# Patient Record
Sex: MALE | Race: WHITE | NOT HISPANIC OR LATINO | Employment: OTHER | URBAN - METROPOLITAN AREA
[De-identification: names, ages, dates, MRNs, and addresses within clinical notes are randomized per-mention and may not be internally consistent; named-entity substitution may affect disease eponyms.]

---

## 2020-07-06 ENCOUNTER — OFFICE VISIT (OUTPATIENT)
Dept: ORTHOPEDIC SURGERY | Facility: CLINIC | Age: 70
End: 2020-07-06

## 2020-07-06 VITALS
HEIGHT: 68 IN | DIASTOLIC BLOOD PRESSURE: 75 MMHG | SYSTOLIC BLOOD PRESSURE: 165 MMHG | HEART RATE: 93 BPM | BODY MASS INDEX: 33.25 KG/M2 | WEIGHT: 219.4 LBS

## 2020-07-06 DIAGNOSIS — M25.561 RIGHT KNEE PAIN, UNSPECIFIED CHRONICITY: Primary | ICD-10-CM

## 2020-07-06 DIAGNOSIS — M17.11 PRIMARY OSTEOARTHRITIS OF RIGHT KNEE: ICD-10-CM

## 2020-07-06 PROCEDURE — 20610 DRAIN/INJ JOINT/BURSA W/O US: CPT | Performed by: FAMILY MEDICINE

## 2020-07-06 PROCEDURE — 99203 OFFICE O/P NEW LOW 30 MIN: CPT | Performed by: FAMILY MEDICINE

## 2020-07-06 RX ORDER — GLIPIZIDE 10 MG/1
10 TABLET ORAL NIGHTLY
COMMUNITY

## 2020-07-06 RX ORDER — PRAVASTATIN SODIUM 10 MG
20 TABLET ORAL NIGHTLY
COMMUNITY
Start: 2020-04-14 | End: 2021-05-27 | Stop reason: ALTCHOICE

## 2020-07-06 RX ORDER — TRIAMCINOLONE ACETONIDE 40 MG/ML
80 INJECTION, SUSPENSION INTRA-ARTICULAR; INTRAMUSCULAR
Status: COMPLETED | OUTPATIENT
Start: 2020-07-06 | End: 2020-07-06

## 2020-07-06 RX ORDER — GLYBURIDE 5 MG/1
TABLET ORAL
Status: ON HOLD | COMMUNITY
End: 2020-10-21

## 2020-07-06 RX ORDER — LISINOPRIL 40 MG/1
40 TABLET ORAL NIGHTLY
COMMUNITY

## 2020-07-06 RX ORDER — LOSARTAN POTASSIUM 100 MG/1
TABLET ORAL
Status: ON HOLD | COMMUNITY
End: 2020-10-21

## 2020-07-06 RX ORDER — ERGOCALCIFEROL 1.25 MG/1
50000 CAPSULE ORAL
COMMUNITY
Start: 2020-04-14

## 2020-07-06 RX ORDER — AMLODIPINE BESYLATE 5 MG/1
5 TABLET ORAL NIGHTLY
COMMUNITY
Start: 2020-04-14

## 2020-07-06 RX ORDER — ALBUTEROL SULFATE 90 UG/1
1 AEROSOL, METERED RESPIRATORY (INHALATION) EVERY 4 HOURS PRN
Status: ON HOLD | COMMUNITY
End: 2021-05-19

## 2020-07-06 RX ORDER — GABAPENTIN 300 MG/1
300 CAPSULE ORAL 3 TIMES DAILY
COMMUNITY

## 2020-07-06 RX ADMIN — TRIAMCINOLONE ACETONIDE 80 MG: 40 INJECTION, SUSPENSION INTRA-ARTICULAR; INTRAMUSCULAR at 13:02

## 2020-07-06 NOTE — PROGRESS NOTES
Procedure   Large Joint Arthrocentesis: R knee  Date/Time: 7/6/2020 1:02 PM  Consent given by: patient  Site marked: site marked  Timeout: Immediately prior to procedure a time out was called to verify the correct patient, procedure, equipment, support staff and site/side marked as required   Supporting Documentation  Indications: pain   Procedure Details  Location: knee - R knee  Preparation: Patient was prepped and draped in the usual sterile fashion  Needle size: 25 G  Approach: anteromedial  Medications administered: 80 mg triamcinolone acetonide 40 MG/ML (2cc of 1% lidocaine without epinepherine, and 2cc of 40mg Kenalog)  Patient tolerance: patient tolerated the procedure well with no immediate complications (Blood loss negligable, pt admits to immediate decrease in pain and improved ROM with gentle ambulation post injection.)

## 2020-07-06 NOTE — PROGRESS NOTES
"Primary Care Sports Medicine Office Visit Note     Patient ID: Brenden Peter is a 69 y.o. male.    Chief Complaint:  Chief Complaint   Patient presents with   • Right Knee - Initial Evaluation, Pain, Edema     X 1 year     HPI:    Mr. Brenden Peter is a 69 y.o. male who presents to the clinic today for bilateral knee pain. Pt states that he has had knee pain for about 1 year. Denies any injury or trauma. Insidious onset. Since that time, has tried rest, icing, patches. Has attempted corticosteroid injections, and HA injections with his previous physicians, with no improvement in pain.     Past Medical History:   Diagnosis Date   • Anxiety    • Diabetes (CMS/HCC)    • Hyperlipidemia    • Hypertension        History reviewed. No pertinent surgical history.    Family History   Problem Relation Age of Onset   • Cancer Other    • Diabetes Other    • Heart disease Other      Social History     Occupational History   • Not on file   Tobacco Use   • Smoking status: Current Every Day Smoker     Packs/day: 0.50     Types: Cigarettes   • Smokeless tobacco: Never Used   Substance and Sexual Activity   • Alcohol use: Not Currently     Frequency: Never   • Drug use: Never   • Sexual activity: Defer      Review of Systems   Constitutional: Negative for activity change and fever.   Respiratory: Negative for cough and shortness of breath.    Cardiovascular: Negative for chest pain.   Gastrointestinal: Negative for constipation, diarrhea, nausea and vomiting.   Musculoskeletal: Positive for arthralgias.   Skin: Negative for color change and rash.   Neurological: Negative for weakness.   Hematological: Does not bruise/bleed easily.     Objective:    /75 (BP Location: Left arm, Patient Position: Sitting, Cuff Size: Large Adult)   Pulse 93   Ht 172.7 cm (68\")   Wt 99.5 kg (219 lb 6.4 oz)   BMI 33.36 kg/m²     Physical Examination:  Physical Exam   Constitutional: He appears well-developed and well-nourished. No distress. " "  HENT:   Head: Normocephalic and atraumatic.   Eyes: Conjunctivae are normal.   Cardiovascular: Intact distal pulses.   Pulmonary/Chest: Effort normal. No respiratory distress.   Musculoskeletal:        Right knee: He exhibits no effusion.   Neurological: He is alert.   Skin: Skin is warm. Capillary refill takes less than 2 seconds. He is not diaphoretic.   Nursing note and vitals reviewed.    Right Knee Exam     Muscle Strength   The patient has normal right knee strength.    Tenderness   The patient is experiencing tenderness in the medial joint line.    Range of Motion   Extension: normal   Flexion: normal     Tests   Wilson:  Medial - negative Lateral - negative  Varus: negative Valgus: negative  Right knee patellar apprehension test: +patellar grind, +luis jeong.    Other   Erythema: absent  Sensation: normal  Pulse: present (distal to the knee, DP and PT palpable)  Swelling: none  Effusion: no effusion present          Imaging and other tests:  Three-view XR of the right knee reveals significant joint space loss with bone-on-bone arthralgia of the medial compartment.  Lateral and patellofemoral compartment joint spaces are moderately well preserved.  There is sclerosis of the tibial plateau of the medial aspect of the knee as well.  Varus knee.  Otherwise, no acute bony abnormality.    Assessment and Plan:    1. Right knee pain, unspecified chronicity  - XR Knee 3 View Right    2. Primary osteoarthritis of right knee    After discussion of risks and benefits, and all other treatment options, the patient elected to proceed with corticosteroid injection to the right knee.  He tolerated this procedure well without complaints or problems.  Continue conservative management otherwise, RTC in 3 months, or sooner if necessary.    Luis Daniel MELLO \"Chance\" Gil MARIE DO, CAQSM  07/06/20  13:01    Disclaimer: Please note that areas of this note were completed with computer voice recognition software.  Quite often " unanticipated grammatical, syntax, homophones, and other interpretive errors are inadvertently transcribed by the computer software. Please excuse any errors that have escaped final proofreading.

## 2020-09-15 ENCOUNTER — OFFICE VISIT (OUTPATIENT)
Dept: ORTHOPEDIC SURGERY | Facility: CLINIC | Age: 70
End: 2020-09-15

## 2020-09-15 VITALS — HEIGHT: 67 IN | WEIGHT: 212 LBS | BODY MASS INDEX: 33.27 KG/M2

## 2020-09-15 DIAGNOSIS — M17.12 PRIMARY OSTEOARTHRITIS OF LEFT KNEE: ICD-10-CM

## 2020-09-15 DIAGNOSIS — M17.11 PRIMARY OSTEOARTHRITIS OF RIGHT KNEE: Primary | ICD-10-CM

## 2020-09-15 PROCEDURE — 99214 OFFICE O/P EST MOD 30 MIN: CPT | Performed by: ORTHOPAEDIC SURGERY

## 2020-09-15 RX ORDER — MELOXICAM 7.5 MG/1
15 TABLET ORAL ONCE
Status: CANCELLED | OUTPATIENT
Start: 2020-09-15 | End: 2020-09-15

## 2020-09-15 RX ORDER — OXYCODONE HCL 10 MG/1
10 TABLET, FILM COATED, EXTENDED RELEASE ORAL ONCE
Status: CANCELLED | OUTPATIENT
Start: 2020-09-15 | End: 2020-09-15

## 2020-09-15 RX ORDER — ACETAMINOPHEN 325 MG/1
1000 TABLET ORAL ONCE
Status: CANCELLED | OUTPATIENT
Start: 2020-09-15 | End: 2020-09-15

## 2020-09-15 NOTE — PROGRESS NOTES
NEW/FOLLOW UP VISIT    Patient: Brenden Peter  ?  YOB: 1950    MRN: [id number]  ?  Chief Complaint   Patient presents with   • Right Knee - Consult      ?  HPI:   Brenden Vásquez is being seen in consultation at the request of Dr. Espinzoa Cordero MD.  The patient states that he has continuous pain and discomfort in his knee.  He is limping just about all the time.  He has tried every form of nonoperative management but continues to have a lot of discomfort in his right knee.  He states that he is afraid of falling because a couple of times is knee has buckled and given out from underneath him.  The patient states that he has difficulty with walking on uneven surfaces especially going up and down the steps.  He is tried steroid injections as well as gel injections into the joint which have really not helped him.  He states that both his knees bother him considerably but the right one is much more symptomatic than the left knee.      Pain Location: BILATERAL knee  Radiation: none  Quality: dull, aching  Intensity/Severity: moderate  Duration: 2 years  Onset quality: gradual   Timing: constant  Aggravating Factors: kneeling, rising after sitting, squatting  Alleviating Factors: NSAIDs, steroid injection (intra-articular), brace  Previous Episodes: yes  Associated Symptoms: pain, swelling, clicking/popping  ADLs Affected: ambulating, recreational activities/sports  Previous Treatment: Anti-inflammatory medication and intra-articular injections of steroid.    This patient is an established patient.  This problem is new to this examiner.      Allergies: No Known Allergies    Medications:   Home Medications:  Current Outpatient Medications on File Prior to Visit   Medication Sig   • albuterol sulfate  (90 Base) MCG/ACT inhaler albuterol sulfate HFA 90 mcg/actuation aerosol inhaler   1 - 2 puffs every 4 to 6 hours as needed for cough or shortness of breath   • amLODIPine (NORVASC) 5 MG tablet     • gabapentin (NEURONTIN) 300 MG capsule gabapentin 300 mg capsule   • glipizide (GLUCOTROL) 10 MG tablet glipizide 10 mg tablet   TAKE 1 TABLET BY MOUTH EVERY DAY AS DIRECTED   • glyburide (DIAbeta) 5 MG tablet glyburide 5 mg tablet   • lisinopril (PRINIVIL,ZESTRIL) 40 MG tablet lisinopril 40 mg tablet   • losartan (COZAAR) 100 MG tablet losartan 100 mg tablet   • metFORMIN (GLUCOPHAGE) 1000 MG tablet    • pravastatin (PRAVACHOL) 10 MG tablet    • sertraline (ZOLOFT) 50 MG tablet    • vitamin D (ERGOCALCIFEROL) 1.25 MG (60404 UT) capsule capsule      No current facility-administered medications on file prior to visit.      Current Medications:  Scheduled Meds:  PRN Meds:.    I have reviewed the patient's medical history in detail and updated the computerized patient record.  Review and summarization of old records include:    Past Medical History:   Diagnosis Date   • Anxiety    • Diabetes (CMS/HCC)    • Hyperlipidemia    • Hypertension      No past surgical history on file.  Social History     Occupational History   • Not on file   Tobacco Use   • Smoking status: Current Every Day Smoker     Packs/day: 0.50     Types: Cigarettes   • Smokeless tobacco: Never Used   Substance and Sexual Activity   • Alcohol use: Not Currently     Frequency: Never   • Drug use: Never   • Sexual activity: Defer      Family History   Problem Relation Age of Onset   • Cancer Other    • Diabetes Other    • Heart disease Other          Review of Systems   Constitutional: Negative.  Negative for fever.   HENT: Negative.    Eyes: Negative.    Respiratory: Negative.    Cardiovascular: Negative.    Endocrine: Negative.    Genitourinary: Negative.    Musculoskeletal: Positive for arthralgias, gait problem and joint swelling.   Skin: Negative.  Negative for rash and wound.   Allergic/Immunologic: Negative.    Neurological: Negative for numbness.   Hematological: Negative.    Psychiatric/Behavioral: Negative.           Wt Readings from Last 3  "Encounters:   09/15/20 96.2 kg (212 lb)   07/06/20 99.5 kg (219 lb 6.4 oz)     Ht Readings from Last 3 Encounters:   09/15/20 170.2 cm (67\")   07/06/20 172.7 cm (68\")     Body mass index is 33.2 kg/m².  Facility age limit for growth percentiles is 20 years.  There were no vitals filed for this visit.      Physical Exam  Constitutional: Patient is oriented to person, place, and time. Appears well-developed and well-nourished.   HENT:   Head: Normocephalic and atraumatic.   Eyes: Conjunctivae and EOM are normal. Pupils are equal, round, and reactive to light.   Cardiovascular: Normal rate, regular rhythm, normal heart sounds and intact distal pulses.   Pulmonary/Chest: Effort normal and breath sounds normal.   Musculoskeletal:   See detailed exam below   Neurological: Alert and oriented to person, place, and time. No sensory deficit. Coordination normal.   Skin: Skin is warm and dry. Capillary refill takes less than 2 seconds. No rash noted. No erythema.   Psychiatric: Patient has a normal mood and affect. His behavior is normal. Judgment and thought content normal.   Nursing note and vitals reviewed.      Ortho Exam:   Bilateral knee (varus). Patient has crepitus throughout range of motion. Positive patellar grind test. Mild effusion. Lachman is negative. Pivot shift is negative. Anterior and posterior drawer signs are negative. Significant joint line tenderness is noted on the medial aspect of the knee. Patient has a varus orientation of the knee. There is fullness and tenderness in the Popliteal fossa. Mild distention of a Popliteal cyst is noted in this location. Range of motion in flexion is from 5-110 degrees. Neurovascular status is intact.  Dorsalis pedis and posterior tibial artery pulses are palpable. Common peroneal nerve function is well preserved. Patient's gait is cautious and antalgic. Skin and soft tissues are mildly swollen, consistent with synovitis and effusion. The patient has a significant limp " with the first few steps after starting the gait cycle. Getting out of a chair takes a lot of effort due to pain on knee flexion.               Diagnostics:  no diagnostic testing performed this visit  Previously obtained x-rays are reviewed.  There is varus deformity of the knee.  Medial bone-on-bone appearance is noted.  There does appear to be some deterioration of the medial femoral condyle bone.  There is subchondral collapse of the bone as well.  The patellofemoral distance is significantly narrowed.  My recommendation is to proceed with total knee arthroplasty based on a combination of radiographic findings and clinical presentation.    Assessment:  Brenden was seen today for consult.    Diagnoses and all orders for this visit:    Primary osteoarthritis of right knee    Primary osteoarthritis of left knee          Procedures  ?    Plan    · Compression/brace to the knee to prevent her from buckling and giving out.  · Intra-articular injections discussed and offered to the patient and he would try that on the left side but on the right side he wants to proceed with total knee arthroplasty.  · Time spent in the office today with the patient is 30 minutes of which greater than 50% of my time was spent face-to-face with the patient going over treatment options including the rationale for total knee arthroplasty.  · We will schedule him for right knee replacement in the near future after medical clearance has been sought and obtained.  The patient was seen today for preoperative discussion.  The patient has been tried on over-the-counter and prescription NSAID's despite the risks of anti-inflammatory bleeding, peptic ulcers and erosive gastritis with short term benefit only.  Braces have been prescribed for mechanical support.  Patient has been participating in an exercise program specifically targeting joint pain relief with limited benefit. Intraarticular injections have been used periodically with some but not  complete relief of pain.  Ambulation aids have also been utilized.    · The details of the surgical procedure were explained including the location of probable incisions and a description of the likely hardware/grafts to be used. The patient understands the likely convalescence after surgery as well as the rehabilitation required.  Also, we have thoroughly discussed with the patient the risks, benefits and alternatives to surgery.  Risks include but are not limited to the risk of infection, joint stiffness, limited range of motion, wound healing problems, scar tissue build up, myocardial infarction, stroke, blood clots (including DVT and/or pulmonary embolus along with the risk of death) neurologic and/or vascular injury, limb length discrepancy, fracture, dislocation, nonunion, malunion, continued pain and need for further surgery including hardware failure requiring revision.   · Rest, ice, compression, and elevation (RICE) therapy  · Stretching and strengthening exercises of the quads and the hamstrings.  · Tylenol 500-1000mg by mouth every 6 hours as needed for pain   · Follow up in 6 week(s)    Date of encounter: 09/15/2020   Ravi Fagan MD

## 2020-10-05 ENCOUNTER — TELEPHONE (OUTPATIENT)
Dept: ORTHOPEDIC SURGERY | Facility: CLINIC | Age: 70
End: 2020-10-05

## 2020-10-05 NOTE — TELEPHONE ENCOUNTER
PATIENT CALLED STATING HE WOULD LIKE TO HAVE BOTH KNEES REPLACED AT THE SAME TIME. I TOLD THE PATIENT WE WOULD HAVE TO LOOK INTO THIS AND SOMEONE WOULD GIVE HIM A CALL TO LET HIM KNOW AFTER WE CHECK WITH DR. SWEENEY NEXT WEEK. PLEASE ADVISE IF THIS IS POSSIBLE TO DO

## 2020-10-05 NOTE — TELEPHONE ENCOUNTER
I would strongly recommend against proceeding with simultaneous bilateral total knee arthroplasty in the same setting under the same anesthetic.  At this point the patient is 69 years of age and has a very significant deformity in his knees.  Even the primary surgery is likely to be fairly involved and complex and his recovery will definitely be complex.  The risk of potential complications related to the heart of the lungs is much higher with bilateral simultaneous knee replacement surgery.  It is my medical advice for the patient that he should have one knee replaced first, the most symptomatic 1.  Subsequently when he has recovered we should be able to go ahead with the neck surgery in about 6 to 8 weeks assuming that he has done very well with the first surgery.  Please communicate this to the patient.

## 2020-10-13 RX ORDER — PIOGLITAZONEHYDROCHLORIDE 30 MG/1
30 TABLET ORAL NIGHTLY
COMMUNITY

## 2020-10-13 NOTE — PAT
Secure chat sent to [facility] Porter and Dr Fagan.  No U/A order has been received for this total joint pt by your office.  If needed please place order.

## 2020-10-15 ENCOUNTER — OFFICE VISIT (OUTPATIENT)
Dept: CARDIOLOGY | Facility: CLINIC | Age: 70
End: 2020-10-15

## 2020-10-15 VITALS
HEIGHT: 67 IN | OXYGEN SATURATION: 96 % | BODY MASS INDEX: 35.63 KG/M2 | SYSTOLIC BLOOD PRESSURE: 152 MMHG | WEIGHT: 227 LBS | DIASTOLIC BLOOD PRESSURE: 79 MMHG | HEART RATE: 82 BPM

## 2020-10-15 DIAGNOSIS — I10 ESSENTIAL HYPERTENSION: ICD-10-CM

## 2020-10-15 DIAGNOSIS — E78.2 MIXED HYPERLIPIDEMIA: ICD-10-CM

## 2020-10-15 DIAGNOSIS — E11.9 TYPE 2 DIABETES MELLITUS WITHOUT COMPLICATION, WITHOUT LONG-TERM CURRENT USE OF INSULIN (HCC): ICD-10-CM

## 2020-10-15 DIAGNOSIS — Z01.810 PREOP CARDIOVASCULAR EXAM: ICD-10-CM

## 2020-10-15 DIAGNOSIS — R06.09 DYSPNEA ON EXERTION: ICD-10-CM

## 2020-10-15 DIAGNOSIS — I20.9 ANGINA PECTORIS (HCC): Primary | ICD-10-CM

## 2020-10-15 PROCEDURE — 93306 TTE W/DOPPLER COMPLETE: CPT | Performed by: INTERNAL MEDICINE

## 2020-10-15 PROCEDURE — 99204 OFFICE O/P NEW MOD 45 MIN: CPT | Performed by: INTERNAL MEDICINE

## 2020-10-15 NOTE — PROGRESS NOTES
"    Subjective:     Encounter Date:10/15/2020      Patient ID: Brenden Peter is a 69 y.o. male.    Chief Complaint: Abnormal EKG, Preop evaluation  History of Present Illness  69-year-old white male patient with multiple cardiac risk factors now scheduled for right total knee replacement  Patient have very limited activity due to the knee pain he has some dyspnea on exertion, Could be angina equilance  Multiple cardiac risk factors  EKG showed sinus rhythm right bundle branch block septal Q waves  Patient was advised echocardiogram to rule out any structural heart disease and a pharmacological stress Myoview  Blood pressure needs to be aggressively controlled  Patient was advised to stop smoking modify cardiac risk factors  The following portions of the patient's history were reviewed and updated as appropriate: Allergies current medications past family history past medical history past social history past surgical history problem list and review of systems    /79 (BP Location: Left arm, Patient Position: Sitting, Cuff Size: Adult)   Pulse 82   Ht 170.2 cm (67\")   Wt 103 kg (227 lb)   SpO2 96%   BMI 35.55 kg/m²     Past Medical History:   Diagnosis Date   • Anxiety    • Diabetes (CMS/[facility])    • Hyperlipidemia    • Hypertension      History reviewed. No pertinent surgical history.  Social History     Socioeconomic History   • Marital status:      Spouse name: Not on file   • Number of children: Not on file   • Years of education: Not on file   • Highest education level: Not on file   Tobacco Use   • Smoking status: Current Every Day Smoker     Packs/day: 0.50     Types: Cigarettes   • Smokeless tobacco: Never Used   Substance and Sexual Activity   • Alcohol use: Not Currently     Frequency: Never   • Drug use: Never   • Sexual activity: Defer     Family History   Problem Relation Age of Onset   • Cancer Other    • Diabetes Other    • Heart disease Other    • No Known Problems Mother    • No Known " Problems Father    • No Known Problems Sister    • No Known Problems Brother    • No Known Problems Maternal Aunt    • No Known Problems Maternal Uncle    • No Known Problems Paternal Aunt    • No Known Problems Paternal Uncle    • No Known Problems Maternal Grandmother    • No Known Problems Maternal Grandfather    • No Known Problems Paternal Grandmother    • No Known Problems Paternal Grandfather    • Anemia Neg Hx    • Arrhythmia Neg Hx    • Asthma Neg Hx    • Clotting disorder Neg Hx    • Fainting Neg Hx    • Heart attack Neg Hx    • Heart failure Neg Hx    • Hyperlipidemia Neg Hx    • Hypertension Neg Hx        Current Outpatient Medications:   •  albuterol sulfate  (90 Base) MCG/ACT inhaler, Inhale 1 puff Every 4 (Four) Hours As Needed for Shortness of Air., Disp: , Rfl:   •  amLODIPine (NORVASC) 5 MG tablet, Take 5 mg by mouth Every Night., Disp: , Rfl:   •  gabapentin (NEURONTIN) 300 MG capsule, Take 300 mg by mouth 3 (Three) Times a Day., Disp: , Rfl:   •  glipizide (GLUCOTROL) 10 MG tablet, Take 10 mg by mouth Every Night., Disp: , Rfl:   •  glyburide (DIAbeta) 5 MG tablet, glyburide 5 mg tablet, Disp: , Rfl:   •  lisinopril (PRINIVIL,ZESTRIL) 40 MG tablet, Take 40 mg by mouth Every Night. LD 10/19, Disp: , Rfl:   •  losartan (COZAAR) 100 MG tablet, losartan 100 mg tablet, Disp: , Rfl:   •  metFORMIN (GLUCOPHAGE) 1000 MG tablet, Take 1,000 mg by mouth 2 (Two) Times a Day With Meals. LD 10/18, Disp: , Rfl:   •  mupirocin (BACTROBAN) 2 % ointment, Apply a pea-sized amount to each nostril twice daily for 5 days prior to surgery., Disp: 22 g, Rfl: 0  •  pioglitazone (ACTOS) 30 MG tablet, Take 30 mg by mouth Every Night., Disp: , Rfl:   •  pravastatin (PRAVACHOL) 10 MG tablet, Take 10 mg by mouth Every Night., Disp: , Rfl:   •  sertraline (ZOLOFT) 50 MG tablet, Take 50 mg by mouth Every Night., Disp: , Rfl:   •  vitamin D (ERGOCALCIFEROL) 1.25 MG (96960 UT) capsule capsule, 50,000 Units 2 (Two) Times a  Week. Tues and Fri, Disp: , Rfl:   No Known Allergies    Review of Systems   Constitution: Negative for fever and malaise/fatigue.   HENT: Negative for congestion and hearing loss.    Eyes: Negative for double vision and visual disturbance.   Cardiovascular: Negative for chest pain, claudication, dyspnea on exertion, leg swelling and syncope.   Respiratory: Negative for cough and shortness of breath.    Endocrine: Negative for cold intolerance.   Skin: Negative for color change and rash.   Musculoskeletal: Negative for arthritis and joint pain.   Gastrointestinal: Negative for abdominal pain and heartburn.   Genitourinary: Negative for hematuria.   Neurological: Negative for excessive daytime sleepiness and dizziness.   Psychiatric/Behavioral: Negative for depression. The patient is not nervous/anxious.    All other systems reviewed and are negative.             Objective:     Constitutional:       Appearance: Healthy appearance. Well-developed.      Comments: Obese   Eyes:      General: No scleral icterus.     Conjunctiva/sclera: Conjunctivae normal.   HENT:      Head: Normocephalic and atraumatic.    Mouth/Throat:      Mouth: No oral lesions.      Pharynx: Oropharynx is clear. Uvula midline.   Neck:      Musculoskeletal: Neck supple.      Thyroid: No thyromegaly.      Vascular: No carotid bruit or JVD.      Trachea: Trachea normal.   Pulmonary:      Effort: Pulmonary effort is normal.      Breath sounds: Normal breath sounds.   Cardiovascular:      Normal rate. Regular rhythm.      No gallop.   Pulses:     Intact distal pulses.   Abdominal:      General: Bowel sounds are normal.      Palpations: Abdomen is soft.   Musculoskeletal:         General: Tenderness present.   Skin:     General: Skin is warm. There is no cyanosis.   Neurological:      Mental Status: Alert and oriented to person, place, and time.      Comments: No focal deficits   Psychiatric:         Behavior: Behavior is cooperative.          Procedures    Lab Review:       Assessment:          Diagnosis Plan   1. Angina pectoris (CMS/HCC)  Stress Test With Myocardial Perfusion One Day   2. Dyspnea on exertion  Stress Test With Myocardial Perfusion One Day   3. Essential hypertension  Stress Test With Myocardial Perfusion One Day   4. Mixed hyperlipidemia  Stress Test With Myocardial Perfusion One Day   5. Type 2 diabetes mellitus without complication, without long-term current use of insulin (CMS/HCC)  Stress Test With Myocardial Perfusion One Day   6. Preop cardiovascular exam  Stress Test With Myocardial Perfusion One Day          Plan:       Multiple cardiac risk factors suggest stress Myoview and echocardiogram  Patient strongly advised to stop smoking modify cardiac risk factors

## 2020-10-16 ENCOUNTER — HOSPITAL ENCOUNTER (OUTPATIENT)
Dept: CARDIOLOGY | Facility: HOSPITAL | Age: 70
End: 2020-10-16

## 2020-10-16 ENCOUNTER — HOSPITAL ENCOUNTER (OUTPATIENT)
Dept: CARDIOLOGY | Facility: HOSPITAL | Age: 70
Discharge: HOME | End: 2020-10-16

## 2020-10-16 ENCOUNTER — TRANSCRIBE ORDERS (OUTPATIENT)
Dept: ADMINISTRATIVE | Facility: HOSPITAL | Age: 70
End: 2020-10-16

## 2020-10-16 DIAGNOSIS — R06.09 DYSPNEA ON EXERTION: ICD-10-CM

## 2020-10-16 DIAGNOSIS — I10 ESSENTIAL HYPERTENSION: ICD-10-CM

## 2020-10-16 DIAGNOSIS — I20.9 ANGINA PECTORIS (HCC): ICD-10-CM

## 2020-10-16 DIAGNOSIS — Z01.818 OTHER SPECIFIED PRE-OPERATIVE EXAMINATION: Primary | ICD-10-CM

## 2020-10-16 DIAGNOSIS — E78.2 MIXED HYPERLIPIDEMIA: ICD-10-CM

## 2020-10-16 DIAGNOSIS — Z01.810 PREOP CARDIOVASCULAR EXAM: ICD-10-CM

## 2020-10-16 DIAGNOSIS — E11.9 TYPE 2 DIABETES MELLITUS WITHOUT COMPLICATION, WITHOUT LONG-TERM CURRENT USE OF INSULIN (HCC): ICD-10-CM

## 2020-10-19 ENCOUNTER — TELEPHONE (OUTPATIENT)
Dept: CARDIOLOGY | Facility: CLINIC | Age: 70
End: 2020-10-19

## 2020-10-19 ENCOUNTER — HOSPITAL ENCOUNTER (OUTPATIENT)
Dept: CARDIOLOGY | Facility: HOSPITAL | Age: 70
Discharge: HOME | End: 2020-10-19

## 2020-10-19 ENCOUNTER — LAB (OUTPATIENT)
Dept: LAB | Facility: HOSPITAL | Age: 70
End: 2020-10-19

## 2020-10-19 DIAGNOSIS — Z01.818 PREOP TESTING: Primary | ICD-10-CM

## 2020-10-19 DIAGNOSIS — M17.11 PRIMARY LOCALIZED OSTEOARTHROSIS OF RIGHT LOWER LEG: ICD-10-CM

## 2020-10-19 DIAGNOSIS — Z01.818 OTHER SPECIFIED PRE-OPERATIVE EXAMINATION: ICD-10-CM

## 2020-10-19 DIAGNOSIS — M17.11 PRIMARY OSTEOARTHRITIS OF RIGHT KNEE: ICD-10-CM

## 2020-10-19 DIAGNOSIS — M17.11 PRIMARY LOCALIZED OSTEOARTHROSIS OF RIGHT LOWER LEG: Primary | ICD-10-CM

## 2020-10-19 LAB
ABO GROUP BLD: NORMAL
ANION GAP SERPL CALCULATED.3IONS-SCNC: 8.9 MMOL/L (ref 5–15)
APTT PPP: 28.7 SECONDS (ref 24–31)
BACTERIA UR QL AUTO: ABNORMAL /HPF
BH CV STRESS COMMENTS STAGE 1: NORMAL
BH CV STRESS DOSE REGADENOSON STAGE 1: 0.4
BH CV STRESS DURATION MIN STAGE 1: 0
BH CV STRESS DURATION SEC STAGE 1: 10
BH CV STRESS PROTOCOL 1: NORMAL
BH CV STRESS RECOVERY BP: NORMAL MMHG
BH CV STRESS RECOVERY HR: 101 BPM
BH CV STRESS STAGE 1: 1
BILIRUB UR QL STRIP: NEGATIVE
BLD GP AB SCN SERPL QL: NEGATIVE
BUN SERPL-MCNC: 12 MG/DL (ref 8–23)
BUN/CREAT SERPL: 18.2 (ref 7–25)
CALCIUM SPEC-SCNC: 9.7 MG/DL (ref 8.6–10.5)
CHLORIDE SERPL-SCNC: 103 MMOL/L (ref 98–107)
CLARITY UR: CLEAR
CO2 SERPL-SCNC: 28.1 MMOL/L (ref 22–29)
COLOR UR: YELLOW
CREAT SERPL-MCNC: 0.66 MG/DL (ref 0.76–1.27)
DEPRECATED RDW RBC AUTO: 45.1 FL (ref 37–54)
ERYTHROCYTE [DISTWIDTH] IN BLOOD BY AUTOMATED COUNT: 13.9 % (ref 12.3–15.4)
GFR SERPL CREATININE-BSD FRML MDRD: 120 ML/MIN/1.73
GLUCOSE SERPL-MCNC: 193 MG/DL (ref 65–99)
GLUCOSE UR STRIP-MCNC: NEGATIVE MG/DL
HBA1C MFR BLD: 7.4 % (ref 3.5–5.6)
HCT VFR BLD AUTO: 38.4 % (ref 37.5–51)
HGB BLD-MCNC: 13 G/DL (ref 13–17.7)
HGB UR QL STRIP.AUTO: NEGATIVE
HYALINE CASTS UR QL AUTO: ABNORMAL /LPF
INR PPP: 1.03 (ref 0.93–1.1)
KETONES UR QL STRIP: NEGATIVE
LEUKOCYTE ESTERASE UR QL STRIP.AUTO: ABNORMAL
LV EF NUC BP: 56 %
MAXIMAL PREDICTED HEART RATE: 151 BPM
MCH RBC QN AUTO: 30.2 PG (ref 26.6–33)
MCHC RBC AUTO-ENTMCNC: 33.9 G/DL (ref 31.5–35.7)
MCV RBC AUTO: 89.1 FL (ref 79–97)
MRSA DNA SPEC QL NAA+PROBE: NORMAL
NITRITE UR QL STRIP: NEGATIVE
PH UR STRIP.AUTO: 5.5 [PH] (ref 5–8)
PLATELET # BLD AUTO: 180 10*3/MM3 (ref 140–450)
PMV BLD AUTO: 10.9 FL (ref 6–12)
POTASSIUM SERPL-SCNC: 4.5 MMOL/L (ref 3.5–5.2)
PROT UR QL STRIP: ABNORMAL
PROTHROMBIN TIME: 11.3 SECONDS (ref 9.6–11.7)
RBC # BLD AUTO: 4.31 10*6/MM3 (ref 4.14–5.8)
RBC # UR: ABNORMAL /HPF
REF LAB TEST METHOD: ABNORMAL
RH BLD: POSITIVE
SODIUM SERPL-SCNC: 140 MMOL/L (ref 136–145)
SP GR UR STRIP: 1.02 (ref 1–1.03)
SQUAMOUS #/AREA URNS HPF: ABNORMAL /HPF
STRESS BASELINE BP: NORMAL MMHG
STRESS BASELINE HR: 85 BPM
STRESS TARGET HR: 128 BPM
T&S EXPIRATION DATE: NORMAL
UROBILINOGEN UR QL STRIP: ABNORMAL
WBC # BLD AUTO: 6.64 10*3/MM3 (ref 3.4–10.8)
WBC UR QL AUTO: ABNORMAL /HPF

## 2020-10-19 PROCEDURE — 78452 HT MUSCLE IMAGE SPECT MULT: CPT | Performed by: INTERNAL MEDICINE

## 2020-10-19 PROCEDURE — U0004 COV-19 TEST NON-CDC HGH THRU: HCPCS

## 2020-10-19 PROCEDURE — 93016 CV STRESS TEST SUPVJ ONLY: CPT | Performed by: INTERNAL MEDICINE

## 2020-10-19 PROCEDURE — A9500 TC99M SESTAMIBI: HCPCS | Performed by: INTERNAL MEDICINE

## 2020-10-19 PROCEDURE — 85610 PROTHROMBIN TIME: CPT

## 2020-10-19 PROCEDURE — 93018 CV STRESS TEST I&R ONLY: CPT | Performed by: INTERNAL MEDICINE

## 2020-10-19 PROCEDURE — 36415 COLL VENOUS BLD VENIPUNCTURE: CPT

## 2020-10-19 PROCEDURE — 85730 THROMBOPLASTIN TIME PARTIAL: CPT

## 2020-10-19 PROCEDURE — 86901 BLOOD TYPING SEROLOGIC RH(D): CPT

## 2020-10-19 PROCEDURE — 93017 CV STRESS TEST TRACING ONLY: CPT

## 2020-10-19 PROCEDURE — 83036 HEMOGLOBIN GLYCOSYLATED A1C: CPT

## 2020-10-19 PROCEDURE — 86900 BLOOD TYPING SEROLOGIC ABO: CPT | Performed by: ORTHOPAEDIC SURGERY

## 2020-10-19 PROCEDURE — 86901 BLOOD TYPING SEROLOGIC RH(D): CPT | Performed by: ORTHOPAEDIC SURGERY

## 2020-10-19 PROCEDURE — 87086 URINE CULTURE/COLONY COUNT: CPT | Performed by: ORTHOPAEDIC SURGERY

## 2020-10-19 PROCEDURE — 80048 BASIC METABOLIC PNL TOTAL CA: CPT

## 2020-10-19 PROCEDURE — 87641 MR-STAPH DNA AMP PROBE: CPT

## 2020-10-19 PROCEDURE — 86900 BLOOD TYPING SEROLOGIC ABO: CPT

## 2020-10-19 PROCEDURE — 25010000002 REGADENOSON 0.4 MG/5ML SOLUTION: Performed by: INTERNAL MEDICINE

## 2020-10-19 PROCEDURE — 0 TECHNETIUM SESTAMIBI: Performed by: INTERNAL MEDICINE

## 2020-10-19 PROCEDURE — 85027 COMPLETE CBC AUTOMATED: CPT

## 2020-10-19 PROCEDURE — C9803 HOPD COVID-19 SPEC COLLECT: HCPCS

## 2020-10-19 PROCEDURE — 86850 RBC ANTIBODY SCREEN: CPT | Performed by: ORTHOPAEDIC SURGERY

## 2020-10-19 PROCEDURE — 81001 URINALYSIS AUTO W/SCOPE: CPT | Performed by: ORTHOPAEDIC SURGERY

## 2020-10-19 PROCEDURE — 78452 HT MUSCLE IMAGE SPECT MULT: CPT

## 2020-10-19 RX ADMIN — TECHNETIUM TC 99M SESTAMIBI 1 DOSE: 1 INJECTION INTRAVENOUS at 09:45

## 2020-10-19 RX ADMIN — REGADENOSON 0.4 MG: 0.08 INJECTION, SOLUTION INTRAVENOUS at 10:30

## 2020-10-19 RX ADMIN — TECHNETIUM TC 99M SESTAMIBI 1 DOSE: 1 INJECTION INTRAVENOUS at 09:30

## 2020-10-19 ASSESSMENT — KOOS JR
KOOS JR SCORE: 34.174
KOOS JR SCORE: 21

## 2020-10-20 ENCOUNTER — TELEPHONE (OUTPATIENT)
Dept: CARDIOLOGY | Facility: CLINIC | Age: 70
End: 2020-10-20

## 2020-10-20 LAB
BACTERIA SPEC AEROBE CULT: NO GROWTH
SARS-COV-2 RNA RESP QL NAA+PROBE: NOT DETECTED

## 2020-10-20 NOTE — TELEPHONE ENCOUNTER
Pt spouse called to see if pt was cleared for stress test. Advsd pt cleared.  Wife stated was unhappy and had a bad experience with the stress test dept.  Stated wasn't offered a sprite and was complaining of lightheaded and dizziness and they didn't do anything to help and let him walk out to the car alone.  I spoke with Aurora Boyd and mel.

## 2020-10-21 ENCOUNTER — APPOINTMENT (OUTPATIENT)
Dept: GENERAL RADIOLOGY | Facility: HOSPITAL | Age: 70
End: 2020-10-21

## 2020-10-21 ENCOUNTER — HOSPITAL ENCOUNTER (OUTPATIENT)
Facility: HOSPITAL | Age: 70
Discharge: HOME HEALTH | End: 2020-10-22
Attending: ORTHOPAEDIC SURGERY | Admitting: ORTHOPAEDIC SURGERY

## 2020-10-21 ENCOUNTER — ANESTHESIA (OUTPATIENT)
Dept: PERIOP | Facility: HOSPITAL | Age: 70
End: 2020-10-21

## 2020-10-21 ENCOUNTER — ANESTHESIA EVENT (OUTPATIENT)
Dept: PERIOP | Facility: HOSPITAL | Age: 70
End: 2020-10-21

## 2020-10-21 DIAGNOSIS — R06.09 DYSPNEA ON EXERTION: Primary | ICD-10-CM

## 2020-10-21 DIAGNOSIS — M17.11 PRIMARY OSTEOARTHRITIS OF RIGHT KNEE: ICD-10-CM

## 2020-10-21 PROBLEM — M17.0 PRIMARY OSTEOARTHRITIS OF KNEES, BILATERAL: Status: ACTIVE | Noted: 2020-10-21

## 2020-10-21 LAB
GLUCOSE BLDC GLUCOMTR-MCNC: 133 MG/DL (ref 70–105)
GLUCOSE BLDC GLUCOMTR-MCNC: 137 MG/DL (ref 70–105)
GLUCOSE BLDC GLUCOMTR-MCNC: 190 MG/DL (ref 70–105)
GLUCOSE BLDC GLUCOMTR-MCNC: 241 MG/DL (ref 70–105)

## 2020-10-21 PROCEDURE — 63710000001 ATORVASTATIN 10 MG TABLET: Performed by: PHYSICIAN ASSISTANT

## 2020-10-21 PROCEDURE — 25010000002 ONDANSETRON PER 1 MG: Performed by: ANESTHESIOLOGY

## 2020-10-21 PROCEDURE — 71046 X-RAY EXAM CHEST 2 VIEWS: CPT

## 2020-10-21 PROCEDURE — A9270 NON-COVERED ITEM OR SERVICE: HCPCS | Performed by: PHYSICIAN ASSISTANT

## 2020-10-21 PROCEDURE — 63710000001 OXYCODONE 10 MG TABLET EXTENDED-RELEASE 12 HOUR: Performed by: ORTHOPAEDIC SURGERY

## 2020-10-21 PROCEDURE — C1776 JOINT DEVICE (IMPLANTABLE): HCPCS | Performed by: ORTHOPAEDIC SURGERY

## 2020-10-21 PROCEDURE — 63710000001 SERTRALINE 50 MG TABLET: Performed by: PHYSICIAN ASSISTANT

## 2020-10-21 PROCEDURE — 73560 X-RAY EXAM OF KNEE 1 OR 2: CPT

## 2020-10-21 PROCEDURE — S0260 H&P FOR SURGERY: HCPCS | Performed by: ORTHOPAEDIC SURGERY

## 2020-10-21 PROCEDURE — 63710000001 INSULIN LISPRO (HUMAN) PER 5 UNITS: Performed by: PHYSICIAN ASSISTANT

## 2020-10-21 PROCEDURE — 63710000001 PIOGLITAZONE 30 MG TABLET: Performed by: PHYSICIAN ASSISTANT

## 2020-10-21 PROCEDURE — 63710000001 GABAPENTIN 300 MG CAPSULE: Performed by: PHYSICIAN ASSISTANT

## 2020-10-21 PROCEDURE — A9270 NON-COVERED ITEM OR SERVICE: HCPCS | Performed by: ORTHOPAEDIC SURGERY

## 2020-10-21 PROCEDURE — G0378 HOSPITAL OBSERVATION PER HR: HCPCS

## 2020-10-21 PROCEDURE — 63710000001 ACETAMINOPHEN 500 MG TABLET: Performed by: PHYSICIAN ASSISTANT

## 2020-10-21 PROCEDURE — 25010000003 BUPIVACAINE LIPOSOME 1.3 % SUSPENSION: Performed by: ORTHOPAEDIC SURGERY

## 2020-10-21 PROCEDURE — 97161 PT EVAL LOW COMPLEX 20 MIN: CPT

## 2020-10-21 PROCEDURE — C1713 ANCHOR/SCREW BN/BN,TIS/BN: HCPCS | Performed by: ORTHOPAEDIC SURGERY

## 2020-10-21 PROCEDURE — 27447 TOTAL KNEE ARTHROPLASTY: CPT | Performed by: ORTHOPAEDIC SURGERY

## 2020-10-21 PROCEDURE — 20985 CPTR-ASST DIR MS PX: CPT | Performed by: ORTHOPAEDIC SURGERY

## 2020-10-21 PROCEDURE — 25010000002 FENTANYL CITRATE (PF) 100 MCG/2ML SOLUTION: Performed by: ANESTHESIOLOGY

## 2020-10-21 PROCEDURE — 63710000001 POLYETHYLENE GLYCOL 17 G PACK: Performed by: PHYSICIAN ASSISTANT

## 2020-10-21 PROCEDURE — 63710000001 GLIPIZIDE 5 MG TABLET: Performed by: PHYSICIAN ASSISTANT

## 2020-10-21 PROCEDURE — 25010000002 ROPIVACAINE PER 1 MG: Performed by: ANESTHESIOLOGY

## 2020-10-21 PROCEDURE — 25010000002 PROPOFOL 10 MG/ML EMULSION: Performed by: ANESTHESIOLOGY

## 2020-10-21 PROCEDURE — 63710000001 OXYCODONE 10 MG TABLET EXTENDED-RELEASE 12 HOUR: Performed by: PHYSICIAN ASSISTANT

## 2020-10-21 PROCEDURE — C9290 INJ, BUPIVACAINE LIPOSOME: HCPCS | Performed by: ORTHOPAEDIC SURGERY

## 2020-10-21 PROCEDURE — 82962 GLUCOSE BLOOD TEST: CPT

## 2020-10-21 PROCEDURE — 63710000001 POVIDONE-IODINE 10 % SOLUTION 118 ML BOTTLE: Performed by: ORTHOPAEDIC SURGERY

## 2020-10-21 PROCEDURE — 99201 PR OFFICE OUTPATIENT NEW 10 MINUTES: CPT | Performed by: NURSE PRACTITIONER

## 2020-10-21 PROCEDURE — 63710000001 MUPIROCIN 2 % OINTMENT 22 G TUBE: Performed by: ORTHOPAEDIC SURGERY

## 2020-10-21 PROCEDURE — 25010000002 CEFAZOLIN PER 500 MG: Performed by: PHYSICIAN ASSISTANT

## 2020-10-21 PROCEDURE — 63710000001 ACETAMINOPHEN 500 MG TABLET: Performed by: ORTHOPAEDIC SURGERY

## 2020-10-21 PROCEDURE — 25010000002 ROPIVACAINE PER 1 MG: Performed by: ORTHOPAEDIC SURGERY

## 2020-10-21 PROCEDURE — 25010000002 DEXAMETHASONE PER 1 MG: Performed by: ANESTHESIOLOGY

## 2020-10-21 PROCEDURE — 76942 ECHO GUIDE FOR BIOPSY: CPT | Performed by: ORTHOPAEDIC SURGERY

## 2020-10-21 PROCEDURE — 63710000001 RIVAROXABAN 10 MG TABLET: Performed by: PHYSICIAN ASSISTANT

## 2020-10-21 PROCEDURE — 63710000001 AMLODIPINE 5 MG TABLET: Performed by: PHYSICIAN ASSISTANT

## 2020-10-21 PROCEDURE — 63710000001 MELOXICAM 15 MG TABLET: Performed by: ORTHOPAEDIC SURGERY

## 2020-10-21 PROCEDURE — 25010000002 MIDAZOLAM PER 1 MG: Performed by: ANESTHESIOLOGY

## 2020-10-21 DEVICE — IMPLANTABLE DEVICE: Type: IMPLANTABLE DEVICE | Site: KNEE | Status: FUNCTIONAL

## 2020-10-21 DEVICE — COMP FEM/KN PERSONA CR CMT COCR STD SZ7 RT: Type: IMPLANTABLE DEVICE | Site: KNEE | Status: FUNCTIONAL

## 2020-10-21 DEVICE — DEV CONTRL TISS STRATAFIX PDS PLS SZ1 VIL 18IN 45 CM: Type: IMPLANTABLE DEVICE | Site: KNEE | Status: FUNCTIONAL

## 2020-10-21 DEVICE — IMPLANTABLE DEVICE
Type: IMPLANTABLE DEVICE | Site: KNEE | Status: NON-FUNCTIONAL
Removed: 2021-05-19

## 2020-10-21 DEVICE — CAP BEAR KN VE UPCHRG: Type: IMPLANTABLE DEVICE | Site: KNEE | Status: FUNCTIONAL

## 2020-10-21 DEVICE — STEM TIB/KN PERSONA CMT 5D SZE RT: Type: IMPLANTABLE DEVICE | Site: KNEE | Status: FUNCTIONAL

## 2020-10-21 DEVICE — CMT BONE REFOBACIN R W/GENT 1X40: Type: IMPLANTABLE DEVICE | Site: KNEE | Status: FUNCTIONAL

## 2020-10-21 DEVICE — EXT STEM FEM/KN PERSONA TPR 14XPLS30MM: Type: IMPLANTABLE DEVICE | Site: KNEE | Status: FUNCTIONAL

## 2020-10-21 DEVICE — CAP TOTL KN CMT PREMIUM: Type: IMPLANTABLE DEVICE | Site: KNEE | Status: FUNCTIONAL

## 2020-10-21 RX ORDER — HYDROMORPHONE HCL 110MG/55ML
0.5 PATIENT CONTROLLED ANALGESIA SYRINGE INTRAVENOUS
Status: DISCONTINUED | OUTPATIENT
Start: 2020-10-21 | End: 2020-10-21 | Stop reason: HOSPADM

## 2020-10-21 RX ORDER — BACITRACIN 50000 [IU]/1
INJECTION, POWDER, FOR SOLUTION INTRAMUSCULAR AS NEEDED
Status: DISCONTINUED | OUTPATIENT
Start: 2020-10-21 | End: 2020-10-21 | Stop reason: HOSPADM

## 2020-10-21 RX ORDER — PROMETHAZINE HYDROCHLORIDE 12.5 MG/1
12.5 TABLET ORAL EVERY 6 HOURS PRN
Status: DISCONTINUED | OUTPATIENT
Start: 2020-10-21 | End: 2020-10-22 | Stop reason: HOSPADM

## 2020-10-21 RX ORDER — FENTANYL CITRATE 50 UG/ML
50 INJECTION, SOLUTION INTRAMUSCULAR; INTRAVENOUS
Status: DISCONTINUED | OUTPATIENT
Start: 2020-10-21 | End: 2020-10-21 | Stop reason: HOSPADM

## 2020-10-21 RX ORDER — DEXTROSE MONOHYDRATE 25 G/50ML
25 INJECTION, SOLUTION INTRAVENOUS
Status: DISCONTINUED | OUTPATIENT
Start: 2020-10-21 | End: 2020-10-22 | Stop reason: HOSPADM

## 2020-10-21 RX ORDER — FERROUS SULFATE TAB EC 324 MG (65 MG FE EQUIVALENT) 324 (65 FE) MG
324 TABLET DELAYED RESPONSE ORAL
Status: DISCONTINUED | OUTPATIENT
Start: 2020-10-22 | End: 2020-10-22 | Stop reason: HOSPADM

## 2020-10-21 RX ORDER — PROPOFOL 10 MG/ML
VIAL (ML) INTRAVENOUS AS NEEDED
Status: DISCONTINUED | OUTPATIENT
Start: 2020-10-21 | End: 2020-10-21 | Stop reason: SURG

## 2020-10-21 RX ORDER — PIOGLITAZONEHYDROCHLORIDE 30 MG/1
30 TABLET ORAL NIGHTLY
Status: DISCONTINUED | OUTPATIENT
Start: 2020-10-21 | End: 2020-10-22 | Stop reason: HOSPADM

## 2020-10-21 RX ORDER — NALOXONE HCL 0.4 MG/ML
0.4 VIAL (ML) INJECTION
Status: DISCONTINUED | OUTPATIENT
Start: 2020-10-21 | End: 2020-10-22 | Stop reason: HOSPADM

## 2020-10-21 RX ORDER — MIDAZOLAM HYDROCHLORIDE 1 MG/ML
INJECTION INTRAMUSCULAR; INTRAVENOUS
Status: COMPLETED | OUTPATIENT
Start: 2020-10-21 | End: 2020-10-21

## 2020-10-21 RX ORDER — ACETAMINOPHEN 650 MG
TABLET, EXTENDED RELEASE ORAL AS NEEDED
Status: DISCONTINUED | OUTPATIENT
Start: 2020-10-21 | End: 2020-10-21 | Stop reason: HOSPADM

## 2020-10-21 RX ORDER — FENTANYL CITRATE 50 UG/ML
INJECTION, SOLUTION INTRAMUSCULAR; INTRAVENOUS
Status: COMPLETED | OUTPATIENT
Start: 2020-10-21 | End: 2020-10-21

## 2020-10-21 RX ORDER — SODIUM CHLORIDE, SODIUM LACTATE, POTASSIUM CHLORIDE, CALCIUM CHLORIDE 600; 310; 30; 20 MG/100ML; MG/100ML; MG/100ML; MG/100ML
INJECTION, SOLUTION INTRAVENOUS CONTINUOUS PRN
Status: DISCONTINUED | OUTPATIENT
Start: 2020-10-21 | End: 2020-10-21 | Stop reason: SURG

## 2020-10-21 RX ORDER — NICOTINE POLACRILEX 4 MG
15 LOZENGE BUCCAL
Status: DISCONTINUED | OUTPATIENT
Start: 2020-10-21 | End: 2020-10-22 | Stop reason: HOSPADM

## 2020-10-21 RX ORDER — OXYCODONE HCL 10 MG/1
10 TABLET, FILM COATED, EXTENDED RELEASE ORAL EVERY 12 HOURS SCHEDULED
Status: DISCONTINUED | OUTPATIENT
Start: 2020-10-21 | End: 2020-10-22 | Stop reason: HOSPADM

## 2020-10-21 RX ORDER — TRAMADOL HYDROCHLORIDE 50 MG/1
50 TABLET ORAL EVERY 6 HOURS PRN
Status: DISCONTINUED | OUTPATIENT
Start: 2020-10-21 | End: 2020-10-22 | Stop reason: HOSPADM

## 2020-10-21 RX ORDER — LISINOPRIL 20 MG/1
40 TABLET ORAL NIGHTLY
Status: DISCONTINUED | OUTPATIENT
Start: 2020-10-22 | End: 2020-10-22 | Stop reason: HOSPADM

## 2020-10-21 RX ORDER — ATORVASTATIN CALCIUM 10 MG/1
10 TABLET, FILM COATED ORAL NIGHTLY
Status: DISCONTINUED | OUTPATIENT
Start: 2020-10-21 | End: 2020-10-22 | Stop reason: HOSPADM

## 2020-10-21 RX ORDER — OXYCODONE HCL 10 MG/1
10 TABLET, FILM COATED, EXTENDED RELEASE ORAL ONCE
Status: COMPLETED | OUTPATIENT
Start: 2020-10-21 | End: 2020-10-21

## 2020-10-21 RX ORDER — ACETAMINOPHEN 500 MG
1000 TABLET ORAL ONCE
Status: COMPLETED | OUTPATIENT
Start: 2020-10-21 | End: 2020-10-21

## 2020-10-21 RX ORDER — ALBUTEROL SULFATE 2.5 MG/3ML
2.5 SOLUTION RESPIRATORY (INHALATION) EVERY 4 HOURS PRN
Status: DISCONTINUED | OUTPATIENT
Start: 2020-10-21 | End: 2020-10-22 | Stop reason: HOSPADM

## 2020-10-21 RX ORDER — ROPIVACAINE HYDROCHLORIDE 5 MG/ML
INJECTION, SOLUTION EPIDURAL; INFILTRATION; PERINEURAL AS NEEDED
Status: DISCONTINUED | OUTPATIENT
Start: 2020-10-21 | End: 2020-10-21 | Stop reason: HOSPADM

## 2020-10-21 RX ORDER — ACETAMINOPHEN 500 MG
1000 TABLET ORAL EVERY 8 HOURS
Status: DISCONTINUED | OUTPATIENT
Start: 2020-10-21 | End: 2020-10-22 | Stop reason: HOSPADM

## 2020-10-21 RX ORDER — MORPHINE SULFATE 4 MG/ML
4 INJECTION, SOLUTION INTRAMUSCULAR; INTRAVENOUS
Status: DISCONTINUED | OUTPATIENT
Start: 2020-10-21 | End: 2020-10-22 | Stop reason: HOSPADM

## 2020-10-21 RX ORDER — GABAPENTIN 300 MG/1
300 CAPSULE ORAL 3 TIMES DAILY
Status: DISCONTINUED | OUTPATIENT
Start: 2020-10-21 | End: 2020-10-22 | Stop reason: HOSPADM

## 2020-10-21 RX ORDER — MELOXICAM 15 MG/1
15 TABLET ORAL ONCE
Status: COMPLETED | OUTPATIENT
Start: 2020-10-21 | End: 2020-10-21

## 2020-10-21 RX ORDER — DEXAMETHASONE SODIUM PHOSPHATE 4 MG/ML
INJECTION, SOLUTION INTRA-ARTICULAR; INTRALESIONAL; INTRAMUSCULAR; INTRAVENOUS; SOFT TISSUE
Status: COMPLETED | OUTPATIENT
Start: 2020-10-21 | End: 2020-10-21

## 2020-10-21 RX ORDER — OXYCODONE HYDROCHLORIDE 5 MG/1
5 TABLET ORAL EVERY 4 HOURS PRN
Status: DISCONTINUED | OUTPATIENT
Start: 2020-10-21 | End: 2020-10-22 | Stop reason: HOSPADM

## 2020-10-21 RX ORDER — ONDANSETRON 2 MG/ML
4 INJECTION INTRAMUSCULAR; INTRAVENOUS EVERY 6 HOURS PRN
Status: DISCONTINUED | OUTPATIENT
Start: 2020-10-21 | End: 2020-10-22 | Stop reason: HOSPADM

## 2020-10-21 RX ORDER — INSULIN LISPRO 100 [IU]/ML
0-9 INJECTION, SOLUTION INTRAVENOUS; SUBCUTANEOUS AS NEEDED
Status: DISCONTINUED | OUTPATIENT
Start: 2020-10-21 | End: 2020-10-22 | Stop reason: HOSPADM

## 2020-10-21 RX ORDER — DIPHENHYDRAMINE HCL 25 MG
25 CAPSULE ORAL EVERY 6 HOURS PRN
Status: DISCONTINUED | OUTPATIENT
Start: 2020-10-21 | End: 2020-10-22 | Stop reason: HOSPADM

## 2020-10-21 RX ORDER — AMLODIPINE BESYLATE 5 MG/1
5 TABLET ORAL NIGHTLY
Status: DISCONTINUED | OUTPATIENT
Start: 2020-10-21 | End: 2020-10-22 | Stop reason: HOSPADM

## 2020-10-21 RX ORDER — ONDANSETRON 2 MG/ML
INJECTION INTRAMUSCULAR; INTRAVENOUS AS NEEDED
Status: DISCONTINUED | OUTPATIENT
Start: 2020-10-21 | End: 2020-10-21 | Stop reason: SURG

## 2020-10-21 RX ORDER — MELOXICAM 15 MG/1
15 TABLET ORAL DAILY
Status: DISCONTINUED | OUTPATIENT
Start: 2020-10-22 | End: 2020-10-22 | Stop reason: HOSPADM

## 2020-10-21 RX ORDER — DEXAMETHASONE SODIUM PHOSPHATE 4 MG/ML
INJECTION, SOLUTION INTRA-ARTICULAR; INTRALESIONAL; INTRAMUSCULAR; INTRAVENOUS; SOFT TISSUE AS NEEDED
Status: DISCONTINUED | OUTPATIENT
Start: 2020-10-21 | End: 2020-10-21 | Stop reason: SURG

## 2020-10-21 RX ORDER — INSULIN LISPRO 100 [IU]/ML
0-9 INJECTION, SOLUTION INTRAVENOUS; SUBCUTANEOUS
Status: DISCONTINUED | OUTPATIENT
Start: 2020-10-21 | End: 2020-10-22 | Stop reason: HOSPADM

## 2020-10-21 RX ORDER — GABAPENTIN 300 MG/1
600 CAPSULE ORAL ONCE
Status: COMPLETED | OUTPATIENT
Start: 2020-10-21 | End: 2020-10-21

## 2020-10-21 RX ORDER — ONDANSETRON 2 MG/ML
4 INJECTION INTRAMUSCULAR; INTRAVENOUS ONCE AS NEEDED
Status: DISCONTINUED | OUTPATIENT
Start: 2020-10-21 | End: 2020-10-21 | Stop reason: HOSPADM

## 2020-10-21 RX ORDER — SODIUM CHLORIDE 9 MG/ML
100 INJECTION, SOLUTION INTRAVENOUS CONTINUOUS
Status: DISCONTINUED | OUTPATIENT
Start: 2020-10-21 | End: 2020-10-22 | Stop reason: HOSPADM

## 2020-10-21 RX ORDER — ROPIVACAINE HYDROCHLORIDE 5 MG/ML
INJECTION, SOLUTION EPIDURAL; INFILTRATION; PERINEURAL
Status: COMPLETED | OUTPATIENT
Start: 2020-10-21 | End: 2020-10-21

## 2020-10-21 RX ORDER — GLIPIZIDE 5 MG/1
10 TABLET ORAL NIGHTLY
Status: DISCONTINUED | OUTPATIENT
Start: 2020-10-21 | End: 2020-10-22 | Stop reason: HOSPADM

## 2020-10-21 RX ORDER — POLYETHYLENE GLYCOL 3350 17 G/17G
17 POWDER, FOR SOLUTION ORAL DAILY
Status: DISCONTINUED | OUTPATIENT
Start: 2020-10-21 | End: 2020-10-22 | Stop reason: HOSPADM

## 2020-10-21 RX ORDER — DIPHENHYDRAMINE HYDROCHLORIDE 50 MG/ML
25 INJECTION INTRAMUSCULAR; INTRAVENOUS EVERY 6 HOURS PRN
Status: DISCONTINUED | OUTPATIENT
Start: 2020-10-21 | End: 2020-10-22 | Stop reason: HOSPADM

## 2020-10-21 RX ORDER — ONDANSETRON 4 MG/1
4 TABLET, FILM COATED ORAL EVERY 6 HOURS PRN
Status: DISCONTINUED | OUTPATIENT
Start: 2020-10-21 | End: 2020-10-22 | Stop reason: HOSPADM

## 2020-10-21 RX ADMIN — SODIUM CHLORIDE 100 ML/HR: 9 INJECTION, SOLUTION INTRAVENOUS at 19:17

## 2020-10-21 RX ADMIN — OXYCODONE HYDROCHLORIDE 10 MG: 10 TABLET, FILM COATED, EXTENDED RELEASE ORAL at 21:10

## 2020-10-21 RX ADMIN — GLIPIZIDE 10 MG: 5 TABLET ORAL at 21:10

## 2020-10-21 RX ADMIN — SODIUM CHLORIDE, SODIUM LACTATE, POTASSIUM CHLORIDE, AND CALCIUM CHLORIDE: .6; .31; .03; .02 INJECTION, SOLUTION INTRAVENOUS at 13:29

## 2020-10-21 RX ADMIN — MUPIROCIN 1 APPLICATION: 20 OINTMENT TOPICAL at 09:54

## 2020-10-21 RX ADMIN — ONDANSETRON 4 MG: 2 INJECTION INTRAMUSCULAR; INTRAVENOUS at 13:46

## 2020-10-21 RX ADMIN — SERTRALINE HYDROCHLORIDE 50 MG: 50 TABLET ORAL at 21:10

## 2020-10-21 RX ADMIN — PIOGLITAZONE 30 MG: 30 TABLET ORAL at 21:10

## 2020-10-21 RX ADMIN — ACETAMINOPHEN 1000 MG: 500 TABLET, FILM COATED ORAL at 09:53

## 2020-10-21 RX ADMIN — ROPIVACAINE HYDROCHLORIDE 30 ML: 5 INJECTION, SOLUTION EPIDURAL; INFILTRATION; PERINEURAL at 11:58

## 2020-10-21 RX ADMIN — RIVAROXABAN 10 MG: 10 TABLET, FILM COATED ORAL at 19:17

## 2020-10-21 RX ADMIN — DEXAMETHASONE SODIUM PHOSPHATE 4 MG: 4 INJECTION, SOLUTION INTRAMUSCULAR; INTRAVENOUS at 11:58

## 2020-10-21 RX ADMIN — DEXAMETHASONE SODIUM PHOSPHATE 4 MG: 4 INJECTION, SOLUTION INTRAMUSCULAR; INTRAVENOUS at 13:46

## 2020-10-21 RX ADMIN — POLYETHYLENE GLYCOL 3350 17 G: 17 POWDER, FOR SOLUTION ORAL at 19:17

## 2020-10-21 RX ADMIN — FENTANYL CITRATE 100 MCG: 50 INJECTION, SOLUTION INTRAMUSCULAR; INTRAVENOUS at 11:58

## 2020-10-21 RX ADMIN — CEFAZOLIN SODIUM 2 G: 10 INJECTION, POWDER, FOR SOLUTION INTRAVENOUS at 21:11

## 2020-10-21 RX ADMIN — TRANEXAMIC ACID 1000 MG: 100 INJECTION, SOLUTION INTRAVENOUS at 12:44

## 2020-10-21 RX ADMIN — MIDAZOLAM 2 MG: 1 INJECTION INTRAMUSCULAR; INTRAVENOUS at 11:58

## 2020-10-21 RX ADMIN — GABAPENTIN 600 MG: 300 CAPSULE ORAL at 09:53

## 2020-10-21 RX ADMIN — MELOXICAM 15 MG: 15 TABLET ORAL at 09:53

## 2020-10-21 RX ADMIN — ACETAMINOPHEN 1000 MG: 500 TABLET, FILM COATED ORAL at 19:17

## 2020-10-21 RX ADMIN — ATORVASTATIN CALCIUM 10 MG: 10 TABLET, FILM COATED ORAL at 21:10

## 2020-10-21 RX ADMIN — GABAPENTIN 300 MG: 300 CAPSULE ORAL at 21:10

## 2020-10-21 RX ADMIN — PROPOFOL 200 MG: 10 INJECTION, EMULSION INTRAVENOUS at 12:27

## 2020-10-21 RX ADMIN — INSULIN LISPRO 2 UNITS: 100 INJECTION, SOLUTION INTRAVENOUS; SUBCUTANEOUS at 19:17

## 2020-10-21 RX ADMIN — CEFAZOLIN SODIUM 2 G: 1 INJECTION, POWDER, FOR SOLUTION INTRAMUSCULAR; INTRAVENOUS at 12:34

## 2020-10-21 RX ADMIN — AMLODIPINE BESYLATE 5 MG: 5 TABLET ORAL at 21:10

## 2020-10-21 RX ADMIN — OXYCODONE HYDROCHLORIDE 10 MG: 10 TABLET, FILM COATED, EXTENDED RELEASE ORAL at 09:53

## 2020-10-21 RX ADMIN — PROPOFOL 100 MCG/KG/MIN: 10 INJECTION, EMULSION INTRAVENOUS at 12:28

## 2020-10-21 RX ADMIN — SODIUM CHLORIDE, SODIUM LACTATE, POTASSIUM CHLORIDE, AND CALCIUM CHLORIDE: .6; .31; .03; .02 INJECTION, SOLUTION INTRAVENOUS at 12:22

## 2020-10-21 NOTE — DISCHARGE PLACEMENT REQUEST
"Brenden Lackey (69 y.o. Male)     Date of Birth Social Security Number Address Home Phone MRN    [id number]  [id number] [place] [facility] [phone] [id number]    Buddhism Marital Status          None        Admission Date Admission Type Admitting Provider Attending Provider Department, Room/Bed    10/21/20 Elective Ravi Fagan MD Mehta, Sanjiv, MD [facility] MAIN OR, [facility] MAIN OR/MAIN OR    Discharge Date Discharge Disposition Discharge Destination                       Attending Provider: Ravi Fagan MD    Allergies: No Known Allergies    Isolation: None   Infection: None   Code Status: Not on file    Ht: 170.2 cm (67\")   Wt: 104 kg (230 lb 6.1 oz)    Admission Cmt: None   Principal Problem: Primary osteoarthritis of right knee [M17.11]                 Active Insurance as of 10/21/2020     Primary Coverage     Payor Plan Insurance Group Employer/Plan Group    MEDICARE MEDICARE A & B      Payor Plan Address Payor Plan Phone Number Payor Plan Fax Number Effective Dates    [address] [phone]  10/1/2015 - None Entered    [facility] [id number]       Subscriber Name Subscriber Birth Date Member ID       BRENDEN LACKEY 1950 [id number]           Secondary Coverage     Payor Plan Insurance Group Employer/Plan Group    [employer] SUP AAR HEALTH CARE OPTIONS N     Payor Plan Address Payor Plan Phone Number Payor Plan Fax Number Effective Dates    [facility] [phone]  1/1/2020 - None Entered    [address]       [facility] [address]       Subscriber Name Subscriber Birth Date Member ID       BRENDEN LACKEY 1950 [id number]                 Emergency Contacts      (Rel.) Home Phone Work Phone Mobile Phone    RomeDonna doty (Spouse) -- -- [phone]    RomeRigoberto (Son) -- -- [phone]              "

## 2020-10-21 NOTE — H&P
History & Physical       Patient: Brenden Peter    Date of Admission: 10/21/2020  6:14 AM    YOB: 1950    Medical Record Number: [id number]    Attending Physician: Ravi Fagan MD        Chief Complaints: Primary osteoarthritis of right knee [M17.11]  Primary osteoarthritis of knees, bilateral [M17.0]      History of Present Illness: 69 y.o. male presents with Primary osteoarthritis of right knee [M17.11]  Primary osteoarthritis of knees, bilateral [M17.0]. Onset of symptoms was gradual and slowly progressive.  Symptoms are associated with pain and a varus deformity of the knee.  Symptoms are aggravated by deep flexion of the knee.   Symptoms improve with using a cane. Patient is now being admitted to the services of Ravi Fagan MD for further evaluation and treatment.      No Known Allergies      Home Medications:  Medications Prior to Admission   Medication Sig Dispense Refill Last Dose   • albuterol sulfate  (90 Base) MCG/ACT inhaler Inhale 1 puff Every 4 (Four) Hours As Needed for Shortness of Air.      • amLODIPine (NORVASC) 5 MG tablet Take 5 mg by mouth Every Night.      • gabapentin (NEURONTIN) 300 MG capsule Take 300 mg by mouth 3 (Three) Times a Day.      • glipizide (GLUCOTROL) 10 MG tablet Take 10 mg by mouth Every Night.      • lisinopril (PRINIVIL,ZESTRIL) 40 MG tablet Take 40 mg by mouth Every Night. LD 10/19      • metFORMIN (GLUCOPHAGE) 1000 MG tablet Take 1,000 mg by mouth 2 (Two) Times a Day With Meals. LD 10/18      • mupirocin (BACTROBAN) 2 % ointment Apply a pea-sized amount to each nostril twice daily for 5 days prior to surgery. 22 g 0    • pioglitazone (ACTOS) 30 MG tablet Take 30 mg by mouth Every Night.      • pravastatin (PRAVACHOL) 10 MG tablet Take 10 mg by mouth Every Night.      • sertraline (ZOLOFT) 50 MG tablet Take 50 mg by mouth Every Night.      • vitamin D (ERGOCALCIFEROL) 1.25 MG (85690 UT) capsule capsule 50,000 Units 2 (Two) Times a Week. Tues  and Fri      • glyburide (DIAbeta) 5 MG tablet glyburide 5 mg tablet      • losartan (COZAAR) 100 MG tablet losartan 100 mg tablet          Current Medications:  Scheduled Meds:  Continuous Infusions:No current facility-administered medications for this encounter.     PRN Meds:.       Past Medical History:   Diagnosis Date   • Anxiety    • Diabetes (CMS/HCC)    • Hyperlipidemia    • Hypertension       No past surgical history on file.     Social History     Occupational History   • Not on file   Tobacco Use   • Smoking status: Current Every Day Smoker     Packs/day: 0.50     Types: Cigarettes   • Smokeless tobacco: Never Used   Substance and Sexual Activity   • Alcohol use: Not Currently     Frequency: Never   • Drug use: Never   • Sexual activity: Defer      Social History     Social History Narrative   • Not on file        Family History   Problem Relation Age of Onset   • Cancer Other    • Diabetes Other    • Heart disease Other    • No Known Problems Mother    • No Known Problems Father    • No Known Problems Sister    • No Known Problems Brother    • No Known Problems Maternal Aunt    • No Known Problems Maternal Uncle    • No Known Problems Paternal Aunt    • No Known Problems Paternal Uncle    • No Known Problems Maternal Grandmother    • No Known Problems Maternal Grandfather    • No Known Problems Paternal Grandmother    • No Known Problems Paternal Grandfather    • Anemia Neg Hx    • Arrhythmia Neg Hx    • Asthma Neg Hx    • Clotting disorder Neg Hx    • Fainting Neg Hx    • Heart attack Neg Hx    • Heart failure Neg Hx    • Hyperlipidemia Neg Hx    • Hypertension Neg Hx          Review of Systems:   HEENT: Patient denies any headaches, vision changes, change in hearing, or tinnitus, Patient denies any rhinorrhea,epistaxis, sinus pain, mouth or dental problems, sore throat or hoarseness, or dysphagia  Pulmonary: Patient denies any cough, congestion, SOA, or wheezing  Cardiovascular: Patient denies any  "chest pain, dyspnea, palpitations, weakness, intolerance of exercise, varicosities, swelling of extremities, known murmur  Gastrointestinal:  Patient denies nausea, vomiting, diarrhea, constipation, loss  of appetite, change in appetite, dysphagia, gas, heartburn, melena, change in bowel habits, use of laxatives or other drugs to alter the function of the gastrointestinal tract.  Genital/Urinary: Patient denies dysuria, change in color of urine, change in frequency of urination, pain with urgency, incontinence, retention, or nocturia.  Musculoskeletal: Patient denies increased warmth; redness; or swelling of joints; limitation of function; deformity; crepitation: pain in a joint or an extremity, the neck, or the back, especially with movement.  Neurological: Patient denies dizziness, tremor, ataxia, difficulty in speaking, change in speech, paresthesia, loss of sensation, seizures, syncope, changes in memory.  Endocrine system: Patient denies tremors, palpitations, intolerance of heat or cold, polyuria, polydipsia, polyphagia, diaphoresis, exophthalmos, or goiter.  Psychological: Patient denies thoughts/plans or harming self or other; depression,  insomnia, night terrors, chuyita, memory loss, disorientation.  Skin: Patient denies any bruising, rashes, discoloration, pruritus, wounds, ulcers, decubiti, changes in the hair or nails  Hematopoietic: Patient denies history of spontaneous or excessive bleeding, epistaxis, hematuria, melena, fatigue, enlarged or tender lymph nodes, pallor, history of anemia.    Physical Exam: 69 y.o. male  Vitals:    10/13/20 1127   Weight: 96.2 kg (212 lb)   Height: 170.2 cm (67\")       General Appearance:          Alert, cooperative, in no acute distress                                                 Head:    Normocephalic, without obvious abnormality, atraumatic   Eyes:            Lids and lashes normal, conjunctivae and sclerae normal, no   icterus, no pallor, corneas clear, PERRLA "   Ears:    Ears appear intact with no abnormalities noted   Throat:   No oral lesions, no thrush, oral mucosa moist   Neck:   No adenopathy, supple, trachea midline, no thyromegaly, no   carotid bruit, no JVD   Back:     No kyphosis present, no scoliosis present, no skin lesions,      erythema or scars, no tenderness to percussion or                   palpation,   range of motion normal   Lungs:     Clear to auscultation,respirations regular, even and                  unlabored    Heart:    Regular rhythm and normal rate, normal S1 and S2, no            murmur, no gallop, no rub, no click   Chest Wall:    No abnormalities observed   Abdomen:     Normal bowel sounds, no masses, no organomegaly, soft        nontender, nondistended, no guarding, no rebound                tenderness   Rectal:     Deferred   Extremities:   Tenderness over medial aspect of the right knee  . Moves all extremities well, no edema,   no cyanosis, no redness   Pulses:   Pulses palpable and equal bilaterally   Skin:   No bleeding, bruising or rash   Lymph nodes:   No palpable adenopathy   Neurologic:   Cranial nerves 2 - 12 grossly intact, sensation intact, DTR       present and equal bilaterally      Right knee. Patient has crepitus throughout range of motion. Positive patellar grind test. Mild effusion. Lachman is negative. Pivot shift is negative. Anterior and posterior drawer signs are negative. Significant joint line tenderness is noted on the medial aspect of the knee. Patient has a varus orientation of the knee. There is fullness and tenderness in the Popliteal fossa. Mild distention of a Popliteal cyst is noted in this location. Range of motion in flexion is from 0- 110 degrees. Neurovascular status is intact.  Dorsalis pedis and posterior tibial artery pulses are palpable. Common peroneal nerve function is well preserved. Patient's gait is cautious and antalgic. Skin and soft tissues are mildly swollen, consistent with synovitis and  effusion. The patient has a significant limp with the first few steps after starting the gait cycle. Getting out of a chair takes a lot of effort due to pain on knee flexion.     Diagnostic Tests:  Admission on 10/21/2020   Component Date Value Ref Range Status   • Color, UA 10/19/2020 Yellow  Yellow, Straw Final   • Appearance, UA 10/19/2020 Clear  Clear Final   • pH, UA 10/19/2020 5.5  5.0 - 8.0 Final   • Specific Gravity, UA 10/19/2020 1.020  1.005 - 1.030 Final   • Glucose, UA 10/19/2020 Negative  Negative Final   • Ketones, UA 10/19/2020 Negative  Negative Final   • Bilirubin, UA 10/19/2020 Negative  Negative Final   • Blood, UA 10/19/2020 Negative  Negative Final   • Protein, UA 10/19/2020 100 mg/dL (2+)* Negative Final   • Leuk Esterase, UA 10/19/2020 Trace* Negative Final   • Nitrite, UA 10/19/2020 Negative  Negative Final   • Urobilinogen, UA 10/19/2020 0.2 E.U./dL  0.2 - 1.0 E.U./dL Final   • RBC, UA 10/19/2020 0-2  None Seen, 0-2 /HPF Final   • WBC, UA 10/19/2020 6-12* None Seen, 0-2 /HPF Final   • Bacteria, UA 10/19/2020 None Seen  None Seen /HPF Final   • Squamous Epithelial Cells, UA 10/19/2020 0-2  None Seen, 0-2 /HPF Final   • Hyaline Casts, UA 10/19/2020 0-2  None Seen /LPF Final   • Methodology 10/19/2020 Automated Microscopy   Final   • Urine Culture 10/19/2020 No growth   Final     No results found.      Assessment:  Patient Active Problem List   Diagnosis   • Primary localized osteoarthrosis of right lower leg   • Primary osteoarthritis of right knee   • Type 2 diabetes mellitus without complication, without long-term current use of insulin (CMS/[facility])   • Preop cardiovascular exam   • Mixed hyperlipidemia   • Essential hypertension   • Dyspnea on exertion   • Primary osteoarthritis of knees, bilateral         Plan:  The patient voiced understanding of the risks, benefits, and alternative forms of treatment that were discussed and the patient consents to proceed with right total knee replacement.        Discharge Plan: tomorrow to home and home health      Date: 10/21/2020    Ravi Fagan MD      DICTATED UTILIZING DRAGON DICTATION

## 2020-10-21 NOTE — PLAN OF CARE
Problem: Adult Inpatient Plan of Care  Goal: Plan of Care Review  Outcome: Ongoing, Progressing  Flowsheets  Taken 10/21/2020 1622  Plan of Care Reviewed With: patient  Outcome Summary: Pt is 70 yo male s/p right TKA on 10/21/2020 by Dr. Fagan.  PT eval completed in PACU.  Pt completes right knee TKA protocol exercises x10 reps.  Pt requiring Loren-modA for bed mobility and Loren for transfers.  Pt able to ambulate using RW with Loren exhibiting mild right knee buckling in stance phase due to weakness.  Pt able to ambulate 50 ft using RW.  Pt will follow 2x/day while at [facility].  Plan home with wife, use of RW, and HHPT.  PPE donned: mask with faceshield, gloves.

## 2020-10-21 NOTE — ANESTHESIA POSTPROCEDURE EVALUATION
Patient: Brenden Peter    Procedure Summary     Date: 10/21/20 Room / Location: [facility] OR 11 / [facility] MAIN OR    Anesthesia Start: 1222 Anesthesia Stop: 1353    Procedure: TOTAL KNEE ARTHROPLASTY (Right Knee) Diagnosis:       Primary osteoarthritis of right knee      (Primary osteoarthritis of right knee [M17.11])    Surgeon: Ravi Fagan MD Provider: Jacques Machuca MD    Anesthesia Type: general with block ASA Status: 3          Anesthesia Type: general with block    Vitals  Vitals Value Taken Time   /63 10/21/20 1417   Temp 97.1 °F (36.2 °C) 10/21/20 1354   Pulse 56 10/21/20 1419   Resp 13 10/21/20 1409   SpO2 94 % 10/21/20 1419   Vitals shown include unvalidated device data.        Post Anesthesia Care and Evaluation    Patient location during evaluation: PACU  Patient participation: complete - patient participated  Level of consciousness: awake  Pain scale: See nurse's notes for pain score.  Pain management: adequate  Airway patency: patent  Anesthetic complications: No anesthetic complications  PONV Status: none  Cardiovascular status: acceptable  Respiratory status: acceptable  Hydration status: acceptable    Comments: Patient seen and examined postoperatively; vital signs stable; SpO2 greater than or equal to 90%; cardiopulmonary status stable; nausea/vomiting adequately controlled; pain adequately controlled; no apparent anesthesia complications; patient discharged from anesthesia care when discharge criteria were met

## 2020-10-21 NOTE — ANESTHESIA PROCEDURE NOTES
Airway  Urgency: elective    Date/Time: 10/21/2020 12:28 PM  Airway not difficult    General Information and Staff    Patient location during procedure: OR  Anesthesiologist: Jacques Machuca MD    Indications and Patient Condition  Indications for airway management: airway protection    Preoxygenated: yes  Mask difficulty assessment: 0 - not attempted    Final Airway Details  Final airway type: supraglottic airway      Successful airway: LMA  Size 4    Number of attempts at approach: 1  Assessment: lips, teeth, and gum same as pre-op and atraumatic intubation

## 2020-10-21 NOTE — ANESTHESIA PREPROCEDURE EVALUATION
Anesthesia Evaluation     Patient summary reviewed and Nursing notes reviewed   NPO Solid Status: > 8 hours  NPO Liquid Status: > 4 hours           Airway   Mallampati: II  TM distance: >3 FB  Neck ROM: full  No difficulty expected  Dental - normal exam     Pulmonary - normal exam   (+) a smoker Current Smoked day of surgery, COPD,   Cardiovascular - normal exam    (+) hypertension, hyperlipidemia,       Neuro/Psych  (+) psychiatric history Anxiety,     GI/Hepatic/Renal/Endo    (+)   diabetes mellitus,     Musculoskeletal (-) negative ROS    Abdominal  - normal exam    Bowel sounds: normal.   Substance History - negative use     OB/GYN negative ob/gyn ROS         Other        ROS/Med Hx Other: Low risk stress test, EF 56%                Anesthesia Plan    ASA 3     general with block   total IV anesthesia  intravenous induction     Anesthetic plan, all risks, benefits, and alternatives have been provided, discussed and informed consent has been obtained with: patient.

## 2020-10-21 NOTE — ANESTHESIA PROCEDURE NOTES
Peripheral Block    Pre-sedation assessment completed: 10/21/2020 11:45 AM    Start time: 10/21/2020 11:50 AM  Stop time: 10/21/2020 11:58 AM  Reason for block: at surgeon's request and post-op pain management  Performed by  Anesthesiologist: Jacques Machuca MD  Preanesthetic Checklist  Completed: patient identified, site marked, surgical consent, pre-op evaluation, timeout performed, IV checked, risks and benefits discussed and monitors and equipment checked  Prep:  Pt Position: supine  Sterile barriers:gloves, gown, sterile barriers and mask  Prep: Betadine  Patient monitoring: blood pressure monitoring, continuous pulse oximetry and EKG  Procedure  Sedation:yes    Guidance:ultrasound guided  ULTRASOUND INTERPRETATION.  Using ultrasound guidance a 20 G gauge needle was placed in close proximity to the femoral nerve, at which point, under ultrasound guidance anesthetic was injected in the area of the nerve and spread of the anesthesia was seen on ultrasound in close proximity thereto.  There were no abnormalities seen on ultrasound; a digital image was taken; and the patient tolerated the procedure with no complications. Images:still images obtained, printed/placed on chart    Laterality:right  Block Type:adductor canal block  Injection Technique:single-shot  Needle Type:echogenic  Needle Gauge:20 G  Resistance on Injection: none  Sedation medications used: midazolam (VERSED) injection, 2 mg  fentaNYL citrate (PF) (SUBLIMAZE) injection, 100 mcg  Medications Used: dexamethasone (DECADRON) injection, 4 mg  ropivacaine (NAROPIN) 0.5 % injection, 30 mL  Med admintered at 10/21/2020 11:58 AM      Post Assessment  Injection Assessment: negative aspiration for heme, no paresthesia on injection and incremental injection  Patient Tolerance:comfortable throughout block  Complications:no

## 2020-10-21 NOTE — THERAPY EVALUATION
Patient Name: Brenden Peter  : 1950    MRN: [id number]                              Today's Date: 10/21/2020       Admit Date: 10/21/2020    Visit Dx:     ICD-10-CM ICD-9-CM   1. Primary osteoarthritis of right knee  M17.11 715.16     Patient Active Problem List   Diagnosis   • Primary localized osteoarthrosis of right lower leg   • Primary osteoarthritis of right knee   • Type 2 diabetes mellitus without complication, without long-term current use of insulin (CMS/Formerly [facility])   • Preop cardiovascular exam   • Mixed hyperlipidemia   • Essential hypertension   • Dyspnea on exertion   • Primary osteoarthritis of knees, bilateral     Past Medical History:   Diagnosis Date   • Anxiety    • COPD (chronic obstructive pulmonary disease) (CMS/Formerly [facility])    • Diabetes (CMS/Formerly [facility])     type 2   • Hyperlipidemia    • Hypertension      Past Surgical History:   Procedure Laterality Date   • EYE SURGERY      cataract     General Information     Row Name 10/21/20 1617          Physical Therapy Time and Intention    Document Type  evaluation  -     Mode of Treatment  physical therapy  -     Row Name 10/21/20 1617          General Information    Patient Profile Reviewed  yes  -     Prior Level of Function  independent: occasionally utilizes RW as needed  -     Existing Precautions/Restrictions  fall  -     Row Name 10/21/20 1617          Living Environment    Lives With  spouse  -     Row Name 10/21/20 1617          Home Main Entrance    Number of Stairs, Main Entrance  one  -     Row Name 10/21/20 1617          Cognition    Orientation Status (Cognition)  oriented x 3  -     Row Name 10/21/20 1617          Safety Issues, Functional Mobility    Safety Issues Affecting Function (Mobility)  safety precaution awareness  -     Impairments Affecting Function (Mobility)  range of motion (ROM);strength;endurance/activity tolerance;balance;postural/trunk control  -       User Key  (r) = Recorded By, (t) = Taken By, (c) =  Cosigned By    Initials Name Provider Type     Jenise Manuel, PT Physical Therapist        Mobility     Row Name 10/21/20 1617          Bed Mobility    Bed Mobility  supine-sit;sit-supine  -     Supine-Sit [place] (Bed Mobility)  moderate assist (50% patient effort)  -     Sit-Supine [place] (Bed Mobility)  2 person assist;minimum assist (75% patient effort)  -     Assistive Device (Bed Mobility)  draw sheet  -     Row Name 10/21/20 1617          Sit-Stand Transfer    Sit-Stand [place] (Transfers)  minimum assist (75% patient effort)  -     Assistive Device (Sit-Stand Transfers)  walker, front-wheeled  -     Row Name 10/21/20 1617          Gait/Stairs (Locomotion)    [place] Level (Gait)  minimum assist (75% patient effort)  -     Assistive Device (Gait)  walker, front-wheeled  -     Distance in Feet (Gait)  60 ft  -     Deviations/Abnormal Patterns (Gait)  gait speed decreased  -     Right Sided Gait Deviations  knee buckling, right side  -     Comment (Gait/Stairs)  decreased step length right LE, mild right knee buckling noted due to weakness, increased reliance on RW, shuffled steps  -     Row Name 10/21/20 1617          Mobility    Extremity Weight-bearing Status  right lower extremity  -     Right Lower Extremity (Weight-bearing Status)  weight-bearing as tolerated (WBAT)  -       User Key  (r) = Recorded By, (t) = Taken By, (c) = Cosigned By    Initials Name Provider Type     Jenise Manuel, PT Physical Therapist        Obj/Interventions     Row Name 10/21/20 1618          Range of Motion Comprehensive    Comment, General Range of Motion  right knee AROM lacking ~10 degrees from neutral knee extension to 90 degrees flexion  -     Row Name 10/21/20 1618          Strength Comprehensive (MMT)    Comment, General Manual Muscle Testing (MMT) Assessment  right knee 3-/5  -     Row Name 10/21/20 1618          Balance    Balance Assessment  sitting static  balance;sitting dynamic balance;standing static balance;standing dynamic balance  -HC     Static Sitting Balance  WFL  -HC     Dynamic Sitting Balance  WFL  -HC     Static Standing Balance  mild impairment  -HC     Dynamic Standing Balance  mild impairment  -HC       User Key  (r) = Recorded By, (t) = Taken By, (c) = Cosigned By    Initials Name Provider Type    HC Jenise Manuel, PT Physical Therapist        Goals/Plan     Row Name 10/21/20 1620          Transfer Goal 1 (PT)    Activity/Assistive Device (Transfer Goal 1, PT)  transfers, all  -HC     [place] Level/Cues Needed (Transfer Goal 1, PT)  modified independence  -HC     Time Frame (Transfer Goal 1, PT)  2 weeks  -HC     Row Name 10/21/20 1620          Gait Training Goal 1 (PT)    Activity/Assistive Device (Gait Training Goal 1, PT)  gait (walking locomotion);assistive device use  -HC     [place] Level (Gait Training Goal 1, PT)  supervision required  -HC     Distance (Gait Training Goal 1, PT)  200 ft  -HC     Time Frame (Gait Training Goal 1, PT)  2 weeks  -HC     Row Name 10/21/20 1620          ROM Goal 1 (PT)    ROM Goal 1 (PT)  Pt will increased right knee AROM to neutral knee extension to 100 degrees knee flexion.  -HC     Row Name 10/21/20 1620          Stairs Goal 1 (PT)    Activity/Assistive Device (Stairs Goal 1, PT)  stairs, all skills  -HC     [place] Level/Cues Needed (Stairs Goal 1, PT)  standby assist  -HC     Number of Stairs (Stairs Goal 1, PT)  1  -HC     Time Frame (Stairs Goal 1, PT)  2 weeks  -HC       User Key  (r) = Recorded By, (t) = Taken By, (c) = Cosigned By    Initials Name Provider Type     Jenise Manuel, PT Physical Therapist        Clinical Impression     Row Name 10/21/20 1614          Pain    Additional Documentation  Pain Scale: Numbers Pre/Post-Treatment (Group)  -     Row Name 10/21/20 1619          Pain Scale: Numbers Pre/Post-Treatment    Pretreatment Pain Rating  5/10  -HC     Posttreatment  Pain Rating  5/10  -     Pre/Posttreatment Pain Comment  right knee discomfort  -     Pain Intervention(s)  Rest;Elevated  -     Row Name 10/21/20 1619          Plan of Care Review    Outcome Summary  Pt is 70 yo male s/p right TKA on 10/21/2020 by Dr Fagan  -     Row Name 10/21/20 1619          Therapy Assessment/Plan (PT)    Patient/Family Therapy Goals Statement (PT)  return home  -     Rehab Potential (PT)  good, to achieve stated therapy goals  -     Criteria for Skilled Interventions Met (PT)  yes;skilled treatment is necessary  -     Predicted Duration of Therapy Intervention (PT)  until d/c  -     Row Name 10/21/20 1619          Vital Signs    Post SpO2 (%)  97  -     O2 Delivery Post Treatment  room air  -     Row Name 10/21/20 1619          Positioning and Restraints    Pre-Treatment Position  in bed  -     Post Treatment Position  bed  -HC     In Bed  notified nsg;call light within reach;encouraged to call for assist  -       User Key  (r) = Recorded By, (t) = Taken By, (c) = Cosigned By    Initials Name Provider Type     Jenise Manuel, PT Physical Therapist        Outcome Measures    No documentation.       Physical Therapy Education                 Title: PT OT SLP Therapies (In Progress)     Topic: Physical Therapy (In Progress)     Point: Mobility training (In Progress)     Learning Progress Summary           Patient Acceptance, E, NR by  at 10/21/2020 1622                   Point: Home exercise program (In Progress)     Learning Progress Summary           Patient Acceptance, E, NR by  at 10/21/2020 1622                               User Key     Initials Effective Dates Name Provider Type Discipline     04/17/20 -  Jenise Manuel, PT Physical Therapist PT              PT Recommendation and Plan  Planned Therapy Interventions (PT): balance training, gait training, neuromuscular re-education, patient/family education, postural re-education, transfer training,  strengthening, ROM (range of motion), stair training, home exercise program  Plan of Care Reviewed With: patient  Outcome Summary: Pt is 68 yo male s/p right TKA on 10/21/2020 by Dr. Fagan.  PT eval completed in PACU.  Pt completes right knee TKA protocol exercises x10 reps.  Pt requiring Loren-modA for bed mobility and Loren for transfers.  Pt able to ambulate using RW with Loren exhibiting mild right knee buckling in stance phase due to weakness.  Pt able to ambulate 50 ft using RW.  [facility]  Plan home with wife, use of RW, and HHPT.  PPE donned: mask with faceshield, gloves.     Time Calculation:   PT Charges     Row Name 10/21/20 1626             Time Calculation    Start Time  1517  -      Stop Time  1541  -      Time Calculation (min)  24 min  -      PT Received On  10/21/20  -      PT - Next Appointment  10/22/20  -      PT Goal Re-Cert Due Date  11/04/20  -         Time Calculation- PT    Total Timed Code Minutes- PT  0 minute(s)  -        User Key  (r) = Recorded By, (t) = Taken By, (c) = Cosigned By    Initials Name Provider Type     Jenise Manuel, PT Physical Therapist        Therapy Charges for Today     Code Description Service Date Service Provider Modifiers Qty    [id number]  PT EVAL LOW COMPLEXITY 4 10/21/2020 Jenise Manuel, PT GP 1               Jenise Manuel, PT  10/21/2020

## 2020-10-21 NOTE — PROGRESS NOTES
Discharge Planning Assessment  [facility]     Patient Name: Brenden Peter  MRN: [id number]  Today's Date: 10/21/2020    Admit Date: 10/21/2020    Discharge Needs Assessment     Row Name 10/21/20 1448       Living Environment    Lives With  spouse    Current Living Arrangements  home/apartment/condo    Primary Care Provided by  self    Provides Primary Care For  no one    Family Caregiver if Needed  spouse    Quality of Family Relationships  helpful    Able to Return to Prior Arrangements  yes       Resource/Environmental Concerns    Resource/Environmental Concerns  none    Transportation Concerns  car, none       Transition Planning    Patient/Family Anticipates Transition to  home with family;home with help/services    Patient/Family Anticipated Services at Transition  none    Transportation Anticipated  family or friend will provide       Discharge Needs Assessment    Readmission Within the Last 30 Days  no previous admission in last 30 days    Equipment Currently Used at Home  walker, rolling;cane, straight    Concerns to be Addressed  denies needs/concerns at this time    Anticipated Changes Related to Illness  none    Equipment Needed After Discharge  none        Discharge Plan     Row Name 10/21/20 1448       Plan    Plan  D/C Plan: Home with [facility] (order placed, sent in Epic). Xarelto 10mg x 12 days cost is $197.87 per [facility] Nurse Navigator will provide samples.    Provided Post Acute Provider List?  Refused    Refused Provider List Comment  wants [facility]    Patient/Family in Agreement with Plan  yes    Plan Comments   called and spoke to patient's wife on phone. Patient lives with spouse, is IADLs and drives. PCP and pharmacy verified-denies any difficulty affording meds. CM discussed home health and wife would like referral to [facility]-order placed, sent in [employer], liaison notified. CM called and spoke to Nriu at [facility] and cost of Xarelto 10mg x  12 days is $197.87-CM notified wife who states they are unable to afford cost. CM spoke to [facility]ru[facility] Nurse Navigator and she has enough samples from Dr Fagan's office to provide to patient tomorrow-CM updated pt's wife. Barrier to D/C: s/p R TKR today.        Continued Care and Services - Admitted Since 10/21/2020     Home Medical Care     Service Provider Request Status Selected Services Address Phone Fax    [facility]  Pending - Request Sent N/A [id number] [place] [address] [phone]              Expected Discharge Date and Time     Expected Discharge Date Expected Discharge Time    Oct 22, 2020         Demographic Summary     Row Name 10/21/20 1447       General Information    Admission Type  observation    Arrived From  operating room    Referral Source  admission list    Reason for Consult  discharge planning    Preferred Language  English     Used During This Interaction  no        Functional Status     Row Name 10/21/20 1447       Functional Status    Usual Activity Tolerance  good    Current Activity Tolerance  moderate       Functional Status, IADL    Medications  independent    Meal Preparation  independent    Housekeeping  independent    Laundry  independent    Shopping  independent       Mental Status Summary    Recent Changes in Mental Status/Cognitive Functioning  unable to assess        Kasandra Card

## 2020-10-21 NOTE — PLAN OF CARE
Goal Outcome Evaluation:  Plan of Care Reviewed With: patient     Outcome Summary: pt up to floor from PACU, doing well at this time.

## 2020-10-21 NOTE — OP NOTE
TOTAL KNEE ARTHROPLASTY OPERATIVE     PATIENT NAME:Brenden Peter     YOB: 1950     ATTENDING PHYSICIAN: Ravi Fagan MD     DATE OF PROCEDURE: 10/21/2020     PREOPERATIVE DIAGNOSIS: Severe degenerative osteoarthritis, right knee.    POSTOPERATIVE DIAGNOSIS: Post-Op Diagnosis Codes:     * Primary osteoarthritis of right knee [M17.11]    PRINCIPAL DIAGNOSIS: Primary osteoarthritis right knee with a varus deformity.    PROCEDURE: Right total knee arthroplasty.    SURGEON:  Ravi Fagan M.D.    ASSISTANT: Moi Waller first assistant was responsible for performing the following activities: Retraction, Suction, Irrigation, Suturing, Closing and Placing Dressing and their skilled assistance was necessary for the success of this case.       ANESTHESIOLOGIST: Dr. Jacques De La Garza MD    ANESTHESIA: Adductor canal block for postop pain control followed by general anesthesia.    POSITION: Supine on the operating table.    DRAINS: None.    COMPLICATIONS: None.    ESTIMATED BLOOD LOSS: 200ml    IMPLANT USED: Ken Persona total knee replacement system, #7 standard posterior cruciate retaining femoral component, number E tibia with a 30 mm intramedullary stem, 11 mm thick ultraconstrained, UC, Vitamin E impregnated polyethylene insert, #32 patella anchored into position using Refobicin antibiotic impregnated bone cement.     SPECIMENS: * No orders in the log *        INDICATION: The patient has been having very significant pain and discomfort in the knee.  We had scheduled the patient for total knee replacement after all forms of conservative nonoperative care had failed to provide adequate relief of symptoms.  Patient understood all the risks and benefits which were discussed in great detail including the possibility of death, infection, myocardial infarction, DVT, pulmonary embolism, stiffness of the knee, wound infection and breakdown, possibility of revision surgery, neurovascular compromise,  pneumonia, pulmonary embolism      DETAILS OF PROCEDURE: Surgical timeout was called. Operative extremity was correctly identified in the operating room suite. Extremity was prepped and draped in a standard fashion. Patient was administered antibiotics per the SCIP protocol.  Patient was placed under appropriate anesthetic.      Patient was administered IV antibiotics per SCIP protocol and hospital policy. The patient’s operative extremity was correctly identified in the operating room suite. The patient was placed under appropriate anesthesia. Tourniquet was applied. The leg was prepped and draped in a standard fashion.  An anterior approach was performed and a quad sparing, subvastus approach was carried out. The patellofemoral ligament was resected. Medial soft tissue release was carried out on the medial face of the tibia to balance the soft tissues to the MCL. Tibial Osteophytes were resected. Severe bone-on-bone appearance was noted. Synovectomy was carried out. The Synovium was thickened and hypertrophic. The PCL and MCL were carefully protected throughout the entire procedure. The distal femur was mapped. A 10 mm thick cut was made off the distal femur. A measuring guide was used and #7 standard posterior cruciate retaining femoral component jig was found to be the best fit. Anterior, posterior and chamfer cuts were created. Care was taken to prevent a distal femoral notch. The proximal tibia was then mapped with navigation. 4 mm of the bone was resected off the medial tibial plateau.  A number E tibia was found to be the best fit with a good fit on the proximal tibial metaphyseal cortical rim.  A trial reduction was carried out. Rotation and orientation of the components were carefully marked. Flexion and extension gaps were checked and found to be symmetric. The stability of the components was checked in full extension, mid- flexion and full flexion.The patella was everted, it was measured and cut.  Patellar Tracking was excellent.  A #32 patella was found to be the best fit.  A Lateral release was not deemed necessary. Femoral lug holes were drilled. The Proximal Tibia was die punched. Patellar anchor holes were drilled.    The cancellous bone was lavaged with a Pulse lavage  and dried. Cement was mixed on the back table. Components were cemented into position sequentially, starting with the number E tibial component and going to the #7 standard posterior cruciate retaining femur and then the #32 patella. The excess amounts of bone cement were removed from the bone-metal interface. Trial reduction was carried out once the cement was fully cured. A 11 mm ultra constrained, UC, tibial polyethylene insert was then locked into position on the tibial tray. Wound was lavaged with Bacitracin irrigating solution. Tourniquet was deflated, hemostasis achieved. An analgesic cocktail was injected intra-articularly into the joint capsule and ligamentous insertions on bone for post op pain relief. The arthrotomy was repaired in 60 degrees of flexion. Subcutaneous sutures were applied. Occlusive dressing was applied. No complications were encountered. Sponge count and needle count were correct. The patient was reversed from anesthesia and taken from the operating room to the recovery room in stable condition. I discussed the satisfactory performance of this procedure with the patient’s family and answered all questions for them.       Ravi Fagan MD  10/21/2020  14:26 EDT

## 2020-10-22 VITALS
OXYGEN SATURATION: 96 % | DIASTOLIC BLOOD PRESSURE: 64 MMHG | BODY MASS INDEX: 36.16 KG/M2 | RESPIRATION RATE: 20 BRPM | HEIGHT: 67 IN | HEART RATE: 70 BPM | WEIGHT: 230.38 LBS | TEMPERATURE: 97.6 F | SYSTOLIC BLOOD PRESSURE: 131 MMHG

## 2020-10-22 PROBLEM — M17.11 PRIMARY OSTEOARTHRITIS OF RIGHT KNEE: Status: RESOLVED | Noted: 2020-09-15 | Resolved: 2020-10-22

## 2020-10-22 LAB
ANION GAP SERPL CALCULATED.3IONS-SCNC: 8 MMOL/L (ref 5–15)
BUN SERPL-MCNC: 19 MG/DL (ref 8–23)
BUN/CREAT SERPL: 27.5 (ref 7–25)
CALCIUM SPEC-SCNC: 8.3 MG/DL (ref 8.6–10.5)
CHLORIDE SERPL-SCNC: 104 MMOL/L (ref 98–107)
CO2 SERPL-SCNC: 26 MMOL/L (ref 22–29)
CREAT SERPL-MCNC: 0.69 MG/DL (ref 0.76–1.27)
GFR SERPL CREATININE-BSD FRML MDRD: 114 ML/MIN/1.73
GLUCOSE BLDC GLUCOMTR-MCNC: 145 MG/DL (ref 70–105)
GLUCOSE BLDC GLUCOMTR-MCNC: 171 MG/DL (ref 70–105)
GLUCOSE BLDC GLUCOMTR-MCNC: 232 MG/DL (ref 70–105)
GLUCOSE SERPL-MCNC: 180 MG/DL (ref 65–99)
HCT VFR BLD AUTO: 36.9 % (ref 37.5–51)
HGB BLD-MCNC: 12.4 G/DL (ref 13–17.7)
POTASSIUM SERPL-SCNC: 4.6 MMOL/L (ref 3.5–5.2)
POTASSIUM SERPL-SCNC: 5.3 MMOL/L (ref 3.5–5.2)
SODIUM SERPL-SCNC: 138 MMOL/L (ref 136–145)

## 2020-10-22 PROCEDURE — A9270 NON-COVERED ITEM OR SERVICE: HCPCS | Performed by: PHYSICIAN ASSISTANT

## 2020-10-22 PROCEDURE — 82962 GLUCOSE BLOOD TEST: CPT

## 2020-10-22 PROCEDURE — 63710000001 POLYETHYLENE GLYCOL 17 G PACK: Performed by: PHYSICIAN ASSISTANT

## 2020-10-22 PROCEDURE — 85014 HEMATOCRIT: CPT | Performed by: PHYSICIAN ASSISTANT

## 2020-10-22 PROCEDURE — G0378 HOSPITAL OBSERVATION PER HR: HCPCS

## 2020-10-22 PROCEDURE — 84132 ASSAY OF SERUM POTASSIUM: CPT | Performed by: INTERNAL MEDICINE

## 2020-10-22 PROCEDURE — 99225 PR SBSQ OBSERVATION CARE/DAY 25 MINUTES: CPT | Performed by: INTERNAL MEDICINE

## 2020-10-22 PROCEDURE — 97116 GAIT TRAINING THERAPY: CPT

## 2020-10-22 PROCEDURE — 63710000001 OXYCODONE 10 MG TABLET EXTENDED-RELEASE 12 HOUR: Performed by: PHYSICIAN ASSISTANT

## 2020-10-22 PROCEDURE — 63710000001 FERROUS SULFATE 324 (65 FE) MG TABLET DELAYED-RELEASE: Performed by: PHYSICIAN ASSISTANT

## 2020-10-22 PROCEDURE — 97110 THERAPEUTIC EXERCISES: CPT

## 2020-10-22 PROCEDURE — 97530 THERAPEUTIC ACTIVITIES: CPT

## 2020-10-22 PROCEDURE — 63710000001 GABAPENTIN 300 MG CAPSULE: Performed by: PHYSICIAN ASSISTANT

## 2020-10-22 PROCEDURE — 99024 POSTOP FOLLOW-UP VISIT: CPT | Performed by: PHYSICIAN ASSISTANT

## 2020-10-22 PROCEDURE — 63710000001 GUAIFENESIN 600 MG TABLET SUSTAINED-RELEASE 12 HOUR: Performed by: INTERNAL MEDICINE

## 2020-10-22 PROCEDURE — 25010000002 CEFAZOLIN PER 500 MG: Performed by: PHYSICIAN ASSISTANT

## 2020-10-22 PROCEDURE — 63710000001 DOXYCYCLINE 100 MG TABLET: Performed by: INTERNAL MEDICINE

## 2020-10-22 PROCEDURE — 63710000001 ACETAMINOPHEN 500 MG TABLET: Performed by: PHYSICIAN ASSISTANT

## 2020-10-22 PROCEDURE — 63710000001 INSULIN LISPRO (HUMAN) PER 5 UNITS: Performed by: PHYSICIAN ASSISTANT

## 2020-10-22 PROCEDURE — 63710000001 METFORMIN 500 MG TABLET: Performed by: PHYSICIAN ASSISTANT

## 2020-10-22 PROCEDURE — A9270 NON-COVERED ITEM OR SERVICE: HCPCS | Performed by: INTERNAL MEDICINE

## 2020-10-22 PROCEDURE — 63710000001 MELOXICAM 15 MG TABLET: Performed by: PHYSICIAN ASSISTANT

## 2020-10-22 PROCEDURE — 80048 BASIC METABOLIC PNL TOTAL CA: CPT | Performed by: PHYSICIAN ASSISTANT

## 2020-10-22 PROCEDURE — 85018 HEMOGLOBIN: CPT | Performed by: PHYSICIAN ASSISTANT

## 2020-10-22 RX ORDER — OXYCODONE HYDROCHLORIDE AND ACETAMINOPHEN 5; 325 MG/1; MG/1
TABLET ORAL
Qty: 50 TABLET | Refills: 0 | Status: SHIPPED | OUTPATIENT
Start: 2020-10-22 | End: 2020-11-03 | Stop reason: SDUPTHER

## 2020-10-22 RX ORDER — MELOXICAM 7.5 MG/1
7.5 TABLET ORAL DAILY
Qty: 12 TABLET | Refills: 0 | Status: SHIPPED | OUTPATIENT
Start: 2020-10-23 | End: 2020-11-04

## 2020-10-22 RX ORDER — BUDESONIDE AND FORMOTEROL FUMARATE DIHYDRATE 80; 4.5 UG/1; UG/1
2 AEROSOL RESPIRATORY (INHALATION)
Status: DISCONTINUED | OUTPATIENT
Start: 2020-10-22 | End: 2020-10-22 | Stop reason: HOSPADM

## 2020-10-22 RX ORDER — DOXYCYCLINE 100 MG/1
100 TABLET ORAL EVERY 12 HOURS SCHEDULED
Qty: 10 TABLET | Refills: 0 | Status: SHIPPED | OUTPATIENT
Start: 2020-10-22 | End: 2020-10-27

## 2020-10-22 RX ORDER — GUAIFENESIN 600 MG/1
600 TABLET, EXTENDED RELEASE ORAL EVERY 12 HOURS SCHEDULED
Status: DISCONTINUED | OUTPATIENT
Start: 2020-10-22 | End: 2020-10-22 | Stop reason: HOSPADM

## 2020-10-22 RX ORDER — DOXYCYCLINE 100 MG/1
100 TABLET ORAL EVERY 12 HOURS SCHEDULED
Status: DISCONTINUED | OUTPATIENT
Start: 2020-10-22 | End: 2020-10-22 | Stop reason: HOSPADM

## 2020-10-22 RX ORDER — ALBUTEROL SULFATE 90 UG/1
2 AEROSOL, METERED RESPIRATORY (INHALATION)
Status: DISCONTINUED | OUTPATIENT
Start: 2020-10-22 | End: 2020-10-22 | Stop reason: HOSPADM

## 2020-10-22 RX ADMIN — INSULIN LISPRO 2 UNITS: 100 INJECTION, SOLUTION INTRAVENOUS; SUBCUTANEOUS at 08:24

## 2020-10-22 RX ADMIN — GABAPENTIN 300 MG: 300 CAPSULE ORAL at 08:23

## 2020-10-22 RX ADMIN — METFORMIN HYDROCHLORIDE 1000 MG: 500 TABLET ORAL at 08:24

## 2020-10-22 RX ADMIN — DOXYCYCLINE 100 MG: 100 TABLET, FILM COATED ORAL at 13:40

## 2020-10-22 RX ADMIN — OXYCODONE HYDROCHLORIDE 10 MG: 10 TABLET, FILM COATED, EXTENDED RELEASE ORAL at 08:29

## 2020-10-22 RX ADMIN — ACETAMINOPHEN 1000 MG: 500 TABLET, FILM COATED ORAL at 03:16

## 2020-10-22 RX ADMIN — INSULIN LISPRO 4 UNITS: 100 INJECTION, SOLUTION INTRAVENOUS; SUBCUTANEOUS at 13:39

## 2020-10-22 RX ADMIN — MELOXICAM 15 MG: 15 TABLET ORAL at 08:24

## 2020-10-22 RX ADMIN — GUAIFENESIN 600 MG: 600 TABLET, EXTENDED RELEASE ORAL at 13:40

## 2020-10-22 RX ADMIN — CEFAZOLIN SODIUM 2 G: 10 INJECTION, POWDER, FOR SOLUTION INTRAVENOUS at 03:16

## 2020-10-22 RX ADMIN — ACETAMINOPHEN 1000 MG: 500 TABLET, FILM COATED ORAL at 10:38

## 2020-10-22 RX ADMIN — FERROUS SULFATE TAB EC 324 MG (65 MG FE EQUIVALENT) 324 MG: 324 (65 FE) TABLET DELAYED RESPONSE at 08:24

## 2020-10-22 RX ADMIN — POLYETHYLENE GLYCOL 3350 17 G: 17 POWDER, FOR SOLUTION ORAL at 08:23

## 2020-10-22 NOTE — PROGRESS NOTES
"      [facility] Medicine Services Daily Progress Note      Hospitalist Team  LOS 0 days      Patient Care Team:  Bert Rojas MD as PCP - General (Family Medicine)    Patient Location: [facility]/      Subjective   Subjective     Chief Complaint / Subjective  Right knee pain    Brief Synopsis of Hospital Course/HPI  Mr. Peter is a 69 y.o. male with a history of type 2 diabetes, hyperlipidemia, hypertension, and osteoarthritis of the right knee was admitted to [facility] on 10/21/2020 for a right total knee arthroplasty per Dr. Fagan.  Hospitalist service was consulted for medical management.            Date::      10/22/2020  Patient's potassium level seems stable but chronic.  Repeat evaluation was normal  Patient blood pressure seems to be controlled with current medications  Patient was hypoxemic yesterday possible atelectatic changes with negative x-ray for acute abnormality of the chest.  He is Covid negative he has some productive cough.  Patient had recent stress test 2 days ago that was negative  Patient denies any chest pain or shortness of breath at this time  He has mild productive cough.  Doxycycline and Symbicort/albuterol were added and recommended to follow-up with pulmonologist as advised primary care physician orthopedic surgery  Patient already anticoagulated with Xarelto for DVT prophylaxis.  Chest x-ray reviewed as above      ROS  All other systems reviewed and negative    Objective   Objective      Vital Signs  Temp:  [97.1 °F (36.2 °C)-98.6 °F (37 °C)] 97.7 °F (36.5 °C)  Heart Rate:  [50-78] 69  Resp:  [10-18] 16  BP: ()/(44-73) 136/68  Oxygen Therapy  SpO2: 95 %  Pulse Oximetry Type: Intermittent  Device (Oxygen Therapy): nasal cannula  Flow (L/min): 2  Flowsheet Rows      First Filed Value   Admission Height  170.2 cm (67\") Documented at 10/13/2020 1127   Admission Weight  96.2 kg (212 lb) Documented at 10/13/2020 1127        Intake & Output (last 3 " days)       10/19 0701 - 10/20 0700 10/20 0701 - 10/21 0700 10/21 0701 - 10/22 0700 10/22 0701 - 10/23 0700    P.O.   420 480    I.V. (mL/kg)   1550 (14.9)     Total Intake(mL/kg)   1970 (18.9) 480 (4.6)    Net   +1970 +480            Urine Unmeasured Occurrence   1 x         Lines, Drains & Airways    Active LDAs     Name:   Placement date:   Placement time:   Site:   Days:    Peripheral IV 10/21/20 0936 Left Antecubital   10/21/20    0936    Antecubital   1                  Physical Exam:    Physical Exam  Vitals signs and nursing note reviewed.   Constitutional:       General: He is not in acute distress.     Appearance: Normal appearance. He is well-developed. He is obese. He is not ill-appearing, toxic-appearing or diaphoretic.   HENT:      Head: Normocephalic and atraumatic.      Right Ear: Ear canal and external ear normal.      Left Ear: Ear canal and external ear normal.      Nose: Nose normal. No congestion or rhinorrhea.      Mouth/Throat:      Mouth: Mucous membranes are moist.      Pharynx: No oropharyngeal exudate.   Eyes:      General: No scleral icterus.        Right eye: No discharge.         Left eye: No discharge.      Extraocular Movements: Extraocular movements intact.      Conjunctiva/sclera: Conjunctivae normal.      Pupils: Pupils are equal, round, and reactive to light.   Neck:      Musculoskeletal: Normal range of motion and neck supple. No neck rigidity or muscular tenderness.      Thyroid: No thyromegaly.      Vascular: No carotid bruit or JVD.      Trachea: No tracheal deviation.   Cardiovascular:      Rate and Rhythm: Normal rate and regular rhythm.      Pulses: Normal pulses.      Heart sounds: Normal heart sounds. No murmur. No friction rub. No gallop.    Pulmonary:      Effort: Pulmonary effort is normal. No respiratory distress.      Breath sounds: No stridor. Examination of the right-middle field reveals rhonchi. Examination of the left-middle field reveals rhonchi. Rhonchi  present. No wheezing or rales.   Chest:      Chest wall: No tenderness.   Abdominal:      General: Bowel sounds are normal. There is no distension.      Palpations: Abdomen is soft. There is no mass.      Tenderness: There is no abdominal tenderness. There is no guarding or rebound.      Hernia: No hernia is present.   Musculoskeletal: Normal range of motion.         General: No swelling, tenderness, deformity or signs of injury.      Right lower leg: No edema.      Left lower leg: No edema.   Lymphadenopathy:      Cervical: No cervical adenopathy.   Skin:     General: Skin is warm and dry.      Coloration: Skin is not jaundiced or pale.      Findings: No bruising, erythema or rash.   Neurological:      General: No focal deficit present.      Mental Status: He is alert and oriented to person, place, and time. Mental status is at baseline.      Cranial Nerves: No cranial nerve deficit.      Sensory: No sensory deficit.      Motor: No weakness or abnormal muscle tone.      Coordination: Coordination normal.   Psychiatric:         Mood and Affect: Mood normal.         Behavior: Behavior normal.         Thought Content: Thought content normal.         Judgment: Judgment normal.              Wounds (last 24 hours)      LDA Wound     Row Name 10/22/20 0720 10/21/20 2115 10/21/20 1741       Wound 10/21/20 1308 Right anterior knee Incision    Wound - Properties Group Placement Date: 10/21/20  -LJ Placement Time: 1308 -LJ Side: Right  - Orientation: anterior  - Location: knee  - Primary Wound Type: Incision  -LJ Additional Comments: LEYLA, ABD, WEBRIL, ACE AND COOL TEMP PAD  -LJ    Dressing Appearance  dry;intact  -LB  dry;intact  -AM  dry;intact;no drainage  -LB    Closure  FRANCINE  -LB  FRANCINE  -AM  FRANCINE  -LB    Base  --  dressing in place, unable to visualize  -AM  --    Drainage Amount  none  -LB  none  -AM  none  -LB    Dressing Care  elastic bandage  -LB  elastic bandage  -AM  elastic bandage  -LB    Retired Wound -  Properties Group Date first assessed: 10/21/20  -LJ Time first assessed: 1308 -LJ Side: Right  -LJ Location: knee  - Primary Wound Type: Incision  -LJ Additional Comments: LEYLA, ABD, WEBRIL, ACE AND COOL TEMP PAD  -LJ    Row Name 10/21/20 1720 10/21/20 1624 10/21/20 1554       Wound 10/21/20 1308 Right anterior knee Incision    Wound - Properties Group Placement Date: 10/21/20  -LJ Placement Time: 1308 -LJ Side: Right  - Orientation: anterior  - Location: knee  -LJ Primary Wound Type: Incision  -LJ Additional Comments: LEYLA, ABD, WEBRIL, ACE AND COOL TEMP PAD  -LJ    Dressing Appearance  dry;intact;no drainage  -NS  dry;intact;no drainage  -NS  dry;intact;no drainage  -NS    Closure  FRANCINE  -NS  FRANCINE  -NS  FRANCINE  -NS    Base  dressing in place, unable to visualize Leyla, Abd, Webril, Ace, Polar Cube  -NS  dressing in place, unable to visualize Leyla, Abd, Webril, Ace, Polar Cube  -NS  dressing in place, unable to visualize Leyla, Abd, Webril, Ace, Polar Cube  -NS    Retired Wound - Properties Group Date first assessed: 10/21/20  -LJ Time first assessed: 1308 -LJ Side: Right  - Location: knee  - Primary Wound Type: Incision  -LJ Additional Comments: LEYLA, ABD, WEBRIL, ACE AND COOL TEMP PAD  -LJ    Row Name 10/21/20 1539 10/21/20 1524 10/21/20 1509       Wound 10/21/20 1308 Right anterior knee Incision    Wound - Properties Group Placement Date: 10/21/20  -LJ Placement Time: 1308 -LJ Side: Right  -LJ Orientation: anterior  - Location: knee  - Primary Wound Type: Incision  -LJ Additional Comments: LEYLA, ABD, WEBRIL, ACE AND COOL TEMP PAD  -LJ    Dressing Appearance  dry;intact;no drainage  -NS  dry;intact;no drainage  -NS  dry;intact;no drainage  -NS    Closure  FRANCINE  -NS  FRANCINE  -NS  FRANCINE  -NS    Base  dressing in place, unable to visualize Leyla, Abd, Webril, Ace, Polar Cube  -NS  dressing in place, unable to visualize [place], Abd, Webril, Ace, Polar Cube  -NS  dressing in place, unable  to visualize [place], Abd, Webril, Ace, Polar Cube  -NS    Retired Wound - Properties Group Date first assessed: 10/21/20  -LJ Time first assessed: 1308 -LJ Side: Right  -LJ Location: knee  - Primary Wound Type: Incision  -LJ Additional Comments: LEYLA, ABD, WEBRIL, ACE AND COOL TEMP PAD  -LJ    Row Name 10/21/20 1454 10/21/20 1439 10/21/20 1424       Wound 10/21/20 1308 Right anterior knee Incision    Wound - Properties Group Placement Date: 10/21/20  -LJ Placement Time: 1308 -LJ Side: Right  -LJ Orientation: anterior  - Location: knee  - Primary Wound Type: Incision  -LJ Additional Comments: LEYLA, ABD, WEBRIL, ACE AND COOL TEMP PAD  -LJ    Dressing Appearance  dry;intact;no drainage  -NS  dry;intact;no drainage  -NS  dry;intact;no drainage  -NS    Closure  FRANCINE  -NS  FRANCINE  -NS  FRANCINE  -NS    Base  dressing in place, unable to visualize [place], Abd, Webril, Ace, Polar Cube  -NS  dressing in place, unable to visualize [place], Abd, Webril, Ace, Polar Cube  -NS  dressing in place, unable to visualize Leyla, Abd, Webril, Ace, Polar Cube  -NS    Retired Wound - Properties Group Date first assessed: 10/21/20  -LJ Time first assessed: 1308 -LJ Side: Right  - Location: knee  - Primary Wound Type: Incision  -LJ Additional Comments: LEYLA, ABD, WEBRIL, ACE AND COOL TEMP PAD  -LJ    Row Name 10/21/20 1409 10/21/20 1354          Wound 10/21/20 1308 Right anterior knee Incision    Wound - Properties Group Placement Date: 10/21/20  -LJ Placement Time: 1308 -LJ Side: Right  -LJ Orientation: anterior  - Location: knee  - Primary Wound Type: Incision  -LJ Additional Comments: LEYLA, ABD, WEBRIL, ACE AND COOL TEMP PAD  -LJ    Dressing Appearance  dry;intact;no drainage  -NS  dry;intact;no drainage  -NS     Closure  FRANCINE  -NS  FRANCINE  -NS     Base  dressing in place, unable to visualize Leyla, Abd, Webril, Ace, Polar Cube  -NS  dressing in place, unable to visualize [place], Abd, Webril, Ace, Polar Cube  -NS      Retired Wound - Properties Group Date first assessed: 10/21/20  -LJ Time first assessed: 1308 -LJ Side: Right  -LJ Location: knee  -LJ Primary Wound Type: Incision  -LJ Additional Comments: LEYLA, ABD, WEBRIL, ACE AND COOL TEMP PAD  -      User Key  (r) = Recorded By, (t) = Taken By, (c) = Cosigned By    Initials Name Provider Type    Linda Perez RN Registered Nurse    Joan Forman RN Registered Nurse    Kasandra Serra RN Registered Nurse    NS Sprigler, Naomi, RN Registered Nurse          Procedures:    Procedure(s):  TOTAL KNEE ARTHROPLASTY          Results Review:     I reviewed the patient's new clinical results.      Lab Results (last 24 hours)     Procedure Component Value Units Date/Time    POC Glucose Once [[id number]]  (Abnormal) Collected: 10/22/20 0736    Specimen: Blood Updated: 10/22/20 0739     Glucose 171 mg/dL      Comment: Serial Number: [id number]:  [id number]       Basic Metabolic Panel [[id number]]  (Abnormal) Collected: 10/22/20 0234    Specimen: Blood Updated: 10/22/20 0326     Glucose 180 mg/dL      BUN 19 mg/dL      Creatinine 0.69 mg/dL      Sodium 138 mmol/L      Potassium 5.3 mmol/L      Comment: Slight hemolysis detected by analyzer. Results may be affected.        Chloride 104 mmol/L      CO2 26.0 mmol/L      Calcium 8.3 mg/dL      eGFR Non African Amer 114 mL/min/1.73      BUN/Creatinine Ratio 27.5     Anion Gap 8.0 mmol/L     Narrative:      GFR Normal >60  Chronic Kidney Disease <60  Kidney Failure <15      Hemoglobin & Hematocrit, Blood [[id number]]  (Abnormal) Collected: 10/22/20 0234    Specimen: Blood Updated: 10/22/20 0308     Hemoglobin 12.4 g/dL      Hematocrit 36.9 %     POC Glucose Once [[id number]]  (Abnormal) Collected: 10/21/20 2114    Specimen: Blood Updated: 10/21/20 2116     Glucose 241 mg/dL      Comment: Serial Number: [id number]:  [id number]       POC Glucose Once [[id number]]  (Abnormal) Collected: 10/21/20 1825    Specimen:  Blood Updated: 10/21/20 1826     Glucose 190 mg/dL      Comment: Serial Number: [id number]:  [id number]       POC Glucose Once [[id number]]  (Abnormal) Collected: 10/21/20 1358    Specimen: Blood Updated: 10/21/20 1359     Glucose 133 mg/dL      Comment: Serial Number: [id number]:  [id number]           Hemoglobin A1C   Date Value Ref Range Status   10/19/2020 7.4 (H) 3.5 - 5.6 % Final     Results from last 7 days   Lab Units 10/19/20  1148   INR  1.03           No results found for: LIPASE  No results found for: CHOL, CHLPL, TRIG, HDL, LDL, LDLDIRECT    No results found for: INTRAOP, PREDX, FINALDX, COMDX    Microbiology Results (last 10 days)     Procedure Component Value - Date/Time    MRSA Screen, PCR (Inpatient) - Swab, Nares [[id number]]  (Normal) Collected: 10/19/20 1148    Lab Status: Final result Specimen: Swab from Nares Updated: 10/19/20 1315     MRSA PCR No MRSA Detected    COVID PRE-OP / PRE-PROCEDURE SCREENING ORDER (NO ISOLATION) - Swab, Nasopharynx [[id number]] Collected: 10/19/20 1148    Lab Status: Final result Specimen: Swab from Nasopharynx Updated: 10/20/20 1421    Narrative:      The following orders were created for panel order COVID PRE-OP / PRE-PROCEDURE SCREENING ORDER (NO ISOLATION) - Swab, Nasopharynx.  Procedure                               Abnormality         Status                     ---------                               -----------         ------                     COVID-19,[facility], NP ...[[id number]]                      Final result                 Please view results for these tests on the individual orders.    COVID-19,[facility], NP SWAB IN LEXAR VIRAL TRANSPORT MEDIA 24-30 HR TAT - Swab, Nasopharynx [[id number]] Collected: 10/19/20 1148    Lab Status: Final result Specimen: Swab from Nasopharynx Updated: 10/20/20 1421     SARS-CoV-2 KAYLI Not Detected    Urine Culture - Urine, Urine, Clean Catch [[id number]]  (Normal) Collected: 10/19/20 1148    Lab Status: Final  result Specimen: Urine, Clean Catch Updated: 10/20/20 2118     Urine Culture No growth          ECG/EMG Results (most recent)     Procedure Component Value Units Date/Time    SCANNED EKG [[id number]] Resulted: 10/21/20      Updated: 10/20/20 1652                    Xr Knee 1 Or 2 View Right    Result Date: 10/21/2020  1.Status post left total knee arthroplasty. 2.No definite immediate postoperative complication is identified.  Electronically Signed By-DR. Vimal Woo MD On:10/21/2020 4:10 PM This report was finalized on [id number] by DR. Vimal Woo MD.    Xr Chest Pa & Lateral    Result Date: 10/21/2020  1.Mild cardiomegaly. 2.Pulmonary hyperinflation which could indicate COPD. 3.No radiographic findings of acute cardiopulmonary abnormality.  Electronically Signed By-DR. Vimal Woo MD On:10/21/2020 9:44 AM This report was finalized on [id number] by DR. Vimal Woo MD.          Xrays, labs reviewed personally by physician.    Medication Review:   I have reviewed the patient's current medication list      Scheduled Meds  acetaminophen, 1,000 mg, Oral, Q8H  amLODIPine, 5 mg, Oral, Nightly  atorvastatin, 10 mg, Oral, Nightly  ferrous sulfate, 324 mg, Oral, Daily With Breakfast  gabapentin, 300 mg, Oral, TID  glipizide, 10 mg, Oral, Nightly  insulin lispro, 0-9 Units, Subcutaneous, TID AC  lisinopril, 40 mg, Oral, Nightly  meloxicam, 15 mg, Oral, Daily  metFORMIN, 1,000 mg, Oral, BID With Meals  oxyCODONE, 10 mg, Oral, Q12H  pioglitazone, 30 mg, Oral, Nightly  polyethylene glycol, 17 g, Oral, Daily  rivaroxaban, 10 mg, Oral, Daily With Dinner  sertraline, 50 mg, Oral, Nightly        Meds Infusions  sodium chloride, 100 mL/hr, Last Rate: 100 mL/hr (10/21/20 1917)        Meds PRN  albuterol  •  dextrose  •  dextrose  •  diphenhydrAMINE **OR** diphenhydrAMINE  •  glucagon (human recombinant)  •  insulin lispro **AND** insulin lispro  •  Morphine **AND** naloxone  •  ondansetron **OR** ondansetron  •   oxyCODONE  •  promethazine  •  traMADol        Assessment/Plan   Assessment/Plan     Active Hospital Problems    Diagnosis  POA   • **Primary osteoarthritis of right knee [M17.11]  Yes   • Primary osteoarthritis of knees, bilateral [M17.0]  Yes   • Type 2 diabetes mellitus without complication, without long-term current use of insulin (CMS/[facility]) [E11.9]  Yes   • Mixed hyperlipidemia [E78.2]  Yes   • Essential hypertension [I10]  Yes      Resolved Hospital Problems   No resolved problems to display.       MEDICAL DECISION MAKING COMPLEXITY BY PROBLEM:     Primary osteoarthritis  -S/P right total knee arthroplasty  -Postop care per orthopedics  -DVT prophylaxis per orthopedics     Diabetes type 2  -Continue Actos, Glucophage  -Accu-Cheks before meals and at bedtime  -Sliding scale insulin    Possible mild postoperative hypoxemia related to atelectatic changes  Patient also suspicious for having sleep apnea/COPD  Patient advised to follow-up with pulmonologist and started on Symbicort and albuterol as needed since he has productive cough and suspect to have acute bronchitic changes  I would advise to continue doxycycline on discharge in addition to Symbicort and albuterol and I would avoid systemic steroids at this time as patient condition seems to be stable.  Also had recent surgery this admission.  6-minute walk test is planned to see if patient will need oxygen on discharge      Hyperlipidemia  -Continue statin     Hypertension, chronic  -Continue Norvasc, lisinopril    Mildly elevated potassium level initially this morning: Potassium level is normal now    Depression/anxiety  -Continue Zoloft  DVT prophylaxis: Xarelto per Ortho  VTE Prophylaxis -   Mechanical Order History:      Ordered        10/21/20 1247  Place Sequential Compression Device  Once         10/21/20 1247  Maintain Sequential Compression Device  Continuous         10/21/20 0844  SCD (sequential compression device)- to be placed on patient in Pre-op   Once,   Status:  Canceled                 Pharmalogical Order History:      Ordered     Dose Route Frequency Stop    10/21/20 5528  rivaroxaban (XARELTO) tablet 10 mg     Question:  Are you ordering rivaroxaban for the prevention of blood clots in an acutely ill medical patient?  Answer:  No    10 mg PO Daily With Dinner --                  Code Status -   Code Status and Medical Interventions:   Ordered at: 10/21/20 8331     Code Status:    CPR     Medical Interventions (Level of Support Prior to Arrest):    Full       This patient has been examined wearing appropriate Personal Protective Equipment and discussed with hospital infection control department. 10/22/20        Discharge Planning  Home health          Electronically signed by Jean Sepulveda MD, 10/22/20, 10:10 EDT.  [facility] Hospitalist Team

## 2020-10-22 NOTE — PROGRESS NOTES
Exercise Oximetry    Patient Name:Brenden Peter   MRN: [id number]   Date: 10/22/20             ROOM AIR BASELINE   SpO2% 87   Heart Rate    Blood Pressure      EXERCISE ON ROOM AIR SpO2% EXERCISE ON O2 @ LPM SpO2%   1 MINUTE  1 MINUTE    2 MINUTES  2 MINUTES    3 MINUTES  3 MINUTES    4 MINUTES  4 MINUTES    5 MINUTES  5 MINUTES    6 MINUTES  6 MINUTES               Distance Walked   Distance Walked   Dyspnea (Miranda Scale)   Dyspnea (Miranda Scale)   Fatigue (Miranda Scale)   Fatigue (Miranda Scale)   SpO2% Post Exercise   SpO2% Post Exercise   HR Post Exercise   HR Post Exercise   Time to Recovery   Time to Recovery     Comments: Pt was 87% at rest on room air. Placed pt on 2L NC sats only increased to 89% with deep breathing. Increased pt to 3L NC pt sats increased to 96%. Patient needs to go home on 3L NC.    Evelia Aguilera, CRT

## 2020-10-22 NOTE — PLAN OF CARE
Problem: Adult Inpatient Plan of Care  Goal: Plan of Care Review  10/22/2020 1802 by Niru Miguel PTA  Outcome: Adequate for Care Transition  Flowsheets (Taken 10/22/2020 1802)  Progress: improving  Plan of Care Reviewed With: patient  Outcome Summary: pt alert this morning.  pt with modified independent transfering in/out of the bed.  pt amb 80' with supervision with cues for heel strike bilaterally.  pt demonstrated active exercises.   right knee flexion to 110 degrees.  Recommend home with wife and HHPT to follow. PPE used:  mask, safety glasses and gloves

## 2020-10-22 NOTE — DISCHARGE PLACEMENT REQUEST
"Brenden Lackey (69 y.o. Male)     Date of Birth Social Security Number Address Home Phone MRN    1950  [id number] [place] [facility] [phone] [id number]    [employer] Marital Status          None        Admission Date Admission Type Admitting Provider Attending Provider Department, Room/Bed    10/21/20 Elective Ravi Fagan MD Mehta, Sanjiv, MD [facility], 4124/1    Discharge Date Discharge Disposition Discharge Destination         Home-Health Care [facility] [place]              Attending Provider: Ravi Fagan MD    Allergies: No Known Allergies    Isolation: None   Infection: None   Code Status: CPR    Ht: 170.2 cm (67\")   Wt: 104 kg (230 lb 6.1 oz)    Admission Cmt: None   Principal Problem: Primary osteoarthritis of right knee [M17.11]                 Active Insurance as of 10/21/2020     Primary Coverage     Payor Plan Insurance Group Employer/Plan Group    MEDICARE MEDICARE A & B      Payor Plan Address Payor Plan Phone Number Payor Plan Fax Number Effective Dates    [address] [phone]  10/1/2015 - None Entered    [facility] [id number]       Subscriber Name Subscriber Birth Date Member ID       BRENDEN LACKEY 1950 [id number]           Secondary Coverage     Payor Plan Insurance Group Employer/Plan Group    AARP MC SUP AARP HEALTH CARE OPTIONS N     Payor Plan Address Payor Plan Phone Number Payor Plan Fax Number Effective Dates    [facility] [phone]  1/1/2020 - None Entered    [address]       [facility] [address]       Subscriber Name Subscriber Birth Date Member ID       BRENDEN LACKEY 1950 [id number]                 Emergency Contacts      (Rel.) Home Phone Work Phone Mobile Phone    RomeDonna (Spouse) -- -- [phone]    Rome Rigoberto (Son) -- -- [phone]              "

## 2020-10-22 NOTE — PLAN OF CARE
Goal Outcome Evaluation:  Plan of Care Reviewed With: patient  Progress: no change  Outcome Summary: pt doing well today with no complaints and will discharge.

## 2020-10-22 NOTE — CONSULTS
[facility] Medicine Services      Patient Name: Brenden Peter  : 1950  MRN: [id number]  Primary Care Physician: Bert Rojas MD  Date of admission: 10/21/2020    Patient Care Team:  Bert Rojas MD as PCP - General (Family Medicine)          Subjective   History Present Illness     Chief Complaint: Osteoarthritis of the right knee.        Mr. Peter is a 69 y.o. male with a history of type 2 diabetes, hyperlipidemia, hypertension, and osteoarthritis of the right knee was admitted to [facility] on 10/21/2020 for a right total knee arthroplasty per Dr. Fagan.  Hospitalist service was consulted for medical management.         Review of Systems   Constitution: Negative.   HENT: Negative.    Eyes: Negative.    Cardiovascular: Negative.    Respiratory: Negative.    Endocrine: Negative.    Hematologic/Lymphatic: Negative.    Skin: Negative.    Musculoskeletal: Positive for joint pain and joint swelling.   Gastrointestinal: Negative.    Genitourinary: Negative.    Neurological: Negative.    Psychiatric/Behavioral: Negative.    Allergic/Immunologic: Negative.    All other systems reviewed and are negative.          Personal History     Past Medical History:   Past Medical History:   Diagnosis Date   • Anxiety    • COPD (chronic obstructive pulmonary disease) (CMS/[facility])    • Diabetes (CMS/[facility])     type 2   • Hyperlipidemia    • Hypertension        Surgical History:      Past Surgical History:   Procedure Laterality Date   • EYE SURGERY      cataract           Family History: family history includes Cancer in an other family member; Diabetes in an other family member; Heart disease in an other family member; No Known Problems in his brother, father, maternal aunt, maternal grandfather, maternal grandmother, maternal uncle, mother, paternal aunt, paternal grandfather, paternal grandmother, paternal uncle, and sister. Otherwise pertinent FHx was reviewed and  unremarkable.     Social History:  reports that he has been smoking cigarettes. He has been smoking about 0.50 packs per day. He has never used smokeless tobacco. He reports previous alcohol use. He reports that he does not use drugs.      Medications:  Prior to Admission medications    Medication Sig Start Date End Date Taking? Authorizing Provider   albuterol sulfate  (90 Base) MCG/ACT inhaler Inhale 1 puff Every 4 (Four) Hours As Needed for Shortness of Air.   Yes Felix Solano MD   amLODIPine (NORVASC) 5 MG tablet Take 5 mg by mouth Every Night. 4/14/20  Yes Felix Solano MD   gabapentin (NEURONTIN) 300 MG capsule Take 300 mg by mouth 3 (Three) Times a Day.   Yes Felix Solano MD   glipizide (GLUCOTROL) 10 MG tablet Take 10 mg by mouth Every Night.   Yes Felix Solano MD   lisinopril (PRINIVIL,ZESTRIL) 40 MG tablet Take 40 mg by mouth Every Night. LD 10/19   Yes Felix Solano MD   metFORMIN (GLUCOPHAGE) 1000 MG tablet Take 1,000 mg by mouth 2 (Two) Times a Day With Meals. LD 10/18 4/14/20  Yes Felix Solano MD   pioglitazone (ACTOS) 30 MG tablet Take 30 mg by mouth Every Night.   Yes Felix Solano MD   pravastatin (PRAVACHOL) 10 MG tablet Take 10 mg by mouth Every Night. 4/14/20  Yes Felix Solano MD   sertraline (ZOLOFT) 50 MG tablet Take 50 mg by mouth Every Night. 4/14/20  Yes Felix Solano MD   vitamin D (ERGOCALCIFEROL) 1.25 MG (01727 UT) capsule capsule 50,000 Units 2 (Two) Times a Week. Tues and Fri 4/14/20  Yes Felix Solano MD   mupirocin (BACTROBAN) 2 % ointment Apply a pea-sized amount to each nostril twice daily for 5 days prior to surgery. 9/15/20 10/21/20 Yes Ravi Fagan MD   glyburide (DIAbeta) 5 MG tablet glyburide 5 mg tablet  10/21/20  Felix Solano MD   losartan (COZAAR) 100 MG tablet losartan 100 mg tablet  10/21/20  Felix Solano MD       Allergies:  No Known Allergies    Objective    Objective     Vital Signs  Temp:  [97.1 °F (36.2 °C)-98.2 °F (36.8 °C)] 97.6 °F (36.4 °C)  Heart Rate:  [50-78] 59  Resp:  [10-18] 18  BP: ()/(44-73) 121/71  SpO2:  [91 %-99 %] 92 %  on  Flow (L/min):  [2-4] 2;   Device (Oxygen Therapy): nasal cannula  Body mass index is 36.08 kg/m².    Physical Exam  Vitals signs reviewed.   Constitutional:       Appearance: Normal appearance. He is obese.   HENT:      Head: Normocephalic.      Mouth/Throat:      Mouth: Mucous membranes are moist.   Eyes:      Conjunctiva/sclera: Conjunctivae normal.   Cardiovascular:      Rate and Rhythm: Normal rate and regular rhythm.   Pulmonary:      Effort: Pulmonary effort is normal.      Breath sounds: Normal breath sounds.   Abdominal:      General: Bowel sounds are normal.   Musculoskeletal:         General: Swelling and tenderness present.   Skin:     General: Skin is warm and dry.      Capillary Refill: Capillary refill takes less than 2 seconds.   Neurological:      General: No focal deficit present.      Mental Status: He is alert and oriented to person, place, and time.         Results Review:  I have personally reviewed most recent cardiac tracings, lab results and radiology images and interpretations and agree with findings    Results from last 7 days   Lab Units 10/19/20  1148   WBC 10*3/mm3 6.64   HEMOGLOBIN g/dL 13.0   HEMATOCRIT % 38.4   PLATELETS 10*3/mm3 180   INR  1.03     Results from last 7 days   Lab Units 10/19/20  1148   SODIUM mmol/L 140   POTASSIUM mmol/L 4.5   CHLORIDE mmol/L 103   CO2 mmol/L 28.1   BUN mg/dL 12   CREATININE mg/dL 0.66*   GLUCOSE mg/dL 193*   CALCIUM mg/dL 9.7     Estimated Creatinine Clearance: 100.7 mL/min (A) (by C-G formula based on SCr of 0.66 mg/dL (L)).  Brief Urine Lab Results  (Last result in the past 365 days)      Color   Clarity   Blood   Leuk Est   Nitrite   Protein   CREAT   Urine HCG        10/19/20 1148 Yellow Clear Negative Trace Negative 100 mg/dL (2+)                Microbiology Results (last 10 days)     Procedure Component Value - Date/Time    MRSA Screen, PCR (Inpatient) - Swab, Nares [[id number]]  (Normal) Collected: 10/19/20 1148    Lab Status: Final result Specimen: Swab from Nares Updated: 10/19/20 1315     MRSA PCR No MRSA Detected    COVID PRE-OP / PRE-PROCEDURE SCREENING ORDER (NO ISOLATION) - Swab, Nasopharynx [[id number]] Collected: 10/19/20 1148    Lab Status: Final result Specimen: Swab from Nasopharynx Updated: 10/20/20 1421    Narrative:      The following orders were created for panel order COVID PRE-OP / PRE-PROCEDURE SCREENING ORDER (NO ISOLATION) - Swab, Nasopharynx.  Procedure                               Abnormality         Status                     ---------                               -----------         ------                     COVID-19,[facility], NP ...[[id number]]                      Final result                 Please view results for these tests on the individual orders.    COVID-19,[facility], NP SWAB IN [facility]EXAR VIRAL TRANSPORT MEDIA 24-30 HR TAT - Swab, Nasopharynx [[id number]] Collected: 10/19/20 1148    Lab Status: Final result Specimen: Swab from Nasopharynx Updated: 10/20/20 1421     SARS-CoV-2 KAYLI Not Detected    Urine Culture - Urine, Urine, Clean Catch [[id number]]  (Normal) Collected: 10/19/20 1148    Lab Status: Final result Specimen: Urine, Clean Catch Updated: 10/20/20 2118     Urine Culture No growth          ECG/EMG Results (most recent)     Procedure Component Value Units Date/Time    SCANNED EKG [[id number]] Resulted: 10/21/20      Updated: 10/20/20 1652                    Xr Knee 1 Or 2 View Right    Result Date: 10/21/2020  1.Status post left total knee arthroplasty. 2.No definite immediate postoperative complication is identified.  Electronically Signed By-DR. Vimal Woo MD On:10/21/2020 4:10 PM This report was finalized on [id number] by DR. Vimal Woo MD.    Xr Chest Pa & Lateral    Result Date:  10/21/2020  1.Mild cardiomegaly. 2.Pulmonary hyperinflation which could indicate COPD. 3.No radiographic findings of acute cardiopulmonary abnormality.  Electronically Signed By-DR. Vimal Woo MD On:10/21/2020 9:44 AM This report was finalized on [id number] by DR. Vimal Woo MD.        Estimated Creatinine Clearance: 100.7 mL/min (A) (by C-G formula based on SCr of 0.66 mg/dL (L)).    Assessment/Plan   Assessment/Plan       Active Hospital Problems    Diagnosis  POA   • **Primary osteoarthritis of right knee [M17.11]  Yes   • Primary osteoarthritis of knees, bilateral [M17.0]  Yes   • Type 2 diabetes mellitus without complication, without long-term current use of insulin (CMS/[facility]) [E11.9]  Yes   • Mixed hyperlipidemia [E78.2]  Yes   • Essential hypertension [I10]  Yes      Resolved Hospital Problems   No resolved problems to display.     Primary osteoarthritis  -S/P right total knee arthroplasty  -Postop care per orthopedics  -DVT prophylaxis per orthopedics    Diabetes type 2  -Continue Actos, Glucophage  -Accu-Cheks before meals and at bedtime  -Sliding scale insulin    Hyperlipidemia  -Continue statin    Hypertension, chronic  -Continue Norvasc, lisinopril    Depression/anxiety  -Continue Zoloft        VTE Prophylaxis -   Mechanical Order History:      Ordered        10/21/20 1247  Place Sequential Compression Device  Once         10/21/20 1247  Maintain Sequential Compression Device  Continuous         10/21/20 0844  SCD (sequential compression device)- to be placed on patient in Pre-op  Once,   Status:  Canceled                 Pharmalogical Order History:      Ordered     Dose Route Frequency Stop    10/21/20 1709  rivaroxaban (XARELTO) tablet 10 mg     Question:  Are you ordering rivaroxaban for the prevention of blood clots in an acutely ill medical patient?  Answer:  No    10 mg PO Daily With Dinner --                CODE STATUS:    Code Status and Medical Interventions:   Ordered at: 10/21/20  1739     Code Status:    CPR     Medical Interventions (Level of Support Prior to Arrest):    Full       This patient has been examined wearing appropriate Personal Protective Equipment . 10/21/20      I discussed the patient's findings and my recommendations with patient.        Electronically signed by LENNIE Bello, 10/21/20, 9:28 PM EDT.  [facility]

## 2020-10-22 NOTE — DISCHARGE SUMMARY
DISCHARGE SUMMARY      Date of Discharge:  10/22/2020    Discharge Diagnosis: Primary osteoarthritis right knee    Presenting Problem/History of Present Illness  Active Hospital Problems    Diagnosis  POA   • Primary osteoarthritis of knees, bilateral [M17.0]  Yes   • Type 2 diabetes mellitus without complication, without long-term current use of insulin (CMS/[facility]) [E11.9]  Yes   • Mixed hyperlipidemia [E78.2]  Yes   • Essential hypertension [I10]  Yes      Resolved Hospital Problems    Diagnosis Date Resolved POA   • **Primary osteoarthritis of right knee [M17.11] 10/22/2020 Yes          Hospital Course  Brenden Peter is a 69 y.o. male who has a longstanding history of right knee pain.  Preoperative imaging did reveal changes of DJD in the area of the right knee.  After exhausting conservative measures and reviewing the risks and benefits of surgery, he elected to proceed with a right total knee replacement.  He underwent that procedure on 10/21/2020 and was delivered to the recovery room in stable condition.  Upon meeting their transfer criteria, he was transferred to the surgical inpatient floor in stable condition.  He did receive perioperative antibiotics and DVT prophylaxis.  He was evaluated by physical therapy team and did receive their services throughout his stay.  Aside from requiring some O2 by nasal cannula, his hospital course was unremarkable.  Upon discharge, he was ambulating with the assistance of a walker, tolerating oral diet, and his pain is controlled with oral medication.  Prior to discharge, all of his questions and concerns were addressed.    Procedures Performed    Procedure(s):  TOTAL KNEE ARTHROPLASTY  -------------------       Consults:   Consults     Date and Time Order Name Status Description    10/21/2020 8954 Inpatient Consult to Hospitalist Completed           Pertinent Test Results: labs: Reviewed and radiology: X-Ray: Reviewed    Condition on Discharge: To home with home  care in stable condition    Vital Signs  Temp:  [97.6 °F (36.4 °C)-98 °F (36.7 °C)] 97.6 °F (36.4 °C)  Heart Rate:  [69-70] 70  Resp:  [19-20] 20  BP: (105-131)/(56-64) 131/64    Physical Exam:   General Appearance alert, pleasant, appears stated age, interactive and cooperative  Extremities Right lower extremity is grossly well aligned and perfused, sensation intact, dressing clean, dry, and intact, plantar and dorsiflexion intact  Neurologic Mental Status orientated to person, place, time and situation, Cranial Nerves cranial nerves 2 - 12 grossly intact as examined    Discharge Medications     Discharge Medications      New Medications      Instructions Start Date   doxycycline 100 MG tablet  Commonly known as: ADOXA   100 mg, Oral, Every 12 Hours Scheduled      meloxicam 7.5 MG tablet  Commonly known as: MOBIC   7.5 mg, Oral, Daily      oxyCODONE-acetaminophen 5-325 MG per tablet  Commonly known as: PERCOCET   1-2 po q6 hours prn pain      rivaroxaban 10 MG tablet  Commonly known as: XARELTO   10 mg, Oral, Daily With Dinner         Continue These Medications      Instructions Start Date   albuterol sulfate  (90 Base) MCG/ACT inhaler  Commonly known as: PROVENTIL HFA;VENTOLIN HFA;PROAIR HFA   1 puff, Inhalation, Every 4 Hours PRN      amLODIPine 5 MG tablet  Commonly known as: NORVASC   5 mg, Oral, Nightly      gabapentin 300 MG capsule  Commonly known as: NEURONTIN   300 mg, Oral, 3 Times Daily      glipizide 10 MG tablet  Commonly known as: GLUCOTROL   10 mg, Oral, Nightly      lisinopril 40 MG tablet  Commonly known as: PRINIVIL,ZESTRIL   40 mg, Oral, Nightly, LD 10/19      metFORMIN 1000 MG tablet  Commonly known as: GLUCOPHAGE   1,000 mg, Oral, 2 Times Daily With Meals, LD 10/18      pioglitazone 30 MG tablet  Commonly known as: ACTOS   30 mg, Oral, Nightly      pravastatin 10 MG tablet  Commonly known as: PRAVACHOL   10 mg, Oral, Nightly      sertraline 50 MG tablet  Commonly known as: ZOLOFT   50  mg, Oral, Nightly      vitamin D 1.25 MG (37329 UT) capsule capsule  Commonly known as: ERGOCALCIFEROL   50,000 Units, 2 Times Weekly, Tues and Fri             Activity at Discharge:   Activity Instructions     Discharge Activity      Total Knee Replacement Discharge Instructions:    I. ACTIVITIES:  1. Exercises:  Complete exercise program as taught by the hospital physical therapist 2 times per day  Exercise program will be advanced by the physical therapist  During the day be up ambulating every 2 hours (while awake) for short distances  Complete the ankle pump exercises at least 10 times per hour (while awake)  Elevate legs when in bed and for at least 30 minutes during the day.Use cold packs 20-30 minutes approximately 5 times per day. This should be done before and after completing your exercises and at any time you are experiencing pain/ stiffness in your operative extremity.      2. Activities of Daily Living:  No tub baths, hot tubs, or swimming pools for 4 weeks  May shower with waterproof dressing in place as long as it is intact.  Do not scrub or rub the incision. Let water run over dressing and pat dry.     II. Restrictions  Continue precautions as taught at the hospital  Your surgeon will discuss with you when you will be able to drive again, typically it is when you have enough strength in your leg to step hard on the brake and are off the pain medication.  Weight bearing is as tolerated  First week stay inside on even terrain. May go up and down stairs one stair at a time utilizing the hand rail once cleared by physical therapy to do so.  After one week, you may venture outside (if cleared to do so by physical therapist).    III. Precautions:  Everyone that comes near you should wash their hands  No elective dental, genital-urinary, or colon procedures or surgical procedures for 12 weeks after surgery unless absolutely necessary.   If dental work or surgical procedure is deemed absolutely necessary,  you will need to contact your surgeon as you will need to take antibiotics 1 hour prior to any dental work (including teeth cleanings).  Please discuss with your surgeon prophylactic antibiotics as the length of time this intervention will be necessary for you varies with each patient's health history and situation.  Avoid sick people. If you must be around someone who is ill, they should wear a mask.  Avoid visits to the Emergency Room or Urgent Care unless you are having a life threatening event.   Stockings/ace wraps are to be placed on in the morning and removed at night. Monitor the stockings to ensure that any swelling is not causing the stockings to become too tight. In this case, remove stockings immediately.    IV. INCISION CARE:  Wash your hands often.  Notify office if dressing becomes dislodged or drainage noted. Change the dressing as directed by your physician.   No creams or ointments to the incision  Do not touch or pick at the incision  Check incision every day and notify surgeon immediately if any of the following signs or symptoms are noted:  Increase in redness  Increase in swelling around the incision/dressing and of the entire extremity  Increase in pain  Drainage oozing touching the edges of the dressing  Pulling apart of the edges of the incision  Increase in overall body temperature (greater than 100.5 degrees)  Your surgeon will instruct you regarding suture or staple removal    V. Medications:   1. Anticoagulants: You will be discharged on an anticoagulant. This is a prophylactic medication that helps prevent blood clots during your post-operative period. The type and length of dosage varies based on your individual needs, procedure performed, and surgeon's preference.  While taking the anticoagulant, you should avoid taking any additional aspirin, ibuprofen (Advil or Motrin), Aleve (Naprosyn) or other non-steroidal anti-inflammatory medications, unless prescribed by your surgeon.   Notify  surgeon immediately if any josefa bleeding is noted in the urine, stool, emesis, or from the nose or the incision. Blood in the stool will often appear as black rather than red. Blood in urine may appear as pink. Blood in emesis may appear as brown/black like coffee grounds.  You will need to apply pressure for longer periods of time to any cuts or abrasions to stop bleeding  Avoid alcohol while taking anticoagulants    2. Stool Softeners: You will be at greater risk of constipation after surgery due to being less mobile and the pain medications.   Take stool softeners as instructed by your surgeon while on pain medications. Over the counter Colace 100 mg 1-2 capsules twice daily.   If stools become too loose or too frequent, please decreases the dosage or stop the stool softener.  If constipation occurs despite use of stool softeners, you are to continue the stool softeners and add a laxative (Milk of Magnesia 1 ounce daily as needed)  Drink plenty of fluids, and eat fruits and vegetables during your recovery time    3. Pain Medications utilized after surgery are narcotics and the law requires that the following information be given to all patients that are prescribed narcotics:  CLASSIFICATION: Pain medications are called Opioids and are narcotics  LEGALITIES: It is illegal to share narcotics with others and to drive within 24 hours of taking narcotics  POTENTIAL SIDE EFFECTS: Potential side effects of opioids include: nausea, vomiting, itching, dizziness, drowsiness, dry mouth, constipation, and difficulty urinating.  POTENTIAL ADVERSE EFFECTS:   Opioid tolerance can develop with use of pain medications and this simply means that it requires more and more of the medication to control pain; however, this is seen more in patients that use opioids for longer periods of time.  Opioid dependence can develop with use of Opioids and this simply means that to stop the medication can cause withdrawal symptoms; however,  this is seen with patients that use Opioids for longer periods of time.  Opioid addiction can develop with use of Opioids and the incidence of this is very unlikely in patients who take the medications as ordered and stop the medications as instructed.  Opioid overdose can be dangerous, but is unlikely when the medication is taken as ordered and stopped when ordered. It is important not to mix opioids with alcohol or with and type of sedative such as Benadryl as this can lead to over sedation and respiratory difficulty.  DOSAGE:   Pain medications will need to be taken consistently for the first week to decrease pain and promote adequate pain relief and participation in physical therapy.  After the initial surgical pain begins to resolve, you may begin to decrease the pain medication. By the end of 6 weeks, you should be off of pain medications.  Refills will not be given by the office during evening hours, on weekends, or after 6 weeks post-op.  To seek refills on pain medications during the initial 6 week post-operative period, you must call the office 48 hours in advance to request the refill. The office will then notify you when to  the prescription. DO NOT wait until you are out of the medication to request a refill.    V. FOLLOW-UP VISITS:  You will need to follow up in the office with Dr. Ravi Fagan/ Karissa Porter PA-C in 1 week. Please call this number [phone] to schedule this appointment.  You will need to follow up with your primary care physician in 4 weeks.  If you have any concerns or suspected complications prior to your follow up visit, please call your surgeons office. Do not wait until your appointment time if you suspect complications. These will need to be addressed in the office promptly.    Additional Activity Instructions:     Possible mild postoperative hypoxemia related to atelectatic changes   Patient also suspicious for having sleep apnea/COPD   Patient advised to follow-up  with pulmonologist and started on Symbicort and albuterol as needed since he has productive cough and suspect to have acute bronchitic changes   I would advise to continue doxycycline on discharge in addition to Symbicort and albuterol and I would avoid systemic steroids at this time as patient condition seems to be stable.  Also had recent surgery this admission                  Follow-up Appointments  Future Appointments   Date Time Provider Department Center   10/27/2020 11:15 AM Karissa Porter PA [facility]     Additional Instructions for the Follow-ups that You Need to Schedule     Ambulatory Referral to Home Health   As directed      Face to Face Visit Date: 10/21/2020    Follow-up provider for Plan of Care?: I treated the patient in an acute care facility and will not continue treatment after discharge.    Follow-up provider: BULMARO CLEMENTS [[id number]]    Reason/Clinical Findings: s/p R TKR    Describe mobility limitations that make leaving home difficult: s/p R TKR    Nursing/Therapeutic Services Requested: Other (eval and treat)    Frequency: 1 Week 1         Discharge Follow-up with Specialty: Orthopedics; 1 Week   As directed      Specialty: Orthopedics    Follow Up: 1 Week    Follow Up Details: Return to the office to see Dr. Ravi Fagan/ Karissa Porter PA-C               Test Results Pending at Discharge       JOSE Willard  10/23/20  09:32 EDT    I certify that this patient is under my care and that I had a face to face encounter that meets the physician face to face encounter requirements.    The encounter occurred on: 10/23/2020 09:32 EDT    Based on the findings of the above encounter, I certify that this patient is confined to the home and needs intermittent skilled nursing care, physical therapy and/or speech therapy.  The patient is under my care, and I have initiated the establishment of the plan of care.    This patient will be followed by a physician who will, periodically,  review the plan of care. The findings from this face to face encounter have been communicated with the patient's community based physician who will be assuming this patient's home health plan of care.    I also have provided the agency additional information to support the patient's homebound status and need for skilled care. (Examples of this information could include physician progress notes, discharge summaries, history and physical forms, operative reports, referral order, etc.)      Ravi Fagan MD

## 2020-10-22 NOTE — DISCHARGE INSTR - ACTIVITY
Possible mild postoperative hypoxemia related to atelectatic changes   Patient also suspicious for having sleep apnea/COPD   Patient advised to follow-up with pulmonologist and started on Symbicort and albuterol as needed since he has productive cough and suspect to have acute bronchitic changes   I would advise to continue doxycycline on discharge in addition to Symbicort and albuterol and I would avoid systemic steroids at this time as patient condition seems to be stable.  Also had recent surgery this admission

## 2020-10-22 NOTE — THERAPY TREATMENT NOTE
Patient Name: Brenden Peter  : 1950    MRN: [id number]                              Today's Date: 10/22/2020       Admit Date: 10/21/2020    Visit Dx:     ICD-10-CM ICD-9-CM   1. Dyspnea on exertion  R06.00 786.09   2. Primary osteoarthritis of right knee  M17.11 715.16     Patient Active Problem List   Diagnosis   • Primary localized osteoarthrosis of right lower leg   • Type 2 diabetes mellitus without complication, without long-term current use of insulin (CMS/[facility])   • Preop cardiovascular exam   • Mixed hyperlipidemia   • Essential hypertension   • Dyspnea on exertion   • Primary osteoarthritis of knees, bilateral     Past Medical History:   Diagnosis Date   • Anxiety    • COPD (chronic obstructive pulmonary disease) (CMS/[facility])    • Diabetes (CMS/[facility])     type 2   • Hyperlipidemia    • Hypertension      Past Surgical History:   Procedure Laterality Date   • EYE SURGERY      cataract   • TOTAL KNEE ARTHROPLASTY Right 10/21/2020    Procedure: TOTAL KNEE ARTHROPLASTY;  Surgeon: Ravi Fagan MD;  Location: [facility];  Service: Orthopedics;  Laterality: Right;     General Information     Row Name 10/22/20 174          Physical Therapy Time and Intention    Document Type  therapy note (daily note)  -SC     Mode of Treatment  physical therapy;individual therapy  -SC     Row Name 10/22/20 174          Cognition    Orientation Status (Cognition)  oriented x 4  -SC     Row Name 10/22/20 174          Safety Issues, Functional Mobility    Impairments Affecting Function (Mobility)  range of motion (ROM);balance;endurance/activity tolerance  -SC     Comment, Safety Issues/Impairments (Mobility)  pt was moderately short of air after gait training  -SC       User Key  (r) = Recorded By, (t) = Taken By, (c) = Cosigned By    Initials Name Provider Type    SC Niru Miguel PTA Physical Therapy Assistant        Mobility     Row Name 10/22/20 1749          Bed Mobility    Bed Mobility  supine-sit;sit-supine   -SC     Supine-Sit [place] (Bed Mobility)  modified independence  -SC     Sit-Supine [place] (Bed Mobility)  modified independence  -SC     Comment (Bed Mobility)  no assist for transfers in and out of the bed  -SC     Row Name 10/22/20 1749          Transfers    Comment (Transfers)  cues to push up from sitting surface  -SC     Row Name 10/22/20 1749          Sit-Stand Transfer    Sit-Stand [place] (Transfers)  supervision;1 person assist  -SC     Assistive Device (Sit-Stand Transfers)  walker, front-wheeled  -SC     Row Name 10/22/20 1749          Gait/Stairs (Locomotion)    [place] Level (Gait)  supervision;1 person assist  -SC     Assistive Device (Gait)  walker, front-wheeled  -SC     Distance in Feet (Gait)  80' x's 2  -SC     Deviations/Abnormal Patterns (Gait)  gait speed decreased;antalgic;stride length decreased  -SC     [place] Level (Stairs)  contact guard;1 person assist  -SC     Assistive Device (Stairs)  walker, front-wheeled  -SC     Number of Steps (Stairs)  up and down one curb step 2 times.  -SC     Ascending Technique (Stairs)  step-to-step  -SC     Descending Technique (Stairs)  step-to-step  -SC     Comment (Gait/Stairs)  initially pt scooting his feet.  cues for heel strike to right and left lower extrem.  improved gait quality by about 1/2 way through gait distance  -SC     Row Name 10/22/20 1749          Mobility    Extremity Weight-bearing Status  right lower extremity  -SC     Right Lower Extremity (Weight-bearing Status)  weight-bearing as tolerated (WBAT)  -SC       User Key  (r) = Recorded By, (t) = Taken By, (c) = Cosigned By    Initials Name Provider Type    SC Niru Miguel PTA Physical Therapy Assistant        Obj/Interventions     Row Name 10/22/20 1755          Range of Motion Comprehensive    Comment, General Range of Motion  right knee rom:  8 degrees from full extension to 110 degrees flexion  -SC     Row Name 10/22/20 1755          Motor Skills     Therapeutic Exercise  knee  -[facility] Name 10/22/20 1755          Knee (Therapeutic Exercise)    Knee (Therapeutic Exercise)  AROM (active range of motion) Total knee protocol:  ap, heel slides, quad sets, hip abduction, laq's and knee flexion in sitting  -SC     Knee AROM (Therapeutic Exercise)  10 repetitions;right  -[facility] Name 10/22/20 1755          Balance    Balance Assessment  sitting static balance;sitting dynamic balance;standing static balance;standing dynamic balance  -SC     Static Sitting Balance  WNL;unsupported  -SC     Dynamic Sitting Balance  WNL;unsupported  -SC     Static Standing Balance  WFL;supported with roll walker  -SC     Dynamic Standing Balance  WFL;supported with roll walker  -SC       User Key  (r) = Recorded By, (t) = Taken By, (c) = Cosigned By    Initials Name Provider Type    Niru Talamantes PTA Physical Therapy Assistant        Goals/Plan     [facility] Name 10/22/20 1801          Transfer Goal 1 (PT)    Progress/Outcome (Transfer Goal 1, PT)  discharged from facility;goal met  -SC     Row Name 10/22/20 1801          Gait Training Goal 1 (PT)    Progress/Outcome (Gait Training Goal 1, PT)  good progress toward goal;discharged from facility  -[facility] Name 10/22/20 1801          ROM Goal 1 (PT)    Progress/Outcome (ROM Goal 1, PT)  goal met  -[facility] Name 10/22/20 1801          Stairs Goal 1 (PT)    Progress/Outcome (Stairs Goal 1, PT)  good progress toward goal;discharged from facility  -SC       User Key  (r) = Recorded By, (t) = Taken By, (c) = Cosigned By    Initials Name Provider Type    Niru Talamantes PTA Physical Therapy Assistant        Clinical Impression     [facility] Name 10/22/20 1759          Pain    Additional Documentation  Pain Scale: Numbers Pre/Post-Treatment (Group)  -SC     Row Name 10/22/20 1759          Pain Scale: Numbers Pre/Post-Treatment    Pretreatment Pain Rating  2/10  -SC     Posttreatment Pain Rating  2/10  -SC     Pain Location - Side   Right  -SC     Pain Location  knee  -SC     Pain Intervention(s)  Rest;Elevated;Cold applied polar ice  -SC     Row Name 10/22/20 1759          Therapy Assessment/Plan (PT)    Rehab Potential (PT)  good, to achieve stated therapy goals  -SC     Criteria for Skilled Interventions Met (PT)  skilled treatment is necessary  -SC     Row Name 10/22/20 1759          Vital Signs    O2 Delivery Pre Treatment  room air  -SC     O2 Delivery Intra Treatment  room air  -SC     O2 Delivery Post Treatment  room air  -SC     Recovery Time  pt moderately short of air but recovered well  -SC     Row Name 10/22/20 1759          Positioning and Restraints    Pre-Treatment Position  sitting in chair/recliner  -SC     Post Treatment Position  chair  -SC     In Chair  notified nsg;reclined;call light within reach  -SC       User Key  (r) = Recorded By, (t) = Taken By, (c) = Cosigned By    Initials Name Provider Type    SC Niru Miguel PTA Physical Therapy Assistant        Outcome Measures    No documentation.       Physical Therapy Education                 Title: PT OT SLP Therapies (Resolved)     Topic: Physical Therapy (Resolved)     Point: Mobility training (Resolved)     Learning Progress Summary           Patient Acceptance, E,TB, DU,VU by SC at 10/22/2020 1802    Acceptance, E, NR by  at 10/21/2020 1622                   Point: Home exercise program (Resolved)     Learning Progress Summary           Patient Acceptance, E,TB, DU,VU by SC at 10/22/2020 1802    Acceptance, E, NR by  at 10/21/2020 1622                               User Key     Initials Effective Dates Name Provider Type Discipline     04/17/20 -  Jenise Manuel, PT Physical Therapist PT    SC 03/01/19 -  Niru Miguel PTA Physical Therapy Assistant PT              PT Recommendation and Plan     Plan of Care Reviewed With: patient  Progress: improving  Outcome Summary: pt alert this morning.  pt with modified independent transfering in/out of the  bed.  pt amb 80' with supervision with cues for heel strike bilaterally.  pt demonstrated active exercises.   right knee flexion to 110 degrees.  Recommend home with wife and HHPT to follow. PPE used:  mask, safety glasses and gloves     Time Calculation:   PT Charges     Row Name 10/22/20 1805             Time Calculation    Start Time  0910  -SC      Stop Time  0951  -SC      Time Calculation (min)  41 min  -SC      PT Received On  10/22/20  -SC         Time Calculation- PT    Total Timed Code Minutes- PT  41 minute(s)  -SC         Timed Charges    05547 - PT Therapeutic Exercise Minutes  11  -SC      65381 - Gait Training Minutes   15  -SC      45199 - PT Therapeutic Activity Minutes  15  -SC        User Key  (r) = Recorded By, (t) = Taken By, (c) = Cosigned By    Initials Name Provider Type    Niru Talamantes PTA Physical Therapy Assistant        Therapy Charges for Today     Code Description Service Date Service Provider Modifiers Qty    55277900256 HC PT THER PROC EA 15 MIN 10/22/2020 Niru Miguel PTA GP 1    14759189383 HC GAIT TRAINING EA 15 MIN 10/22/2020 Niru Miguel PTA GP 1    95774274876 HC PT THERAPEUTIC ACT EA 15 MIN 10/22/2020 Niru Miguel PTA GP 1               Niru Miguel PTA  10/22/2020

## 2020-10-22 NOTE — PLAN OF CARE
Goal Outcome Evaluation:  Plan of Care Reviewed With: patient  Progress: no change  Outcome Summary: pt doing well. no complaints of pain throughout the night. pt still on 2L O2. ambulating well with minimal assist and walker. resting well. will continue to monitor.

## 2020-10-23 ENCOUNTER — TELEPHONE (OUTPATIENT)
Dept: ORTHOPEDIC SURGERY | Facility: CLINIC | Age: 70
End: 2020-10-23

## 2020-10-23 NOTE — TELEPHONE ENCOUNTER
Call from Ravi STOVER PT with [facility]. Started home PT today pt is doing very well already. Patient has two occlusive dressings. Bleeding on lower half of incision, not leaked out from under dressing. Wanted to know if okay to just keep pt wrapped with the Ace wrap until we see him in office on Tuesday. Advised yes that is what we would like to do. Okay per Ravi.    Right arm;

## 2020-10-23 NOTE — PROGRESS NOTES
Case Management Discharge Note      Final Note: DC to home with LTAC, located within [facility]    Provided Post Acute Provider List?: Refused  Refused Provider List Comment: wants [facility]    Selected Continued Care - Discharged on 10/22/2020 Admission date: 10/21/2020 - Discharge disposition: Home-Health Care Svc        Home Medical Care Coordination complete    Service Provider Selected Services Address Phone Fax    [facility] [address] [id number] [phone]                    Final Discharge Disposition Code: 06 - home with home health care

## 2020-10-27 ENCOUNTER — OFFICE VISIT (OUTPATIENT)
Dept: ORTHOPEDIC SURGERY | Facility: CLINIC | Age: 70
End: 2020-10-27

## 2020-10-27 ENCOUNTER — TELEPHONE (OUTPATIENT)
Dept: ORTHOPEDIC SURGERY | Facility: CLINIC | Age: 70
End: 2020-10-27

## 2020-10-27 VITALS
DIASTOLIC BLOOD PRESSURE: 65 MMHG | BODY MASS INDEX: 36.19 KG/M2 | WEIGHT: 230.6 LBS | HEART RATE: 73 BPM | HEIGHT: 67 IN | SYSTOLIC BLOOD PRESSURE: 143 MMHG

## 2020-10-27 DIAGNOSIS — Z96.651 HX OF TOTAL KNEE REPLACEMENT, RIGHT: Primary | ICD-10-CM

## 2020-10-27 DIAGNOSIS — E66.01 MORBID OBESITY (HCC): ICD-10-CM

## 2020-10-27 PROBLEM — E04.2 MULTINODULAR GOITER: Status: ACTIVE | Noted: 2019-04-18

## 2020-10-27 PROBLEM — E11.29 MICROALBUMINURIA DUE TO TYPE 2 DIABETES MELLITUS: Status: ACTIVE | Noted: 2020-02-05

## 2020-10-27 PROBLEM — F17.200 TOBACCO DEPENDENCE SYNDROME: Status: ACTIVE | Noted: 2020-10-27

## 2020-10-27 PROBLEM — E11.42 DIABETIC PERIPHERAL NEUROPATHY: Status: ACTIVE | Noted: 2020-10-27

## 2020-10-27 PROBLEM — N18.2 STAGE 2 CHRONIC KIDNEY DISEASE: Status: ACTIVE | Noted: 2020-10-27

## 2020-10-27 PROBLEM — M65.30 ACQUIRED TRIGGER FINGER: Status: ACTIVE | Noted: 2020-10-27

## 2020-10-27 PROBLEM — E55.9 VITAMIN D DEFICIENCY: Status: ACTIVE | Noted: 2020-10-27

## 2020-10-27 PROBLEM — IMO0002 UNCONTROLLED TYPE 2 DIABETES MELLITUS: Status: ACTIVE | Noted: 2020-08-12

## 2020-10-27 PROBLEM — N40.0 BENIGN PROSTATIC HYPERPLASIA: Status: ACTIVE | Noted: 2020-10-27

## 2020-10-27 PROBLEM — F32.A DEPRESSION: Status: ACTIVE | Noted: 2020-10-27

## 2020-10-27 PROBLEM — R80.9 MICROALBUMINURIA DUE TO TYPE 2 DIABETES MELLITUS: Status: ACTIVE | Noted: 2020-02-05

## 2020-10-27 PROCEDURE — 99024 POSTOP FOLLOW-UP VISIT: CPT | Performed by: PHYSICIAN ASSISTANT

## 2020-10-27 RX ORDER — BISOPROLOL FUMARATE 5 MG/1
5 TABLET, FILM COATED ORAL DAILY
COMMUNITY
Start: 2020-10-06

## 2020-10-27 NOTE — PATIENT INSTRUCTIONS
Total Knee Replacement, Care After  This sheet gives you information about how to care for yourself after your procedure. Your doctor may also give you more specific instructions. If you have problems or questions, contact your doctor.  What can I expect after the procedure?  After the procedure, it is common to have:  · Pain.  · Swelling.  · A small amount of blood coming from your cut from surgery (incision).  · Clear fluid coming from your cut from surgery.  · Limited movement of your knee.  Follow these instructions at home:  Medicines  · Take over-the-counter and prescription medicines only as told by your doctor.  · If you were prescribed a blood thinner (anticoagulant), take it as told by your doctor.  · Ask your doctor if the medicine prescribed to you:  ? Requires you to avoid driving or using heavy machinery.  ? Can cause trouble pooping (constipation). You may need to take steps to prevent or treat trouble pooping:  § Drink enough fluid to keep your pee (urine) pale yellow.  § Take over-the-counter or prescription medicines.  § Eat foods that are high in fiber. These include beans, whole grains, and fresh fruits and vegetables.  § Limit foods that are high in fat and sugar. These include fried or sweet foods.  Bathing  · Do not take baths, swim, or use a hot tub until your doctor approves. Ask your doctor if you may take showers. You may only be allowed to take sponge baths.  · Keep your bandage (dressing) dry until your doctor says it can be taken off.  Incision care and drain care    · Follow instructions from your doctor about how to take care of your cut from surgery. Make sure you:  ? Wash your hands with soap and water before and after you change your bandage. If you cannot use soap and water, use hand .  ? Change your bandage as told by your doctor.  ? Leave stitches (sutures), skin glue, or skin tape (adhesive) strips in place. They may need to stay in place for 2 weeks or longer. If tape  strips get loose and curl up, you may trim the loose edges. Do not remove tape strips completely unless your doctor says it is okay.  · Check your cut from surgery and your drain site every day for signs of infection. Check for:  ? More redness, swelling, or pain.  ? More fluid or blood.  ? Warmth.  ? Pus or a bad smell.  · If you have a drain, follow instructions from your doctor about caring for it.  Managing pain, stiffness, and swelling         · If told, put ice on your knee.  ? Put ice in a plastic bag or use the icing device (cold flow pad or cryocuff) that you were given. Follow your doctor's directions about how to use the icing device.  ? Place a towel between your skin and the bag or between your skin and the icing device.  ? Leave the ice on for 20 minutes, 2-3 times per day.  · If told, put heat on your knee before you exercise. Use the heat source that your doctor recommends, such as a moist heat pack or a heating pad.  ? Place a towel between your skin and the heat source.  ? Leave the heat on for 20-30 minutes.  ? Remove the heat if your skin turns bright red. This is very important if you are unable to feel pain, heat, or cold. You may have a greater risk of getting burned.  · Move your toes often.  · Raise (elevate) your knee above the level of your heart while you are sitting or lying down.  ? Use several pillows to keep your leg straight.  ? Do not put a pillow just under the knee. If the knee is bent for a long time, this may make the knee stiff.  · Wear elastic knee support as told by your doctor.  Activity  · Rest as told by your doctor.  · Do not sit for a long time without moving. Get up to take short walks every 1-2 hours. This is important. Ask for help if you feel weak or unsteady.  · Ask your doctor what activities are safe for you.  · Avoid activities that put stress on your knees. These include running, jumping rope, and jumping jacks.  · Do not play contact sports until your doctor  says it is okay.  · Do exercises as told by your physical therapist.  · If you have been sent home with a knee joint motion machine (continuous passive motion machine), use it as told by your doctor.  Safety    · Do not use your leg to support your body weight until your doctor says that you can. Use crutches or a walker as told by your doctor.  · Do not drive until your doctor says it is okay. Ask your doctor when it is safe to drive.  General instructions  · Do not use any products that contain nicotine or tobacco, such as cigarettes, e-cigarettes, and chewing tobacco. These can delay healing. If you need help quitting, ask your doctor.  · Wear special socks (compression stockings) as told by your doctor.  · Tell your doctor if you plan to have dental work. Also:  ? Tell your dentist about your joint replacement.  ? Ask your doctor if there are instructions you need to follow before dental care and routine cleanings.  · Keep all follow-up visits as told by your doctor. This is important.  Contact a doctor if:  · You have more redness, swelling, or pain around your cut from surgery or your drain.  · You have more fluid or blood coming from your cut from surgery or your drain.  · You have pus or a bad smell coming from your cut from surgery or your drain.  · Your cut from surgery or your drain area feels warm to the touch.  · You have a fever.  · Your cut breaks open.  · You have knee pain that does not go away.  · The movement of your knee is getting worse.  · Your new joint feels loose.  Get help right away if you have:  · Pain in your calf or thigh.  · Swelling in your calf or thigh.  · Shortness of breath.  · Trouble breathing.  · Chest pain.  Summary  · After the procedure, it is common to have pain and swelling, blood or fluid coming from your cut from surgery, and trouble moving your knee.  · Follow instructions from your doctor about how to take care of your cut from surgery.  · Use crutches or a walker as  told by your doctor.  · If you were prescribed a blood thinner, take it as told by your doctor.  · Keep all follow-up visits as told by your doctor. This is important.  This information is not intended to replace advice given to you by your health care provider. Make sure you discuss any questions you have with your health care provider.  Document Released: 03/11/2013 Document Revised: 04/27/2020 Document Reviewed: 08/01/2019  Elsevier Patient Education © 2020 [employer]

## 2020-10-27 NOTE — TELEPHONE ENCOUNTER
Caller: JAYANT LACKEY    Patient is needing: PATIENT WANTED ME TO LET YOU KNOW [facility] DIDN'T COME GET OXYGEN BUT [facility] DID AND THEIR PHONE NUMBER [phone]

## 2020-10-27 NOTE — PROGRESS NOTES
ORTHO POSTOP VISIT       Subjective:    HPI:  Brenden Peter is a 70 y.o. male who presents about 1 week out from having a right total knee replacement.  He reports he is doing well with mild intermittent discomfort that is controlled by his pain medication.  He states that his oxygen level has been around 97% on room air even with activities.    Medications:    Current Outpatient Medications:   •  albuterol sulfate  (90 Base) MCG/ACT inhaler, Inhale 1 puff Every 4 (Four) Hours As Needed for Shortness of Air., Disp: , Rfl:   •  amLODIPine (NORVASC) 5 MG tablet, Take 5 mg by mouth Every Night., Disp: , Rfl:   •  bisoprolol (ZEBeta) 5 MG tablet, Take 5 mg by mouth Daily., Disp: , Rfl:   •  doxycycline (ADOXA) 100 MG tablet, Take 1 tablet by mouth Every 12 (Twelve) Hours for 10 doses. Indications: COPD exacerbation, Disp: 10 tablet, Rfl: 0  •  gabapentin (NEURONTIN) 300 MG capsule, Take 300 mg by mouth 3 (Three) Times a Day., Disp: , Rfl:   •  glipizide (GLUCOTROL) 10 MG tablet, Take 10 mg by mouth Every Night., Disp: , Rfl:   •  lisinopril (PRINIVIL,ZESTRIL) 40 MG tablet, Take 40 mg by mouth Every Night. LD 10/19, Disp: , Rfl:   •  meloxicam (MOBIC) 7.5 MG tablet, Take 1 tablet by mouth Daily for 12 days., Disp: 12 tablet, Rfl: 0  •  metFORMIN (GLUCOPHAGE) 1000 MG tablet, Take 1,000 mg by mouth 2 (Two) Times a Day With Meals. LD 10/18, Disp: , Rfl:   •  oxyCODONE-acetaminophen (PERCOCET) 5-325 MG per tablet, 1-2 po q6 hours prn pain, Disp: 50 tablet, Rfl: 0  •  pioglitazone (ACTOS) 30 MG tablet, Take 30 mg by mouth Every Night., Disp: , Rfl:   •  pravastatin (PRAVACHOL) 10 MG tablet, Take 10 mg by mouth Every Night., Disp: , Rfl:   •  rivaroxaban (XARELTO) 10 MG tablet, Take 1 tablet by mouth Daily With Dinner for 12 days. Indications: Post Surgical - Knee, Prophylaxis of Venous Thromboembolism, Disp: 12 tablet, Rfl: 0  •  sertraline (ZOLOFT) 50 MG tablet, Take 50 mg by mouth Every Night., Disp: , Rfl:  "  •  vitamin D (ERGOCALCIFEROL) 1.25 MG (28709 UT) capsule capsule, 50,000 Units 2 (Two) Times a Week. Tues and Fri, Disp: , Rfl:   Current outpatient and discharge medications have been reconciled for the patient.  Reviewed by: JOSE Willard      Allergies:  No Known Allergies       Objective   Objective:    /65 (BP Location: Right arm, Patient Position: Sitting, Cuff Size: Large Adult)   Pulse 73   Ht 170.2 cm (67\")   Wt 105 kg (230 lb 9.6 oz)   BMI 36.12 kg/m²     Physical Examination:  Alert, oriented, obese individual in no acute distress, ambulating with the assistance of a walker  Right lower extremity shows a well-healing surgical incision with staples in place with no erythema or open skin lesions.  There is minimal serosanguineous drainage from the distal portion of the incision.  There is a mild amount of swelling. It is grossly well aligned, and the patient is neurovascularly intact distally. The knee is stable to varus and valgus stress, there is no patellar maltracking or crepitus noted, and plantar and dorsiflexion is 5/5. There is mild tenderness to palpation and with range of motion.           Imaging:  xrays to be obtained at next visit            Assessment:  1. Hx of total knee replacement, right    2. Morbid obesity (CMS/HCC)       About 1 week out from surgery        Plan:  His incision was cleansed thoroughly with chlorhexidine soap and saline solution and redressed with a bordered Mepilex silver dressing.  This dressing should be left in place until patient is seen back in 1 week.  He may shower with the dressing in place.  His leg was rewrapped with clean Ace wraps.  He should continue his PT, compression, elevation, and icing, as well as his anticoagulation.  He may discontinue his home O2 at this time.  We will plan to see him back in 1 week with imaging at that time.             JOSE Willard  10/27/20  11:45 EDT                      "

## 2020-10-29 ENCOUNTER — TELEPHONE (OUTPATIENT)
Dept: ORTHOPEDIC SURGERY | Facility: CLINIC | Age: 70
End: 2020-10-29

## 2020-10-29 NOTE — TELEPHONE ENCOUNTER
Call placed to [facility], [phone] s/w Christine to inquire about  for the O2. States they need a d/c order. Asked if Karissa's office visit note would do as it does state in there that the patient may d/c at this time. States we can fax that over to her at [phone].    Call placed to patient's wife, apologized that no one had come yet and advised that when the last note was sent it made it sound like [facility] had already picked it up. Advised I had faxed over what was needed and they would contact me if they needed anything further.

## 2020-10-29 NOTE — TELEPHONE ENCOUNTER
WIFE (NOEL) CALLED STATING THAT PATIENT'S OXYGEN WAS DISCONTINUED BUT NO ONE HAS PICKED UP THE OXYGEN SUPPLIES YET. STATES MESSAGE ON 10/27 IS INCORRECT. [facility] DID NOT . PLEASE ADVISE     # [phone]

## 2020-11-03 ENCOUNTER — OFFICE VISIT (OUTPATIENT)
Dept: ORTHOPEDIC SURGERY | Facility: CLINIC | Age: 70
End: 2020-11-03

## 2020-11-03 VITALS
HEART RATE: 86 BPM | SYSTOLIC BLOOD PRESSURE: 134 MMHG | BODY MASS INDEX: 35.47 KG/M2 | DIASTOLIC BLOOD PRESSURE: 74 MMHG | HEIGHT: 67 IN | WEIGHT: 226 LBS

## 2020-11-03 DIAGNOSIS — E66.01 MORBID OBESITY (HCC): ICD-10-CM

## 2020-11-03 DIAGNOSIS — Z96.651 HX OF TOTAL KNEE REPLACEMENT, RIGHT: Primary | ICD-10-CM

## 2020-11-03 PROCEDURE — 99024 POSTOP FOLLOW-UP VISIT: CPT | Performed by: PHYSICIAN ASSISTANT

## 2020-11-03 RX ORDER — OXYCODONE HYDROCHLORIDE AND ACETAMINOPHEN 5; 325 MG/1; MG/1
TABLET ORAL
Qty: 30 TABLET | Refills: 0 | Status: ON HOLD | OUTPATIENT
Start: 2020-11-03 | End: 2021-05-19

## 2020-11-03 NOTE — PROGRESS NOTES
ORTHO POSTOP VISIT       Subjective:    HPI:  Brenden Peter is a 70 y.o. male who presents about 2 weeks out from having a right total knee replacement.  He reports that he is doing well with mild intermittent discomfort.  He is currently in home therapy.  He is requesting a refill on his pain medication.    Medications:    Current Outpatient Medications:   •  albuterol sulfate  (90 Base) MCG/ACT inhaler, Inhale 1 puff Every 4 (Four) Hours As Needed for Shortness of Air., Disp: , Rfl:   •  amLODIPine (NORVASC) 5 MG tablet, Take 5 mg by mouth Every Night., Disp: , Rfl:   •  bisoprolol (ZEBeta) 5 MG tablet, Take 5 mg by mouth Daily., Disp: , Rfl:   •  gabapentin (NEURONTIN) 300 MG capsule, Take 300 mg by mouth 3 (Three) Times a Day., Disp: , Rfl:   •  glipizide (GLUCOTROL) 10 MG tablet, Take 10 mg by mouth Every Night., Disp: , Rfl:   •  lisinopril (PRINIVIL,ZESTRIL) 40 MG tablet, Take 40 mg by mouth Every Night. LD 10/19, Disp: , Rfl:   •  meloxicam (MOBIC) 7.5 MG tablet, Take 1 tablet by mouth Daily for 12 days., Disp: 12 tablet, Rfl: 0  •  metFORMIN (GLUCOPHAGE) 1000 MG tablet, Take 1,000 mg by mouth 2 (Two) Times a Day With Meals. LD 10/18, Disp: , Rfl:   •  oxyCODONE-acetaminophen (PERCOCET) 5-325 MG per tablet, 1 po q6 hours prn pain, Disp: 30 tablet, Rfl: 0  •  pioglitazone (ACTOS) 30 MG tablet, Take 30 mg by mouth Every Night., Disp: , Rfl:   •  pravastatin (PRAVACHOL) 10 MG tablet, Take 10 mg by mouth Every Night., Disp: , Rfl:   •  rivaroxaban (XARELTO) 10 MG tablet, Take 1 tablet by mouth Daily With Dinner for 12 days. Indications: Post Surgical - Knee, Prophylaxis of Venous Thromboembolism, Disp: 12 tablet, Rfl: 0  •  sertraline (ZOLOFT) 50 MG tablet, Take 50 mg by mouth Every Night., Disp: , Rfl:   •  vitamin D (ERGOCALCIFEROL) 1.25 MG (70274 UT) capsule capsule, 50,000 Units 2 (Two) Times a Week. Tues and Fri, Disp: , Rfl:   Current outpatient and discharge medications have been reconciled  "for the patient.  Reviewed by: JOSE Willard      Allergies:  No Known Allergies       Objective   Objective:    /74 (BP Location: Right arm, Patient Position: Sitting, Cuff Size: Large Adult)   Pulse 86   Ht 170.2 cm (67\")   Wt 103 kg (226 lb)   BMI 35.40 kg/m²     Physical Examination:  Alert, oriented, obese individual in no acute distress, ambulating with the assistance of a walker  Right lower extremity shows a well-healing surgical incision with no erythema, drainage, or open skin lesions. There is a minimal to mild amount of swelling. It is grossly well aligned, and the patient is neurovascularly intact distally. The knee is stable to varus and valgus stress, there is no patellar maltracking or crepitus noted, and plantar and dorsiflexion is 5/5. There is mild tenderness to palpation and with range of motion, which is about 0-105.         Imaging:  xrays obtained today   right Knee X-Ray  Indication: 2 weeks status post right total knee replacement  AP, Lateral, [place] views  Findings:A well positioned knee replacement without evidence of bony or implant failure. and No fractures or dislocations are appreciated  within normal limits joint spaces  Hardware appropriately positioned yes    yes prior studies available for comparison.    This patient's x-ray report was graded according to the Kellgren and Arun classification.  This took into account the joint space narrowing, osteophyte formation, sclerosis of the distal femur/proximal tibia along with deformity of those bones.  The findings were indicative of K L grade na.    X-RAY was ordered and reviewed by JOSE Willard            Assessment:  1. Hx of total knee replacement, right    2. Morbid obesity (CMS/HCC)       About 2 weeks out from surgery        Plan:  At this time, we will plan to see the patient back in about 6 weeks. The patient should continue PT, WBAT, and call with any problems.  I will refill his pain medication at " this time, risks and addiction properties were discussed.  He will also be given an order to start outpatient physical therapy.             JOSE Willard  11/03/20  09:07 EST

## 2020-12-22 ENCOUNTER — TELEPHONE (OUTPATIENT)
Dept: ORTHOPEDIC SURGERY | Facility: CLINIC | Age: 70
End: 2020-12-22

## 2020-12-22 NOTE — TELEPHONE ENCOUNTER
My recommendation for this patient to address the fluid on his knee is to elevate the leg, application of an Ace wrap around the knee, application of ice and ibuprofen 600 mg orally twice daily.  The other option would be for him to get a steroid injection into the knee if this initial management does not help him out.  Please call him and let him know thanks

## 2020-12-22 NOTE — TELEPHONE ENCOUNTER
Call placed to patient, advised as per Dr. Fagan and to call me next week if not any better then we can make appt to see Karissa to address the fluid. Patient expressed understanding and thanked us for calling

## 2020-12-22 NOTE — TELEPHONE ENCOUNTER
Caller: JAYANT LACKEY    Relationship to patient: SELF    Best call back number:     Chief complaint: FLUID ON KNEE    Type of visit: FOLLOW UP    Additional notes: PATIENT SAID HAS FLUID ON KNEE AND CAUSING A LITTLE PAIN.  HE ASKED FOR ADVICE ON WHAT TO DO AND WOULD LIKE A CALL BACK

## 2021-01-12 ENCOUNTER — OFFICE VISIT (OUTPATIENT)
Dept: ORTHOPEDIC SURGERY | Facility: CLINIC | Age: 71
End: 2021-01-12

## 2021-01-12 VITALS
WEIGHT: 228 LBS | BODY MASS INDEX: 35.79 KG/M2 | HEIGHT: 67 IN | RESPIRATION RATE: 20 BRPM | SYSTOLIC BLOOD PRESSURE: 137 MMHG | DIASTOLIC BLOOD PRESSURE: 76 MMHG | HEART RATE: 75 BPM

## 2021-01-12 DIAGNOSIS — Z96.651 HX OF TOTAL KNEE REPLACEMENT, RIGHT: Primary | ICD-10-CM

## 2021-01-12 PROCEDURE — 99024 POSTOP FOLLOW-UP VISIT: CPT | Performed by: ORTHOPAEDIC SURGERY

## 2021-01-14 NOTE — PROGRESS NOTES
POST OP TOTAL GLOBAL      NAME: Brenden Peter           : 1950    MRN: [id number]    Chief Complaint   Patient presents with   • Right Knee - Follow-up, Pain     swelling       Date of Surgery: Patient underwent right total knee arthroplasty performed about 2 and half months ago.  ?  HPI:   Patient returns today for 2.5 month follow up of bilateral total knee arthroplasty Incision(s) healed nicely with no signs of infection. Patient reports doing well with no unusual complaints. No fevers, rigors or chills. Appears to be progressing appropriately. Patient is on appropriate anticoagulation.  He has developed some fluid on the prepatellar region.  He is unable to fully kneel on the knee just yet.  I have encouraged him to take anti-inflammatory medication and to wrap the knee up so that the fluid can be dissipated.      Review of Systems:      Ortho Exam:   Right knee.The patient is status post total knee arthroplasty postoperative 2.5 month(s). Incision is clean. Calf is soft and nontender. Homans sign is negative. There is no clicking, popping or catching. Anterior and posterior drawer signs are negative.  There is no instability of the components. Appropriate amounts of swelling and bruising are noted. Dorsalis pedis and posterior tibial artery pulses are palpable. Common peroneal nerve function is well preserved. Range of motion is from 0-125 degrees of flexion. Gait is cautious but otherwise fairly normal. There is no evidence of a deep seated joint infection.      Diagnostic Studies:  no diagnostic testing performed this visit        Assessment:  Diagnoses and all orders for this visit:    1. Hx of total knee replacement, right (Primary)            Plan   · Incision care  · To use ACE wrap/MEG stocking  · Continue ice to joint   · Stretching and strengthening exercises  · Aggressive ROM of the knee including the quads and the hamstrings.  · Calcium and vitamin D for bone health.  · It is okay to use  an elastic knee brace to help manage the swelling in the prepatellar region.  · Falls precautions  · You may now resume any prescription medications you were taking prior to surgery  · Continue with lifelong antibiotic prophylaxis with dental procedures following total joint replacement.  · Follow up in 3 month(s)    Date of encounter: 01/12/2021   Ravi Fagan MD

## 2021-02-02 ENCOUNTER — OFFICE VISIT (OUTPATIENT)
Dept: ORTHOPEDIC SURGERY | Facility: CLINIC | Age: 71
End: 2021-02-02

## 2021-02-02 VITALS
HEART RATE: 73 BPM | DIASTOLIC BLOOD PRESSURE: 69 MMHG | SYSTOLIC BLOOD PRESSURE: 145 MMHG | WEIGHT: 228 LBS | HEIGHT: 67 IN | BODY MASS INDEX: 35.79 KG/M2

## 2021-02-02 DIAGNOSIS — M25.562 CHRONIC PAIN OF LEFT KNEE: Primary | ICD-10-CM

## 2021-02-02 DIAGNOSIS — G89.29 CHRONIC PAIN OF LEFT KNEE: Primary | ICD-10-CM

## 2021-02-02 DIAGNOSIS — M25.461 SWELLING OF JOINT OF RIGHT KNEE: ICD-10-CM

## 2021-02-02 DIAGNOSIS — Z96.651 HX OF TOTAL KNEE REPLACEMENT, RIGHT: ICD-10-CM

## 2021-02-02 LAB
APPEARANCE FLD: ABNORMAL
COLOR FLD: ABNORMAL
LYMPHOCYTES NFR FLD MANUAL: 11 %
METHOD: ABNORMAL
MONOCYTES NFR FLD: 14 %
NEUTROPHILS NFR FLD MANUAL: 75 %
NUC CELL # FLD: 358 /MM3

## 2021-02-02 PROCEDURE — 89051 BODY FLUID CELL COUNT: CPT | Performed by: ORTHOPAEDIC SURGERY

## 2021-02-02 PROCEDURE — 20610 DRAIN/INJ JOINT/BURSA W/O US: CPT | Performed by: ORTHOPAEDIC SURGERY

## 2021-02-02 PROCEDURE — 87205 SMEAR GRAM STAIN: CPT | Performed by: ORTHOPAEDIC SURGERY

## 2021-02-02 PROCEDURE — 99213 OFFICE O/P EST LOW 20 MIN: CPT | Performed by: ORTHOPAEDIC SURGERY

## 2021-02-02 PROCEDURE — 87070 CULTURE OTHR SPECIMN AEROBIC: CPT | Performed by: ORTHOPAEDIC SURGERY

## 2021-02-02 RX ORDER — METHYLPREDNISOLONE ACETATE 80 MG/ML
160 INJECTION, SUSPENSION INTRA-ARTICULAR; INTRALESIONAL; INTRAMUSCULAR; SOFT TISSUE
Status: COMPLETED | OUTPATIENT
Start: 2021-02-02 | End: 2021-02-02

## 2021-02-02 RX ORDER — LIDOCAINE HYDROCHLORIDE 10 MG/ML
2 INJECTION, SOLUTION INFILTRATION; PERINEURAL
Status: COMPLETED | OUTPATIENT
Start: 2021-02-02 | End: 2021-02-02

## 2021-02-02 RX ADMIN — LIDOCAINE HYDROCHLORIDE 2 ML: 10 INJECTION, SOLUTION INFILTRATION; PERINEURAL at 10:22

## 2021-02-02 RX ADMIN — METHYLPREDNISOLONE ACETATE 160 MG: 80 INJECTION, SUSPENSION INTRA-ARTICULAR; INTRALESIONAL; INTRAMUSCULAR; SOFT TISSUE at 10:22

## 2021-02-02 NOTE — PROGRESS NOTES
"Chief Complaint  Pain and Initial Evaluation of the Left Knee    Subjective    History of Present Illness      Brenden Peter is a 70 y.o. male who presents to [facility] for prepatellar swelling in the bursa of the right knee.  He has had a right total knee arthroplasty performed by me on 13 October 2020.  He has done very well with the knee replacement in terms of improved mobility and reduced pain.  He states that he has trouble with bleeding in different body parts.  He is not sure what anticoagulant he is on.  He states that the least little bit of trauma to the soft tissues causes his skin and subcutaneous tissues to bleed.  He feels that he may have bumped his right knee and developed an accumulation of fluid in the prepatellar region.  He is also complaining of pain and discomfort in the left knee.  He has symptoms on the left knee consistent with osteoarthritis and they are slowly and progressively getting worse.  History of Present Illness   Pain Location:  BILATERAL knee  Radiation: none  Quality: dull, aching  Intensity/Severity: moderate  Duration: 3 weeks  Progression of symptoms: yes, progressive worsening  Onset quality: sudden  Timing: constant  Aggravating Factors: kneeling, rising after sitting  Alleviating Factors: NSAIDs  Previous Episodes: yes  Associated Symptoms: swelling  ADLs Affected: ambulating  Previous Treatment: prior surgery       Objective   Vital Signs:   /69   Pulse 73   Ht 170.2 cm (67\")   Wt 103 kg (228 lb)   BMI 35.71 kg/m²     Physical Exam  Physical Exam  Vitals signs and nursing note reviewed.   Constitutional:       Appearance: Normal appearance.   Pulmonary:      Effort: Pulmonary effort is normal.   Skin:     General: Skin is warm and dry.      Capillary Refill: Capillary refill takes less than 2 seconds.   Neurological:      General: No focal deficit present.      Mental Status: He is alert and oriented " to person, place, and time. Mental status is at baseline.   Psychiatric:         Mood and Affect: Mood normal.         Behavior: Behavior normal.         Thought Content: Thought content normal.         Judgment: Judgment normal.     Ortho Exam   Left knee (varus). Patient has crepitus throughout range of motion. Positive patellar grind test. Mild effusion. Lachman is negative. Pivot shift is negative. Anterior and posterior drawer signs are negative. Significant joint line tenderness is noted on the medial aspect of the knee. Patient has a varus orientation of the knee. There is fullness and tenderness in the Popliteal fossa. Mild distention of a Popliteal cyst is noted in this location. Range of motion in flexion is from 0-110 degrees. Neurovascular status is intact.  Dorsalis pedis and posterior tibial artery pulses are palpable. Common peroneal nerve function is well preserved. Patient's gait is cautious and antalgic. Skin and soft tissues are mildly swollen, consistent with synovitis and effusion. The patient has a significant limp with the first few steps after starting the gait cycle. Getting out of a chair takes a lot of effort due to pain on knee flexion.     Right knee.The patient is status post total knee arthroplasty postoperative 4.5 month(s). Incision is clean. Calf is soft and nontender. Homans sign is negative. There is no clicking, popping or catching. Anterior and posterior drawer signs are negative.  There is no instability of the components. Appropriate amounts of swelling and bruising are noted. Dorsalis pedis and posterior tibial artery pulses are palpable. Common peroneal nerve function is well preserved. Range of motion is from 0-110 degrees of flexion. Gait is cautious but otherwise fairly normal. There is no evidence of a deep seated joint infection.  He does have an accumulation of fluid in the prepatellar region.  There is no cellulitis or erythema.  No abscess formation is noted.  There  is no lymphangitis.  He does not have any tenderness around the distal femur or the proximal tibia to suggest that he might have infection related to the implants.          Result Review :   The following data was reviewed by: aRvi Fagan MD on 02/02/2021:       X-ray of the knee AP and lateral obtained today.  Findings: Well-placed implants with a good cement mantle without any subsidence of the implants.  Bony lesion: no  Soft tissues: within normal limits  Joint spaces: decreased on the left side.  Hardware appropriately positioned: yes  Prior studies available for comparison: yes from a previous visit.          Procedures           Assessment   Assessment and Plan    Problem List Items Addressed This Visit        Other    Hx of total knee replacement, right    Swelling of joint of right knee    Relevant Orders    Body Fluid Cell Count With Differential - Body Fluid, Knee, Right    Body Fluid Culture - Body Fluid, Knee, Right    Chronic pain of left knee - Primary    Relevant Orders    XR Knee 3 View Left    Large Joint Arthrocentesis: R knee          Follow Up   · Compression/brace to the knee.  · After full prep and drape the right knee was aspirated in the prepatellar region.  We drained 130 cc of blood tinged, straw-colored fluid.  · We have sent the blood stained fluid for cell count and differential, cultures and sensitivities and Gram stain to make sure that there is no sepsis in this location.  · I have talked to the patient about starting him on oral antibiotics but he states that he has so many other medication that he would like to defer adding any new medication to his daily list of pills that he takes at this point.  · I have also discussed with the patient about the possibility of irrigation debridement and removal of the fluid from the prepatellar bursa.  The possibility of an infected total knee arthroplasty is also been discussed with the patient but he states that he would like to continue  with conservative management at this point.  · Rest, ice, compression, and elevation (RICE) therapy  · Stretching and strengthening exercises of the quads and the hamstrings.  · OTC Tylenol 500-1000mg by mouth every 6 hours as needed for pain   · Follow up in 2 week(s)  • Patient was given instructions and counseling regarding his condition or for health maintenance advice. Please see specific information pulled into the AVS if appropriate.     Ravi Fagan MD   Date of Encounter: 2/2/2021   Electronically signed by Ravi Fagan MD, 02/02/21, 2:50 PM EST.     EMR Dragon/Transcription disclaimer:  Much of this encounter note is an electronic transcription/translation of spoken language to printed text. The electronic translation of spoken language may permit erroneous, or at times, nonsensical words or phrases to be inadvertently transcribed; Although I have reviewed the note for such errors, some may still exist.

## 2021-02-02 NOTE — PROGRESS NOTES
Large Joint Arthrocentesis: R knee  Date/Time: 2/2/2021 10:22 AM  Consent given by: patient  Procedure Details  Location: knee - R knee  Needle size: 25 G  Medications administered: 160 mg methylPREDNISolone acetate 80 MG/ML; 2 mL lidocaine 1 %  Aspirate amount: 125 mL  Aspirate: bloody  Analysis: fluid sample sent for laboratory analysis  Patient tolerance: patient tolerated the procedure well with no immediate complications

## 2021-02-05 ENCOUNTER — TELEPHONE (OUTPATIENT)
Dept: PODIATRY | Facility: CLINIC | Age: 71
End: 2021-02-05

## 2021-02-08 LAB
BACTERIA FLD CULT: NORMAL
GRAM STN SPEC: NORMAL
GRAM STN SPEC: NORMAL

## 2021-02-16 ENCOUNTER — OFFICE VISIT (OUTPATIENT)
Dept: ORTHOPEDIC SURGERY | Facility: CLINIC | Age: 71
End: 2021-02-16

## 2021-02-16 VITALS
HEIGHT: 67 IN | DIASTOLIC BLOOD PRESSURE: 76 MMHG | BODY MASS INDEX: 35.79 KG/M2 | HEART RATE: 55 BPM | SYSTOLIC BLOOD PRESSURE: 137 MMHG | WEIGHT: 228 LBS

## 2021-02-16 DIAGNOSIS — M25.461 SWELLING OF JOINT OF RIGHT KNEE: Primary | ICD-10-CM

## 2021-02-16 LAB
APPEARANCE FLD: ABNORMAL
COLOR FLD: ABNORMAL
LYMPHOCYTES NFR FLD MANUAL: 6 %
METHOD: ABNORMAL
NEUTROPHILS NFR FLD MANUAL: 94 %
NUC CELL # FLD: 1014 /MM3

## 2021-02-16 PROCEDURE — 99213 OFFICE O/P EST LOW 20 MIN: CPT | Performed by: ORTHOPAEDIC SURGERY

## 2021-02-16 PROCEDURE — 87070 CULTURE OTHR SPECIMN AEROBIC: CPT | Performed by: ORTHOPAEDIC SURGERY

## 2021-02-16 PROCEDURE — 87205 SMEAR GRAM STAIN: CPT | Performed by: ORTHOPAEDIC SURGERY

## 2021-02-16 PROCEDURE — 89051 BODY FLUID CELL COUNT: CPT | Performed by: ORTHOPAEDIC SURGERY

## 2021-02-16 PROCEDURE — 20610 DRAIN/INJ JOINT/BURSA W/O US: CPT | Performed by: ORTHOPAEDIC SURGERY

## 2021-02-16 RX ORDER — LIDOCAINE HYDROCHLORIDE 10 MG/ML
3 INJECTION, SOLUTION EPIDURAL; INFILTRATION; INTRACAUDAL; PERINEURAL
Status: COMPLETED | OUTPATIENT
Start: 2021-02-16 | End: 2021-02-16

## 2021-02-16 RX ADMIN — LIDOCAINE HYDROCHLORIDE 3 ML: 10 INJECTION, SOLUTION EPIDURAL; INFILTRATION; INTRACAUDAL; PERINEURAL at 11:45

## 2021-02-16 NOTE — PROGRESS NOTES
Recurrent accumulation of fluid on the anterior aspect of the right knee joint in the prepatellar bursa.  Large Joint Arthrocentesis: R knee  Date/Time: 2/16/2021 11:45 AM  Consent given by: patient  Supporting Documentation  Indications: joint swelling and pain   Procedure Details  Location: knee - R knee  Needle size: 18 G  Medications administered: 3 mL lidocaine PF 1% 1 %  Aspirate amount: 125 mL  Aspirate: bloody  Analysis: fluid sample sent for laboratory analysis  Patient tolerance: patient tolerated the procedure well with no immediate complications

## 2021-02-16 NOTE — PROGRESS NOTES
Orthopedic follow-up visit.    Patient: Brenden Peter    YOB: 1950    MRN: [id number]    Chief Complaints: Recurrent swelling over the anterior aspect of the knee over the prepatellar bursa.    History of Present Illness: Patient returns today for right knee pain.  His pain is located over the Anterior aspect of the knee over the prepatellar bursa aspect of the joint.  The pain has been progressive in nature and remains constant.  His pain is worsened by going up and down stairs, kneeling. There has been improvement in the past with ice and injections.  The patient had knee replacement surgery performed by me in October 2020.  He did very well with the knee replacement surgery and does not have any issues whatsoever.  There are no fevers, rigors or chills.  He has developed recurrent effusion in the prepatellar region.  He denies any recent history of trauma to the anterior aspect of the knee.  The patient also states that he does not take any blood thinners that might cause a spontaneous hemorrhage in the prepatellar bursa.  I have discussed with him very clearly that he may have an infective pathology related to the knee replacement surgery in which case he might need removal of the implants with placement of antibiotic spacers and IV antibiotics.  At this point he states he would rather avoid surgery as long as possible.    This problem is new to this examiner.     Allergies: No Known Allergies    Medications:   Home Medications:  Current Outpatient Medications on File Prior to Visit   Medication Sig   • albuterol sulfate  (90 Base) MCG/ACT inhaler Inhale 1 puff Every 4 (Four) Hours As Needed for Shortness of Air.   • amLODIPine (NORVASC) 5 MG tablet Take 5 mg by mouth Every Night.   • bisoprolol (ZEBeta) 5 MG tablet Take 5 mg by mouth Daily.   • gabapentin (NEURONTIN) 300 MG capsule Take 300 mg by mouth 3 (Three) Times a Day.   • glipizide (GLUCOTROL) 10 MG tablet Take 10 mg by mouth  Every Night.   • lisinopril (PRINIVIL,ZESTRIL) 40 MG tablet Take 40 mg by mouth Every Night. LD 10/19   • metFORMIN (GLUCOPHAGE) 1000 MG tablet Take 1,000 mg by mouth 2 (Two) Times a Day With Meals. LD 10/18   • oxyCODONE-acetaminophen (PERCOCET) 5-325 MG per tablet 1 po q6 hours prn pain   • pioglitazone (ACTOS) 30 MG tablet Take 30 mg by mouth Every Night.   • pravastatin (PRAVACHOL) 10 MG tablet Take 10 mg by mouth Every Night.   • sertraline (ZOLOFT) 50 MG tablet Take 50 mg by mouth Every Night.   • vitamin D (ERGOCALCIFEROL) 1.25 MG (72177 UT) capsule capsule 50,000 Units 2 (Two) Times a Week. Tues and Fri   • rivaroxaban (XARELTO) 10 MG tablet Take 1 tablet by mouth Daily With Dinner for 12 days. Indications: Post Surgical - Knee, Prophylaxis of Venous Thromboembolism     No current facility-administered medications on file prior to visit.      Current Medications:  Scheduled Meds:  PRN Meds:.    I have reviewed the patient's medical history in detail and updated the computerized patient record.  Review and summarization of old records include:    Past Medical History:   Diagnosis Date   • Abnormal EKG 2/3/2015   • Anxiety    • COPD (chronic obstructive pulmonary disease) (CMS/[facility])    • Coronary artery disease 3/3/2015   • Diabetes (CMS/[facility])     type 2   • Hyperlipidemia    • Hypertension    • Microalbuminuria due to type 2 diabetes mellitus (CMS/[facility]) 2/5/2020   • Multinodular goiter 4/18/2019   • Murmur 2/3/2015     Past Surgical History:   Procedure Laterality Date   • EYE SURGERY      cataract   • TOTAL KNEE ARTHROPLASTY Right 10/21/2020    Procedure: TOTAL KNEE ARTHROPLASTY;  Surgeon: Ravi Fagan MD;  Location: [facility] OR;  Service: Orthopedics;  Laterality: Right;     Social History     Occupational History   • Not on file   Tobacco Use   • Smoking status: Current Every Day Smoker     Packs/day: 0.50     Types: Cigarettes   • Smokeless tobacco: Never Used   Substance and Sexual Activity   • Alcohol  "use: Not Currently     Frequency: Never   • Drug use: Never   • Sexual activity: Defer      Social History     Social History Narrative   • Not on file     Family History   Problem Relation Age of Onset   • Cancer Other    • Diabetes Other    • Heart disease Other    • No Known Problems Mother    • No Known Problems Father    • No Known Problems Sister    • No Known Problems Brother    • No Known Problems Maternal Aunt    • No Known Problems Maternal Uncle    • No Known Problems Paternal Aunt    • No Known Problems Paternal Uncle    • No Known Problems Maternal Grandmother    • No Known Problems Maternal Grandfather    • No Known Problems Paternal Grandmother    • No Known Problems Paternal Grandfather    • Anemia Neg Hx    • Arrhythmia Neg Hx    • Asthma Neg Hx    • Clotting disorder Neg Hx    • Fainting Neg Hx    • Heart attack Neg Hx    • Heart failure Neg Hx    • Hyperlipidemia Neg Hx    • Hypertension Neg Hx        Review of Systems  Constitutional: Negative for appetite change.   HENT: Negative.    Eyes: Negative.    Respiratory: Negative.    Cardiovascular: Negative.    Gastrointestinal: Negative.    Endocrine: Negative.    Genitourinary: Negative.    Musculoskeletal: See details of exam below.  Skin: Negative.    Allergic/Immunologic: Negative.    Hematological: Negative.    Psychiatric/Behavioral: Negative.          Wt Readings from Last 3 Encounters:   02/16/21 103 kg (228 lb)   02/02/21 103 kg (228 lb)   01/12/21 103 kg (228 lb)     Ht Readings from Last 3 Encounters:   02/16/21 170.2 cm (67\")   02/02/21 170.2 cm (67\")   01/12/21 170.2 cm (67\")     Body mass index is 35.71 kg/m².  Facility age limit for growth percentiles is 20 years.  Vitals:    02/16/21 1112   BP: 137/76   Pulse: 55       Physical Exam  Constitutional: Patient is oriented to person, place, and time. Appears well-developed and well-nourished.   HENT:   Head: Normocephalic and atraumatic.   Eyes: Conjunctivae and EOM are normal. Pupils " are equal, round, and reactive to light.   Cardiovascular: Normal rate, regular rhythm, normal heart sounds and intact distal pulses.   Pulmonary/Chest: Effort normal and breath sounds normal.   Musculoskeletal:   See detailed exam below   Neurological: Alert and oriented to person, place, and time. No sensory deficit. Coordination normal.   Skin: Skin is warm and dry. Capillary refill takes less than 2 seconds. No rash noted. No erythema.   Psychiatric: Patient has a normal mood and affect. His behavior is normal. Judgment and thought content normal.   Nursing note and vitals reviewed.    Musculoskeletal:    Right knee: The patient has a healed incision from previous total knee arthroplasty.  There is no redness or erythema in this location.  He does not have any increased warmth of the overlying skin.  He does have a fairly significant accumulation of fluid in the prepatellar region.  No abscess formation or lymphangitis is noted.  Range of motion is from 10 to 90 degrees of flexion.  Cervical flexion was associated with pain and discomfort.      Diagnostics:      Procedure:  Procedures    Assessment:   Diagnoses and all orders for this visit:    1. Swelling of joint of right knee (Primary)  -     Large Joint Arthrocentesis: R knee          Plan:   · Injected patient's right knee joint(s)with lidocaine from an anterolateral approach followed by aspiration of 125 cc of bloodstained fluid from the prepatellar bursa.  · Compression/brace to the knee to prevent recurrence of effusion in the prepatellar region.  · Tablet meloxicam 7.5 mg tab 1 p.o. nightly for pain swelling and discomfort.  · Rest, ice, compression, and elevation (RICE) therapy  · OTC Tylenol 500-1000mg by mouth every 6 hours as needed for pain   · Follow up in 1 week(s).  · I have discussed with the patient that he does have recurrent prepatellar fluid accumulation and might require a visit to the operating room for a formal irrigation and debridement  and intraoperative cultures to represent an accurate picture of what is going on with these recurrent effusions.  There is a possibility of an infective pathology related to the knee replacement surgery and in that case he would have to have the implants removed followed by placement of antibiotic spacers and IV antibiotics as well.  At this point he is controlling his blood sugar levels well with a recent hemoglobin A1c of 6.8 and I have encouraged him to be very strict with his dietary protocols to prevent a run up on his blood sugar levels.    Date of encounter: 02/16/2021   Ravi Fagan MD

## 2021-02-21 LAB
BACTERIA FLD CULT: NORMAL
GRAM STN SPEC: NORMAL
GRAM STN SPEC: NORMAL

## 2021-02-23 ENCOUNTER — OFFICE VISIT (OUTPATIENT)
Dept: ORTHOPEDIC SURGERY | Facility: CLINIC | Age: 71
End: 2021-02-23

## 2021-02-23 VITALS
WEIGHT: 228 LBS | HEART RATE: 64 BPM | HEIGHT: 67 IN | BODY MASS INDEX: 35.79 KG/M2 | DIASTOLIC BLOOD PRESSURE: 68 MMHG | SYSTOLIC BLOOD PRESSURE: 131 MMHG

## 2021-02-23 DIAGNOSIS — M25.461 SWELLING OF JOINT OF RIGHT KNEE: Primary | ICD-10-CM

## 2021-02-23 PROCEDURE — 20610 DRAIN/INJ JOINT/BURSA W/O US: CPT | Performed by: ORTHOPAEDIC SURGERY

## 2021-02-23 RX ADMIN — METHYLPREDNISOLONE ACETATE 160 MG: 80 INJECTION, SUSPENSION INTRA-ARTICULAR; INTRALESIONAL; INTRAMUSCULAR; SOFT TISSUE at 14:39

## 2021-02-23 NOTE — PROGRESS NOTES
Large Joint Arthrocentesis: R knee  Date/Time: 2/23/2021 2:39 PM  Consent given by: patient  Supporting Documentation  Indications: pain   Procedure Details  Location: knee - R knee  Needle size: 25 G  Medications administered: 160 mg methylPREDNISolone acetate 80 MG/ML  Aspirate amount: 50 mL  Aspirate: bloody

## 2021-02-24 RX ORDER — METHYLPREDNISOLONE ACETATE 80 MG/ML
160 INJECTION, SUSPENSION INTRA-ARTICULAR; INTRALESIONAL; INTRAMUSCULAR; SOFT TISSUE
Status: COMPLETED | OUTPATIENT
Start: 2021-02-23 | End: 2021-02-23

## 2021-02-24 NOTE — PROGRESS NOTES
INJECTION    Patient: Brenden Peter    YOB: 1950    MRN: [id number]    Chief Complaints: Recurrent swelling in the prepatellar bursa of the right knee.    History of Present Illness: Patient returns today for right knee pain.  His pain is located over the Anterior aspect of the joint.  The pain has been progressive in nature and remains intermittent .  His pain is worsened by kneeling, rising after sitting. There has been improvement in the past with ice and injections.  The patient has had a prior total knee arthroplasty last year.  There are no issues related to the knee replacement surgery.  He does not have any fevers, rigors or chills.  I have discussed with him about taking him to surgery to perform an irrigation and debridement and to explore the prepatellar bursa.  We had aspirated him last week and his fluid aspirate did not show any organisms on Gram stain.  His cultures have been negative up to this point.  There is no question that infection is obviously a priority and concern.    This problem is not new to this examiner.     Allergies: No Known Allergies    Medications:   Home Medications:  Current Outpatient Medications on File Prior to Visit   Medication Sig   • albuterol sulfate  (90 Base) MCG/ACT inhaler Inhale 1 puff Every 4 (Four) Hours As Needed for Shortness of Air.   • amLODIPine (NORVASC) 5 MG tablet Take 5 mg by mouth Every Night.   • bisoprolol (ZEBeta) 5 MG tablet Take 5 mg by mouth Daily.   • gabapentin (NEURONTIN) 300 MG capsule Take 300 mg by mouth 3 (Three) Times a Day.   • glipizide (GLUCOTROL) 10 MG tablet Take 10 mg by mouth Every Night.   • lisinopril (PRINIVIL,ZESTRIL) 40 MG tablet Take 40 mg by mouth Every Night. LD 10/19   • metFORMIN (GLUCOPHAGE) 1000 MG tablet Take 1,000 mg by mouth 2 (Two) Times a Day With Meals. LD 10/18   • oxyCODONE-acetaminophen (PERCOCET) 5-325 MG per tablet 1 po q6 hours prn pain   • pioglitazone (ACTOS) 30 MG tablet Take 30 mg  by mouth Every Night.   • pravastatin (PRAVACHOL) 10 MG tablet Take 10 mg by mouth Every Night.   • rivaroxaban (XARELTO) 10 MG tablet Take 1 tablet by mouth Daily With Dinner for 12 days. Indications: Post Surgical - Knee, Prophylaxis of Venous Thromboembolism   • sertraline (ZOLOFT) 50 MG tablet Take 50 mg by mouth Every Night.   • vitamin D (ERGOCALCIFEROL) 1.25 MG (24682 UT) capsule capsule 50,000 Units 2 (Two) Times a Week. Tues and Fri     No current facility-administered medications on file prior to visit.      Current Medications:  Scheduled Meds:  PRN Meds:.    I have reviewed the patient's medical history in detail and updated the computerized patient record.  Review and summarization of old records include:    Past Medical History:   Diagnosis Date   • Abnormal EKG 2/3/2015   • Anxiety    • COPD (chronic obstructive pulmonary disease) (CMS/[facility])    • Coronary artery disease 3/3/2015   • Diabetes (CMS/[facility])     type 2   • Hyperlipidemia    • Hypertension    • Microalbuminuria due to type 2 diabetes mellitus (CMS/[facility]) 2/5/2020   • Multinodular goiter 4/18/2019   • Murmur 2/3/2015     Past Surgical History:   Procedure Laterality Date   • EYE SURGERY      cataract   • TOTAL KNEE ARTHROPLASTY Right 10/21/2020    Procedure: TOTAL KNEE ARTHROPLASTY;  Surgeon: Ravi Fagan MD;  Location: [facility];  Service: Orthopedics;  Laterality: Right;     Social History     Occupational History   • Not on file   Tobacco Use   • Smoking status: Current Every Day Smoker     Packs/day: 0.50     Types: Cigarettes   • Smokeless tobacco: Never Used   Substance and Sexual Activity   • Alcohol use: Not Currently     Frequency: Never   • Drug use: Never   • Sexual activity: Defer      Social History     Social History Narrative   • Not on file     Family History   Problem Relation Age of Onset   • Cancer Other    • Diabetes Other    • Heart disease Other    • No Known Problems Mother    • No Known Problems Father    • No Known  "Problems Sister    • No Known Problems Brother    • No Known Problems Maternal Aunt    • No Known Problems Maternal Uncle    • No Known Problems Paternal Aunt    • No Known Problems Paternal Uncle    • No Known Problems Maternal Grandmother    • No Known Problems Maternal Grandfather    • No Known Problems Paternal Grandmother    • No Known Problems Paternal Grandfather    • Anemia Neg Hx    • Arrhythmia Neg Hx    • Asthma Neg Hx    • Clotting disorder Neg Hx    • Fainting Neg Hx    • Heart attack Neg Hx    • Heart failure Neg Hx    • Hyperlipidemia Neg Hx    • Hypertension Neg Hx        Review of Systems  Constitutional: Negative for appetite change.   HENT: Negative.    Eyes: Negative.    Respiratory: Negative.    Cardiovascular: Negative.    Gastrointestinal: Negative.    Endocrine: Negative.    Genitourinary: Negative.    Musculoskeletal: See details of exam below.  Skin: Negative.    Allergic/Immunologic: Negative.    Hematological: Negative.    Psychiatric/Behavioral: Negative.          Wt Readings from Last 3 Encounters:   02/23/21 103 kg (228 lb)   02/16/21 103 kg (228 lb)   02/02/21 103 kg (228 lb)     Ht Readings from Last 3 Encounters:   02/23/21 170.2 cm (67\")   02/16/21 170.2 cm (67\")   02/02/21 170.2 cm (67\")     Body mass index is 35.71 kg/m².  Facility age limit for growth percentiles is 20 years.  Vitals:    02/23/21 1325   BP: 131/68   Pulse: 64       Physical Exam  Constitutional: Patient is oriented to person, place, and time. Appears well-developed and well-nourished.   HENT:   Head: Normocephalic and atraumatic.   Eyes: Conjunctivae and EOM are normal. Pupils are equal, round, and reactive to light.   Cardiovascular: Normal rate, regular rhythm, normal heart sounds and intact distal pulses.   Pulmonary/Chest: Effort normal and breath sounds normal.   Musculoskeletal:   See detailed exam below   Neurological: Alert and oriented to person, place, and time. No sensory deficit. Coordination normal. "   Skin: Skin is warm and dry. Capillary refill takes less than 2 seconds. No rash noted. No erythema.   Psychiatric: Patient has a normal mood and affect. His behavior is normal. Judgment and thought content normal.   Nursing note and vitals reviewed.    Musculoskeletal:    Right knee (effusion). The knee is swollen. The patella is ballotable. There is thickening of the synovial membrane. There appears to be a fluid flow in the supra-patellar space. The patient's knee feels tight in flexion. Range of motion is restricted because of the limited flexion. There is some quadriceps inhibition on account of the distension of the joint. There is diffuse tenderness around the knee. The popliteal fossa is full and tender as well. No evidence of a compartmental syndrome is noted. Anterior and posterior drawer signs are negative. No medial or lateral instability is noted. Pivot shift sign is negative. Dorsalis pedis and posterior tibial artery pulses are palpable. Common peroneal nerve function is well preserved.       Diagnostics:      Procedure:  Procedures    Assessment:   Diagnoses and all orders for this visit:    1. Swelling of joint of right knee (Primary)  -     Large Joint Arthrocentesis: R knee          Plan:   · Injected patient's right knee joint(s)with steroid from an anterolateral approach.  We were able to aspirate 50 cc of straw-colored fluid with blood staining from the anterior aspect of the knee over the prepatellar bursa.  · I have discussed with the patient that there is no doubt that we need to perform an I&D of this area and obtain cultures of the fluid intraoperatively.  The possibility of the underlying knee joint replacement implants being infected is definitely very high and we would have to make sure that he does not need explantation of the implants with antibiotic spacer implantation.  · Compression/brace to the need to support it.  · Rest, ice, compression, and elevation (RICE) therapy  · OTC  Tylenol 500-1000mg by mouth every 6 hours as needed for pain   · Follow up in 2 week(s)    Date of encounter: 02/23/2021   Ravi Fagan MD   contact guard

## 2021-02-25 ENCOUNTER — TELEPHONE (OUTPATIENT)
Dept: ORTHOPEDIC SURGERY | Facility: CLINIC | Age: 71
End: 2021-02-25

## 2021-02-25 DIAGNOSIS — Z96.651 HX OF TOTAL KNEE REPLACEMENT, RIGHT: Primary | ICD-10-CM

## 2021-02-25 DIAGNOSIS — Z96.651 HX OF TOTAL KNEE REPLACEMENT, RIGHT: ICD-10-CM

## 2021-02-25 RX ORDER — HYDROCODONE BITARTRATE AND ACETAMINOPHEN 5; 325 MG/1; MG/1
1 TABLET ORAL EVERY 8 HOURS PRN
Qty: 30 TABLET | Refills: 0 | Status: CANCELLED | OUTPATIENT
Start: 2021-02-25

## 2021-02-25 RX ORDER — OXYCODONE HYDROCHLORIDE AND ACETAMINOPHEN 5; 325 MG/1; MG/1
TABLET ORAL
Qty: 30 TABLET | Refills: 0 | Status: CANCELLED | OUTPATIENT
Start: 2021-02-25

## 2021-02-25 NOTE — TELEPHONE ENCOUNTER
Can you please call this patient in a total of 3 prescriptions?  First 1 is tablet Bactrim DS tab 1 p.o. twice daily total 30 pills.  Second prescription is tablet doxycycline 100 mg tab 1 p.o. twice daily total 30 pills.  These are for antibiotics to cover the joint and to minimize the possibility of an infection.  The next prescription is tablet Norco 5/325 mg tab 1 p.o. q. 8-12 as needed pain total 40 pills no refills.  Please send this 1 to me electronically and I will sign off on it thank you

## 2021-02-25 NOTE — TELEPHONE ENCOUNTER
Caller: JAYANT    Relationship: SELF    Best call back number: [phone]    Medication needed: PAIN MEDS  Requested Prescriptions      No prescriptions requested or ordered in this encounter       When do you need the refill by: ASAP    What details did the patient provide when requesting the medication: PT SPOKE TO SOMEONE THE OTHER DAY ABOUT PAIN MEDS  RT KNEE HE HAD SX IN 10/2020.     Does the patient have less than a 3 day supply:  [x] Yes  [] No    What is the patient's preferred pharmacy:        [facility]

## 2021-02-26 RX ORDER — HYDROCODONE BITARTRATE AND ACETAMINOPHEN 5; 325 MG/1; MG/1
1 TABLET ORAL EVERY 8 HOURS PRN
Qty: 40 TABLET | Refills: 0 | Status: SHIPPED | OUTPATIENT
Start: 2021-02-26 | End: 2021-04-20 | Stop reason: SDUPTHER

## 2021-02-26 NOTE — TELEPHONE ENCOUNTER
Okay.  Thank you.  I have signed off on those.  Please look at my note encounter signature and then let the patient know that the medicine has been sent to his pharmacy thank you

## 2021-03-09 ENCOUNTER — OFFICE VISIT (OUTPATIENT)
Dept: ORTHOPEDIC SURGERY | Facility: CLINIC | Age: 71
End: 2021-03-09

## 2021-03-09 VITALS
SYSTOLIC BLOOD PRESSURE: 149 MMHG | BODY MASS INDEX: 35.79 KG/M2 | HEIGHT: 67 IN | DIASTOLIC BLOOD PRESSURE: 83 MMHG | WEIGHT: 228 LBS | HEART RATE: 75 BPM

## 2021-03-09 DIAGNOSIS — M25.461 SWELLING OF JOINT OF RIGHT KNEE: Primary | ICD-10-CM

## 2021-03-09 LAB
APPEARANCE FLD: ABNORMAL
COLOR FLD: ABNORMAL
LYMPHOCYTES NFR FLD MANUAL: 13 %
METHOD: ABNORMAL
MONOCYTES NFR FLD: 6 %
NEUTROPHILS NFR FLD MANUAL: 81 %
NUC CELL # FLD: 918 /MM3

## 2021-03-09 PROCEDURE — 20610 DRAIN/INJ JOINT/BURSA W/O US: CPT | Performed by: ORTHOPAEDIC SURGERY

## 2021-03-09 PROCEDURE — 87070 CULTURE OTHR SPECIMN AEROBIC: CPT | Performed by: ORTHOPAEDIC SURGERY

## 2021-03-09 PROCEDURE — 89051 BODY FLUID CELL COUNT: CPT | Performed by: ORTHOPAEDIC SURGERY

## 2021-03-09 PROCEDURE — 87205 SMEAR GRAM STAIN: CPT | Performed by: ORTHOPAEDIC SURGERY

## 2021-03-09 RX ADMIN — METHYLPREDNISOLONE ACETATE 160 MG: 80 INJECTION, SUSPENSION INTRA-ARTICULAR; INTRALESIONAL; INTRAMUSCULAR; SOFT TISSUE at 16:08

## 2021-03-09 NOTE — PROGRESS NOTES
Large Joint Arthrocentesis: R knee  Date/Time: 3/9/2021 4:08 PM  Consent given by: patient  Site marked: site marked  Timeout: Immediately prior to procedure a time out was called to verify the correct patient, procedure, equipment, support staff and site/side marked as required   Supporting Documentation  Indications: pain   Procedure Details  Location: knee - R knee  Needle size: 25 G  Medications administered: 160 mg methylPREDNISolone acetate 80 MG/ML  Aspirate amount: 50 mL  Aspirate: bloody and blood-tinged  Analysis: fluid sample sent for laboratory analysis  Patient tolerance: patient tolerated the procedure well with no immediate complications

## 2021-03-10 RX ORDER — METHYLPREDNISOLONE ACETATE 80 MG/ML
160 INJECTION, SUSPENSION INTRA-ARTICULAR; INTRALESIONAL; INTRAMUSCULAR; SOFT TISSUE
Status: COMPLETED | OUTPATIENT
Start: 2021-03-09 | End: 2021-03-09

## 2021-03-10 NOTE — PROGRESS NOTES
INJECTION    Patient: Brenden Peter    YOB: 1950    MRN: [id number]    Chief Complaints: Recurrent swelling of the right knee in the prepatellar region.    History of Present Illness: Patient returns today for right knee pain.  His pain is located over the Anteriorly and superior to the patella. aspect of the joint.  The pain has been progressive in nature and remains intermittent .  His pain is worsened by going up and down stairs, kneeling. There has been improvement in the past with heat, NSAIDS and injections.  He has had a prior total knee arthroplasty performed by me in October 2020.  Obviously I am very much concerned about a possible infection related to the implants.  The patient understands that he may have to have the implant explanted and proceed with antibiotic spacers.  He states that he is not at all interested in any form of surgical intervention due to multiple medical problems.  He is currently on oral doxycycline for antibiotic prophylaxis.    This problem is not new to this examiner.     Allergies: No Known Allergies    Medications:   Home Medications:  Current Outpatient Medications on File Prior to Visit   Medication Sig   • albuterol sulfate  (90 Base) MCG/ACT inhaler Inhale 1 puff Every 4 (Four) Hours As Needed for Shortness of Air.   • amLODIPine (NORVASC) 5 MG tablet Take 5 mg by mouth Every Night.   • bisoprolol (ZEBeta) 5 MG tablet Take 5 mg by mouth Daily.   • gabapentin (NEURONTIN) 300 MG capsule Take 300 mg by mouth 3 (Three) Times a Day.   • glipizide (GLUCOTROL) 10 MG tablet Take 10 mg by mouth Every Night.   • HYDROcodone-acetaminophen (NORCO) 5-325 MG per tablet Take 1 tablet by mouth Every 8 (Eight) Hours As Needed for Severe Pain .   • lisinopril (PRINIVIL,ZESTRIL) 40 MG tablet Take 40 mg by mouth Every Night. LD 10/19   • metFORMIN (GLUCOPHAGE) 1000 MG tablet Take 1,000 mg by mouth 2 (Two) Times a Day With Meals. LD 10/18   • oxyCODONE-acetaminophen  (PERCOCET) 5-325 MG per tablet 1 po q6 hours prn pain   • pioglitazone (ACTOS) 30 MG tablet Take 30 mg by mouth Every Night.   • pravastatin (PRAVACHOL) 10 MG tablet Take 10 mg by mouth Every Night.   • sertraline (ZOLOFT) 50 MG tablet Take 50 mg by mouth Every Night.   • vitamin D (ERGOCALCIFEROL) 1.25 MG (38691 UT) capsule capsule 50,000 Units 2 (Two) Times a Week. Tues and Fri   • rivaroxaban (XARELTO) 10 MG tablet Take 1 tablet by mouth Daily With Dinner for 12 days. Indications: Post Surgical - Knee, Prophylaxis of Venous Thromboembolism     No current facility-administered medications on file prior to visit.     Current Medications:  Scheduled Meds:  PRN Meds:.    I have reviewed the patient's medical history in detail and updated the computerized patient record.  Review and summarization of old records include:    Past Medical History:   Diagnosis Date   • Abnormal EKG 2/3/2015   • Anxiety    • COPD (chronic obstructive pulmonary disease) (CMS/[facility])    • Coronary artery disease 3/3/2015   • Diabetes (CMS/[facility])     type 2   • Hyperlipidemia    • Hypertension    • Microalbuminuria due to type 2 diabetes mellitus (CMS/[facility]) 2/5/2020   • Multinodular goiter 4/18/2019   • Murmur 2/3/2015     Past Surgical History:   Procedure Laterality Date   • EYE SURGERY      cataract   • TOTAL KNEE ARTHROPLASTY Right 10/21/2020    Procedure: TOTAL KNEE ARTHROPLASTY;  Surgeon: Ravi Fagan MD;  Location: [facility];  Service: Orthopedics;  Laterality: Right;     Social History     Occupational History   • Not on file   Tobacco Use   • Smoking status: Current Every Day Smoker     Packs/day: 0.50     Types: Cigarettes   • Smokeless tobacco: Never Used   Vaping Use   • Vaping Use: Never used   Substance and Sexual Activity   • Alcohol use: Not Currently   • Drug use: Never   • Sexual activity: Defer      Social History     Social History Narrative   • Not on file     Family History   Problem Relation Age of Onset   • Cancer  "Other    • Diabetes Other    • Heart disease Other    • No Known Problems Mother    • No Known Problems Father    • No Known Problems Sister    • No Known Problems Brother    • No Known Problems Maternal Aunt    • No Known Problems Maternal Uncle    • No Known Problems Paternal Aunt    • No Known Problems Paternal Uncle    • No Known Problems Maternal Grandmother    • No Known Problems Maternal Grandfather    • No Known Problems Paternal Grandmother    • No Known Problems Paternal Grandfather    • Anemia Neg Hx    • Arrhythmia Neg Hx    • Asthma Neg Hx    • Clotting disorder Neg Hx    • Fainting Neg Hx    • Heart attack Neg Hx    • Heart failure Neg Hx    • Hyperlipidemia Neg Hx    • Hypertension Neg Hx        Review of Systems  Constitutional: Negative for appetite change.   HENT: Negative.    Eyes: Negative.    Respiratory: Negative.    Cardiovascular: Negative.    Gastrointestinal: Negative.    Endocrine: Negative.    Genitourinary: Negative.    Musculoskeletal: See details of exam below.  Skin: Negative.    Allergic/Immunologic: Negative.    Hematological: Negative.    Psychiatric/Behavioral: Negative.          Wt Readings from Last 3 Encounters:   03/09/21 103 kg (228 lb)   02/23/21 103 kg (228 lb)   02/16/21 103 kg (228 lb)     Ht Readings from Last 3 Encounters:   03/09/21 170.2 cm (67\")   02/23/21 170.2 cm (67\")   02/16/21 170.2 cm (67\")     Body mass index is 35.71 kg/m².  Facility age limit for growth percentiles is 20 years.  Vitals:    03/09/21 1433   BP: 149/83   Pulse: 75       Physical Exam  Constitutional: Patient is oriented to person, place, and time. Appears well-developed and well-nourished.   HENT:   Head: Normocephalic and atraumatic.   Eyes: Conjunctivae and EOM are normal. Pupils are equal, round, and reactive to light.   Cardiovascular: Normal rate, regular rhythm, normal heart sounds and intact distal pulses.   Pulmonary/Chest: Effort normal and breath sounds normal.   Musculoskeletal: "   See detailed exam below   Neurological: Alert and oriented to person, place, and time. No sensory deficit. Coordination normal.   Skin: Skin is warm and dry. Capillary refill takes less than 2 seconds. No rash noted. No erythema.   Psychiatric: Patient has a normal mood and affect. His behavior is normal. Judgment and thought content normal.   Nursing note and vitals reviewed.    Musculoskeletal:    Right knee (effusion). The knee is swollen. The patella is ballotable. There is thickening of the synovial membrane. There appears to be a fluid flow in the supra-patellar space. The patient's knee feels tight in flexion. Range of motion is restricted because of the limited flexion. There is some quadriceps inhibition on account of the distension of the joint. There is diffuse tenderness around the knee. The popliteal fossa is full and tender as well. No evidence of a compartmental syndrome is noted. Anterior and posterior drawer signs are negative. No medial or lateral instability is noted. Pivot shift sign is negative. Dorsalis pedis and posterior tibial artery pulses are palpable. Common peroneal nerve function is well preserved.       Diagnostics:      Procedure:  Procedures    Assessment:   Diagnoses and all orders for this visit:    1. Swelling of joint of right knee (Primary)  -     Large Joint Arthrocentesis: R knee  -     Body Fluid Culture - Body Fluid, Knee, Right  -     Body Fluid Cell Count With Differential - Body Fluid, Knee, Right          Plan:   · Injected patient's right knee joint(s)with steroid from an anteromedial approach.  50 cc of bloodstained fluid were aspirated and will be sent for cultures and sensitivities, Gram stain and aerobic and anaerobic cultures to make sure that he does not have a localized infection.  · Compression/brace to the knee to prevent recurrence of the swelling.  · Tablet doxycycline 100 mg orally twice daily for antibiotic prophylaxis.  · Rest, ice, compression, and  elevation (RICE) therapy  · OTC Tylenol 500-1000mg by mouth every 6 hours as needed for pain   · Follow up in 2 week(s)    Date of encounter: 03/09/2021   Ravi Fagan MD

## 2021-03-14 LAB
BACTERIA FLD CULT: NORMAL
GRAM STN SPEC: NORMAL

## 2021-03-23 ENCOUNTER — OFFICE VISIT (OUTPATIENT)
Dept: ORTHOPEDIC SURGERY | Facility: CLINIC | Age: 71
End: 2021-03-23

## 2021-03-23 ENCOUNTER — TELEPHONE (OUTPATIENT)
Dept: ORTHOPEDIC SURGERY | Facility: CLINIC | Age: 71
End: 2021-03-23

## 2021-03-23 VITALS — WEIGHT: 222.8 LBS | BODY MASS INDEX: 34.97 KG/M2 | HEIGHT: 67 IN

## 2021-03-23 DIAGNOSIS — Z96.651 HX OF TOTAL KNEE REPLACEMENT, RIGHT: ICD-10-CM

## 2021-03-23 DIAGNOSIS — M25.461 SWELLING OF JOINT OF RIGHT KNEE: Primary | ICD-10-CM

## 2021-03-23 PROCEDURE — 99213 OFFICE O/P EST LOW 20 MIN: CPT | Performed by: ORTHOPAEDIC SURGERY

## 2021-03-23 PROCEDURE — 20610 DRAIN/INJ JOINT/BURSA W/O US: CPT | Performed by: ORTHOPAEDIC SURGERY

## 2021-03-23 RX ORDER — LIDOCAINE HYDROCHLORIDE 10 MG/ML
2 INJECTION, SOLUTION INFILTRATION; PERINEURAL
Status: COMPLETED | OUTPATIENT
Start: 2021-03-23 | End: 2021-03-23

## 2021-03-23 RX ORDER — DOXYCYCLINE HYCLATE 100 MG
TABLET ORAL
COMMUNITY
Start: 2021-02-25 | End: 2021-04-06

## 2021-03-23 RX ORDER — SULFAMETHOXAZOLE AND TRIMETHOPRIM 800; 160 MG/1; MG/1
TABLET ORAL
Status: ON HOLD | COMMUNITY
Start: 2021-02-25 | End: 2021-05-19

## 2021-03-23 RX ADMIN — LIDOCAINE HYDROCHLORIDE 2 ML: 10 INJECTION, SOLUTION INFILTRATION; PERINEURAL at 08:38

## 2021-03-23 NOTE — PROGRESS NOTES
"Chief Complaint  Follow-up of the Right Knee    Subjective    History of Present Illness      Brenden Peter is a 70 y.o. male who presents to [facility] for prepatellar swelling and effusion of his right knee.  History of Present Illness the patient has had a right total knee replacement performed by me on 21 October 2020.  His knee replacement is doing extremely well.  He does not have any issues with that knee whatsoever.  The patient states that his prepatellar swelling is a little bit better than before.  He does not know why the swelling comes on.  He is starting to experience some amount of increased pain and wants me to aspirate this knee for him.  He is on a combination of doxycycline and Bactrim for his antibiotic prophylaxis.  There is no evidence of a deep-seated joint infection at this point.  Pain Location:  RIGHT knee  Radiation: none  Quality: aching, sharp  Intensity/Severity: mild-moderate  Duration: 5 months  Progression of symptoms: no worsening, reports improvement  Onset quality: gradual   Timing: intermittent  Aggravating Factors: going up and down stairs, kneeling, rising after sitting, walking  Alleviating Factors: steroid injection (intra-articular), rest, brace, ice, aspiration   Previous Episodes: yes  Associated Symptoms: pain, swelling  ADLs Affected: ambulating, work related activities, recreational activities/sports  Previous Treatment: intra-articular injection and prior surgery and aspiration       Objective   Vital Signs:   Ht 170.2 cm (67\")   Wt 101 kg (222 lb 12.8 oz)   BMI 34.90 kg/m²     Physical Exam  Physical Exam  Vitals signs and nursing note reviewed.   Constitutional:       Appearance: Normal appearance.   Pulmonary:      Effort: Pulmonary effort is normal.   Skin:     General: Skin is warm and dry.      Capillary Refill: Capillary refill takes less than 2 seconds.   Neurological:      General: No focal deficit present.      Mental " Status: He is alert and oriented to person, place, and time. Mental status is at baseline.   Psychiatric:         Mood and Affect: Mood normal.         Behavior: Behavior normal.         Thought Content: Thought content normal.         Judgment: Judgment normal.     Ortho Exam     Right knee.The patient is status post total knee arthroplasty postoperative 5 month(s). Incision is clean. Calf is soft and nontender. Homans sign is negative. There is no clicking, popping or catching. Anterior and posterior drawer signs are negative.  There is no instability of the components. Appropriate amounts of swelling and bruising are noted. Dorsalis pedis and posterior tibial artery pulses are palpable. Common peroneal nerve function is well preserved. Range of motion is from 0-110 degrees of flexion. Gait is cautious but otherwise fairly normal. There is no evidence of a deep seated joint infection.        Result Review :   The following data was reviewed by: Ravi Fagan MD on 03/23/2021:  Lab tests - see below for summary  no diagnostic testing performed this visit  The reports of the aspirate from the previous visit to the office are noted and discussed with the patient.  The appearance of the fluid is turbid.  Body culture fluid shows no growth at 5 days.  The neutrophil amount is 80% which is less than before.  The cultures and sensitivities are negative and I have discussed that with the patient in detail.          Large Joint Arthrocentesis: R knee  Date/Time: 3/23/2021 8:38 AM  Consent given by: patient  Site marked: site marked  Timeout: Immediately prior to procedure a time out was called to verify the correct patient, procedure, equipment, support staff and site/side marked as required   Supporting Documentation  Indications: pain   Procedure Details  Location: knee - R knee  Preparation: Patient was prepped and draped in the usual sterile fashion  Needle size: 25 G  Approach: anteromedial  Medications administered:  2 mL lidocaine 1 %  Aspirate amount: 25 mL  Patient tolerance: patient tolerated the procedure well with no immediate complications                 Assessment   Assessment and Plan    Diagnoses and all orders for this visit:    1. Swelling of joint of right knee (Primary)    2. Hx of total knee replacement, right    Other orders  -     Large Joint Arthrocentesis: R knee          Follow Up   · Compression/brace to the knee to prevent recurrence of the fluid accumulation.  · The right knee was prepped and draped in the standard fashion and about 25 cc of straw-colored fluid with some blood tingeing were aspirated from the anterior aspect of the knee in the prepatellar bursa.  There is no evidence of sepsis.  The fluid is clear and there is no evidence of turbulence.  · Calcium and vitamin D for bone health.  · Tablet doxycycline 100 mg orally twice daily for 2 weeks.  · Tablet Bactrim DS tab 1 p.o. twice daily for antibiotic prophylaxis.  · Rest, ice, compression, and elevation (RICE) therapy  · Stretching and strengthening exercises of the quads and the hamstrings.  · Discussed with the patient that he might need a formal irrigation and debridement and possible explantation of the implants with application of a antibiotic impregnated spacer.  The patient states that he really does not want to consider any form of surgical intervention at this point because of underlying medical issues and fear of potential complications.  · OTC Alternate Ibuprofen and Tylenol as needed  · Follow up in 2 week(s)  • Patient was given instructions and counseling regarding his condition or for health maintenance advice. Please see specific information pulled into the AVS if appropriate.     Ravi Fagan MD   Date of Encounter: 3/23/2021       EMR Dragon/Transcription disclaimer:  Much of this encounter note is an electronic transcription/translation of spoken language to printed text. The electronic translation of spoken language may  permit erroneous, or at times, nonsensical words or phrases to be inadvertently transcribed; Although I have reviewed the note for such errors, some may still exist.

## 2021-04-06 ENCOUNTER — OFFICE VISIT (OUTPATIENT)
Dept: ORTHOPEDIC SURGERY | Facility: CLINIC | Age: 71
End: 2021-04-06

## 2021-04-06 VITALS — WEIGHT: 223 LBS | HEIGHT: 67 IN | BODY MASS INDEX: 35 KG/M2

## 2021-04-06 DIAGNOSIS — Z96.651 HX OF TOTAL KNEE REPLACEMENT, RIGHT: Primary | ICD-10-CM

## 2021-04-06 PROCEDURE — 20610 DRAIN/INJ JOINT/BURSA W/O US: CPT | Performed by: ORTHOPAEDIC SURGERY

## 2021-04-06 PROCEDURE — 99213 OFFICE O/P EST LOW 20 MIN: CPT | Performed by: ORTHOPAEDIC SURGERY

## 2021-04-06 RX ORDER — DOXYCYCLINE HYCLATE 100 MG
100 TABLET ORAL 2 TIMES DAILY
Qty: 60 TABLET | Refills: 0 | Status: SHIPPED | OUTPATIENT
Start: 2021-04-06 | End: 2021-04-20 | Stop reason: SDUPTHER

## 2021-04-06 RX ORDER — LIDOCAINE HYDROCHLORIDE 10 MG/ML
2 INJECTION, SOLUTION INFILTRATION; PERINEURAL
Status: COMPLETED | OUTPATIENT
Start: 2021-04-06 | End: 2021-04-06

## 2021-04-06 RX ADMIN — LIDOCAINE HYDROCHLORIDE 2 ML: 10 INJECTION, SOLUTION INFILTRATION; PERINEURAL at 08:50

## 2021-04-06 NOTE — PROGRESS NOTES
"Chief Complaint  Follow-up of the Right Knee    Subjective    History of Present Illness      Brenden Peter is a 70 y.o. male who presents to [facility] for right knee pain and effusion.  History of Present Illness the patient is well-known to me he has had a right total knee arthroplasty on 21 October 2020.  He continues to have effusion in the suprapatellar location.  He has a lot of pain and discomfort with stiffness of the knee.  He has difficulty with squatting on the ground.  He has been on doxycycline for 3 to 4 weeks now.  He does not have any fevers, rigors or chills.  There is no evidence of infective pathology.  The cultures and Gram stain from previous aspirate from the knee have been essentially normal and sterile and have not grown any organisms.  I have discussed with the patient at length that he might eventually need irrigation debridement and possible removal of the implants with antibiotic spacer placement.  He states that he does not want any surgical consideration at this point whatsoever.  Pain Location:  RIGHT knee  Radiation: none  Quality: dull, aching  Intensity/Severity: moderate  Duration: 2 months  Progression of symptoms: no worsening, reports improvement  Onset quality: sudden  Timing: intermittent  Aggravating Factors: rising after sitting, squatting  Alleviating Factors: NSAIDs  Previous Episodes: yes  Associated Symptoms: swelling  ADLs Affected: ambulating, recreational activities/sports  Previous Treatment: prior surgery       Objective   Vital Signs:   Ht 170.2 cm (67\")   Wt 101 kg (223 lb)   BMI 34.93 kg/m²     Physical Exam  Physical Exam  Vitals signs and nursing note reviewed.   Constitutional:       Appearance: Normal appearance.   Pulmonary:      Effort: Pulmonary effort is normal.   Skin:     General: Skin is warm and dry.      Capillary Refill: Capillary refill takes less than 2 seconds.   Neurological:      General: No focal deficit " present.      Mental Status: He is alert and oriented to person, place, and time. Mental status is at baseline.   Psychiatric:         Mood and Affect: Mood normal.         Behavior: Behavior normal.         Thought Content: Thought content normal.         Judgment: Judgment normal.     Ortho Exam     Right knee.The patient is status post total knee arthroplasty postoperative 5.5 month(s). Incision is clean. Calf is soft and nontender. Homans sign is negative. There is no clicking, popping or catching. Anterior and posterior drawer signs are negative.  There is no instability of the components. Appropriate amounts of swelling and bruising are noted. Dorsalis pedis and posterior tibial artery pulses are palpable. Common peroneal nerve function is well preserved. Range of motion is from 0-95 degrees of flexion. Gait is cautious but otherwise fairly normal. There is no evidence of a deep seated joint infection.          Result Review :   The following data was reviewed by: Ravi Fagan MD on 04/06/2021:    no diagnostic testing performed this visit            Large Joint Arthrocentesis: R knee  Date/Time: 4/6/2021 8:50 AM  Consent given by: patient  Site marked: site marked  Timeout: Immediately prior to procedure a time out was called to verify the correct patient, procedure, equipment, support staff and site/side marked as required   Supporting Documentation  Indications: pain   Procedure Details  Location: knee - R knee  Preparation: Patient was prepped and draped in the usual sterile fashion  Needle size: 25 G  Approach: anteromedial  Medications administered: 2 mL lidocaine 1 %  Aspirate amount: 35 mL  Patient tolerance: patient tolerated the procedure well with no immediate complications                 Assessment   Assessment and Plan    Diagnoses and all orders for this visit:    1. Hx of total knee replacement, right (Primary)    Other orders  -     Large Joint Arthrocentesis: R knee          Follow Up    · Compression/brace to the knee.  · After full prep and draped in a sterile fashion from a lateral approach 35 cc of straw-colored fluid was withdrawn from the joint.  There is no evidence of progression or purulence.  The patient tolerated the aspiration procedure well.  · Rest, ice, compression, and elevation (RICE) therapy  · Stretching and strengthening exercises of the quads and the hamstrings.  · Tablet doxycycline 100 mg orally twice daily for 2 weeks for antibiotic prophylaxis.  · Discussed with the patient about removal of the implants and antibiotic spacer placement and he states that he does not want any surgery at this point because he is 70 years of age and has multiple medical issues and would be at a significant risk for perioperative complications.  · OTC Tylenol 500-1000mg by mouth every 6 hours as needed for pain   · Follow up in 2 week(s)  • Patient was given instructions and counseling regarding his condition or for health maintenance advice. Please see specific information pulled into the AVS if appropriate.     Ravi Fagan MD   Date of Encounter: 4/6/2021        EMR Dragon/Transcription disclaimer:  Much of this encounter note is an electronic transcription/translation of spoken language to printed text. The electronic translation of spoken language may permit erroneous, or at times, nonsensical words or phrases to be inadvertently transcribed; Although I have reviewed the note for such errors, some may still exist.

## 2021-04-20 ENCOUNTER — LAB (OUTPATIENT)
Dept: LAB | Facility: HOSPITAL | Age: 71
End: 2021-04-20

## 2021-04-20 ENCOUNTER — OFFICE VISIT (OUTPATIENT)
Dept: ORTHOPEDIC SURGERY | Facility: CLINIC | Age: 71
End: 2021-04-20

## 2021-04-20 VITALS — WEIGHT: 226.2 LBS | HEIGHT: 67 IN | BODY MASS INDEX: 35.5 KG/M2

## 2021-04-20 DIAGNOSIS — Z96.651 HX OF TOTAL KNEE REPLACEMENT, RIGHT: ICD-10-CM

## 2021-04-20 DIAGNOSIS — Z96.651 HX OF TOTAL KNEE REPLACEMENT, RIGHT: Primary | ICD-10-CM

## 2021-04-20 DIAGNOSIS — M25.461 SWELLING OF JOINT OF RIGHT KNEE: ICD-10-CM

## 2021-04-20 LAB
BASOPHILS # BLD AUTO: 0.03 10*3/MM3 (ref 0–0.2)
BASOPHILS NFR BLD AUTO: 0.4 % (ref 0–1.5)
CRP SERPL-MCNC: <0.3 MG/DL (ref 0–0.5)
DEPRECATED RDW RBC AUTO: 45.2 FL (ref 37–54)
EOSINOPHIL # BLD AUTO: 0.06 10*3/MM3 (ref 0–0.4)
EOSINOPHIL NFR BLD AUTO: 0.9 % (ref 0.3–6.2)
ERYTHROCYTE [DISTWIDTH] IN BLOOD BY AUTOMATED COUNT: 14.4 % (ref 12.3–15.4)
ERYTHROCYTE [SEDIMENTATION RATE] IN BLOOD: 25 MM/HR (ref 0–20)
HCT VFR BLD AUTO: 39.1 % (ref 37.5–51)
HGB BLD-MCNC: 13.1 G/DL (ref 13–17.7)
IMM GRANULOCYTES # BLD AUTO: 0.03 10*3/MM3 (ref 0–0.05)
IMM GRANULOCYTES NFR BLD AUTO: 0.4 % (ref 0–0.5)
LYMPHOCYTES # BLD AUTO: 2.24 10*3/MM3 (ref 0.7–3.1)
LYMPHOCYTES NFR BLD AUTO: 32.4 % (ref 19.6–45.3)
MCH RBC QN AUTO: 29.4 PG (ref 26.6–33)
MCHC RBC AUTO-ENTMCNC: 33.5 G/DL (ref 31.5–35.7)
MCV RBC AUTO: 87.7 FL (ref 79–97)
MONOCYTES # BLD AUTO: 0.63 10*3/MM3 (ref 0.1–0.9)
MONOCYTES NFR BLD AUTO: 9.1 % (ref 5–12)
NEUTROPHILS NFR BLD AUTO: 3.93 10*3/MM3 (ref 1.7–7)
NEUTROPHILS NFR BLD AUTO: 56.8 % (ref 42.7–76)
NRBC BLD AUTO-RTO: 0 /100 WBC (ref 0–0.2)
PLATELET # BLD AUTO: 181 10*3/MM3 (ref 140–450)
PMV BLD AUTO: 11.9 FL (ref 6–12)
RBC # BLD AUTO: 4.46 10*6/MM3 (ref 4.14–5.8)
WBC # BLD AUTO: 6.92 10*3/MM3 (ref 3.4–10.8)

## 2021-04-20 PROCEDURE — 86140 C-REACTIVE PROTEIN: CPT

## 2021-04-20 PROCEDURE — 36415 COLL VENOUS BLD VENIPUNCTURE: CPT

## 2021-04-20 PROCEDURE — 87070 CULTURE OTHR SPECIMN AEROBIC: CPT

## 2021-04-20 PROCEDURE — 87205 SMEAR GRAM STAIN: CPT

## 2021-04-20 PROCEDURE — 85025 COMPLETE CBC W/AUTO DIFF WBC: CPT

## 2021-04-20 PROCEDURE — 99214 OFFICE O/P EST MOD 30 MIN: CPT | Performed by: ORTHOPAEDIC SURGERY

## 2021-04-20 PROCEDURE — 85652 RBC SED RATE AUTOMATED: CPT

## 2021-04-20 RX ORDER — HYDROCODONE BITARTRATE AND ACETAMINOPHEN 5; 325 MG/1; MG/1
1 TABLET ORAL EVERY 12 HOURS PRN
Qty: 40 TABLET | Refills: 0 | Status: SHIPPED | OUTPATIENT
Start: 2021-04-20 | End: 2021-05-20 | Stop reason: HOSPADM

## 2021-04-20 RX ORDER — DOXYCYCLINE HYCLATE 100 MG
100 TABLET ORAL 2 TIMES DAILY
Qty: 60 TABLET | Refills: 0 | Status: ON HOLD | OUTPATIENT
Start: 2021-04-20 | End: 2021-05-19

## 2021-04-20 NOTE — PROGRESS NOTES
"Chief Complaint  Follow-up of the Right Knee    Subjective    History of Present Illness      Brenden Peter is a 70 y.o. male who presents to [facility] for persistent right knee swelling.  History of Present Illness the patient continues to have recurrent swelling in the prepatellar bursa of the right knee.  His surgery was on 21 October 2020.  He states that he is miserable when the effusion builds up.  He has no fevers, rigors or chills.  I am very much concerned about a deep-seated joint infection.  Every time he has his knee aspirated his symptoms resolved and he is back to walking normally in the community.  The patient states that he has no history of trauma to the knee.  The knee itself is functioning well.  I am going to check a full set of labs on him as well as a triple phase bone scan to make sure that he does not need removal of the implants and two-stage exchange arthroplasty for deep-seated infection.  Pain Location:  RIGHT knee  Radiation: none  Quality: dull, aching  Intensity/Severity: mild-moderate  Duration: 2 months  Progression of symptoms: no worsening, symptoms stable/unchanged  Onset quality: sudden  Timing: intermittent  Aggravating Factors: rising after sitting, squatting  Alleviating Factors: NSAIDs  Previous Episodes: yes  Associated Symptoms: swelling  ADLs Affected: ambulating, recreational activities/sports  Previous Treatment: prior surgery       Objective   Vital Signs:   Ht 170.2 cm (67\")   Wt 103 kg (226 lb 3.2 oz)   BMI 35.43 kg/m²     Physical Exam  Physical Exam  Vitals signs and nursing note reviewed.   Constitutional:       Appearance: Normal appearance.   Pulmonary:      Effort: Pulmonary effort is normal.   Skin:     General: Skin is warm and dry.      Capillary Refill: Capillary refill takes less than 2 seconds.   Neurological:      General: No focal deficit present.      Mental Status: He is alert and oriented to person, place, and time. " Mental status is at baseline.   Psychiatric:         Mood and Affect: Mood normal.         Behavior: Behavior normal.         Thought Content: Thought content normal.         Judgment: Judgment normal.     Ortho Exam       Right knee.The patient is status post total knee arthroplasty postoperative 6 month(s). Incision is clean. Calf is soft and nontender. Homans sign is negative. There is no clicking, popping or catching. Anterior and posterior drawer signs are negative.  There is no instability of the components. Appropriate amounts of swelling and bruising are noted. Dorsalis pedis and posterior tibial artery pulses are palpable. Common peroneal nerve function is well preserved. Range of motion is from 0-120 degrees of flexion. Gait is cautious but otherwise fairly normal. There is no evidence of a deep seated joint infection.  He does have an effusion which is mostly in the prepatellar bursal region.          Result Review :   The following data was reviewed by: Ravi Fagan MD on 04/20/2021:  Radiologic studies - see below for interpretation  right Knee X-Ray  Indication: Evaluation of implant position and prepatellar fluid accumulation.  AP, Lateral views  Findings: Well-placed implants with a good cement mantle without any subsidence of the implants.  There is no bony erosion or osteolysis.  no bony lesion  Soft tissues within normal limits  within normal limits joint spaces  Hardware appropriately positioned yes      yes prior studies available for comparison.    X-RAY was ordered and reviewed by Ravi Fagan MD            Procedures           Assessment   Assessment and Plan    Diagnoses and all orders for this visit:    1. Hx of total knee replacement, right (Primary)  -     XR Knee 3 View Right  -     CBC & Differential; Future  -     C-reactive Protein; Future  -     Sedimentation Rate; Future  -     Gram Stain - , Knee, Right; Future  -     NM Bone Scan 3 Phase; Future    2. Swelling of joint of right  knee  -     CBC & Differential; Future  -     C-reactive Protein; Future  -     Sedimentation Rate; Future  -     Gram Stain - , Knee, Right; Future  -     NM Bone Scan 3 Phase; Future          Follow Up   · Compression/brace to the knee to prevent the effusion from recurring.  · Aspirated patient's knee with full aseptic precautions from a lateral approach and we were able to remove 35 cc of straw-colored fluid.  There is no evidence of purulence in this knee fluid.  · Blood work for infection parameters including CBC with differential sedimentation rate and C-reactive protein ordered for the patient today.  · On the fluid that was aspirated we got orders for Gram stain cultures and sensitivity, aerobic and anaerobic.  · We are going to schedule a triple phase bone scan for the patient for evaluation of any deep-seated infection or bony changes related to infection.  · Rest, ice, compression, and elevation (RICE) therapy  · Stretching and strengthening exercises of the quads and the hamstrings.  · Extensive discussion with the patient that he might require removal of these implants and possibly a two-stage infection protocol with spacer blocks.  · OTC Alternate Ibuprofen and Tylenol as needed  · Follow up in 2 week(s)  • Patient was given instructions and counseling regarding his condition or for health maintenance advice. Please see specific information pulled into the AVS if appropriate.     Ravi Fagan MD   Date of Encounter: 4/20/2021        EMR Dragon/Transcription disclaimer:  Much of this encounter note is an electronic transcription/translation of spoken language to printed text. The electronic translation of spoken language may permit erroneous, or at times, nonsensical words or phrases to be inadvertently transcribed; Although I have reviewed the note for such errors, some may still exist.

## 2021-04-25 LAB
BACTERIA FLD CULT: NORMAL
GRAM STN SPEC: NORMAL
GRAM STN SPEC: NORMAL

## 2021-04-29 ENCOUNTER — HOSPITAL ENCOUNTER (OUTPATIENT)
Dept: NUCLEAR MEDICINE | Facility: HOSPITAL | Age: 71
Discharge: HOME | End: 2021-04-29

## 2021-04-29 DIAGNOSIS — Z96.651 HX OF TOTAL KNEE REPLACEMENT, RIGHT: ICD-10-CM

## 2021-04-29 DIAGNOSIS — M25.461 SWELLING OF JOINT OF RIGHT KNEE: ICD-10-CM

## 2021-04-29 PROCEDURE — 0 TECHNETIUM MEDRONATE KIT: Performed by: ORTHOPAEDIC SURGERY

## 2021-04-29 PROCEDURE — 78315 BONE IMAGING 3 PHASE: CPT

## 2021-04-29 PROCEDURE — A9503 TC99M MEDRONATE: HCPCS | Performed by: ORTHOPAEDIC SURGERY

## 2021-04-29 RX ORDER — TC 99M MEDRONATE 20 MG/10ML
28.6 INJECTION, POWDER, LYOPHILIZED, FOR SOLUTION INTRAVENOUS
Status: COMPLETED | OUTPATIENT
Start: 2021-04-29 | End: 2021-04-29

## 2021-04-29 RX ADMIN — TC 99M MEDRONATE 28.6 MILLICURIE: 20 INJECTION, POWDER, LYOPHILIZED, FOR SOLUTION INTRAVENOUS at 10:45

## 2021-05-05 ENCOUNTER — TELEPHONE (OUTPATIENT)
Dept: ORTHOPEDIC SURGERY | Facility: CLINIC | Age: 71
End: 2021-05-05

## 2021-05-05 NOTE — TELEPHONE ENCOUNTER
PT wife voiced they never received results regarding the bone scan, Can you please review bone scan and I can call PT back with results

## 2021-05-06 ENCOUNTER — PREP FOR SURGERY (OUTPATIENT)
Dept: OTHER | Facility: HOSPITAL | Age: 71
End: 2021-05-06

## 2021-05-06 DIAGNOSIS — Z96.651 HX OF TOTAL KNEE REPLACEMENT, RIGHT: Primary | ICD-10-CM

## 2021-05-06 RX ORDER — OXYCODONE HCL 10 MG/1
10 TABLET, FILM COATED, EXTENDED RELEASE ORAL ONCE
Status: CANCELLED | OUTPATIENT
Start: 2021-05-06 | End: 2021-05-06

## 2021-05-06 RX ORDER — CHLORHEXIDINE GLUCONATE 4 G/100ML
SOLUTION TOPICAL
Qty: 236 ML | Refills: 0 | Status: ON HOLD | OUTPATIENT
Start: 2021-05-06 | End: 2021-05-19

## 2021-05-06 RX ORDER — MELOXICAM 15 MG/1
15 TABLET ORAL ONCE
Status: CANCELLED | OUTPATIENT
Start: 2021-05-06 | End: 2021-05-06

## 2021-05-06 RX ORDER — ACETAMINOPHEN 500 MG
1000 TABLET ORAL ONCE
Status: CANCELLED | OUTPATIENT
Start: 2021-05-06 | End: 2021-05-06

## 2021-05-12 ENCOUNTER — LAB (OUTPATIENT)
Dept: LAB | Facility: HOSPITAL | Age: 71
End: 2021-05-12

## 2021-05-12 ENCOUNTER — HOSPITAL ENCOUNTER (OUTPATIENT)
Dept: GENERAL RADIOLOGY | Facility: HOSPITAL | Age: 71
Discharge: HOME | End: 2021-05-12

## 2021-05-12 ENCOUNTER — HOSPITAL ENCOUNTER (OUTPATIENT)
Dept: CARDIOLOGY | Facility: HOSPITAL | Age: 71
Discharge: HOME | End: 2021-05-12

## 2021-05-12 DIAGNOSIS — Z96.651 HX OF TOTAL KNEE REPLACEMENT, RIGHT: ICD-10-CM

## 2021-05-12 LAB
ABO GROUP BLD: NORMAL
ANION GAP SERPL CALCULATED.3IONS-SCNC: 11.6 MMOL/L (ref 5–15)
APTT PPP: 28.9 SECONDS (ref 24–31)
BACTERIA UR QL AUTO: NORMAL /HPF
BASOPHILS # BLD AUTO: 0.02 10*3/MM3 (ref 0–0.2)
BASOPHILS NFR BLD AUTO: 0.3 % (ref 0–1.5)
BILIRUB UR QL STRIP: NEGATIVE
BLD GP AB SCN SERPL QL: NEGATIVE
BUN SERPL-MCNC: 9 MG/DL (ref 8–23)
BUN/CREAT SERPL: 13.2 (ref 7–25)
CALCIUM SPEC-SCNC: 9.5 MG/DL (ref 8.6–10.5)
CHLORIDE SERPL-SCNC: 102 MMOL/L (ref 98–107)
CLARITY UR: CLEAR
CO2 SERPL-SCNC: 24.4 MMOL/L (ref 22–29)
COLOR UR: YELLOW
CREAT SERPL-MCNC: 0.68 MG/DL (ref 0.76–1.27)
DEPRECATED RDW RBC AUTO: 46.7 FL (ref 37–54)
EOSINOPHIL # BLD AUTO: 0.07 10*3/MM3 (ref 0–0.4)
EOSINOPHIL NFR BLD AUTO: 1.1 % (ref 0.3–6.2)
ERYTHROCYTE [DISTWIDTH] IN BLOOD BY AUTOMATED COUNT: 14.5 % (ref 12.3–15.4)
GFR SERPL CREATININE-BSD FRML MDRD: 115 ML/MIN/1.73
GLUCOSE SERPL-MCNC: 117 MG/DL (ref 65–99)
GLUCOSE UR STRIP-MCNC: NEGATIVE MG/DL
HBA1C MFR BLD: 6.9 % (ref 3.5–5.6)
HCT VFR BLD AUTO: 39.8 % (ref 37.5–51)
HGB BLD-MCNC: 13.2 G/DL (ref 13–17.7)
HGB UR QL STRIP.AUTO: NEGATIVE
HYALINE CASTS UR QL AUTO: NORMAL /LPF
IMM GRANULOCYTES # BLD AUTO: 0.03 10*3/MM3 (ref 0–0.05)
IMM GRANULOCYTES NFR BLD AUTO: 0.5 % (ref 0–0.5)
INR PPP: 1 (ref 0.93–1.1)
KETONES UR QL STRIP: NEGATIVE
LEUKOCYTE ESTERASE UR QL STRIP.AUTO: NEGATIVE
LYMPHOCYTES # BLD AUTO: 1.63 10*3/MM3 (ref 0.7–3.1)
LYMPHOCYTES NFR BLD AUTO: 25.5 % (ref 19.6–45.3)
MCH RBC QN AUTO: 29.2 PG (ref 26.6–33)
MCHC RBC AUTO-ENTMCNC: 33.2 G/DL (ref 31.5–35.7)
MCV RBC AUTO: 88.1 FL (ref 79–97)
MONOCYTES # BLD AUTO: 0.55 10*3/MM3 (ref 0.1–0.9)
MONOCYTES NFR BLD AUTO: 8.6 % (ref 5–12)
MRSA DNA SPEC QL NAA+PROBE: NORMAL
NEUTROPHILS NFR BLD AUTO: 4.1 10*3/MM3 (ref 1.7–7)
NEUTROPHILS NFR BLD AUTO: 64 % (ref 42.7–76)
NITRITE UR QL STRIP: NEGATIVE
NRBC BLD AUTO-RTO: 0 /100 WBC (ref 0–0.2)
PH UR STRIP.AUTO: 5.5 [PH] (ref 5–8)
PLATELET # BLD AUTO: 195 10*3/MM3 (ref 140–450)
PMV BLD AUTO: 11.7 FL (ref 6–12)
POTASSIUM SERPL-SCNC: 4.9 MMOL/L (ref 3.5–5.2)
PROT UR QL STRIP: ABNORMAL
PROTHROMBIN TIME: 11 SECONDS (ref 9.6–11.7)
RBC # BLD AUTO: 4.52 10*6/MM3 (ref 4.14–5.8)
RBC # UR: NORMAL /HPF
REF LAB TEST METHOD: NORMAL
RH BLD: POSITIVE
SODIUM SERPL-SCNC: 138 MMOL/L (ref 136–145)
SP GR UR STRIP: 1.02 (ref 1–1.03)
SQUAMOUS #/AREA URNS HPF: NORMAL /HPF
T&S EXPIRATION DATE: NORMAL
UROBILINOGEN UR QL STRIP: ABNORMAL
WBC # BLD AUTO: 6.4 10*3/MM3 (ref 3.4–10.8)
WBC UR QL AUTO: NORMAL /HPF

## 2021-05-12 PROCEDURE — 81001 URINALYSIS AUTO W/SCOPE: CPT

## 2021-05-12 PROCEDURE — 83036 HEMOGLOBIN GLYCOSYLATED A1C: CPT

## 2021-05-12 PROCEDURE — 86900 BLOOD TYPING SEROLOGIC ABO: CPT

## 2021-05-12 PROCEDURE — 93010 ELECTROCARDIOGRAM REPORT: CPT | Performed by: INTERNAL MEDICINE

## 2021-05-12 PROCEDURE — 80048 BASIC METABOLIC PNL TOTAL CA: CPT

## 2021-05-12 PROCEDURE — 85730 THROMBOPLASTIN TIME PARTIAL: CPT

## 2021-05-12 PROCEDURE — 86850 RBC ANTIBODY SCREEN: CPT

## 2021-05-12 PROCEDURE — 71046 X-RAY EXAM CHEST 2 VIEWS: CPT

## 2021-05-12 PROCEDURE — 93005 ELECTROCARDIOGRAM TRACING: CPT | Performed by: PHYSICIAN ASSISTANT

## 2021-05-12 PROCEDURE — 85025 COMPLETE CBC W/AUTO DIFF WBC: CPT

## 2021-05-12 PROCEDURE — 85610 PROTHROMBIN TIME: CPT

## 2021-05-12 PROCEDURE — 36415 COLL VENOUS BLD VENIPUNCTURE: CPT

## 2021-05-12 PROCEDURE — 86901 BLOOD TYPING SEROLOGIC RH(D): CPT

## 2021-05-12 PROCEDURE — 87641 MR-STAPH DNA AMP PROBE: CPT

## 2021-05-17 ENCOUNTER — LAB (OUTPATIENT)
Dept: LAB | Facility: HOSPITAL | Age: 71
End: 2021-05-17

## 2021-05-17 LAB — SARS-COV-2 ORF1AB RESP QL NAA+PROBE: NOT DETECTED

## 2021-05-17 PROCEDURE — U0005 INFEC AGEN DETEC AMPLI PROBE: HCPCS

## 2021-05-17 PROCEDURE — C9803 HOPD COVID-19 SPEC COLLECT: HCPCS

## 2021-05-17 PROCEDURE — U0004 COV-19 TEST NON-CDC HGH THRU: HCPCS

## 2021-05-18 ENCOUNTER — ANESTHESIA EVENT (OUTPATIENT)
Dept: PERIOP | Facility: HOSPITAL | Age: 71
End: 2021-05-18

## 2021-05-19 ENCOUNTER — ANESTHESIA (OUTPATIENT)
Dept: PERIOP | Facility: HOSPITAL | Age: 71
End: 2021-05-19

## 2021-05-19 ENCOUNTER — HOSPITAL ENCOUNTER (OUTPATIENT)
Facility: HOSPITAL | Age: 71
Discharge: HOME | End: 2021-05-20
Attending: ORTHOPAEDIC SURGERY | Admitting: ORTHOPAEDIC SURGERY

## 2021-05-19 DIAGNOSIS — Z96.651 HX OF TOTAL KNEE REPLACEMENT, RIGHT: ICD-10-CM

## 2021-05-19 PROBLEM — E78.2 MIXED HYPERLIPIDEMIA: Chronic | Status: ACTIVE | Noted: 2020-10-15

## 2021-05-19 PROBLEM — J44.9 COPD (CHRONIC OBSTRUCTIVE PULMONARY DISEASE): Chronic | Status: ACTIVE | Noted: 2021-05-19

## 2021-05-19 PROBLEM — F32.A DEPRESSION: Chronic | Status: ACTIVE | Noted: 2020-10-27

## 2021-05-19 PROBLEM — N18.2 STAGE 2 CHRONIC KIDNEY DISEASE: Chronic | Status: ACTIVE | Noted: 2020-10-27

## 2021-05-19 PROBLEM — N40.0 BENIGN PROSTATIC HYPERPLASIA: Chronic | Status: ACTIVE | Noted: 2020-10-27

## 2021-05-19 PROBLEM — E11.9 TYPE 2 DIABETES MELLITUS WITHOUT COMPLICATION, WITHOUT LONG-TERM CURRENT USE OF INSULIN (HCC): Chronic | Status: ACTIVE | Noted: 2020-10-15

## 2021-05-19 PROBLEM — I10 ESSENTIAL HYPERTENSION: Chronic | Status: ACTIVE | Noted: 2020-10-15

## 2021-05-19 PROBLEM — F17.200 TOBACCO DEPENDENCE SYNDROME: Chronic | Status: ACTIVE | Noted: 2020-10-27

## 2021-05-19 LAB
GLUCOSE BLDC GLUCOMTR-MCNC: 123 MG/DL (ref 70–105)
GLUCOSE BLDC GLUCOMTR-MCNC: 152 MG/DL (ref 70–105)
GLUCOSE BLDC GLUCOMTR-MCNC: 172 MG/DL (ref 70–105)
GLUCOSE BLDC GLUCOMTR-MCNC: 182 MG/DL (ref 70–105)

## 2021-05-19 PROCEDURE — A9270 NON-COVERED ITEM OR SERVICE: HCPCS | Performed by: PHYSICIAN ASSISTANT

## 2021-05-19 PROCEDURE — 88331 PATH CONSLTJ SURG 1 BLK 1SPC: CPT | Performed by: PATHOLOGY

## 2021-05-19 PROCEDURE — 63710000001 ATORVASTATIN 10 MG TABLET: Performed by: PHYSICIAN ASSISTANT

## 2021-05-19 PROCEDURE — C9290 INJ, BUPIVACAINE LIPOSOME: HCPCS | Performed by: ORTHOPAEDIC SURGERY

## 2021-05-19 PROCEDURE — 76942 ECHO GUIDE FOR BIOPSY: CPT | Performed by: ORTHOPAEDIC SURGERY

## 2021-05-19 PROCEDURE — 94799 UNLISTED PULMONARY SVC/PX: CPT

## 2021-05-19 PROCEDURE — 63710000001 ACETAMINOPHEN 500 MG TABLET: Performed by: PHYSICIAN ASSISTANT

## 2021-05-19 PROCEDURE — 63710000001 BISOPROLOL 5 MG TABLET: Performed by: PHYSICIAN ASSISTANT

## 2021-05-19 PROCEDURE — 25010000002 PROPOFOL 10 MG/ML EMULSION: Performed by: NURSE ANESTHETIST, CERTIFIED REGISTERED

## 2021-05-19 PROCEDURE — 87116 MYCOBACTERIA CULTURE: CPT | Performed by: ORTHOPAEDIC SURGERY

## 2021-05-19 PROCEDURE — S0260 H&P FOR SURGERY: HCPCS | Performed by: ORTHOPAEDIC SURGERY

## 2021-05-19 PROCEDURE — 25010000002 FENTANYL CITRATE (PF) 50 MCG/ML SOLUTION: Performed by: ANESTHESIOLOGY

## 2021-05-19 PROCEDURE — 87070 CULTURE OTHR SPECIMN AEROBIC: CPT | Performed by: ORTHOPAEDIC SURGERY

## 2021-05-19 PROCEDURE — 87206 SMEAR FLUORESCENT/ACID STAI: CPT | Performed by: ORTHOPAEDIC SURGERY

## 2021-05-19 PROCEDURE — A9270 NON-COVERED ITEM OR SERVICE: HCPCS | Performed by: ORTHOPAEDIC SURGERY

## 2021-05-19 PROCEDURE — A9270 NON-COVERED ITEM OR SERVICE: HCPCS | Performed by: NURSE PRACTITIONER

## 2021-05-19 PROCEDURE — 25010000002 CEFTRIAXONE PER 250 MG: Performed by: NURSE PRACTITIONER

## 2021-05-19 PROCEDURE — G0378 HOSPITAL OBSERVATION PER HR: HCPCS

## 2021-05-19 PROCEDURE — 63710000001 OXYCODONE 10 MG TABLET EXTENDED-RELEASE 12 HOUR: Performed by: PHYSICIAN ASSISTANT

## 2021-05-19 PROCEDURE — C1713 ANCHOR/SCREW BN/BN,TIS/BN: HCPCS | Performed by: ORTHOPAEDIC SURGERY

## 2021-05-19 PROCEDURE — 27486 REVISE/REPLACE KNEE JOINT: CPT | Performed by: ORTHOPAEDIC SURGERY

## 2021-05-19 PROCEDURE — 87205 SMEAR GRAM STAIN: CPT | Performed by: ORTHOPAEDIC SURGERY

## 2021-05-19 PROCEDURE — 25010000002 ROPIVACAINE PER 1 MG: Performed by: ORTHOPAEDIC SURGERY

## 2021-05-19 PROCEDURE — 63710000001 POVIDONE-IODINE 10 % SOLUTION 118 ML BOTTLE: Performed by: ORTHOPAEDIC SURGERY

## 2021-05-19 PROCEDURE — 25010000002 ROPIVACAINE PER 1 MG: Performed by: ANESTHESIOLOGY

## 2021-05-19 PROCEDURE — 25010000003 CEFAZOLIN PER 500 MG: Performed by: ORTHOPAEDIC SURGERY

## 2021-05-19 PROCEDURE — 63710000001 MELOXICAM 15 MG TABLET: Performed by: PHYSICIAN ASSISTANT

## 2021-05-19 PROCEDURE — C1776 JOINT DEVICE (IMPLANTABLE): HCPCS | Performed by: ORTHOPAEDIC SURGERY

## 2021-05-19 PROCEDURE — 63710000001 INSULIN LISPRO (HUMAN) PER 5 UNITS: Performed by: NURSE PRACTITIONER

## 2021-05-19 PROCEDURE — 25010000002 HYDROMORPHONE PER 4 MG: Performed by: NURSE ANESTHETIST, CERTIFIED REGISTERED

## 2021-05-19 PROCEDURE — 87102 FUNGUS ISOLATION CULTURE: CPT | Performed by: ORTHOPAEDIC SURGERY

## 2021-05-19 PROCEDURE — 63710000001 POLYETHYLENE GLYCOL 17 G PACK: Performed by: PHYSICIAN ASSISTANT

## 2021-05-19 PROCEDURE — 82962 GLUCOSE BLOOD TEST: CPT

## 2021-05-19 PROCEDURE — 94640 AIRWAY INHALATION TREATMENT: CPT

## 2021-05-19 PROCEDURE — 88304 TISSUE EXAM BY PATHOLOGIST: CPT | Performed by: ORTHOPAEDIC SURGERY

## 2021-05-19 PROCEDURE — 25010000002 DEXAMETHASONE PER 1 MG: Performed by: ANESTHESIOLOGY

## 2021-05-19 PROCEDURE — 63710000001 SERTRALINE 50 MG TABLET: Performed by: PHYSICIAN ASSISTANT

## 2021-05-19 PROCEDURE — C1889 IMPLANT/INSERT DEVICE, NOC: HCPCS | Performed by: ORTHOPAEDIC SURGERY

## 2021-05-19 PROCEDURE — 63710000001 GABAPENTIN 300 MG CAPSULE: Performed by: PHYSICIAN ASSISTANT

## 2021-05-19 PROCEDURE — 25010000002 VANCOMYCIN 10 G RECONSTITUTED SOLUTION: Performed by: PHYSICIAN ASSISTANT

## 2021-05-19 PROCEDURE — 99219 PR INITIAL OBSERVATION CARE/DAY 50 MINUTES: CPT | Performed by: HOSPITALIST

## 2021-05-19 PROCEDURE — 25010000002 VANCOMYCIN: Performed by: PHYSICIAN ASSISTANT

## 2021-05-19 PROCEDURE — 25010000003 BUPIVACAINE LIPOSOME 1.3 % SUSPENSION: Performed by: ORTHOPAEDIC SURGERY

## 2021-05-19 PROCEDURE — 87075 CULTR BACTERIA EXCEPT BLOOD: CPT | Performed by: ORTHOPAEDIC SURGERY

## 2021-05-19 PROCEDURE — 25010000002 ONDANSETRON PER 1 MG: Performed by: NURSE ANESTHETIST, CERTIFIED REGISTERED

## 2021-05-19 DEVICE — ART/SRF KN PERSONA PS EF MC 6TO7 12MM RT: Type: IMPLANTABLE DEVICE | Site: KNEE | Status: FUNCTIONAL

## 2021-05-19 DEVICE — DEV WND/CLS CONTRL TISS STRATAFIX SYMM PDS PLS CTX 60CM VIL: Type: IMPLANTABLE DEVICE | Site: KNEE | Status: FUNCTIONAL

## 2021-05-19 RX ORDER — DIPHENHYDRAMINE HYDROCHLORIDE 50 MG/ML
25 INJECTION INTRAMUSCULAR; INTRAVENOUS EVERY 6 HOURS PRN
Status: DISCONTINUED | OUTPATIENT
Start: 2021-05-19 | End: 2021-05-20 | Stop reason: HOSPADM

## 2021-05-19 RX ORDER — BISOPROLOL FUMARATE 5 MG/1
5 TABLET, FILM COATED ORAL DAILY
Status: DISCONTINUED | OUTPATIENT
Start: 2021-05-19 | End: 2021-05-20 | Stop reason: HOSPADM

## 2021-05-19 RX ORDER — FERROUS SULFATE TAB EC 324 MG (65 MG FE EQUIVALENT) 324 (65 FE) MG
324 TABLET DELAYED RESPONSE ORAL
Status: DISCONTINUED | OUTPATIENT
Start: 2021-05-20 | End: 2021-05-20 | Stop reason: HOSPADM

## 2021-05-19 RX ORDER — FENTANYL CITRATE 50 UG/ML
INJECTION, SOLUTION INTRAMUSCULAR; INTRAVENOUS
Status: COMPLETED | OUTPATIENT
Start: 2021-05-19 | End: 2021-05-19

## 2021-05-19 RX ORDER — LIDOCAINE HYDROCHLORIDE 10 MG/ML
0.5 INJECTION, SOLUTION INFILTRATION; PERINEURAL ONCE AS NEEDED
Status: DISCONTINUED | OUTPATIENT
Start: 2021-05-19 | End: 2021-05-19 | Stop reason: HOSPADM

## 2021-05-19 RX ORDER — PROPOFOL 10 MG/ML
VIAL (ML) INTRAVENOUS AS NEEDED
Status: DISCONTINUED | OUTPATIENT
Start: 2021-05-19 | End: 2021-05-19 | Stop reason: SURG

## 2021-05-19 RX ORDER — ACETAMINOPHEN 650 MG
TABLET, EXTENDED RELEASE ORAL AS NEEDED
Status: DISCONTINUED | OUTPATIENT
Start: 2021-05-19 | End: 2021-05-19 | Stop reason: HOSPADM

## 2021-05-19 RX ORDER — OXYCODONE HCL 10 MG/1
10 TABLET, FILM COATED, EXTENDED RELEASE ORAL ONCE
Status: COMPLETED | OUTPATIENT
Start: 2021-05-19 | End: 2021-05-19

## 2021-05-19 RX ORDER — TRANEXAMIC ACID 10 MG/ML
1000 INJECTION, SOLUTION INTRAVENOUS ONCE
Status: DISCONTINUED | OUTPATIENT
Start: 2021-05-19 | End: 2021-05-19 | Stop reason: HOSPADM

## 2021-05-19 RX ORDER — ALBUTEROL SULFATE 2.5 MG/3ML
2.5 SOLUTION RESPIRATORY (INHALATION) ONCE AS NEEDED
Status: DISCONTINUED | OUTPATIENT
Start: 2021-05-19 | End: 2021-05-19 | Stop reason: HOSPADM

## 2021-05-19 RX ORDER — GLIPIZIDE 5 MG/1
10 TABLET ORAL NIGHTLY
Status: DISCONTINUED | OUTPATIENT
Start: 2021-05-19 | End: 2021-05-19

## 2021-05-19 RX ORDER — LIDOCAINE HYDROCHLORIDE 20 MG/ML
INJECTION, SOLUTION EPIDURAL; INFILTRATION; INTRACAUDAL; PERINEURAL AS NEEDED
Status: DISCONTINUED | OUTPATIENT
Start: 2021-05-19 | End: 2021-05-19 | Stop reason: SURG

## 2021-05-19 RX ORDER — LIDOCAINE HYDROCHLORIDE 10 MG/ML
0.5 INJECTION, SOLUTION EPIDURAL; INFILTRATION; INTRACAUDAL; PERINEURAL ONCE AS NEEDED
Status: DISCONTINUED | OUTPATIENT
Start: 2021-05-19 | End: 2021-05-19 | Stop reason: HOSPADM

## 2021-05-19 RX ORDER — ACETAMINOPHEN 325 MG/1
650 TABLET ORAL ONCE AS NEEDED
Status: DISCONTINUED | OUTPATIENT
Start: 2021-05-19 | End: 2021-05-19 | Stop reason: HOSPADM

## 2021-05-19 RX ORDER — ONDANSETRON 4 MG/1
4 TABLET, FILM COATED ORAL EVERY 6 HOURS PRN
Status: DISCONTINUED | OUTPATIENT
Start: 2021-05-19 | End: 2021-05-20 | Stop reason: HOSPADM

## 2021-05-19 RX ORDER — HYDROMORPHONE HCL 110MG/55ML
0.25 PATIENT CONTROLLED ANALGESIA SYRINGE INTRAVENOUS
Status: DISCONTINUED | OUTPATIENT
Start: 2021-05-19 | End: 2021-05-19 | Stop reason: HOSPADM

## 2021-05-19 RX ORDER — INSULIN LISPRO 100 [IU]/ML
0-9 INJECTION, SOLUTION INTRAVENOUS; SUBCUTANEOUS
Status: DISCONTINUED | OUTPATIENT
Start: 2021-05-19 | End: 2021-05-20 | Stop reason: HOSPADM

## 2021-05-19 RX ORDER — ONDANSETRON 2 MG/ML
4 INJECTION INTRAMUSCULAR; INTRAVENOUS EVERY 6 HOURS PRN
Status: DISCONTINUED | OUTPATIENT
Start: 2021-05-19 | End: 2021-05-20 | Stop reason: HOSPADM

## 2021-05-19 RX ORDER — ONDANSETRON 2 MG/ML
4 INJECTION INTRAMUSCULAR; INTRAVENOUS ONCE AS NEEDED
Status: DISCONTINUED | OUTPATIENT
Start: 2021-05-19 | End: 2021-05-19 | Stop reason: HOSPADM

## 2021-05-19 RX ORDER — SODIUM CHLORIDE 0.9 % (FLUSH) 0.9 %
10 SYRINGE (ML) INJECTION AS NEEDED
Status: DISCONTINUED | OUTPATIENT
Start: 2021-05-19 | End: 2021-05-19 | Stop reason: HOSPADM

## 2021-05-19 RX ORDER — SODIUM CHLORIDE 0.9 % (FLUSH) 0.9 %
10 SYRINGE (ML) INJECTION EVERY 12 HOURS SCHEDULED
Status: DISCONTINUED | OUTPATIENT
Start: 2021-05-19 | End: 2021-05-19 | Stop reason: HOSPADM

## 2021-05-19 RX ORDER — DROPERIDOL 2.5 MG/ML
0.62 INJECTION, SOLUTION INTRAMUSCULAR; INTRAVENOUS ONCE AS NEEDED
Status: DISCONTINUED | OUTPATIENT
Start: 2021-05-19 | End: 2021-05-19 | Stop reason: HOSPADM

## 2021-05-19 RX ORDER — DEXAMETHASONE SODIUM PHOSPHATE 4 MG/ML
INJECTION, SOLUTION INTRA-ARTICULAR; INTRALESIONAL; INTRAMUSCULAR; INTRAVENOUS; SOFT TISSUE
Status: COMPLETED | OUTPATIENT
Start: 2021-05-19 | End: 2021-05-19

## 2021-05-19 RX ORDER — MAGNESIUM HYDROXIDE 1200 MG/15ML
LIQUID ORAL AS NEEDED
Status: DISCONTINUED | OUTPATIENT
Start: 2021-05-19 | End: 2021-05-19 | Stop reason: HOSPADM

## 2021-05-19 RX ORDER — ONDANSETRON 2 MG/ML
INJECTION INTRAMUSCULAR; INTRAVENOUS AS NEEDED
Status: DISCONTINUED | OUTPATIENT
Start: 2021-05-19 | End: 2021-05-19 | Stop reason: SURG

## 2021-05-19 RX ORDER — ACETAMINOPHEN 650 MG/1
650 SUPPOSITORY RECTAL ONCE AS NEEDED
Status: DISCONTINUED | OUTPATIENT
Start: 2021-05-19 | End: 2021-05-19 | Stop reason: HOSPADM

## 2021-05-19 RX ORDER — ACETAMINOPHEN 500 MG
1000 TABLET ORAL EVERY 8 HOURS
Status: DISCONTINUED | OUTPATIENT
Start: 2021-05-19 | End: 2021-05-20 | Stop reason: HOSPADM

## 2021-05-19 RX ORDER — 0.9 % SODIUM CHLORIDE 0.9 %
VIAL (ML) INJECTION AS NEEDED
Status: DISCONTINUED | OUTPATIENT
Start: 2021-05-19 | End: 2021-05-19 | Stop reason: HOSPADM

## 2021-05-19 RX ORDER — SODIUM CHLORIDE, SODIUM LACTATE, POTASSIUM CHLORIDE, CALCIUM CHLORIDE 600; 310; 30; 20 MG/100ML; MG/100ML; MG/100ML; MG/100ML
1000 INJECTION, SOLUTION INTRAVENOUS CONTINUOUS
Status: DISCONTINUED | OUTPATIENT
Start: 2021-05-19 | End: 2021-05-19

## 2021-05-19 RX ORDER — NICOTINE POLACRILEX 4 MG
15 LOZENGE BUCCAL
Status: DISCONTINUED | OUTPATIENT
Start: 2021-05-19 | End: 2021-05-20 | Stop reason: HOSPADM

## 2021-05-19 RX ORDER — ATORVASTATIN CALCIUM 10 MG/1
10 TABLET, FILM COATED ORAL DAILY
Status: DISCONTINUED | OUTPATIENT
Start: 2021-05-19 | End: 2021-05-20 | Stop reason: HOSPADM

## 2021-05-19 RX ORDER — ROPIVACAINE HYDROCHLORIDE 5 MG/ML
INJECTION, SOLUTION EPIDURAL; INFILTRATION; PERINEURAL AS NEEDED
Status: DISCONTINUED | OUTPATIENT
Start: 2021-05-19 | End: 2021-05-19 | Stop reason: HOSPADM

## 2021-05-19 RX ORDER — ROPIVACAINE HYDROCHLORIDE 5 MG/ML
INJECTION, SOLUTION EPIDURAL; INFILTRATION; PERINEURAL
Status: COMPLETED | OUTPATIENT
Start: 2021-05-19 | End: 2021-05-19

## 2021-05-19 RX ORDER — HYDRALAZINE HYDROCHLORIDE 20 MG/ML
10 INJECTION INTRAMUSCULAR; INTRAVENOUS EVERY 6 HOURS PRN
Status: DISCONTINUED | OUTPATIENT
Start: 2021-05-19 | End: 2021-05-20 | Stop reason: HOSPADM

## 2021-05-19 RX ORDER — CEFAZOLIN SODIUM 1 G/3ML
INJECTION, POWDER, FOR SOLUTION INTRAMUSCULAR; INTRAVENOUS AS NEEDED
Status: DISCONTINUED | OUTPATIENT
Start: 2021-05-19 | End: 2021-05-19 | Stop reason: HOSPADM

## 2021-05-19 RX ORDER — POLYETHYLENE GLYCOL 3350 17 G/17G
17 POWDER, FOR SOLUTION ORAL DAILY
Status: DISCONTINUED | OUTPATIENT
Start: 2021-05-19 | End: 2021-05-20 | Stop reason: HOSPADM

## 2021-05-19 RX ORDER — OXYCODONE HYDROCHLORIDE 5 MG/1
5 TABLET ORAL EVERY 4 HOURS PRN
Status: DISCONTINUED | OUTPATIENT
Start: 2021-05-19 | End: 2021-05-20 | Stop reason: HOSPADM

## 2021-05-19 RX ORDER — PIOGLITAZONEHYDROCHLORIDE 30 MG/1
30 TABLET ORAL NIGHTLY
Status: DISCONTINUED | OUTPATIENT
Start: 2021-05-19 | End: 2021-05-19

## 2021-05-19 RX ORDER — PROMETHAZINE HYDROCHLORIDE 12.5 MG/1
12.5 TABLET ORAL EVERY 6 HOURS PRN
Status: DISCONTINUED | OUTPATIENT
Start: 2021-05-19 | End: 2021-05-20 | Stop reason: HOSPADM

## 2021-05-19 RX ORDER — LISINOPRIL 20 MG/1
40 TABLET ORAL NIGHTLY
Status: DISCONTINUED | OUTPATIENT
Start: 2021-05-20 | End: 2021-05-20 | Stop reason: HOSPADM

## 2021-05-19 RX ORDER — MELOXICAM 15 MG/1
15 TABLET ORAL DAILY
Status: DISCONTINUED | OUTPATIENT
Start: 2021-05-20 | End: 2021-05-20 | Stop reason: HOSPADM

## 2021-05-19 RX ORDER — OXYCODONE HCL 10 MG/1
10 TABLET, FILM COATED, EXTENDED RELEASE ORAL EVERY 12 HOURS SCHEDULED
Status: DISCONTINUED | OUTPATIENT
Start: 2021-05-19 | End: 2021-05-20 | Stop reason: HOSPADM

## 2021-05-19 RX ORDER — SODIUM CHLORIDE 9 MG/ML
100 INJECTION, SOLUTION INTRAVENOUS CONTINUOUS
Status: DISCONTINUED | OUTPATIENT
Start: 2021-05-19 | End: 2021-05-20 | Stop reason: HOSPADM

## 2021-05-19 RX ORDER — MORPHINE SULFATE 4 MG/ML
4 INJECTION, SOLUTION INTRAMUSCULAR; INTRAVENOUS
Status: DISCONTINUED | OUTPATIENT
Start: 2021-05-19 | End: 2021-05-20 | Stop reason: HOSPADM

## 2021-05-19 RX ORDER — GABAPENTIN 300 MG/1
600 CAPSULE ORAL ONCE
Status: COMPLETED | OUTPATIENT
Start: 2021-05-19 | End: 2021-05-19

## 2021-05-19 RX ORDER — IPRATROPIUM BROMIDE AND ALBUTEROL SULFATE 2.5; .5 MG/3ML; MG/3ML
3 SOLUTION RESPIRATORY (INHALATION) ONCE AS NEEDED
Status: DISCONTINUED | OUTPATIENT
Start: 2021-05-19 | End: 2021-05-19 | Stop reason: HOSPADM

## 2021-05-19 RX ORDER — INSULIN LISPRO 100 [IU]/ML
0-9 INJECTION, SOLUTION INTRAVENOUS; SUBCUTANEOUS AS NEEDED
Status: DISCONTINUED | OUTPATIENT
Start: 2021-05-19 | End: 2021-05-20 | Stop reason: HOSPADM

## 2021-05-19 RX ORDER — GABAPENTIN 300 MG/1
300 CAPSULE ORAL EVERY 12 HOURS SCHEDULED
Status: DISCONTINUED | OUTPATIENT
Start: 2021-05-19 | End: 2021-05-20 | Stop reason: HOSPADM

## 2021-05-19 RX ORDER — ACETAMINOPHEN 500 MG
1000 TABLET ORAL ONCE
Status: COMPLETED | OUTPATIENT
Start: 2021-05-19 | End: 2021-05-19

## 2021-05-19 RX ORDER — ASPIRIN 325 MG
325 TABLET, DELAYED RELEASE (ENTERIC COATED) ORAL DAILY
Status: DISCONTINUED | OUTPATIENT
Start: 2021-05-20 | End: 2021-05-20 | Stop reason: HOSPADM

## 2021-05-19 RX ORDER — DEXTROSE MONOHYDRATE 25 G/50ML
25 INJECTION, SOLUTION INTRAVENOUS
Status: DISCONTINUED | OUTPATIENT
Start: 2021-05-19 | End: 2021-05-20 | Stop reason: HOSPADM

## 2021-05-19 RX ORDER — NALOXONE HCL 0.4 MG/ML
0.4 VIAL (ML) INJECTION
Status: DISCONTINUED | OUTPATIENT
Start: 2021-05-19 | End: 2021-05-20 | Stop reason: HOSPADM

## 2021-05-19 RX ORDER — MELOXICAM 15 MG/1
15 TABLET ORAL ONCE
Status: COMPLETED | OUTPATIENT
Start: 2021-05-19 | End: 2021-05-19

## 2021-05-19 RX ORDER — DIPHENHYDRAMINE HCL 25 MG
25 CAPSULE ORAL EVERY 6 HOURS PRN
Status: DISCONTINUED | OUTPATIENT
Start: 2021-05-19 | End: 2021-05-20 | Stop reason: HOSPADM

## 2021-05-19 RX ORDER — AMLODIPINE BESYLATE 5 MG/1
5 TABLET ORAL NIGHTLY
Status: DISCONTINUED | OUTPATIENT
Start: 2021-05-19 | End: 2021-05-20 | Stop reason: HOSPADM

## 2021-05-19 RX ORDER — HYDROMORPHONE HCL 110MG/55ML
PATIENT CONTROLLED ANALGESIA SYRINGE INTRAVENOUS AS NEEDED
Status: DISCONTINUED | OUTPATIENT
Start: 2021-05-19 | End: 2021-05-19 | Stop reason: SURG

## 2021-05-19 RX ORDER — SODIUM CHLORIDE, SODIUM LACTATE, POTASSIUM CHLORIDE, CALCIUM CHLORIDE 600; 310; 30; 20 MG/100ML; MG/100ML; MG/100ML; MG/100ML
9 INJECTION, SOLUTION INTRAVENOUS CONTINUOUS PRN
Status: DISCONTINUED | OUTPATIENT
Start: 2021-05-19 | End: 2021-05-19 | Stop reason: HOSPADM

## 2021-05-19 RX ORDER — IPRATROPIUM BROMIDE AND ALBUTEROL SULFATE 2.5; .5 MG/3ML; MG/3ML
3 SOLUTION RESPIRATORY (INHALATION)
Status: DISCONTINUED | OUTPATIENT
Start: 2021-05-19 | End: 2021-05-20 | Stop reason: HOSPADM

## 2021-05-19 RX ORDER — HYDROCODONE BITARTRATE AND ACETAMINOPHEN 5; 325 MG/1; MG/1
1 TABLET ORAL ONCE AS NEEDED
Status: DISCONTINUED | OUTPATIENT
Start: 2021-05-19 | End: 2021-05-19 | Stop reason: HOSPADM

## 2021-05-19 RX ADMIN — LIDOCAINE HYDROCHLORIDE 100 MG: 20 INJECTION, SOLUTION EPIDURAL; INFILTRATION; INTRACAUDAL; PERINEURAL at 10:42

## 2021-05-19 RX ADMIN — GABAPENTIN 600 MG: 300 CAPSULE ORAL at 08:11

## 2021-05-19 RX ADMIN — DEXAMETHASONE SODIUM PHOSPHATE 4 MG: 4 INJECTION, SOLUTION INTRAMUSCULAR; INTRAVENOUS at 08:53

## 2021-05-19 RX ADMIN — ACETAMINOPHEN 1000 MG: 500 TABLET, FILM COATED ORAL at 08:11

## 2021-05-19 RX ADMIN — VANCOMYCIN HYDROCHLORIDE 1250 MG: 10 INJECTION, POWDER, LYOPHILIZED, FOR SOLUTION INTRAVENOUS at 20:39

## 2021-05-19 RX ADMIN — SODIUM CHLORIDE, POTASSIUM CHLORIDE, SODIUM LACTATE AND CALCIUM CHLORIDE: 600; 310; 30; 20 INJECTION, SOLUTION INTRAVENOUS at 10:42

## 2021-05-19 RX ADMIN — MELOXICAM 15 MG: 15 TABLET ORAL at 08:11

## 2021-05-19 RX ADMIN — SERTRALINE HYDROCHLORIDE 50 MG: 50 TABLET ORAL at 20:39

## 2021-05-19 RX ADMIN — PROPOFOL 150 MG: 10 INJECTION, EMULSION INTRAVENOUS at 10:42

## 2021-05-19 RX ADMIN — INSULIN LISPRO 2 UNITS: 100 INJECTION, SOLUTION INTRAVENOUS; SUBCUTANEOUS at 18:22

## 2021-05-19 RX ADMIN — OXYCODONE HYDROCHLORIDE 10 MG: 10 TABLET, FILM COATED, EXTENDED RELEASE ORAL at 20:39

## 2021-05-19 RX ADMIN — IPRATROPIUM BROMIDE AND ALBUTEROL SULFATE 3 ML: 2.5; .5 SOLUTION RESPIRATORY (INHALATION) at 20:15

## 2021-05-19 RX ADMIN — BISOPROLOL FUMARATE 5 MG: 5 TABLET, FILM COATED ORAL at 18:24

## 2021-05-19 RX ADMIN — ATORVASTATIN CALCIUM 10 MG: 10 TABLET, FILM COATED ORAL at 18:24

## 2021-05-19 RX ADMIN — GABAPENTIN 300 MG: 300 CAPSULE ORAL at 20:39

## 2021-05-19 RX ADMIN — SODIUM CHLORIDE 100 ML/HR: 9 INJECTION, SOLUTION INTRAVENOUS at 19:56

## 2021-05-19 RX ADMIN — PROPOFOL 100 MCG/KG/MIN: 10 INJECTION, EMULSION INTRAVENOUS at 10:53

## 2021-05-19 RX ADMIN — VANCOMYCIN HYDROCHLORIDE 1250 MG: 10 INJECTION, POWDER, LYOPHILIZED, FOR SOLUTION INTRAVENOUS at 09:18

## 2021-05-19 RX ADMIN — SODIUM CHLORIDE, POTASSIUM CHLORIDE, SODIUM LACTATE AND CALCIUM CHLORIDE 9 ML/HR: 600; 310; 30; 20 INJECTION, SOLUTION INTRAVENOUS at 07:55

## 2021-05-19 RX ADMIN — HYDROMORPHONE HYDROCHLORIDE 0.4 MG: 2 INJECTION INTRAMUSCULAR; INTRAVENOUS; SUBCUTANEOUS at 11:32

## 2021-05-19 RX ADMIN — CEFTRIAXONE SODIUM 2 G: 2 INJECTION, POWDER, FOR SOLUTION INTRAMUSCULAR; INTRAVENOUS at 19:54

## 2021-05-19 RX ADMIN — OXYCODONE HYDROCHLORIDE 10 MG: 10 TABLET, FILM COATED, EXTENDED RELEASE ORAL at 08:11

## 2021-05-19 RX ADMIN — POLYETHYLENE GLYCOL 3350 17 G: 17 POWDER, FOR SOLUTION ORAL at 20:39

## 2021-05-19 RX ADMIN — FENTANYL CITRATE 100 MCG: 50 INJECTION, SOLUTION INTRAMUSCULAR; INTRAVENOUS at 08:53

## 2021-05-19 RX ADMIN — ONDANSETRON 4 MG: 2 INJECTION INTRAMUSCULAR; INTRAVENOUS at 12:07

## 2021-05-19 RX ADMIN — ACETAMINOPHEN 1000 MG: 500 TABLET, FILM COATED ORAL at 18:24

## 2021-05-19 RX ADMIN — ROPIVACAINE HYDROCHLORIDE 30 ML: 5 INJECTION, SOLUTION EPIDURAL; INFILTRATION; PERINEURAL at 08:53

## 2021-05-19 NOTE — ANESTHESIA PROCEDURE NOTES
Airway  Urgency: elective    Date/Time: 5/19/2021 10:55 AM  Airway not difficult    General Information and Staff    Patient location during procedure: OR  Anesthesiologist: Dalton Loo MD  CRNA: Cassia Wolff CRNA    Indications and Patient Condition  Indications for airway management: airway protection    Preoxygenated: yes  Mask difficulty assessment: 0 - not attempted    Final Airway Details  Final airway type: supraglottic airway      Successful airway: unique and LMA  Size 4    Number of attempts at approach: 1  Assessment: lips, teeth, and gum same as pre-op

## 2021-05-19 NOTE — ANESTHESIA PREPROCEDURE EVALUATION
Anesthesia Evaluation     Patient summary reviewed and Nursing notes reviewed   NPO Solid Status: > 8 hours  NPO Liquid Status: > 2 hours           Airway   Mallampati: II  TM distance: >3 FB  Neck ROM: full  No difficulty expected  Dental - normal exam     Pulmonary    (+) COPD,   Cardiovascular     (+) hypertension, valvular problems/murmurs, CAD, hyperlipidemia,       Neuro/Psych  GI/Hepatic/Renal/Endo    (+) obesity,   renal disease, diabetes mellitus,     Musculoskeletal     (+) joint swelling,   Abdominal    Substance History      OB/GYN          Other   arthritis,      ROS/Med Hx Other: · Findings consistent with a normal ECG stress test.  · Left ventricular ejection fraction is normal. (Calculated EF = 56%).  · Impressions are consistent with a low risk study.  · Small mid inferior and apical reverse redistribution without any ischemia EF 56%.                   Anesthesia Plan    ASA 3     general with block     intravenous induction     Anesthetic plan, all risks, benefits, and alternatives have been provided, discussed and informed consent has been obtained with: patient.    Plan discussed with CRNA and CAA.

## 2021-05-19 NOTE — ANESTHESIA POSTPROCEDURE EVALUATION
Patient: Brenden Peter    Procedure Summary     Date: 05/19/21 Room / Location: [facility] OR 12 / [facility] MAIN OR    Anesthesia Start: 1042 Anesthesia Stop: 1231    Procedure: KNEE POLY INSERT EXCHANGE with irrigation (Right Knee) Diagnosis:       Hx of total knee replacement, right      (Hx of total knee replacement, right [Z96.651])    Surgeons: Ravi Fagan MD Provider: Dalton Loo MD    Anesthesia Type: general with block ASA Status: 3          Anesthesia Type: general with block    Vitals  Vitals Value Taken Time   /47 05/19/21 1328   Temp 97.8 °F (36.6 °C) 05/19/21 1228   Pulse 56 05/19/21 1328   Resp 22 05/19/21 1328   SpO2 95 % 05/19/21 1328           Post Anesthesia Care and Evaluation    Patient location during evaluation: PACU  Patient participation: complete - patient participated  Level of consciousness: awake  Pain scale: See nurse's notes for pain score.  Pain management: adequate  Airway patency: patent  Anesthetic complications: No anesthetic complications  PONV Status: none  Cardiovascular status: acceptable  Respiratory status: acceptable  Hydration status: acceptable    Comments: Patient seen and examined postoperatively; vital signs stable; SpO2 greater than or equal to 90%; cardiopulmonary status stable; nausea/vomiting adequately controlled; pain adequately controlled; no apparent anesthesia complications; patient discharged from anesthesia care when discharge criteria were met

## 2021-05-19 NOTE — ANESTHESIA PROCEDURE NOTES
Peripheral Block    Pre-sedation assessment completed: 5/19/2021 8:48 AM    Patient reassessed immediately prior to procedure    Patient location during procedure: pre-op  Start time: 5/19/2021 8:48 AM  Stop time: 5/19/2021 8:53 AM  Reason for block: at surgeon's request and post-op pain management  Performed by  Anesthesiologist: Dalton Loo MD  Preanesthetic Checklist  Completed: patient identified, IV checked, site marked, risks and benefits discussed, surgical consent, monitors and equipment checked, pre-op evaluation and timeout performed  Prep:  Pt Position: supine  Sterile barriers:cap, gloves, mask and washed/disinfected hands  Prep: ChloraPrep  Patient monitoring: blood pressure monitoring, continuous pulse oximetry and EKG  Procedure  Sedation:yes    Guidance:ultrasound guided  ULTRASOUND INTERPRETATION. Using ultrasound guidance a 21 G gauge needle was placed in close proximity to the nerve, at which point, under ultrasound guidance anesthetic was injected in the area of the nerve and spread of the anesthesia was seen on ultrasound in close proximity thereto.  There were no abnormalities seen on ultrasound; a digital image was taken; and the patient tolerated the procedure with no complications. Images:still images obtained, printed/placed on chart    Laterality:right  Block Type:adductor canal block  Injection Technique:single-shot  Needle Type:echogenic    Sedation medications used: fentaNYL citrate (PF) (SUBLIMAZE) injection, 100 mcg  Medications Used: dexamethasone (DECADRON) injection, 4 mg  ropivacaine (NAROPIN) 0.5 % injection, 30 mL  Med admintered at 5/19/2021 8:53 AM      Post Assessment  Injection Assessment: negative aspiration for heme, no paresthesia on injection and incremental injection  Patient Tolerance:comfortable throughout block  Complications:no

## 2021-05-20 VITALS
SYSTOLIC BLOOD PRESSURE: 122 MMHG | BODY MASS INDEX: 34.6 KG/M2 | DIASTOLIC BLOOD PRESSURE: 68 MMHG | TEMPERATURE: 97.9 F | HEIGHT: 67 IN | HEART RATE: 60 BPM | WEIGHT: 220.46 LBS | RESPIRATION RATE: 17 BRPM | OXYGEN SATURATION: 93 %

## 2021-05-20 DIAGNOSIS — M17.11 PRIMARY LOCALIZED OSTEOARTHROSIS OF RIGHT LOWER LEG: Primary | ICD-10-CM

## 2021-05-20 LAB
ALBUMIN SERPL-MCNC: 3.6 G/DL (ref 3.5–5.2)
ALBUMIN/GLOB SERPL: 1.2 G/DL
ALP SERPL-CCNC: 48 U/L (ref 39–117)
ALT SERPL W P-5'-P-CCNC: 15 U/L (ref 1–41)
ANION GAP SERPL CALCULATED.3IONS-SCNC: 11 MMOL/L (ref 5–15)
AST SERPL-CCNC: 14 U/L (ref 1–40)
BASOPHILS # BLD AUTO: 0 10*3/MM3 (ref 0–0.2)
BASOPHILS NFR BLD AUTO: 0.2 % (ref 0–1.5)
BILIRUB SERPL-MCNC: 0.2 MG/DL (ref 0–1.2)
BUN SERPL-MCNC: 19 MG/DL (ref 8–23)
BUN/CREAT SERPL: 26.4 (ref 7–25)
CALCIUM SPEC-SCNC: 8.7 MG/DL (ref 8.6–10.5)
CHLORIDE SERPL-SCNC: 106 MMOL/L (ref 98–107)
CO2 SERPL-SCNC: 22 MMOL/L (ref 22–29)
CREAT SERPL-MCNC: 0.72 MG/DL (ref 0.76–1.27)
CRP SERPL-MCNC: 0.98 MG/DL (ref 0–0.5)
DEPRECATED RDW RBC AUTO: 50.3 FL (ref 37–54)
EOSINOPHIL # BLD AUTO: 0 10*3/MM3 (ref 0–0.4)
EOSINOPHIL NFR BLD AUTO: 0 % (ref 0.3–6.2)
ERYTHROCYTE [DISTWIDTH] IN BLOOD BY AUTOMATED COUNT: 16 % (ref 12.3–15.4)
ERYTHROCYTE [SEDIMENTATION RATE] IN BLOOD: 41 MM/HR (ref 0–20)
GFR SERPL CREATININE-BSD FRML MDRD: 108 ML/MIN/1.73
GLOBULIN UR ELPH-MCNC: 3.1 GM/DL
GLUCOSE BLDC GLUCOMTR-MCNC: 126 MG/DL (ref 70–105)
GLUCOSE BLDC GLUCOMTR-MCNC: 145 MG/DL (ref 70–105)
GLUCOSE SERPL-MCNC: 153 MG/DL (ref 65–99)
HCT VFR BLD AUTO: 36.4 % (ref 37.5–51)
HGB BLD-MCNC: 11.9 G/DL (ref 13–17.7)
LAB AP CASE REPORT: NORMAL
LAB AP CLINICAL INFORMATION: NORMAL
LYMPHOCYTES # BLD AUTO: 0.6 10*3/MM3 (ref 0.7–3.1)
LYMPHOCYTES NFR BLD AUTO: 6.7 % (ref 19.6–45.3)
Lab: NORMAL
MCH RBC QN AUTO: 29.3 PG (ref 26.6–33)
MCHC RBC AUTO-ENTMCNC: 32.7 G/DL (ref 31.5–35.7)
MCV RBC AUTO: 89.6 FL (ref 79–97)
MONOCYTES # BLD AUTO: 0.6 10*3/MM3 (ref 0.1–0.9)
MONOCYTES NFR BLD AUTO: 6.4 % (ref 5–12)
NEUTROPHILS NFR BLD AUTO: 8.1 10*3/MM3 (ref 1.7–7)
NEUTROPHILS NFR BLD AUTO: 86.7 % (ref 42.7–76)
NRBC BLD AUTO-RTO: 0 /100 WBC (ref 0–0.2)
PATH REPORT.FINAL DX SPEC: NORMAL
PATH REPORT.GROSS SPEC: NORMAL
PLATELET # BLD AUTO: 159 10*3/MM3 (ref 140–450)
PMV BLD AUTO: 9.6 FL (ref 6–12)
POTASSIUM SERPL-SCNC: 5.1 MMOL/L (ref 3.5–5.2)
PROT SERPL-MCNC: 6.7 G/DL (ref 6–8.5)
RBC # BLD AUTO: 4.06 10*6/MM3 (ref 4.14–5.8)
SODIUM SERPL-SCNC: 139 MMOL/L (ref 136–145)
VANCOMYCIN PEAK SERPL-MCNC: 20.6 MCG/ML (ref 20–40)
WBC # BLD AUTO: 9.4 10*3/MM3 (ref 3.4–10.8)

## 2021-05-20 PROCEDURE — 25010000002 VANCOMYCIN 10 G RECONSTITUTED SOLUTION: Performed by: NURSE PRACTITIONER

## 2021-05-20 PROCEDURE — 63710000001 OXYCODONE 10 MG TABLET EXTENDED-RELEASE 12 HOUR: Performed by: PHYSICIAN ASSISTANT

## 2021-05-20 PROCEDURE — 97161 PT EVAL LOW COMPLEX 20 MIN: CPT

## 2021-05-20 PROCEDURE — A9270 NON-COVERED ITEM OR SERVICE: HCPCS | Performed by: PHYSICIAN ASSISTANT

## 2021-05-20 PROCEDURE — 63710000001 FERROUS SULFATE 324 (65 FE) MG TABLET DELAYED-RELEASE: Performed by: PHYSICIAN ASSISTANT

## 2021-05-20 PROCEDURE — 63710000001 POLYETHYLENE GLYCOL 17 G PACK: Performed by: PHYSICIAN ASSISTANT

## 2021-05-20 PROCEDURE — 63710000001 BISOPROLOL 5 MG TABLET: Performed by: PHYSICIAN ASSISTANT

## 2021-05-20 PROCEDURE — 80202 ASSAY OF VANCOMYCIN: CPT | Performed by: INTERNAL MEDICINE

## 2021-05-20 PROCEDURE — 94618 PULMONARY STRESS TESTING: CPT

## 2021-05-20 PROCEDURE — 82962 GLUCOSE BLOOD TEST: CPT

## 2021-05-20 PROCEDURE — 85652 RBC SED RATE AUTOMATED: CPT | Performed by: NURSE PRACTITIONER

## 2021-05-20 PROCEDURE — 63710000001 GABAPENTIN 300 MG CAPSULE: Performed by: PHYSICIAN ASSISTANT

## 2021-05-20 PROCEDURE — 86140 C-REACTIVE PROTEIN: CPT | Performed by: NURSE PRACTITIONER

## 2021-05-20 PROCEDURE — 63710000001 ATORVASTATIN 10 MG TABLET: Performed by: PHYSICIAN ASSISTANT

## 2021-05-20 PROCEDURE — 94799 UNLISTED PULMONARY SVC/PX: CPT

## 2021-05-20 PROCEDURE — 99225 PR SBSQ OBSERVATION CARE/DAY 25 MINUTES: CPT | Performed by: HOSPITALIST

## 2021-05-20 PROCEDURE — 63710000001 ASPIRIN EC 325 MG TABLET DELAYED-RELEASE: Performed by: PHYSICIAN ASSISTANT

## 2021-05-20 PROCEDURE — 63710000001 ACETAMINOPHEN 500 MG TABLET: Performed by: PHYSICIAN ASSISTANT

## 2021-05-20 PROCEDURE — 85025 COMPLETE CBC W/AUTO DIFF WBC: CPT | Performed by: NURSE PRACTITIONER

## 2021-05-20 PROCEDURE — G0378 HOSPITAL OBSERVATION PER HR: HCPCS

## 2021-05-20 PROCEDURE — 80053 COMPREHEN METABOLIC PANEL: CPT | Performed by: NURSE PRACTITIONER

## 2021-05-20 PROCEDURE — 99024 POSTOP FOLLOW-UP VISIT: CPT | Performed by: ORTHOPAEDIC SURGERY

## 2021-05-20 PROCEDURE — 63710000001 MELOXICAM 15 MG TABLET: Performed by: PHYSICIAN ASSISTANT

## 2021-05-20 RX ORDER — ASPIRIN 325 MG
325 TABLET, DELAYED RELEASE (ENTERIC COATED) ORAL DAILY
Qty: 30 TABLET | Refills: 0 | COMMUNITY
Start: 2021-05-21 | End: 2021-06-20

## 2021-05-20 RX ORDER — OXYCODONE HYDROCHLORIDE AND ACETAMINOPHEN 5; 325 MG/1; MG/1
1 TABLET ORAL EVERY 6 HOURS PRN
Qty: 40 TABLET | Refills: 0 | Status: SHIPPED | OUTPATIENT
Start: 2021-05-20 | End: 2021-12-06

## 2021-05-20 RX ADMIN — SODIUM CHLORIDE 100 ML/HR: 9 INJECTION, SOLUTION INTRAVENOUS at 07:11

## 2021-05-20 RX ADMIN — OXYCODONE HYDROCHLORIDE 10 MG: 10 TABLET, FILM COATED, EXTENDED RELEASE ORAL at 09:07

## 2021-05-20 RX ADMIN — ACETAMINOPHEN 1000 MG: 500 TABLET, FILM COATED ORAL at 09:07

## 2021-05-20 RX ADMIN — POLYETHYLENE GLYCOL 3350 17 G: 17 POWDER, FOR SOLUTION ORAL at 09:07

## 2021-05-20 RX ADMIN — ATORVASTATIN CALCIUM 10 MG: 10 TABLET, FILM COATED ORAL at 09:07

## 2021-05-20 RX ADMIN — GABAPENTIN 300 MG: 300 CAPSULE ORAL at 09:07

## 2021-05-20 RX ADMIN — MELOXICAM 15 MG: 15 TABLET ORAL at 09:07

## 2021-05-20 RX ADMIN — ASPIRIN 325 MG: 325 TABLET, COATED ORAL at 09:07

## 2021-05-20 RX ADMIN — BISOPROLOL FUMARATE 5 MG: 5 TABLET, FILM COATED ORAL at 09:07

## 2021-05-20 RX ADMIN — VANCOMYCIN HYDROCHLORIDE 1250 MG: 10 INJECTION, POWDER, LYOPHILIZED, FOR SOLUTION INTRAVENOUS at 09:16

## 2021-05-20 RX ADMIN — FERROUS SULFATE TAB EC 324 MG (65 MG FE EQUIVALENT) 324 MG: 324 (65 FE) TABLET DELAYED RESPONSE at 09:07

## 2021-05-21 ENCOUNTER — READMISSION MANAGEMENT (OUTPATIENT)
Dept: CALL CENTER | Facility: HOSPITAL | Age: 71
End: 2021-05-21

## 2021-05-21 NOTE — OUTREACH NOTE
Prep Survey      Responses   [facility] patient discharged from?  [facility]   Is LACE score < 7 ?  No   Emergency Room discharge w/ pulse ox?  No   Eligibility  Readm Mgmt   Discharge diagnosis  Hx of total knee replacement, right    Does the patient have one of the following disease processes/diagnoses(primary or secondary)?  Total Joint Replacement   Does the patient have Home health ordered?  Yes   What is the Home health agency?   [facility]    Is there a DME ordered?  No   Medication alerts for this patient  ASA    Prep survey completed?  Yes          Olivia Corrigan RN

## 2021-05-22 LAB
BACTERIA SPEC AEROBE CULT: NORMAL
GRAM STN SPEC: NORMAL
GRAM STN SPEC: NORMAL

## 2021-05-24 ENCOUNTER — READMISSION MANAGEMENT (OUTPATIENT)
Dept: CALL CENTER | Facility: HOSPITAL | Age: 71
End: 2021-05-24

## 2021-05-24 LAB
BACTERIA FLD CULT: NORMAL
GRAM STN SPEC: NORMAL
GRAM STN SPEC: NORMAL

## 2021-05-24 NOTE — OUTREACH NOTE
Total Joint Week 1 Survey      Responses   [facility] facility patient discharged from?  [facility]   Does the patient have one of the following disease processes/diagnoses(primary or secondary)?  Total Joint Replacement   Joint surgery performed?  Knee   Week 1 attempt successful?  No   Unsuccessful attempts  Attempt 1          Sara Abbott RN

## 2021-05-27 ENCOUNTER — OFFICE VISIT (OUTPATIENT)
Dept: ORTHOPEDIC SURGERY | Facility: CLINIC | Age: 71
End: 2021-05-27

## 2021-05-27 VITALS
DIASTOLIC BLOOD PRESSURE: 66 MMHG | HEIGHT: 67 IN | BODY MASS INDEX: 34.53 KG/M2 | WEIGHT: 220 LBS | HEART RATE: 80 BPM | SYSTOLIC BLOOD PRESSURE: 133 MMHG

## 2021-05-27 DIAGNOSIS — E66.9 OBESITY (BMI 30-39.9): ICD-10-CM

## 2021-05-27 DIAGNOSIS — Z96.651 HX OF TOTAL KNEE REPLACEMENT, RIGHT: Primary | ICD-10-CM

## 2021-05-27 PROCEDURE — 99024 POSTOP FOLLOW-UP VISIT: CPT | Performed by: PHYSICIAN ASSISTANT

## 2021-05-27 RX ORDER — PRAVASTATIN SODIUM 20 MG
20 TABLET ORAL DAILY
COMMUNITY
Start: 2021-04-19 | End: 2023-01-16 | Stop reason: HOSPADM

## 2021-05-27 RX ORDER — DOXYCYCLINE HYCLATE 100 MG/1
100 CAPSULE ORAL 2 TIMES DAILY
Qty: 28 CAPSULE | Refills: 0 | Status: SHIPPED | OUTPATIENT
Start: 2021-05-27 | End: 2021-06-10

## 2021-05-27 NOTE — PROGRESS NOTES
ORTHO POSTOP VISIT       Subjective:    HPI:  Brenden Peter is a 70 y.o. male who presents about 1 week out from having a right knee I&D with polyexchange.  He reports he is doing well with mild intermittent discomfort.  He is not currently on any antibiotics.  He does report that it feels like the fluid is trying to come back.  Cultures were negative, however tissue pathology did show reactive synovium with chronic inflammation, hemosiderin deposition and few foci of acute inflammation (focally greater than five neutrophils per high power field).    Medications:    Current Outpatient Medications:   •  amLODIPine (NORVASC) 5 MG tablet, Take 5 mg by mouth Every Night., Disp: , Rfl:   •  aspirin  MG tablet, Take 1 tablet by mouth Daily for 30 days., Disp: 30 tablet, Rfl: 0  •  bisoprolol (ZEBeta) 5 MG tablet, Take 5 mg by mouth Daily., Disp: , Rfl:   •  gabapentin (NEURONTIN) 300 MG capsule, Take 300 mg by mouth 2 (two) times a day., Disp: , Rfl:   •  glipizide (GLUCOTROL) 10 MG tablet, Take 10 mg by mouth Every Night. HOLD the night before surgery, Disp: , Rfl:   •  lisinopril (PRINIVIL,ZESTRIL) 40 MG tablet, Take 40 mg by mouth Every Night. HOLD 24 hours before surgery, Disp: , Rfl:   •  metFORMIN (GLUCOPHAGE) 1000 MG tablet, Take 1,000 mg by mouth 2 (Two) Times a Day With Meals. HOLD 48 hours before surgery, Disp: , Rfl:   •  oxyCODONE-acetaminophen (PERCOCET) 5-325 MG per tablet, Take 1 tablet by mouth Every 6 (Six) Hours As Needed for Moderate Pain  or Severe Pain ., Disp: 40 tablet, Rfl: 0  •  pioglitazone (ACTOS) 30 MG tablet, Take 30 mg by mouth Every Night. HOLD the night before surgery, Disp: , Rfl:   •  pravastatin (PRAVACHOL) 20 MG tablet, , Disp: , Rfl:   •  sertraline (ZOLOFT) 50 MG tablet, Take 50 mg by mouth Every Night., Disp: , Rfl:   •  vitamin D (ERGOCALCIFEROL) 1.25 MG (86683 UT) capsule capsule, 50,000 Units 2 (Two) Times a Week. Tues and Fri, Disp: , Rfl:   •  doxycycline  "(VIBRAMYCIN) 100 MG capsule, Take 1 capsule by mouth 2 (Two) Times a Day for 14 days., Disp: 28 capsule, Rfl: 0  Current outpatient and discharge medications have been reconciled for the patient.  Reviewed by: JOSE Willard      Allergies:  No Known Allergies       Objective   Objective:    /66 (BP Location: Right arm, Patient Position: Sitting, Cuff Size: Large Adult)   Pulse 80   Ht 170.2 cm (67\")   Wt 99.8 kg (220 lb)   BMI 34.46 kg/m²     Physical Examination:  Alert, oriented, obese individual in no acute distress, ambulating with the assistance of a walker  Right lower extremity shows a well-healing surgical incision with staples in place with no erythema, drainage, or open skin lesions. There is a mild to moderate amount of swelling. It is grossly well aligned, and the patient is neurovascularly intact distally. There is mild tenderness to palpation and with range of motion.           Imaging:  no diagnostic testing performed this visit            Assessment:  1. Hx of total knee replacement, right    2. Obesity (BMI 30-39.9)       About 1 week out from surgery        Plan:  His incision was cleansed thoroughly with chlorhexidine soap and saline solution and redressed with a bordered Mepilex silver dressing.  This dressing should be left in place with the patient is seen back in 1 week.  He was given clean Ace wraps to continue compression.  He may shower with the dressing in place.  Although his cultures were negative, he is intermittently been on antibiotics and did show greater than 5 neutrophils on high-power field, and therefore I am going to place him on doxycycline for 2 weeks.  I will plan to see him back in 1 week for staple removal.  He should call with any questions or concerns.             JOSE Willard  05/27/21  11:18 EDT    EMR Dragon/Transcription disclaimer:  Much of this encounter note is an electronic transcription/translation of spoken language to printed text. The " electronic translation of spoken language may permit erroneous, or at times, nonsensical words or phrases to be inadvertently transcribed; Although I have reviewed the note for such errors, some may still exist.

## 2021-05-27 NOTE — PATIENT INSTRUCTIONS
Total Knee Replacement, Care After  This sheet gives you information about how to care for yourself after your procedure. Your doctor may also give you more specific instructions. If you have problems or questions, contact your doctor.  What can I expect after the procedure?  After the procedure, it is common to have:  · Pain.  · Swelling.  · A small amount of blood coming from your cut from surgery (incision).  · Clear fluid coming from your cut from surgery.  · Limited movement of your knee.  Follow these instructions at home:  Medicines  · Take over-the-counter and prescription medicines only as told by your doctor.  · If you were prescribed a blood thinner (anticoagulant), take it as told by your doctor.  · Ask your doctor if the medicine prescribed to you:  ? Requires you to avoid driving or using heavy machinery.  ? Can cause trouble pooping (constipation). You may need to take steps to prevent or treat trouble pooping:  § Drink enough fluid to keep your pee (urine) pale yellow.  § Take over-the-counter or prescription medicines.  § Eat foods that are high in fiber. These include beans, whole grains, and fresh fruits and vegetables.  § Limit foods that are high in fat and sugar. These include fried or sweet foods.  Bathing  · Do not take baths, swim, or use a hot tub until your doctor approves. Ask your doctor if you may take showers. You may only be allowed to take sponge baths.  · Keep your bandage (dressing) dry until your doctor says it can be taken off.  Incision care and drain care    · Follow instructions from your doctor about how to take care of your cut from surgery. Make sure you:  ? Wash your hands with soap and water before and after you change your bandage. If you cannot use soap and water, use hand .  ? Change your bandage as told by your doctor.  ? Leave stitches (sutures), skin glue, or skin tape (adhesive) strips in place. They may need to stay in place for 2 weeks or longer. If tape  strips get loose and curl up, you may trim the loose edges. Do not remove tape strips completely unless your doctor says it is okay.  · Check your cut from surgery and your drain site every day for signs of infection. Check for:  ? More redness, swelling, or pain.  ? More fluid or blood.  ? Warmth.  ? Pus or a bad smell.  · If you have a drain, follow instructions from your doctor about caring for it.  Managing pain, stiffness, and swelling         · If told, put ice on your knee.  ? Put ice in a plastic bag or use the icing device (cold flow pad or cryocuff) that you were given. Follow your doctor's directions about how to use the icing device.  ? Place a towel between your skin and the bag or between your skin and the icing device.  ? Leave the ice on for 20 minutes, 2-3 times per day.  · If told, put heat on your knee before you exercise. Use the heat source that your doctor recommends, such as a moist heat pack or a heating pad.  ? Place a towel between your skin and the heat source.  ? Leave the heat on for 20-30 minutes.  ? Remove the heat if your skin turns bright red. This is very important if you are unable to feel pain, heat, or cold. You may have a greater risk of getting burned.  · Move your toes often.  · Raise (elevate) your knee above the level of your heart while you are sitting or lying down.  ? Use several pillows to keep your leg straight.  ? Do not put a pillow just under the knee. If the knee is bent for a long time, this may make the knee stiff.  · Wear elastic knee support as told by your doctor.  Activity  · Rest as told by your doctor.  · Do not sit for a long time without moving. Get up to take short walks every 1-2 hours. This is important. Ask for help if you feel weak or unsteady.  · Ask your doctor what activities are safe for you.  · Avoid activities that put stress on your knees. These include running, jumping rope, and jumping jacks.  · Do not play contact sports until your doctor  says it is okay.  · Do exercises as told by your physical therapist.  · If you have been sent home with a knee joint motion machine (continuous passive motion machine), use it as told by your doctor.  Safety    · Do not use your leg to support your body weight until your doctor says that you can. Use crutches or a walker as told by your doctor.  · Do not drive until your doctor says it is okay. Ask your doctor when it is safe to drive.  General instructions  · Do not use any products that contain nicotine or tobacco, such as cigarettes, e-cigarettes, and chewing tobacco. These can delay healing. If you need help quitting, ask your doctor.  · Wear special socks (compression stockings) as told by your doctor.  · Tell your doctor if you plan to have dental work. Also:  ? Tell your dentist about your joint replacement.  ? Ask your doctor if there are instructions you need to follow before dental care and routine cleanings.  · Keep all follow-up visits as told by your doctor. This is important.  Contact a doctor if:  · You have more redness, swelling, or pain around your cut from surgery or your drain.  · You have more fluid or blood coming from your cut from surgery or your drain.  · You have pus or a bad smell coming from your cut from surgery or your drain.  · Your cut from surgery or your drain area feels warm to the touch.  · You have a fever.  · Your cut breaks open.  · You have knee pain that does not go away.  · The movement of your knee is getting worse.  · Your new joint feels loose.  Get help right away if you have:  · Pain in your calf or thigh.  · Swelling in your calf or thigh.  · Shortness of breath.  · Trouble breathing.  · Chest pain.  Summary  · After the procedure, it is common to have pain and swelling, blood or fluid coming from your cut from surgery, and trouble moving your knee.  · Follow instructions from your doctor about how to take care of your cut from surgery.  · Use crutches or a walker as  told by your doctor.  · If you were prescribed a blood thinner, take it as told by your doctor.  · Keep all follow-up visits as told by your doctor. This is important.  This information is not intended to replace advice given to you by your health care provider. Make sure you discuss any questions you have with your health care provider.  Document Revised: 04/27/2020 Document Reviewed: 08/01/2019  Elsevier Patient Education © 2021 [employer]

## 2021-05-28 ENCOUNTER — READMISSION MANAGEMENT (OUTPATIENT)
Dept: CALL CENTER | Facility: HOSPITAL | Age: 71
End: 2021-05-28

## 2021-05-28 NOTE — OUTREACH NOTE
Total Joint Week 1 Survey      Responses   [facility] patient discharged from?  [facility]   Does the patient have one of the following disease processes/diagnoses(primary or secondary)?  Total Joint Replacement   Joint surgery performed?  Knee   Week 1 attempt successful?  Yes   Call start time  1749   Call end time  1755   Discharge diagnosis  Hx of total knee replacement, right    Does the patient have all medications related to this admission filled (includes all antibiotics, pain medications, etc.)  Yes   Is the patient taking all medications as directed (includes completed medication regime)?  Yes   Is the patient able to teach back alternate methods of pain control?  Ice, Knee-elevation/no pillow under knee, Reposition, Short, frequent activity, Correct alignment   Does the patient have a follow up appointment with their surgeon?  Yes   Has the patient kept scheduled appointments due by today?  Yes   What is the Home health agency?   [facility]    Has home health visited the patient within 72 hours of discharge?  Yes   Psychosocial issues?  No   Has the patient began therapy sessions (either in the home or as an out patient)?  Yes [ PT]   Does the patient have a wound vac in place?  N/A   Has the patient fallen since discharge?  No   Did the patient receive a copy of their discharge instructions?  Yes   Nursing interventions  Reviewed instructions with patient   What is the patient's perception of their functional status since discharge?  Improving   Is the patient able to teach back signs and symptoms of infection?  Temp >100.4 for 24h or longer, Incisional drainage, Blisters around incision, Increased swelling or redness around incision (not associated with surgical edema), Severe discomfort or pain, Changes in mobility, Shortness of breath or chest pain   Is the patient able to teach back how to prevent infection?  Check incision daily, Keep incision covered if drainage, Shower only as directed by surgeon,  Eat well-balanced diet, No lotion or creams, Wash hands before and after touching incision, Keep incision covered if pets in house, No tub baths, hot tub or swimming   Is the patient able to teach back signs and symptoms of DVT?  Redness in calf, Severe pain in calf, Swelling in calf, Shortness of breath or chest pain, Area hot to touch   Is the patient able to teach back home safety measures?  Ability to shower, Modifications to reach items, Accessibility to necessary areas in home, Modifications with ADLs such as dressing, cooking, toileting   Did the patient implement home safety suggestions from pre-surgery classes if attended?  N/A   If the patient is a current smoker, are they able to teach back resources for cessation?  -- [has reduced to 1/2 PPD]   Is the patient/caregiver able to teach back the hierarchy of who to call/visit for symptoms/problems? PCP, Specialist, Home health nurse, Urgent Care, ED, 911  Yes   Additional teach back comments  pt states Incision had leak with bleeding today after HH PT, pt went to [facility], incision rewrapped, so far, no more bleeding noted   Week 1 call completed?  Yes          Shabana Diallo, RN

## 2021-05-29 LAB — BACTERIA SPEC ANAEROBE CULT: NORMAL

## 2021-06-03 ENCOUNTER — OFFICE VISIT (OUTPATIENT)
Dept: ORTHOPEDIC SURGERY | Facility: CLINIC | Age: 71
End: 2021-06-03

## 2021-06-03 VITALS
WEIGHT: 220 LBS | SYSTOLIC BLOOD PRESSURE: 145 MMHG | DIASTOLIC BLOOD PRESSURE: 71 MMHG | HEART RATE: 56 BPM | HEIGHT: 67 IN | BODY MASS INDEX: 34.53 KG/M2

## 2021-06-03 DIAGNOSIS — Z96.651 HX OF TOTAL KNEE REPLACEMENT, RIGHT: Primary | ICD-10-CM

## 2021-06-03 DIAGNOSIS — E66.9 OBESITY (BMI 30-39.9): ICD-10-CM

## 2021-06-03 PROCEDURE — 99024 POSTOP FOLLOW-UP VISIT: CPT | Performed by: PHYSICIAN ASSISTANT

## 2021-06-03 NOTE — PROGRESS NOTES
ORTHO POSTOP VISIT       Subjective:    HPI:  Brenden Peter is a 70 y.o. male who presents about 2 weeks out from having a right knee I&D with polyexchange.  He reports he is doing well with mild intermittent discomfort.  He does report that it is feeling better. Cultures were negative, however tissue pathology did show reactive synovium with chronic inflammation, hemosiderin deposition and few foci of acute inflammation (focally greater than five neutrophils per high power field).    Medications:    Current Outpatient Medications:   •  amLODIPine (NORVASC) 5 MG tablet, Take 5 mg by mouth Every Night., Disp: , Rfl:   •  aspirin  MG tablet, Take 1 tablet by mouth Daily for 30 days., Disp: 30 tablet, Rfl: 0  •  bisoprolol (ZEBeta) 5 MG tablet, Take 5 mg by mouth Daily., Disp: , Rfl:   •  doxycycline (VIBRAMYCIN) 100 MG capsule, Take 1 capsule by mouth 2 (Two) Times a Day for 14 days., Disp: 28 capsule, Rfl: 0  •  gabapentin (NEURONTIN) 300 MG capsule, Take 300 mg by mouth 2 (two) times a day., Disp: , Rfl:   •  glipizide (GLUCOTROL) 10 MG tablet, Take 10 mg by mouth Every Night. HOLD the night before surgery, Disp: , Rfl:   •  lisinopril (PRINIVIL,ZESTRIL) 40 MG tablet, Take 40 mg by mouth Every Night. HOLD 24 hours before surgery, Disp: , Rfl:   •  metFORMIN (GLUCOPHAGE) 1000 MG tablet, Take 1,000 mg by mouth 2 (Two) Times a Day With Meals. HOLD 48 hours before surgery, Disp: , Rfl:   •  oxyCODONE-acetaminophen (PERCOCET) 5-325 MG per tablet, Take 1 tablet by mouth Every 6 (Six) Hours As Needed for Moderate Pain  or Severe Pain ., Disp: 40 tablet, Rfl: 0  •  pioglitazone (ACTOS) 30 MG tablet, Take 30 mg by mouth Every Night. HOLD the night before surgery, Disp: , Rfl:   •  pravastatin (PRAVACHOL) 20 MG tablet, , Disp: , Rfl:   •  sertraline (ZOLOFT) 50 MG tablet, Take 50 mg by mouth Every Night., Disp: , Rfl:   •  vitamin D (ERGOCALCIFEROL) 1.25 MG (63552 UT) capsule capsule, 50,000 Units 2 (Two) Times a  "Week. Tues and Fri, Disp: , Rfl:   Current outpatient and discharge medications have been reconciled for the patient.  Reviewed by: JOSE Willard      Allergies:  No Known Allergies       Objective   Objective:    /71 (BP Location: Right arm, Patient Position: Sitting, Cuff Size: Large Adult)   Pulse 56   Ht 170.2 cm (67\")   Wt 99.8 kg (220 lb)   BMI 34.46 kg/m²     Physical Examination:  Alert, oriented, obese individual in no acute distress, ambulating with the assistance of a cane  Right lower extremity shows a well-healing surgical incision with no erythema, drainage, or open skin lesions. There is a mild amount of swelling. It is grossly well aligned, and the patient is neurovascularly intact distally. The knee is stable to varus and valgus stress, there is no patellar maltracking or crepitus noted, and plantar and dorsiflexion is 5/5. There is mild tenderness to palpation and with range of motion, which is about 0-95.           Imaging:  xrays obtained today  right Knee X-Ray  Indication: 2 weeks status post right knee I&D with poly exchange  AP, Lateral, [place] views  Findings:A well positioned knee replacement without evidence of bony or implant failure. and No fractures or dislocations are appreciated  within normal limits joint spaces  Hardware appropriately positioned yes    yes prior studies available for comparison.    This patient's x-ray report was graded according to the Kellgren and Arun classification.  This took into account the joint space narrowing, osteophyte formation, sclerosis of the distal femur/proximal tibia along with deformity of those bones.  The findings were indicative of K L grade na.    X-RAY was ordered and reviewed by JOSE Willard          Assessment:  1. Hx of total knee replacement, right    2. Obesity (BMI 30-39.9)       2 weeks status post right knee I&D with poly exchange        Plan:  His incision was cleansed thoroughly with chlorhexidine soap " and saline solution and redressed with a bordered Mepilex silver dressing.  He may remove this dressing in 1 week.  He has deferred outpatient physical therapy at this time. We will plan to see the patient back in about 6 weeks. The patient should continue PT, WBAT, and call with any problems.               JOSE Willard  06/03/21  12:45 EDT    EMR Dragon/Transcription disclaimer:  Much of this encounter note is an electronic transcription/translation of spoken language to printed text. The electronic translation of spoken language may permit erroneous, or at times, nonsensical words or phrases to be inadvertently transcribed; Although I have reviewed the note for such errors, some may still exist.

## 2021-06-04 LAB — QT INTERVAL: 439 MS

## 2021-06-08 ENCOUNTER — READMISSION MANAGEMENT (OUTPATIENT)
Dept: CALL CENTER | Facility: HOSPITAL | Age: 71
End: 2021-06-08

## 2021-06-08 NOTE — OUTREACH NOTE
Total Joint Week 2 Survey      Responses   [facility] patient discharged from?  [facility]   Does the patient have one of the following disease processes/diagnoses(primary or secondary)?  Total Joint Replacement   Joint surgery performed?  Knee   Week 2 attempt successful?  Yes   Call start time  1048   Call end time  1058   Has the patient been back in either the hospital or Emergency Department since discharge?  No   Discharge diagnosis  Hx of total knee replacement, right    Person spoke with today (if not patient) and relationship  Patient   Does the patient have all medications related to this admission filled (includes all antibiotics, pain medications, etc.)  Yes   Is the patient taking all medications as directed (includes completed medication regime)?  Yes   Is the patient able to teach back alternate methods of pain control?  Ice, Reposition, Short, frequent activity   Does the patient have a follow up appointment with their surgeon?  No   Nursing Interventions  Advised patient to make appointment, Educated patient on importance of making appointment   Has the patient kept scheduled appointments due by today?  N/A   What is the Home health agency?   [facility]    Has home health visited the patient within 72 hours of discharge?  Yes   Psychosocial issues?  No   Has the patient began therapy sessions (either in the home or as an out patient)?  Yes   Does the patient have a wound vac in place?  N/A   Has the patient fallen since discharge?  No   What is the patient's perception of their functional status since discharge?  Improving   Is the patient able to teach back signs and symptoms of infection?  Temp >100.4 for 24h or longer, Blisters around incision, Increased swelling or redness around incision (not associated with surgical edema), Severe discomfort or pain, Changes in mobility, Incisional drainage, Shortness of breath or chest pain   Is the patient able to teach back how to prevent infection?  Check  incision daily, No lotion or creams, Shower only as directed by surgeon, No tub baths, hot tub or swimming, Eat well-balanced diet   Is the patient able to teach back signs and symptoms of DVT?  Redness in calf, Swelling in calf, Area hot to touch, Severe pain in calf, Shortness of breath or chest pain   Is the patient able to teach back home safety measures?  Ability to shower   Did the patient implement home safety suggestions from pre-surgery classes if attended?  N/A   If the patient is a current smoker, are they able to teach back resources for cessation?  [id number]   Is the patient/caregiver able to teach back the hierarchy of who to call/visit for symptoms/problems? PCP, Specialist, Home health nurse, Urgent Care, ED, 911  Yes   Week 2 call completed?  Yes   Wrap up additional comments  Pt states he is doing better. Pt did not have surgeon fu appt. Pt advised to call surgeon office and make fu appt,  pt verbalized understanding. Questions/concerns addressed.          Nahomy Choi RN

## 2021-06-14 ENCOUNTER — TELEPHONE (OUTPATIENT)
Dept: ORTHOPEDIC SURGERY | Facility: CLINIC | Age: 71
End: 2021-06-14

## 2021-06-14 NOTE — TELEPHONE ENCOUNTER
Caller: JAYANT LACKEY    Relationship: SELF    Best call back number: [phone]    What is the best time to reach you: ANY    Who are you requesting to speak with (clinical staff, provider,  specific staff member): MEDICAL ASSISTANT    What was the call regarding: PATIENT STATES HE IS OUT OF ANTIBIOTICS AND HE WAS CURIOUS HOW LONG HE NEEDED TO STAY ON ASPIRIN OR IF HE CAN STOP TAKING IT    Do you require a callback: YES

## 2021-06-15 NOTE — TELEPHONE ENCOUNTER
His cultures from the surgery were negative.  Based on that I feel that he can quit taking his antibiotics at this point.  I just wanted to see how he is doing postoperatively after the irrigation debridement and polyethylene exchange.  Please let me know.  Thank you

## 2021-06-16 LAB — FUNGUS WND CULT: NORMAL

## 2021-06-16 NOTE — TELEPHONE ENCOUNTER
He can quit taking the aspirin as well.  His risk of a DVT is very minimal at this point.  Did you asked the patient how he is doing following the irrigation and debridement?  I am curious to know because I have not seen him back in the office.  I do think [facility] has seen him but I have not.  Please let me know thank you

## 2021-06-22 ENCOUNTER — READMISSION MANAGEMENT (OUTPATIENT)
Dept: CALL CENTER | Facility: HOSPITAL | Age: 71
End: 2021-06-22

## 2021-06-22 NOTE — OUTREACH NOTE
Total Joint Month 1 Survey      Responses   [facility] patient discharged from?  [facility]   Does the patient have one of the following disease processes/diagnoses(primary or secondary)?  Total Joint Replacement   Joint surgery performed?  Knee   Month 1 attempt successful?  Yes   Call start time  1114   Call end time  1117   Has the patient been back in either the hospital or Emergency Department since discharge?  No   Discharge diagnosis  Hx of total knee replacement, right    Person spoke with today (if not patient) and relationship  Patient   Is the patient taking all medications as directed (includes completed medication regime)?  Yes   Is the patient able to teach back alternate methods of pain control?  Knee-elevation/no pillow under knee, Ice, Short, frequent activity, Correct alignment, Reposition   Has the patient kept scheduled appointments due by today?  Yes   Is the patient still receiving Home Health Services?  Yes   Home health comments  PT visit 6/22/21   Is the patient still attending therapy sessions(either in the home or as an outpatient)?  Yes   Has the patient fallen since discharge?  No   What is the patient's perception of their functional status since discharge?  Improving   Is the patient able to teach back signs and symptoms of infection?  Changes in mobility   Month 1 call completed?  Yes   Wrap up additional comments  Pt states he is doing really good. PT visit at time of call. No questions/concerns.          Nahomy Choi RN

## 2021-06-30 LAB
MYCOBACTERIUM SPEC CULT: NORMAL
NIGHT BLUE STAIN TISS: NORMAL

## 2021-07-14 ENCOUNTER — TELEPHONE (OUTPATIENT)
Dept: ORTHOPEDIC SURGERY | Facility: CLINIC | Age: 71
End: 2021-07-14

## 2021-07-14 NOTE — TELEPHONE ENCOUNTER
Provider:  GIANCARLO WOO PA-C  Caller:  JAYANT LACKEY  Relationship to Patient: SELF  Phone Number: [phone]  Reason for Call:  PATIENT CALLED AND CANCELLED POST OP APPT FOR RIGHT KNEE ON 7/15/21. SURGERY WAS ON 5/19/21. PATIENT SAYS HE'LL CALL BACK TO R/S. HAS TO TAKE WIFE TOMORROW FOR ALL KINDS OF TESTING AND HAS HIS HANDS FULL. SAYS KNEE DOING FINE AND SWELLING GONE DOWN.

## 2021-07-26 ENCOUNTER — READMISSION MANAGEMENT (OUTPATIENT)
Dept: CALL CENTER | Facility: HOSPITAL | Age: 71
End: 2021-07-26

## 2021-07-26 NOTE — OUTREACH NOTE
Total Joint Month 2 Survey      Responses   [facility] patient discharged from?  [facility]   Does the patient have one of the following disease processes/diagnoses(primary or secondary)?  Total Joint Replacement   Joint surgery performed?  Knee   Month 2 attempt successful?  Yes   Call start time  1358   Call end time  1359   Has the patient been back in either the hospital or Emergency Department since discharge?  No   Discharge diagnosis  Hx of total knee replacement, right    Is patient permission given to speak with other caregiver?  Yes   List who call center can speak with  Donna- Wife   Person spoke with today (if not patient) and relationship  Donna- Wife   Is the patient taking all medications as directed (includes completed medication regime)?  Yes   Is the patient able to teach back alternate methods of pain control?  Ice, Knee-elevation/no pillow under knee, Reposition, Correct alignment, Short, frequent activity   Has the patient kept scheduled appointments due by today?  Yes   Is the patient still receiving Home Health Services?  Yes   Is the patient still attending therapy sessions(either in the home or as an outpatient)?  Yes   Has the patient fallen since discharge?  No   What is the patient's perception of their functional status since discharge?  Improving   Is the patient able to teach back signs and symptoms of infection?  Temp >100.4 for 24h or longer, Incisional drainage, Blisters around incision, Increased swelling or redness around incision (not associated with surgical edema), Severe discomfort or pain, Changes in mobility, Shortness of breath or chest pain   If the patient is a current smoker, are they able to teach back resources for cessation?  Smoking cessation medications   Is the patient/caregiver able to teach back the hierarchy of who to call/visit for symptoms/problems? PCP, Specialist, Home health nurse, Urgent Care, ED, 911  Yes   Month 2 Call Completed?  Yes          Eloina  MARIANA Steiner, RN

## 2021-08-25 ENCOUNTER — READMISSION MANAGEMENT (OUTPATIENT)
Dept: CALL CENTER | Facility: HOSPITAL | Age: 71
End: 2021-08-25

## 2021-08-25 NOTE — OUTREACH NOTE
Total Joint Month 3 Survey      Responses   [facility] patient discharged from?  [facility]   Does the patient have one of the following disease processes/diagnoses(primary or secondary)?  Total Joint Replacement   Joint surgery performed?  Knee   Month 3 attempt successful?  Yes   Call start time  1358   Call end time  1400   Discharge diagnosis  Hx of total knee replacement, right    Person spoke with today (if not patient) and relationship  Donna- Wife   Is the patient taking all medications as directed (includes completed medication regime)?  Yes   Has the patient kept scheduled appointments due by today?  Yes   Is the patient still receiving Home Health Services?  No   Is the patient still attending therapy sessions(either in the home or as an outpatient)?  No   Has the patient fallen since discharge?  No   What is the patient's perception of their functional status since discharge?  Improving   Is the patient/caregiver able to teach back the hierarchy of who to call/visit for symptoms/problems? PCP, Specialist, Home health nurse, Urgent Care, ED, 911  Yes   Additional teach back comments  States he is doing well and has no pain.  Has been to his follow up appts.  He no longer needs home oxygen.   Graduated  Yes   Wrap up additional comments  Denies questions or needs at this time          Shirley Serrano LPN

## 2021-10-21 ENCOUNTER — TELEPHONE (OUTPATIENT)
Dept: ORTHOPEDIC SURGERY | Facility: CLINIC | Age: 71
End: 2021-10-21

## 2021-10-21 NOTE — TELEPHONE ENCOUNTER
Caller: JAYANT LACKEY     Relationship: SELF     Best call back number: [phone]    What was the call regarding:[facility] IS REQUESTING L KNEE INJECTION    Do you require a callback: YES

## 2021-10-22 ENCOUNTER — OFFICE VISIT (OUTPATIENT)
Dept: ORTHOPEDIC SURGERY | Facility: CLINIC | Age: 71
End: 2021-10-22

## 2021-10-22 VITALS
HEIGHT: 67 IN | WEIGHT: 225.6 LBS | HEART RATE: 78 BPM | DIASTOLIC BLOOD PRESSURE: 74 MMHG | BODY MASS INDEX: 35.41 KG/M2 | SYSTOLIC BLOOD PRESSURE: 160 MMHG

## 2021-10-22 DIAGNOSIS — M17.12 PRIMARY OSTEOARTHRITIS OF LEFT KNEE: Primary | ICD-10-CM

## 2021-10-22 DIAGNOSIS — E66.01 MORBID OBESITY (HCC): ICD-10-CM

## 2021-10-22 PROCEDURE — 20610 DRAIN/INJ JOINT/BURSA W/O US: CPT | Performed by: PHYSICIAN ASSISTANT

## 2021-10-22 PROCEDURE — 99213 OFFICE O/P EST LOW 20 MIN: CPT | Performed by: PHYSICIAN ASSISTANT

## 2021-10-22 RX ORDER — TRIAMCINOLONE ACETONIDE 40 MG/ML
80 INJECTION, SUSPENSION INTRA-ARTICULAR; INTRAMUSCULAR
Status: COMPLETED | OUTPATIENT
Start: 2021-10-22 | End: 2021-10-22

## 2021-10-22 RX ORDER — ALBUTEROL SULFATE 90 UG/1
2 AEROSOL, METERED RESPIRATORY (INHALATION) EVERY 6 HOURS PRN
COMMUNITY
Start: 2021-10-13 | End: 2023-04-03 | Stop reason: HOSPADM

## 2021-10-22 RX ORDER — LIDOCAINE HYDROCHLORIDE 10 MG/ML
2 INJECTION, SOLUTION EPIDURAL; INFILTRATION; INTRACAUDAL; PERINEURAL
Status: COMPLETED | OUTPATIENT
Start: 2021-10-22 | End: 2021-10-22

## 2021-10-22 RX ORDER — MELOXICAM 15 MG/1
15 TABLET ORAL DAILY
Qty: 90 TABLET | Refills: 3 | Status: ON HOLD | OUTPATIENT
Start: 2021-10-22 | End: 2021-12-27

## 2021-10-22 RX ADMIN — LIDOCAINE HYDROCHLORIDE 2 ML: 10 INJECTION, SOLUTION EPIDURAL; INFILTRATION; INTRACAUDAL; PERINEURAL at 09:23

## 2021-10-22 RX ADMIN — TRIAMCINOLONE ACETONIDE 80 MG: 40 INJECTION, SUSPENSION INTRA-ARTICULAR; INTRAMUSCULAR at 09:23

## 2021-10-22 NOTE — PROGRESS NOTES
ORTHOPEDIC VISIT    Referring Provider: No ref. provider found  Primary Care Provider: Bert Rojas MD         Subjective:  Chief Complaint:  Chief Complaint   Patient presents with   • Left Knee - Follow-up       HPI:  Brenden Peter is a 70 y.o. male who presents for his initial visit for left knee pain ongoing for at least 6 months.  He denies any specific injury.  He describes an intermittent sharp, shooting pain, mainly located on the medial aspect of the knee.  He denies any radiation, numbness, or tingling.  He denies any mechanical symptoms, but does report occasional instability.  He is not currently taking any anti-inflammatories, but has previously used meloxicam.  He denies any previous history of surgery or injections in the knee.    Past Medical History:   Diagnosis Date   • Abnormal EKG 2/3/2015   • Anxiety    • COPD (chronic obstructive pulmonary disease) ([facility])    • Coronary artery disease 3/3/2015   • Diabetes ([facility])     type 2   • Hyperlipidemia    • Hypertension    • Microalbuminuria due to type 2 diabetes mellitus ([facility]) 2/5/2020   • Multinodular goiter 4/18/2019   • Murmur 2/3/2015       Past Surgical History:   Procedure Laterality Date   • EYE SURGERY      cataract   • TOTAL KNEE ARTHROPLASTY Right 10/21/2020    Procedure: TOTAL KNEE ARTHROPLASTY;  Surgeon: Ravi Fagan MD;  Location: [facility];  Service: Orthopedics;  Laterality: Right;   • TOTAL KNEE ARTHROPLASTY REVISION Right 5/19/2021    Procedure: KNEE POLY INSERT EXCHANGE with irrigation;  Surgeon: Ravi Fagan MD;  Location: [facility] OR;  Service: Orthopedics;  Laterality: Right;       Family History   Problem Relation Age of Onset   • Cancer Other    • Diabetes Other    • Heart disease Other    • No Known Problems Mother    • No Known Problems Father    • No Known Problems Sister    • No Known Problems Brother    • No Known Problems Maternal Aunt    • No Known Problems Maternal Uncle    • No Known Problems  Paternal Aunt    • No Known Problems Paternal Uncle    • No Known Problems Maternal Grandmother    • No Known Problems Maternal Grandfather    • No Known Problems Paternal Grandmother    • No Known Problems Paternal Grandfather    • Anemia Neg Hx    • Arrhythmia Neg Hx    • Asthma Neg Hx    • Clotting disorder Neg Hx    • Fainting Neg Hx    • Heart attack Neg Hx    • Heart failure Neg Hx    • Hyperlipidemia Neg Hx    • Hypertension Neg Hx        Social History     Occupational History   • Not on file   Tobacco Use   • Smoking status: Current Every Day Smoker     Packs/day: 1.00     Types: Cigarettes   • Smokeless tobacco: Never Used   Vaping Use   • Vaping Use: Never used   Substance and Sexual Activity   • Alcohol use: Not Currently   • Drug use: Never   • Sexual activity: Defer        Medications:    Current Outpatient Medications:   •  albuterol sulfate  (90 Base) MCG/ACT inhaler, , Disp: , Rfl:   •  amLODIPine (NORVASC) 5 MG tablet, Take 5 mg by mouth Every Night., Disp: , Rfl:   •  bisoprolol (ZEBeta) 5 MG tablet, Take 5 mg by mouth Daily., Disp: , Rfl:   •  gabapentin (NEURONTIN) 300 MG capsule, Take 300 mg by mouth 2 (two) times a day., Disp: , Rfl:   •  glipizide (GLUCOTROL) 10 MG tablet, Take 10 mg by mouth Every Night. HOLD the night before surgery, Disp: , Rfl:   •  lisinopril (PRINIVIL,ZESTRIL) 40 MG tablet, Take 40 mg by mouth Every Night. HOLD 24 hours before surgery, Disp: , Rfl:   •  metFORMIN (GLUCOPHAGE) 1000 MG tablet, Take 1,000 mg by mouth 2 (Two) Times a Day With Meals. HOLD 48 hours before surgery, Disp: , Rfl:   •  oxyCODONE-acetaminophen (PERCOCET) 5-325 MG per tablet, Take 1 tablet by mouth Every 6 (Six) Hours As Needed for Moderate Pain  or Severe Pain ., Disp: 40 tablet, Rfl: 0  •  pioglitazone (ACTOS) 30 MG tablet, Take 30 mg by mouth Every Night. HOLD the night before surgery, Disp: , Rfl:   •  pravastatin (PRAVACHOL) 20 MG tablet, , Disp: , Rfl:   •  sertraline (ZOLOFT) 50 MG  "tablet, Take 50 mg by mouth Every Night., Disp: , Rfl:   •  vitamin D (ERGOCALCIFEROL) 1.25 MG (04009 UT) capsule capsule, 50,000 Units 2 (Two) Times a Week. Tues and Fri, Disp: , Rfl:   •  meloxicam (MOBIC) 15 MG tablet, Take 1 tablet by mouth Daily. Prn joint pain, Disp: 90 tablet, Rfl: 3    Allergies:  No Known Allergies      Review of Systems:  Gen -no fever, chills , sweats, headache   Eyes - no irritation or discharge   ENT -  no ear pain , runny nose , sore throat , difficulty swallowing   Resp - no cough , congestion , excessive expectoration   CVS - no chest pain , palpitations.   Abd - no pain , nausea , vomiting , diarrhea   Skin - no rash , lesions.   Neuro - no dizziness    Please see HPI for any other pertinent positives.  All other systems were reviewed and are negative.       Objective   Objective:    /74 (BP Location: Left arm, Patient Position: Sitting, Cuff Size: Large Adult)   Pulse 78   Ht 170.2 cm (67\")   Wt 102 kg (225 lb 9.6 oz)   BMI 35.33 kg/m²     Physical Examination:  Alert, oriented, obese individual in no acute distress, ambulating unassisted  Left lower extremity shows no erythema, rashes, or open skin lesions. There is a minimal to mild amount of swelling. It is grossly well aligned, and the patient is neurovascularly intact distally. The knee is stable to varus and valgus stress, there is no patellar maltracking or crepitus noted, and plantar and dorsiflexion is 5/5. There is no tenderness to palpation or with range of motion, which is about 0-120.  Negative Wilson's.           Imaging:  Previous left knee imaging was reviewed by me today done on 2/2/2021 which shows mild tricompartmental DJD.  No fractures or dislocations are appreciated.            Assessment:  1. Primary osteoarthritis of left knee    2. Morbid obesity (HCC)                 Plan:  I am recommending treatment with conservative measures at this time.  Weight loss is highly recommended.  We will try " meloxicam.  He will start using the brace that he has at home to help with stability and fall prevention.  Intra-articular steroid injection today, risks and benefits were discussed and postinjection instructions were given.  We have also discussed future use with Visco supplementation.  I will plan to see him back in 3 months for recheck.  Natural history and expected course discussed. Questions answered.  Educational materials distributed.  Rest, ice, compression, and elevation (RICE) therapy.  NSAIDs per medication orders.  OTC analgesics as needed.  Arthrocentesis. See procedure note.  cortisone injections  viscosupplementation  physical therapy  bracing  weight loss  activtiy modification  Large Joint Arthrocentesis: L knee  Date/Time: 10/22/2021 9:23 AM  Consent given by: patient  Timeout: Immediately prior to procedure a time out was called to verify the correct patient, procedure, equipment, support staff and site/side marked as required   Supporting Documentation  Indications: pain   Procedure Details  Location: knee - L knee  Preparation: Patient was prepped and draped in the usual sterile fashion  Needle size: 25 G  Approach: anterolateral  Medications administered: 2 mL lidocaine PF 1% 1 %; 80 mg triamcinolone acetonide 40 MG/ML  Patient tolerance: patient tolerated the procedure well with no immediate complications      After consent was obtained and a time out was properly performed, the left knee was prepped with alcohol and chlorhexidine. Sterile technique was used, along with a 25 gauge needle, to inject 2 cc of 1% lidocaine and 2 cc of 40 mg/mL kenalog into the knee. The patient tolerated the procedure well.               JOSE Willard  10/22/21  09:21 EDT    EMR Dragon/Transcription disclaimer:  Much of this encounter note is an electronic transcription/translation of spoken language to printed text. The electronic translation of spoken language may permit erroneous, or at times,  nonsensical words or phrases to be inadvertently transcribed; Although I have reviewed the note for such errors, some may still exist.

## 2021-10-22 NOTE — PATIENT INSTRUCTIONS
"Osteoarthritis    Osteoarthritis is a type of arthritis. It refers to joint pain or joint disease. Osteoarthritis affects tissue that covers the ends of bones in joints (cartilage). Cartilage acts as a cushion between the bones and helps them move smoothly. Osteoarthritis occurs when cartilage in the joints gets worn down. Osteoarthritis is sometimes called \"wear and tear\" arthritis.  Osteoarthritis is the most common form of arthritis. It often occurs in older people. It is a condition that gets worse over time. The joints most often affected by this condition are in the fingers, toes, hips, knees, and spine, including the neck and lower back.  What are the causes?  This condition is caused by the wearing down of cartilage that covers the ends of bones.  What increases the risk?  The following factors may make you more likely to develop this condition:  · Being age 50 or older.  · Obesity.  · Overuse of joints.  · Past injury of a joint.  · Past surgery on a joint.  · Family history of osteoarthritis.  What are the signs or symptoms?  The main symptoms of this condition are pain, swelling, and stiffness in the joint. Other symptoms may include:  · An enlarged joint.  · More pain and further damage caused by small pieces of bone or cartilage that break off and float inside of the joint.  · Small deposits of bone (osteophytes) that grow on the edges of the joint.  · A grating or scraping feeling inside the joint when you move it.  · Popping or creaking sounds when you move.  · Difficulty walking or exercising.  · An inability to  items, twist your hand(s), or control the movements of your hands and fingers.  How is this diagnosed?  This condition may be diagnosed based on:  · Your medical history.  · A physical exam.  · Your symptoms.  · X-rays of the affected joint(s).  · Blood tests to rule out other types of arthritis.  How is this treated?  There is no cure for this condition, but treatment can help control " pain and improve joint function. Treatment may include a combination of therapies, such as:  · Pain relief techniques, such as:  ? Applying heat and cold to the joint.  ? Massage.  ? A form of talk therapy called cognitive behavioral therapy (CBT). This therapy helps you set goals and follow up on the changes that you make.  · Medicines for pain and inflammation. The medicines can be taken by mouth or applied to the skin. They include:  ? NSAIDs, such as ibuprofen.  ? Prescription medicines.  ? Strong anti-inflammatory medicines (corticosteroids).  ? Certain nutritional supplements.  · A prescribed exercise program. You may work with a physical therapist.  · Assistive devices, such as a brace, wrap, splint, specialized glove, or cane.  · A weight control plan.  · Surgery, such as:  ? An osteotomy. This is done to reposition the bones and relieve pain or to remove loose pieces of bone and cartilage.  ? Joint replacement surgery. You may need this surgery if you have advanced osteoarthritis.  Follow these instructions at home:  Activity  · Rest your affected joints as told by your health care provider.  · Exercise as told by your health care provider. He or she may recommend specific types of exercise, such as:  ? Strengthening exercises. These are done to strengthen the muscles that support joints affected by arthritis.  ? Aerobic activities. These are exercises, such as brisk walking or water aerobics, that increase your heart rate.  ? Range-of-motion activities. These help your joints move more easily.  ? Balance and agility exercises.  Managing pain, stiffness, and swelling         · If directed, apply heat to the affected area as often as told by your health care provider. Use the heat source that your health care provider recommends, such as a moist heat pack or a heating pad.  ? If you have a removable assistive device, remove it as told by your health care provider.  ? Place a towel between your skin and the  heat source. If your health care provider tells you to keep the assistive device on while you apply heat, place a towel between the assistive device and the heat source.  ? Leave the heat on for 20-30 minutes.  ? Remove the heat if your skin turns bright red. This is especially important if you are unable to feel pain, heat, or cold. You may have a greater risk of getting burned.  · If directed, put ice on the affected area. To do this:  ? If you have a removable assistive device, remove it as told by your health care provider.  ? Put ice in a plastic bag.  ? Place a towel between your skin and the bag. If your health care provider tells you to keep the assistive device on during icing, place a towel between the assistive device and the bag.  ? Leave the ice on for 20 minutes, 2-3 times a day.  ? Move your fingers or toes often to reduce stiffness and swelling.  ? Raise (elevate) the injured area above the level of your heart while you are sitting or lying down.  General instructions  · Take over-the-counter and prescription medicines only as told by your health care provider.  · Maintain a healthy weight. Follow instructions from your health care provider for weight control.  · Do not use any products that contain nicotine or tobacco, such as cigarettes, e-cigarettes, and chewing tobacco. If you need help quitting, ask your health care provider.  · Use assistive devices as told by your health care provider.  · Keep all follow-up visits as told by your health care provider. This is important.  Where to find more information  · National Kelly of Arthritis and Musculoskeletal and Skin Diseases: www.niams.nih.gov  · National Kelly on Aging: www.toy.nih.gov  · American College of Rheumatology: www.rheumatology.org  Contact a health care provider if:  · You have redness, swelling, or a feeling of warmth in a joint that gets worse.  · You have a fever along with joint or muscle aches.  · You develop a rash.  · You  have trouble doing your normal activities.  Get help right away if:  · You have pain that gets worse and is not relieved by pain medicine.  Summary  · Osteoarthritis is a type of arthritis that affects tissue covering the ends of bones in joints (cartilage).  · This condition is caused by the wearing down of cartilage that covers the ends of bones.  · The main symptom of this condition is pain, swelling, and stiffness in the joint.  · There is no cure for this condition, but treatment can help control pain and improve joint function.  This information is not intended to replace advice given to you by your health care provider. Make sure you discuss any questions you have with your health care provider.  Document Revised: 12/14/2020 Document Reviewed: 12/14/2020  [employer] Patient Education © 2021 [employer]      Knee Injection  A knee injection is a procedure to get medicine into your knee joint to relieve the pain, swelling, and stiffness of arthritis. Your health care provider uses a needle to inject medicine, which may also help to lubricate and cushion your knee joint. You may need more than one injection.  Tell a health care provider about:  · Any allergies you have.  · All medicines you are taking, including vitamins, herbs, eye drops, creams, and over-the-counter medicines.  · Any problems you or family members have had with anesthetic medicines.  · Any blood disorders you have.  · Any surgeries you have had.  · Any medical conditions you have.  · Whether you are pregnant or may be pregnant.  What are the risks?  Generally, this is a safe procedure. However, problems may occur, including:  · Infection.  · Bleeding.  · Symptoms that get worse.  · Damage to the area around your knee.  · Allergic reaction to any of the medicines.  · Skin reactions from repeated injections.  What happens before the procedure?  · Ask your health care provider about:  ? Changing or stopping your regular medicines. This is  especially important if you are taking diabetes medicines or blood thinners.  ? Taking medicines such as aspirin and ibuprofen. These medicines can thin your blood. Do not take these medicines unless your health care provider tells you to take them.  ? Taking over-the-counter medicines, vitamins, herbs, and supplements.  · Plan to have a responsible adult take you home from the hospital or clinic.  What happens during the procedure?    · You will sit or lie down in a position for your knee to be treated.  · The skin over your kneecap will be cleaned with a germ-killing soap.  · You will be given a medicine that numbs the area (local anesthetic). You may feel some stinging.  · The medicine will be injected into your knee. The needle is carefully placed between your kneecap and your knee. The medicine is injected into the joint space.  · The needle will be removed at the end of the procedure.  · A bandage (dressing) may be placed over the injection site.  The procedure may vary among health care providers and hospitals.  What can I expect after the procedure?  · Your blood pressure, heart rate, breathing rate, and blood oxygen level will be monitored until you leave the hospital or clinic.  · You may have to move your knee through its full range of motion. This helps to get all the medicine into your joint space.  · You will be watched to make sure that you do not have a reaction to the injected medicine.  · You may feel more pain, swelling, and warmth than you did before the injection. This reaction may last about 1-2 days.  Follow these instructions at home:  Medicines  · Take over-the-counter and prescription medicines only as told by your health care provider.  · Ask your health care provider if the medicine prescribed to you requires you to avoid driving or using machinery.  · Do not take medicines such as aspirin and ibuprofen unless your health care provider tells you to take them.  Injection site  care  · Follow instructions from your health care provider about:  ? How to take care of your puncture site.  ? When and how you should change your dressing.  ? When you should remove your dressing.  · Check your injection area every day for signs of infection. Check for:  ? More redness, swelling, or pain after 2 days.  ? Fluid or blood.  ? Pus or a bad smell.  ? Warmth.  Managing pain, stiffness, and swelling    · If directed, put ice on the injection area. To do this:  ? Put ice in a plastic bag.  ? Place a towel between your skin and the bag.  ? Leave the ice on for 20 minutes, 2-3 times per day.  ? Remove the ice if your skin turns bright red. This is very important. If you cannot feel pain, heat, or cold, you have a greater risk of damage to the area.  · Do not apply heat to your knee.  · Raise (elevate) the injection area above the level of your heart while you are sitting or lying down.    General instructions  · If you were given a dressing, keep it dry until your health care provider says it can be removed. Ask your health care provider when you can start showering or bathing.  · Avoid strenuous activities for as long as directed by your health care provider. Ask your health care provider when you can return to your normal activities.  · Keep all follow-up visits. This is important. You may need more injections.  Contact a health care provider if you have:  · A fever.  · Warmth in your injection area.  · Fluid, blood, or pus coming from your injection site.  · Symptoms at your injection site that last longer than 2 days after your procedure.  Get help right away if:  · Your knee turns very red.  · Your knee becomes very swollen.  · Your knee is in severe pain.  Summary  · A knee injection is a procedure to get medicine into your knee joint to relieve the pain, swelling, and stiffness of arthritis.  · A needle is carefully placed between your kneecap and your knee to inject medicine into the joint  space.  · Before the procedure, ask your health care provider about changing or stopping your regular medicines, especially if you are taking diabetes medicines or blood thinners.  · Contact your health care provider if you have any problems or questions after your procedure.  This information is not intended to replace advice given to you by your health care provider. Make sure you discuss any questions you have with your health care provider.  Document Revised: 06/02/2021 Document Reviewed: 06/02/2021  [employer] Patient Education © 2021 [employer]

## 2021-11-08 ENCOUNTER — TELEPHONE (OUTPATIENT)
Dept: ORTHOPEDIC SURGERY | Facility: CLINIC | Age: 71
End: 2021-11-08

## 2021-11-08 DIAGNOSIS — M17.12 PRIMARY OSTEOARTHRITIS OF LEFT KNEE: Primary | ICD-10-CM

## 2021-11-08 NOTE — TELEPHONE ENCOUNTER
Provider: GIANCARLO WOO PA-C  Caller:  JAYANT LACKEY  Relationship to Patient: SELF  Pharmacy:   Phone Number:  [phone]  Reason for Call:  PATIENT SAYS HE WAS SUPPOSED TO CALL IF CORTISONE INJECTION DID NOT HELP HIS LEFT KNEE AND WE WOULD CHECK WITH HIS INSURANCE ABOUT GETTING GEL INJECTIONS.

## 2021-11-08 NOTE — TELEPHONE ENCOUNTER
Call placed to patient, advised we would start the process for the gel injection then let him know what ins says and get him scheduled after that. Okay per patient.

## 2021-11-15 NOTE — TELEPHONE ENCOUNTER
Caller: Brenden Peter    Relationship: Self    Best call back number:     What is the best time to reach you: ANYTIME    Do you know the name of the person who called: NOHEMI    What was the call regarding: THE PATIENT WOULD LIKE TO KNOW IF THE INSURANCE HAS APPROVED GEL INJECTIONS FOR HIS LEFT KNEE.    Do you require a callback: YES

## 2021-11-19 NOTE — TELEPHONE ENCOUNTER
Caller: Brenden Peter     Relationship: Self     Best call back number: [phone]      What is the best time to reach you: ANYTIME     Do you know the name of the person who called: NOHEMI     What was the call regarding: PATIENT CALLED x3 WOULD LIKE TO KNOW IF HIS MEDICARE A & B + AARP MEDICARE SUPPLEMENT HAS APPROVED GEL INJECTIONS FOR HIS LEFT KNEE?    PATIENT SCHEDULED bernadine/GIANCARLO WOO 01/28/22 FOR FOLLOW UP / LEFT KNEE / MONOVISC INJ (& IT APPEARS MONOVISC APPROVAL IN MEDIA FROM 11/10/21?)     Do you require a callback: YES

## 2021-11-19 NOTE — TELEPHONE ENCOUNTER
Call back to patient, he has not had a gel injection in his Left knee and would like to come in before January to get this taken care of. Advised we can do that and that ins did approve the injection. Appt given.

## 2021-12-06 ENCOUNTER — OFFICE VISIT (OUTPATIENT)
Dept: ORTHOPEDIC SURGERY | Facility: CLINIC | Age: 71
End: 2021-12-06

## 2021-12-06 VITALS
DIASTOLIC BLOOD PRESSURE: 72 MMHG | BODY MASS INDEX: 37.2 KG/M2 | HEIGHT: 67 IN | HEART RATE: 73 BPM | SYSTOLIC BLOOD PRESSURE: 158 MMHG | WEIGHT: 237 LBS

## 2021-12-06 DIAGNOSIS — E66.01 MORBID OBESITY (HCC): ICD-10-CM

## 2021-12-06 DIAGNOSIS — M17.12 PRIMARY OSTEOARTHRITIS OF LEFT KNEE: Primary | ICD-10-CM

## 2021-12-06 PROCEDURE — 20610 DRAIN/INJ JOINT/BURSA W/O US: CPT | Performed by: PHYSICIAN ASSISTANT

## 2021-12-06 RX ORDER — LIDOCAINE HYDROCHLORIDE 10 MG/ML
3 INJECTION, SOLUTION EPIDURAL; INFILTRATION; INTRACAUDAL; PERINEURAL
Status: COMPLETED | OUTPATIENT
Start: 2021-12-06 | End: 2021-12-06

## 2021-12-06 RX ORDER — ALBUTEROL SULFATE 2.5 MG/3ML
3 SOLUTION RESPIRATORY (INHALATION) 3 TIMES DAILY PRN
COMMUNITY
End: 2023-04-03 | Stop reason: HOSPADM

## 2021-12-06 RX ORDER — CEPHALEXIN 500 MG/1
CAPSULE ORAL
COMMUNITY
Start: 2021-11-28 | End: 2021-12-27

## 2021-12-06 RX ADMIN — LIDOCAINE HYDROCHLORIDE 3 ML: 10 INJECTION, SOLUTION EPIDURAL; INFILTRATION; INTRACAUDAL; PERINEURAL at 09:32

## 2021-12-06 NOTE — PATIENT INSTRUCTIONS
Knee Injection  A knee injection is a procedure to get medicine into your knee joint to relieve the pain, swelling, and stiffness of arthritis. Your health care provider uses a needle to inject medicine, which may also help to lubricate and cushion your knee joint. You may need more than one injection.  Tell a health care provider about:  · Any allergies you have.  · All medicines you are taking, including vitamins, herbs, eye drops, creams, and over-the-counter medicines.  · Any problems you or family members have had with anesthetic medicines.  · Any blood disorders you have.  · Any surgeries you have had.  · Any medical conditions you have.  · Whether you are pregnant or may be pregnant.  What are the risks?  Generally, this is a safe procedure. However, problems may occur, including:  · Infection.  · Bleeding.  · Symptoms that get worse.  · Damage to the area around your knee.  · Allergic reaction to any of the medicines.  · Skin reactions from repeated injections.  What happens before the procedure?  · Ask your health care provider about:  ? Changing or stopping your regular medicines. This is especially important if you are taking diabetes medicines or blood thinners.  ? Taking medicines such as aspirin and ibuprofen. These medicines can thin your blood. Do not take these medicines unless your health care provider tells you to take them.  ? Taking over-the-counter medicines, vitamins, herbs, and supplements.  · Plan to have a responsible adult take you home from the hospital or clinic.  What happens during the procedure?    · You will sit or lie down in a position for your knee to be treated.  · The skin over your kneecap will be cleaned with a germ-killing soap.  · You will be given a medicine that numbs the area (local anesthetic). You may feel some stinging.  · The medicine will be injected into your knee. The needle is carefully placed between your kneecap and your knee. The medicine is injected into the  joint space.  · The needle will be removed at the end of the procedure.  · A bandage (dressing) may be placed over the injection site.  The procedure may vary among health care providers and hospitals.  What can I expect after the procedure?  · Your blood pressure, heart rate, breathing rate, and blood oxygen level will be monitored until you leave the hospital or clinic.  · You may have to move your knee through its full range of motion. This helps to get all the medicine into your joint space.  · You will be watched to make sure that you do not have a reaction to the injected medicine.  · You may feel more pain, swelling, and warmth than you did before the injection. This reaction may last about 1-2 days.  Follow these instructions at home:  Medicines  · Take over-the-counter and prescription medicines only as told by your health care provider.  · Ask your health care provider if the medicine prescribed to you requires you to avoid driving or using machinery.  · Do not take medicines such as aspirin and ibuprofen unless your health care provider tells you to take them.  Injection site care  · Follow instructions from your health care provider about:  ? How to take care of your puncture site.  ? When and how you should change your dressing.  ? When you should remove your dressing.  · Check your injection area every day for signs of infection. Check for:  ? More redness, swelling, or pain after 2 days.  ? Fluid or blood.  ? Pus or a bad smell.  ? Warmth.  Managing pain, stiffness, and swelling    · If directed, put ice on the injection area. To do this:  ? Put ice in a plastic bag.  ? Place a towel between your skin and the bag.  ? Leave the ice on for 20 minutes, 2-3 times per day.  ? Remove the ice if your skin turns bright red. This is very important. If you cannot feel pain, heat, or cold, you have a greater risk of damage to the area.  · Do not apply heat to your knee.  · Raise (elevate) the injection area  above the level of your heart while you are sitting or lying down.    General instructions  · If you were given a dressing, keep it dry until your health care provider says it can be removed. Ask your health care provider when you can start showering or bathing.  · Avoid strenuous activities for as long as directed by your health care provider. Ask your health care provider when you can return to your normal activities.  · Keep all follow-up visits. This is important. You may need more injections.  Contact a health care provider if you have:  · A fever.  · Warmth in your injection area.  · Fluid, blood, or pus coming from your injection site.  · Symptoms at your injection site that last longer than 2 days after your procedure.  Get help right away if:  · Your knee turns very red.  · Your knee becomes very swollen.  · Your knee is in severe pain.  Summary  · A knee injection is a procedure to get medicine into your knee joint to relieve the pain, swelling, and stiffness of arthritis.  · A needle is carefully placed between your kneecap and your knee to inject medicine into the joint space.  · Before the procedure, ask your health care provider about changing or stopping your regular medicines, especially if you are taking diabetes medicines or blood thinners.  · Contact your health care provider if you have any problems or questions after your procedure.  This information is not intended to replace advice given to you by your health care provider. Make sure you discuss any questions you have with your health care provider.  Document Revised: 06/02/2021 Document Reviewed: 06/02/2021  [employer] Patient Education © 2021 [employer]

## 2021-12-06 NOTE — PROGRESS NOTES
ORTHO FOLLOW UP       Subjective:    HPI:   Brenden Peter is a 71 y.o. male who presents in follow-up for his left knee pain with a known history of left knee DJD.  He last had an intra-articular steroid injection on 10/22/2021 with a positive but inadequate response.      Past Medical History:   Diagnosis Date   • Abnormal EKG 2/3/2015   • Anxiety    • COPD (chronic obstructive pulmonary disease) ([facility])    • Coronary artery disease 3/3/2015   • Diabetes ([facility])     type 2   • Hyperlipidemia    • Hypertension    • Microalbuminuria due to type 2 diabetes mellitus ([facility]) 2/5/2020   • Multinodular goiter 4/18/2019   • Murmur 2/3/2015   • Primary osteoarthritis of left knee 10/22/2021       Past Surgical History:   Procedure Laterality Date   • EYE SURGERY      cataract   • TOTAL KNEE ARTHROPLASTY Right 10/21/2020    Procedure: TOTAL KNEE ARTHROPLASTY;  Surgeon: Ravi Fagan MD;  Location: [facility];  Service: Orthopedics;  Laterality: Right;   • TOTAL KNEE ARTHROPLASTY REVISION Right 5/19/2021    Procedure: KNEE POLY INSERT EXCHANGE with irrigation;  Surgeon: Ravi Fagan MD;  Location: [facility];  Service: Orthopedics;  Laterality: Right;       Social History     Occupational History   • Not on file   Tobacco Use   • Smoking status: Current Every Day Smoker     Packs/day: 1.00     Types: Cigarettes   • Smokeless tobacco: Never Used   Vaping Use   • Vaping Use: Never used   Substance and Sexual Activity   • Alcohol use: Not Currently   • Drug use: Never   • Sexual activity: Defer      The following portions of the patient's history were reviewed and updated as appropriate: allergies, current medications, past family history, past medical history, past social history, past surgical history and problem list.    Medications:    Current Outpatient Medications:   •  albuterol (PROVENTIL) (2.5 MG/3ML) 0.083% nebulizer solution, 3 mL., Disp: , Rfl:   •  albuterol sulfate  (90 Base) MCG/ACT inhaler, ,  "Disp: , Rfl:   •  amLODIPine (NORVASC) 5 MG tablet, Take 5 mg by mouth Every Night., Disp: , Rfl:   •  bisoprolol (ZEBeta) 5 MG tablet, Take 5 mg by mouth Daily., Disp: , Rfl:   •  cephalexin (KEFLEX) 500 MG capsule, , Disp: , Rfl:   •  gabapentin (NEURONTIN) 300 MG capsule, Take 300 mg by mouth 2 (two) times a day., Disp: , Rfl:   •  glipizide (GLUCOTROL) 10 MG tablet, Take 10 mg by mouth Every Night. HOLD the night before surgery, Disp: , Rfl:   •  lisinopril (PRINIVIL,ZESTRIL) 40 MG tablet, Take 40 mg by mouth Every Night. HOLD 24 hours before surgery, Disp: , Rfl:   •  meloxicam (MOBIC) 15 MG tablet, Take 1 tablet by mouth Daily. Prn joint pain, Disp: 90 tablet, Rfl: 3  •  metFORMIN (GLUCOPHAGE) 1000 MG tablet, Take 1,000 mg by mouth 2 (Two) Times a Day With Meals. HOLD 48 hours before surgery, Disp: , Rfl:   •  pioglitazone (ACTOS) 30 MG tablet, Take 30 mg by mouth Every Night. HOLD the night before surgery, Disp: , Rfl:   •  pravastatin (PRAVACHOL) 20 MG tablet, , Disp: , Rfl:   •  sertraline (ZOLOFT) 50 MG tablet, Take 50 mg by mouth Every Night., Disp: , Rfl:   •  vitamin D (ERGOCALCIFEROL) 1.25 MG (78360 UT) capsule capsule, 50,000 Units 2 (Two) Times a Week. Tues and Fri, Disp: , Rfl:     Allergies:  No Known Allergies    Review of Systems:  Gen -no fever, chills , sweats, headache   Eyes - no irritation or discharge   ENT -  no ear pain , runny nose , sore throat , difficulty swallowing   Resp - no cough , congestion , excessive expectoration   CVS - no chest pain , palpitations.   Abd - no pain , nausea , vomiting , diarrhea   Skin - no rash , lesions.   Neuro - no dizziness    Please see HPI for any other pertinent positives.  All other systems were reviewed and are negative.       Objective   Objective:    /72 (BP Location: Left arm, Patient Position: Sitting, Cuff Size: Large Adult)   Pulse 73   Ht 170.2 cm (67\")   Wt 108 kg (237 lb)   BMI 37.12 kg/m²     Physical Examination:  Alert, " oriented, obese individual in no acute distress, ambulating unassisted  Left lower extremity shows no erythema, rashes, or open skin lesions. There is a mild amount of swelling with pitting edema. It is grossly well aligned, and the patient is neurovascularly intact distally. The knee is stable to varus and valgus stress, there is no patellar maltracking or crepitus noted, and plantar and dorsiflexion is 5/5. There is mild tenderness to palpation and with range of motion, which is about 0-120.         Imaging:  no diagnostic testing performed this visit            Assessment:  1. Primary osteoarthritis of left knee    2. Morbid obesity (HCC)                 Plan:  Left knee Monovisc injection today, risks and benefits were discussed and postinjection instructions were given.  This is his first time trying Visco supplementation.  Continue weight loss efforts, and follow-up as needed.  Large Joint Arthrocentesis: L knee  Date/Time: 12/6/2021 9:32 AM  Consent given by: patient  Timeout: Immediately prior to procedure a time out was called to verify the correct patient, procedure, equipment, support staff and site/side marked as required   Supporting Documentation  Indications: pain   Procedure Details  Location: knee - L knee  Preparation: Patient was prepped and draped in the usual sterile fashion  Needle size: 22 G  Approach: anterolateral  Medications administered: 88 mg Hyaluronan 88 MG/4ML; 3 mL lidocaine PF 1% 1 %  Patient tolerance: patient tolerated the procedure well with no immediate complications      After consent was obtained and a time out was properly performed, the left knee was prepped with alcohol and chlorhexidine. A 25 gauge needle was used to inject 3 cc of 1% lidocaine, following this a 22 gauge needle was used to inject one syringe of Monovisc. Sterile technique was used and the patient tolerated the procedure well.               JOSE Willard  12/06/21  09:31 EST    EMR  Dragon/Transcription disclaimer:  Much of this encounter note is an electronic transcription/translation of spoken language to printed text. The electronic translation of spoken language may permit erroneous, or at times, nonsensical words or phrases to be inadvertently transcribed; Although I have reviewed the note for such errors, some may still exist.

## 2021-12-27 ENCOUNTER — APPOINTMENT (OUTPATIENT)
Dept: CT IMAGING | Facility: HOSPITAL | Age: 71
End: 2021-12-27

## 2021-12-27 ENCOUNTER — HOSPITAL ENCOUNTER (INPATIENT)
Facility: HOSPITAL | Age: 71
LOS: 2 days | Discharge: HOME | End: 2021-12-29
Attending: EMERGENCY MEDICINE | Admitting: HOSPITALIST

## 2021-12-27 ENCOUNTER — APPOINTMENT (OUTPATIENT)
Dept: GENERAL RADIOLOGY | Facility: HOSPITAL | Age: 71
End: 2021-12-27

## 2021-12-27 DIAGNOSIS — J44.1 COPD EXACERBATION: ICD-10-CM

## 2021-12-27 DIAGNOSIS — J96.01 ACUTE RESPIRATORY FAILURE WITH HYPOXIA: Primary | ICD-10-CM

## 2021-12-27 DIAGNOSIS — J20.4 PARAINFLUENZA VIRUS BRONCHITIS: ICD-10-CM

## 2021-12-27 LAB
ALBUMIN SERPL-MCNC: 3.6 G/DL (ref 3.5–5.2)
ALBUMIN/GLOB SERPL: 1.1 G/DL
ALP SERPL-CCNC: 56 U/L (ref 39–117)
ALT SERPL W P-5'-P-CCNC: 13 U/L (ref 1–41)
ANION GAP SERPL CALCULATED.3IONS-SCNC: 10 MMOL/L (ref 5–15)
APTT PPP: 32.4 SECONDS (ref 24–31)
ARTERIAL PATENCY WRIST A: POSITIVE
AST SERPL-CCNC: 12 U/L (ref 1–40)
ATMOSPHERIC PRESS: ABNORMAL MM[HG]
B PARAPERT DNA SPEC QL NAA+PROBE: NOT DETECTED
B PERT DNA SPEC QL NAA+PROBE: NOT DETECTED
BACTERIA UR QL AUTO: ABNORMAL /HPF
BASE EXCESS BLDA CALC-SCNC: 2.8 MMOL/L (ref 0–3)
BASOPHILS # BLD AUTO: 0 10*3/MM3 (ref 0–0.2)
BASOPHILS NFR BLD AUTO: 0.5 % (ref 0–1.5)
BDY SITE: ABNORMAL
BILIRUB SERPL-MCNC: 0.2 MG/DL (ref 0–1.2)
BILIRUB UR QL STRIP: NEGATIVE
BUN SERPL-MCNC: 8 MG/DL (ref 8–23)
BUN/CREAT SERPL: 13.1 (ref 7–25)
C PNEUM DNA NPH QL NAA+NON-PROBE: NOT DETECTED
CALCIUM SPEC-SCNC: 8.6 MG/DL (ref 8.6–10.5)
CHLORIDE SERPL-SCNC: 100 MMOL/L (ref 98–107)
CLARITY UR: CLEAR
CO2 BLDA-SCNC: 29.1 MMOL/L (ref 22–29)
CO2 SERPL-SCNC: 29 MMOL/L (ref 22–29)
COLOR UR: YELLOW
CREAT SERPL-MCNC: 0.61 MG/DL (ref 0.76–1.27)
D DIMER PPP FEU-MCNC: 1.23 MG/L (FEU) (ref 0–0.59)
D-LACTATE SERPL-SCNC: 1.1 MMOL/L (ref 0.5–2)
DEPRECATED RDW RBC AUTO: 46.8 FL (ref 37–54)
EOSINOPHIL # BLD AUTO: 0 10*3/MM3 (ref 0–0.4)
EOSINOPHIL NFR BLD AUTO: 0.7 % (ref 0.3–6.2)
ERYTHROCYTE [DISTWIDTH] IN BLOOD BY AUTOMATED COUNT: 15.4 % (ref 12.3–15.4)
FLUAV SUBTYP SPEC NAA+PROBE: NOT DETECTED
FLUBV RNA ISLT QL NAA+PROBE: NOT DETECTED
GFR SERPL CREATININE-BSD FRML MDRD: 130 ML/MIN/1.73
GLOBULIN UR ELPH-MCNC: 3.3 GM/DL
GLUCOSE BLDC GLUCOMTR-MCNC: 186 MG/DL (ref 70–105)
GLUCOSE BLDC GLUCOMTR-MCNC: 232 MG/DL (ref 70–105)
GLUCOSE SERPL-MCNC: 187 MG/DL (ref 65–99)
GLUCOSE UR STRIP-MCNC: NEGATIVE MG/DL
HADV DNA SPEC NAA+PROBE: NOT DETECTED
HCO3 BLDA-SCNC: 27.8 MMOL/L (ref 21–28)
HCOV 229E RNA SPEC QL NAA+PROBE: NOT DETECTED
HCOV HKU1 RNA SPEC QL NAA+PROBE: NOT DETECTED
HCOV NL63 RNA SPEC QL NAA+PROBE: NOT DETECTED
HCOV OC43 RNA SPEC QL NAA+PROBE: NOT DETECTED
HCT VFR BLD AUTO: 36 % (ref 37.5–51)
HEMODILUTION: NO
HGB BLD-MCNC: 12 G/DL (ref 13–17.7)
HGB UR QL STRIP.AUTO: NEGATIVE
HMPV RNA NPH QL NAA+NON-PROBE: NOT DETECTED
HPIV1 RNA ISLT QL NAA+PROBE: NOT DETECTED
HPIV2 RNA SPEC QL NAA+PROBE: NOT DETECTED
HPIV3 RNA NPH QL NAA+PROBE: DETECTED
HPIV4 P GENE NPH QL NAA+PROBE: NOT DETECTED
HYALINE CASTS UR QL AUTO: ABNORMAL /LPF
INHALED O2 CONCENTRATION: 40 %
INR PPP: 1.03 (ref 0.93–1.1)
KETONES UR QL STRIP: ABNORMAL
L PNEUMO1 AG UR QL IA: NEGATIVE
LEUKOCYTE ESTERASE UR QL STRIP.AUTO: NEGATIVE
LYMPHOCYTES # BLD AUTO: 1.2 10*3/MM3 (ref 0.7–3.1)
LYMPHOCYTES NFR BLD AUTO: 27.3 % (ref 19.6–45.3)
M PNEUMO IGG SER IA-ACNC: NOT DETECTED
MCH RBC QN AUTO: 29.2 PG (ref 26.6–33)
MCHC RBC AUTO-ENTMCNC: 33.3 G/DL (ref 31.5–35.7)
MCV RBC AUTO: 87.6 FL (ref 79–97)
MODALITY: ABNORMAL
MONOCYTES # BLD AUTO: 0.5 10*3/MM3 (ref 0.1–0.9)
MONOCYTES NFR BLD AUTO: 11.9 % (ref 5–12)
NEUTROPHILS NFR BLD AUTO: 2.6 10*3/MM3 (ref 1.7–7)
NEUTROPHILS NFR BLD AUTO: 59.6 % (ref 42.7–76)
NITRITE UR QL STRIP: NEGATIVE
NRBC BLD AUTO-RTO: 0.1 /100 WBC (ref 0–0.2)
NT-PROBNP SERPL-MCNC: 427.2 PG/ML (ref 0–900)
PCO2 BLDA: 43.4 MM HG (ref 35–48)
PH BLDA: 7.41 PH UNITS (ref 7.35–7.45)
PH UR STRIP.AUTO: 6.5 [PH] (ref 5–8)
PLATELET # BLD AUTO: 183 10*3/MM3 (ref 140–450)
PMV BLD AUTO: 8.6 FL (ref 6–12)
PO2 BLDA: 53.9 MM HG (ref 83–108)
POTASSIUM SERPL-SCNC: 4.1 MMOL/L (ref 3.5–5.2)
PROT SERPL-MCNC: 6.9 G/DL (ref 6–8.5)
PROT UR QL STRIP: ABNORMAL
PROTHROMBIN TIME: 11.4 SECONDS (ref 9.6–11.7)
RBC # BLD AUTO: 4.11 10*6/MM3 (ref 4.14–5.8)
RBC # UR STRIP: ABNORMAL /HPF
REF LAB TEST METHOD: ABNORMAL
RHINOVIRUS RNA SPEC NAA+PROBE: NOT DETECTED
RSV RNA NPH QL NAA+NON-PROBE: NOT DETECTED
S PNEUM AG SPEC QL LA: NEGATIVE
SAO2 % BLDCOA: 87.8 % (ref 94–98)
SARS-COV-2 RNA NPH QL NAA+NON-PROBE: NOT DETECTED
SODIUM SERPL-SCNC: 139 MMOL/L (ref 136–145)
SP GR UR STRIP: 1.04 (ref 1–1.03)
SQUAMOUS #/AREA URNS HPF: ABNORMAL /HPF
TROPONIN T SERPL-MCNC: <0.01 NG/ML (ref 0–0.03)
UROBILINOGEN UR QL STRIP: ABNORMAL
WBC # UR STRIP: ABNORMAL /HPF
WBC NRBC COR # BLD: 4.3 10*3/MM3 (ref 3.4–10.8)

## 2021-12-27 PROCEDURE — 85025 COMPLETE CBC W/AUTO DIFF WBC: CPT | Performed by: EMERGENCY MEDICINE

## 2021-12-27 PROCEDURE — 80053 COMPREHEN METABOLIC PANEL: CPT | Performed by: EMERGENCY MEDICINE

## 2021-12-27 PROCEDURE — 85610 PROTHROMBIN TIME: CPT | Performed by: EMERGENCY MEDICINE

## 2021-12-27 PROCEDURE — 94640 AIRWAY INHALATION TREATMENT: CPT

## 2021-12-27 PROCEDURE — 99285 EMERGENCY DEPT VISIT HI MDM: CPT

## 2021-12-27 PROCEDURE — 93005 ELECTROCARDIOGRAM TRACING: CPT

## 2021-12-27 PROCEDURE — 82962 GLUCOSE BLOOD TEST: CPT

## 2021-12-27 PROCEDURE — 85379 FIBRIN DEGRADATION QUANT: CPT | Performed by: EMERGENCY MEDICINE

## 2021-12-27 PROCEDURE — 87899 AGENT NOS ASSAY W/OPTIC: CPT | Performed by: HOSPITALIST

## 2021-12-27 PROCEDURE — 93005 ELECTROCARDIOGRAM TRACING: CPT | Performed by: EMERGENCY MEDICINE

## 2021-12-27 PROCEDURE — 85730 THROMBOPLASTIN TIME PARTIAL: CPT | Performed by: EMERGENCY MEDICINE

## 2021-12-27 PROCEDURE — 94799 UNLISTED PULMONARY SVC/PX: CPT

## 2021-12-27 PROCEDURE — 71275 CT ANGIOGRAPHY CHEST: CPT

## 2021-12-27 PROCEDURE — 25010000002 METHYLPREDNISOLONE PER 125 MG: Performed by: EMERGENCY MEDICINE

## 2021-12-27 PROCEDURE — 36600 WITHDRAWAL OF ARTERIAL BLOOD: CPT

## 2021-12-27 PROCEDURE — 99222 1ST HOSP IP/OBS MODERATE 55: CPT | Performed by: HOSPITALIST

## 2021-12-27 PROCEDURE — 71045 X-RAY EXAM CHEST 1 VIEW: CPT

## 2021-12-27 PROCEDURE — 87040 BLOOD CULTURE FOR BACTERIA: CPT | Performed by: EMERGENCY MEDICINE

## 2021-12-27 PROCEDURE — 81001 URINALYSIS AUTO W/SCOPE: CPT | Performed by: EMERGENCY MEDICINE

## 2021-12-27 PROCEDURE — 0 IOPAMIDOL PER 1 ML: Performed by: EMERGENCY MEDICINE

## 2021-12-27 PROCEDURE — 83880 ASSAY OF NATRIURETIC PEPTIDE: CPT | Performed by: EMERGENCY MEDICINE

## 2021-12-27 PROCEDURE — 25010000002 HEPARIN (PORCINE) PER 1000 UNITS: Performed by: HOSPITALIST

## 2021-12-27 PROCEDURE — 82803 BLOOD GASES ANY COMBINATION: CPT

## 2021-12-27 PROCEDURE — 0202U NFCT DS 22 TRGT SARS-COV-2: CPT | Performed by: EMERGENCY MEDICINE

## 2021-12-27 PROCEDURE — 84484 ASSAY OF TROPONIN QUANT: CPT | Performed by: EMERGENCY MEDICINE

## 2021-12-27 PROCEDURE — 83605 ASSAY OF LACTIC ACID: CPT

## 2021-12-27 PROCEDURE — 25010000002 METHYLPREDNISOLONE PER 40 MG: Performed by: HOSPITALIST

## 2021-12-27 PROCEDURE — 63710000001 INSULIN LISPRO (HUMAN) PER 5 UNITS: Performed by: HOSPITALIST

## 2021-12-27 RX ORDER — AMOXICILLIN 250 MG
2 CAPSULE ORAL 2 TIMES DAILY
Status: DISCONTINUED | OUTPATIENT
Start: 2021-12-27 | End: 2021-12-29 | Stop reason: HOSPADM

## 2021-12-27 RX ORDER — ALBUTEROL SULFATE 90 UG/1
2 AEROSOL, METERED RESPIRATORY (INHALATION) ONCE
Status: COMPLETED | OUTPATIENT
Start: 2021-12-27 | End: 2021-12-27

## 2021-12-27 RX ORDER — METHYLPREDNISOLONE SODIUM SUCCINATE 125 MG/2ML
125 INJECTION, POWDER, LYOPHILIZED, FOR SOLUTION INTRAMUSCULAR; INTRAVENOUS ONCE
Status: COMPLETED | OUTPATIENT
Start: 2021-12-27 | End: 2021-12-27

## 2021-12-27 RX ORDER — GABAPENTIN 300 MG/1
300 CAPSULE ORAL EVERY 12 HOURS SCHEDULED
Status: DISCONTINUED | OUTPATIENT
Start: 2021-12-27 | End: 2021-12-29 | Stop reason: HOSPADM

## 2021-12-27 RX ORDER — BISACODYL 10 MG
10 SUPPOSITORY, RECTAL RECTAL DAILY PRN
Status: DISCONTINUED | OUTPATIENT
Start: 2021-12-27 | End: 2021-12-29 | Stop reason: HOSPADM

## 2021-12-27 RX ORDER — INSULIN LISPRO 100 [IU]/ML
0-9 INJECTION, SOLUTION INTRAVENOUS; SUBCUTANEOUS
Status: DISCONTINUED | OUTPATIENT
Start: 2021-12-27 | End: 2021-12-28

## 2021-12-27 RX ORDER — ACETAMINOPHEN 325 MG/1
650 TABLET ORAL EVERY 4 HOURS PRN
Status: DISCONTINUED | OUTPATIENT
Start: 2021-12-27 | End: 2021-12-29 | Stop reason: HOSPADM

## 2021-12-27 RX ORDER — IPRATROPIUM BROMIDE AND ALBUTEROL SULFATE 2.5; .5 MG/3ML; MG/3ML
3 SOLUTION RESPIRATORY (INHALATION)
Status: DISCONTINUED | OUTPATIENT
Start: 2021-12-27 | End: 2021-12-29 | Stop reason: HOSPADM

## 2021-12-27 RX ORDER — BISOPROLOL FUMARATE 5 MG/1
5 TABLET, FILM COATED ORAL DAILY
Status: DISCONTINUED | OUTPATIENT
Start: 2021-12-27 | End: 2021-12-29 | Stop reason: HOSPADM

## 2021-12-27 RX ORDER — ACETAMINOPHEN 160 MG/5ML
650 SOLUTION ORAL EVERY 4 HOURS PRN
Status: DISCONTINUED | OUTPATIENT
Start: 2021-12-27 | End: 2021-12-29 | Stop reason: HOSPADM

## 2021-12-27 RX ORDER — ALBUTEROL SULFATE 90 UG/1
2 AEROSOL, METERED RESPIRATORY (INHALATION) ONCE
Status: DISCONTINUED | OUTPATIENT
Start: 2021-12-27 | End: 2021-12-29 | Stop reason: HOSPADM

## 2021-12-27 RX ORDER — BISACODYL 5 MG/1
5 TABLET, DELAYED RELEASE ORAL DAILY PRN
Status: DISCONTINUED | OUTPATIENT
Start: 2021-12-27 | End: 2021-12-29 | Stop reason: HOSPADM

## 2021-12-27 RX ORDER — HEPARIN SODIUM 5000 [USP'U]/ML
5000 INJECTION, SOLUTION INTRAVENOUS; SUBCUTANEOUS EVERY 8 HOURS SCHEDULED
Status: DISCONTINUED | OUTPATIENT
Start: 2021-12-27 | End: 2021-12-29 | Stop reason: HOSPADM

## 2021-12-27 RX ORDER — SODIUM CHLORIDE 0.9 % (FLUSH) 0.9 %
3-10 SYRINGE (ML) INJECTION AS NEEDED
Status: DISCONTINUED | OUTPATIENT
Start: 2021-12-27 | End: 2021-12-29 | Stop reason: HOSPADM

## 2021-12-27 RX ORDER — GLIPIZIDE 5 MG/1
10 TABLET ORAL NIGHTLY
Status: DISCONTINUED | OUTPATIENT
Start: 2021-12-27 | End: 2021-12-29 | Stop reason: HOSPADM

## 2021-12-27 RX ORDER — SODIUM CHLORIDE 0.9 % (FLUSH) 0.9 %
10 SYRINGE (ML) INJECTION AS NEEDED
Status: DISCONTINUED | OUTPATIENT
Start: 2021-12-27 | End: 2021-12-29 | Stop reason: HOSPADM

## 2021-12-27 RX ORDER — ONDANSETRON 2 MG/ML
4 INJECTION INTRAMUSCULAR; INTRAVENOUS EVERY 6 HOURS PRN
Status: DISCONTINUED | OUTPATIENT
Start: 2021-12-27 | End: 2021-12-29 | Stop reason: HOSPADM

## 2021-12-27 RX ORDER — ONDANSETRON 4 MG/1
4 TABLET, FILM COATED ORAL EVERY 6 HOURS PRN
Status: DISCONTINUED | OUTPATIENT
Start: 2021-12-27 | End: 2021-12-29 | Stop reason: HOSPADM

## 2021-12-27 RX ORDER — DEXTROSE MONOHYDRATE 25 G/50ML
25 INJECTION, SOLUTION INTRAVENOUS
Status: DISCONTINUED | OUTPATIENT
Start: 2021-12-27 | End: 2021-12-29 | Stop reason: HOSPADM

## 2021-12-27 RX ORDER — NICOTINE POLACRILEX 4 MG
15 LOZENGE BUCCAL
Status: DISCONTINUED | OUTPATIENT
Start: 2021-12-27 | End: 2021-12-29 | Stop reason: HOSPADM

## 2021-12-27 RX ORDER — HYDROCODONE BITARTRATE AND ACETAMINOPHEN 5; 325 MG/1; MG/1
1 TABLET ORAL EVERY 4 HOURS PRN
Status: DISCONTINUED | OUTPATIENT
Start: 2021-12-27 | End: 2021-12-29 | Stop reason: HOSPADM

## 2021-12-27 RX ORDER — SODIUM CHLORIDE 9 MG/ML
75 INJECTION, SOLUTION INTRAVENOUS CONTINUOUS
Status: DISCONTINUED | OUTPATIENT
Start: 2021-12-27 | End: 2021-12-28

## 2021-12-27 RX ORDER — ATORVASTATIN CALCIUM 10 MG/1
10 TABLET, FILM COATED ORAL DAILY
Status: DISCONTINUED | OUTPATIENT
Start: 2021-12-27 | End: 2021-12-29 | Stop reason: HOSPADM

## 2021-12-27 RX ORDER — POLYETHYLENE GLYCOL 3350 17 G/17G
17 POWDER, FOR SOLUTION ORAL DAILY PRN
Status: DISCONTINUED | OUTPATIENT
Start: 2021-12-27 | End: 2021-12-29 | Stop reason: HOSPADM

## 2021-12-27 RX ORDER — PIOGLITAZONEHYDROCHLORIDE 30 MG/1
30 TABLET ORAL NIGHTLY
Status: DISCONTINUED | OUTPATIENT
Start: 2021-12-27 | End: 2021-12-29 | Stop reason: HOSPADM

## 2021-12-27 RX ORDER — OLANZAPINE 10 MG/2ML
1 INJECTION, POWDER, LYOPHILIZED, FOR SOLUTION INTRAMUSCULAR
Status: DISCONTINUED | OUTPATIENT
Start: 2021-12-27 | End: 2021-12-29 | Stop reason: HOSPADM

## 2021-12-27 RX ORDER — IPRATROPIUM BROMIDE AND ALBUTEROL SULFATE 2.5; .5 MG/3ML; MG/3ML
3 SOLUTION RESPIRATORY (INHALATION) ONCE
Status: DISCONTINUED | OUTPATIENT
Start: 2021-12-27 | End: 2021-12-27

## 2021-12-27 RX ORDER — ACETAMINOPHEN 650 MG/1
650 SUPPOSITORY RECTAL EVERY 4 HOURS PRN
Status: DISCONTINUED | OUTPATIENT
Start: 2021-12-27 | End: 2021-12-29 | Stop reason: HOSPADM

## 2021-12-27 RX ORDER — BUDESONIDE 0.5 MG/2ML
0.5 INHALANT ORAL
Status: DISCONTINUED | OUTPATIENT
Start: 2021-12-27 | End: 2021-12-29 | Stop reason: HOSPADM

## 2021-12-27 RX ORDER — CALCIUM CARBONATE 200(500)MG
1 TABLET,CHEWABLE ORAL 2 TIMES DAILY PRN
Status: DISCONTINUED | OUTPATIENT
Start: 2021-12-27 | End: 2021-12-29 | Stop reason: HOSPADM

## 2021-12-27 RX ORDER — AMLODIPINE BESYLATE 5 MG/1
5 TABLET ORAL NIGHTLY
Status: DISCONTINUED | OUTPATIENT
Start: 2021-12-27 | End: 2021-12-29 | Stop reason: HOSPADM

## 2021-12-27 RX ORDER — METHYLPREDNISOLONE SODIUM SUCCINATE 40 MG/ML
40 INJECTION, POWDER, LYOPHILIZED, FOR SOLUTION INTRAMUSCULAR; INTRAVENOUS EVERY 8 HOURS
Status: DISCONTINUED | OUTPATIENT
Start: 2021-12-27 | End: 2021-12-28

## 2021-12-27 RX ORDER — INSULIN LISPRO 100 [IU]/ML
0-9 INJECTION, SOLUTION INTRAVENOUS; SUBCUTANEOUS AS NEEDED
Status: DISCONTINUED | OUTPATIENT
Start: 2021-12-27 | End: 2021-12-28

## 2021-12-27 RX ORDER — LISINOPRIL 20 MG/1
40 TABLET ORAL NIGHTLY
Status: DISCONTINUED | OUTPATIENT
Start: 2021-12-27 | End: 2021-12-29 | Stop reason: HOSPADM

## 2021-12-27 RX ORDER — SODIUM CHLORIDE 0.9 % (FLUSH) 0.9 %
3 SYRINGE (ML) INJECTION EVERY 12 HOURS SCHEDULED
Status: DISCONTINUED | OUTPATIENT
Start: 2021-12-27 | End: 2021-12-29 | Stop reason: HOSPADM

## 2021-12-27 RX ADMIN — BISOPROLOL FUMARATE 5 MG: 5 TABLET, FILM COATED ORAL at 23:02

## 2021-12-27 RX ADMIN — IPRATROPIUM BROMIDE AND ALBUTEROL SULFATE 3 ML: 2.5; .5 SOLUTION RESPIRATORY (INHALATION) at 15:27

## 2021-12-27 RX ADMIN — METHYLPREDNISOLONE SODIUM SUCCINATE 40 MG: 40 INJECTION, POWDER, FOR SOLUTION INTRAMUSCULAR; INTRAVENOUS at 23:01

## 2021-12-27 RX ADMIN — INSULIN LISPRO 4 UNITS: 100 INJECTION, SOLUTION INTRAVENOUS; SUBCUTANEOUS at 23:00

## 2021-12-27 RX ADMIN — IPRATROPIUM BROMIDE AND ALBUTEROL SULFATE 3 ML: 2.5; .5 SOLUTION RESPIRATORY (INHALATION) at 20:32

## 2021-12-27 RX ADMIN — AMLODIPINE BESYLATE 5 MG: 5 TABLET ORAL at 23:02

## 2021-12-27 RX ADMIN — SODIUM CHLORIDE 75 ML/HR: 9 INJECTION, SOLUTION INTRAVENOUS at 16:59

## 2021-12-27 RX ADMIN — IPRATROPIUM BROMIDE AND ALBUTEROL SULFATE 3 ML: 2.5; .5 SOLUTION RESPIRATORY (INHALATION) at 23:56

## 2021-12-27 RX ADMIN — GLIPIZIDE 10 MG: 5 TABLET ORAL at 23:01

## 2021-12-27 RX ADMIN — PIOGLITAZONE 30 MG: 30 TABLET ORAL at 23:01

## 2021-12-27 RX ADMIN — ATORVASTATIN CALCIUM 10 MG: 10 TABLET, FILM COATED ORAL at 23:02

## 2021-12-27 RX ADMIN — METHYLPREDNISOLONE SODIUM SUCCINATE 125 MG: 125 INJECTION, POWDER, FOR SOLUTION INTRAMUSCULAR; INTRAVENOUS at 13:47

## 2021-12-27 RX ADMIN — SODIUM CHLORIDE, PRESERVATIVE FREE 3 ML: 5 INJECTION INTRAVENOUS at 23:07

## 2021-12-27 RX ADMIN — GABAPENTIN 300 MG: 300 CAPSULE ORAL at 23:02

## 2021-12-27 RX ADMIN — BUDESONIDE 0.5 MG: 0.5 INHALANT RESPIRATORY (INHALATION) at 20:32

## 2021-12-27 RX ADMIN — IOPAMIDOL 100 ML: 755 INJECTION, SOLUTION INTRAVENOUS at 14:35

## 2021-12-27 RX ADMIN — LISINOPRIL 40 MG: 20 TABLET ORAL at 23:02

## 2021-12-27 RX ADMIN — DOCUSATE SODIUM 50 MG AND SENNOSIDES 8.6 MG 2 TABLET: 8.6; 5 TABLET, FILM COATED ORAL at 23:02

## 2021-12-27 RX ADMIN — ALBUTEROL SULFATE 2 PUFF: 108 INHALANT RESPIRATORY (INHALATION) at 13:50

## 2021-12-27 RX ADMIN — HEPARIN SODIUM 5000 UNITS: 5000 INJECTION INTRAVENOUS; SUBCUTANEOUS at 23:02

## 2021-12-27 RX ADMIN — SERTRALINE 50 MG: 50 TABLET, FILM COATED ORAL at 23:02

## 2021-12-28 LAB
ANION GAP SERPL CALCULATED.3IONS-SCNC: 11 MMOL/L (ref 5–15)
BASOPHILS # BLD AUTO: 0 10*3/MM3 (ref 0–0.2)
BASOPHILS NFR BLD AUTO: 0.4 % (ref 0–1.5)
BUN SERPL-MCNC: 15 MG/DL (ref 8–23)
BUN/CREAT SERPL: 25.9 (ref 7–25)
CALCIUM SPEC-SCNC: 8.7 MG/DL (ref 8.6–10.5)
CHLORIDE SERPL-SCNC: 103 MMOL/L (ref 98–107)
CO2 SERPL-SCNC: 23 MMOL/L (ref 22–29)
CREAT SERPL-MCNC: 0.58 MG/DL (ref 0.76–1.27)
DEPRECATED RDW RBC AUTO: 45.9 FL (ref 37–54)
EOSINOPHIL # BLD AUTO: 0 10*3/MM3 (ref 0–0.4)
EOSINOPHIL NFR BLD AUTO: 0 % (ref 0.3–6.2)
ERYTHROCYTE [DISTWIDTH] IN BLOOD BY AUTOMATED COUNT: 15.1 % (ref 12.3–15.4)
GFR SERPL CREATININE-BSD FRML MDRD: 138 ML/MIN/1.73
GLUCOSE BLDC GLUCOMTR-MCNC: 229 MG/DL (ref 70–105)
GLUCOSE BLDC GLUCOMTR-MCNC: 247 MG/DL (ref 70–105)
GLUCOSE BLDC GLUCOMTR-MCNC: 362 MG/DL (ref 70–105)
GLUCOSE BLDC GLUCOMTR-MCNC: 384 MG/DL (ref 70–105)
GLUCOSE SERPL-MCNC: 238 MG/DL (ref 65–99)
HCT VFR BLD AUTO: 35.8 % (ref 37.5–51)
HGB BLD-MCNC: 11.9 G/DL (ref 13–17.7)
LYMPHOCYTES # BLD AUTO: 0.9 10*3/MM3 (ref 0.7–3.1)
LYMPHOCYTES NFR BLD AUTO: 20.2 % (ref 19.6–45.3)
MCH RBC QN AUTO: 28.8 PG (ref 26.6–33)
MCHC RBC AUTO-ENTMCNC: 33.2 G/DL (ref 31.5–35.7)
MCV RBC AUTO: 86.8 FL (ref 79–97)
MONOCYTES # BLD AUTO: 0.2 10*3/MM3 (ref 0.1–0.9)
MONOCYTES NFR BLD AUTO: 3.9 % (ref 5–12)
NEUTROPHILS NFR BLD AUTO: 3.4 10*3/MM3 (ref 1.7–7)
NEUTROPHILS NFR BLD AUTO: 75.5 % (ref 42.7–76)
NRBC BLD AUTO-RTO: 0.3 /100 WBC (ref 0–0.2)
PLATELET # BLD AUTO: 189 10*3/MM3 (ref 140–450)
PMV BLD AUTO: 9.1 FL (ref 6–12)
POTASSIUM SERPL-SCNC: 4.2 MMOL/L (ref 3.5–5.2)
RBC # BLD AUTO: 4.13 10*6/MM3 (ref 4.14–5.8)
SODIUM SERPL-SCNC: 137 MMOL/L (ref 136–145)
WBC NRBC COR # BLD: 4.6 10*3/MM3 (ref 3.4–10.8)

## 2021-12-28 PROCEDURE — 92610 EVALUATE SWALLOWING FUNCTION: CPT

## 2021-12-28 PROCEDURE — 85025 COMPLETE CBC W/AUTO DIFF WBC: CPT | Performed by: HOSPITALIST

## 2021-12-28 PROCEDURE — 97162 PT EVAL MOD COMPLEX 30 MIN: CPT

## 2021-12-28 PROCEDURE — 25010000002 HEPARIN (PORCINE) PER 1000 UNITS: Performed by: HOSPITALIST

## 2021-12-28 PROCEDURE — 80048 BASIC METABOLIC PNL TOTAL CA: CPT | Performed by: HOSPITALIST

## 2021-12-28 PROCEDURE — 99232 SBSQ HOSP IP/OBS MODERATE 35: CPT | Performed by: HOSPITALIST

## 2021-12-28 PROCEDURE — 25010000002 DIPHENHYDRAMINE PER 50 MG: Performed by: NURSE PRACTITIONER

## 2021-12-28 PROCEDURE — 82962 GLUCOSE BLOOD TEST: CPT

## 2021-12-28 PROCEDURE — 94799 UNLISTED PULMONARY SVC/PX: CPT

## 2021-12-28 PROCEDURE — 63710000001 INSULIN LISPRO (HUMAN) PER 5 UNITS: Performed by: HOSPITALIST

## 2021-12-28 PROCEDURE — 36415 COLL VENOUS BLD VENIPUNCTURE: CPT | Performed by: HOSPITALIST

## 2021-12-28 PROCEDURE — 25010000002 METHYLPREDNISOLONE PER 40 MG: Performed by: HOSPITALIST

## 2021-12-28 RX ORDER — DIPHENHYDRAMINE HYDROCHLORIDE 50 MG/ML
25 INJECTION INTRAMUSCULAR; INTRAVENOUS NIGHTLY PRN
Status: DISCONTINUED | OUTPATIENT
Start: 2021-12-28 | End: 2021-12-29 | Stop reason: HOSPADM

## 2021-12-28 RX ORDER — INSULIN LISPRO 100 [IU]/ML
0-14 INJECTION, SOLUTION INTRAVENOUS; SUBCUTANEOUS AS NEEDED
Status: DISCONTINUED | OUTPATIENT
Start: 2021-12-28 | End: 2021-12-28

## 2021-12-28 RX ORDER — INSULIN LISPRO 100 [IU]/ML
0-14 INJECTION, SOLUTION INTRAVENOUS; SUBCUTANEOUS
Status: DISCONTINUED | OUTPATIENT
Start: 2021-12-28 | End: 2021-12-28

## 2021-12-28 RX ORDER — METHYLPREDNISOLONE SODIUM SUCCINATE 40 MG/ML
40 INJECTION, POWDER, LYOPHILIZED, FOR SOLUTION INTRAMUSCULAR; INTRAVENOUS DAILY
Status: DISCONTINUED | OUTPATIENT
Start: 2021-12-29 | End: 2021-12-29 | Stop reason: HOSPADM

## 2021-12-28 RX ORDER — INSULIN LISPRO 100 [IU]/ML
0-14 INJECTION, SOLUTION INTRAVENOUS; SUBCUTANEOUS EVERY 6 HOURS SCHEDULED
Status: DISCONTINUED | OUTPATIENT
Start: 2021-12-28 | End: 2021-12-29

## 2021-12-28 RX ORDER — INSULIN LISPRO 100 [IU]/ML
0-14 INJECTION, SOLUTION INTRAVENOUS; SUBCUTANEOUS AS NEEDED
Status: DISCONTINUED | OUTPATIENT
Start: 2021-12-28 | End: 2021-12-29

## 2021-12-28 RX ADMIN — HYDROCODONE BITARTRATE AND ACETAMINOPHEN 1 TABLET: 5; 325 TABLET ORAL at 21:24

## 2021-12-28 RX ADMIN — DOCUSATE SODIUM 50 MG AND SENNOSIDES 8.6 MG 2 TABLET: 8.6; 5 TABLET, FILM COATED ORAL at 21:23

## 2021-12-28 RX ADMIN — AMLODIPINE BESYLATE 5 MG: 5 TABLET ORAL at 21:22

## 2021-12-28 RX ADMIN — SODIUM CHLORIDE, PRESERVATIVE FREE 3 ML: 5 INJECTION INTRAVENOUS at 21:24

## 2021-12-28 RX ADMIN — INSULIN LISPRO 4 UNITS: 100 INJECTION, SOLUTION INTRAVENOUS; SUBCUTANEOUS at 08:32

## 2021-12-28 RX ADMIN — GABAPENTIN 300 MG: 300 CAPSULE ORAL at 08:31

## 2021-12-28 RX ADMIN — GLIPIZIDE 10 MG: 5 TABLET ORAL at 21:22

## 2021-12-28 RX ADMIN — HEPARIN SODIUM 5000 UNITS: 5000 INJECTION INTRAVENOUS; SUBCUTANEOUS at 05:11

## 2021-12-28 RX ADMIN — INSULIN LISPRO 12 UNITS: 100 INJECTION, SOLUTION INTRAVENOUS; SUBCUTANEOUS at 16:54

## 2021-12-28 RX ADMIN — IPRATROPIUM BROMIDE AND ALBUTEROL SULFATE 3 ML: 2.5; .5 SOLUTION RESPIRATORY (INHALATION) at 20:55

## 2021-12-28 RX ADMIN — SODIUM CHLORIDE 75 ML/HR: 9 INJECTION, SOLUTION INTRAVENOUS at 02:09

## 2021-12-28 RX ADMIN — IPRATROPIUM BROMIDE AND ALBUTEROL SULFATE 3 ML: 2.5; .5 SOLUTION RESPIRATORY (INHALATION) at 08:30

## 2021-12-28 RX ADMIN — BUDESONIDE 0.5 MG: 0.5 INHALANT RESPIRATORY (INHALATION) at 20:55

## 2021-12-28 RX ADMIN — LISINOPRIL 40 MG: 20 TABLET ORAL at 21:23

## 2021-12-28 RX ADMIN — HEPARIN SODIUM 5000 UNITS: 5000 INJECTION INTRAVENOUS; SUBCUTANEOUS at 12:54

## 2021-12-28 RX ADMIN — BUDESONIDE 0.5 MG: 0.5 INHALANT RESPIRATORY (INHALATION) at 08:34

## 2021-12-28 RX ADMIN — GABAPENTIN 300 MG: 300 CAPSULE ORAL at 21:22

## 2021-12-28 RX ADMIN — METHYLPREDNISOLONE SODIUM SUCCINATE 40 MG: 40 INJECTION, POWDER, FOR SOLUTION INTRAMUSCULAR; INTRAVENOUS at 05:11

## 2021-12-28 RX ADMIN — HEPARIN SODIUM 5000 UNITS: 5000 INJECTION INTRAVENOUS; SUBCUTANEOUS at 21:23

## 2021-12-28 RX ADMIN — SODIUM CHLORIDE 75 ML/HR: 9 INJECTION, SOLUTION INTRAVENOUS at 15:30

## 2021-12-28 RX ADMIN — INSULIN LISPRO 8 UNITS: 100 INJECTION, SOLUTION INTRAVENOUS; SUBCUTANEOUS at 12:49

## 2021-12-28 RX ADMIN — ATORVASTATIN CALCIUM 10 MG: 10 TABLET, FILM COATED ORAL at 08:31

## 2021-12-28 RX ADMIN — SERTRALINE 50 MG: 50 TABLET, FILM COATED ORAL at 21:23

## 2021-12-28 RX ADMIN — DIPHENHYDRAMINE HYDROCHLORIDE 25 MG: 50 INJECTION, SOLUTION INTRAMUSCULAR; INTRAVENOUS at 21:24

## 2021-12-28 RX ADMIN — IPRATROPIUM BROMIDE AND ALBUTEROL SULFATE 3 ML: 2.5; .5 SOLUTION RESPIRATORY (INHALATION) at 23:24

## 2021-12-28 RX ADMIN — PIOGLITAZONE 30 MG: 30 TABLET ORAL at 21:23

## 2021-12-28 RX ADMIN — IPRATROPIUM BROMIDE AND ALBUTEROL SULFATE 3 ML: 2.5; .5 SOLUTION RESPIRATORY (INHALATION) at 11:00

## 2021-12-29 VITALS
RESPIRATION RATE: 18 BRPM | HEART RATE: 62 BPM | HEIGHT: 67 IN | DIASTOLIC BLOOD PRESSURE: 71 MMHG | TEMPERATURE: 97.8 F | SYSTOLIC BLOOD PRESSURE: 135 MMHG | BODY MASS INDEX: 36.54 KG/M2 | OXYGEN SATURATION: 96 % | WEIGHT: 232.81 LBS

## 2021-12-29 LAB
GLUCOSE BLDC GLUCOMTR-MCNC: 175 MG/DL (ref 70–105)
GLUCOSE BLDC GLUCOMTR-MCNC: 207 MG/DL (ref 70–105)
QT INTERVAL: 425 MS

## 2021-12-29 PROCEDURE — 99239 HOSP IP/OBS DSCHRG MGMT >30: CPT | Performed by: HOSPITALIST

## 2021-12-29 PROCEDURE — 25010000002 HEPARIN (PORCINE) PER 1000 UNITS: Performed by: HOSPITALIST

## 2021-12-29 PROCEDURE — 25010000002 METHYLPREDNISOLONE PER 40 MG: Performed by: HOSPITALIST

## 2021-12-29 PROCEDURE — 82962 GLUCOSE BLOOD TEST: CPT

## 2021-12-29 PROCEDURE — 63710000001 INSULIN LISPRO (HUMAN) PER 5 UNITS: Performed by: HOSPITALIST

## 2021-12-29 PROCEDURE — 94618 PULMONARY STRESS TESTING: CPT

## 2021-12-29 PROCEDURE — 94799 UNLISTED PULMONARY SVC/PX: CPT

## 2021-12-29 RX ORDER — PREDNISONE 20 MG/1
TABLET ORAL
Qty: 16 TABLET | Refills: 0 | Status: SHIPPED | OUTPATIENT
Start: 2021-12-29 | End: 2022-01-08

## 2021-12-29 RX ORDER — INSULIN LISPRO 100 [IU]/ML
0-14 INJECTION, SOLUTION INTRAVENOUS; SUBCUTANEOUS AS NEEDED
Status: DISCONTINUED | OUTPATIENT
Start: 2021-12-29 | End: 2021-12-29 | Stop reason: HOSPADM

## 2021-12-29 RX ORDER — METHYLPREDNISOLONE 4 MG/1
TABLET ORAL
Qty: 1 EACH | Refills: 0 | Status: SHIPPED | OUTPATIENT
Start: 2021-12-29 | End: 2021-12-29 | Stop reason: HOSPADM

## 2021-12-29 RX ORDER — INSULIN LISPRO 100 [IU]/ML
0-14 INJECTION, SOLUTION INTRAVENOUS; SUBCUTANEOUS
Status: DISCONTINUED | OUTPATIENT
Start: 2021-12-29 | End: 2021-12-29 | Stop reason: HOSPADM

## 2021-12-29 RX ADMIN — ATORVASTATIN CALCIUM 10 MG: 10 TABLET, FILM COATED ORAL at 08:07

## 2021-12-29 RX ADMIN — METHYLPREDNISOLONE SODIUM SUCCINATE 40 MG: 40 INJECTION, POWDER, FOR SOLUTION INTRAMUSCULAR; INTRAVENOUS at 08:09

## 2021-12-29 RX ADMIN — IPRATROPIUM BROMIDE AND ALBUTEROL SULFATE 3 ML: 2.5; .5 SOLUTION RESPIRATORY (INHALATION) at 07:02

## 2021-12-29 RX ADMIN — BUDESONIDE 0.5 MG: 0.5 INHALANT RESPIRATORY (INHALATION) at 07:06

## 2021-12-29 RX ADMIN — SODIUM CHLORIDE, PRESERVATIVE FREE 3 ML: 5 INJECTION INTRAVENOUS at 08:08

## 2021-12-29 RX ADMIN — IPRATROPIUM BROMIDE AND ALBUTEROL SULFATE 3 ML: 2.5; .5 SOLUTION RESPIRATORY (INHALATION) at 11:59

## 2021-12-29 RX ADMIN — BISOPROLOL FUMARATE 5 MG: 5 TABLET, FILM COATED ORAL at 08:07

## 2021-12-29 RX ADMIN — GABAPENTIN 300 MG: 300 CAPSULE ORAL at 08:07

## 2021-12-29 RX ADMIN — INSULIN LISPRO 3 UNITS: 100 INJECTION, SOLUTION INTRAVENOUS; SUBCUTANEOUS at 08:08

## 2021-12-29 RX ADMIN — HEPARIN SODIUM 5000 UNITS: 5000 INJECTION INTRAVENOUS; SUBCUTANEOUS at 06:26

## 2021-12-30 ENCOUNTER — READMISSION MANAGEMENT (OUTPATIENT)
Dept: CALL CENTER | Facility: HOSPITAL | Age: 71
End: 2021-12-30

## 2021-12-30 NOTE — OUTREACH NOTE
Prep Survey      Responses   [facility] facility patient discharged from? [facility]   Is LACE score < 7 ? No   Emergency Room discharge w/ pulse ox? No   Eligibility Readm Mgmt   Discharge diagnosis COPD   Does the patient have one of the following disease processes/diagnoses(primary or secondary)? COPD/Pneumonia   Does the patient have Home health ordered? No   Is there a DME ordered? Yes   What DME was ordered? Oxygen   Comments regarding appointments Follow up with Bert Rojas MD   Prep survey completed? Yes          Marci Dc RN

## 2022-01-01 LAB
BACTERIA SPEC AEROBE CULT: NORMAL
BACTERIA SPEC AEROBE CULT: NORMAL

## 2022-01-05 ENCOUNTER — READMISSION MANAGEMENT (OUTPATIENT)
Dept: CALL CENTER | Facility: HOSPITAL | Age: 72
End: 2022-01-05

## 2022-01-05 NOTE — OUTREACH NOTE
COPD/PN Week 1 Survey      Responses   [facility] patient discharged from? [facility]   Does the patient have one of the following disease processes/diagnoses(primary or secondary)? COPD/Pneumonia   Was the primary reason for admission: COPD exacerbation   Week 1 attempt successful? Yes   Call start time 1647   Call end time 1654   Meds reviewed with patient/caregiver? Yes   Is the patient having any side effects they believe may be caused by any medication additions or changes? No   Does the patient have all medications ordered at discharge? Yes   Is the patient taking all medications as directed (includes completed medication regime)? Yes   Has home health visited the patient within 72 hours of discharge? N/A   Pulse Ox monitoring Intermittent   Pulse Ox device source Hospital   O2 Sat comments 90-91 %  without the oxygen   Psychosocial issues? No   What is the patient's perception of their health status since discharge? Improving  [feels he is improving]   Are the patient's immunizations up to date?  --  [flu vaccination is UTD,covd vaccination is utd. No booster]   If the patient is a current smoker, are they able to teach back resources for cessation? --  [has decreased to six cigarettes a day]   Is the patient/caregiver able to teach back the hierarchy of who to call/visit for symptoms/problems? PCP, Specialist, Home health nurse, Urgent Care, ED, 911 Yes   Is the patient able to teach back COPD zones? No   Patient reports what zone on this call? Green Zone  [patient is between the green and yellow]   Yellow Zone Increased shortness of air   Week 1 call completed? Yes   Wrap up additional comments feels he is improving          Ángela Gutierrez RN

## 2022-08-02 ENCOUNTER — TELEPHONE (OUTPATIENT)
Dept: ORTHOPEDIC SURGERY | Facility: CLINIC | Age: 72
End: 2022-08-02

## 2022-08-02 DIAGNOSIS — M17.12 PRIMARY OSTEOARTHRITIS OF LEFT KNEE: Primary | ICD-10-CM

## 2022-08-02 NOTE — TELEPHONE ENCOUNTER
SPOKE WITH PATIENT LET HIM KNOW I SUBMITTED FOR APPROVAL, WILL CALL TO SCHEDULED ONCE APPROVED. PATIENT UNDERSTOOD

## 2022-08-02 NOTE — TELEPHONE ENCOUNTER
Caller: JAYANT LACKEY     Relationship to patient: PATIENT    Best call back number:[phone]    Chief complaint: LEFT KNEE     Type of visit: MONOVISC INJECTION     Requested date: ASAP

## 2022-08-12 ENCOUNTER — OFFICE VISIT (OUTPATIENT)
Dept: ORTHOPEDIC SURGERY | Facility: CLINIC | Age: 72
End: 2022-08-12

## 2022-08-12 VITALS
HEIGHT: 67 IN | HEART RATE: 48 BPM | SYSTOLIC BLOOD PRESSURE: 138 MMHG | BODY MASS INDEX: 38.17 KG/M2 | WEIGHT: 243.2 LBS | DIASTOLIC BLOOD PRESSURE: 71 MMHG

## 2022-08-12 DIAGNOSIS — M17.12 PRIMARY OSTEOARTHRITIS OF LEFT KNEE: Primary | ICD-10-CM

## 2022-08-12 DIAGNOSIS — E66.01 MORBID OBESITY: ICD-10-CM

## 2022-08-12 PROCEDURE — 20610 DRAIN/INJ JOINT/BURSA W/O US: CPT | Performed by: PHYSICIAN ASSISTANT

## 2022-08-12 PROCEDURE — 99213 OFFICE O/P EST LOW 20 MIN: CPT | Performed by: PHYSICIAN ASSISTANT

## 2022-08-12 RX ORDER — IPRATROPIUM BROMIDE AND ALBUTEROL SULFATE 2.5; .5 MG/3ML; MG/3ML
3 SOLUTION RESPIRATORY (INHALATION) EVERY 6 HOURS PRN
COMMUNITY

## 2022-08-12 RX ORDER — HYDROCODONE BITARTRATE AND ACETAMINOPHEN 5; 325 MG/1; MG/1
1 TABLET ORAL EVERY 6 HOURS PRN
Qty: 20 TABLET | Refills: 0 | Status: SHIPPED | OUTPATIENT
Start: 2022-08-12 | End: 2023-01-13

## 2022-08-12 RX ORDER — LIDOCAINE HYDROCHLORIDE 10 MG/ML
3 INJECTION, SOLUTION EPIDURAL; INFILTRATION; INTRACAUDAL; PERINEURAL
Status: COMPLETED | OUTPATIENT
Start: 2022-08-12 | End: 2022-08-12

## 2022-08-12 RX ADMIN — LIDOCAINE HYDROCHLORIDE 3 ML: 10 INJECTION, SOLUTION EPIDURAL; INFILTRATION; INTRACAUDAL; PERINEURAL at 11:01

## 2022-08-12 NOTE — PROGRESS NOTES
ORTHO FOLLOW UP       Subjective:    HPI:   Brenden Peter is a 71 y.o. male who presents in follow-up for his left knee pain with a known history of left knee DJD.  He last had a Monovisc injection on 2021, which did help to reduce his pain.  He reports that he has been in moderate to severe pain for the last week or 2 and is requesting something for the pain.      Past Medical History:   Diagnosis Date   • Abnormal EKG 2/3/2015   • Anxiety    • COPD (chronic obstructive pulmonary disease) ([facility])    • Coronary artery disease 3/3/2015   • Diabetes ([facility])     type 2   • Hyperlipidemia    • Hypertension    • Microalbuminuria due to type 2 diabetes mellitus ([facility]) 2020   • Multinodular goiter 2019   • Murmur 2/3/2015   • Primary osteoarthritis of left knee 10/22/2021       Past Surgical History:   Procedure Laterality Date   • EYE SURGERY      cataract   • TOTAL KNEE ARTHROPLASTY Right 10/21/2020    Procedure: TOTAL KNEE ARTHROPLASTY;  Surgeon: Ravi Fagan MD;  Location: [facility];  Service: Orthopedics;  Laterality: Right;   • TOTAL KNEE ARTHROPLASTY REVISION Right 2021    Procedure: KNEE POLY INSERT EXCHANGE with irrigation;  Surgeon: Ravi Fagan MD;  Location: [facility];  Service: Orthopedics;  Laterality: Right;       Social History     Occupational History   • Not on file   Tobacco Use   • Smoking status: Former Smoker     Packs/day: 1.00     Types: Cigarettes     Quit date: 7/3/2022     Years since quittin.1   • Smokeless tobacco: Former User     Quit date: 7/3/2022   Vaping Use   • Vaping Use: Never used   Substance and Sexual Activity   • Alcohol use: Not Currently   • Drug use: Never   • Sexual activity: Defer      The following portions of the patient's history were reviewed and updated as appropriate: allergies, current medications, past family history, past medical history, past social history, past surgical history and problem list.    Medications:    Current  Outpatient Medications:   •  albuterol (PROVENTIL) (2.5 MG/3ML) 0.083% nebulizer solution, Take 3 mL by nebulization 3 (Three) Times a Day As Needed for Wheezing or Shortness of Air., Disp: , Rfl:   •  albuterol sulfate  (90 Base) MCG/ACT inhaler, Inhale 2 puffs Every 6 (Six) Hours As Needed., Disp: , Rfl:   •  amLODIPine (NORVASC) 5 MG tablet, Take 5 mg by mouth Every Night., Disp: , Rfl:   •  bisoprolol (ZEBeta) 5 MG tablet, Take 5 mg by mouth Daily., Disp: , Rfl:   •  gabapentin (NEURONTIN) 300 MG capsule, Take 300 mg by mouth 2 (two) times a day., Disp: , Rfl:   •  glipizide (GLUCOTROL) 10 MG tablet, Take 10 mg by mouth Every Night. HOLD the night before surgery, Disp: , Rfl:   •  ipratropium-albuterol (DUO-NEB) 0.5-2.5 mg/3 ml nebulizer, 3 mL Every 6 (Six) Hours., Disp: , Rfl:   •  lisinopril (PRINIVIL,ZESTRIL) 40 MG tablet, Take 40 mg by mouth Every Night. HOLD 24 hours before surgery, Disp: , Rfl:   •  metFORMIN (GLUCOPHAGE) 1000 MG tablet, Take 1,000 mg by mouth 2 (Two) Times a Day With Meals. HOLD 48 hours before surgery, Disp: , Rfl:   •  pioglitazone (ACTOS) 30 MG tablet, Take 30 mg by mouth Every Night. HOLD the night before surgery, Disp: , Rfl:   •  pravastatin (PRAVACHOL) 20 MG tablet, 20 mg Daily., Disp: , Rfl:   •  sertraline (ZOLOFT) 50 MG tablet, Take 50 mg by mouth Every Night., Disp: , Rfl:   •  umeclidinium-vilanterol (ANORO ELLIPTA) 62.5-25 MCG/INH aerosol powder  inhaler, Anoro Ellipta 62.5 mcg-25 mcg/actuation powder for inhalation  Inhale 1 puff every day by inhalation route for 30 days., Disp: , Rfl:   •  vitamin D (ERGOCALCIFEROL) 1.25 MG (48619 UT) capsule capsule, 50,000 Units 2 (Two) Times a Week. [place] and Fri, Disp: , Rfl:   •  HYDROcodone-acetaminophen (NORCO) 5-325 MG per tablet, Take 1 tablet by mouth Every 6 (Six) Hours As Needed for Moderate Pain ., Disp: 20 tablet, Rfl: 0    Allergies:  No Known Allergies    Review of Systems:  Gen -no fever, chills , sweats, headache  "  Eyes - no irritation or discharge   ENT -  no ear pain , runny nose , sore throat , difficulty swallowing   Resp - no cough , congestion , excessive expectoration   CVS - no chest pain , palpitations.   Abd - no pain , nausea , vomiting , diarrhea   Skin - no rash , lesions.   Neuro - no dizziness    Please see HPI for any other pertinent positives.  All other systems were reviewed and are negative.       Objective   Objective:    /71   Pulse (!) 48   Ht 170.2 cm (67\")   Wt 110 kg (243 lb 3.2 oz)   BMI 38.09 kg/m²     Physical Examination:  Alert, oriented, obese individual in no acute distress, ambulating unassisted  Left lower extremity shows no erythema, rashes, or open skin lesions. There is a mild amount of swelling with pitting edema. It is grossly well aligned, and the patient is neurovascularly intact distally. The knee is stable to varus and valgus stress, there is no patellar maltracking or crepitus noted, and plantar and dorsiflexion is 5/5. There is mild tenderness to palpation and with range of motion, which is about 0-120.         Imaging:  no diagnostic testing performed this visit            Assessment:  1. Primary osteoarthritis of left knee    2. Morbid obesity (HCC)                 Plan:  Left knee Monovisc injection today, risks and benefits were discussed and postinjection instructions were given.  I will give him a small amount of Norco for his pain, as it can take up to 4 weeks before you see the full effects of viscosupplementation.  Risks and addiction properties were discussed.  Continue weight loss efforts, and follow-up as needed.  - Large Joint Arthrocentesis: L knee on 8/12/2022 11:01 AM  Indications: pain  Details: 22 G needle, anterolateral approach  Medications: 3 mL lidocaine PF 1% 1 %; 88 mg Hyaluronan 88 MG/4ML  Outcome: tolerated well, no immediate complications  Procedure, treatment alternatives, risks and benefits explained, specific risks discussed. Consent was " given by the patient. Immediately prior to procedure a time out was called to verify the correct patient, procedure, equipment, support staff and site/side marked as required. Patient was prepped and draped in the usual sterile fashion.       After consent was obtained and a time out was properly performed, the left knee was prepped with alcohol and chlorhexidine. A 25 gauge needle was used to inject 3 cc of 1% lidocaine, following this a 22 gauge needle was used to inject one syringe of Monovisc. Sterile technique was used and the patient tolerated the procedure well.               JOSE Willard  08/12/22  11:03 EDT

## 2022-09-04 ENCOUNTER — HOSPITAL ENCOUNTER (INPATIENT)
Facility: HOSPITAL | Age: 72
LOS: 1 days | Discharge: HOME | End: 2022-09-06
Attending: EMERGENCY MEDICINE | Admitting: INTERNAL MEDICINE

## 2022-09-04 ENCOUNTER — APPOINTMENT (OUTPATIENT)
Dept: GENERAL RADIOLOGY | Facility: HOSPITAL | Age: 72
End: 2022-09-04

## 2022-09-04 DIAGNOSIS — J18.9 PNEUMONIA DUE TO INFECTIOUS ORGANISM, UNSPECIFIED LATERALITY, UNSPECIFIED PART OF LUNG: ICD-10-CM

## 2022-09-04 DIAGNOSIS — R06.02 SHORTNESS OF BREATH: Primary | ICD-10-CM

## 2022-09-04 DIAGNOSIS — J44.1 COPD EXACERBATION: ICD-10-CM

## 2022-09-04 LAB
ALBUMIN SERPL-MCNC: 4.4 G/DL (ref 3.5–5.2)
ALBUMIN/GLOB SERPL: 1.3 G/DL
ALP SERPL-CCNC: 59 U/L (ref 39–117)
ALT SERPL W P-5'-P-CCNC: 14 U/L (ref 1–41)
ANION GAP SERPL CALCULATED.3IONS-SCNC: 9 MMOL/L (ref 5–15)
ARTERIAL PATENCY WRIST A: POSITIVE
AST SERPL-CCNC: 15 U/L (ref 1–40)
ATMOSPHERIC PRESS: ABNORMAL MM[HG]
B PARAPERT DNA SPEC QL NAA+PROBE: NOT DETECTED
B PERT DNA SPEC QL NAA+PROBE: NOT DETECTED
BASE EXCESS BLDA CALC-SCNC: 1.2 MMOL/L (ref 0–3)
BASOPHILS # BLD AUTO: 0 10*3/MM3 (ref 0–0.2)
BASOPHILS NFR BLD AUTO: 0.6 % (ref 0–1.5)
BDY SITE: ABNORMAL
BILIRUB SERPL-MCNC: 0.4 MG/DL (ref 0–1.2)
BUN SERPL-MCNC: 15 MG/DL (ref 8–23)
BUN/CREAT SERPL: 22.7 (ref 7–25)
C PNEUM DNA NPH QL NAA+NON-PROBE: NOT DETECTED
CALCIUM SPEC-SCNC: 9.5 MG/DL (ref 8.6–10.5)
CHLORIDE SERPL-SCNC: 104 MMOL/L (ref 98–107)
CO2 BLDA-SCNC: 27.1 MMOL/L (ref 22–29)
CO2 SERPL-SCNC: 29 MMOL/L (ref 22–29)
CREAT SERPL-MCNC: 0.66 MG/DL (ref 0.76–1.27)
DEPRECATED RDW RBC AUTO: 49.4 FL (ref 37–54)
EGFRCR SERPLBLD CKD-EPI 2021: 100.3 ML/MIN/1.73
EOSINOPHIL # BLD AUTO: 0.1 10*3/MM3 (ref 0–0.4)
EOSINOPHIL NFR BLD AUTO: 1.9 % (ref 0.3–6.2)
ERYTHROCYTE [DISTWIDTH] IN BLOOD BY AUTOMATED COUNT: 16 % (ref 12.3–15.4)
FLUAV SUBTYP SPEC NAA+PROBE: NOT DETECTED
FLUBV RNA ISLT QL NAA+PROBE: NOT DETECTED
GLOBULIN UR ELPH-MCNC: 3.4 GM/DL
GLUCOSE SERPL-MCNC: 60 MG/DL (ref 65–99)
HADV DNA SPEC NAA+PROBE: NOT DETECTED
HCO3 BLDA-SCNC: 25.8 MMOL/L (ref 21–28)
HCOV 229E RNA SPEC QL NAA+PROBE: NOT DETECTED
HCOV HKU1 RNA SPEC QL NAA+PROBE: NOT DETECTED
HCOV NL63 RNA SPEC QL NAA+PROBE: NOT DETECTED
HCOV OC43 RNA SPEC QL NAA+PROBE: NOT DETECTED
HCT VFR BLD AUTO: 34.6 % (ref 37.5–51)
HEMODILUTION: NO
HGB BLD-MCNC: 11.2 G/DL (ref 13–17.7)
HMPV RNA NPH QL NAA+NON-PROBE: NOT DETECTED
HPIV1 RNA ISLT QL NAA+PROBE: NOT DETECTED
HPIV2 RNA SPEC QL NAA+PROBE: NOT DETECTED
HPIV3 RNA NPH QL NAA+PROBE: NOT DETECTED
HPIV4 P GENE NPH QL NAA+PROBE: NOT DETECTED
INHALED O2 CONCENTRATION: 36 %
LYMPHOCYTES # BLD AUTO: 1.5 10*3/MM3 (ref 0.7–3.1)
LYMPHOCYTES NFR BLD AUTO: 31.3 % (ref 19.6–45.3)
M PNEUMO IGG SER IA-ACNC: NOT DETECTED
MCH RBC QN AUTO: 28.3 PG (ref 26.6–33)
MCHC RBC AUTO-ENTMCNC: 32.4 G/DL (ref 31.5–35.7)
MCV RBC AUTO: 87.3 FL (ref 79–97)
MODALITY: ABNORMAL
MONOCYTES # BLD AUTO: 0.4 10*3/MM3 (ref 0.1–0.9)
MONOCYTES NFR BLD AUTO: 7.5 % (ref 5–12)
NEUTROPHILS NFR BLD AUTO: 2.8 10*3/MM3 (ref 1.7–7)
NEUTROPHILS NFR BLD AUTO: 58.7 % (ref 42.7–76)
NRBC BLD AUTO-RTO: 0 /100 WBC (ref 0–0.2)
NT-PROBNP SERPL-MCNC: 289.2 PG/ML (ref 0–900)
PCO2 BLDA: 40 MM HG (ref 35–48)
PH BLDA: 7.42 PH UNITS (ref 7.35–7.45)
PLATELET # BLD AUTO: 164 10*3/MM3 (ref 140–450)
PMV BLD AUTO: 8.6 FL (ref 6–12)
PO2 BLDA: 59.7 MM HG (ref 83–108)
POTASSIUM SERPL-SCNC: 3.7 MMOL/L (ref 3.5–5.2)
PROT SERPL-MCNC: 7.8 G/DL (ref 6–8.5)
RBC # BLD AUTO: 3.97 10*6/MM3 (ref 4.14–5.8)
RHINOVIRUS RNA SPEC NAA+PROBE: NOT DETECTED
RSV RNA NPH QL NAA+NON-PROBE: NOT DETECTED
SAO2 % BLDCOA: 90.9 % (ref 94–98)
SARS-COV-2 RNA NPH QL NAA+NON-PROBE: NOT DETECTED
SODIUM SERPL-SCNC: 142 MMOL/L (ref 136–145)
TROPONIN T SERPL-MCNC: <0.01 NG/ML (ref 0–0.03)
WBC NRBC COR # BLD: 4.8 10*3/MM3 (ref 3.4–10.8)

## 2022-09-04 PROCEDURE — 36600 WITHDRAWAL OF ARTERIAL BLOOD: CPT

## 2022-09-04 PROCEDURE — 84484 ASSAY OF TROPONIN QUANT: CPT | Performed by: NURSE PRACTITIONER

## 2022-09-04 PROCEDURE — 84443 ASSAY THYROID STIM HORMONE: CPT | Performed by: NURSE PRACTITIONER

## 2022-09-04 PROCEDURE — 94799 UNLISTED PULMONARY SVC/PX: CPT

## 2022-09-04 PROCEDURE — 82077 ASSAY SPEC XCP UR&BREATH IA: CPT | Performed by: NURSE PRACTITIONER

## 2022-09-04 PROCEDURE — 99284 EMERGENCY DEPT VISIT MOD MDM: CPT

## 2022-09-04 PROCEDURE — 85025 COMPLETE CBC W/AUTO DIFF WBC: CPT | Performed by: NURSE PRACTITIONER

## 2022-09-04 PROCEDURE — 0202U NFCT DS 22 TRGT SARS-COV-2: CPT | Performed by: NURSE PRACTITIONER

## 2022-09-04 PROCEDURE — 80053 COMPREHEN METABOLIC PANEL: CPT | Performed by: NURSE PRACTITIONER

## 2022-09-04 PROCEDURE — 83880 ASSAY OF NATRIURETIC PEPTIDE: CPT | Performed by: NURSE PRACTITIONER

## 2022-09-04 PROCEDURE — 99222 1ST HOSP IP/OBS MODERATE 55: CPT | Performed by: NURSE PRACTITIONER

## 2022-09-04 PROCEDURE — 82803 BLOOD GASES ANY COMBINATION: CPT

## 2022-09-04 PROCEDURE — 71045 X-RAY EXAM CHEST 1 VIEW: CPT

## 2022-09-04 PROCEDURE — 36415 COLL VENOUS BLD VENIPUNCTURE: CPT

## 2022-09-04 PROCEDURE — 84100 ASSAY OF PHOSPHORUS: CPT | Performed by: NURSE PRACTITIONER

## 2022-09-04 PROCEDURE — 93005 ELECTROCARDIOGRAM TRACING: CPT | Performed by: EMERGENCY MEDICINE

## 2022-09-04 PROCEDURE — 94640 AIRWAY INHALATION TREATMENT: CPT

## 2022-09-04 RX ORDER — ALBUTEROL SULFATE 90 UG/1
2 AEROSOL, METERED RESPIRATORY (INHALATION) ONCE
Status: COMPLETED | OUTPATIENT
Start: 2022-09-04 | End: 2022-09-04

## 2022-09-04 RX ORDER — IPRATROPIUM BROMIDE AND ALBUTEROL SULFATE 2.5; .5 MG/3ML; MG/3ML
3 SOLUTION RESPIRATORY (INHALATION) ONCE
Status: COMPLETED | OUTPATIENT
Start: 2022-09-04 | End: 2022-09-04

## 2022-09-04 RX ORDER — METHYLPREDNISOLONE SODIUM SUCCINATE 125 MG/2ML
125 INJECTION, POWDER, LYOPHILIZED, FOR SOLUTION INTRAMUSCULAR; INTRAVENOUS ONCE
Status: COMPLETED | OUTPATIENT
Start: 2022-09-04 | End: 2022-09-05

## 2022-09-04 RX ORDER — SODIUM CHLORIDE 0.9 % (FLUSH) 0.9 %
10 SYRINGE (ML) INJECTION AS NEEDED
Status: DISCONTINUED | OUTPATIENT
Start: 2022-09-04 | End: 2022-09-06 | Stop reason: HOSPADM

## 2022-09-04 RX ADMIN — IPRATROPIUM BROMIDE AND ALBUTEROL SULFATE 3 ML: .5; 3 SOLUTION RESPIRATORY (INHALATION) at 22:48

## 2022-09-04 RX ADMIN — ALBUTEROL SULFATE 2 PUFF: 108 INHALANT RESPIRATORY (INHALATION) at 21:58

## 2022-09-05 ENCOUNTER — APPOINTMENT (OUTPATIENT)
Dept: CARDIOLOGY | Facility: HOSPITAL | Age: 72
End: 2022-09-05

## 2022-09-05 PROBLEM — M17.12 PRIMARY OSTEOARTHRITIS OF LEFT KNEE: Status: RESOLVED | Noted: 2021-10-22 | Resolved: 2022-09-05

## 2022-09-05 PROBLEM — J44.1 COPD EXACERBATION (HCC): Status: ACTIVE | Noted: 2022-09-05

## 2022-09-05 PROBLEM — R06.09 DYSPNEA ON EXERTION: Status: RESOLVED | Noted: 2020-10-15 | Resolved: 2022-09-05

## 2022-09-05 PROBLEM — N18.2 STAGE 2 CHRONIC KIDNEY DISEASE: Chronic | Status: RESOLVED | Noted: 2020-10-27 | Resolved: 2022-09-05

## 2022-09-05 PROBLEM — J96.01 ACUTE RESPIRATORY FAILURE WITH HYPOXIA: Status: RESOLVED | Noted: 2021-12-27 | Resolved: 2022-09-05

## 2022-09-05 PROBLEM — A41.9 SEPSIS (HCC): Status: ACTIVE | Noted: 2022-09-05

## 2022-09-05 PROBLEM — Z01.810 PREOP CARDIOVASCULAR EXAM: Status: RESOLVED | Noted: 2020-10-15 | Resolved: 2022-09-05

## 2022-09-05 PROBLEM — E66.9 OBESITY (BMI 30-39.9): Status: RESOLVED | Noted: 2021-05-27 | Resolved: 2022-09-05

## 2022-09-05 PROBLEM — IMO0002 UNCONTROLLED TYPE 2 DIABETES MELLITUS: Status: RESOLVED | Noted: 2020-08-12 | Resolved: 2022-09-05

## 2022-09-05 PROBLEM — R00.1 BRADYCARDIA: Status: ACTIVE | Noted: 2022-09-05

## 2022-09-05 PROBLEM — R06.02 SHORTNESS OF BREATH: Status: ACTIVE | Noted: 2022-09-05

## 2022-09-05 LAB
ALBUMIN SERPL-MCNC: 3.9 G/DL (ref 3.5–5.2)
ALBUMIN/GLOB SERPL: 1.1 G/DL
ALP SERPL-CCNC: 56 U/L (ref 39–117)
ALT SERPL W P-5'-P-CCNC: 14 U/L (ref 1–41)
AMPHET+METHAMPHET UR QL: NEGATIVE
ANION GAP SERPL CALCULATED.3IONS-SCNC: 11 MMOL/L (ref 5–15)
ANION GAP SERPL CALCULATED.3IONS-SCNC: 13 MMOL/L (ref 5–15)
AST SERPL-CCNC: 15 U/L (ref 1–40)
BARBITURATES UR QL SCN: NEGATIVE
BASOPHILS # BLD AUTO: 0 10*3/MM3 (ref 0–0.2)
BASOPHILS # BLD AUTO: 0 10*3/MM3 (ref 0–0.2)
BASOPHILS NFR BLD AUTO: 0.2 % (ref 0–1.5)
BASOPHILS NFR BLD AUTO: 0.5 % (ref 0–1.5)
BENZODIAZ UR QL SCN: NEGATIVE
BILIRUB SERPL-MCNC: 0.3 MG/DL (ref 0–1.2)
BUN SERPL-MCNC: 13 MG/DL (ref 8–23)
BUN SERPL-MCNC: 14 MG/DL (ref 8–23)
BUN/CREAT SERPL: 21.3 (ref 7–25)
BUN/CREAT SERPL: 22.2 (ref 7–25)
CA-I SERPL ISE-MCNC: 1.22 MMOL/L (ref 1.2–1.3)
CALCIUM SPEC-SCNC: 9.1 MG/DL (ref 8.6–10.5)
CALCIUM SPEC-SCNC: 9.3 MG/DL (ref 8.6–10.5)
CANNABINOIDS SERPL QL: NEGATIVE
CHLORIDE SERPL-SCNC: 103 MMOL/L (ref 98–107)
CHLORIDE SERPL-SCNC: 105 MMOL/L (ref 98–107)
CO2 SERPL-SCNC: 24 MMOL/L (ref 22–29)
CO2 SERPL-SCNC: 27 MMOL/L (ref 22–29)
COCAINE UR QL: NEGATIVE
CREAT SERPL-MCNC: 0.61 MG/DL (ref 0.76–1.27)
CREAT SERPL-MCNC: 0.63 MG/DL (ref 0.76–1.27)
D-LACTATE SERPL-SCNC: 1.9 MMOL/L (ref 0.5–2)
D-LACTATE SERPL-SCNC: 2.3 MMOL/L (ref 0.5–2)
D-LACTATE SERPL-SCNC: 2.5 MMOL/L (ref 0.5–2)
D-LACTATE SERPL-SCNC: 2.5 MMOL/L (ref 0.5–2)
D-LACTATE SERPL-SCNC: 4.5 MMOL/L (ref 0.5–2)
D-LACTATE SERPL-SCNC: 4.7 MMOL/L (ref 0.5–2)
DEPRECATED RDW RBC AUTO: 49.4 FL (ref 37–54)
DEPRECATED RDW RBC AUTO: 50.8 FL (ref 37–54)
EGFRCR SERPLBLD CKD-EPI 2021: 101.7 ML/MIN/1.73
EGFRCR SERPLBLD CKD-EPI 2021: 102.7 ML/MIN/1.73
EOSINOPHIL # BLD AUTO: 0 10*3/MM3 (ref 0–0.4)
EOSINOPHIL # BLD AUTO: 0 10*3/MM3 (ref 0–0.4)
EOSINOPHIL NFR BLD AUTO: 0.2 % (ref 0.3–6.2)
EOSINOPHIL NFR BLD AUTO: 0.6 % (ref 0.3–6.2)
ERYTHROCYTE [DISTWIDTH] IN BLOOD BY AUTOMATED COUNT: 16 % (ref 12.3–15.4)
ERYTHROCYTE [DISTWIDTH] IN BLOOD BY AUTOMATED COUNT: 16.2 % (ref 12.3–15.4)
ETHANOL UR QL: <0.01 %
GLOBULIN UR ELPH-MCNC: 3.4 GM/DL
GLUCOSE BLDC GLUCOMTR-MCNC: 228 MG/DL (ref 70–105)
GLUCOSE BLDC GLUCOMTR-MCNC: 264 MG/DL (ref 70–105)
GLUCOSE BLDC GLUCOMTR-MCNC: 288 MG/DL (ref 70–105)
GLUCOSE BLDC GLUCOMTR-MCNC: 293 MG/DL (ref 70–105)
GLUCOSE BLDC GLUCOMTR-MCNC: 301 MG/DL (ref 70–105)
GLUCOSE SERPL-MCNC: 115 MG/DL (ref 65–99)
GLUCOSE SERPL-MCNC: 229 MG/DL (ref 65–99)
HBA1C MFR BLD: 5.8 % (ref 3.5–5.6)
HCT VFR BLD AUTO: 33.5 % (ref 37.5–51)
HCT VFR BLD AUTO: 34.5 % (ref 37.5–51)
HGB BLD-MCNC: 10.9 G/DL (ref 13–17.7)
HGB BLD-MCNC: 11.4 G/DL (ref 13–17.7)
LYMPHOCYTES # BLD AUTO: 0.5 10*3/MM3 (ref 0.7–3.1)
LYMPHOCYTES # BLD AUTO: 0.8 10*3/MM3 (ref 0.7–3.1)
LYMPHOCYTES NFR BLD AUTO: 11.7 % (ref 19.6–45.3)
LYMPHOCYTES NFR BLD AUTO: 6.9 % (ref 19.6–45.3)
MAGNESIUM SERPL-MCNC: 1.9 MG/DL (ref 1.6–2.4)
MCH RBC QN AUTO: 28.2 PG (ref 26.6–33)
MCH RBC QN AUTO: 28.9 PG (ref 26.6–33)
MCHC RBC AUTO-ENTMCNC: 32.4 G/DL (ref 31.5–35.7)
MCHC RBC AUTO-ENTMCNC: 32.9 G/DL (ref 31.5–35.7)
MCV RBC AUTO: 87 FL (ref 79–97)
MCV RBC AUTO: 87.7 FL (ref 79–97)
METHADONE UR QL SCN: NEGATIVE
MONOCYTES # BLD AUTO: 0.1 10*3/MM3 (ref 0.1–0.9)
MONOCYTES # BLD AUTO: 0.2 10*3/MM3 (ref 0.1–0.9)
MONOCYTES NFR BLD AUTO: 1.4 % (ref 5–12)
MONOCYTES NFR BLD AUTO: 2.4 % (ref 5–12)
NEUTROPHILS NFR BLD AUTO: 5.5 10*3/MM3 (ref 1.7–7)
NEUTROPHILS NFR BLD AUTO: 6.6 10*3/MM3 (ref 1.7–7)
NEUTROPHILS NFR BLD AUTO: 84.8 % (ref 42.7–76)
NEUTROPHILS NFR BLD AUTO: 91.3 % (ref 42.7–76)
NRBC BLD AUTO-RTO: 0.1 /100 WBC (ref 0–0.2)
NRBC BLD AUTO-RTO: 0.1 /100 WBC (ref 0–0.2)
OPIATES UR QL: POSITIVE
OXYCODONE UR QL SCN: NEGATIVE
PHOSPHATE SERPL-MCNC: 2.8 MG/DL (ref 2.5–4.5)
PLATELET # BLD AUTO: 160 10*3/MM3 (ref 140–450)
PLATELET # BLD AUTO: 164 10*3/MM3 (ref 140–450)
PMV BLD AUTO: 9.2 FL (ref 6–12)
PMV BLD AUTO: 9.2 FL (ref 6–12)
POTASSIUM SERPL-SCNC: 4.1 MMOL/L (ref 3.5–5.2)
POTASSIUM SERPL-SCNC: 4.1 MMOL/L (ref 3.5–5.2)
PROCALCITONIN SERPL-MCNC: 0.05 NG/ML (ref 0–0.25)
PROT SERPL-MCNC: 7.3 G/DL (ref 6–8.5)
RBC # BLD AUTO: 3.85 10*6/MM3 (ref 4.14–5.8)
RBC # BLD AUTO: 3.94 10*6/MM3 (ref 4.14–5.8)
SODIUM SERPL-SCNC: 140 MMOL/L (ref 136–145)
SODIUM SERPL-SCNC: 143 MMOL/L (ref 136–145)
TROPONIN T SERPL-MCNC: <0.01 NG/ML (ref 0–0.03)
TROPONIN T SERPL-MCNC: <0.01 NG/ML (ref 0–0.03)
TSH SERPL DL<=0.05 MIU/L-ACNC: 3.22 UIU/ML (ref 0.27–4.2)
WBC NRBC COR # BLD: 6.5 10*3/MM3 (ref 3.4–10.8)
WBC NRBC COR # BLD: 7.3 10*3/MM3 (ref 3.4–10.8)

## 2022-09-05 PROCEDURE — 80307 DRUG TEST PRSMV CHEM ANLYZR: CPT | Performed by: NURSE PRACTITIONER

## 2022-09-05 PROCEDURE — 87040 BLOOD CULTURE FOR BACTERIA: CPT | Performed by: NURSE PRACTITIONER

## 2022-09-05 PROCEDURE — 25010000002 CEFTRIAXONE PER 250 MG: Performed by: NURSE PRACTITIONER

## 2022-09-05 PROCEDURE — 85025 COMPLETE CBC W/AUTO DIFF WBC: CPT | Performed by: NURSE PRACTITIONER

## 2022-09-05 PROCEDURE — 25010000002 HEPARIN (PORCINE) PER 1000 UNITS: Performed by: INTERNAL MEDICINE

## 2022-09-05 PROCEDURE — 83605 ASSAY OF LACTIC ACID: CPT | Performed by: NURSE PRACTITIONER

## 2022-09-05 PROCEDURE — 93010 ELECTROCARDIOGRAM REPORT: CPT | Performed by: INTERNAL MEDICINE

## 2022-09-05 PROCEDURE — 94799 UNLISTED PULMONARY SVC/PX: CPT

## 2022-09-05 PROCEDURE — 94761 N-INVAS EAR/PLS OXIMETRY MLT: CPT

## 2022-09-05 PROCEDURE — 25010000002 AZITHROMYCIN PER 500 MG: Performed by: NURSE PRACTITIONER

## 2022-09-05 PROCEDURE — 94664 DEMO&/EVAL PT USE INHALER: CPT

## 2022-09-05 PROCEDURE — 83605 ASSAY OF LACTIC ACID: CPT

## 2022-09-05 PROCEDURE — 36415 COLL VENOUS BLD VENIPUNCTURE: CPT | Performed by: NURSE PRACTITIONER

## 2022-09-05 PROCEDURE — 93005 ELECTROCARDIOGRAM TRACING: CPT | Performed by: NURSE PRACTITIONER

## 2022-09-05 PROCEDURE — 93306 TTE W/DOPPLER COMPLETE: CPT

## 2022-09-05 PROCEDURE — 82962 GLUCOSE BLOOD TEST: CPT

## 2022-09-05 PROCEDURE — 83036 HEMOGLOBIN GLYCOSYLATED A1C: CPT | Performed by: NURSE PRACTITIONER

## 2022-09-05 PROCEDURE — 93005 ELECTROCARDIOGRAM TRACING: CPT | Performed by: INTERNAL MEDICINE

## 2022-09-05 PROCEDURE — 83735 ASSAY OF MAGNESIUM: CPT | Performed by: NURSE PRACTITIONER

## 2022-09-05 PROCEDURE — 25010000002 METHYLPREDNISOLONE PER 40 MG: Performed by: NURSE PRACTITIONER

## 2022-09-05 PROCEDURE — 63710000001 INSULIN GLARGINE PER 5 UNITS: Performed by: HOSPITALIST

## 2022-09-05 PROCEDURE — 80053 COMPREHEN METABOLIC PANEL: CPT | Performed by: NURSE PRACTITIONER

## 2022-09-05 PROCEDURE — 25010000002 METHYLPREDNISOLONE PER 125 MG: Performed by: NURSE PRACTITIONER

## 2022-09-05 PROCEDURE — 99222 1ST HOSP IP/OBS MODERATE 55: CPT | Performed by: INTERNAL MEDICINE

## 2022-09-05 PROCEDURE — 93306 TTE W/DOPPLER COMPLETE: CPT | Performed by: INTERNAL MEDICINE

## 2022-09-05 PROCEDURE — 63710000001 INSULIN LISPRO (HUMAN) PER 5 UNITS: Performed by: NURSE PRACTITIONER

## 2022-09-05 PROCEDURE — 82330 ASSAY OF CALCIUM: CPT | Performed by: NURSE PRACTITIONER

## 2022-09-05 PROCEDURE — 99232 SBSQ HOSP IP/OBS MODERATE 35: CPT | Performed by: INTERNAL MEDICINE

## 2022-09-05 PROCEDURE — 84484 ASSAY OF TROPONIN QUANT: CPT | Performed by: NURSE PRACTITIONER

## 2022-09-05 PROCEDURE — 84145 PROCALCITONIN (PCT): CPT | Performed by: NURSE PRACTITIONER

## 2022-09-05 RX ORDER — INSULIN LISPRO 100 [IU]/ML
0-9 INJECTION, SOLUTION INTRAVENOUS; SUBCUTANEOUS
Status: DISCONTINUED | OUTPATIENT
Start: 2022-09-05 | End: 2022-09-06

## 2022-09-05 RX ORDER — HYDRALAZINE HYDROCHLORIDE 20 MG/ML
10 INJECTION INTRAMUSCULAR; INTRAVENOUS EVERY 6 HOURS PRN
Status: DISCONTINUED | OUTPATIENT
Start: 2022-09-05 | End: 2022-09-06 | Stop reason: HOSPADM

## 2022-09-05 RX ORDER — POTASSIUM CHLORIDE 1.5 G/1.77G
40 POWDER, FOR SOLUTION ORAL AS NEEDED
Status: DISCONTINUED | OUTPATIENT
Start: 2022-09-05 | End: 2022-09-06 | Stop reason: HOSPADM

## 2022-09-05 RX ORDER — METHYLPREDNISOLONE SODIUM SUCCINATE 40 MG/ML
40 INJECTION, POWDER, LYOPHILIZED, FOR SOLUTION INTRAMUSCULAR; INTRAVENOUS EVERY 8 HOURS
Status: DISCONTINUED | OUTPATIENT
Start: 2022-09-05 | End: 2022-09-06

## 2022-09-05 RX ORDER — ONDANSETRON 2 MG/ML
4 INJECTION INTRAMUSCULAR; INTRAVENOUS EVERY 6 HOURS PRN
Status: DISCONTINUED | OUTPATIENT
Start: 2022-09-05 | End: 2022-09-06 | Stop reason: HOSPADM

## 2022-09-05 RX ORDER — MAGNESIUM SULFATE HEPTAHYDRATE 40 MG/ML
4 INJECTION, SOLUTION INTRAVENOUS AS NEEDED
Status: DISCONTINUED | OUTPATIENT
Start: 2022-09-05 | End: 2022-09-06 | Stop reason: HOSPADM

## 2022-09-05 RX ORDER — MAGNESIUM SULFATE HEPTAHYDRATE 40 MG/ML
2 INJECTION, SOLUTION INTRAVENOUS AS NEEDED
Status: DISCONTINUED | OUTPATIENT
Start: 2022-09-05 | End: 2022-09-06 | Stop reason: HOSPADM

## 2022-09-05 RX ORDER — SODIUM CHLORIDE 0.9 % (FLUSH) 0.9 %
10 SYRINGE (ML) INJECTION AS NEEDED
Status: DISCONTINUED | OUTPATIENT
Start: 2022-09-05 | End: 2022-09-06 | Stop reason: HOSPADM

## 2022-09-05 RX ORDER — NICOTINE POLACRILEX 4 MG
15 LOZENGE BUCCAL
Status: DISCONTINUED | OUTPATIENT
Start: 2022-09-05 | End: 2022-09-06 | Stop reason: HOSPADM

## 2022-09-05 RX ORDER — GUAIFENESIN 600 MG/1
1200 TABLET, EXTENDED RELEASE ORAL EVERY 12 HOURS
Status: DISCONTINUED | OUTPATIENT
Start: 2022-09-05 | End: 2022-09-06 | Stop reason: HOSPADM

## 2022-09-05 RX ORDER — TRAMADOL HYDROCHLORIDE 50 MG/1
50 TABLET ORAL EVERY 6 HOURS PRN
Status: DISCONTINUED | OUTPATIENT
Start: 2022-09-05 | End: 2022-09-06 | Stop reason: HOSPADM

## 2022-09-05 RX ORDER — BENZONATATE 100 MG/1
200 CAPSULE ORAL 3 TIMES DAILY PRN
Status: DISCONTINUED | OUTPATIENT
Start: 2022-09-05 | End: 2022-09-06 | Stop reason: HOSPADM

## 2022-09-05 RX ORDER — DEXTROSE MONOHYDRATE 25 G/50ML
25 INJECTION, SOLUTION INTRAVENOUS
Status: DISCONTINUED | OUTPATIENT
Start: 2022-09-05 | End: 2022-09-06 | Stop reason: HOSPADM

## 2022-09-05 RX ORDER — BISOPROLOL FUMARATE 5 MG/1
5 TABLET, FILM COATED ORAL DAILY
Status: DISCONTINUED | OUTPATIENT
Start: 2022-09-06 | End: 2022-09-06 | Stop reason: HOSPADM

## 2022-09-05 RX ORDER — DEXTROSE AND SODIUM CHLORIDE 5; .45 G/100ML; G/100ML
75 INJECTION, SOLUTION INTRAVENOUS CONTINUOUS
Status: DISCONTINUED | OUTPATIENT
Start: 2022-09-05 | End: 2022-09-05

## 2022-09-05 RX ORDER — POTASSIUM CHLORIDE 20 MEQ/1
40 TABLET, EXTENDED RELEASE ORAL AS NEEDED
Status: DISCONTINUED | OUTPATIENT
Start: 2022-09-05 | End: 2022-09-06 | Stop reason: HOSPADM

## 2022-09-05 RX ORDER — IPRATROPIUM BROMIDE AND ALBUTEROL SULFATE 2.5; .5 MG/3ML; MG/3ML
3 SOLUTION RESPIRATORY (INHALATION)
Status: DISCONTINUED | OUTPATIENT
Start: 2022-09-05 | End: 2022-09-06

## 2022-09-05 RX ORDER — ALUMINA, MAGNESIA, AND SIMETHICONE 2400; 2400; 240 MG/30ML; MG/30ML; MG/30ML
15 SUSPENSION ORAL EVERY 6 HOURS PRN
Status: DISCONTINUED | OUTPATIENT
Start: 2022-09-05 | End: 2022-09-06 | Stop reason: HOSPADM

## 2022-09-05 RX ORDER — ACETAMINOPHEN 650 MG/1
650 SUPPOSITORY RECTAL EVERY 4 HOURS PRN
Status: DISCONTINUED | OUTPATIENT
Start: 2022-09-05 | End: 2022-09-06 | Stop reason: HOSPADM

## 2022-09-05 RX ORDER — AZITHROMYCIN 250 MG/1
500 TABLET, FILM COATED ORAL
Status: DISCONTINUED | OUTPATIENT
Start: 2022-09-05 | End: 2022-09-06

## 2022-09-05 RX ORDER — AMLODIPINE BESYLATE 5 MG/1
5 TABLET ORAL NIGHTLY
Status: DISCONTINUED | OUTPATIENT
Start: 2022-09-05 | End: 2022-09-06 | Stop reason: HOSPADM

## 2022-09-05 RX ORDER — ATORVASTATIN CALCIUM 10 MG/1
10 TABLET, FILM COATED ORAL DAILY
Status: DISCONTINUED | OUTPATIENT
Start: 2022-09-06 | End: 2022-09-06 | Stop reason: HOSPADM

## 2022-09-05 RX ORDER — CHOLECALCIFEROL (VITAMIN D3) 125 MCG
5 CAPSULE ORAL NIGHTLY PRN
Status: DISCONTINUED | OUTPATIENT
Start: 2022-09-05 | End: 2022-09-06 | Stop reason: HOSPADM

## 2022-09-05 RX ORDER — GABAPENTIN 300 MG/1
300 CAPSULE ORAL 3 TIMES DAILY
Status: DISCONTINUED | OUTPATIENT
Start: 2022-09-05 | End: 2022-09-06 | Stop reason: HOSPADM

## 2022-09-05 RX ORDER — HEPARIN SODIUM 5000 [USP'U]/ML
5000 INJECTION, SOLUTION INTRAVENOUS; SUBCUTANEOUS EVERY 8 HOURS SCHEDULED
Status: DISCONTINUED | OUTPATIENT
Start: 2022-09-05 | End: 2022-09-06 | Stop reason: HOSPADM

## 2022-09-05 RX ORDER — SODIUM CHLORIDE 0.9 % (FLUSH) 0.9 %
10 SYRINGE (ML) INJECTION EVERY 12 HOURS SCHEDULED
Status: DISCONTINUED | OUTPATIENT
Start: 2022-09-05 | End: 2022-09-06 | Stop reason: HOSPADM

## 2022-09-05 RX ORDER — ONDANSETRON 4 MG/1
4 TABLET, FILM COATED ORAL EVERY 6 HOURS PRN
Status: DISCONTINUED | OUTPATIENT
Start: 2022-09-05 | End: 2022-09-06 | Stop reason: HOSPADM

## 2022-09-05 RX ORDER — BUDESONIDE 0.5 MG/2ML
0.5 INHALANT ORAL
Status: DISCONTINUED | OUTPATIENT
Start: 2022-09-05 | End: 2022-09-06 | Stop reason: HOSPADM

## 2022-09-05 RX ORDER — ACETAMINOPHEN 160 MG/5ML
650 SOLUTION ORAL EVERY 4 HOURS PRN
Status: DISCONTINUED | OUTPATIENT
Start: 2022-09-05 | End: 2022-09-06 | Stop reason: HOSPADM

## 2022-09-05 RX ORDER — CALCIUM GLUCONATE 20 MG/ML
1 INJECTION, SOLUTION INTRAVENOUS AS NEEDED
Status: DISCONTINUED | OUTPATIENT
Start: 2022-09-05 | End: 2022-09-06 | Stop reason: HOSPADM

## 2022-09-05 RX ORDER — ACETAMINOPHEN 325 MG/1
650 TABLET ORAL EVERY 4 HOURS PRN
Status: DISCONTINUED | OUTPATIENT
Start: 2022-09-05 | End: 2022-09-06 | Stop reason: HOSPADM

## 2022-09-05 RX ORDER — OLANZAPINE 10 MG/2ML
1 INJECTION, POWDER, LYOPHILIZED, FOR SOLUTION INTRAMUSCULAR AS NEEDED
Status: DISCONTINUED | OUTPATIENT
Start: 2022-09-05 | End: 2022-09-06 | Stop reason: HOSPADM

## 2022-09-05 RX ORDER — NITROGLYCERIN 0.4 MG/1
0.4 TABLET SUBLINGUAL
Status: DISCONTINUED | OUTPATIENT
Start: 2022-09-05 | End: 2022-09-06 | Stop reason: HOSPADM

## 2022-09-05 RX ORDER — CALCIUM GLUCONATE 20 MG/ML
2 INJECTION, SOLUTION INTRAVENOUS AS NEEDED
Status: DISCONTINUED | OUTPATIENT
Start: 2022-09-05 | End: 2022-09-06 | Stop reason: HOSPADM

## 2022-09-05 RX ORDER — LISINOPRIL 20 MG/1
40 TABLET ORAL NIGHTLY
Status: DISCONTINUED | OUTPATIENT
Start: 2022-09-05 | End: 2022-09-06 | Stop reason: HOSPADM

## 2022-09-05 RX ADMIN — INSULIN GLARGINE 5 UNITS: 100 INJECTION, SOLUTION SUBCUTANEOUS at 22:04

## 2022-09-05 RX ADMIN — HEPARIN SODIUM 5000 UNITS: 5000 INJECTION INTRAVENOUS; SUBCUTANEOUS at 17:56

## 2022-09-05 RX ADMIN — METHYLPREDNISOLONE SODIUM SUCCINATE 125 MG: 125 INJECTION, POWDER, FOR SOLUTION INTRAMUSCULAR; INTRAVENOUS at 00:51

## 2022-09-05 RX ADMIN — SODIUM CHLORIDE 1000 ML: 9 INJECTION, SOLUTION INTRAVENOUS at 22:04

## 2022-09-05 RX ADMIN — BUDESONIDE 0.5 MG: 0.5 INHALANT RESPIRATORY (INHALATION) at 10:13

## 2022-09-05 RX ADMIN — AZITHROMYCIN MONOHYDRATE 500 MG: 500 INJECTION, POWDER, LYOPHILIZED, FOR SOLUTION INTRAVENOUS at 00:53

## 2022-09-05 RX ADMIN — IPRATROPIUM BROMIDE AND ALBUTEROL SULFATE 3 ML: .5; 3 SOLUTION RESPIRATORY (INHALATION) at 15:18

## 2022-09-05 RX ADMIN — INSULIN LISPRO 6 UNITS: 100 INJECTION, SOLUTION INTRAVENOUS; SUBCUTANEOUS at 12:22

## 2022-09-05 RX ADMIN — CEFTRIAXONE 2 G: 2 INJECTION, POWDER, FOR SOLUTION INTRAMUSCULAR; INTRAVENOUS at 01:25

## 2022-09-05 RX ADMIN — METHYLPREDNISOLONE SODIUM SUCCINATE 40 MG: 40 INJECTION, POWDER, FOR SOLUTION INTRAMUSCULAR; INTRAVENOUS at 12:22

## 2022-09-05 RX ADMIN — Medication 10 ML: at 08:25

## 2022-09-05 RX ADMIN — IPRATROPIUM BROMIDE AND ALBUTEROL SULFATE 3 ML: .5; 3 SOLUTION RESPIRATORY (INHALATION) at 10:19

## 2022-09-05 RX ADMIN — INSULIN LISPRO 4 UNITS: 100 INJECTION, SOLUTION INTRAVENOUS; SUBCUTANEOUS at 08:21

## 2022-09-05 RX ADMIN — IPRATROPIUM BROMIDE AND ALBUTEROL SULFATE 3 ML: .5; 3 SOLUTION RESPIRATORY (INHALATION) at 02:54

## 2022-09-05 RX ADMIN — GUAIFENESIN 1200 MG: 600 TABLET, EXTENDED RELEASE ORAL at 08:21

## 2022-09-05 RX ADMIN — INSULIN LISPRO 6 UNITS: 100 INJECTION, SOLUTION INTRAVENOUS; SUBCUTANEOUS at 21:18

## 2022-09-05 RX ADMIN — GUAIFENESIN 1200 MG: 600 TABLET, EXTENDED RELEASE ORAL at 21:17

## 2022-09-05 RX ADMIN — METHYLPREDNISOLONE SODIUM SUCCINATE 40 MG: 40 INJECTION, POWDER, FOR SOLUTION INTRAMUSCULAR; INTRAVENOUS at 17:56

## 2022-09-05 RX ADMIN — AMLODIPINE BESYLATE 5 MG: 5 TABLET ORAL at 21:17

## 2022-09-05 RX ADMIN — AZITHROMYCIN MONOHYDRATE 500 MG: 250 TABLET ORAL at 21:17

## 2022-09-05 RX ADMIN — SERTRALINE 50 MG: 50 TABLET, FILM COATED ORAL at 21:18

## 2022-09-05 RX ADMIN — Medication 10 ML: at 21:18

## 2022-09-05 RX ADMIN — INSULIN LISPRO 7 UNITS: 100 INJECTION, SOLUTION INTRAVENOUS; SUBCUTANEOUS at 17:56

## 2022-09-05 RX ADMIN — IPRATROPIUM BROMIDE AND ALBUTEROL SULFATE 3 ML: .5; 3 SOLUTION RESPIRATORY (INHALATION) at 07:14

## 2022-09-05 RX ADMIN — DEXTROSE AND SODIUM CHLORIDE 75 ML/HR: 5; 450 INJECTION, SOLUTION INTRAVENOUS at 02:00

## 2022-09-05 RX ADMIN — GABAPENTIN 300 MG: 300 CAPSULE ORAL at 21:17

## 2022-09-05 RX ADMIN — Medication 5 MG: at 21:17

## 2022-09-05 RX ADMIN — TRAMADOL HYDROCHLORIDE 50 MG: 50 TABLET, COATED ORAL at 08:31

## 2022-09-05 RX ADMIN — Medication 10 ML: at 02:00

## 2022-09-05 RX ADMIN — HEPARIN SODIUM 5000 UNITS: 5000 INJECTION INTRAVENOUS; SUBCUTANEOUS at 21:17

## 2022-09-05 RX ADMIN — LISINOPRIL 40 MG: 20 TABLET ORAL at 21:17

## 2022-09-05 NOTE — H&P
"    [facility]      Patient Name: Brenden Peter  : 1950  MRN: [id number]  Primary Care Physician:  Bert Rojas MD  Date of admission: 2022      Subjective      Chief Complaint: Dyspnea    History of Present Illness:  Brenden Peter is a 71 y.o. male w/PMH of COPD, DM, HLD, HTN, BPH, CAD, obesity, tobacco use (quit July 3, 2022), chronic pain, neuropathy, depression, vitamin D deficiency who presents to [facility] ED due to dyspnea.  Patient states dyspnea has progressively worsened over the last 3 to 4 days, dyspnea is worse with exertion and laying flat, relieved with rest and elevated HOB.  Patient experienced a syncopal episode last week, states he was standing in the kitchen window and began to \"nod off\".  Patient admits to dyspnea with exertion, orthopnea, chills, wheezing, nonproductive cough.  Patient denies current cigarette use, fever, chest pain, nausea, vomiting.  As dyspnea did not improve he decided to go to [facility] ED.        Upon arrival to the ED vitals temp 97.4, HR 49, RR 16, /118, O2 sats 89% on 4 L cannula.  Labs notable for pH 7.419, PCO2 40.0, PO2 59.7, HCO3 25.8, O2 sat 90.9% on 3 L nasal cannula.  Troponin less than 0.010, proBNP 289.2, glucose 60, , K3.7, CO2 29, creatinine 0.66, BUN 15, ALT 14, WBC 5.80, Hgb 11.2, platelets 164, neutrophils 58.7.  EKG shows sinus bradycardia rate 52, prolonged SC interval with RBBB.  COVID test negative.  Respiratory PCR panel negative.  Patient treated in the ED with IV azithromycin, IV Rocephin, albuterol MDI, DuoNeb, IV Solu-Medrol.      Review of Systems   Constitutional: Positive for chills and malaise/fatigue. Negative for decreased appetite and fever.   HENT: Negative.    Eyes: Negative.    Cardiovascular: Positive for dyspnea on exertion, orthopnea and syncope. Negative for chest pain and palpitations.   Respiratory: Positive for cough, shortness of " breath, sleep disturbances due to breathing and wheezing. Negative for sputum production.    Endocrine: Negative.    Hematologic/Lymphatic: Negative.    Skin: Negative.    Musculoskeletal: Negative.    Gastrointestinal: Negative.  Negative for bloating, abdominal pain, nausea and vomiting.   Genitourinary: Negative.  Negative for dysuria and hematuria.   Neurological: Positive for weakness. Negative for difficulty with concentration and seizures.   Psychiatric/Behavioral: Negative.  Negative for substance abuse.   Allergic/Immunologic: Negative.    All other systems reviewed and are negative.       Personal History     Past Medical History:   Diagnosis Date   • Abnormal EKG 2/3/2015   • Anxiety    • COPD (chronic obstructive pulmonary disease) ([facility])    • Coronary artery disease 3/3/2015   • Diabetes ([facility])     type 2   • Hyperlipidemia    • Hypertension    • Microalbuminuria due to type 2 diabetes mellitus ([facility]) 2/5/2020   • Multinodular goiter 4/18/2019   • Murmur 2/3/2015   • Primary osteoarthritis of left knee 10/22/2021       Past Surgical History:   Procedure Laterality Date   • EYE SURGERY      cataract   • TOTAL KNEE ARTHROPLASTY Right 10/21/2020    Procedure: TOTAL KNEE ARTHROPLASTY;  Surgeon: Ravi Fagan MD;  Location: [facility];  Service: Orthopedics;  Laterality: Right;   • TOTAL KNEE ARTHROPLASTY REVISION Right 5/19/2021    Procedure: KNEE POLY INSERT EXCHANGE with irrigation;  Surgeon: Ravi Fagan MD;  Location: [facility];  Service: Orthopedics;  Laterality: Right;       Family History: family history includes Cancer in an other family member; Diabetes in an other family member; Heart disease in an other family member; No Known Problems in his brother, father, maternal aunt, maternal grandfather, maternal grandmother, maternal uncle, mother, paternal aunt, paternal grandfather, paternal grandmother, paternal uncle, and sister. Otherwise pertinent FHx was reviewed and not pertinent to  current issue.    Social History:  reports that he quit smoking about 2 months ago. His smoking use included cigarettes. He smoked 1.00 pack per day. He quit smokeless tobacco use about 2 months ago. He reports previous alcohol use. He reports that he does not use drugs.    Home Medications:  Prior to Admission Medications     Prescriptions Last Dose Informant Patient Reported? Taking?    albuterol (PROVENTIL) (2.5 MG/3ML) 0.083% nebulizer solution   Yes No    Take 3 mL by nebulization 3 (Three) Times a Day As Needed for Wheezing or Shortness of Air.    albuterol sulfate  (90 Base) MCG/ACT inhaler   Yes No    Inhale 2 puffs Every 6 (Six) Hours As Needed.    amLODIPine (NORVASC) 5 MG tablet   Yes No    Take 5 mg by mouth Every Night.    bisoprolol (ZEBeta) 5 MG tablet   Yes No    Take 5 mg by mouth Daily.    gabapentin (NEURONTIN) 300 MG capsule   Yes No    Take 300 mg by mouth 2 (two) times a day.    glipizide (GLUCOTROL) 10 MG tablet   Yes No    Take 10 mg by mouth Every Night. HOLD the night before surgery    HYDROcodone-acetaminophen (NORCO) 5-325 MG per tablet   No No    Take 1 tablet by mouth Every 6 (Six) Hours As Needed for Moderate Pain .    ipratropium-albuterol (DUO-NEB) 0.5-2.5 mg/3 ml nebulizer   Yes No    3 mL Every 6 (Six) Hours.    lisinopril (PRINIVIL,ZESTRIL) 40 MG tablet   Yes No    Take 40 mg by mouth Every Night. HOLD 24 hours before surgery    metFORMIN (GLUCOPHAGE) 1000 MG tablet   Yes No    Take 1,000 mg by mouth 2 (Two) Times a Day With Meals. HOLD 48 hours before surgery    pioglitazone (ACTOS) 30 MG tablet   Yes No    Take 30 mg by mouth Every Night. HOLD the night before surgery    pravastatin (PRAVACHOL) 20 MG tablet   Yes No    20 mg Daily.    sertraline (ZOLOFT) 50 MG tablet   Yes No    Take 50 mg by mouth Every Night.    umeclidinium-vilanterol (ANORO ELLIPTA) 62.5-25 MCG/INH aerosol powder  inhaler   Yes No    Anoro Ellipta 62.5 mcg-25 mcg/actuation powder for inhalation    Inhale 1 puff every day by inhalation route for 30 days.    vitamin D (ERGOCALCIFEROL) 1.25 MG (05967 UT) capsule capsule   Yes No    50,000 Units 2 (Two) Times a Week. Tues and Fri            Allergies:  No Known Allergies    Objective      Vitals:   Temp:  [97.4 °F (36.3 °C)] 97.4 °F (36.3 °C)  Heart Rate:  [49-60] 60  Resp:  [13-18] 18  BP: (126-163)/() 135/62  Flow (L/min):  [4] 4    Physical Exam  Vitals and nursing note reviewed.   Constitutional:       Appearance: He is obese. He is ill-appearing.   HENT:      Head: Normocephalic.      Right Ear: External ear normal.      Left Ear: External ear normal.      Nose: Nose normal.      Mouth/Throat:      Mouth: Mucous membranes are moist.   Eyes:      Pupils: Pupils are equal, round, and reactive to light.   Cardiovascular:      Rate and Rhythm: Regular rhythm. Bradycardia present.      Pulses: Normal pulses.   Pulmonary:      Effort: Tachypnea and prolonged expiration present.      Breath sounds: Decreased breath sounds, wheezing and rhonchi present.   Abdominal:      General: Bowel sounds are normal.      Palpations: Abdomen is soft.      Tenderness: There is no guarding or rebound.   Musculoskeletal:      Cervical back: No edema or erythema.      Right lower le+ Edema present.      Left lower le+ Edema present.   Skin:     General: Skin is warm and dry.      Findings: Bruising present.   Neurological:      General: No focal deficit present.      Mental Status: He is alert and oriented to person, place, and time.      Cranial Nerves: No dysarthria or facial asymmetry.      Motor: No tremor.   Psychiatric:         Attention and Perception: Attention and perception normal.         Mood and Affect: Mood is anxious.         Speech: Speech normal.         Behavior: Behavior normal. Behavior is cooperative.         Thought Content: Thought content normal.         Cognition and Memory: Cognition and memory normal.         Judgment: Judgment normal.             Result Review:  I have personally reviewed the results from the time of this admission to 9/5/2022 00:31 EDT and agree with these findings:  [x]  Laboratory  [x]  Microbiology  [x]  Radiology  [x]  EKG/Telemetry   [x]  Cardiology/Vascular   []  Pathology  []  Old records  []  Other:            Assessment & Plan        Active Hospital Problems:  Active Hospital Problems    Diagnosis    • **COPD with acute exacerbation (HCC)    • Bradycardia    • Mixed hyperlipidemia    • Type 2 diabetes mellitus without complication, without long-term current use of insulin (HCC)    • Essential hypertension    • Coronary artery disease    • Chronic pain of left knee    • Morbid obesity (HCC)    • Tobacco dependence syndrome        COPD with acute exacerbation:  -Patient treated in ED with REY Tanner, IV Rocephin, IV azithromycin  -COVID, respiratory PCR panel negative  -Consult pulmonology for evaluation and recommendations, may require sleep study and referral to outpatient pulmonary rehabilitation as well  -Obtain sputum culture  -Continue IV Rocephin  -DuoNebs, IV Pulmicort, Pep therapy, O2  -Continuous pulse oximetry  -Continue to monitor, consult pulmonology if indicated    Bradycardia:  -Troponin negative, serial troponins  -EKG reviewed, trend EKGs  -Consult cardiology for evaluation recommendations  -Echocardiogram in a.m. to evaluate LV function  -Continuous cardiac monitoring    DM type II:  -Sliding scale insulin  -Hypoglycemia protocol  -Monitor Accu-Cheks  -Consistent carbohydrate diet  -Hold oral diabetic medication during admission for potential procedures    Mixed hyperlipidemia:  -Reconcile home medications, resume as appropriate    Essential hypertension/CAD:  -IV hydralazine as needed for systolic greater than 160  -Low-sodium diet  -Reconcile home medications, resume as appropriate    Obesity:  -Encourage lifestyle modifications  -Encourage dietary modifications    Tobacco abuse:  -Tobacco cessation  education    Chronic pain of left knee:  -Ultram every 6 as needed for pain        DVT prophylaxis:  Mechanical DVT prophylaxis orders are present.                Home medications verified by nursing and/or pharmacy prior to provider review, resume medications as appropriate after medication reconciliation is completed      CODE STATUS:    Code Status (Patient has no pulse and is not breathing): CPR (Attempt to Resuscitate)  Medical Interventions (Patient has pulse or is breathing): Full Support    Admission Status:  I believe this patient meets observation status.          The patient was interviewed wearing appropriate personal protective equipment.      Signature: Electronically signed by LENNIE Champion, 09/05/22, 4:30 AM EDT.

## 2022-09-05 NOTE — PLAN OF CARE
Problem: Adult Inpatient Plan of Care  Goal: Plan of Care Review  Outcome: Ongoing, Progressing  Flowsheets (Taken 9/5/2022 1713)  Progress: no change  Plan of Care Reviewed With: patient  Goal: Patient-Specific Goal (Individualized)  Outcome: Ongoing, Progressing  Goal: Absence of Hospital-Acquired Illness or Injury  Outcome: Ongoing, Progressing  Intervention: Identify and Manage Fall Risk  Recent Flowsheet Documentation  Taken 9/5/2022 1627 by Tiff Finley LPN  Safety Promotion/Fall Prevention: safety round/check completed  Taken 9/5/2022 1434 by Tiff Finley LPN  Safety Promotion/Fall Prevention: safety round/check completed  Taken 9/5/2022 1225 by Tiff Finley LPN  Safety Promotion/Fall Prevention: safety round/check completed  Taken 9/5/2022 1035 by Tiff Finley LPN  Safety Promotion/Fall Prevention: safety round/check completed  Taken 9/5/2022 0810 by Tiff Finley LPN  Safety Promotion/Fall Prevention: safety round/check completed  Goal: Optimal Comfort and Wellbeing  Outcome: Ongoing, Progressing  Intervention: Monitor Pain and Promote Comfort  Recent Flowsheet Documentation  Taken 9/5/2022 0810 by Tiff Finley LPN  Pain Management Interventions: see MAR  Intervention: Provide Person-Centered Care  Recent Flowsheet Documentation  Taken 9/5/2022 0810 by Tiff Finley LPN  Trust Relationship/Rapport:   care explained   choices provided   thoughts/feelings acknowledged  Goal: Readiness for Transition of Care  Outcome: Ongoing, Progressing   Goal Outcome Evaluation:  Plan of Care Reviewed With: patient        Progress: no change   Pt has been sitting up in the chair today, with meals and family at bedside. Pt and family have been updated about the plan of care. Pt has had no complaints, will cont to monitor.

## 2022-09-05 NOTE — CONSULTS
Referring Provider: Hospitalist  Reason for Consultation: Shortness of breath    Patient Care Team:  Bert Rojas MD as PCP - General (Family Medicine)    Chief complaint shortness of breath    Subjective .     History of present illness:  Brenden Peter is a 71 y.o. male with history of coronary disease COPD diabetes hypertension hyperlipidemia presents to the hospital with complaints of increasing shortness of breath.  Patient states that he has been having some shortness of breath got worse over the last 3 to 4 days.  No complains of any chest pain.  No PND orthopnea.  No palpitations but had little dizziness with near syncope.  No swelling of the feet.  Has been taking his medicines regularly.  In the ER patient was noted to have slightly evaded proBNP level but his chest x-ray was abnormal and she was started with antibiotics and duo nebs and Solu-Medrol.  Review of Systems   Constitutional: Negative for fever and malaise/fatigue.   HENT: Negative for ear pain and nosebleeds.    Eyes: Negative for blurred vision and double vision.   Cardiovascular: Negative for chest pain, dyspnea on exertion and palpitations.   Respiratory: Positive for shortness of breath. Negative for cough.    Skin: Negative for rash.   Musculoskeletal: Negative for joint pain.   Gastrointestinal: Negative for abdominal pain, nausea and vomiting.   Neurological: Negative for focal weakness and headaches.   Psychiatric/Behavioral: Negative for depression. The patient is not nervous/anxious.    All other systems reviewed and are negative.      History  Past Medical History:   Diagnosis Date   • Abnormal EKG 2/3/2015   • Anxiety    • COPD (chronic obstructive pulmonary disease) (HCC)    • Coronary artery disease 3/3/2015   • Diabetes (HCC)     type 2   • Hyperlipidemia    • Hypertension    • Microalbuminuria due to type 2 diabetes mellitus (HCC) 2/5/2020   • Multinodular goiter 4/18/2019   • Murmur 2/3/2015   • Primary  osteoarthritis of left knee 10/22/2021       Past Surgical History:   Procedure Laterality Date   • EYE SURGERY      cataract   • TOTAL KNEE ARTHROPLASTY Right 10/21/2020    Procedure: TOTAL KNEE ARTHROPLASTY;  Surgeon: Ravi Fagan MD;  Location: [facility] MAIN OR;  Service: Orthopedics;  Laterality: Right;   • TOTAL KNEE ARTHROPLASTY REVISION Right 2021    Procedure: KNEE POLY INSERT EXCHANGE with irrigation;  Surgeon: Ravi Fagan MD;  Location: [facility] MAIN OR;  Service: Orthopedics;  Laterality: Right;       Family History   Problem Relation Age of Onset   • Cancer Other    • Diabetes Other    • Heart disease Other    • No Known Problems Mother    • No Known Problems Father    • No Known Problems Sister    • No Known Problems Brother    • No Known Problems Maternal Aunt    • No Known Problems Maternal Uncle    • No Known Problems Paternal Aunt    • No Known Problems Paternal Uncle    • No Known Problems Maternal Grandmother    • No Known Problems Maternal Grandfather    • No Known Problems Paternal Grandmother    • No Known Problems Paternal Grandfather    • Anemia Neg Hx    • Arrhythmia Neg Hx    • Asthma Neg Hx    • Clotting disorder Neg Hx    • Fainting Neg Hx    • Heart attack Neg Hx    • Heart failure Neg Hx    • Hyperlipidemia Neg Hx    • Hypertension Neg Hx        Social History     Tobacco Use   • Smoking status: Former Smoker     Packs/day: 1.00     Types: Cigarettes     Quit date: 7/3/2022     Years since quittin.1   • Smokeless tobacco: Former User     Quit date: 7/3/2022   Vaping Use   • Vaping Use: Never used   Substance Use Topics   • Alcohol use: Not Currently   • Drug use: Never        Medications Prior to Admission   Medication Sig Dispense Refill Last Dose   • albuterol sulfate  (90 Base) MCG/ACT inhaler Inhale 2 puffs Every 6 (Six) Hours As Needed.   Past Week at Unknown time   • amLODIPine (NORVASC) 5 MG tablet Take 5 mg by mouth Every Night.   2022 at Unknown time   •  bisoprolol (ZEBeta) 5 MG tablet Take 5 mg by mouth Daily.   9/4/2022 at Unknown time   • gabapentin (NEURONTIN) 300 MG capsule Take 300 mg by mouth 3 (Three) Times a Day.   9/4/2022 at Unknown time   • glipizide (GLUCOTROL) 10 MG tablet Take 10 mg by mouth Every Night.   9/4/2022 at Unknown time   • HYDROcodone-acetaminophen (NORCO) 5-325 MG per tablet Take 1 tablet by mouth Every 6 (Six) Hours As Needed for Moderate Pain . 20 tablet 0 Past Month at Unknown time   • lisinopril (PRINIVIL,ZESTRIL) 40 MG tablet Take 40 mg by mouth Every Night.   9/4/2022 at Unknown time   • metFORMIN (GLUCOPHAGE) 1000 MG tablet Take 1,000 mg by mouth 2 (Two) Times a Day With Meals.   9/4/2022 at Unknown time   • pioglitazone (ACTOS) 30 MG tablet Take 30 mg by mouth Every Night.   9/4/2022 at Unknown time   • pravastatin (PRAVACHOL) 20 MG tablet Take 20 mg by mouth Daily.   9/4/2022 at Unknown time   • sertraline (ZOLOFT) 50 MG tablet Take 50 mg by mouth Every Night.   9/4/2022 at Unknown time   • vitamin D (ERGOCALCIFEROL) 1.25 MG (27694 UT) capsule capsule 50,000 Units 2 (Two) Times a Week. Tues and Fri   Past Week at Unknown time   • albuterol (PROVENTIL) (2.5 MG/3ML) 0.083% nebulizer solution Take 3 mL by nebulization 3 (Three) Times a Day As Needed for Wheezing or Shortness of Air.      • ipratropium-albuterol (DUO-NEB) 0.5-2.5 mg/3 ml nebulizer 3 mL Every 6 (Six) Hours.      • umeclidinium-vilanterol (ANORO ELLIPTA) 62.5-25 MCG/INH aerosol powder  inhaler Inhale 1 puff Daily.            Patient has no known allergies.    Scheduled Meds:azithromycin, 500 mg, Oral, Q24H  budesonide, 0.5 mg, Nebulization, BID - RT  [START ON 9/6/2022] cefTRIAXone, 2 g, Intravenous, Daily  guaiFENesin, 1,200 mg, Oral, Q12H  heparin (porcine), 5,000 Units, Subcutaneous, Q8H  insulin lispro, 0-9 Units, Subcutaneous, 4x Daily With Meals & Nightly  ipratropium-albuterol, 3 mL, Nebulization, Q4H - RT  methylPREDNISolone sodium succinate, 40 mg,  "Intravenous, Q8H  sodium chloride, 10 mL, Intravenous, Q12H      Continuous Infusions:dextrose 5 % and sodium chloride 0.45 %, 75 mL/hr, Last Rate: 75 mL/hr (09/05/22 0200)      PRN Meds:.•  acetaminophen **OR** acetaminophen **OR** acetaminophen  •  aluminum-magnesium hydroxide-simethicone  •  benzonatate  •  Calcium Gluconate-NaCl **AND** calcium gluconate **AND** Calcium, Ionized  •  dextrose  •  dextrose  •  glucagon (human recombinant)  •  hydrALAZINE  •  magnesium sulfate **OR** magnesium sulfate **OR** magnesium sulfate  •  nitroglycerin  •  ondansetron **OR** ondansetron  •  potassium & sodium phosphates **OR** potassium & sodium phosphates  •  potassium chloride  •  potassium chloride  •  [COMPLETED] Insert peripheral IV **AND** sodium chloride  •  sodium chloride  •  traMADol    Objective     VITAL SIGNS  Vitals:    09/05/22 1207 09/05/22 1518 09/05/22 1521 09/05/22 1541   BP: 135/68   129/62   BP Location: Right arm   Right arm   Patient Position: Sitting   Sitting   Pulse: 67 75 77 67   Resp: 18 18 18 18   Temp: 98.2 °F (36.8 °C)   97.5 °F (36.4 °C)   TempSrc: Oral   Oral   SpO2: 96% 95% 97% 95%   Weight:       Height:           Flowsheet Rows    Flowsheet Row First Filed Value   Admission Height 170.2 cm (67\") Documented at 09/04/2022 2124   Admission Weight 107 kg (236 lb 1.8 oz) Documented at 09/04/2022 2124           TELEMETRY: Sinus rhythm    Physical Exam:  Constitutional:       Appearance: Well-developed.   Eyes:      General: No scleral icterus.     Conjunctiva/sclera: Conjunctivae normal.      Pupils: Pupils are equal, round, and reactive to light.   HENT:      Head: Normocephalic and atraumatic.   Neck:      Vascular: No carotid bruit or JVD.   Pulmonary:      Effort: Pulmonary effort is normal.      Breath sounds: Normal breath sounds. No wheezing. No rales.   Cardiovascular:      Normal rate. Regular rhythm.   Pulses:     Intact distal pulses.   Abdominal:      General: Bowel sounds are " normal.      Palpations: Abdomen is soft.   Musculoskeletal: Normal range of motion.      Cervical back: Normal range of motion and neck supple. Skin:     General: Skin is warm and dry.      Findings: No rash.   Neurological:      Mental Status: Alert.      Comments: No focal deficits          Results Review:   I reviewed the patient's new clinical results.  Lab Results (last 24 hours)     Procedure Component Value Units Date/Time    POC Glucose Once [[id number]]  (Abnormal) Collected: 09/05/22 1745    Specimen: Blood Updated: 09/05/22 1746     Glucose 301 mg/dL      Comment: Serial Number: [id number]:  [id number]       STAT Lactic Acid, Reflex [[id number]]  (Abnormal) Collected: 09/05/22 1451    Specimen: Blood Updated: 09/05/22 1534     Lactate 2.5 mmol/L     Hemoglobin A1c [[id number]]  (Abnormal) Collected: 09/05/22 0332    Specimen: Blood Updated: 09/05/22 1323     Hemoglobin A1C 5.8 %     Narrative:      Hemoglobin A1C Reference Range:    <5.7 %        Normal  5.7-6.4 %     Increased risk for diabetes  > 6.4 %        Diabetes       These guidelines have been recommended by the American Diabetic Association for Hgb A1c.      The following 2010 guidelines have been recommended by the American Diabetes Association for Hemoglobin A1c.    HBA1c 5.7-6.4% Increased risk for future diabetes (pre-diabetes)  HBA1c     >6.4% Diabetes      Lactic Acid, Plasma [[id number]]  (Abnormal) Collected: 09/05/22 1201    Specimen: Blood Updated: 09/05/22 1249     Lactate 4.5 mmol/L     Troponin [[id number]]  (Normal) Collected: 09/05/22 1201    Specimen: Blood Updated: 09/05/22 1238     Troponin T <0.010 ng/mL     Narrative:      Troponin T Reference Range:  <= 0.03 ng/mL-   Negative for AMI  >0.03 ng/mL-     Abnormal for myocardial necrosis.  Clinicians would have to utilize clinical acumen, EKG, Troponin and serial changes to determine if it is an Acute Myocardial Infarction or myocardial injury due to an underlying chronic  condition.       Results may be falsely decreased if patient taking Biotin.      POC Glucose Once [[id number]]  (Abnormal) Collected: 09/05/22 1204    Specimen: Blood Updated: 09/05/22 1205     Glucose 288 mg/dL      Comment: Serial Number: [id number]:  [id number]       Comprehensive Metabolic Panel [[id number]]  (Abnormal) Collected: 09/05/22 0639    Specimen: Blood Updated: 09/05/22 0726     Glucose 229 mg/dL      BUN 14 mg/dL      Creatinine 0.63 mg/dL      Sodium 140 mmol/L      Potassium 4.1 mmol/L      Chloride 103 mmol/L      CO2 24.0 mmol/L      Calcium 9.1 mg/dL      Total Protein 7.3 g/dL      Albumin 3.90 g/dL      ALT (SGPT) 14 U/L      AST (SGOT) 15 U/L      Alkaline Phosphatase 56 U/L      Total Bilirubin 0.3 mg/dL      Globulin 3.4 gm/dL      A/G Ratio 1.1 g/dL      BUN/Creatinine Ratio 22.2     Anion Gap 13.0 mmol/L      eGFR 101.7 mL/min/1.73      Comment: National Kidney Foundation and American Society of Nephrology (ASN) Task Force recommended calculation based on the Chronic Kidney Disease Epidemiology Collaboration (CKD-EPI) equation refit without adjustment for race.       Narrative:      GFR Normal >60  Chronic Kidney Disease <60  Kidney Failure <15      POC Glucose Once [[id number]]  (Abnormal) Collected: 09/05/22 0713    Specimen: Blood Updated: 09/05/22 0714     Glucose 228 mg/dL      Comment: Serial Number: [id number]:  [id number]       CBC & Differential [[id number]]  (Abnormal) Collected: 09/05/22 0639    Specimen: Blood Updated: 09/05/22 0708    Narrative:      The following orders were created for panel order CBC & Differential.  Procedure                               Abnormality         Status                     ---------                               -----------         ------                     CBC Auto Differential[[id number]]        Abnormal            Final result                 Please view results for these tests on the individual orders.    CBC Auto Differential  "[[id number]]  (Abnormal) Collected: 09/05/22 0639    Specimen: Blood Updated: 09/05/22 0708     WBC 7.30 10*3/mm3      RBC 3.85 10*6/mm3      Hemoglobin 10.9 g/dL      Hematocrit 33.5 %      MCV 87.0 fL      MCH 28.2 pg      MCHC 32.4 g/dL      RDW 16.0 %      RDW-SD 49.4 fl      MPV 9.2 fL      Platelets 164 10*3/mm3      Neutrophil % 91.3 %      Lymphocyte % 6.9 %      Monocyte % 1.4 %      Eosinophil % 0.2 %      Basophil % 0.2 %      Neutrophils, Absolute 6.60 10*3/mm3      Lymphocytes, Absolute 0.50 10*3/mm3      Monocytes, Absolute 0.10 10*3/mm3      Eosinophils, Absolute 0.00 10*3/mm3      Basophils, Absolute 0.00 10*3/mm3      nRBC 0.1 /100 WBC     Procalcitonin [[id number]]  (Normal) Collected: 09/05/22 0332    Specimen: Blood Updated: 09/05/22 0616     Procalcitonin 0.05 ng/mL     Narrative:      As a Marker for Sepsis (Non-Neonates):    1. <0.5 ng/mL represents a low risk of severe sepsis and/or septic shock.  2. >2 ng/mL represents a high risk of severe sepsis and/or septic shock.    As a Marker for Lower Respiratory Tract Infections that require antibiotic therapy:    PCT on Admission    Antibiotic Therapy       6-12 Hrs later    >0.5                Strongly Recommended  >0.25 - <0.5        Recommended   0.1 - 0.25          Discouraged              Remeasure/reassess PCT  <0.1                Strongly Discouraged     Remeasure/reassess PCT    As 28 day mortality risk marker: \"Change in Procalcitonin Result\" (>80% or <=80%) if Day 0 (or Day 1) and Day 4 values are available. Refer to [email or web address]://[email or web address]    Change in PCT <=80%  A decrease of PCT levels below or equal to 80% defines a positive change in PCT test result representing a higher risk for 28-day all-cause mortality of patients diagnosed with severe sepsis for septic shock.    Change in PCT >80%  A decrease of PCT levels of more than 80% defines a negative change in PCT result representing a lower risk for 28-day all-cause mortality " of patients diagnosed with severe sepsis or septic shock.       Lactic Acid, Plasma [[id number]]  (Abnormal) Collected: 09/05/22 0332    Specimen: Blood Updated: 09/05/22 0523     Lactate 2.3 mmol/L     Basic Metabolic Panel [[id number]]  (Abnormal) Collected: 09/05/22 0332    Specimen: Blood Updated: 09/05/22 0520     Glucose 115 mg/dL      BUN 13 mg/dL      Creatinine 0.61 mg/dL      Sodium 143 mmol/L      Potassium 4.1 mmol/L      Chloride 105 mmol/L      CO2 27.0 mmol/L      Calcium 9.3 mg/dL      BUN/Creatinine Ratio 21.3     Anion Gap 11.0 mmol/L      eGFR 102.7 mL/min/1.73      Comment: National Kidney Foundation and American Society of Nephrology (ASN) Task Force recommended calculation based on the Chronic Kidney Disease Epidemiology Collaboration (CKD-EPI) equation refit without adjustment for race.       Narrative:      GFR Normal >60  Chronic Kidney Disease <60  Kidney Failure <15      Troponin [[id number]]  (Normal) Collected: 09/05/22 0332    Specimen: Blood Updated: 09/05/22 0520     Troponin T <0.010 ng/mL     Narrative:      Troponin T Reference Range:  <= 0.03 ng/mL-   Negative for AMI  >0.03 ng/mL-     Abnormal for myocardial necrosis.  Clinicians would have to utilize clinical acumen, EKG, Troponin and serial changes to determine if it is an Acute Myocardial Infarction or myocardial injury due to an underlying chronic condition.       Results may be falsely decreased if patient taking Biotin.      Magnesium [[id number]]  (Normal) Collected: 09/05/22 0332    Specimen: Blood Updated: 09/05/22 0520     Magnesium 1.9 mg/dL     CBC Auto Differential [[id number]]  (Abnormal) Collected: 09/05/22 0332    Specimen: Blood Updated: 09/05/22 0516     WBC 6.50 10*3/mm3      RBC 3.94 10*6/mm3      Hemoglobin 11.4 g/dL      Hematocrit 34.5 %      MCV 87.7 fL      MCH 28.9 pg      MCHC 32.9 g/dL      RDW 16.2 %      RDW-SD 50.8 fl      MPV 9.2 fL      Platelets 160 10*3/mm3      Neutrophil % 84.8 %      Lymphocyte %  11.7 %      Monocyte % 2.4 %      Eosinophil % 0.6 %      Basophil % 0.5 %      Neutrophils, Absolute 5.50 10*3/mm3      Lymphocytes, Absolute 0.80 10*3/mm3      Monocytes, Absolute 0.20 10*3/mm3      Eosinophils, Absolute 0.00 10*3/mm3      Basophils, Absolute 0.00 10*3/mm3      nRBC 0.1 /100 WBC     Calcium, Ionized [[id number]]  (Normal) Collected: 09/05/22 0332    Specimen: Blood Updated: 09/05/22 0505     Ionized Calcium 1.22 mmol/L     Urine Drug Screen - Urine, Clean Catch [[id number]]  (Abnormal) Collected: 09/05/22 0256    Specimen: Urine, Clean Catch Updated: 09/05/22 0351     Amphet/Methamphet, Screen Negative     Barbiturates Screen, Urine Negative     Benzodiazepine Screen, Urine Negative     Cocaine Screen, Urine Negative     Opiate Screen Positive     THC, Screen, Urine Negative     Methadone Screen, Urine Negative     Oxycodone Screen, Urine Negative    Narrative:      Negative Thresholds Per Drugs Screened:    Amphetamines                 500 ng/ml  Barbiturates                 200 ng/ml  Benzodiazepines              100 ng/ml  Cocaine                      300 ng/ml  Methadone                    300 ng/ml  Opiates                      300 ng/ml  Oxycodone                    100 ng/ml  THC                           50 ng/ml    The Normal Value for all drugs tested is negative. This report includes final unconfirmed screening results to be used for medical treatment purposes only. Unconfirmed results must not be used for non-medical purposes such as employment or legal testing. Clinical consideration should be applied to any drug of abuse test, particularly when unconfirmed results are used.          All urine drugs of abuse requests without chain of custody are for medical screening purposes only.  False positives are possible.      TSH [[id number]]  (Normal) Collected: 09/04/22 2146    Specimen: Blood Updated: 09/05/22 0059     TSH 3.220 uIU/mL     Ethanol [[id number]] Collected: 09/04/22 2146     Specimen: Blood Updated: 09/05/22 0046     Ethanol % <0.010 %     Narrative:      Plasma Ethanol Clinical Symptoms:    ETOH (%)               Clinical Symptom  .01-.05              No apparent influence  .03-.12              Euphoria, Diminished judgment and attention   .09-.25              Impaired comprehension, Muscle incoordination  .18-.30              Confusion, Staggered gait, Slurred speech  .25-.40              Markedly decreased response to stimuli, unable to stand or                        walk, vomitting, sleep or stupor  .35-.50              Comatose, Anesthesia, Subnormal body temperature        Phosphorus [[id number]]  (Normal) Collected: 09/04/22 2146    Specimen: Blood Updated: 09/05/22 0045     Phosphorus 2.8 mg/dL     Blood Culture - Blood, Arm, Right [[id number]] Collected: 09/05/22 0041    Specimen: Blood from Arm, Right Updated: 09/05/22 0044    POC Lactate [[id number]]  (Normal) Collected: 09/05/22 0010    Specimen: Blood Updated: 09/05/22 0011     Lactate 1.9 mmol/L      Comment: Serial Number: [id number]:  [id number]       Blood Culture - Blood, Arm, Right [[id number]] Collected: 09/05/22 0007    Specimen: Blood from Arm, Right Updated: 09/05/22 0009    Respiratory Panel PCR w/COVID-19(SARS-CoV-2) IAN/NAYELY/KATERIN/PAD/COR/MAD/KRISH In-House, NP Swab in UTM/VTM, 3-4 HR TAT - Swab, Nasopharynx [[id number]]  (Normal) Collected: 09/04/22 2150    Specimen: Swab from Nasopharynx Updated: 09/04/22 2243     ADENOVIRUS, PCR Not Detected     Coronavirus 229E Not Detected     Coronavirus HKU1 Not Detected     Coronavirus NL63 Not Detected     Coronavirus OC43 Not Detected     COVID19 Not Detected     Human Metapneumovirus Not Detected     Human Rhinovirus/Enterovirus Not Detected     Influenza A PCR Not Detected     Influenza B PCR Not Detected     Parainfluenza Virus 1 Not Detected     Parainfluenza Virus 2 Not Detected     Parainfluenza Virus 3 Not Detected     Parainfluenza Virus 4 Not Detected     RSV,  PCR Not Detected     Bordetella pertussis pcr Not Detected     Bordetella parapertussis PCR Not Detected     Chlamydophila pneumoniae PCR Not Detected     Mycoplasma pneumo by PCR Not Detected    Narrative:      In the setting of a positive respiratory panel with a viral infection PLUS a negative procalcitonin without other underlying concern for bacterial infection, consider observing off antibiotics or discontinuation of antibiotics and continue supportive care. If the respiratory panel is positive for atypical bacterial infection (Bordetella pertussis, Chlamydophila pneumoniae, or Mycoplasma pneumoniae), consider antibiotic de-escalation to target atypical bacterial infection.    Comprehensive Metabolic Panel [[id number]]  (Abnormal) Collected: 09/04/22 2146    Specimen: Blood Updated: 09/04/22 2220     Glucose 60 mg/dL      BUN 15 mg/dL      Creatinine 0.66 mg/dL      Sodium 142 mmol/L      Potassium 3.7 mmol/L      Chloride 104 mmol/L      CO2 29.0 mmol/L      Calcium 9.5 mg/dL      Total Protein 7.8 g/dL      Albumin 4.40 g/dL      ALT (SGPT) 14 U/L      AST (SGOT) 15 U/L      Alkaline Phosphatase 59 U/L      Total Bilirubin 0.4 mg/dL      Globulin 3.4 gm/dL      A/G Ratio 1.3 g/dL      BUN/Creatinine Ratio 22.7     Anion Gap 9.0 mmol/L      eGFR 100.3 mL/min/1.73      Comment: National Kidney Foundation and American Society of Nephrology (ASN) Task Force recommended calculation based on the Chronic Kidney Disease Epidemiology Collaboration (CKD-EPI) equation refit without adjustment for race.       Narrative:      GFR Normal >60  Chronic Kidney Disease <60  Kidney Failure <15      Troponin [[id number]]  (Normal) Collected: 09/04/22 2146    Specimen: Blood Updated: 09/04/22 2212     Troponin T <0.010 ng/mL     Narrative:      Troponin T Reference Range:  <= 0.03 ng/mL-   Negative for AMI  >0.03 ng/mL-     Abnormal for myocardial necrosis.  Clinicians would have to utilize clinical acumen, EKG, Troponin and  serial changes to determine if it is an Acute Myocardial Infarction or myocardial injury due to an underlying chronic condition.       Results may be falsely decreased if patient taking Biotin.      BNP [[id number]]  (Normal) Collected: 09/04/22 2146    Specimen: Blood Updated: 09/04/22 2211     proBNP 289.2 pg/mL     Narrative:      Among patients with dyspnea, NT-proBNP is highly sensitive for the detection of acute congestive heart failure. In addition NT-proBNP of <300 pg/ml effectively rules out acute congestive heart failure with 99% negative predictive value.    Results may be falsely decreased if patient taking Biotin.      Blood Gas, Arterial - [[id number]]  (Abnormal) Collected: 09/04/22 2155    Specimen: Arterial Blood Updated: 09/04/22 2202     Site Right Radial     Scar's Test Positive     pH, Arterial 7.419 pH units      pCO2, Arterial 40.0 mm Hg      pO2, Arterial 59.7 mm Hg      HCO3, Arterial 25.8 mmol/L      Base Excess, Arterial 1.2 mmol/L      Comment: Serial Number: [id number]:  [id number]        O2 Saturation, Arterial 90.9 %      CO2 Content 27.1 mmol/L      Barometric Pressure for Blood Gas --     Comment: N/A        Modality Cannula     FIO2 36 %      Hemodilution No    CBC & Differential [[id number]]  (Abnormal) Collected: 09/04/22 2146    Specimen: Blood Updated: 09/04/22 2152    Narrative:      The following orders were created for panel order CBC & Differential.  Procedure                               Abnormality         Status                     ---------                               -----------         ------                     CBC Auto Differential[[id number]]        Abnormal            Final result                 Please view results for these tests on the individual orders.    CBC Auto Differential [[id number]]  (Abnormal) Collected: 09/04/22 2146    Specimen: Blood Updated: 09/04/22 2152     WBC 4.80 10*3/mm3      RBC 3.97 10*6/mm3      Hemoglobin 11.2 g/dL      Hematocrit 34.6 %       MCV 87.3 fL      MCH 28.3 pg      MCHC 32.4 g/dL      RDW 16.0 %      RDW-SD 49.4 fl      MPV 8.6 fL      Platelets 164 10*3/mm3      Neutrophil % 58.7 %      Lymphocyte % 31.3 %      Monocyte % 7.5 %      Eosinophil % 1.9 %      Basophil % 0.6 %      Neutrophils, Absolute 2.80 10*3/mm3      Lymphocytes, Absolute 1.50 10*3/mm3      Monocytes, Absolute 0.40 10*3/mm3      Eosinophils, Absolute 0.10 10*3/mm3      Basophils, Absolute 0.00 10*3/mm3      nRBC 0.0 /100 WBC           Imaging Results (Last 24 Hours)     Procedure Component Value Units Date/Time    XR Chest 1 View [[id number]] Collected: 09/05/22 0757     Updated: 09/05/22 0800    Narrative:         DATE OF EXAM:   9/4/2022 10:11 PM     PROCEDURE:   XR CHEST 1 VW-     INDICATIONS:   Dyspnea     COMPARISON:  12/27/2021     TECHNIQUE:   Portable chest radiograph.     FINDINGS:    Heart size mildly enlarged. No pneumothorax, or significant pleural  effusion. There are scattered calcified granulomas. Osseous structures  appear grossly intact. Mild patchy bibasilar opacities may relate to  atelectasis, pneumonia not excluded.       Impression:      Mild patchy bibasilar airspace opacities may relate to atelectasis,  pneumonia not excluded.     Electronically Signed By-Wang Alba MD On:9/5/2022 7:58 AM  This report was finalized on [id number] by  Wang Alba MD.          EKG      I personally viewed and interpreted the patient's EKG/Telemetry data:    ECHOCARDIOGRAM:      STRESS MYOVIEW:    CARDIAC CATHETERIZATION:    OTHER:         Assessment & Plan     Principal Problem:    COPD with acute exacerbation (HCC)  Active Problems:    Type 2 diabetes mellitus without complication, without long-term current use of insulin (HCC)    Mixed hyperlipidemia    Essential hypertension    Morbid obesity (HCC)    Coronary artery disease    Tobacco dependence syndrome    Chronic pain of left knee    Bradycardia    Sepsis (HCC)    Shortness of breath      Patient  presented with shortness of breath and had COPD exacerbation and is on antibiotics and duo nebs.  Patient is also being ruled out for MI by get enzymes  Patient is having an echocardiogram for LV function  Patient sugar levels and lipid levels are followed by the primary care doctor and is on home medications  Patient's blood pressure is slightly high but will adjust medicines accordingly  Patient is also advised to stop smoking.    I discussed the patients findings and my recommendations with patient and nurse    Prince Celeste MD  09/05/22  17:47 EDT

## 2022-09-05 NOTE — ED PROVIDER NOTES
Subjective   Patient presents with:  Shortness of Breath: Pt reports increased SOA and bradycardia (in the 50's at home).  Pt reports hx of COPD and states on home oxygen.     Bert Rojas MD   No LMP for male patient.    Patient is a 71-year-old male with a history of COPD, on oxygen at home baseline 4 L, presents emergency department with a complaint of dyspnea.  Patient is also cared for by his son who also runs history, they reported they check his oxygen levels, and also noted his heart rate to be in the 50s.  Patient denies chest pain at this time.  He does report he has had some lower extremity swelling and fluid retention.  No nausea vomiting diarrhea.  No dizziness, lightheadedness or diaphoresis.  His son reports the patient may have had a syncopal episode last week.  Had no further episode since that time.          Review of Systems   Constitutional: Negative for chills and fever.   Eyes: Negative for photophobia and visual disturbance.   Respiratory: Positive for shortness of breath. Negative for cough and chest tightness.    Cardiovascular: Positive for leg swelling. Negative for chest pain and palpitations.   Gastrointestinal: Negative for abdominal pain.   Genitourinary: Negative for dysuria.   Musculoskeletal: Negative for back pain and neck pain.   Skin: Negative for color change and rash.   Neurological: Positive for syncope. Negative for dizziness, weakness and light-headedness.   Psychiatric/Behavioral: Negative for confusion.       Past Medical History:   Diagnosis Date   • Abnormal EKG 2/3/2015   • Anxiety    • COPD (chronic obstructive pulmonary disease) ([facility])    • Coronary artery disease 3/3/2015   • Diabetes ([facility])     type 2   • Hyperlipidemia    • Hypertension    • Microalbuminuria due to type 2 diabetes mellitus ([facility]) 2/5/2020   • Multinodular goiter 4/18/2019   • Murmur 2/3/2015   • Primary osteoarthritis of left knee 10/22/2021       No Known Allergies    Past Surgical  History:   Procedure Laterality Date   • EYE SURGERY      cataract   • TOTAL KNEE ARTHROPLASTY Right 10/21/2020    Procedure: TOTAL KNEE ARTHROPLASTY;  Surgeon: Ravi Fagan MD;  Location: [facility] MAIN OR;  Service: Orthopedics;  Laterality: Right;   • TOTAL KNEE ARTHROPLASTY REVISION Right 2021    Procedure: KNEE POLY INSERT EXCHANGE with irrigation;  Surgeon: Ravi Fagan MD;  Location: [facility] MAIN OR;  Service: Orthopedics;  Laterality: Right;       Family History   Problem Relation Age of Onset   • Cancer Other    • Diabetes Other    • Heart disease Other    • No Known Problems Mother    • No Known Problems Father    • No Known Problems Sister    • No Known Problems Brother    • No Known Problems Maternal Aunt    • No Known Problems Maternal Uncle    • No Known Problems Paternal Aunt    • No Known Problems Paternal Uncle    • No Known Problems Maternal Grandmother    • No Known Problems Maternal Grandfather    • No Known Problems Paternal Grandmother    • No Known Problems Paternal Grandfather    • Anemia Neg Hx    • Arrhythmia Neg Hx    • Asthma Neg Hx    • Clotting disorder Neg Hx    • Fainting Neg Hx    • Heart attack Neg Hx    • Heart failure Neg Hx    • Hyperlipidemia Neg Hx    • Hypertension Neg Hx        Social History     Socioeconomic History   • Marital status:    Tobacco Use   • Smoking status: Former Smoker     Packs/day: 1.00     Types: Cigarettes     Quit date: 7/3/2022     Years since quittin.1   • Smokeless tobacco: Former User     Quit date: 7/3/2022   Vaping Use   • Vaping Use: Never used   Substance and Sexual Activity   • Alcohol use: Not Currently   • Drug use: Never   • Sexual activity: Defer           Objective   Physical Exam  Vitals and nursing note reviewed.   Constitutional:       Appearance: He is ill-appearing.   HENT:      Head: Normocephalic and atraumatic.      Mouth/Throat:      Mouth: Mucous membranes are moist.      Pharynx: Oropharynx is clear.   Eyes:       "Extraocular Movements: Extraocular movements intact.      Pupils: Pupils are equal, round, and reactive to light.   Cardiovascular:      Rate and Rhythm: Normal rate and regular rhythm.      Heart sounds: No murmur heard.    No friction rub. No gallop.   Pulmonary:      Effort: Pulmonary effort is normal.      Breath sounds: Wheezing present.   Abdominal:      General: Bowel sounds are normal.      Palpations: Abdomen is soft.   Musculoskeletal:         General: Normal range of motion.      Cervical back: Normal range of motion and neck supple.      Right lower leg: No tenderness. No edema.      Left lower leg: No tenderness. No edema.   Skin:     General: Skin is warm and dry.      Capillary Refill: Capillary refill takes less than 2 seconds.   Neurological:      General: No focal deficit present.      Mental Status: He is alert and oriented to person, place, and time.   Psychiatric:         Mood and Affect: Mood normal.         Behavior: Behavior normal.         Procedures           ED Course  ED Course as of 09/05/22 0243   Sun Sep 04, 2022   2143 Per hospital policy respiratory declines to do DuoNeb prior to COVID test result [LB]      ED Course User Index  [LB] Jigna Dennis, APRN           /94 (BP Location: Right arm, Patient Position: Lying)   Pulse 59   Temp 97.4 °F (36.3 °C) (Temporal)   Resp 18   Ht 170.2 cm (67\")   Wt 108 kg (237 lb 9.4 oz)   SpO2 (!) 87%   BMI 37.21 kg/m²   Labs Reviewed   COMPREHENSIVE METABOLIC PANEL - Abnormal; Notable for the following components:       Result Value    Glucose 60 (*)     Creatinine 0.66 (*)     All other components within normal limits    Narrative:     GFR Normal >60  Chronic Kidney Disease <60  Kidney Failure <15     CBC WITH AUTO DIFFERENTIAL - Abnormal; Notable for the following components:    RBC 3.97 (*)     Hemoglobin 11.2 (*)     Hematocrit 34.6 (*)     RDW 16.0 (*)     All other components within normal limits   BLOOD GAS, ARTERIAL - " Abnormal; Notable for the following components:    pO2, Arterial 59.7 (*)     O2 Saturation, Arterial 90.9 (*)     All other components within normal limits   RESPIRATORY PANEL PCR W/ COVID-19 (SARS-COV-2) IAN/NAYELY/KATERIN/PAD/COR/MAD/[facility] IN-HOUSE, NP [facility]AB IN [place], 3-4 HR TAT - Normal    Narrative:     In the setting of a positive respiratory panel with a viral infection PLUS a negative procalcitonin without other underlying concern for bacterial infection, consider observing off antibiotics or discontinuation of antibiotics and continue supportive care. If the respiratory panel is positive for atypical bacterial infection (Bordetella pertussis, Chlamydophila pneumoniae, or Mycoplasma pneumoniae), consider antibiotic de-escalation to target atypical bacterial infection.   BNP (IN-HOUSE) - Normal    Narrative:     Among patients with dyspnea, NT-proBNP is highly sensitive for the detection of acute congestive heart failure. In addition NT-proBNP of <300 pg/ml effectively rules out acute congestive heart failure with 99% negative predictive value.    Results may be falsely decreased if patient taking Biotin.     TROPONIN (IN-HOUSE) - Normal    Narrative:     Troponin T Reference Range:  <= 0.03 ng/mL-   Negative for AMI  >0.03 ng/mL-     Abnormal for myocardial necrosis.  Clinicians would have to utilize clinical acumen, EKG, Troponin and serial changes to determine if it is an Acute Myocardial Infarction or myocardial injury due to an underlying chronic condition.       Results may be falsely decreased if patient taking Biotin.     PHOSPHORUS - Normal   TSH - Normal   POC LACTATE - Normal   BLOOD CULTURE   BLOOD CULTURE   RESPIRATORY CULTURE   BLOOD GAS, ARTERIAL   ETHANOL    Narrative:     Plasma Ethanol Clinical Symptoms:    ETOH (%)               Clinical Symptom  .01-.05              No apparent influence  .03-.12              Euphoria, Diminished judgment and attention   .09-.25              Impaired  comprehension, Muscle incoordination  .18-.30              Confusion, Staggered gait, Slurred speech  .25-.40              Markedly decreased response to stimuli, unable to stand or                        walk, vomitting, sleep or stupor  .35-.50              Comatose, Anesthesia, Subnormal body temperature       BASIC METABOLIC PANEL   CBC WITH AUTO DIFFERENTIAL   HEMOGLOBIN A1C   TROPONIN (IN-HOUSE)   CALCIUM, IONIZED   LACTIC ACID, PLASMA   URINE DRUG SCREEN   MAGNESIUM   MAGNESIUM   POC LACTATE   POCT GLUCOSE FINGERSTICK   POCT GLUCOSE FINGERSTICK   POCT GLUCOSE FINGERSTICK   POCT GLUCOSE FINGERSTICK   CBC AND DIFFERENTIAL    Narrative:     The following orders were created for panel order CBC & Differential.  Procedure                               Abnormality         Status                     ---------                               -----------         ------                     CBC Auto Differential[[id number]]        Abnormal            Final result                 Please view results for these tests on the individual orders.     Medications   sodium chloride 0.9 % flush 10 mL (has no administration in time range)   azithromycin (ZITHROMAX) 500 mg in sodium chloride 0.9 % 250 mL IVPB-VTB (500 mg Intravenous Given 9/5/22 0053)   ipratropium-albuterol (DUO-NEB) nebulizer solution 3 mL (3 mL Nebulization Not Given 9/5/22 0032)   methylPREDNISolone sodium succinate (SOLU-Medrol) injection 40 mg (has no administration in time range)   guaiFENesin (MUCINEX) 12 hr tablet 1,200 mg (has no administration in time range)   nitroglycerin (NITROSTAT) SL tablet 0.4 mg (has no administration in time range)   sodium chloride 0.9 % flush 10 mL (10 mL Intravenous Given 9/5/22 0200)   sodium chloride 0.9 % flush 10 mL (has no administration in time range)   acetaminophen (TYLENOL) tablet 650 mg (has no administration in time range)     Or   acetaminophen (TYLENOL) 160 MG/5ML solution 650 mg (has no administration in time range)      Or   acetaminophen (TYLENOL) suppository 650 mg (has no administration in time range)   aluminum-magnesium hydroxide-simethicone (MAALOX MAX) 400-400-40 MG/5ML suspension 15 mL (has no administration in time range)   ondansetron (ZOFRAN) tablet 4 mg (has no administration in time range)     Or   ondansetron (ZOFRAN) injection 4 mg (has no administration in time range)   dextrose (GLUTOSE) oral gel 15 g (has no administration in time range)   dextrose (D50W) (25 g/50 mL) IV injection 25 g (has no administration in time range)   glucagon (human recombinant) (GLUCAGEN DIAGNOSTIC) 1 mg in sterile water (preservative free) 1 mL injection (has no administration in time range)   cefTRIAXone (ROCEPHIN) 1 g in sodium chloride 0.9 % 100 mL IVPB (has no administration in time range)   traMADol (ULTRAM) tablet 50 mg (has no administration in time range)   potassium chloride (K-DUR,KLOR-CON) CR tablet 40 mEq (has no administration in time range)   potassium chloride (KLOR-CON) packet 40 mEq (has no administration in time range)   Magnesium Sulfate 2 gram Bolus, followed by 8 gram infusion (total Mg dose 10 grams)- Mg less than or equal to 1mg/dL (has no administration in time range)     Or   Magnesium Sulfate 2 gram / 50mL Infusion (GIVE X 3 BAGS TO EQUAL 6GM TOTAL DOSE) - Mg 1.1 - 1.5 mg/dl (has no administration in time range)     Or   Magnesium Sulfate 4 gram infusion- Mg 1.6-1.9 mg/dL (has no administration in time range)   potassium & sodium phosphates (PHOS-NAK) 280-160-250 MG packet - for Phosphorus less than 1.25 mg/dL (has no administration in time range)     Or   potassium & sodium phosphates (PHOS-NAK) 280-160-250 MG packet - for Phosphorus 1.25 - 2.5 mg/dL (has no administration in time range)   calcium gluconate 1g/50ml 0.675% NaCl IV SOLN (has no administration in time range)     And   calcium gluconate 2-0.675 GM/100ML NACL IVPB (has no administration in time range)   insulin lispro (ADMELOG) injection 0-9  Units (has no administration in time range)   benzonatate (TESSALON) capsule 200 mg (has no administration in time range)   dextrose 5 % and sodium chloride 0.45 % infusion (75 mL/hr Intravenous New Bag 9/5/22 0200)   budesonide (PULMICORT) nebulizer solution 0.5 mg (0.5 mg Nebulization Not Given 9/5/22 0031)   hydrALAZINE (APRESOLINE) injection 10 mg (has no administration in time range)   ipratropium-albuterol (DUO-NEB) nebulizer solution 3 mL (3 mL Nebulization Given 9/4/22 2248)   albuterol sulfate HFA (PROVENTIL HFA;VENTOLIN HFA;PROAIR HFA) inhaler 2 puff (2 puffs Inhalation Given 9/4/22 2158)   methylPREDNISolone sodium succinate (SOLU-Medrol) injection 125 mg (125 mg Intravenous Given 9/5/22 0051)   cefTRIAXone (ROCEPHIN) 2 g in sodium chloride 0.9 % 100 mL IVPB (2 g Intravenous New Bag 9/5/22 0125)     No radiology results for the last day       EKG reviewed by Dr. Rodney MADISON and I, our findings are: Sinus bradycardia rate of 52, compared to previous 12/27/2021.  No acute changes                           MDM  Number of Diagnoses or Management Options     Amount and/or Complexity of Data Reviewed  Clinical lab tests: reviewed  Tests in the radiology section of CPT®: reviewed  Tests in the medicine section of CPT®: reviewed  Independent visualization of images, tracings, or specimens: yes      Clinical information obtained from an independent historian. History obtained from or confirmed by : Son at bedside    Differentials: Pneumonia, COPD, electrolyte imbalance, arrhythmia    Patient was brought back to the emergency department room for evaluation and placed on appropriate monitoring.  Patient had IV established and blood work obtained.  He underwent a recent work-up for shortness of breath.  Chest x-ray reveals findings concerning for pneumonia.  He will be started on Rocephin and Zithromax.  He is given Solu-Medrol as well as DuoNeb treatment.  Viral panel was negative.  CBC CMP reviewed.  BMP troponin  reviewed.  Patient will be admitted for further evaluation and management.    Managment of patient discussed with: HospitalistJenise  Note Disclaimer: At [facility], we believe that sharing information builds trust and better relationships. You are receiving this note because you recently visited [facility]. It is possible you will see health information before a provider has talked with you about it. This kind of information can be easy to misunderstand. To help you fully understand what it means for your health, we urge you to discuss this note with your provider.    Note dicatated utilizing Dragon Dictation.  Final diagnoses:   Shortness of breath   Pneumonia due to infectious organism, unspecified laterality, unspecified part of lung   COPD exacerbation (HCC)       ED Disposition  ED Disposition     ED Disposition   Decision to Admit    Condition   --    Comment   Level of Care: Remote Telemetry [26]   Diagnosis: COPD with acute exacerbation (HCC) [[id number]]   Admitting Physician: RAYO RASMUSSEN [[id number]]   Attending Physician: RAYO RASMUSSEN [[id number]]   Bed Request Comments: med surg               No follow-up provider specified.       Medication List      No changes were made to your prescriptions during this visit.          Jigna Dennis, APRN  09/05/22 0243

## 2022-09-05 NOTE — CONSULTS
Group: [facility]         CONSULT NOTE    Patient Identification:  Brenden Peter  71 y.o.  male  1950  [id number]            Requesting physician: Attending physician    Reason for Consultation: COPD      History of Present Illness:  71-year-old male with previous medical history of COPD,DM, HLD, HTN, BPH, CAD, obesity, tobacco use (quit July 3, 2022), chronic pain, neuropathy, depression, vitamin D deficiency who presents to [facility] ED due to complaints of 3 to 4 days of shortness of breath that worsens with exertion and supine position.  Also reports cough with minimal sputum.  He was admitted with COPD exacerbation with pneumonia and was started on IV antibiotics IV steroids and bronchodilators and today he is feeling partially better.    Assessment:  COPD with acute exacerbation (HCC)  Bibasilar pneumonia  Hypoxemia  Loud snoring excessive tiredness suggestive of obstructive sleep apnea  Ex-smoker: Quit July 2022  Diabetes mellitus  Hypertension  Chronic coronary artery disease  Chronic back pain  Dyslipidemia  Obesity      Recommendations:  Oxygen supplement and titration to maintain saturation 90 to 95%: Currently requiring 6 L per nasal cannula  IV steroids  Antibiotics  Bronchodilators  Inhaled corticosteroids  Mucinex  DVT/GI prophylaxis  Check daily labs and correct electrolytes as needed    Patient will follow-up as an outpatient after discharge to schedule sleep study        Review of Sytems:  Review of Systems  Constitutional: Negative for chills, fever and positive for malaise/fatigue.   HENT: Negative.    Eyes: Negative.    Cardiovascular: Negative.    Respiratory: Positive for cough and shortness of breath.  Positive for loud snoring and excessive daytime sleepiness  Skin: Negative.    Musculoskeletal: Negative.    Gastrointestinal: Negative.    Genitourinary: Negative.    Neurological: Negative.    Psychiatric/Behavioral: Negative.  Past Medical History:  Past  Medical History:   Diagnosis Date   • Abnormal EKG 2/3/2015   • Anxiety    • COPD (chronic obstructive pulmonary disease) ([facility])    • Coronary artery disease 3/3/2015   • Diabetes ([facility])     type 2   • Hyperlipidemia    • Hypertension    • Microalbuminuria due to type 2 diabetes mellitus ([facility]) 2/5/2020   • Multinodular goiter 4/18/2019   • Murmur 2/3/2015   • Primary osteoarthritis of left knee 10/22/2021       Past Surgical History:  Past Surgical History:   Procedure Laterality Date   • EYE SURGERY      cataract   • TOTAL KNEE ARTHROPLASTY Right 10/21/2020    Procedure: TOTAL KNEE ARTHROPLASTY;  Surgeon: Ravi Fagan MD;  Location: [facility] OR;  Service: Orthopedics;  Laterality: Right;   • TOTAL KNEE ARTHROPLASTY REVISION Right 5/19/2021    Procedure: KNEE POLY INSERT EXCHANGE with irrigation;  Surgeon: Ravi Fagan MD;  Location: [facility] OR;  Service: Orthopedics;  Laterality: Right;        Home Meds:  Medications Prior to Admission   Medication Sig Dispense Refill Last Dose   • albuterol (PROVENTIL) (2.5 MG/3ML) 0.083% nebulizer solution Take 3 mL by nebulization 3 (Three) Times a Day As Needed for Wheezing or Shortness of Air.      • albuterol sulfate  (90 Base) MCG/ACT inhaler Inhale 2 puffs Every 6 (Six) Hours As Needed.      • amLODIPine (NORVASC) 5 MG tablet Take 5 mg by mouth Every Night.      • bisoprolol (ZEBeta) 5 MG tablet Take 5 mg by mouth Daily.      • gabapentin (NEURONTIN) 300 MG capsule Take 300 mg by mouth 2 (two) times a day.      • glipizide (GLUCOTROL) 10 MG tablet Take 10 mg by mouth Every Night. HOLD the night before surgery      • HYDROcodone-acetaminophen (NORCO) 5-325 MG per tablet Take 1 tablet by mouth Every 6 (Six) Hours As Needed for Moderate Pain . 20 tablet 0    • ipratropium-albuterol (DUO-NEB) 0.5-2.5 mg/3 ml nebulizer 3 mL Every 6 (Six) Hours.      • lisinopril (PRINIVIL,ZESTRIL) 40 MG tablet Take 40 mg by mouth Every Night. HOLD 24 hours before surgery      •  metFORMIN (GLUCOPHAGE) 1000 MG tablet Take 1,000 mg by mouth 2 (Two) Times a Day With Meals. HOLD 48 hours before surgery      • pioglitazone (ACTOS) 30 MG tablet Take 30 mg by mouth Every Night. HOLD the night before surgery      • pravastatin (PRAVACHOL) 20 MG tablet 20 mg Daily.      • sertraline (ZOLOFT) 50 MG tablet Take 50 mg by mouth Every Night.      • umeclidinium-vilanterol (ANORO ELLIPTA) 62.5-25 MCG/INH aerosol powder  inhaler Anoro Ellipta 62.5 mcg-25 mcg/actuation powder for inhalation   Inhale 1 puff every day by inhalation route for 30 days.      • vitamin D (ERGOCALCIFEROL) 1.25 MG (99990 UT) capsule capsule 50,000 Units 2 (Two) Times a Week. Tues and Fri          Allergies:  No Known Allergies    Social History:   Social History     Socioeconomic History   • Marital status:    Tobacco Use   • Smoking status: Former Smoker     Packs/day: 1.00     Types: Cigarettes     Quit date: 7/3/2022     Years since quittin.1   • Smokeless tobacco: Former User     Quit date: 7/3/2022   Vaping Use   • Vaping Use: Never used   Substance and Sexual Activity   • Alcohol use: Not Currently   • Drug use: Never   • Sexual activity: Defer       Family History:  Family History   Problem Relation Age of Onset   • Cancer Other    • Diabetes Other    • Heart disease Other    • No Known Problems Mother    • No Known Problems Father    • No Known Problems Sister    • No Known Problems Brother    • No Known Problems Maternal Aunt    • No Known Problems Maternal Uncle    • No Known Problems Paternal Aunt    • No Known Problems Paternal Uncle    • No Known Problems Maternal Grandmother    • No Known Problems Maternal Grandfather    • No Known Problems Paternal Grandmother    • No Known Problems Paternal Grandfather    • Anemia Neg Hx    • Arrhythmia Neg Hx    • Asthma Neg Hx    • Clotting disorder Neg Hx    • Fainting Neg Hx    • Heart attack Neg Hx    • Heart failure Neg Hx    • Hyperlipidemia Neg Hx    •  "Hypertension Neg Hx        Physical Exam:  /68 (BP Location: Right arm, Patient Position: Sitting)   Pulse 67   Temp 98.2 °F (36.8 °C) (Oral)   Resp 18   Ht 170.2 cm (67\")   Wt 108 kg (237 lb)   SpO2 96%   BMI 37.12 kg/m²  Body mass index is 37.12 kg/m². 96% 108 kg (237 lb)  Physical Exam  General Appearance:  Alert   HEENT:  Normocephalic, without obvious abnormality, Conjunctiva/corneas clear,.   Nares normal, no drainage     Neck:  Supple, symmetrical, trachea midline. No JVD.  Lungs /Chest wall:   Bilateral basal rhonchi, expiratory wheezing, respirations unlabored, symmetrical wall movement.     Heart:  Regular rate and rhythm, S1 S2 normal  Abdomen: Soft, non-tender, no masses, no organomegaly.    Extremities: No edema, no clubbing or cyanosis  LABS:  Lab Results   Component Value Date    CALCIUM 9.1 09/05/2022    PHOS 2.8 09/04/2022     Results from last 7 days   Lab Units 09/05/22  0639 09/05/22 0332 09/04/22  2146   MAGNESIUM mg/dL  --  1.9  --    SODIUM mmol/L 140 143 142   POTASSIUM mmol/L 4.1 4.1 3.7   CHLORIDE mmol/L 103 105 104   CO2 mmol/L 24.0 27.0 29.0   BUN mg/dL 14 13 15   CREATININE mg/dL 0.63* 0.61* 0.66*   GLUCOSE mg/dL 229* 115* 60*   CALCIUM mg/dL 9.1 9.3 9.5   WBC 10*3/mm3 7.30 6.50 4.80   HEMOGLOBIN g/dL 10.9* 11.4* 11.2*   PLATELETS 10*3/mm3 164 160 164   ALT (SGPT) U/L 14  --  14   AST (SGOT) U/L 15  --  15   PROBNP pg/mL  --   --  289.2   PROCALCITONIN ng/mL  --  0.05  --      Lab Results   Component Value Date    TROPONINT <0.010 09/05/2022     Results from last 7 days   Lab Units 09/05/22  0332 09/04/22  2146   TROPONIN T ng/mL <0.010 <0.010         Results from last 7 days   Lab Units 09/05/22  0332 09/05/22  0010   PROCALCITONIN ng/mL 0.05  --    LACTATE mmol/L 2.3* 1.9     Results from last 7 days   Lab Units 09/04/22  2155   PH, ARTERIAL pH units 7.419   PCO2, ARTERIAL mm Hg 40.0   PO2 ART mm Hg 59.7*   O2 SATURATION ART % 90.9*   MODALITY  Cannula     Results from " last 7 days   Lab Units 09/04/22  2150   ADENOVIRUS DETECTION BY PCR  Not Detected   CORONAVIRUS 229E  Not Detected   CORONAVIRUS HKU1  Not Detected   CORONAVIRUS NL63  Not Detected   CORONAVIRUS OC43  Not Detected   HUMAN METAPNEUMOVIRUS  Not Detected   HUMAN RHINOVIRUS/ENTEROVIRUS  Not Detected   INFLUENZA B PCR  Not Detected   PARAINFLUENZA 1  Not Detected   PARAINFLUENZA VIRUS 2  Not Detected   PARAINFLUENZA VIRUS 3  Not Detected   PARAINFLUENZA VIRUS 4  Not Detected   BORDETELLA PERTUSSIS PCR  Not Detected   CHLAMYDOPHILA PNEUMONIAE PCR  Not Detected   MYCOPLAMA PNEUMO PCR  Not Detected   INFLUENZA A PCR  Not Detected   RSV, PCR  Not Detected             Lab Results   Component Value Date    TSH 3.220 09/04/2022     Estimated Creatinine Clearance: 126.1 mL/min (A) (by C-G formula based on SCr of 0.63 mg/dL (L)).         Imaging:  Imaging Results (Last 24 Hours)     Procedure Component Value Units Date/Time    XR Chest 1 View [[id number]] Collected: 09/05/22 0757     Updated: 09/05/22 0800    Narrative:         DATE OF EXAM:   9/4/2022 10:11 PM     PROCEDURE:   XR CHEST 1 VW-     INDICATIONS:   Dyspnea     COMPARISON:  12/27/2021     TECHNIQUE:   Portable chest radiograph.     FINDINGS:    Heart size mildly enlarged. No pneumothorax, or significant pleural  effusion. There are scattered calcified granulomas. Osseous structures  appear grossly intact. Mild patchy bibasilar opacities may relate to  atelectasis, pneumonia not excluded.       Impression:      Mild patchy bibasilar airspace opacities may relate to atelectasis,  pneumonia not excluded.     Electronically Signed By-Wang Alba MD On:9/5/2022 7:58 AM  This report was finalized on [id number] by  Wang Alba MD.            Current Meds:   SCHEDULE  azithromycin, 500 mg, Oral, Q24H  budesonide, 0.5 mg, Nebulization, BID - RT  [START ON 9/6/2022] cefTRIAXone, 2 g, Intravenous, Daily  guaiFENesin, 1,200 mg, Oral, Q12H  insulin lispro, 0-9 Units,  Subcutaneous, 4x Daily With Meals & Nightly  ipratropium-albuterol, 3 mL, Nebulization, Q4H - RT  methylPREDNISolone sodium succinate, 40 mg, Intravenous, Q8H  sodium chloride, 10 mL, Intravenous, Q12H      Infusions  dextrose 5 % and sodium chloride 0.45 %, 75 mL/hr, Last Rate: 75 mL/hr (09/05/22 0200)      PRNs  •  acetaminophen **OR** acetaminophen **OR** acetaminophen  •  aluminum-magnesium hydroxide-simethicone  •  benzonatate  •  Calcium Gluconate-NaCl **AND** calcium gluconate **AND** Calcium, Ionized  •  dextrose  •  dextrose  •  glucagon (human recombinant)  •  hydrALAZINE  •  magnesium sulfate **OR** magnesium sulfate **OR** magnesium sulfate  •  nitroglycerin  •  ondansetron **OR** ondansetron  •  potassium & sodium phosphates **OR** potassium & sodium phosphates  •  potassium chloride  •  potassium chloride  •  [COMPLETED] Insert peripheral IV **AND** sodium chloride  •  sodium chloride  •  traMADol        Juanjo Castañeda MD  9/5/2022  12:19 EDT      Much of this encounter note is an electronic transcription/translation of spoken language to printed text using Dragon Software.

## 2022-09-05 NOTE — PLAN OF CARE
Problem: Adult Inpatient Plan of Care  Goal: Plan of Care Review  Outcome: Ongoing, Progressing  Flowsheets (Taken 9/5/2022 0330)  Outcome Evaluation: Pt admitted to unit from ED r/t SOA, PNA, & COPD exacerbation. Pt reports SOA, especially on exertion. Pt on tele with HR in 60s. Cardiology consulted per orders. Pt wears 4L O2 at home & is currently on 5.5L. Pt is diabetic & is to receive accuchecks AS/HS. Sputum cx ordered but pt reports no cough at this time. D5 1/2 NS running at 75 mL/hr per orders. Pt reports having syncopal episode recently. No additional C/O voiced at this time. Will continue to monitor.  Goal: Patient-Specific Goal (Individualized)  Outcome: Ongoing, Progressing  Goal: Absence of Hospital-Acquired Illness or Injury  Outcome: Ongoing, Progressing  Intervention: Identify and Manage Fall Risk  Recent Flowsheet Documentation  Taken 9/5/2022 0212 by Nicolette oWods RN  Safety Promotion/Fall Prevention:   safety round/check completed   nonskid shoes/slippers when out of bed   fall prevention program maintained   clutter free environment maintained   assistive device/personal items within reach   activity supervised  Intervention: Prevent Skin Injury  Recent Flowsheet Documentation  Taken 9/5/2022 0212 by Nicolette Woods RN  Body Position: supine  Intervention: Prevent and Manage VTE (Venous Thromboembolism) Risk  Recent Flowsheet Documentation  Taken 9/5/2022 0212 by Nicolette Woods RN  Activity Management: activity adjusted per tolerance  Intervention: Prevent Infection  Recent Flowsheet Documentation  Taken 9/5/2022 0212 by Nicolette Woods RN  Infection Prevention:   hand hygiene promoted   personal protective equipment utilized   rest/sleep promoted   single patient room provided  Goal: Optimal Comfort and Wellbeing  Outcome: Ongoing, Progressing  Intervention: Monitor Pain and Promote Comfort  Recent Flowsheet Documentation  Taken 9/5/2022 0212 by Nicoltete Woods RN  Pain Management  Interventions:   care clustered   diversional activity provided   pillow support provided   position adjusted   quiet environment facilitated   unnecessary movement minimized  Intervention: Provide Person-Centered Care  Recent Flowsheet Documentation  Taken 9/5/2022 0212 by Nicolette Woods RN  Trust Relationship/Rapport:   care explained   choices provided   questions answered   questions encouraged   reassurance provided   thoughts/feelings acknowledged  Goal: Readiness for Transition of Care  Outcome: Ongoing, Progressing  Intervention: Mutually Develop Transition Plan  Recent Flowsheet Documentation  Taken 9/5/2022 0218 by Nicolette Woods RN  Transportation Anticipated:   car, drives self   family or friend will provide  Patient/Family Anticipated Services at Transition:   respiratory services      home health care  Patient/Family Anticipates Transition to: home with family  Taken 9/5/2022 0216 by Nicolette Woods, NITHYA  Equipment Currently Used at Home:   cane, straight   glucometer   nebulizer   oxygen   pulse ox   respiratory supplies   shower chair  Taken 9/5/2022 0213 by Nicolette Woods RN  Equipment Currently Used at Home:   pulse ox   nebulizer   respiratory supplies   oxygen   cane, straight   glucometer   shower chair     Problem: Fall Injury Risk  Goal: Absence of Fall and Fall-Related Injury  Outcome: Ongoing, Progressing  Intervention: Identify and Manage Contributors  Recent Flowsheet Documentation  Taken 9/5/2022 0212 by Nicolette Woods RN  Medication Review/Management: medications reviewed  Intervention: Promote Injury-Free Environment  Recent Flowsheet Documentation  Taken 9/5/2022 0212 by Nicolette Woods, NITHYA  Safety Promotion/Fall Prevention:   safety round/check completed   nonskid shoes/slippers when out of bed   fall prevention program maintained   clutter free environment maintained   assistive device/personal items within reach   activity supervised     Problem: COPD (Chronic  Obstructive Pulmonary Disease) Comorbidity  Goal: Maintenance of COPD Symptom Control  Outcome: Ongoing, Progressing  Intervention: Maintain COPD-Symptom Control  Recent Flowsheet Documentation  Taken 9/5/2022 0212 by Nicolette Woods RN  Medication Review/Management: medications reviewed     Problem: Diabetes Comorbidity  Goal: Blood Glucose Level Within Targeted Range  Outcome: Ongoing, Progressing     Problem: Hypertension Comorbidity  Goal: Blood Pressure in Desired Range  Outcome: Ongoing, Progressing  Intervention: Maintain Blood Pressure Management  Recent Flowsheet Documentation  Taken 9/5/2022 0212 by Nicolette Woods, RN  Medication Review/Management: medications reviewed     Problem: Osteoarthritis Comorbidity  Goal: Maintenance of Osteoarthritis Symptom Control  Outcome: Ongoing, Progressing  Intervention: Maintain Osteoarthritis Symptom Control  Recent Flowsheet Documentation  Taken 9/5/2022 0212 by Nicolette Woods RN  Activity Management: activity adjusted per tolerance  Medication Review/Management: medications reviewed   Goal Outcome Evaluation:              Outcome Evaluation: Pt admitted to unit from ED r/t SOA, PNA, & COPD exacerbation. Pt reports SOA, especially on exertion. Pt on tele with HR in 60s. Cardiology consulted per orders. Pt wears 4L O2 at home & is currently on 5.5L. Pt is diabetic & is to receive accuchecks AS/HS. Sputum cx ordered but pt reports no cough at this time. D5 1/2 NS running at 75 mL/hr per orders. Pt reports having syncopal episode recently. No additional C/O voiced at this time. Will continue to monitor.

## 2022-09-06 ENCOUNTER — READMISSION MANAGEMENT (OUTPATIENT)
Dept: CALL CENTER | Facility: HOSPITAL | Age: 72
End: 2022-09-06

## 2022-09-06 VITALS
TEMPERATURE: 97.5 F | OXYGEN SATURATION: 95 % | BODY MASS INDEX: 37.2 KG/M2 | RESPIRATION RATE: 20 BRPM | HEIGHT: 67 IN | DIASTOLIC BLOOD PRESSURE: 78 MMHG | WEIGHT: 237 LBS | HEART RATE: 58 BPM | SYSTOLIC BLOOD PRESSURE: 146 MMHG

## 2022-09-06 PROBLEM — R00.1 BRADYCARDIA: Status: RESOLVED | Noted: 2022-09-05 | Resolved: 2022-09-06

## 2022-09-06 PROBLEM — J44.1 COPD WITH ACUTE EXACERBATION: Status: RESOLVED | Noted: 2022-09-04 | Resolved: 2022-09-06

## 2022-09-06 PROBLEM — R06.02 SHORTNESS OF BREATH: Status: RESOLVED | Noted: 2022-09-05 | Resolved: 2022-09-06

## 2022-09-06 LAB
ANION GAP SERPL CALCULATED.3IONS-SCNC: 12 MMOL/L (ref 5–15)
BASOPHILS # BLD AUTO: 0 10*3/MM3 (ref 0–0.2)
BASOPHILS NFR BLD AUTO: 0.1 % (ref 0–1.5)
BUN SERPL-MCNC: 28 MG/DL (ref 8–23)
BUN/CREAT SERPL: 30.8 (ref 7–25)
CALCIUM SPEC-SCNC: 8.9 MG/DL (ref 8.6–10.5)
CHLORIDE SERPL-SCNC: 104 MMOL/L (ref 98–107)
CO2 SERPL-SCNC: 24 MMOL/L (ref 22–29)
CREAT SERPL-MCNC: 0.91 MG/DL (ref 0.76–1.27)
D-LACTATE SERPL-SCNC: 1.7 MMOL/L (ref 0.5–2)
DEPRECATED RDW RBC AUTO: 50.3 FL (ref 37–54)
EGFRCR SERPLBLD CKD-EPI 2021: 90.1 ML/MIN/1.73
EOSINOPHIL # BLD AUTO: 0 10*3/MM3 (ref 0–0.4)
EOSINOPHIL NFR BLD AUTO: 0 % (ref 0.3–6.2)
ERYTHROCYTE [DISTWIDTH] IN BLOOD BY AUTOMATED COUNT: 16.2 % (ref 12.3–15.4)
GLUCOSE BLDC GLUCOMTR-MCNC: 205 MG/DL (ref 70–105)
GLUCOSE BLDC GLUCOMTR-MCNC: 220 MG/DL (ref 70–105)
GLUCOSE BLDC GLUCOMTR-MCNC: 226 MG/DL (ref 70–105)
GLUCOSE SERPL-MCNC: 243 MG/DL (ref 65–99)
HCT VFR BLD AUTO: 30.9 % (ref 37.5–51)
HGB BLD-MCNC: 10 G/DL (ref 13–17.7)
LYMPHOCYTES # BLD AUTO: 0.6 10*3/MM3 (ref 0.7–3.1)
LYMPHOCYTES NFR BLD AUTO: 7.2 % (ref 19.6–45.3)
MAGNESIUM SERPL-MCNC: 1.9 MG/DL (ref 1.6–2.4)
MCH RBC QN AUTO: 28.2 PG (ref 26.6–33)
MCHC RBC AUTO-ENTMCNC: 32.3 G/DL (ref 31.5–35.7)
MCV RBC AUTO: 87.4 FL (ref 79–97)
MONOCYTES # BLD AUTO: 0.2 10*3/MM3 (ref 0.1–0.9)
MONOCYTES NFR BLD AUTO: 2.8 % (ref 5–12)
NEUTROPHILS NFR BLD AUTO: 6.9 10*3/MM3 (ref 1.7–7)
NEUTROPHILS NFR BLD AUTO: 89.9 % (ref 42.7–76)
NRBC BLD AUTO-RTO: 0 /100 WBC (ref 0–0.2)
PLATELET # BLD AUTO: 154 10*3/MM3 (ref 140–450)
PMV BLD AUTO: 9.6 FL (ref 6–12)
POTASSIUM SERPL-SCNC: 4.4 MMOL/L (ref 3.5–5.2)
QT INTERVAL: 483 MS
RBC # BLD AUTO: 3.54 10*6/MM3 (ref 4.14–5.8)
SODIUM SERPL-SCNC: 140 MMOL/L (ref 136–145)
WBC NRBC COR # BLD: 7.7 10*3/MM3 (ref 3.4–10.8)

## 2022-09-06 PROCEDURE — 82962 GLUCOSE BLOOD TEST: CPT

## 2022-09-06 PROCEDURE — 63710000001 INSULIN LISPRO (HUMAN) PER 5 UNITS: Performed by: NURSE PRACTITIONER

## 2022-09-06 PROCEDURE — 94618 PULMONARY STRESS TESTING: CPT

## 2022-09-06 PROCEDURE — 85025 COMPLETE CBC W/AUTO DIFF WBC: CPT | Performed by: INTERNAL MEDICINE

## 2022-09-06 PROCEDURE — 99232 SBSQ HOSP IP/OBS MODERATE 35: CPT | Performed by: INTERNAL MEDICINE

## 2022-09-06 PROCEDURE — 80048 BASIC METABOLIC PNL TOTAL CA: CPT | Performed by: INTERNAL MEDICINE

## 2022-09-06 PROCEDURE — 25010000002 HEPARIN (PORCINE) PER 1000 UNITS: Performed by: INTERNAL MEDICINE

## 2022-09-06 PROCEDURE — 83735 ASSAY OF MAGNESIUM: CPT | Performed by: NURSE PRACTITIONER

## 2022-09-06 PROCEDURE — 99239 HOSP IP/OBS DSCHRG MGMT >30: CPT | Performed by: INTERNAL MEDICINE

## 2022-09-06 PROCEDURE — 36415 COLL VENOUS BLD VENIPUNCTURE: CPT | Performed by: INTERNAL MEDICINE

## 2022-09-06 PROCEDURE — 25010000002 CEFTRIAXONE PER 250 MG: Performed by: NURSE PRACTITIONER

## 2022-09-06 PROCEDURE — 25010000002 METHYLPREDNISOLONE PER 40 MG: Performed by: NURSE PRACTITIONER

## 2022-09-06 PROCEDURE — 83605 ASSAY OF LACTIC ACID: CPT | Performed by: NURSE PRACTITIONER

## 2022-09-06 RX ORDER — IPRATROPIUM BROMIDE AND ALBUTEROL SULFATE 2.5; .5 MG/3ML; MG/3ML
3 SOLUTION RESPIRATORY (INHALATION)
Status: DISCONTINUED | OUTPATIENT
Start: 2022-09-06 | End: 2022-09-06 | Stop reason: HOSPADM

## 2022-09-06 RX ORDER — PREDNISONE 20 MG/1
20 TABLET ORAL DAILY
Status: DISCONTINUED | OUTPATIENT
Start: 2022-09-08 | End: 2022-09-06 | Stop reason: HOSPADM

## 2022-09-06 RX ORDER — INSULIN LISPRO 100 [IU]/ML
0-24 INJECTION, SOLUTION INTRAVENOUS; SUBCUTANEOUS
Status: DISCONTINUED | OUTPATIENT
Start: 2022-09-06 | End: 2022-09-06 | Stop reason: HOSPADM

## 2022-09-06 RX ORDER — PREDNISONE 10 MG/1
TABLET ORAL
Qty: 12 TABLET | Refills: 0 | Status: SHIPPED | OUTPATIENT
Start: 2022-09-06 | End: 2022-09-12

## 2022-09-06 RX ORDER — PREDNISONE 10 MG/1
10 TABLET ORAL DAILY
Status: DISCONTINUED | OUTPATIENT
Start: 2022-09-10 | End: 2022-09-06 | Stop reason: HOSPADM

## 2022-09-06 RX ADMIN — INSULIN LISPRO 4 UNITS: 100 INJECTION, SOLUTION INTRAVENOUS; SUBCUTANEOUS at 08:16

## 2022-09-06 RX ADMIN — GUAIFENESIN 1200 MG: 600 TABLET, EXTENDED RELEASE ORAL at 08:17

## 2022-09-06 RX ADMIN — GABAPENTIN 300 MG: 300 CAPSULE ORAL at 08:17

## 2022-09-06 RX ADMIN — INSULIN LISPRO 4 UNITS: 100 INJECTION, SOLUTION INTRAVENOUS; SUBCUTANEOUS at 12:09

## 2022-09-06 RX ADMIN — BISOPROLOL FUMARATE 5 MG: 5 TABLET ORAL at 08:17

## 2022-09-06 RX ADMIN — ATORVASTATIN CALCIUM 10 MG: 10 TABLET, FILM COATED ORAL at 08:17

## 2022-09-06 RX ADMIN — CEFTRIAXONE 2 G: 2 INJECTION, POWDER, FOR SOLUTION INTRAMUSCULAR; INTRAVENOUS at 05:12

## 2022-09-06 RX ADMIN — METHYLPREDNISOLONE SODIUM SUCCINATE 40 MG: 40 INJECTION, POWDER, FOR SOLUTION INTRAMUSCULAR; INTRAVENOUS at 12:09

## 2022-09-06 RX ADMIN — HEPARIN SODIUM 5000 UNITS: 5000 INJECTION INTRAVENOUS; SUBCUTANEOUS at 05:12

## 2022-09-06 RX ADMIN — METHYLPREDNISOLONE SODIUM SUCCINATE 40 MG: 40 INJECTION, POWDER, FOR SOLUTION INTRAMUSCULAR; INTRAVENOUS at 02:32

## 2022-09-06 RX ADMIN — Medication 10 ML: at 08:24

## 2022-09-06 NOTE — DISCHARGE SUMMARY
"             [facility] Medicine Services  DISCHARGE SUMMARY    Patient Name: Brenden Peter  : 1950  MRN: [id number]    Date of Admission: 2022  Discharge Diagnosis: Acute on chronic hypoxic respiratory failure  Date of Discharge: 2022  Primary Care Physician: Bert Rojas MD      Presenting Problem:   Shortness of breath [R06.02]  COPD exacerbation (HCC) [J44.1]  COPD with acute exacerbation (HCC) [J44.1]  Pneumonia due to infectious organism, unspecified laterality, unspecified part of lung [J18.9]    Active and Resolved Hospital Problems:  Active Hospital Problems    Diagnosis POA   • Chronic pain of left knee [M25.562, G89.29] Yes   • Morbid obesity (HCC) [E66.01] Yes   • Tobacco dependence syndrome [F17.200] Yes   • Mixed hyperlipidemia [E78.2] Yes   • Type 2 diabetes mellitus without complication, without long-term current use of insulin ([facility]) [E11.9] Yes   • Essential hypertension [I10] Yes   • Coronary artery disease [I25.10] Yes      Resolved Hospital Problems    Diagnosis POA   • **COPD with acute exacerbation (HCC) [J44.1] Yes   • Bradycardia [R00.1] Yes   • Shortness of breath [R06.02] Yes         Hospital Course     Hospital Course:  Brenden Peter is a 71 y.o. male who presents to [facility] ED due to dyspnea.  Patient states dyspnea has progressively worsened over the last 3 to 4 days, dyspnea is worse with exertion and laying flat, relieved with rest and elevated HOB.  Patient experienced a syncopal episode last week, states he was standing in the kitchen window and began to \"nod off\".  Patient admits to dyspnea with exertion, orthopnea, chills, wheezing, nonproductive cough.  Patient denies current cigarette use, fever, chest pain, nausea, vomiting.  As dyspnea did not improve he decided to go to [facility] ED. the patient's acute and chronic medical conditions were managed as outlined:    COPD with acute exacerbation:  -Patient " treated in ED with REY Tanner, IV Rocephin, IV azithromycin  -COVID, respiratory PCR panel negative  -Consult pulmonology for evaluation and recommendations, may require sleep study and referral to outpatient pulmonary rehabilitation as well  -Obtain sputum culture  -Continue IV Rocephin  -DuoNebs, IV Pulmicort, Pep therapy, O2  -Continuous pulse oximetry  -- Pulmonology consulted     --Supplemental O2, sats 90 to 95% = currently on 6 L     --IV steroids, inhaled corticosteroids, bronchodilators, antibiotics     --Follow-up outpatient for sleep study     Bradycardia:  -Troponin negative, serial troponins  -EKG reviewed, trend EKGs  --2D echo pending  --Cardiology consulted     --Adjust blood pressure medication     --Advised patient to stop smoking     DM type II:  -Sliding scale insulin  -Hypoglycemia protocol  -Monitor Accu-Cheks  -Consistent carbohydrate diet  -Hold oral diabetic medication during admission for potential procedures     Mixed hyperlipidemia:  -Reconcile home medications, resume as appropriate     Essential hypertension/CAD:  -IV hydralazine as needed for systolic greater than 160  -Low-sodium diet  -Reconcile home medications, resume as appropriate     Obesity:  -Encourage lifestyle modifications  -Encourage dietary modifications     Tobacco abuse:  -Tobacco cessation education     Chronic pain of left knee:  -Ultram every 6 as needed for pain    DVT prophylaxis:  Medical and mechanical DVT prophylaxis orders are present.    The patient was able to discharge home in good condition with discharge instructions and follow-up appointments as noted below.      Day of Discharge     Vital Signs:  Temp:  [97.5 °F (36.4 °C)-98.5 °F (36.9 °C)] 97.5 °F (36.4 °C)  Heart Rate:  [58-70] 58  Resp:  [16-20] 20  BP: (126-146)/(54-78) 146/78  Flow (L/min):  [5] 5    Physical Exam:  Vital signs reviewed.  Nursing notes reviewed.  General: well-developed and well-nourished, NAD  HEENT: NC/AT, EOMI, PERRLA  Heart:  RRR. No murmur   Chest: Decreased bibasilar rhonchi and wheezing.  Normal respiratory effort  Abdominal: Soft. NT/ND. Bowel sounds present  Musculoskeletal: Normal ROM.  No edema. No calf tenderness.  Neurological: AAOx3, no focal deficits  Skin: Skin is warm and dry. No rash  Psychiatric: Normal mood and affect.                Pertinent  and/or Most Recent Results     LAB RESULTS:      Lab 09/06/22  0126 09/05/22  1949 09/05/22  1729 09/05/22  1451 09/05/22  1201 09/05/22  0639 09/05/22  0332 09/05/22  0010 09/04/22 2146   WBC 7.70  --   --   --   --  7.30 6.50  --  4.80   HEMOGLOBIN 10.0*  --   --   --   --  10.9* 11.4*  --  11.2*   HEMATOCRIT 30.9*  --   --   --   --  33.5* 34.5*  --  34.6*   PLATELETS 154  --   --   --   --  164 160  --  164   NEUTROS ABS 6.90  --   --   --   --  6.60 5.50  --  2.80   LYMPHS ABS 0.60*  --   --   --   --  0.50* 0.80  --  1.50   MONOS ABS 0.20  --   --   --   --  0.10 0.20  --  0.40   EOS ABS 0.00  --   --   --   --  0.00 0.00  --  0.10   MCV 87.4  --   --   --   --  87.0 87.7  --  87.3   PROCALCITONIN  --   --   --   --   --   --  0.05  --   --    LACTATE 1.7 4.7* 2.5* 2.5* 4.5*  --  2.3*   < >  --     < > = values in this interval not displayed.         Lab 09/06/22  0126 09/05/22  0639 09/05/22 0332 09/04/22 2146   SODIUM 140 140 143 142   POTASSIUM 4.4 4.1 4.1 3.7   CHLORIDE 104 103 105 104   CO2 24.0 24.0 27.0 29.0   ANION GAP 12.0 13.0 11.0 9.0   BUN 28* 14 13 15   CREATININE 0.91 0.63* 0.61* 0.66*   EGFR 90.1 101.7 102.7 100.3   GLUCOSE 243* 229* 115* 60*   CALCIUM 8.9 9.1 9.3 9.5   IONIZED CALCIUM  --   --  1.22  --    MAGNESIUM 1.9  --  1.9  --    PHOSPHORUS  --   --   --  2.8   HEMOGLOBIN A1C  --   --  5.8*  --    TSH  --   --   --  3.220         Lab 09/05/22  0639 09/04/22  2146   TOTAL PROTEIN 7.3 7.8   ALBUMIN 3.90 4.40   GLOBULIN 3.4 3.4   ALT (SGPT) 14 14   AST (SGOT) 15 15   BILIRUBIN 0.3 0.4   ALK PHOS 56 59         Lab 09/05/22  1201 09/05/22  0332  09/04/22 2146   PROBNP  --   --  289.2   TROPONIN T <0.010 <0.010 <0.010                 Lab 09/04/22 2155   PH, ARTERIAL 7.419   PCO2, ARTERIAL 40.0   PO2 ART 59.7*   O2 SATURATION ART 90.9*   FIO2 36   HCO3 ART 25.8   BASE EXCESS ART 1.2     Brief Urine Lab Results  (Last result in the past 365 days)      Color   Clarity   Blood   Leuk Est   Nitrite   Protein   CREAT   Urine HCG        12/27/21 1500 Yellow   Clear   Negative   Negative   Negative   100 mg/dL (2+)               Microbiology Results (last 10 days)     Procedure Component Value - Date/Time    Blood Culture - Blood, Arm, Right [[id number]]  (Normal) Collected: 09/05/22 0041    Lab Status: Preliminary result Specimen: Blood from Arm, Right Updated: 09/06/22 0047     Blood Culture No growth at 24 hours    Blood Culture - Blood, Arm, Right [[id number]]  (Normal) Collected: 09/05/22 0007    Lab Status: Preliminary result Specimen: Blood from Arm, Right Updated: 09/06/22 0017     Blood Culture No growth at 24 hours    Respiratory Panel PCR w/COVID-19(SARS-CoV-2) IAN/NAYELY/KATERIN/PAD/COR/MAD/KRISH In-House, NP Swab in UTM/VTM, 3-4 HR TAT - Swab, Nasopharynx [[id number]]  (Normal) Collected: 09/04/22 2150    Lab Status: Final result Specimen: Swab from Nasopharynx Updated: 09/04/22 2243     ADENOVIRUS, PCR Not Detected     Coronavirus 229E Not Detected     Coronavirus HKU1 Not Detected     Coronavirus NL63 Not Detected     Coronavirus OC43 Not Detected     COVID19 Not Detected     Human Metapneumovirus Not Detected     Human Rhinovirus/Enterovirus Not Detected     Influenza A PCR Not Detected     Influenza B PCR Not Detected     Parainfluenza Virus 1 Not Detected     Parainfluenza Virus 2 Not Detected     Parainfluenza Virus 3 Not Detected     Parainfluenza Virus 4 Not Detected     RSV, PCR Not Detected     Bordetella pertussis pcr Not Detected     Bordetella parapertussis PCR Not Detected     Chlamydophila pneumoniae PCR Not Detected     Mycoplasma pneumo by PCR  Not Detected    Narrative:      In the setting of a positive respiratory panel with a viral infection PLUS a negative procalcitonin without other underlying concern for bacterial infection, consider observing off antibiotics or discontinuation of antibiotics and continue supportive care. If the respiratory panel is positive for atypical bacterial infection (Bordetella pertussis, Chlamydophila pneumoniae, or Mycoplasma pneumoniae), consider antibiotic de-escalation to target atypical bacterial infection.          XR Chest 1 View    Result Date: 9/5/2022  Impression: Mild patchy bibasilar airspace opacities may relate to atelectasis, pneumonia not excluded.  Electronically Signed By-Wang Alba MD On:9/5/2022 7:58 AM This report was finalized on [id number] by  Wang Alba MD.      Labs Pending at Discharge:  Pending Labs     Order Current Status    Blood Culture - Blood, Arm, Right Preliminary result    Blood Culture - Blood, Arm, Right Preliminary result          Procedures Performed           Consults:   Consults     Date and Time Order Name Status Description    9/5/2022  4:27 AM Inpatient Pulmonology Consult Completed     9/5/2022 12:29 AM Inpatient Cardiology Consult Completed     9/4/2022 11:21 PM Hospitalist (on-call MD unless specified)            Discharge Details        Discharge Medications      New Medications      Instructions Start Date   predniSONE 10 MG tablet  Commonly known as: DELTASONE   Take 3 tablets by mouth Daily for 2 days, THEN 2 tablets Daily for 2 days, THEN 1 tablet Daily for 2 days.   Start Date: September 6, 2022        Continue These Medications      Instructions Start Date   albuterol (2.5 MG/3ML) 0.083% nebulizer solution  Commonly known as: PROVENTIL   3 mL, Nebulization, 3 Times Daily PRN      albuterol sulfate  (90 Base) MCG/ACT inhaler  Commonly known as: PROVENTIL HFA;VENTOLIN HFA;PROAIR HFA   2 puffs, Inhalation, Every 6 Hours PRN      amLODIPine 5 MG  tablet  Commonly known as: NORVASC   5 mg, Oral, Nightly      bisoprolol 5 MG tablet  Commonly known as: ZEBeta   5 mg, Oral, Daily      gabapentin 300 MG capsule  Commonly known as: NEURONTIN   300 mg, Oral, 3 Times Daily      glipizide 10 MG tablet  Commonly known as: GLUCOTROL   10 mg, Oral, Nightly      HYDROcodone-acetaminophen 5-325 MG per tablet  Commonly known as: NORCO   1 tablet, Oral, Every 6 Hours PRN      ipratropium-albuterol 0.5-2.5 mg/3 ml nebulizer  Commonly known as: DUO-NEB   3 mL, Every 6 Hours      lisinopril 40 MG tablet  Commonly known as: PRINIVIL,ZESTRIL   40 mg, Oral, Nightly      metFORMIN 1000 MG tablet  Commonly known as: GLUCOPHAGE   1,000 mg, Oral, 2 Times Daily With Meals      pioglitazone 30 MG tablet  Commonly known as: ACTOS   30 mg, Oral, Nightly      pravastatin 20 MG tablet  Commonly known as: PRAVACHOL   20 mg, Oral, Daily      sertraline 50 MG tablet  Commonly known as: ZOLOFT   50 mg, Oral, Nightly      umeclidinium-vilanterol 62.5-25 MCG/INH aerosol powder  inhaler  Commonly known as: ANORO ELLIPTA   1 puff, Inhalation, Daily - RT      vitamin D 1.25 MG (48145 UT) capsule capsule  Commonly known as: ERGOCALCIFEROL   50,000 Units, 2 Times Weekly, Tues and Fri             No Known Allergies      Discharge Disposition: Home in good condition  Home or Self Care    Diet:  Hospital:  Diet Order   Procedures   • Diet Cardiac, Diabetic/Consistent Carbs; Healthy Heart; Diabetic - Consistent Carb     Home:  Diet Instructions     Diet: Consistent Carbohydrate, Cardiac      Discharge Diet:  Consistent Carbohydrate  Cardiac             Discharge Activity:   Activity Instructions     Activity as Tolerated            CODE STATUS:  Code Status and Medical Interventions:   Ordered at: 09/05/22 0029     Code Status (Patient has no pulse and is not breathing):    CPR (Attempt to Resuscitate)     Medical Interventions (Patient has pulse or is breathing):    Full Support         No future  appointments.    Additional Instructions for the Follow-ups that You Need to Schedule     Call MD With Problems / Concerns   As directed      Instructions: Call [phone] or email [email or web address] for problems or concerns.    Order Comments: Instructions: Call [phone] or email [email or web address] for problems or concerns.          Discharge Follow-up with PCP   As directed       Currently Documented PCP:    Bert Rojas MD    PCP Phone Number:    [phone]     Follow Up Details: 1 week         Discharge Follow-up with Specialty: Pulmonary clinic - Dr. Castañeda; 2 Weeks   As directed      Specialty: Pulmonary clinic - Dr. Castañeda    Follow Up: 2 Weeks         Discharge Follow-up with Specified Provider: Cardiology - Dr. Celeste; 1 Month   As directed      To: Cardiology - Dr. Celeste    Follow Up: 1 Month               Time spent on Discharge including face to face service: 35 minutes    This patient has been examined wearing appropriate Personal Protective Equipment. 09/06/22      Signature: Electronically signed by Dagmar Snell MD, 09/06/22, 4:25 PM EDT.

## 2022-09-06 NOTE — DISCHARGE PLACEMENT REQUEST
"Brenden Lackey (71 y.o. Male)             Date of Birth   1950    Social Security Number       Address   [address] [place]    Home Phone   [phone]    MRN   [id number]       Christianity   None    Marital Status                               Admission Date   9/4/22    Admission Type   Emergency    Admitting Provider   Dagmar Snell MD    Attending Provider   Dagmar Snell MD    Department, Room/Bed   [facility] 3A MEDICAL INPATIENT, 311/1       Discharge Date       Discharge Disposition       Discharge Destination                               Attending Provider: Dagmar Snell MD    Allergies: No Known Allergies    Isolation: None   Infection: None   Code Status: CPR   Advance Care Planning Activity    Ht: 170.2 cm (67\")   Wt: 108 kg (237 lb)    Admission Cmt: None   Principal Problem: COPD with acute exacerbation (HCC) [J44.1]                 Active Insurance as of 9/4/2022     Primary Coverage     Payor Plan Insurance Group Employer/Plan Group    MEDICARE MEDICARE A & B      Payor Plan Address Payor Plan Phone Number Payor Plan Fax Number Effective Dates    [address] [phone]  10/1/2015 - None Entered    [facility] [id number]       Subscriber Name Subscriber Birth Date Member ID       BRENDEN LACKEY 1950 [id number]           Secondary Coverage     Payor Plan Insurance Group Employer/Plan Group    [employer] SUP AAR HEALTH CARE OPTIONS N     Payor Plan Address Payor Plan Phone Number Payor Plan Fax Number Effective Dates    [facility] [phone]  1/1/2020 - None Entered    [address]       [facility] [address]       Subscriber Name Subscriber Birth Date Member ID       BRENDEN LACKEY 1950 [id number]                 Emergency Contacts      (Rel.) Home Phone Work Phone Mobile Phone    RomeDonna SHANNAN (Spouse) [phone] -- [phone]    Rigoberto Lackey (Son) -- -- [phone]    Renetta Lackey (Daughter) " [phone] -- --

## 2022-09-06 NOTE — PROGRESS NOTES
Exercise Oximetry    Patient Name:Brenden Peter   MRN: [id number]   Date: 09/06/22             ROOM AIR BASELINE   SpO2% 86   Heart Rate    Blood Pressure      EXERCISE ON ROOM AIR SpO2% EXERCISE ON O2 @  LPM SpO2%   1 MINUTE  1 MINUTE    2 MINUTES  2 MINUTES    3 MINUTES  3 MINUTES    4 MINUTES  4 MINUTES    5 MINUTES  5 MINUTES    6 MINUTES  6 MINUTES               Distance Walked   Distance Walked   Dyspnea (Miranda Scale)   Dyspnea (Miranda Scale)   Fatigue (Miranda Scale)   Fatigue (Miranda Scale)   SpO2% Post Exercise   SpO2% Post Exercise   HR Post Exercise   HR Post Exercise   Time to Recovery   Time to Recovery     Comments: Room air sats 86% while at rest in bed.  Returned pt to 4L that he was previously on.

## 2022-09-06 NOTE — PROGRESS NOTES
"    [facility] Medicine Services Daily Progress Note    Patient Name: Brenden Peter  : 1950  MRN: [id number]  Primary Care Physician:  Bert Rojas MD  Date of admission: 2022      Subjective      Chief Complaint: Dyspnea      Patient Reports he feels much better, no shortness of air.    Review of Systems   All other systems reviewed and are negative.        Objective      Vitals:   Temp:  [97.4 °F (36.3 °C)-98.6 °F (37 °C)] 98.5 °F (36.9 °C)  Heart Rate:  [49-77] 68  Resp:  [13-24] 17  BP: (126-163)/() 134/68  Flow (L/min):  [4-6] 5    Physical Exam   Vital signs reviewed.  Nursing notes reviewed.  General: well-developed and well-nourished, NAD  HEENT: NC/AT, EOMI, PERRLA  Heart: RRR. No murmur   Chest: Bibasilar rhonchi and wheezing.  Normal respiratory effort  Abdominal: Soft. NT/ND. Bowel sounds present  Musculoskeletal: Normal ROM.  No edema. No calf tenderness.  Neurological: AAOx3, no focal deficits  Skin: Skin is warm and dry. No rash  Psychiatric: Normal mood and affect.       Result Review    Result Review:  I have personally reviewed the results from the time of this admission to 2022 20:03 EDT and agree with these findings:  [x]  Laboratory  [x]  Microbiology  [x]  Radiology  [x]  EKG/Telemetry   [x]  Cardiology/Vascular   []  Pathology  []  Old records  []  Other:  Most notable findings include:          Assessment & Plan      Brief Patient Summary:  Brenden Peter is a 71 y.o. male who presents to [facility] ED due to dyspnea.  Patient states dyspnea has progressively worsened over the last 3 to 4 days, dyspnea is worse with exertion and laying flat, relieved with rest and elevated HOB.  Patient experienced a syncopal episode last week, states he was standing in the kitchen window and began to \"nod off\".  Patient admits to dyspnea with exertion, orthopnea, chills, wheezing, nonproductive cough.  Patient denies current cigarette use, " fever, chest pain, nausea, vomiting.  As dyspnea did not improve he decided to go to [facility] ED.      azithromycin, 500 mg, Oral, Q24H  budesonide, 0.5 mg, Nebulization, BID - RT  [START ON 9/6/2022] cefTRIAXone, 2 g, Intravenous, Daily  guaiFENesin, 1,200 mg, Oral, Q12H  heparin (porcine), 5,000 Units, Subcutaneous, Q8H  insulin lispro, 0-9 Units, Subcutaneous, 4x Daily With Meals & Nightly  ipratropium-albuterol, 3 mL, Nebulization, Q4H - RT  methylPREDNISolone sodium succinate, 40 mg, Intravenous, Q8H  sodium chloride, 10 mL, Intravenous, Q12H       dextrose 5 % and sodium chloride 0.45 %, 75 mL/hr, Last Rate: 75 mL/hr (09/05/22 0200)         Active Hospital Problems:  Active Hospital Problems    Diagnosis    • **COPD with acute exacerbation (HCC)    • Bradycardia    • Sepsis ([facility])    • Shortness of breath    • Chronic pain of left knee    • Morbid obesity ([facility])    • Tobacco dependence syndrome    • Mixed hyperlipidemia    • Type 2 diabetes mellitus without complication, without long-term current use of insulin ([facility])    • Essential hypertension    • Coronary artery disease      Plan:   COPD with acute exacerbation:  -Patient treated in ED with REY Tanner, IV Rocephin, IV azithromycin  -COVID, respiratory PCR panel negative  -Consult pulmonology for evaluation and recommendations, may require sleep study and referral to outpatient pulmonary rehabilitation as well  -Obtain sputum culture  -Continue IV Rocephin  -DuMoises, IV Pulmicort, Pep therapy, O2  -Continuous pulse oximetry  -- Pulmonology consulted     --Supplemental O2, sats 90 to 95% = currently on 6 L     --IV steroids, inhaled corticosteroids, bronchodilators, antibiotics     --Follow-up outpatient for sleep study     Bradycardia:  -Troponin negative, serial troponins  -EKG reviewed, trend EKGs  --2D echo pending  --Cardiology consulted     --Adjust blood pressure medication     --Advised patient to stop smoking     DM type II:  -Sliding  scale insulin  -Hypoglycemia protocol  -Monitor Accu-Cheks  -Consistent carbohydrate diet  -Hold oral diabetic medication during admission for potential procedures     Mixed hyperlipidemia:  -Reconcile home medications, resume as appropriate     Essential hypertension/CAD:  -IV hydralazine as needed for systolic greater than 160  -Low-sodium diet  -Reconcile home medications, resume as appropriate     Obesity:  -Encourage lifestyle modifications  -Encourage dietary modifications     Tobacco abuse:  -Tobacco cessation education     Chronic pain of left knee:  -Ultram every 6 as needed for pain    DVT prophylaxis:  Medical and mechanical DVT prophylaxis orders are present.    CODE STATUS:    Code Status (Patient has no pulse and is not breathing): CPR (Attempt to Resuscitate)  Medical Interventions (Patient has pulse or is breathing): Full Support      Disposition:  I expect patient to be discharged pending clinical course and consultant recommendations.    This patient has been examined wearing appropriate Personal Protective Equipment. 09/05/22      Electronically signed by Dagmar Snell MD, 09/05/22, 20:03 EDT.  [facility] Hospitalist Team

## 2022-09-06 NOTE — CASE MANAGEMENT/SOCIAL WORK
Discharge Planning Assessment  [facility]     Patient Name: Brenden Peter  MRN: [id number]  Today's Date: 9/6/2022    Admit Date: 9/4/2022     Discharge Needs Assessment     Row Name 09/06/22 1539       Living Environment    People in Home spouse    Name(s) of People in Home Isis Thompson    Current Living Arrangements home    Primary Care Provided by self    Provides Primary Care For no one, unable/limited ability to care for self    Family Caregiver if Needed spouse    Family Caregiver Names Donna    Quality of Family Relationships unable to assess  No family present during CM rounds    Able to Return to Prior Arrangements yes       Resource/Environmental Concerns    Resource/Environmental Concerns none    Transportation Concerns none       Transition Planning    Patient/Family Anticipates Transition to home with family    Patient/Family Anticipated Services at Transition durable medical equipment  requested portable oxygen tank    Transportation Anticipated family or friend will provide       Discharge Needs Assessment    Readmission Within the Last 30 Days no previous admission in last 30 days    Equipment Currently Used at Home cane, straight;oxygen    Concerns to be Addressed discharge planning    Anticipated Changes Related to Illness none    Equipment Needed After Discharge oxygen    Provided Post Acute Provider List? N/A    N/A Provider List Comment Anticipate routine home               Discharge Plan     Row Name 09/06/22 7802       Plan    Plan D/C plan: Anticpate routine home, needs portable oxygen tank from [employer]    Patient/Family in Agreement with Plan yes    Plan Comments Met with pt at bedside. Pt lives at home with wife Donna. Pt is IADL, including driving. Son Rigoberto lives next door. Rigoberto or Donna will provide transport at time of d/c. PCP and pharmacy confirmed. No financial barriers to medications. Pt has cane and oxygen that he uses at home, 4L supplied by [facility] [facility]. Pt reqeusted portable tank  for his ride home. CM contacted [place] liaison to request portable tank. Pt had 6 min walk that required 4L oxygen, no need to increase current order.              Continued Care and Services - Admitted Since 9/4/2022     Durable Medical Equipment     Service Provider Request Status Selected Services Address Phone Fax Patient Preferred    [facility]  Pending - Request Sent N/A [id number], [place] [address] [phone] --              Expected Discharge Date and Time     Expected Discharge Date Expected Discharge Time    Sep 6, 2022          Demographic Summary     Row Name 09/06/22 1535       General Information    Admission Type inpatient    Arrived From emergency department    Required Notices Provided Important Message from Medicare    Referral Source admission list    Reason for Consult discharge planning    Preferred Language English               Functional Status     Row Name 09/06/22 1536       Functional Status    Usual Activity Tolerance good    Current Activity Tolerance moderate       Functional Status, IADL    Medications independent    Meal Preparation independent    Housekeeping independent    Laundry independent    Shopping independent                      Patient Forms     Row Name 09/06/22 1535       Patient Forms    Important Message from Medicare (IMM) Delivered  IMM 9/6    Delivered to Patient    Method of delivery In person                Met with patient in room wearing PPE: mask  Maintained distance greater than six feet and spent less than 15 minutes in the room.        PABLO LEÓN RN

## 2022-09-06 NOTE — PROGRESS NOTES
Daily Progress Note        COPD with acute exacerbation (HCC)    Type 2 diabetes mellitus without complication, without long-term current use of insulin (HCC)    Mixed hyperlipidemia    Essential hypertension    Morbid obesity (HCC)    Coronary artery disease    Tobacco dependence syndrome    Chronic pain of left knee    Bradycardia    Sepsis (HCC)    Shortness of breath      Assessment  COPD with acute exacerbation (HCC)  Bibasilar pneumonia  Hypoxemia  Loud snoring excessive tiredness suggestive of obstructive sleep apnea  Ex-smoker: Quit July 2022  Diabetes mellitus  Hypertension  Chronic coronary artery disease  Chronic back pain  Dyslipidemia  Obesity          Plan  New echo results pending    Oxygen supplement and titration to maintain saturation 90 to 95%: Currently requiring 5L NC, was 6 L   Solu-Medrol 40 mg IV every 8 hours  Azithromycin, Rocephin  Mucinex  Pulmicort and DuoNebs  DVT  prophylaxis-Heparin SQ and mechanical SCDs  Check daily labs and correct electrolytes as needed  I-S every 4 hours while awake and flutter valve 4 times daily  Smoking cessation counseling  PT/OT following  Patient will follow-up as an outpatient after discharge to schedule sleep study    9/4/22       LOS: 1 day     Subjective         Objective     Vital signs for last 24 hours:  Vitals:    09/05/22 1541 09/05/22 1817 09/05/22 2326 09/06/22 0354   BP: 129/62 134/68 126/56 138/54   BP Location: Right arm Right arm Right arm    Patient Position: Sitting Lying Lying Lying   Pulse: 67 68 70 58   Resp: 18 17 16 16   Temp: 97.5 °F (36.4 °C) 98.5 °F (36.9 °C) 97.7 °F (36.5 °C) 97.6 °F (36.4 °C)   TempSrc: Oral Oral Oral Oral   SpO2: 95% 96% 91% 94%   Weight:       Height:           Intake/Output last 3 shifts:  I/O last 3 completed shifts:  In: 2700 [P.O.:600; I.V.:1000; IV Piggyback:1100]  Out: 600 [Urine:600]  Intake/Output this shift:  No intake/output data recorded.      Radiology  Imaging Results (Last 24 Hours)       ** No  results found for the last 24 hours. **            Labs:  Results from last 7 days   Lab Units 09/06/22  0126   WBC 10*3/mm3 7.70   HEMOGLOBIN g/dL 10.0*   HEMATOCRIT % 30.9*   PLATELETS 10*3/mm3 154     Results from last 7 days   Lab Units 09/06/22  0126 09/05/22  0639   SODIUM mmol/L 140 140   POTASSIUM mmol/L 4.4 4.1   CHLORIDE mmol/L 104 103   CO2 mmol/L 24.0 24.0   BUN mg/dL 28* 14   CREATININE mg/dL 0.91 0.63*   CALCIUM mg/dL 8.9 9.1   BILIRUBIN mg/dL  --  0.3   ALK PHOS U/L  --  56   ALT (SGPT) U/L  --  14   AST (SGOT) U/L  --  15   GLUCOSE mg/dL 243* 229*     Results from last 7 days   Lab Units 09/04/22  2155   PH, ARTERIAL pH units 7.419   PO2 ART mm Hg 59.7*   PCO2, ARTERIAL mm Hg 40.0   HCO3 ART mmol/L 25.8     Results from last 7 days   Lab Units 09/05/22  0639 09/04/22  2146   ALBUMIN g/dL 3.90 4.40     Results from last 7 days   Lab Units 09/05/22  1201 09/05/22  0332 09/04/22  2146   TROPONIN T ng/mL <0.010 <0.010 <0.010         Results from last 7 days   Lab Units 09/06/22  0126   MAGNESIUM mg/dL 1.9         Results from last 7 days   Lab Units 09/04/22  2146   TSH uIU/mL 3.220           Meds:   SCHEDULE  amLODIPine, 5 mg, Oral, Nightly  atorvastatin, 10 mg, Oral, Daily  azithromycin, 500 mg, Oral, Q24H  bisoprolol, 5 mg, Oral, Daily  budesonide, 0.5 mg, Nebulization, BID - RT  cefTRIAXone, 2 g, Intravenous, Daily  gabapentin, 300 mg, Oral, TID  guaiFENesin, 1,200 mg, Oral, Q12H  heparin (porcine), 5,000 Units, Subcutaneous, Q8H  insulin lispro, 0-9 Units, Subcutaneous, 4x Daily With Meals & Nightly  ipratropium-albuterol, 3 mL, Nebulization, Q6H - RT  lisinopril, 40 mg, Oral, Nightly  methylPREDNISolone sodium succinate, 40 mg, Intravenous, Q8H  sertraline, 50 mg, Oral, Nightly  sodium chloride, 10 mL, Intravenous, Q12H      Infusions     PRNs    acetaminophen **OR** acetaminophen **OR** acetaminophen    aluminum-magnesium hydroxide-simethicone    benzonatate    Calcium Gluconate-NaCl **AND**  calcium gluconate **AND** Calcium, Ionized    dextrose    dextrose    glucagon (human recombinant)    hydrALAZINE    magnesium sulfate **OR** magnesium sulfate **OR** magnesium sulfate    melatonin    nitroglycerin    ondansetron **OR** ondansetron    potassium & sodium phosphates **OR** potassium & sodium phosphates    potassium chloride    potassium chloride    [COMPLETED] Insert peripheral IV **AND** sodium chloride    sodium chloride    traMADol    Physical Exam:  Physical Exam  General Appearance:  Alert.  No distress noted.  HEENT:  Normocephalic, without obvious abnormality, Conjunctiva/corneas clear,.   Nares normal, no drainage     Neck:  Supple, symmetrical, trachea midline. No JVD.  Lungs /Chest wall:   Decreased minimal basal rales bilaterally, respirations unlabored, symmetrical wall movement.     Heart:  Regular rate and rhythm, S1 S2 normal  Abdomen: Soft, non-tender, no masses, no organomegaly.    Extremities: No edema, no clubbing or cyanosis    ROS  Review of Systems  Constitutional: Negative for chills, fever and positive for malaise/fatigue.   HENT: Negative.    Eyes: Negative.    Cardiovascular: Negative.    Respiratory: Positive for cough and shortness of breath. Positive for loud snoring and excessive daytime sleepiness  Skin: Negative.    Musculoskeletal: Negative.    Gastrointestinal: Negative.    Genitourinary: Negative.    Neurological: Negative.    Psychiatric/Behavioral: Negative.      I have reviewed current clinicals.     Electronically signed by LENNIE Villalba, 09/06/22, 8:21 AM EDT.     The NP scribed the note for me, I have examined the patient on the day of the encounter and reviewed and verified the above findings and and I formulated the assessment and plan as documented

## 2022-09-06 NOTE — PROGRESS NOTES
Referring Provider: Hospitalist    Reason for follow-up: Shortness of breath     Patient Care Team:  Bert Rojas MD as PCP - General (Family Medicine)    Subjective .  Patient is feeling better now    Objective  Sitting in chair comfortably     Review of Systems   Constitutional: Negative for fever and malaise/fatigue.   Cardiovascular: Negative for chest pain, dyspnea on exertion and palpitations.   Respiratory: Negative for cough and shortness of breath.    Skin: Negative for rash.   Gastrointestinal: Negative for abdominal pain, nausea and vomiting.   Neurological: Negative for focal weakness and headaches.   All other systems reviewed and are negative.      Patient has no known allergies.    Scheduled Meds:amLODIPine, 5 mg, Oral, Nightly  atorvastatin, 10 mg, Oral, Daily  bisoprolol, 5 mg, Oral, Daily  budesonide, 0.5 mg, Nebulization, BID - RT  cefTRIAXone, 2 g, Intravenous, Daily  gabapentin, 300 mg, Oral, TID  guaiFENesin, 1,200 mg, Oral, Q12H  heparin (porcine), 5,000 Units, Subcutaneous, Q8H  insulin lispro, 0-24 Units, Subcutaneous, TID AC  ipratropium-albuterol, 3 mL, Nebulization, Q6H - RT  lisinopril, 40 mg, Oral, Nightly  predniSONE, 30 mg, Oral, Daily   Followed by  [START ON 9/8/2022] predniSONE, 20 mg, Oral, Daily   Followed by  [START ON 9/10/2022] predniSONE, 10 mg, Oral, Daily  sertraline, 50 mg, Oral, Nightly  sodium chloride, 10 mL, Intravenous, Q12H      Continuous Infusions:   PRN Meds:.•  acetaminophen **OR** acetaminophen **OR** acetaminophen  •  aluminum-magnesium hydroxide-simethicone  •  benzonatate  •  Calcium Gluconate-NaCl **AND** calcium gluconate **AND** Calcium, Ionized  •  dextrose  •  dextrose  •  glucagon (human recombinant)  •  hydrALAZINE  •  magnesium sulfate **OR** magnesium sulfate **OR** magnesium sulfate  •  melatonin  •  nitroglycerin  •  ondansetron **OR** ondansetron  •  potassium & sodium phosphates **OR** potassium & sodium phosphates  •  potassium  "chloride  •  potassium chloride  •  [COMPLETED] Insert peripheral IV **AND** sodium chloride  •  sodium chloride  •  traMADol        VITAL SIGNS  Vitals:    09/05/22 2326 09/06/22 0354 09/06/22 0837 09/06/22 1245   BP: 126/56 138/54 137/67 146/78   BP Location: Right arm  Left arm Right arm   Patient Position: Lying Lying Sitting Sitting   Pulse: 70 58 68 58   Resp: 16 16 18 20   Temp: 97.7 °F (36.5 °C) 97.6 °F (36.4 °C) 97.6 °F (36.4 °C) 97.5 °F (36.4 °C)   TempSrc: Oral Oral Oral Oral   SpO2: 91% 94% 96% 95%   Weight:       Height:           Flowsheet Rows    Flowsheet Row First Filed Value   Admission Height 170.2 cm (67\") Documented at 09/04/2022 2124   Admission Weight 107 kg (236 lb 1.8 oz) Documented at 09/04/2022 2124           TELEMETRY: Sinus rhythm    Physical Exam:  Constitutional:       Appearance: Well-developed.   Eyes:      General: No scleral icterus.     Conjunctiva/sclera: Conjunctivae normal.   HENT:      Head: Normocephalic and atraumatic.   Neck:      Vascular: No carotid bruit or JVD.   Pulmonary:      Effort: Pulmonary effort is normal.      Breath sounds: Normal breath sounds. No wheezing. No rales.   Cardiovascular:      Normal rate. Regular rhythm.   Pulses:     Intact distal pulses.   Abdominal:      General: Bowel sounds are normal.      Palpations: Abdomen is soft.   Musculoskeletal:      Cervical back: Normal range of motion and neck supple. Skin:     General: Skin is warm and dry.      Findings: No rash.   Neurological:      Mental Status: Alert.          Results Review:   I reviewed the patient's new clinical results.  Lab Results (last 24 hours)     Procedure Component Value Units Date/Time    POC Glucose Once [[id number]]  (Abnormal) Collected: 09/06/22 1123    Specimen: Blood Updated: 09/06/22 1125     Glucose 226 mg/dL      Comment: Serial Number: [id number]:  [id number]       POC Glucose Once [[id number]]  (Abnormal) Collected: 09/06/22 0725    Specimen: Blood Updated: " 09/06/22 0727     Glucose 205 mg/dL      Comment: Serial Number: [id number]:  [id number]       STAT Lactic Acid, Reflex [[id number]]  (Normal) Collected: 09/06/22 0126    Specimen: Blood Updated: 09/06/22 0211     Lactate 1.7 mmol/L     Basic Metabolic Panel [[id number]]  (Abnormal) Collected: 09/06/22 0126    Specimen: Blood Updated: 09/06/22 0211     Glucose 243 mg/dL      BUN 28 mg/dL      Creatinine 0.91 mg/dL      Sodium 140 mmol/L      Potassium 4.4 mmol/L      Chloride 104 mmol/L      CO2 24.0 mmol/L      Calcium 8.9 mg/dL      BUN/Creatinine Ratio 30.8     Anion Gap 12.0 mmol/L      eGFR 90.1 mL/min/1.73      Comment: National Kidney Foundation and American Society of Nephrology (ASN) Task Force recommended calculation based on the Chronic Kidney Disease Epidemiology Collaboration (CKD-EPI) equation refit without adjustment for race.       Narrative:      GFR Normal >60  Chronic Kidney Disease <60  Kidney Failure <15      Magnesium [[id number]]  (Normal) Collected: 09/06/22 0126    Specimen: Blood Updated: 09/06/22 0209     Magnesium 1.9 mg/dL     CBC & Differential [[id number]]  (Abnormal) Collected: 09/06/22 0126    Specimen: Blood Updated: 09/06/22 0206    Narrative:      The following orders were created for panel order CBC & Differential.  Procedure                               Abnormality         Status                     ---------                               -----------         ------                     CBC Auto Differential[[id number]]        Abnormal            Final result                 Please view results for these tests on the individual orders.    CBC Auto Differential [[id number]]  (Abnormal) Collected: 09/06/22 0126    Specimen: Blood Updated: 09/06/22 0206     WBC 7.70 10*3/mm3      RBC 3.54 10*6/mm3      Hemoglobin 10.0 g/dL      Hematocrit 30.9 %      MCV 87.4 fL      MCH 28.2 pg      MCHC 32.3 g/dL      RDW 16.2 %      RDW-SD 50.3 fl      MPV 9.6 fL      Platelets 154 10*3/mm3       Neutrophil % 89.9 %      Lymphocyte % 7.2 %      Monocyte % 2.8 %      Eosinophil % 0.0 %      Basophil % 0.1 %      Neutrophils, Absolute 6.90 10*3/mm3      Lymphocytes, Absolute 0.60 10*3/mm3      Monocytes, Absolute 0.20 10*3/mm3      Eosinophils, Absolute 0.00 10*3/mm3      Basophils, Absolute 0.00 10*3/mm3      nRBC 0.0 /100 WBC     Blood Culture - Blood, Arm, Right [[id number]]  (Normal) Collected: 09/05/22 0041    Specimen: Blood from Arm, Right Updated: 09/06/22 0047     Blood Culture No growth at 24 hours    Blood Culture - Blood, Arm, Right [[id number]]  (Normal) Collected: 09/05/22 0007    Specimen: Blood from Arm, Right Updated: 09/06/22 0017     Blood Culture No growth at 24 hours    POC Glucose Once [[id number]]  (Abnormal) Collected: 09/05/22 2249    Specimen: Blood Updated: 09/05/22 2250     Glucose 264 mg/dL      Comment: Serial Number: [id number]:  [id number]       POC Glucose Once [[id number]]  (Abnormal) Collected: 09/05/22 2111    Specimen: Blood Updated: 09/05/22 2112     Glucose 293 mg/dL      Comment: Serial Number: [id number]:  [id number]       STAT Lactic Acid, Reflex [[id number]]  (Abnormal) Collected: 09/05/22 1949    Specimen: Blood Updated: 09/05/22 2054     Lactate 4.7 mmol/L     STAT Lactic Acid, Reflex [[id number]]  (Abnormal) Collected: 09/05/22 1729    Specimen: Blood Updated: 09/05/22 1830     Lactate 2.5 mmol/L     POC Glucose Once [[id number]]  (Abnormal) Collected: 09/05/22 1745    Specimen: Blood Updated: 09/05/22 1746     Glucose 301 mg/dL      Comment: Serial Number: [id number]:  [id number]       STAT Lactic Acid, Reflex [[id number]]  (Abnormal) Collected: 09/05/22 1451    Specimen: Blood Updated: 09/05/22 1534     Lactate 2.5 mmol/L           Imaging Results (Last 24 Hours)     ** No results found for the last 24 hours. **          EKG      I personally viewed and interpreted the patient's EKG/Telemetry data:    ECHOCARDIOGRAM:    STRESS MYOVIEW:    CARDIAC  CATHETERIZATION:    OTHER:         Assessment & Plan     Principal Problem:    COPD with acute exacerbation (HCC)  Active Problems:    Type 2 diabetes mellitus without complication, without long-term current use of insulin (HCC)    Mixed hyperlipidemia    Essential hypertension    Morbid obesity (HCC)    Coronary artery disease    Tobacco dependence syndrome    Chronic pain of left knee    Bradycardia    Sepsis (HCC)    Shortness of breath       Patient presented with shortness of breath and had COPD exacerbation is on antibiotics and duo nebs  Patient is ruled out for MI by EKG enzymes  Patient had an echocardiogram which showed normal function but had right ventricular dilatation and probably high PA pressures  Patient is blood pressure and heart rate are stable and sugar levels and lipid levels are followed by the primary care doctor  Patient will be followed as an outpatient.    I discussed the patients findings and my recommendations with patient and nurse    Prince Celeste MD  09/06/22  15:23 EDT

## 2022-09-06 NOTE — PLAN OF CARE
Problem: Adult Inpatient Plan of Care  Goal: Plan of Care Review  Outcome: Ongoing, Progressing  Goal: Patient-Specific Goal (Individualized)  Outcome: Ongoing, Progressing  Goal: Absence of Hospital-Acquired Illness or Injury  Outcome: Ongoing, Progressing  Intervention: Identify and Manage Fall Risk  Recent Flowsheet Documentation  Taken 9/6/2022 1400 by Domi Medel RN  Safety Promotion/Fall Prevention: safety round/check completed  Taken 9/6/2022 1246 by Domi Medel RN  Safety Promotion/Fall Prevention: safety round/check completed  Goal: Optimal Comfort and Wellbeing  Outcome: Ongoing, Progressing  Goal: Readiness for Transition of Care  Outcome: Ongoing, Progressing     Problem: Fall Injury Risk  Goal: Absence of Fall and Fall-Related Injury  Outcome: Ongoing, Progressing  Intervention: Promote Injury-Free Environment  Recent Flowsheet Documentation  Taken 9/6/2022 1400 by Domi Medel RN  Safety Promotion/Fall Prevention: safety round/check completed  Taken 9/6/2022 1246 by Domi Medel RN  Safety Promotion/Fall Prevention: safety round/check completed     Problem: COPD (Chronic Obstructive Pulmonary Disease) Comorbidity  Goal: Maintenance of COPD Symptom Control  Outcome: Ongoing, Progressing     Problem: Diabetes Comorbidity  Goal: Blood Glucose Level Within Targeted Range  Outcome: Ongoing, Progressing     Problem: Hypertension Comorbidity  Goal: Blood Pressure in Desired Range  Outcome: Ongoing, Progressing     Problem: Osteoarthritis Comorbidity  Goal: Maintenance of Osteoarthritis Symptom Control  Outcome: Ongoing, Progressing   Goal Outcome Evaluation:                 Patient has discharge orders. IV removed. Discharge teaching provided. Will continue to observe while patient is still here.

## 2022-09-06 NOTE — PLAN OF CARE
Problem: Adult Inpatient Plan of Care  Goal: Plan of Care Review  Outcome: Ongoing, Progressing  Flowsheets (Taken 9/6/2022 0241)  Outcome Evaluation: Pt reports feeling much better during this shift. Pt currently wearing O2 at 5L per NC, no SOA reported. Pt initially was on D5 1/2 NS running at 75 mL/hr. Pt is diabetic with elevated BS. Lactate continued to be elevated.  Dr. Louise notified with N.O provided. Pt received 1L bolus of NS & 5U of lantus. Lactate currently wnl. Pt on tele with HR in the upper 60s at this time. Pt resting in chair at this time. Pt reports this is most comfortable for him. No additional C/O voiced. Will continue to monitor.  Goal: Patient-Specific Goal (Individualized)  Outcome: Ongoing, Progressing  Goal: Absence of Hospital-Acquired Illness or Injury  Outcome: Ongoing, Progressing  Intervention: Identify and Manage Fall Risk  Recent Flowsheet Documentation  Taken 9/6/2022 0217 by Nicolette Woods RN  Safety Promotion/Fall Prevention:   safety round/check completed   fall prevention program maintained  Taken 9/6/2022 0010 by Nicolette Woods RN  Safety Promotion/Fall Prevention:   safety round/check completed   fall prevention program maintained  Taken 9/5/2022 2219 by Nicolette Woods RN  Safety Promotion/Fall Prevention:   safety round/check completed   fall prevention program maintained  Taken 9/5/2022 2026 by Nicolette Woods, NITHYA  Safety Promotion/Fall Prevention:   safety round/check completed   nonskid shoes/slippers when out of bed   fall prevention program maintained   clutter free environment maintained   assistive device/personal items within reach   activity supervised  Intervention: Prevent and Manage VTE (Venous Thromboembolism) Risk  Recent Flowsheet Documentation  Taken 9/5/2022 2026 by Nicolette Woods, RN  Activity Management: up in chair  Intervention: Prevent Infection  Recent Flowsheet Documentation  Taken 9/6/2022 0217 by Nicolette Woods, NITHYA  Infection Prevention:    hand hygiene promoted   personal protective equipment utilized   rest/sleep promoted   single patient room provided  Taken 9/6/2022 0010 by Nicolette Woods RN  Infection Prevention:   hand hygiene promoted   personal protective equipment utilized   rest/sleep promoted   single patient room provided  Taken 9/5/2022 2219 by Nicolette Woods RN  Infection Prevention:   hand hygiene promoted   personal protective equipment utilized   rest/sleep promoted   single patient room provided  Taken 9/5/2022 2026 by Nicolette Woods RN  Infection Prevention:   hand hygiene promoted   personal protective equipment utilized   rest/sleep promoted   single patient room provided  Goal: Optimal Comfort and Wellbeing  Outcome: Ongoing, Progressing  Intervention: Provide Person-Centered Care  Recent Flowsheet Documentation  Taken 9/5/2022 2026 by Nicolette Woods RN  Trust Relationship/Rapport:   care explained   choices provided   questions answered   questions encouraged   reassurance provided   thoughts/feelings acknowledged  Goal: Readiness for Transition of Care  Outcome: Ongoing, Progressing     Problem: Fall Injury Risk  Goal: Absence of Fall and Fall-Related Injury  Outcome: Ongoing, Progressing  Intervention: Identify and Manage Contributors  Recent Flowsheet Documentation  Taken 9/6/2022 0217 by Nicolette Woods RN  Medication Review/Management: medications reviewed  Taken 9/6/2022 0010 by Nicolette Woods RN  Medication Review/Management: medications reviewed  Taken 9/5/2022 2219 by Nicolette Woods RN  Medication Review/Management: medications reviewed  Taken 9/5/2022 2026 by Nicolette Woods RN  Medication Review/Management: medications reviewed  Intervention: Promote Injury-Free Environment  Recent Flowsheet Documentation  Taken 9/6/2022 0217 by Nicolette Woods RN  Safety Promotion/Fall Prevention:   safety round/check completed   fall prevention program maintained  Taken 9/6/2022 0010 by Nicolette Woods, NITHYA  Safety  Promotion/Fall Prevention:   safety round/check completed   fall prevention program maintained  Taken 9/5/2022 2219 by Nicolette Woods RN  Safety Promotion/Fall Prevention:   safety round/check completed   fall prevention program maintained  Taken 9/5/2022 2026 by Nicolette Woods RN  Safety Promotion/Fall Prevention:   safety round/check completed   nonskid shoes/slippers when out of bed   fall prevention program maintained   clutter free environment maintained   assistive device/personal items within reach   activity supervised     Problem: COPD (Chronic Obstructive Pulmonary Disease) Comorbidity  Goal: Maintenance of COPD Symptom Control  Outcome: Ongoing, Progressing  Intervention: Maintain COPD-Symptom Control  Recent Flowsheet Documentation  Taken 9/6/2022 0217 by Nicolette Woods RN  Medication Review/Management: medications reviewed  Taken 9/6/2022 0010 by Nicolette Woods RN  Medication Review/Management: medications reviewed  Taken 9/5/2022 2219 by Nicolette Woods RN  Medication Review/Management: medications reviewed  Taken 9/5/2022 2026 by Nicolette Woods RN  Supportive Measures:   active listening utilized   verbalization of feelings encouraged  Medication Review/Management: medications reviewed     Problem: Diabetes Comorbidity  Goal: Blood Glucose Level Within Targeted Range  Outcome: Ongoing, Progressing  Intervention: Monitor and Manage Glycemia  Recent Flowsheet Documentation  Taken 9/5/2022 2026 by Nicolette Woods RN  Glycemic Management: blood glucose monitored     Problem: Hypertension Comorbidity  Goal: Blood Pressure in Desired Range  Outcome: Ongoing, Progressing  Intervention: Maintain Blood Pressure Management  Recent Flowsheet Documentation  Taken 9/6/2022 0217 by Nicolette Woods RN  Medication Review/Management: medications reviewed  Taken 9/6/2022 0010 by Nicolette Woods RN  Medication Review/Management: medications reviewed  Taken 9/5/2022 2219 by Nicolette Woods, RN  Medication  Review/Management: medications reviewed  Taken 9/5/2022 2026 by Nicolette Woods RN  Medication Review/Management: medications reviewed     Problem: Osteoarthritis Comorbidity  Goal: Maintenance of Osteoarthritis Symptom Control  Outcome: Ongoing, Progressing  Intervention: Maintain Osteoarthritis Symptom Control  Recent Flowsheet Documentation  Taken 9/6/2022 0217 by Nicolette Woods RN  Medication Review/Management: medications reviewed  Taken 9/6/2022 0010 by Nicolette Woods RN  Medication Review/Management: medications reviewed  Taken 9/5/2022 2219 by Nicolette Woods RN  Medication Review/Management: medications reviewed  Taken 9/5/2022 2026 by Nicolette Woods RN  Activity Management: up in chair  Medication Review/Management: medications reviewed   Goal Outcome Evaluation:              Outcome Evaluation: Pt reports feeling much better during this shift. Pt currently wearing O2 at 5L per NC, no SOA reported. Pt initially was on D5 1/2 NS running at 75 mL/hr. Pt is diabetic with elevated BS. Lactate continued to be elevated.  Dr. Louise notified with N.O provided. Pt received 1L bolus of NS & 5U of lantus. Lactate currently wnl. Pt on tele with HR in the upper 60s at this time. Pt resting in chair at this time. Pt reports this is most comfortable for him. No additional C/O voiced. Will continue to monitor.

## 2022-09-07 NOTE — CASE MANAGEMENT/SOCIAL WORK
Case Management Discharge Note      Final Note: Home    Provided Post Acute Provider List?: N/A  N/A Provider List Comment: Anticipate routine home    Selected Continued Care - Discharged on 9/6/2022 Admission date: 9/4/2022 - Discharge disposition: Home or Self Care            Transportation Services  Private: Car    Final Discharge Disposition Code: 01 - home or self-care

## 2022-09-07 NOTE — OUTREACH NOTE
Prep Survey    Flowsheet Row Responses   [facility] patient discharged from? [facility]   Is LACE score < 7 ? No   Emergency Room discharge w/ pulse ox? No   Eligibility Readm Mgmt   Discharge diagnosis   COPD with acute exacerbation    Does the patient have one of the following disease processes/diagnoses(primary or secondary)? COPD   Does the patient have Home health ordered? No   Is there a DME ordered? Yes   What DME was ordered? [facility] for portable oxygen tank to get home   Prep survey completed? Yes          SKYLER RIGGINS - Registered Nurse

## 2022-09-09 LAB
BH CV ECHO MEAS - ACS: 2.14 CM
BH CV ECHO MEAS - AO MAX PG: 15.5 MMHG
BH CV ECHO MEAS - AO MEAN PG: 7.9 MMHG
BH CV ECHO MEAS - AO ROOT DIAM: 4 CM
BH CV ECHO MEAS - AO V2 MAX: 196.8 CM/SEC
BH CV ECHO MEAS - AO V2 VTI: 46.4 CM
BH CV ECHO MEAS - AVA(I,D): 2.8 CM2
BH CV ECHO MEAS - EDV(CUBED): 110.1 ML
BH CV ECHO MEAS - EDV(MOD-SP4): 130.1 ML
BH CV ECHO MEAS - EF(MOD-BP): 65 %
BH CV ECHO MEAS - EF(MOD-SP4): 64.7 %
BH CV ECHO MEAS - ESV(CUBED): 32.4 ML
BH CV ECHO MEAS - ESV(MOD-SP4): 45.9 ML
BH CV ECHO MEAS - FS: 33.5 %
BH CV ECHO MEAS - IVS/LVPW: 0.94 CM
BH CV ECHO MEAS - IVSD: 1.14 CM
BH CV ECHO MEAS - LA A2CS (ATRIAL LENGTH): 3.6 CM
BH CV ECHO MEAS - LV DIASTOLIC VOL/BSA (35-75): 59.8 CM2
BH CV ECHO MEAS - LV MASS(C)D: 211.8 GRAMS
BH CV ECHO MEAS - LV MAX PG: 7.8 MMHG
BH CV ECHO MEAS - LV MEAN PG: 3.7 MMHG
BH CV ECHO MEAS - LV SYSTOLIC VOL/BSA (12-30): 21.1 CM2
BH CV ECHO MEAS - LV V1 MAX: 139.9 CM/SEC
BH CV ECHO MEAS - LV V1 VTI: 33 CM
BH CV ECHO MEAS - LVIDD: 4.8 CM
BH CV ECHO MEAS - LVIDS: 3.2 CM
BH CV ECHO MEAS - LVOT AREA: 3.9 CM2
BH CV ECHO MEAS - LVOT DIAM: 2.22 CM
BH CV ECHO MEAS - LVPWD: 1.21 CM
BH CV ECHO MEAS - MV A MAX VEL: 120.1 CM/SEC
BH CV ECHO MEAS - MV DEC SLOPE: 515.7 CM/SEC2
BH CV ECHO MEAS - MV DEC TIME: 0.24 MSEC
BH CV ECHO MEAS - MV E MAX VEL: 122.4 CM/SEC
BH CV ECHO MEAS - MV E/A: 1.02
BH CV ECHO MEAS - MV MAX PG: 7.5 MMHG
BH CV ECHO MEAS - MV MEAN PG: 3.4 MMHG
BH CV ECHO MEAS - MV V2 VTI: 45.3 CM
BH CV ECHO MEAS - MVA(VTI): 2.8 CM2
BH CV ECHO MEAS - PA V2 MAX: 100.3 CM/SEC
BH CV ECHO MEAS - RV MAX PG: 2.7 MMHG
BH CV ECHO MEAS - RV V1 MAX: 81.6 CM/SEC
BH CV ECHO MEAS - RV V1 VTI: 16.9 CM
BH CV ECHO MEAS - RVDD: 3.2 CM
BH CV ECHO MEAS - SI(MOD-SP4): 38.7 ML/M2
BH CV ECHO MEAS - SV(LVOT): 128.3 ML
BH CV ECHO MEAS - SV(MOD-SP4): 84.2 ML
BH CV ECHO MEAS - TR MAX PG: 8.7 MMHG
BH CV ECHO MEAS - TR MAX VEL: 147.8 CM/SEC
MAXIMAL PREDICTED HEART RATE: 149 BPM
STRESS TARGET HR: 127 BPM

## 2022-09-10 LAB
BACTERIA SPEC AEROBE CULT: NORMAL
BACTERIA SPEC AEROBE CULT: NORMAL

## 2022-09-11 LAB — QT INTERVAL: 470 MS

## 2022-09-15 ENCOUNTER — READMISSION MANAGEMENT (OUTPATIENT)
Dept: CALL CENTER | Facility: HOSPITAL | Age: 72
End: 2022-09-15

## 2022-09-15 NOTE — OUTREACH NOTE
COPD/PN Week 2 Survey    Flowsheet Row Responses   [facility] patient discharged from? [facility]   Does the patient have one of the following disease processes/diagnoses(primary or secondary)? COPD   Week 2 attempt successful? Yes   Call start time 1456   Call end time 1506   Discharge diagnosis   COPD with acute exacerbation    Meds reviewed with patient/caregiver? Yes   Is the patient having any side effects they believe may be caused by any medication additions or changes? No   Does the patient have all medications ordered at discharge? Yes   Is the patient taking all medications as directed (includes completed medication regime)? Yes   Medication comments New med ordered for thrush that still needs to be p/u   Comments regarding appointments Pulm appt on 9/15/22   Does the patient have a primary care provider?  Yes   Does the patient have an appointment with their PCP or specialist within 7 days of discharge? Greater than 7 days   Comments regarding PCP 9/20/22   Nursing Interventions Verified appointment date/time/provider   Has the patient kept scheduled appointments due by today? Yes   Has home health visited the patient within 72 hours of discharge? N/A   DME comments home oxygen prior to this admission   Pulse Ox monitoring Intermittent   Pulse Ox device source Patient   O2 Sat comments 94-97% on RA   O2 Sat: education provided Sat levels, Monitoring frequency   Psychosocial issues? No   Did the patient receive a copy of their discharge instructions? Yes   Nursing interventions Reviewed instructions with patient   What is the patient's perception of their health status since discharge? Improving   Is the patient/caregiver able to teach back the hierarchy of who to call/visit for symptoms/problems? PCP, Specialist, Home health nurse, Urgent Care, ED, 911 Yes   Patient reports what zone on this call? Green Zone   Green Zone Reports doing well, Breathing without shortness of breath, Usual activity and  exercise level   Green Zone interventions: Take daily medications, Use oxygen as prescribed   Is the patient/caregiver able to teach back signs and symptoms of worsening condition: Fever/chills, Shortness of breath, Chest pain   Is the patient/caregiver able to teach back importance of completing antibiotic course of treatment? --  [none ordered]   Week 2 call completed? Yes          JORDY Euceda Registered Nurse

## 2022-10-16 LAB — QT INTERVAL: 425 MS

## 2022-11-28 ENCOUNTER — TELEPHONE (OUTPATIENT)
Dept: ORTHOPEDIC SURGERY | Facility: CLINIC | Age: 72
End: 2022-11-28

## 2022-12-06 NOTE — TELEPHONE ENCOUNTER
Caller: JAYANT LACKEY    Relationship: SELF    Best call back number: [phone] MAY LEAVE A MESSAGE    What is the best time to reach you: ANYTIME    Do you require a callback: PLEASE CALL PATIENT. HE HAS NOT HEARD ANYTHING ABOUT THE SCHEDULING OF HIS INJECTION FOR HIS LEFT KNEE.    THANK YOU

## 2022-12-07 NOTE — TELEPHONE ENCOUNTER
CALLED PATIENT AND INFORMED HIM HIS LAST GEL INJECTION WAS ON 08/12/2022 AND THEY ARE EVERY  6 MONTHS AND A DAY. PATIENT WILL WAIT UNTIL JAN TO CALL AND GET THE NEXT INJECTION APPROVAL STARTED.

## 2023-01-12 ENCOUNTER — APPOINTMENT (OUTPATIENT)
Dept: CT IMAGING | Facility: HOSPITAL | Age: 73
End: 2023-01-12
Payer: MEDICARE

## 2023-01-12 ENCOUNTER — HOSPITAL ENCOUNTER (OUTPATIENT)
Facility: HOSPITAL | Age: 73
Discharge: HOME | End: 2023-01-16
Attending: EMERGENCY MEDICINE | Admitting: HOSPITALIST
Payer: MEDICARE

## 2023-01-12 ENCOUNTER — APPOINTMENT (OUTPATIENT)
Dept: GENERAL RADIOLOGY | Facility: HOSPITAL | Age: 73
End: 2023-01-12
Payer: MEDICARE

## 2023-01-12 DIAGNOSIS — J44.1 ACUTE EXACERBATION OF CHRONIC OBSTRUCTIVE PULMONARY DISEASE (COPD): ICD-10-CM

## 2023-01-12 DIAGNOSIS — J96.01 ACUTE HYPOXEMIC RESPIRATORY FAILURE: Primary | ICD-10-CM

## 2023-01-12 DIAGNOSIS — J96.21 ACUTE ON CHRONIC RESPIRATORY FAILURE WITH HYPOXIA: ICD-10-CM

## 2023-01-12 DIAGNOSIS — R94.39 ABNORMAL NUCLEAR STRESS TEST: ICD-10-CM

## 2023-01-12 LAB
ALBUMIN SERPL-MCNC: 4.3 G/DL (ref 3.5–5.2)
ALBUMIN/GLOB SERPL: 1.2 G/DL
ALP SERPL-CCNC: 71 U/L (ref 39–117)
ALT SERPL W P-5'-P-CCNC: 15 U/L (ref 1–41)
ANION GAP SERPL CALCULATED.3IONS-SCNC: 12 MMOL/L (ref 5–15)
AST SERPL-CCNC: 16 U/L (ref 1–40)
BASOPHILS # BLD AUTO: 0 10*3/MM3 (ref 0–0.2)
BASOPHILS NFR BLD AUTO: 0.5 % (ref 0–1.5)
BILIRUB SERPL-MCNC: 0.4 MG/DL (ref 0–1.2)
BUN SERPL-MCNC: 13 MG/DL (ref 8–23)
BUN/CREAT SERPL: 17.3 (ref 7–25)
CALCIUM SPEC-SCNC: 9.3 MG/DL (ref 8.6–10.5)
CHLORIDE SERPL-SCNC: 101 MMOL/L (ref 98–107)
CO2 SERPL-SCNC: 28 MMOL/L (ref 22–29)
CREAT SERPL-MCNC: 0.75 MG/DL (ref 0.76–1.27)
D DIMER PPP FEU-MCNC: 1.3 MG/L (FEU) (ref 0–0.72)
DEPRECATED RDW RBC AUTO: 45.9 FL (ref 37–54)
EGFRCR SERPLBLD CKD-EPI 2021: 95.9 ML/MIN/1.73
EOSINOPHIL # BLD AUTO: 0.1 10*3/MM3 (ref 0–0.4)
EOSINOPHIL NFR BLD AUTO: 0.9 % (ref 0.3–6.2)
ERYTHROCYTE [DISTWIDTH] IN BLOOD BY AUTOMATED COUNT: 14.8 % (ref 12.3–15.4)
GLOBULIN UR ELPH-MCNC: 3.5 GM/DL
GLUCOSE SERPL-MCNC: 218 MG/DL (ref 65–99)
HCT VFR BLD AUTO: 36.1 % (ref 37.5–51)
HGB BLD-MCNC: 11.5 G/DL (ref 13–17.7)
HOLD SPECIMEN: NORMAL
INR PPP: 1.06 (ref 0.93–1.1)
LYMPHOCYTES # BLD AUTO: 1 10*3/MM3 (ref 0.7–3.1)
LYMPHOCYTES NFR BLD AUTO: 14.4 % (ref 19.6–45.3)
MCH RBC QN AUTO: 28.5 PG (ref 26.6–33)
MCHC RBC AUTO-ENTMCNC: 31.9 G/DL (ref 31.5–35.7)
MCV RBC AUTO: 89.4 FL (ref 79–97)
MONOCYTES # BLD AUTO: 0.4 10*3/MM3 (ref 0.1–0.9)
MONOCYTES NFR BLD AUTO: 6.3 % (ref 5–12)
NEUTROPHILS NFR BLD AUTO: 5.1 10*3/MM3 (ref 1.7–7)
NEUTROPHILS NFR BLD AUTO: 77.9 % (ref 42.7–76)
NRBC BLD AUTO-RTO: 0 /100 WBC (ref 0–0.2)
NT-PROBNP SERPL-MCNC: 354.3 PG/ML (ref 0–900)
PLATELET # BLD AUTO: 179 10*3/MM3 (ref 140–450)
PMV BLD AUTO: 9.1 FL (ref 6–12)
POTASSIUM SERPL-SCNC: 3.9 MMOL/L (ref 3.5–5.2)
PROCALCITONIN SERPL-MCNC: 0.06 NG/ML (ref 0–0.25)
PROT SERPL-MCNC: 7.8 G/DL (ref 6–8.5)
PROTHROMBIN TIME: 10.9 SECONDS (ref 9.6–11.7)
RBC # BLD AUTO: 4.04 10*6/MM3 (ref 4.14–5.8)
SODIUM SERPL-SCNC: 141 MMOL/L (ref 136–145)
TROPONIN T SERPL-MCNC: <0.01 NG/ML (ref 0–0.03)
WBC NRBC COR # BLD: 6.6 10*3/MM3 (ref 3.4–10.8)
WHOLE BLOOD HOLD COAG: NORMAL
WHOLE BLOOD HOLD SPECIMEN: NORMAL

## 2023-01-12 PROCEDURE — 93005 ELECTROCARDIOGRAM TRACING: CPT | Performed by: EMERGENCY MEDICINE

## 2023-01-12 PROCEDURE — G0378 HOSPITAL OBSERVATION PER HR: HCPCS

## 2023-01-12 PROCEDURE — 99285 EMERGENCY DEPT VISIT HI MDM: CPT

## 2023-01-12 PROCEDURE — 84484 ASSAY OF TROPONIN QUANT: CPT | Performed by: EMERGENCY MEDICINE

## 2023-01-12 PROCEDURE — 94761 N-INVAS EAR/PLS OXIMETRY MLT: CPT

## 2023-01-12 PROCEDURE — 85610 PROTHROMBIN TIME: CPT | Performed by: EMERGENCY MEDICINE

## 2023-01-12 PROCEDURE — 96365 THER/PROPH/DIAG IV INF INIT: CPT

## 2023-01-12 PROCEDURE — 96375 TX/PRO/DX INJ NEW DRUG ADDON: CPT

## 2023-01-12 PROCEDURE — 94799 UNLISTED PULMONARY SVC/PX: CPT

## 2023-01-12 PROCEDURE — 94640 AIRWAY INHALATION TREATMENT: CPT

## 2023-01-12 PROCEDURE — 85025 COMPLETE CBC W/AUTO DIFF WBC: CPT | Performed by: EMERGENCY MEDICINE

## 2023-01-12 PROCEDURE — 85379 FIBRIN DEGRADATION QUANT: CPT | Performed by: EMERGENCY MEDICINE

## 2023-01-12 PROCEDURE — 83880 ASSAY OF NATRIURETIC PEPTIDE: CPT | Performed by: EMERGENCY MEDICINE

## 2023-01-12 PROCEDURE — 0 IOPAMIDOL PER 1 ML: Performed by: EMERGENCY MEDICINE

## 2023-01-12 PROCEDURE — 80053 COMPREHEN METABOLIC PANEL: CPT | Performed by: EMERGENCY MEDICINE

## 2023-01-12 PROCEDURE — 84145 PROCALCITONIN (PCT): CPT | Performed by: EMERGENCY MEDICINE

## 2023-01-12 PROCEDURE — 71045 X-RAY EXAM CHEST 1 VIEW: CPT

## 2023-01-12 PROCEDURE — 25010000002 METHYLPREDNISOLONE PER 125 MG: Performed by: EMERGENCY MEDICINE

## 2023-01-12 PROCEDURE — 71275 CT ANGIOGRAPHY CHEST: CPT

## 2023-01-12 RX ORDER — METHYLPREDNISOLONE SODIUM SUCCINATE 125 MG/2ML
125 INJECTION, POWDER, LYOPHILIZED, FOR SOLUTION INTRAMUSCULAR; INTRAVENOUS ONCE
Status: COMPLETED | OUTPATIENT
Start: 2023-01-12 | End: 2023-01-12

## 2023-01-12 RX ORDER — IPRATROPIUM BROMIDE AND ALBUTEROL SULFATE 2.5; .5 MG/3ML; MG/3ML
3 SOLUTION RESPIRATORY (INHALATION) ONCE
Status: COMPLETED | OUTPATIENT
Start: 2023-01-12 | End: 2023-01-12

## 2023-01-12 RX ORDER — ACETAMINOPHEN 325 MG/1
650 TABLET ORAL EVERY 4 HOURS PRN
Status: DISCONTINUED | OUTPATIENT
Start: 2023-01-12 | End: 2023-01-16 | Stop reason: HOSPADM

## 2023-01-12 RX ORDER — INSULIN LISPRO 100 [IU]/ML
2-7 INJECTION, SOLUTION INTRAVENOUS; SUBCUTANEOUS
Status: DISCONTINUED | OUTPATIENT
Start: 2023-01-12 | End: 2023-01-16 | Stop reason: HOSPADM

## 2023-01-12 RX ORDER — NICOTINE POLACRILEX 4 MG
15 LOZENGE BUCCAL
Status: DISCONTINUED | OUTPATIENT
Start: 2023-01-12 | End: 2023-01-16 | Stop reason: HOSPADM

## 2023-01-12 RX ORDER — SODIUM CHLORIDE 0.9 % (FLUSH) 0.9 %
10 SYRINGE (ML) INJECTION AS NEEDED
Status: DISCONTINUED | OUTPATIENT
Start: 2023-01-12 | End: 2023-01-16 | Stop reason: HOSPADM

## 2023-01-12 RX ORDER — OLANZAPINE 10 MG/2ML
1 INJECTION, POWDER, LYOPHILIZED, FOR SOLUTION INTRAMUSCULAR
Status: DISCONTINUED | OUTPATIENT
Start: 2023-01-12 | End: 2023-01-16 | Stop reason: HOSPADM

## 2023-01-12 RX ORDER — CHOLECALCIFEROL (VITAMIN D3) 125 MCG
5 CAPSULE ORAL NIGHTLY PRN
Status: DISCONTINUED | OUTPATIENT
Start: 2023-01-12 | End: 2023-01-16 | Stop reason: HOSPADM

## 2023-01-12 RX ORDER — SODIUM CHLORIDE 9 MG/ML
40 INJECTION, SOLUTION INTRAVENOUS AS NEEDED
Status: DISCONTINUED | OUTPATIENT
Start: 2023-01-12 | End: 2023-01-16 | Stop reason: HOSPADM

## 2023-01-12 RX ORDER — ENOXAPARIN SODIUM 100 MG/ML
40 INJECTION SUBCUTANEOUS
Status: DISCONTINUED | OUTPATIENT
Start: 2023-01-12 | End: 2023-01-15

## 2023-01-12 RX ORDER — SODIUM CHLORIDE 0.9 % (FLUSH) 0.9 %
10 SYRINGE (ML) INJECTION EVERY 12 HOURS SCHEDULED
Status: DISCONTINUED | OUTPATIENT
Start: 2023-01-12 | End: 2023-01-16 | Stop reason: HOSPADM

## 2023-01-12 RX ORDER — DEXTROSE MONOHYDRATE 25 G/50ML
25 INJECTION, SOLUTION INTRAVENOUS
Status: DISCONTINUED | OUTPATIENT
Start: 2023-01-12 | End: 2023-01-16 | Stop reason: HOSPADM

## 2023-01-12 RX ADMIN — IPRATROPIUM BROMIDE AND ALBUTEROL SULFATE 3 ML: .5; 3 SOLUTION RESPIRATORY (INHALATION) at 22:50

## 2023-01-12 RX ADMIN — Medication 10 ML: at 22:22

## 2023-01-12 RX ADMIN — METHYLPREDNISOLONE SODIUM SUCCINATE 125 MG: 125 INJECTION, POWDER, FOR SOLUTION INTRAMUSCULAR; INTRAVENOUS at 19:21

## 2023-01-12 RX ADMIN — IPRATROPIUM BROMIDE AND ALBUTEROL SULFATE 3 ML: .5; 3 SOLUTION RESPIRATORY (INHALATION) at 19:13

## 2023-01-12 RX ADMIN — IOPAMIDOL 100 ML: 755 INJECTION, SOLUTION INTRAVENOUS at 20:52

## 2023-01-12 RX ADMIN — DOXYCYCLINE 100 MG: 100 INJECTION, POWDER, LYOPHILIZED, FOR SOLUTION INTRAVENOUS at 22:21

## 2023-01-12 NOTE — Clinical Note
Level of Care: Med/Surg [1]   Admitting Physician: RUBIN MEI [[id number]]   Attending Physician: RUBIN MEI [[id number]]

## 2023-01-13 LAB
FERRITIN SERPL-MCNC: 134.5 NG/ML (ref 30–400)
GLUCOSE BLDC GLUCOMTR-MCNC: 210 MG/DL (ref 70–105)
GLUCOSE BLDC GLUCOMTR-MCNC: 217 MG/DL (ref 70–105)
GLUCOSE BLDC GLUCOMTR-MCNC: 219 MG/DL (ref 70–105)
GLUCOSE BLDC GLUCOMTR-MCNC: 225 MG/DL (ref 70–105)
GLUCOSE BLDC GLUCOMTR-MCNC: 234 MG/DL (ref 70–105)
GLUCOSE BLDC GLUCOMTR-MCNC: 257 MG/DL (ref 70–105)
IRON 24H UR-MRATE: 48 MCG/DL (ref 59–158)
IRON SATN MFR SERPL: 10 % (ref 20–50)
QT INTERVAL: 428 MS
TIBC SERPL-MCNC: 466 MCG/DL (ref 298–536)
TRANSFERRIN SERPL-MCNC: 313 MG/DL (ref 200–360)
WHOLE BLOOD HOLD SPECIMEN: NORMAL

## 2023-01-13 PROCEDURE — 94799 UNLISTED PULMONARY SVC/PX: CPT

## 2023-01-13 PROCEDURE — 25010000002 ENOXAPARIN PER 10 MG: Performed by: HOSPITALIST

## 2023-01-13 PROCEDURE — 84466 ASSAY OF TRANSFERRIN: CPT | Performed by: HOSPITALIST

## 2023-01-13 PROCEDURE — 83540 ASSAY OF IRON: CPT | Performed by: HOSPITALIST

## 2023-01-13 PROCEDURE — G0378 HOSPITAL OBSERVATION PER HR: HCPCS

## 2023-01-13 PROCEDURE — 82962 GLUCOSE BLOOD TEST: CPT

## 2023-01-13 PROCEDURE — 63710000001 INSULIN LISPRO (HUMAN) PER 5 UNITS: Performed by: HOSPITALIST

## 2023-01-13 PROCEDURE — 82728 ASSAY OF FERRITIN: CPT | Performed by: HOSPITALIST

## 2023-01-13 PROCEDURE — 94660 CPAP INITIATION&MGMT: CPT

## 2023-01-13 PROCEDURE — 94761 N-INVAS EAR/PLS OXIMETRY MLT: CPT

## 2023-01-13 PROCEDURE — 99215 OFFICE O/P EST HI 40 MIN: CPT | Performed by: INTERNAL MEDICINE

## 2023-01-13 PROCEDURE — 96372 THER/PROPH/DIAG INJ SC/IM: CPT

## 2023-01-13 PROCEDURE — 94664 DEMO&/EVAL PT USE INHALER: CPT

## 2023-01-13 PROCEDURE — 63710000001 PREDNISONE PER 1 MG: Performed by: HOSPITALIST

## 2023-01-13 PROCEDURE — 36415 COLL VENOUS BLD VENIPUNCTURE: CPT | Performed by: HOSPITALIST

## 2023-01-13 PROCEDURE — 25010000002 FUROSEMIDE PER 20 MG: Performed by: INTERNAL MEDICINE

## 2023-01-13 PROCEDURE — 94618 PULMONARY STRESS TESTING: CPT

## 2023-01-13 PROCEDURE — 96375 TX/PRO/DX INJ NEW DRUG ADDON: CPT

## 2023-01-13 RX ORDER — LISINOPRIL 20 MG/1
40 TABLET ORAL NIGHTLY
Status: DISCONTINUED | OUTPATIENT
Start: 2023-01-13 | End: 2023-01-16 | Stop reason: HOSPADM

## 2023-01-13 RX ORDER — AMLODIPINE BESYLATE 5 MG/1
5 TABLET ORAL NIGHTLY
Status: DISCONTINUED | OUTPATIENT
Start: 2023-01-13 | End: 2023-01-16 | Stop reason: HOSPADM

## 2023-01-13 RX ORDER — FUROSEMIDE 10 MG/ML
40 INJECTION INTRAMUSCULAR; INTRAVENOUS
Status: DISCONTINUED | OUTPATIENT
Start: 2023-01-13 | End: 2023-01-16

## 2023-01-13 RX ORDER — ATORVASTATIN CALCIUM 10 MG/1
10 TABLET, FILM COATED ORAL DAILY
Status: DISCONTINUED | OUTPATIENT
Start: 2023-01-13 | End: 2023-01-16

## 2023-01-13 RX ORDER — GABAPENTIN 300 MG/1
300 CAPSULE ORAL 3 TIMES DAILY
Status: DISCONTINUED | OUTPATIENT
Start: 2023-01-13 | End: 2023-01-16 | Stop reason: HOSPADM

## 2023-01-13 RX ORDER — ALBUTEROL SULFATE 2.5 MG/3ML
2.5 SOLUTION RESPIRATORY (INHALATION) 3 TIMES DAILY PRN
Status: DISCONTINUED | OUTPATIENT
Start: 2023-01-13 | End: 2023-01-16 | Stop reason: HOSPADM

## 2023-01-13 RX ORDER — GLIPIZIDE 5 MG/1
10 TABLET ORAL NIGHTLY
Status: DISCONTINUED | OUTPATIENT
Start: 2023-01-13 | End: 2023-01-13

## 2023-01-13 RX ORDER — BISOPROLOL FUMARATE 5 MG/1
5 TABLET, FILM COATED ORAL DAILY
Status: DISCONTINUED | OUTPATIENT
Start: 2023-01-13 | End: 2023-01-16 | Stop reason: HOSPADM

## 2023-01-13 RX ORDER — IPRATROPIUM BROMIDE AND ALBUTEROL SULFATE 2.5; .5 MG/3ML; MG/3ML
3 SOLUTION RESPIRATORY (INHALATION)
Status: DISCONTINUED | OUTPATIENT
Start: 2023-01-13 | End: 2023-01-16 | Stop reason: HOSPADM

## 2023-01-13 RX ORDER — FERROUS SULFATE TAB EC 324 MG (65 MG FE EQUIVALENT) 324 (65 FE) MG
324 TABLET DELAYED RESPONSE ORAL
Status: DISCONTINUED | OUTPATIENT
Start: 2023-01-13 | End: 2023-01-16 | Stop reason: HOSPADM

## 2023-01-13 RX ORDER — HYDROCODONE BITARTRATE AND ACETAMINOPHEN 5; 325 MG/1; MG/1
1 TABLET ORAL EVERY 6 HOURS PRN
Status: DISCONTINUED | OUTPATIENT
Start: 2023-01-13 | End: 2023-01-16 | Stop reason: HOSPADM

## 2023-01-13 RX ORDER — PREDNISONE 20 MG/1
40 TABLET ORAL
Status: DISCONTINUED | OUTPATIENT
Start: 2023-01-13 | End: 2023-01-16 | Stop reason: HOSPADM

## 2023-01-13 RX ADMIN — AMLODIPINE BESYLATE 5 MG: 5 TABLET ORAL at 21:00

## 2023-01-13 RX ADMIN — IPRATROPIUM BROMIDE AND ALBUTEROL SULFATE 3 ML: 2.5; .5 SOLUTION RESPIRATORY (INHALATION) at 22:58

## 2023-01-13 RX ADMIN — IPRATROPIUM BROMIDE AND ALBUTEROL SULFATE 3 ML: 2.5; .5 SOLUTION RESPIRATORY (INHALATION) at 06:36

## 2023-01-13 RX ADMIN — BISOPROLOL FUMARATE 5 MG: 5 TABLET ORAL at 09:08

## 2023-01-13 RX ADMIN — FERROUS SULFATE TAB EC 324 MG (65 MG FE EQUIVALENT) 324 MG: 324 (65 FE) TABLET DELAYED RESPONSE at 16:13

## 2023-01-13 RX ADMIN — LISINOPRIL 40 MG: 20 TABLET ORAL at 21:00

## 2023-01-13 RX ADMIN — Medication 10 ML: at 21:00

## 2023-01-13 RX ADMIN — INSULIN LISPRO 3 UNITS: 100 INJECTION, SOLUTION INTRAVENOUS; SUBCUTANEOUS at 01:11

## 2023-01-13 RX ADMIN — PREDNISONE 40 MG: 20 TABLET ORAL at 09:08

## 2023-01-13 RX ADMIN — IPRATROPIUM BROMIDE AND ALBUTEROL SULFATE 3 ML: 2.5; .5 SOLUTION RESPIRATORY (INHALATION) at 15:40

## 2023-01-13 RX ADMIN — INSULIN LISPRO 3 UNITS: 100 INJECTION, SOLUTION INTRAVENOUS; SUBCUTANEOUS at 12:34

## 2023-01-13 RX ADMIN — INSULIN LISPRO 4 UNITS: 100 INJECTION, SOLUTION INTRAVENOUS; SUBCUTANEOUS at 21:00

## 2023-01-13 RX ADMIN — FUROSEMIDE 40 MG: 10 INJECTION, SOLUTION INTRAMUSCULAR; INTRAVENOUS at 20:59

## 2023-01-13 RX ADMIN — ENOXAPARIN SODIUM 40 MG: 100 INJECTION SUBCUTANEOUS at 16:00

## 2023-01-13 RX ADMIN — ENOXAPARIN SODIUM 40 MG: 100 INJECTION SUBCUTANEOUS at 01:12

## 2023-01-13 RX ADMIN — GABAPENTIN 300 MG: 300 CAPSULE ORAL at 09:08

## 2023-01-13 RX ADMIN — INSULIN LISPRO 3 UNITS: 100 INJECTION, SOLUTION INTRAVENOUS; SUBCUTANEOUS at 09:07

## 2023-01-13 RX ADMIN — ATORVASTATIN CALCIUM 10 MG: 10 TABLET, FILM COATED ORAL at 09:08

## 2023-01-13 RX ADMIN — SERTRALINE 50 MG: 50 TABLET, FILM COATED ORAL at 21:00

## 2023-01-13 RX ADMIN — INSULIN LISPRO 3 UNITS: 100 INJECTION, SOLUTION INTRAVENOUS; SUBCUTANEOUS at 18:23

## 2023-01-13 RX ADMIN — GABAPENTIN 300 MG: 300 CAPSULE ORAL at 16:14

## 2023-01-13 RX ADMIN — GABAPENTIN 300 MG: 300 CAPSULE ORAL at 21:00

## 2023-01-13 NOTE — PLAN OF CARE
Goal Outcome Evaluation:   Patient was admitted due to COPD exacerbation. Patients oxygen needs are greater than what he is on at home and drops significantly when up. Plan is for patient to have stress test tomorrow.

## 2023-01-13 NOTE — ED PROVIDER NOTES
Subjective   History of Present Illness  72-year-old male with prior medical history of COPD, CAD, type 2 diabetes states he had worsening shortness of breath for the last 2 days.  No fevers.  States he is just felt bad.  Wears 4 L nasal cannula at baseline.  On his baseline oxygen in triage he was originally in the 70s.  Was placed on nonrebreather and brought back to room.  States he has had some very mild intermittent chest pain.  No vomiting or diarrhea.  No runny nose or sore throat.  Is endorsing some orthopnea.  Review of Systems   Constitutional: Negative for chills and fever.   Respiratory: Positive for shortness of breath and wheezing.    Cardiovascular: Positive for chest pain. Negative for leg swelling.   Gastrointestinal: Negative for abdominal pain.   All other systems reviewed and are negative.      Past Medical History:   Diagnosis Date   • Abnormal EKG 2/3/2015   • Anxiety    • COPD (chronic obstructive pulmonary disease) ([facility])    • Coronary artery disease 3/3/2015   • Diabetes ([facility])     type 2   • Hyperlipidemia    • Hypertension    • Microalbuminuria due to type 2 diabetes mellitus ([facility]) 2/5/2020   • Multinodular goiter 4/18/2019   • Murmur 2/3/2015   • Primary osteoarthritis of left knee 10/22/2021       No Known Allergies    Past Surgical History:   Procedure Laterality Date   • EYE SURGERY      cataract   • TOTAL KNEE ARTHROPLASTY Right 10/21/2020    Procedure: TOTAL KNEE ARTHROPLASTY;  Surgeon: Ravi Fagan MD;  Location: [facility];  Service: Orthopedics;  Laterality: Right;   • TOTAL KNEE ARTHROPLASTY REVISION Right 5/19/2021    Procedure: KNEE POLY INSERT EXCHANGE with irrigation;  Surgeon: Ravi Fagan MD;  Location: [facility] OR;  Service: Orthopedics;  Laterality: Right;       Family History   Problem Relation Age of Onset   • Cancer Other    • Diabetes Other    • Heart disease Other    • No Known Problems Mother    • No Known Problems Father    • No Known Problems Sister    •  No Known Problems Brother    • No Known Problems Maternal Aunt    • No Known Problems Maternal Uncle    • No Known Problems Paternal Aunt    • No Known Problems Paternal Uncle    • No Known Problems Maternal Grandmother    • No Known Problems Maternal Grandfather    • No Known Problems Paternal Grandmother    • No Known Problems Paternal Grandfather    • Anemia Neg Hx    • Arrhythmia Neg Hx    • Asthma Neg Hx    • Clotting disorder Neg Hx    • Fainting Neg Hx    • Heart attack Neg Hx    • Heart failure Neg Hx    • Hyperlipidemia Neg Hx    • Hypertension Neg Hx        Social History     Socioeconomic History   • Marital status:    Tobacco Use   • Smoking status: Former     Packs/day: 1.00     Types: Cigarettes     Quit date: 7/3/2022     Years since quittin.5   • Smokeless tobacco: Former     Quit date: 7/3/2022   Vaping Use   • Vaping Use: Never used   Substance and Sexual Activity   • Alcohol use: Not Currently   • Drug use: Never   • Sexual activity: Defer           Objective   Physical Exam  Constitutional:  No acute distress.  Head:  Atraumatic.  Normocephalic.   Eyes:  No scleral icterus. Normal conjunctivae  ENT:  Moist mucosa.  No nasal discharge present.  Cardiovascular:  Well perfused.  Equal pulses.  Regular rate.  Normal capillary refill.    Pulmonary/Chest: Tachypnea, bilateral expiratory wheezes, increased work of breathing, speaking in short sentences  Abdominal:  Nondistended. Nontender.   Extremities:  No peripheral edema.  No Deformity  Skin:  Warm, dry  Neurological:  Alert, awake, and appropriate.  Normal speech.      Procedures           ED Course  ED Course as of 23 2220   Thu 2023   193 BUN: 13 [CH]      ED Course User Index  [CH] Aditya Caldwell MD                                           [facility]  EKG # 1  Signed and interpreted by the EP.  Time Interpreted: 60 6 PM  Rate: 71  Rhythm: Normal sinus rhythm  Axis: Right axis deviation  Intervals: Right bundle branch  block  ST Segments: No obvious acute ischemic changes however baseline wander in multiple leads somewhat limits interpretation     Comparison: No significant change from September 5, 2022 EKG    Patient wheezing significantly.  Given DuoNeb.  Initially required nonrebreather but after patient settled out able to place on 5 L nasal cannula maintaining saturations in low 90s with this.  Elevated D-dimer.  Given patient having some chest pain with shortness of breath that is unusual for him we will pursue this.  EKG appears to have no significant changes from prior negative troponin low suspicion for ACS given this is been going on for 2 days.  BUN unremarkable.  Not appreciate pulmonary edema on x-ray.  Despite patient endorsing orthopnea I have low suspicion that this is secondary to volume overload and pulmonary edema.  Suspect COPD exacerbation.  Given steroids and DuoNeb will reassess.  Patient states he feels much better after steroids and DuoNeb but still significantly hypoxic.  6 L only satting 89 to 90%.  Given doxycycline.  Will admit to hospitalist.  Patient agreeable with this plan when discussed.    Final diagnoses:   Acute hypoxemic respiratory failure (HCC)   Acute exacerbation of chronic obstructive pulmonary disease (COPD) (HCC)       ED Disposition  ED Disposition     ED Disposition   Decision to Admit    Condition   --    Comment   Level of Care: Med/Surg [1]   Admitting Physician: RUBIN MEI [[id number]]   Attending Physician: RUBIN MEI [[id number]]               No follow-up provider specified.       Medication List      No changes were made to your prescriptions during this visit.          Aditya Caldwell MD  01/12/23 1769

## 2023-01-13 NOTE — CONSULTS
"Cardiology Consult Note      REQUESTING PHYSICIAN    No att. providers found    PATIENT IDENTIFICATION  Name: Brenden Peter  Age: 72 y.o.  Sex: male  :  1950  MRN: [id number]             REASON FOR CONSULTATION:  72-year-old male with no known history of ischemic heart disease.  Past medical history includes COPD, chronic hypoxic respiratory failure on home O2 at 4L, diabetes mellitus 2, primary hypertension, obesity with BMI 34.46.    TTE 2022 with normal LV systolic function EF 61-65%, mild MR/TR      CC:  Shortness of breath    HISTORY OF PRESENT ILLNESS:   Patient presented to the emergency department [facility] 2023 with complaint of worsening shortness of breath, orthopnea and severe activity intolerance.  He reports \"very mild\" right lower chest pressure.  He denies any josefa chest pain, no palpitations.  He denies any lower extremity edema, dizziness or lightheadedness.  Patient has CPAP at home which she is compliant with.  He was given 125 mg IV methylprednisolone, started on DuoNeb treatments and IV doxycycline.  Upon my evaluation, patient is resting comfortably in bed and reports significant improvement in his shortness of breath.    Additionally, the patient has had some significant lower extremity edema and he reports at least a 20 pound weight gain.  His CT scan does suggest some interstitial edema.      REVIEW OF SYSTEMS:  Pertinent items are noted in HPI, all other systems reviewed and negative    OBJECTIVE   Troponin negative x1  proBNP within normal limits  D-dimer 1.3  Hemoglobin 11.5    Chest CT with no evidence of PE, mild interstitial edema, trace pleural effusion    ASSESSMENT  Acute respiratory failure  Mild interstitial edema on CT  History of chronic hypoxic respiratory failure-O2 dependent  Chest discomfort-atypical  Valvular heart disease-mild MR/TR  Diabetes mellitus 2  Primary hypertension  Lower extremity edema/volume overload      PLAN  TTE September " "2022 with normal LV systolic function, mild valvular dysfunction  Will order nuclear stress test in a.m. to evaluate for any evidence of inducible ischemia  Blood pressure, rhythm and rate very stable  Patient has known history of chronic hypoxic respiratory failure.  Only mild interstitial edema noted on CT, no lower extremity edema.  Continue amlodipine, statin, bisoprolol, lisinopril  Diuresis as renal function allows  Further recommendations following results of diagnostics and evaluation by cardiologist        Vital Signs  Visit Vitals  /85 (BP Location: Right arm, Patient Position: Sitting)   Pulse 59   Temp 97.8 °F (36.6 °C) (Oral)   Resp 15   Ht 170.2 cm (67\")   Wt 99.8 kg (220 lb)   SpO2 96%   BMI 34.46 kg/m²     Oxygen Therapy  SpO2: 96 %  Pulse Oximetry Type: Intermittent  Device (Oxygen Therapy): nasal cannula  Flow (L/min): 4  Oxygen Concentration (%): 40  Flowsheet Rows      Flowsheet Row First Filed Value   Admission Height 170.2 cm (67\") Documented at 01/12/2023 1845   Admission Weight 99.8 kg (220 lb) Documented at 01/12/2023 1845          Intake & Output (last 3 days)     None        Lines, Drains & Airways       Active LDAs       Name Placement date Placement time Site Days    Peripheral IV 01/12/23 1858 Right Antecubital 01/12/23  1858  Antecubital  less than 1    Peripheral IV 01/12/23 1858 Left Antecubital 01/12/23  1858  Antecubital  less than 1                    MEDICAL HISTORY    Past Medical History:   Diagnosis Date   • Abnormal EKG 2/3/2015   • Anxiety    • COPD (chronic obstructive pulmonary disease) ([facility])    • Coronary artery disease 3/3/2015   • Diabetes ([facility])     type 2   • Hyperlipidemia    • Hypertension    • Microalbuminuria due to type 2 diabetes mellitus ([facility]) 2/5/2020   • Multinodular goiter 4/18/2019   • Murmur 2/3/2015   • Primary osteoarthritis of left knee 10/22/2021        SURGICAL HISTORY    Past Surgical History:   Procedure Laterality Date   • CARDIAC " "CATHETERIZATION Left 1/15/2023    Procedure: Left Heart Cath and coronary angiogram;  Surgeon: Shawn Baker MD;  Location: [facility] CATH INVASIVE LOCATION;  Service: Cardiovascular;  Laterality: Left;   • EYE SURGERY      cataract   • TOTAL KNEE ARTHROPLASTY Right 10/21/2020    Procedure: TOTAL KNEE ARTHROPLASTY;  Surgeon: Ravi Fagan MD;  Location: [facility] MAIN OR;  Service: Orthopedics;  Laterality: Right;   • TOTAL KNEE ARTHROPLASTY REVISION Right 2021    Procedure: KNEE POLY INSERT EXCHANGE with irrigation;  Surgeon: Ravi Fagan MD;  Location: [facility] MAIN OR;  Service: Orthopedics;  Laterality: Right;        FAMILY HISTORY    Family History   Problem Relation Age of Onset   • Cancer Other    • Diabetes Other    • Heart disease Other    • No Known Problems Mother    • No Known Problems Father    • No Known Problems Sister    • No Known Problems Brother    • No Known Problems Maternal Aunt    • No Known Problems Maternal Uncle    • No Known Problems Paternal Aunt    • No Known Problems Paternal Uncle    • No Known Problems Maternal Grandmother    • No Known Problems Maternal Grandfather    • No Known Problems Paternal Grandmother    • No Known Problems Paternal Grandfather    • Anemia Neg Hx    • Arrhythmia Neg Hx    • Asthma Neg Hx    • Clotting disorder Neg Hx    • Fainting Neg Hx    • Heart attack Neg Hx    • Heart failure Neg Hx    • Hyperlipidemia Neg Hx    • Hypertension Neg Hx        SOCIAL HISTORY    Social History     Tobacco Use   • Smoking status: Former     Packs/day: 1.00     Types: Cigarettes     Quit date: 7/3/2022     Years since quittin.5   • Smokeless tobacco: Former     Quit date: 7/3/2022   Substance Use Topics   • Alcohol use: Not Currently        ALLERGIES    No Known Allergies           /85 (BP Location: Right arm, Patient Position: Sitting)   Pulse 59   Temp 97.8 °F (36.6 °C) (Oral)   Resp 15   Ht 170.2 cm (67\")   Wt 99.8 kg (220 lb)   SpO2 96%   BMI 34.46 kg/m² "   Intake/Output last 3 shifts:  No intake/output data recorded.  Intake/Output this shift:  No intake/output data recorded.    PHYSICAL EXAM:    General: Alert, cooperative, no distress, appears stated age  Head:  Normocephalic, atraumatic, mucous membranes moist  Eyes:  Conjunctivae/corneas clear, EOM's intact     Neck:  Supple,  no bruit  Lungs:             Coarse and diminished with scattered crackles in bases, no wheezes  Chest wall: No tenderness  Heart::  Regular rate and rhythm, S1 and S2 normal, 1/6 holosystolic murmur.  No rub or gallop  Abdomen: Soft, nontender, nondistended, bowel sounds active  Extremities: No cyanosis, clubbing.  2+ edema  Pulses: Diminished pedal pulses  Skin:  No rashes or lesions  Neuro/psych: A&O x3. CN II through XII are grossly intact with appropriate affect      Scheduled Meds:          Continuous Infusions:    No current facility-administered medications for this encounter.      PRN Meds:            Results Review:     I reviewed the patient's new clinical results.    CBC        Cr Clearance Estimated Creatinine Clearance: 119.3 mL/min (A) (by C-G formula based on SCr of 0.63 mg/dL (L)).  Coag       HbA1C   Lab Results   Component Value Date    HGBA1C 5.8 (H) 09/05/2022    HGBA1C 6.9 (H) 05/12/2021    HGBA1C 7.4 (H) 10/19/2020     Blood Glucose   No results found for: POCGLU  Infection       CMP       ABG      UA      MARILIA  No results found for: POCMETH, POCAMPHET, POCBARBITUR, POCBENZO, POCCOCAINE, POCOPIATES, POCOXYCODO, POCPHENCYC, POCPROPOXY, POCTHC, POCTRICYC  Lysis Labs       Radiology(recent) No radiology results for the last day            Xrays, labs reviewed personally by physician.    ECG/EMG Results (most recent)     Procedure Component Value Units Date/Time    ECG 12 Lead Other; SOA [[id number]] Collected: 01/12/23 1856     Updated: 01/13/23 1610     QT Interval 428 ms     Narrative:      HEART RATE= 71  bpm  RR Interval= 848  ms  OK Interval= 198  ms  P Horizontal  "Axis= -44  deg  P Front Axis= 62  deg  QRSD Interval= 167  ms  QT Interval= 428  ms  QRS Axis= 132  deg  T Wave Axis= 42  deg  - ABNORMAL ECG -  Sinus rhythm  Right bundle branch block  Anteroseptal infarct, age indeterminate  When compared with ECG of 05-Sep-2022 9:56:20,  No significant change  Electronically Signed By: Aditya Caldwell ([facility]) 13-Jan-2023 16:10:08  Date and Time of Study: 2023-01-12 18:56:59    SCANNED - TELEMETRY   [[id number]] Resulted: 01/12/23     Updated: 01/16/23 0524    SCANNED - TELEMETRY   [[id number]] Resulted: 01/12/23     Updated: 01/16/23 1016    SCANNED - TELEMETRY   [[id number]] Resulted: 01/12/23     Updated: 01/16/23 1034    SCANNED - TELEMETRY   [[id number]] Resulted: 01/12/23     Updated: 01/17/23 0712    ECG 12 Lead [[id number]] Collected: 01/16/23 0655     Updated: 01/21/23 1308     QT Interval 461 ms     Narrative:      HEART RATE= 55  bpm  RR Interval= 1088  ms  OH Interval= 214  ms  P Horizontal Axis= 5  deg  P Front Axis= 49  deg  QRSD Interval= 168  ms  QT Interval= 461  ms  QRS Axis= 77  deg  T Wave Axis= 21  deg  - ABNORMAL ECG -  Sinus bradycardia  Borderline prolonged OH interval  Right bundle branch block  ST elevation secondary to IVCD  When compared with ECG of 12-Jan-2023 18:56:59,  No significant change  Electronically Signed By: Shawn Baker ([facility]) 21-Jan-2023 13:07:50  Date and Time of Study: 2023-01-16 06:55:22            Medication Review:   I have reviewed the patient's current medication list  Scheduled Meds:  Continuous Infusions:No current facility-administered medications for this encounter.    PRN Meds:.    Imaging:  Imaging Results (Last 72 Hours)     ** No results found for the last 72 hours. **            Kenan Parker DO  02/01/23  19:24 EST       EMR Dragon/Transcription:   \"Dictated utilizing Dragon dictation\".                 Electronically signed by LENNIE Jimenez, 01/13/23, 2:56 PM EST.    Cardiology attending:  Seen and examined.  Chart " and labs reviewed.  History and exam findings are verified with above changes noted.  Assessment and plan notated by APC after being formulated by attending consultant.  Note that greater than 50% of the time spent in care of the patient was provided by attending consultant.  Patient does report some mild chest pressure.  Biggest complaint is shortness of breath.  CT scan is suggestive of interstitial edema.  Patient does report a least 20 pound weight gain.  We will get stress test but will also start patient on IV diuretics and monitor renal function electrolytes closely.

## 2023-01-13 NOTE — PROGRESS NOTES
[facility] Medicine Services  Daily progress note     Patient Name: Brenden Peter  : 1950  MRN: [id number]  Primary Care Physician:  Bert Rojas MD  Date of admission: 2023        Subjective       Chief Complaint: Shortness of breath     History of Present Illness: Brenden Peter is a 72 y.o. male with a medical history notable for morbid obesity, COPD C/B chronic hypoxic respiratory failure on home O2 at 4 L, type 2 diabetes mellitus, hypertension, and other comorbidities who presented to [facility] on 2023 complaining of worsening shortness of breath.  For the past 2 to 3 days     Patient has been having worsening shortness of breath.  He noted on his home pulse oximeter that his O2 sats were going down.  He is adherent to his CPAP machine and he takes his medications as prescribed.  However he did mention he has not been using his nebulizer treatment as often.  He typically uses it about 2-3 times daily, but has only been using it about once daily recently.  He thinks he seems to get sick around this time yearly.  He stated his wife was feeling ill with a stomach bug but no other known sick contacts.     While in the ED, the patient received methylprednisolone 125mg IV, Duoneb treatment x1 dose, and doxycycline 100mg IV.  Patient stated after receiving medications, he feels much better and feels close to his baseline.   2023; patient sitting at the bedside, upon speaking to the patient he endorses he has been having this difficulty of breathing/dyspnea on minimal exertion in the last 1 year every 2 months he has these recurrences and has not been sleeping well, he was just prescribed CPAP 1 month ago, denies any chest pain no nausea or vomiting, hemodynamically stable     Review of Systems   Constitutional: Negative for chills and fever.   HENT: Negative.    Cardiovascular: Positive for dyspnea on exertion. Negative for chest pain, leg swelling and  palpitations.   Respiratory: Positive for shortness of breath. Negative for cough, sputum production and wheezing.    Skin: Negative.    Musculoskeletal: Positive for arthritis.   Gastrointestinal: Negative for abdominal pain, nausea and vomiting.   Genitourinary: Negative for dysuria.   Neurological: Negative.    Psychiatric/Behavioral: Negative.          Personal History      Medical History        Past Medical History:   Diagnosis Date   • Abnormal EKG 2/3/2015   • Anxiety     • COPD (chronic obstructive pulmonary disease) ([facility])     • Coronary artery disease 3/3/2015   • Diabetes ([facility])       type 2   • Hyperlipidemia     • Hypertension     • Microalbuminuria due to type 2 diabetes mellitus ([facility]) 2/5/2020   • Multinodular goiter 4/18/2019   • Murmur 2/3/2015   • Primary osteoarthritis of left knee 10/22/2021            Surgical History         Past Surgical History:   Procedure Laterality Date   • EYE SURGERY         cataract   • TOTAL KNEE ARTHROPLASTY Right 10/21/2020     Procedure: TOTAL KNEE ARTHROPLASTY;  Surgeon: Ravi Fagan MD;  Location: [facility];  Service: Orthopedics;  Laterality: Right;   • TOTAL KNEE ARTHROPLASTY REVISION Right 5/19/2021     Procedure: KNEE POLY INSERT EXCHANGE with irrigation;  Surgeon: Ravi Fagan MD;  Location: [facility];  Service: Orthopedics;  Laterality: Right;            Family History: family history includes Cancer in an other family member; Diabetes in an other family member; Heart disease in an other family member; No Known Problems in his brother, father, maternal aunt, maternal grandfather, maternal grandmother, maternal uncle, mother, paternal aunt, paternal grandfather, paternal grandmother, paternal uncle, and sister.      Social History:  reports that he quit smoking about 6 months ago. His smoking use included cigarettes. He smoked an average of 1 pack per day. He quit smokeless tobacco use about 6 months ago. He reports that he does not currently use  alcohol. He reports that he does not use drugs.     Home Medications:          Prior to Admission Medications      Prescriptions Last Dose Informant Patient Reported? Taking?     albuterol (PROVENTIL) (2.5 MG/3ML) 0.083% nebulizer solution     Yes No     Take 3 mL by nebulization 3 (Three) Times a Day As Needed for Wheezing or Shortness of Air.     albuterol sulfate  (90 Base) MCG/ACT inhaler     Yes No     Inhale 2 puffs Every 6 (Six) Hours As Needed.     amLODIPine (NORVASC) 5 MG tablet     Yes No     Take 5 mg by mouth Every Night.     bisoprolol (ZEBeta) 5 MG tablet     Yes No     Take 5 mg by mouth Daily.     gabapentin (NEURONTIN) 300 MG capsule     Yes No     Take 300 mg by mouth 3 (Three) Times a Day.     glipizide (GLUCOTROL) 10 MG tablet     Yes No     Take 10 mg by mouth Every Night.     HYDROcodone-acetaminophen (NORCO) 5-325 MG per tablet     No No     Take 1 tablet by mouth Every 6 (Six) Hours As Needed for Moderate Pain .     ipratropium-albuterol (DUO-NEB) 0.5-2.5 mg/3 ml nebulizer     Yes No     3 mL Every 6 (Six) Hours.     lisinopril (PRINIVIL,ZESTRIL) 40 MG tablet     Yes No     Take 40 mg by mouth Every Night.     metFORMIN (GLUCOPHAGE) 1000 MG tablet     Yes No     Take 1,000 mg by mouth 2 (Two) Times a Day With Meals.     pioglitazone (ACTOS) 30 MG tablet     Yes No     Take 30 mg by mouth Every Night.     pravastatin (PRAVACHOL) 20 MG tablet     Yes No     Take 20 mg by mouth Daily.     sertraline (ZOLOFT) 50 MG tablet     Yes No     Take 50 mg by mouth Every Night.     umeclidinium-vilanterol (ANORO ELLIPTA) 62.5-25 MCG/INH aerosol powder  inhaler     Yes No     Inhale 1 puff Daily.     vitamin D (ERGOCALCIFEROL) 1.25 MG (78224 UT) capsule capsule     Yes No     50,000 Units 2 (Two) Times a Week. Tues and Fri                Allergies:  No Known Allergies     Objective       Vitals:   Temp:  [97.7 °F (36.5 °C)-98.7 °F (37.1 °C)] 97.7 °F (36.5 °C)  Heart Rate:  [67-83] 69  Resp:   [16-24] 18  BP: (124-165)/() 142/65  Flow (L/min):  [4-15] 4  Physical Exam      General:  Appears in no acute distress, nontoxic appearing  HEENT:  Normocephalic. Normal conjunctivae, EOM intact bilaterally, pupils equal and reactive to light. No JVD. Thyroid not visibly enlarged. No mucosal pallor or cyanosis. No oral lesions.   Respiratory: Respirations regular, increased shortness of breath with minimal exertion, bilateral breath sounds with good air entry in all fields. No crackles, rubs or wheezes auscultated  Cardiovascular: S1S2 Regular rate and rhythm. No murmur, rub or gallop. No pretibial pitting edema  Gastrointestinal: Abdomen soft, flat, nontender. Bowel sounds present. No rebound or guarding.   Musculoskeletal: Moves all extremities voluntarily. No abnormal movements  Extremities: No digital clubbing or cyanosis  Skin: Color pink. Skin warm and dry to touch. No rashes    Neuro: AAO x3, CN II-XII grossly intact, comprehension intact, follows commands appropriately  Psych: Mood and affect normal, pleasant and cooperative        Result Review    Result Review:  I have personally reviewed the results from the time of this admission to 1/13/2023 05:49 EST and agree with these findings:  [x]?  Laboratory  []?  Microbiology  [x]?  Radiology  [x]?  EKG/Telemetry           []?  Cardiology/Vascular   []?  Pathology  []?  Old records  []?  Other:     LAB RESULTS:          Lab 01/12/23  1902   WBC 6.60   HEMOGLOBIN 11.5*   HEMATOCRIT 36.1*   PLATELETS 179   NEUTROS ABS 5.10   LYMPHS ABS 1.00   MONOS ABS 0.40   EOS ABS 0.10   MCV 89.4   PROCALCITONIN 0.06   PROTIME 10.9              Lab 01/12/23  1902   SODIUM 141   POTASSIUM 3.9   CHLORIDE 101   CO2 28.0   ANION GAP 12.0   BUN 13   CREATININE 0.75*   EGFR 95.9   GLUCOSE 218*   CALCIUM 9.3              Lab 01/12/23  1902   TOTAL PROTEIN 7.8   ALBUMIN 4.3   GLOBULIN 3.5   ALT (SGPT) 15   AST (SGOT) 16   BILIRUBIN 0.4   ALK PHOS 71              Lab  01/12/23  1902   PROBNP 354.3   TROPONIN T <0.010   PROTIME 10.9   INR 1.06              XR CHEST 1 VW   Date of Exam: 1/12/2023 7:10 PM EST      Findings:   The heart appears mildly enlarged. There is evidence of remote granulomatous disease. There appears to be prominence of central pulmonary vasculature. The lungs appear hyperinflated which may be seen with COPD. There is suggestion of mild basilar   predominant interstitial opacities. No significant effusion or pneumothorax is seen.      Impression:   1.Mild basilar predominant interstitial opacities which may indicate edema or could be seen with COPD.   2.Mild cardiomegaly and prominent pulmonary vasculature.         CT ANGIOGRAM CHEST PULMONARY EMBOLISM   Date of Exam: 1/12/2023 8:49 PM EST      Findings:   Pulmonary arteries: Adequately opacified. No emboli demonstrated      Tiff/mediastinum: Enlarged mediastinal and bilateral hilar lymph nodes. These appear slightly larger than on the comparison study. Precarinal node measures 2.6 x 2.2 cm. Subcarinal node measures 1.8 cm short axis thoracic aorta normal in caliber.   Coronary artery calcification. No pericardial effusion      Lungs/pleura: Emphysema. Interlobular septal thickening most pronounced in the apices and bases. Interval resolution of bronchiolitis changes from the prior study. Trace pleural effusions with minimal dependent atelectasis in the lower lobes. Calcified   granuloma in the right middle lobe.      Upper abdomen: Unremarkable. Normal-sized spleen. No upper abdominal adenopathy      Bones/soft tissues: No axillary adenopathy. No acute bony abnormality. Degenerative disc disease in the midthoracic spine      Impression:   1. No evidence of pulmonary embolism   2. Interlobular septal thickening that likely represents mild interstitial edema. Atypical pneumonia less likely.   3. Trace pleural effusions   4. Emphysema   5. Mediastinal and hilar adenopathy, likely congestive or reactive.  Recommend chest CT with the patient's clinically baseline to assess for resolution               Assessment & Plan          Plan:   Acute on chronic respiratory failure with hypoxia, baseline 4 L at home currently requiring 9 L with minimal exertion/WOOD/orthopnea  Likely due to COPD exacerbation versus cardiac in origin with history of CAD.  Versus SONJA, CTA of chest negative for PE. ProBNP WNL r/o acute heart failure exacerbation.,  EKG with right bundle branch block no new changes, will get stress Cardiolite test and consult cardiology and pulmonology  - continue supplemental O2; goal O2 88-92%  - will treat underlying COPD      COPD exacerbation  - 5 day course of prednisone 40mg daily  - standing Duonebs q8h since Anoro inhaler not on hospital formulary     Anemia  -Check iron, TIBC, and ferritin  -Recommend outpatient follow-up and age-appropriate cancer screening if indicated     Hypertension  -Amlodipine 5 mg daily  -Lisinopril 40 mg daily     Type 2 DM c/b peripheral neuropathy  -Carb consistent diet  -POC glucose 3 times daily before meals and at bedtime  -Sliding scale correction insulin protocol  -Continue home regimen of gabapentin  -Hold home regimen of metformin, glipizide, pioglitazone     CAD/  -Bisoprolol 5 mg daily  -Atorvastatin 10 mg; pravastatin not on hospital formulary     Mediastinal and hilar adenopathy  Etiology unknown - may be reactive vs due to congestion. However, findings warrant close f/u  - recommend repeat CT chest when pt at baseline. Consider repeat CT chest in 3 months.  Pulmonology consult     Chronic pain, stable  -Continue home regimen of hydrocodone-acetaminophen 5/325 mg every 6 hours as needed     Mood disorder, stable  -Continue sertraline 50 mg nightly     DVT prophylaxis:  Medical DVT prophylaxis orders are present.     CODE STATUS:    Admission Status:  I believe this patient meets inpatient observation status.  His symptoms remain improved and patient at baseline O2  requirements, stress Cardiolite test, cardiology and pulmonology consult likely discharge in 3 to 4 days     I discussed the patient's findings and my recommendations with patient and nursing staff.    Electronically signed by Macarena Marte MD, 01/13/23, 12:46 PM EST.

## 2023-01-13 NOTE — H&P
[facility] Medicine Services  History & Physical    Patient Name: Brenden Peter  : 1950  MRN: [id number]  Primary Care Physician:  Bert Rojas MD  Date of admission: 2023      Subjective      Chief Complaint: Shortness of breath    History of Present Illness: Brenden Peter is a 72 y.o. male with a medical history notable for morbid obesity, COPD C/B chronic hypoxic respiratory failure on home O2 at 4 L, type 2 diabetes mellitus, hypertension, and other comorbidities who presented to [facility] on 2023 complaining of worsening shortness of breath.  For the past 2 to 3 days    Patient has been having worsening shortness of breath.  He noted on his home pulse oximeter that his O2 sats were going down.  He is adherent to his CPAP machine and he takes his medications as prescribed.  However he did mention he has not been using his nebulizer treatment as often.  He typically uses it about 2-3 times daily, but has only been using it about once daily recently.  He thinks he seems to get sick around this time yearly.  He stated his wife was feeling ill with a stomach bug but no other known sick contacts.    While in the ED, the patient received methylprednisolone 125mg IV, Duoneb treatment x1 dose, and doxycycline 100mg IV.  Patient stated after receiving medications, he feels much better and feels close to his baseline.      Review of Systems   Constitutional: Negative for chills and fever.   HENT: Negative.    Cardiovascular: Positive for dyspnea on exertion. Negative for chest pain, leg swelling and palpitations.   Respiratory: Positive for shortness of breath. Negative for cough, sputum production and wheezing.    Skin: Negative.    Musculoskeletal: Positive for arthritis.   Gastrointestinal: Negative for abdominal pain, nausea and vomiting.   Genitourinary: Negative for dysuria.   Neurological: Negative.    Psychiatric/Behavioral: Negative.         Personal  History     Past Medical History:   Diagnosis Date   • Abnormal EKG 2/3/2015   • Anxiety    • COPD (chronic obstructive pulmonary disease) ([facility])    • Coronary artery disease 3/3/2015   • Diabetes ([facility])     type 2   • Hyperlipidemia    • Hypertension    • Microalbuminuria due to type 2 diabetes mellitus ([facility]) 2/5/2020   • Multinodular goiter 4/18/2019   • Murmur 2/3/2015   • Primary osteoarthritis of left knee 10/22/2021       Past Surgical History:   Procedure Laterality Date   • EYE SURGERY      cataract   • TOTAL KNEE ARTHROPLASTY Right 10/21/2020    Procedure: TOTAL KNEE ARTHROPLASTY;  Surgeon: Ravi Fagan MD;  Location: [facility] OR;  Service: Orthopedics;  Laterality: Right;   • TOTAL KNEE ARTHROPLASTY REVISION Right 5/19/2021    Procedure: KNEE POLY INSERT EXCHANGE with irrigation;  Surgeon: Ravi Fagan MD;  Location: [facility] MAIN OR;  Service: Orthopedics;  Laterality: Right;       Family History: family history includes Cancer in an other family member; Diabetes in an other family member; Heart disease in an other family member; No Known Problems in his brother, father, maternal aunt, maternal grandfather, maternal grandmother, maternal uncle, mother, paternal aunt, paternal grandfather, paternal grandmother, paternal uncle, and sister.     Social History:  reports that he quit smoking about 6 months ago. His smoking use included cigarettes. He smoked an average of 1 pack per day. He quit smokeless tobacco use about 6 months ago. He reports that he does not currently use alcohol. He reports that he does not use drugs.    Home Medications:  Prior to Admission Medications     Prescriptions Last Dose Informant Patient Reported? Taking?    albuterol (PROVENTIL) (2.5 MG/3ML) 0.083% nebulizer solution   Yes No    Take 3 mL by nebulization 3 (Three) Times a Day As Needed for Wheezing or Shortness of Air.    albuterol sulfate  (90 Base) MCG/ACT inhaler   Yes No    Inhale 2 puffs Every 6 (Six) Hours As  Needed.    amLODIPine (NORVASC) 5 MG tablet   Yes No    Take 5 mg by mouth Every Night.    bisoprolol (ZEBeta) 5 MG tablet   Yes No    Take 5 mg by mouth Daily.    gabapentin (NEURONTIN) 300 MG capsule   Yes No    Take 300 mg by mouth 3 (Three) Times a Day.    glipizide (GLUCOTROL) 10 MG tablet   Yes No    Take 10 mg by mouth Every Night.    HYDROcodone-acetaminophen (NORCO) 5-325 MG per tablet   No No    Take 1 tablet by mouth Every 6 (Six) Hours As Needed for Moderate Pain .    ipratropium-albuterol (DUO-NEB) 0.5-2.5 mg/3 ml nebulizer   Yes No    3 mL Every 6 (Six) Hours.    lisinopril (PRINIVIL,ZESTRIL) 40 MG tablet   Yes No    Take 40 mg by mouth Every Night.    metFORMIN (GLUCOPHAGE) 1000 MG tablet   Yes No    Take 1,000 mg by mouth 2 (Two) Times a Day With Meals.    pioglitazone (ACTOS) 30 MG tablet   Yes No    Take 30 mg by mouth Every Night.    pravastatin (PRAVACHOL) 20 MG tablet   Yes No    Take 20 mg by mouth Daily.    sertraline (ZOLOFT) 50 MG tablet   Yes No    Take 50 mg by mouth Every Night.    umeclidinium-vilanterol (ANORO ELLIPTA) 62.5-25 MCG/INH aerosol powder  inhaler   Yes No    Inhale 1 puff Daily.    vitamin D (ERGOCALCIFEROL) 1.25 MG (34258 UT) capsule capsule   Yes No    50,000 Units 2 (Two) Times a Week. Tues and Fri            Allergies:  No Known Allergies    Objective      Vitals:   Temp:  [98.7 °F (37.1 °C)] 98.7 °F (37.1 °C)  Heart Rate:  [68-83] 71  Resp:  [18-24] 18  BP: (124-165)/() 165/80  Flow (L/min):  [4-15] 15    Physical Exam     General:  Appears in no acute distress, nontoxic appearing  HEENT:  Normocephalic. Normal conjunctivae, EOM intact bilaterally, pupils equal and reactive to light. No JVD. Thyroid not visibly enlarged. No mucosal pallor or cyanosis. No oral lesions.   Respiratory: Respirations regular and unlabored at rest. Bilateral breath sounds with good air entry in all fields. No crackles, rubs or wheezes auscultated  Cardiovascular: S1S2 Regular rate and  rhythm. No murmur, rub or gallop. No pretibial pitting edema  Gastrointestinal: Abdomen soft, flat, nontender. Bowel sounds present. No rebound or guarding.   Musculoskeletal: Moves all extremities voluntarily. No abnormal movements  Extremities: No digital clubbing or cyanosis  Skin: Color pink. Skin warm and dry to touch. No rashes    Neuro: AAO x3, CN II-XII grossly intact, comprehension intact, follows commands appropriately  Psych: Mood and affect normal, pleasant and cooperative      Result Review    Result Review:  I have personally reviewed the results from the time of this admission to 1/13/2023 05:49 EST and agree with these findings:  [x]  Laboratory  []  Microbiology  [x]  Radiology  [x]  EKG/Telemetry         []  Cardiology/Vascular   []  Pathology  []  Old records  []  Other:    LAB RESULTS:      Lab 01/12/23  1902   WBC 6.60   HEMOGLOBIN 11.5*   HEMATOCRIT 36.1*   PLATELETS 179   NEUTROS ABS 5.10   LYMPHS ABS 1.00   MONOS ABS 0.40   EOS ABS 0.10   MCV 89.4   PROCALCITONIN 0.06   PROTIME 10.9         Lab 01/12/23  1902   SODIUM 141   POTASSIUM 3.9   CHLORIDE 101   CO2 28.0   ANION GAP 12.0   BUN 13   CREATININE 0.75*   EGFR 95.9   GLUCOSE 218*   CALCIUM 9.3         Lab 01/12/23  1902   TOTAL PROTEIN 7.8   ALBUMIN 4.3   GLOBULIN 3.5   ALT (SGPT) 15   AST (SGOT) 16   BILIRUBIN 0.4   ALK PHOS 71         Lab 01/12/23  1902   PROBNP 354.3   TROPONIN T <0.010   PROTIME 10.9   INR 1.06             XR CHEST 1 VW   Date of Exam: 1/12/2023 7:10 PM EST     Findings:   The heart appears mildly enlarged. There is evidence of remote granulomatous disease. There appears to be prominence of central pulmonary vasculature. The lungs appear hyperinflated which may be seen with COPD. There is suggestion of mild basilar   predominant interstitial opacities. No significant effusion or pneumothorax is seen.     Impression:   1.Mild basilar predominant interstitial opacities which may indicate edema or could be seen with  COPD.   2.Mild cardiomegaly and prominent pulmonary vasculature.       CT ANGIOGRAM CHEST PULMONARY EMBOLISM   Date of Exam: 1/12/2023 8:49 PM EST     Findings:   Pulmonary arteries: Adequately opacified. No emboli demonstrated     Tiff/mediastinum: Enlarged mediastinal and bilateral hilar lymph nodes. These appear slightly larger than on the comparison study. Precarinal node measures 2.6 x 2.2 cm. Subcarinal node measures 1.8 cm short axis thoracic aorta normal in caliber.   Coronary artery calcification. No pericardial effusion     Lungs/pleura: Emphysema. Interlobular septal thickening most pronounced in the apices and bases. Interval resolution of bronchiolitis changes from the prior study. Trace pleural effusions with minimal dependent atelectasis in the lower lobes. Calcified   granuloma in the right middle lobe.     Upper abdomen: Unremarkable. Normal-sized spleen. No upper abdominal adenopathy     Bones/soft tissues: No axillary adenopathy. No acute bony abnormality. Degenerative disc disease in the midthoracic spine     Impression:   1. No evidence of pulmonary embolism   2. Interlobular septal thickening that likely represents mild interstitial edema. Atypical pneumonia less likely.   3. Trace pleural effusions   4. Emphysema   5. Mediastinal and hilar adenopathy, likely congestive or reactive. Recommend chest CT with the patient's clinically baseline to assess for resolution           Assessment & Plan        Plan:   Acute on chronic respiratory with hypoxia  Due to COPD exacerbation. CTA of chest negative for PE. ProBNP WNL r/o acute heart failure exacerbation.  - continue supplemental O2; goal O2 88-92%  - will treat underlying COPD     COPD exacerbation  - 5 day course of prednisone 40mg daily  - standing Duonebs q8h since Anoro inhaler not on hospital formulary    Anemia  -Check iron, TIBC, and ferritin  -Recommend outpatient follow-up and age-appropriate cancer screening if  indicated    Hypertension  -Amlodipine 5 mg daily  -Lisinopril 40 mg daily    Type 2 DM c/b peripheral neuropathy  -Carb consistent diet  -POC glucose 3 times daily before meals and at bedtime  -Sliding scale correction insulin protocol  -Continue home regimen of gabapentin  -Hold home regimen of metformin, glipizide, pioglitazone    CAD  -Bisoprolol 5 mg daily  -Atorvastatin 10 mg; pravastatin not on hospital formulary    Mediastinal and hilar adenopathy  Etiology unknown - may be reactive vs due to congestion. However, findings warrant close f/u  - recommend repeat CT chest when pt at baseline. Consider repeat CT chest in 3 months.    Chronic pain, stable  -Continue home regimen of hydrocodone-acetaminophen 5/325 mg every 6 hours as needed    Mood disorder, stable  -Continue sertraline 50 mg nightly    DVT prophylaxis:  Medical DVT prophylaxis orders are present.    CODE STATUS:       Admission Status:  I believe this patient meets inpatient observation status.  His symptoms remain improved and patient at baseline O2 requirements, anticipate DC home within the next 24 hours.    I discussed the patient's findings and my recommendations with patient and nursing staff.      Signature: Electronically signed by Rachna Louise MD, 01/13/23, 05:49 EST.  [facility] Hospitalist Team

## 2023-01-13 NOTE — CASE MANAGEMENT/SOCIAL WORK
Discharge Planning Assessment   Daniele     Patient Name: Brenden Peter  MRN: [id number]  Today's Date: 1/13/2023    Admit Date: 1/12/2023    Plan: Return home with spouse,current with [facility] for home 02,(updated [facility] representative of incresed 02 needs)   Discharge Needs Assessment     Row Name 01/13/23 1212       Living Environment    People in Home spouse    Name(s) of People in Home Donna-Spouse    Current Living Arrangements home    Primary Care Provided by self    Provides Primary Care For no one    Family Caregiver if Needed spouse    Quality of Family Relationships helpful;involved;supportive       Resource/Environmental Concerns    Transportation Concerns none       Transition Planning    Patient/Family Anticipates Transition to home with family    Transportation Anticipated family or friend will provide  Spouse       Discharge Needs Assessment    Readmission Within the Last 30 Days no previous admission in last 30 days    Equipment Currently Used at Home oxygen;cpap  02@5L at home([facility])    Concerns to be Addressed discharge planning    Equipment Needed After Discharge --  increase 02 needs, updated [facility]               Discharge Plan     Row Name 01/13/23 1215       Plan    Plan Return home with spouse,current with [facility] for home 02,(updated [facility] representative of incresed 02 needs)    Plan Comments Spoke with shannen who lives in own home with spouse, IADL's, Confirmed PCP and Pharmacy. No transportation or prescription coverage issues. Updated [facility] of increase home 02 needs.              Continued Care and Services - Admitted Since 1/12/2023     Durable Medical Equipment     Service Provider Request Status Selected Services Address Phone Fax Patient Preferred    [facility] Pending - Request Sent N/A [id number], [place] [address] [phone] --                 Demographic Summary     Row Name 01/13/23 1211       General Information    Admission  Type observation    Arrived From emergency department    Required Notices Provided Observation Status Notice    Referral Source emergency department    Reason for Consult discharge planning               Functional Status     Row Name 01/13/23 1212       Functional Status    Usual Activity Tolerance good    Current Activity Tolerance good       Functional Status, IADL    Medications independent    Meal Preparation independent    Housekeeping independent    Laundry independent    Shopping independent       Mental Status    General Appearance WDL WDL            Patient Forms     Row Name 01/13/23 1211       Patient Forms    Patient Observation Letter Delivered  1/12 Registration         Met with patient in room wearing PPE: mask, face shield/goggles, gloves, gown.      Maintained distance greater than six feet and spent less than 15 minutes in the room.        Jaja Devlin RN

## 2023-01-13 NOTE — CONSULTS
Group: [facility]         CONSULT NOTE    Patient Identification:  Brenden Peter  72 y.o.  male  1950  [id number]            Requesting physician: Attending physician    Reason for Consultation: COPD        History of Present Illness:    72 y.o. male with a medical history notable for morbid obesity, COPD C/B chronic hypoxic respiratory failure on home O2 at 4 L, type 2 diabetes mellitus, hypertension, and other comorbidities who presented to [facility] on 1/12/2023 complaining of worsening shortness of breath.  For the past 2 to 3 days    Assessment:    COPD with acute exacerbation (HCC)  Acute broncitis/Bronchiectasis  Ex-smoker: Quit July 2022  Diabetes mellitus  Hypertension  Chronic coronary artery disease  Chronic back pain  Dyslipidemia  Obesity      Recommendations:      Oxygen supplement and titration to maintain saturation 90 to 95%:     prednison    Doxycyline    Mucinex    Pulmicort and DuoNebs                  Review of Sytems:  Review of Systems   Respiratory: Positive for cough and shortness of breath.    Cardiovascular: Positive for palpitations.       Past Medical History:  Past Medical History:   Diagnosis Date   • Abnormal EKG 2/3/2015   • Anxiety    • COPD (chronic obstructive pulmonary disease) ([facility])    • Coronary artery disease 3/3/2015   • Diabetes ([facility])     type 2   • Hyperlipidemia    • Hypertension    • Microalbuminuria due to type 2 diabetes mellitus ([facility]) 2/5/2020   • Multinodular goiter 4/18/2019   • Murmur 2/3/2015   • Primary osteoarthritis of left knee 10/22/2021       Past Surgical History:  Past Surgical History:   Procedure Laterality Date   • EYE SURGERY      cataract   • TOTAL KNEE ARTHROPLASTY Right 10/21/2020    Procedure: TOTAL KNEE ARTHROPLASTY;  Surgeon: Ravi Fagan MD;  Location: [facility];  Service: Orthopedics;  Laterality: Right;   • TOTAL KNEE ARTHROPLASTY REVISION Right 5/19/2021    Procedure: KNEE POLY INSERT EXCHANGE with  irrigation;  Surgeon: Ravi Fagan MD;  Location: [facility] MAIN OR;  Service: Orthopedics;  Laterality: Right;        Home Meds:  Medications Prior to Admission   Medication Sig Dispense Refill Last Dose   • amLODIPine (NORVASC) 5 MG tablet Take 5 mg by mouth Every Night.   Past Week   • bisoprolol (ZEBeta) 5 MG tablet Take 5 mg by mouth Daily.   Past Week   • gabapentin (NEURONTIN) 300 MG capsule Take 300 mg by mouth 3 (Three) Times a Day.   Past Week   • glipizide (GLUCOTROL) 10 MG tablet Take 10 mg by mouth Every Night.   Past Week   • ipratropium-albuterol (DUO-NEB) 0.5-2.5 mg/3 ml nebulizer 3 mL Every 6 (Six) Hours As Needed.   Past Week   • lisinopril (PRINIVIL,ZESTRIL) 40 MG tablet Take 40 mg by mouth Every Night.   Past Week   • metFORMIN (GLUCOPHAGE) 1000 MG tablet Take 1,000 mg by mouth 2 (Two) Times a Day With Meals.   Past Week   • pioglitazone (ACTOS) 30 MG tablet Take 30 mg by mouth Every Night.   Past Week   • pravastatin (PRAVACHOL) 20 MG tablet Take 20 mg by mouth Daily.   Past Week   • sertraline (ZOLOFT) 50 MG tablet Take 50 mg by mouth Every Night.   Past Week   • albuterol (PROVENTIL) (2.5 MG/3ML) 0.083% nebulizer solution Take 3 mL by nebulization 3 (Three) Times a Day As Needed for Wheezing or Shortness of Air.      • albuterol sulfate  (90 Base) MCG/ACT inhaler Inhale 2 puffs Every 6 (Six) Hours As Needed.      • umeclidinium-vilanterol (ANORO ELLIPTA) 62.5-25 MCG/INH aerosol powder  inhaler Inhale 1 puff Daily. (Patient not taking: Reported on 2023)   Not Taking   • vitamin D (ERGOCALCIFEROL) 1.25 MG (99056 UT) capsule capsule 50,000 Units Every 7 (Seven) Days. 2023       Allergies:  No Known Allergies    Social History:   Social History     Socioeconomic History   • Marital status:    Tobacco Use   • Smoking status: Former     Packs/day: 1.00     Types: Cigarettes     Quit date: 7/3/2022     Years since quittin.5   • Smokeless tobacco: Former     Quit  "date: 7/3/2022   Vaping Use   • Vaping Use: Never used   Substance and Sexual Activity   • Alcohol use: Not Currently   • Drug use: Never   • Sexual activity: Defer       Family History:  Family History   Problem Relation Age of Onset   • Cancer Other    • Diabetes Other    • Heart disease Other    • No Known Problems Mother    • No Known Problems Father    • No Known Problems Sister    • No Known Problems Brother    • No Known Problems Maternal Aunt    • No Known Problems Maternal Uncle    • No Known Problems Paternal Aunt    • No Known Problems Paternal Uncle    • No Known Problems Maternal Grandmother    • No Known Problems Maternal Grandfather    • No Known Problems Paternal Grandmother    • No Known Problems Paternal Grandfather    • Anemia Neg Hx    • Arrhythmia Neg Hx    • Asthma Neg Hx    • Clotting disorder Neg Hx    • Fainting Neg Hx    • Heart attack Neg Hx    • Heart failure Neg Hx    • Hyperlipidemia Neg Hx    • Hypertension Neg Hx        Physical Exam:  /76 (BP Location: Left arm, Patient Position: Lying)   Pulse 65   Temp 97.5 °F (36.4 °C) (Oral)   Resp 19   Ht 170.2 cm (67\")   Wt 99.8 kg (220 lb)   SpO2 94%   BMI 34.46 kg/m²  Body mass index is 34.46 kg/m². 94% 99.8 kg (220 lb)  Physical Exam    LABS:  Lab Results   Component Value Date    CALCIUM 9.3 01/12/2023    PHOS 2.8 09/04/2022     Results from last 7 days   Lab Units 01/12/23 1902   SODIUM mmol/L 141   POTASSIUM mmol/L 3.9   CHLORIDE mmol/L 101   CO2 mmol/L 28.0   BUN mg/dL 13   CREATININE mg/dL 0.75*   GLUCOSE mg/dL 218*   CALCIUM mg/dL 9.3   WBC 10*3/mm3 6.60   HEMOGLOBIN g/dL 11.5*   PLATELETS 10*3/mm3 179   ALT (SGPT) U/L 15   AST (SGOT) U/L 16   PROBNP pg/mL 354.3   PROCALCITONIN ng/mL 0.06     Lab Results   Component Value Date    TROPONINT <0.010 01/12/2023     Results from last 7 days   Lab Units 01/12/23 1902   TROPONIN T ng/mL <0.010         Results from last 7 days   Lab Units 01/12/23 1902   PROCALCITONIN ng/mL " 0.06             Results from last 7 days   Lab Units 01/12/23  1902   INR  1.06         Lab Results   Component Value Date    TSH 3.220 09/04/2022     Estimated Creatinine Clearance: 100.2 mL/min (A) (by C-G formula based on SCr of 0.75 mg/dL (L)).         Imaging:  Imaging Results (Last 24 Hours)     Procedure Component Value Units Date/Time    CT Angiogram Chest Pulmonary Embolism [[id number]] Collected: 01/12/23 2107     Updated: 01/12/23 2116    Narrative:      CT ANGIOGRAM CHEST PULMONARY EMBOLISM    Date of Exam: 1/12/2023 8:49 PM EST    Indication: Pulmonary embolism (PE) suspected, positive D-dimer.    Comparison: 12/27/2021    Technique: Axial CT images were obtained of the chest before and after the uneventful intravenous administration of iodinated contrast utilizing pulmonary embolism protocol.  Sagittal and coronal reconstructions were performed.  Automated exposure   control and iterative reconstruction methods were used.     Findings:  Pulmonary arteries: Adequately opacified. No emboli demonstrated    Tiff/mediastinum: Enlarged mediastinal and bilateral hilar lymph nodes. These appear slightly larger than on the comparison study. Precarinal node measures 2.6 x 2.2 cm. Subcarinal node measures 1.8 cm short axis thoracic aorta normal in caliber.   Coronary artery calcification. No pericardial effusion    Lungs/pleura: Emphysema. Interlobular septal thickening most pronounced in the apices and bases. Interval resolution of bronchiolitis changes from the prior study. Trace pleural effusions with minimal dependent atelectasis in the lower lobes. Calcified   granuloma in the right middle lobe.    Upper abdomen: Unremarkable. Normal-sized spleen. No upper abdominal adenopathy    Bones/soft tissues: No axillary adenopathy. No acute bony abnormality. Degenerative disc disease in the midthoracic spine      Impression:      Impression:    1. No evidence of pulmonary embolism  2. Interlobular septal thickening  that likely represents mild interstitial edema. Atypical pneumonia less likely.  3. Trace pleural effusions  4. Emphysema  5. Mediastinal and hilar adenopathy, likely congestive or reactive. Recommend chest CT with the patient's clinically baseline to assess for resolution    Electronically Signed: Vitaliy Sesay    1/12/2023 9:14 PM EST    Workstation ID: [id number]    XR Chest 1 View [[id number]] Collected: 01/12/23 1956     Updated: 01/12/23 2000    Narrative:      XR CHEST 1 VW    Date of Exam: 1/12/2023 7:10 PM EST    Indication: sob.    Comparison: September 4, 2022, December 27, 2021    Findings:  The heart appears mildly enlarged. There is evidence of remote granulomatous disease. There appears to be prominence of central pulmonary vasculature. The lungs appear hyperinflated which may be seen with COPD. There is suggestion of mild basilar   predominant interstitial opacities. No significant effusion or pneumothorax is seen.      Impression:      Impression:  1.Mild basilar predominant interstitial opacities which may indicate edema or could be seen with COPD.  2.Mild cardiomegaly and prominent pulmonary vasculature.    Electronically Signed: Vimal Woo    1/12/2023 7:58 PM EST    Workstation ID: [id number]            Current Meds:   SCHEDULE  amLODIPine, 5 mg, Oral, Nightly  atorvastatin, 10 mg, Oral, Daily  bisoprolol, 5 mg, Oral, Daily  enoxaparin, 40 mg, Subcutaneous, Q24H  ferrous sulfate, 324 mg, Oral, Daily With Breakfast  gabapentin, 300 mg, Oral, TID  insulin lispro, 2-7 Units, Subcutaneous, 4x Daily AC & at Bedtime  ipratropium-albuterol, 3 mL, Nebulization, Q8H - RT  lisinopril, 40 mg, Oral, Nightly  predniSONE, 40 mg, Oral, Daily With Breakfast  sertraline, 50 mg, Oral, Nightly  sodium chloride, 10 mL, Intravenous, Q12H      Infusions     PRNs  •  acetaminophen  •  albuterol  •  dextrose  •  dextrose  •  glucagon (human recombinant)  •  HYDROcodone-acetaminophen  •  melatonin  •  [COMPLETED] Insert  Peripheral IV **AND** sodium chloride  •  sodium chloride  •  sodium chloride        Flip Greenwood MD  1/13/2023  17:44 EST      Much of this encounter note is an electronic transcription/translation of spoken language to printed text using Dragon Software.

## 2023-01-13 NOTE — PROGRESS NOTES
Exercise Oximetry    Patient Name:Brenden Peter   MRN: [id number]   Date: 01/13/23             ROOM AIR BASELINE   SpO2%                  75 %   Heart Rate             67   Blood Pressure      EXERCISE ON ROOM AIR SpO2% EXERCISE ON O2 @  LPM SpO2%   1 MINUTE  1 MINUTE                    4   79   2 MINUTES  2 MINUTES                  5  82    3 MINUTES  3 MINUTES                  6  87   4 MINUTES  4 MINUTES                  7   84   5 MINUTES  5 MINUTES                  8  87   6 MINUTES  6 MINUTES                  9  90              Distance Walked   Distance Walked   Dyspnea (Miranda Scale)   Dyspnea (Miranda Scale)   Fatigue (Miranda Scale)   Fatigue (Miranda Scale)   SpO2% Post Exercise   SpO2% Post Exercise   HR Post Exercise   HR Post Exercise   Time to Recovery   Time to Recovery     Comments: At rest pt requires 4 lpm NC to ks 90-91%. With exertion the patient required 9 lpm NC with focused breathing to Keep saturations 90%.  Dr Marte Notified.

## 2023-01-14 ENCOUNTER — APPOINTMENT (OUTPATIENT)
Dept: NUCLEAR MEDICINE | Facility: HOSPITAL | Age: 73
End: 2023-01-14
Payer: MEDICARE

## 2023-01-14 LAB
BH CV REST NUCLEAR ISOTOPE DOSE: 11 MCI
BH CV STRESS BP STAGE 1: NORMAL
BH CV STRESS BP STAGE 2: NORMAL
BH CV STRESS COMMENTS STAGE 1: NORMAL
BH CV STRESS COMMENTS STAGE 2: NORMAL
BH CV STRESS DOSE REGADENOSON STAGE 1: 0.4
BH CV STRESS DURATION MIN STAGE 1: 0
BH CV STRESS DURATION MIN STAGE 2: 4
BH CV STRESS DURATION SEC STAGE 1: 10
BH CV STRESS DURATION SEC STAGE 2: 0
BH CV STRESS HR STAGE 1: 60
BH CV STRESS HR STAGE 2: 77
BH CV STRESS NUCLEAR ISOTOPE DOSE: 30 MCI
BH CV STRESS PROTOCOL 1: NORMAL
BH CV STRESS RECOVERY BP: NORMAL MMHG
BH CV STRESS RECOVERY HR: 58 BPM
BH CV STRESS STAGE 1: 1
BH CV STRESS STAGE 2: 2
GLUCOSE BLDC GLUCOMTR-MCNC: 156 MG/DL (ref 70–105)
GLUCOSE BLDC GLUCOMTR-MCNC: 173 MG/DL (ref 70–105)
GLUCOSE BLDC GLUCOMTR-MCNC: 259 MG/DL (ref 70–105)
GLUCOSE BLDC GLUCOMTR-MCNC: 318 MG/DL (ref 70–105)
LV EF NUC BP: 55 %
MAXIMAL PREDICTED HEART RATE: 148 BPM
PERCENT MAX PREDICTED HR: 52.03 %
STRESS BASELINE BP: NORMAL MMHG
STRESS BASELINE HR: 53 BPM
STRESS PERCENT HR: 61 %
STRESS POST PEAK BP: NORMAL MMHG
STRESS POST PEAK HR: 77 BPM
STRESS TARGET HR: 126 BPM

## 2023-01-14 PROCEDURE — 63710000001 INSULIN LISPRO (HUMAN) PER 5 UNITS: Performed by: HOSPITALIST

## 2023-01-14 PROCEDURE — 25010000002 ENOXAPARIN PER 10 MG: Performed by: HOSPITALIST

## 2023-01-14 PROCEDURE — 93017 CV STRESS TEST TRACING ONLY: CPT

## 2023-01-14 PROCEDURE — G0378 HOSPITAL OBSERVATION PER HR: HCPCS

## 2023-01-14 PROCEDURE — 93018 CV STRESS TEST I&R ONLY: CPT | Performed by: INTERNAL MEDICINE

## 2023-01-14 PROCEDURE — 94799 UNLISTED PULMONARY SVC/PX: CPT

## 2023-01-14 PROCEDURE — 78452 HT MUSCLE IMAGE SPECT MULT: CPT | Performed by: INTERNAL MEDICINE

## 2023-01-14 PROCEDURE — 25010000002 REGADENOSON 0.4 MG/5ML SOLUTION: Performed by: HOSPITALIST

## 2023-01-14 PROCEDURE — 0 TECHNETIUM TETROFOSMIN KIT: Performed by: HOSPITALIST

## 2023-01-14 PROCEDURE — 99205 OFFICE O/P NEW HI 60 MIN: CPT | Performed by: INTERNAL MEDICINE

## 2023-01-14 PROCEDURE — 82962 GLUCOSE BLOOD TEST: CPT

## 2023-01-14 PROCEDURE — 78452 HT MUSCLE IMAGE SPECT MULT: CPT

## 2023-01-14 PROCEDURE — 63710000001 PREDNISONE PER 1 MG: Performed by: HOSPITALIST

## 2023-01-14 PROCEDURE — 93016 CV STRESS TEST SUPVJ ONLY: CPT | Performed by: NURSE PRACTITIONER

## 2023-01-14 PROCEDURE — 94664 DEMO&/EVAL PT USE INHALER: CPT

## 2023-01-14 PROCEDURE — 96376 TX/PRO/DX INJ SAME DRUG ADON: CPT

## 2023-01-14 PROCEDURE — 25010000002 FUROSEMIDE PER 20 MG: Performed by: INTERNAL MEDICINE

## 2023-01-14 PROCEDURE — A9502 TC99M TETROFOSMIN: HCPCS | Performed by: HOSPITALIST

## 2023-01-14 RX ADMIN — SERTRALINE 50 MG: 50 TABLET, FILM COATED ORAL at 20:14

## 2023-01-14 RX ADMIN — IPRATROPIUM BROMIDE AND ALBUTEROL SULFATE 3 ML: 2.5; .5 SOLUTION RESPIRATORY (INHALATION) at 21:27

## 2023-01-14 RX ADMIN — INSULIN LISPRO 2 UNITS: 100 INJECTION, SOLUTION INTRAVENOUS; SUBCUTANEOUS at 08:43

## 2023-01-14 RX ADMIN — ENOXAPARIN SODIUM 40 MG: 100 INJECTION SUBCUTANEOUS at 17:42

## 2023-01-14 RX ADMIN — FUROSEMIDE 40 MG: 10 INJECTION, SOLUTION INTRAMUSCULAR; INTRAVENOUS at 17:42

## 2023-01-14 RX ADMIN — GABAPENTIN 300 MG: 300 CAPSULE ORAL at 17:42

## 2023-01-14 RX ADMIN — ATORVASTATIN CALCIUM 10 MG: 10 TABLET, FILM COATED ORAL at 08:42

## 2023-01-14 RX ADMIN — LISINOPRIL 40 MG: 20 TABLET ORAL at 20:14

## 2023-01-14 RX ADMIN — FERROUS SULFATE TAB EC 324 MG (65 MG FE EQUIVALENT) 324 MG: 324 (65 FE) TABLET DELAYED RESPONSE at 08:42

## 2023-01-14 RX ADMIN — IPRATROPIUM BROMIDE AND ALBUTEROL SULFATE 3 ML: 2.5; .5 SOLUTION RESPIRATORY (INHALATION) at 16:38

## 2023-01-14 RX ADMIN — Medication 10 ML: at 20:14

## 2023-01-14 RX ADMIN — INSULIN LISPRO 4 UNITS: 100 INJECTION, SOLUTION INTRAVENOUS; SUBCUTANEOUS at 17:42

## 2023-01-14 RX ADMIN — REGADENOSON 0.4 MG: 0.08 INJECTION, SOLUTION INTRAVENOUS at 09:57

## 2023-01-14 RX ADMIN — INSULIN LISPRO 2 UNITS: 100 INJECTION, SOLUTION INTRAVENOUS; SUBCUTANEOUS at 12:26

## 2023-01-14 RX ADMIN — BISOPROLOL FUMARATE 5 MG: 5 TABLET ORAL at 12:26

## 2023-01-14 RX ADMIN — PREDNISONE 40 MG: 20 TABLET ORAL at 08:42

## 2023-01-14 RX ADMIN — FUROSEMIDE 40 MG: 10 INJECTION, SOLUTION INTRAMUSCULAR; INTRAVENOUS at 12:26

## 2023-01-14 RX ADMIN — GABAPENTIN 300 MG: 300 CAPSULE ORAL at 08:42

## 2023-01-14 RX ADMIN — IPRATROPIUM BROMIDE AND ALBUTEROL SULFATE 3 ML: 2.5; .5 SOLUTION RESPIRATORY (INHALATION) at 07:43

## 2023-01-14 RX ADMIN — GABAPENTIN 300 MG: 300 CAPSULE ORAL at 20:14

## 2023-01-14 RX ADMIN — INSULIN LISPRO 5 UNITS: 100 INJECTION, SOLUTION INTRAVENOUS; SUBCUTANEOUS at 20:14

## 2023-01-14 RX ADMIN — TETROFOSMIN 1 DOSE: 1.38 INJECTION, POWDER, LYOPHILIZED, FOR SOLUTION INTRAVENOUS at 07:26

## 2023-01-14 RX ADMIN — AMLODIPINE BESYLATE 5 MG: 5 TABLET ORAL at 20:14

## 2023-01-14 RX ADMIN — TETROFOSMIN 1 DOSE: 1.38 INJECTION, POWDER, LYOPHILIZED, FOR SOLUTION INTRAVENOUS at 09:58

## 2023-01-14 RX ADMIN — Medication 10 ML: at 08:42

## 2023-01-14 NOTE — PROGRESS NOTES
Daily Progress Note        Acute on chronic respiratory failure with hypoxia (HCC)      Assessment:     COPD with acute exacerbation (HCC)  Acute broncitis/Bronchiectasis  Ex-smoker: Quit July 2022  Diabetes mellitus  Hypertension  Chronic coronary artery disease  Chronic back pain  Dyslipidemia  Obesity        Recommendations:        Oxygen supplement and titration to maintain saturation 90 to 95%:      prednison     Doxycyline     Mucinex     Pulmicort and DuoNebs     Status post stress test 1/14/2023  Left ventricular ejection fraction is normal (Calculated EF = 55%).  •  Myocardial perfusion imaging indicates a moderate-to-severe area of ischemia .    Patient felt better with diuretics           Review of Sytems:  Review of Systems   Respiratory: Positive for cough and shortness of breath.    Cardiovascular: Positive for palpitations.          LOS: 0 days     Subjective         Objective     Vital signs for last 24 hours:  Vitals:    01/14/23 0701 01/14/23 0743 01/14/23 0746 01/14/23 1139   BP: 119/53   131/69   BP Location: Right arm   Right arm   Patient Position: Lying   Lying   Pulse: 55 58 59 58   Resp: 18 18 18 18   Temp: 98.2 °F (36.8 °C)   97.5 °F (36.4 °C)   TempSrc: Oral   Oral   SpO2: 95% 95% 96% 95%   Weight:       Height:           Intake/Output last 3 shifts:  I/O last 3 completed shifts:  In: -   Out: 500 [Urine:500]  Intake/Output this shift:  No intake/output data recorded.      Radiology  Imaging Results (Last 24 Hours)     ** No results found for the last 24 hours. **          Labs:  Results from last 7 days   Lab Units 01/12/23  1902   WBC 10*3/mm3 6.60   HEMOGLOBIN g/dL 11.5*   HEMATOCRIT % 36.1*   PLATELETS 10*3/mm3 179     Results from last 7 days   Lab Units 01/12/23  1902   SODIUM mmol/L 141   POTASSIUM mmol/L 3.9   CHLORIDE mmol/L 101   CO2 mmol/L 28.0   BUN mg/dL 13   CREATININE mg/dL 0.75*   CALCIUM mg/dL 9.3   BILIRUBIN mg/dL 0.4   ALK PHOS U/L 71   ALT (SGPT) U/L 15   AST (SGOT) U/L  16   GLUCOSE mg/dL 218*         Results from last 7 days   Lab Units 01/12/23  1902   ALBUMIN g/dL 4.3     Results from last 7 days   Lab Units 01/12/23 1902   TROPONIN T ng/mL <0.010             Results from last 7 days   Lab Units 01/12/23 1902   INR  1.06               Meds:   SCHEDULE  amLODIPine, 5 mg, Oral, Nightly  atorvastatin, 10 mg, Oral, Daily  bisoprolol, 5 mg, Oral, Daily  enoxaparin, 40 mg, Subcutaneous, Q24H  ferrous sulfate, 324 mg, Oral, Daily With Breakfast  furosemide, 40 mg, Intravenous, BID  gabapentin, 300 mg, Oral, TID  insulin lispro, 2-7 Units, Subcutaneous, 4x Daily AC & at Bedtime  ipratropium-albuterol, 3 mL, Nebulization, Q8H - RT  lisinopril, 40 mg, Oral, Nightly  predniSONE, 40 mg, Oral, Daily With Breakfast  sertraline, 50 mg, Oral, Nightly  sodium chloride, 10 mL, Intravenous, Q12H      Infusions     PRNs  •  acetaminophen  •  albuterol  •  dextrose  •  dextrose  •  glucagon (human recombinant)  •  HYDROcodone-acetaminophen  •  melatonin  •  [COMPLETED] Insert Peripheral IV **AND** sodium chloride  •  sodium chloride  •  sodium chloride    Physical Exam:  Physical Exam  Cardiovascular:      Heart sounds: Murmur heard.   Pulmonary:      Breath sounds: Rhonchi and rales present.   Abdominal:      General: There is no distension.      Tenderness: There is no abdominal tenderness.         ROS  Review of Systems   Respiratory: Positive for cough and shortness of breath.    Cardiovascular: Negative for chest pain, palpitations and leg swelling.             Total time spent with patient greater than: 45 Minutes

## 2023-01-14 NOTE — PROGRESS NOTES
[facility] Medicine Services  Daily progress note     Patient Name: Brenden Peter  : 1950  MRN: [id number]  Primary Care Physician:  Bert Rojas MD  Date of admission: 2023        Subjective       Chief Complaint: Shortness of breath     History of Present Illness: Brenden Peter is a 72 y.o. male with a medical history notable for morbid obesity, COPD C/B chronic hypoxic respiratory failure on home O2 at 4 L, type 2 diabetes mellitus, hypertension, and other comorbidities who presented to [facility] on 2023 complaining of worsening shortness of breath.  For the past 2 to 3 days     Patient has been having worsening shortness of breath.  He noted on his home pulse oximeter that his O2 sats were going down.  He is adherent to his CPAP machine and he takes his medications as prescribed.  However he did mention he has not been using his nebulizer treatment as often.  He typically uses it about 2-3 times daily, but has only been using it about once daily recently.  He thinks he seems to get sick around this time yearly.  He stated his wife was feeling ill with a stomach bug but no other known sick contacts.     While in the ED, the patient received methylprednisolone 125mg IV, Duoneb treatment x1 dose, and doxycycline 100mg IV.  Patient stated after receiving medications, he feels much better and feels close to his baseline.   2023; patient sitting at the bedside, upon speaking to the patient he endorses he has been having this difficulty of breathing/dyspnea on minimal exertion in the last 1 year every 2 months he has these recurrences and has not been sleeping well, he was just prescribed CPAP 1 month ago, denies any chest pain no nausea or vomiting, hemodynamically stable     2023  Feels better with breathing, denies for any chest pain, no nausea or vomiting.      Review of Systems   Constitutional: Negative for chills and fever.   HENT: Negative.     Cardiovascular: Positive for dyspnea on exertion. Negative for chest pain, leg swelling and palpitations.   Respiratory: Positive for shortness of breath. Negative for cough, sputum production and wheezing.    Skin: Negative.    Musculoskeletal: Positive for arthritis.   Gastrointestinal: Negative for abdominal pain, nausea and vomiting.   Genitourinary: Negative for dysuria.   Neurological: Negative.    Psychiatric/Behavioral: Negative.          Personal History      Medical History        Past Medical History:   Diagnosis Date   • Abnormal EKG 2/3/2015   • Anxiety     • COPD (chronic obstructive pulmonary disease) ([facility])     • Coronary artery disease 3/3/2015   • Diabetes ([facility])       type 2   • Hyperlipidemia     • Hypertension     • Microalbuminuria due to type 2 diabetes mellitus ([facility]) 2/5/2020   • Multinodular goiter 4/18/2019   • Murmur 2/3/2015   • Primary osteoarthritis of left knee 10/22/2021            Surgical History         Past Surgical History:   Procedure Laterality Date   • EYE SURGERY         cataract   • TOTAL KNEE ARTHROPLASTY Right 10/21/2020     Procedure: TOTAL KNEE ARTHROPLASTY;  Surgeon: Ravi Fagan MD;  Location: [facility];  Service: Orthopedics;  Laterality: Right;   • TOTAL KNEE ARTHROPLASTY REVISION Right 5/19/2021     Procedure: KNEE POLY INSERT EXCHANGE with irrigation;  Surgeon: Ravi Fagan MD;  Location: [facility];  Service: Orthopedics;  Laterality: Right;            Family History: family history includes Cancer in an other family member; Diabetes in an other family member; Heart disease in an other family member; No Known Problems in his brother, father, maternal aunt, maternal grandfather, maternal grandmother, maternal uncle, mother, paternal aunt, paternal grandfather, paternal grandmother, paternal uncle, and sister.      Social History:  reports that he quit smoking about 6 months ago. His smoking use included cigarettes. He smoked an average of 1 pack per  day. He quit smokeless tobacco use about 6 months ago. He reports that he does not currently use alcohol. He reports that he does not use drugs.     Home Medications:          Prior to Admission Medications      Prescriptions Last Dose Informant Patient Reported? Taking?     albuterol (PROVENTIL) (2.5 MG/3ML) 0.083% nebulizer solution     Yes No     Take 3 mL by nebulization 3 (Three) Times a Day As Needed for Wheezing or Shortness of Air.     albuterol sulfate  (90 Base) MCG/ACT inhaler     Yes No     Inhale 2 puffs Every 6 (Six) Hours As Needed.     amLODIPine (NORVASC) 5 MG tablet     Yes No     Take 5 mg by mouth Every Night.     bisoprolol (ZEBeta) 5 MG tablet     Yes No     Take 5 mg by mouth Daily.     gabapentin (NEURONTIN) 300 MG capsule     Yes No     Take 300 mg by mouth 3 (Three) Times a Day.     glipizide (GLUCOTROL) 10 MG tablet     Yes No     Take 10 mg by mouth Every Night.     HYDROcodone-acetaminophen (NORCO) 5-325 MG per tablet     No No     Take 1 tablet by mouth Every 6 (Six) Hours As Needed for Moderate Pain .     ipratropium-albuterol (DUO-NEB) 0.5-2.5 mg/3 ml nebulizer     Yes No     3 mL Every 6 (Six) Hours.     lisinopril (PRINIVIL,ZESTRIL) 40 MG tablet     Yes No     Take 40 mg by mouth Every Night.     metFORMIN (GLUCOPHAGE) 1000 MG tablet     Yes No     Take 1,000 mg by mouth 2 (Two) Times a Day With Meals.     pioglitazone (ACTOS) 30 MG tablet     Yes No     Take 30 mg by mouth Every Night.     pravastatin (PRAVACHOL) 20 MG tablet     Yes No     Take 20 mg by mouth Daily.     sertraline (ZOLOFT) 50 MG tablet     Yes No     Take 50 mg by mouth Every Night.     umeclidinium-vilanterol (ANORO ELLIPTA) 62.5-25 MCG/INH aerosol powder  inhaler     Yes No     Inhale 1 puff Daily.     vitamin D (ERGOCALCIFEROL) 1.25 MG (53387 UT) capsule capsule     Yes No     50,000 Units 2 (Two) Times a Week. Tues and Fri                Allergies:  No Known Allergies     Objective       Vitals:   Temp:   [97.5 °F (36.4 °C)-98.3 °F (36.8 °C)] 97.5 °F (36.4 °C)  Heart Rate:  [55-70] 58  Resp:  [16-19] 18  BP: (102-159)/(53-76) 131/69  Flow (L/min):  [3.5-5] 4  Physical Exam      General:  Appears in no acute distress, nontoxic appearing  HEENT:  Normocephalic. Normal conjunctivae, EOM intact bilaterally, pupils equal and reactive to light. No JVD. Thyroid not visibly enlarged. No mucosal pallor or cyanosis. No oral lesions.   Respiratory: Respirations regular, increased shortness of breath with minimal exertion, bilateral breath sounds with good air entry in all fields. No crackles, rubs or wheezes auscultated  Cardiovascular: S1S2 Regular rate and rhythm. No murmur, rub or gallop. No pretibial pitting edema  Gastrointestinal: Abdomen soft, flat, nontender. Bowel sounds present. No rebound or guarding.   Musculoskeletal: Moves all extremities voluntarily. No abnormal movements  Extremities: No digital clubbing or cyanosis  Skin: Color pink. Skin warm and dry to touch. No rashes    Neuro: AAO x3, CN II-XII grossly intact, comprehension intact, follows commands appropriately  Psych: Mood and affect normal, pleasant and cooperative        Result Review    Result Review:  I have personally reviewed the results from the time of this admission to 1/13/2023 05:49 EST and agree with these findings:  [x]?  Laboratory  []?  Microbiology  [x]?  Radiology  [x]?  EKG/Telemetry           []?  Cardiology/Vascular   []?  Pathology  []?  Old records  []?  Other:     LAB RESULTS:          Lab 01/12/23 1902   WBC 6.60   HEMOGLOBIN 11.5*   HEMATOCRIT 36.1*   PLATELETS 179   NEUTROS ABS 5.10   LYMPHS ABS 1.00   MONOS ABS 0.40   EOS ABS 0.10   MCV 89.4   PROCALCITONIN 0.06   PROTIME 10.9              Lab 01/12/23 1902   SODIUM 141   POTASSIUM 3.9   CHLORIDE 101   CO2 28.0   ANION GAP 12.0   BUN 13   CREATININE 0.75*   EGFR 95.9   GLUCOSE 218*   CALCIUM 9.3              Lab 01/12/23 1902   TOTAL PROTEIN 7.8   ALBUMIN 4.3   GLOBULIN 3.5    ALT (SGPT) 15   AST (SGOT) 16   BILIRUBIN 0.4   ALK PHOS 71              Lab 01/12/23  1902   PROBNP 354.3   TROPONIN T <0.010   PROTIME 10.9   INR 1.06              XR CHEST 1 VW   Date of Exam: 1/12/2023 7:10 PM EST      Findings:   The heart appears mildly enlarged. There is evidence of remote granulomatous disease. There appears to be prominence of central pulmonary vasculature. The lungs appear hyperinflated which may be seen with COPD. There is suggestion of mild basilar   predominant interstitial opacities. No significant effusion or pneumothorax is seen.      Impression:   1.Mild basilar predominant interstitial opacities which may indicate edema or could be seen with COPD.   2.Mild cardiomegaly and prominent pulmonary vasculature.         CT ANGIOGRAM CHEST PULMONARY EMBOLISM   Date of Exam: 1/12/2023 8:49 PM EST      Findings:   Pulmonary arteries: Adequately opacified. No emboli demonstrated      Tiff/mediastinum: Enlarged mediastinal and bilateral hilar lymph nodes. These appear slightly larger than on the comparison study. Precarinal node measures 2.6 x 2.2 cm. Subcarinal node measures 1.8 cm short axis thoracic aorta normal in caliber.   Coronary artery calcification. No pericardial effusion      Lungs/pleura: Emphysema. Interlobular septal thickening most pronounced in the apices and bases. Interval resolution of bronchiolitis changes from the prior study. Trace pleural effusions with minimal dependent atelectasis in the lower lobes. Calcified   granuloma in the right middle lobe.      Upper abdomen: Unremarkable. Normal-sized spleen. No upper abdominal adenopathy      Bones/soft tissues: No axillary adenopathy. No acute bony abnormality. Degenerative disc disease in the midthoracic spine      Impression:   1. No evidence of pulmonary embolism   2. Interlobular septal thickening that likely represents mild interstitial edema. Atypical pneumonia less likely.   3. Trace pleural effusions   4.  Emphysema   5. Mediastinal and hilar adenopathy, likely congestive or reactive. Recommend chest CT with the patient's clinically baseline to assess for resolution               Assessment & Plan          Plan:   Acute on chronic respiratory failure with hypoxia, baseline 4 L at home currently requiring 9 L with minimal exertion/WOOD/orthopnea, now improved oxygenation today of 4 litre   Likely due to COPD exacerbation versus cardiac in origin with history of CAD.  Versus SONJA, CTA of chest negative for PE. ProBNP WNL r/o acute heart failure exacerbation.,  EKG with right bundle branch block no new changes, will get stress Cardiolite test and consult cardiology and pulmonology  - continue supplemental O2; goal O2 88-92%  - will treat underlying COPD   - taper off oxygen as tolerated.     COPD exacerbation improving.  - 5 day course of prednisone 40mg daily  - standing Duonebs q8h since Anoro inhaler not on hospital formulary     Anemia  -Check iron, TIBC, and ferritin  -Recommend outpatient follow-up and age-appropriate cancer screening if indicated     Hypertension  -Amlodipine 5 mg daily  -Lisinopril 40 mg daily     Type 2 DM c/b peripheral neuropathy  -Carb consistent diet  -POC glucose 3 times daily before meals and at bedtime  -Sliding scale correction insulin protocol  -Continue home regimen of gabapentin  -Hold home regimen of metformin, glipizide, pioglitazone     CAD/  -Bisoprolol 5 mg daily  -Atorvastatin 10 mg; pravastatin not on hospital formulary     Mediastinal and hilar adenopathy  Etiology unknown - may be reactive vs due to congestion. However, findings warrant close f/u  - recommend repeat CT chest when pt at baseline. Consider repeat CT chest in 3 months.  Pulmonology consult     Chronic pain, stable  -Continue home regimen of hydrocodone-acetaminophen 5/325 mg every 6 hours as needed     Mood disorder, stable  -Continue sertraline 50 mg nightly     DVT prophylaxis:  Medical DVT prophylaxis orders  are present.     CODE STATUS:    Admission Status:  I believe this patient meets inpatient observation status.  His symptoms remain improved and patient at baseline O2 requirements, stress Cardiolite test, cardiology and pulmonology consult likely discharge in 3 to 4 days     I discussed the patient's findings and my recommendations with patient and nursing staff.    Electronically signed by Macarena Marte MD, 01/13/23, 12:46 PM EST.

## 2023-01-14 NOTE — PLAN OF CARE
Goal Outcome Evaluation:              Outcome Evaluation: pt resting well thoughout shift. Started IV lasix this shift. Currently NPO for stress test this AM. Continue to monitor.

## 2023-01-14 NOTE — PLAN OF CARE
Goal Outcome Evaluation:  Plan of Care Reviewed With: patient           Outcome Evaluation: Patient resting in bed througout shift. stress test this morning. no complaints. iv lasix continued.

## 2023-01-14 NOTE — PROGRESS NOTES
CC--- COPD and dyspnea and diabetes    Sub  Patient is clinically better and performed a stress test today and denies any active chest pain.  Complains of mild leg edema.  Denies any hemoptysis.      Past Medical History:   Diagnosis Date   • Abnormal EKG 2/3/2015   • Anxiety    • COPD (chronic obstructive pulmonary disease) ([facility])    • Coronary artery disease 3/3/2015   • Diabetes ([facility])     type 2   • Hyperlipidemia    • Hypertension    • Microalbuminuria due to type 2 diabetes mellitus ([facility]) 2/5/2020   • Multinodular goiter 4/18/2019   • Murmur 2/3/2015   • Primary osteoarthritis of left knee 10/22/2021     Past Surgical History:   Procedure Laterality Date   • EYE SURGERY      cataract   • TOTAL KNEE ARTHROPLASTY Right 10/21/2020    Procedure: TOTAL KNEE ARTHROPLASTY;  Surgeon: Ravi Fagan MD;  Location: [facility];  Service: Orthopedics;  Laterality: Right;   • TOTAL KNEE ARTHROPLASTY REVISION Right 5/19/2021    Procedure: KNEE POLY INSERT EXCHANGE with irrigation;  Surgeon: Ravi Fagan MD;  Location: [facility];  Service: Orthopedics;  Laterality: Right;     Family History   Problem Relation Age of Onset   • Cancer Other    • Diabetes Other    • Heart disease Other    • No Known Problems Mother    • No Known Problems Father    • No Known Problems Sister    • No Known Problems Brother    • No Known Problems Maternal Aunt    • No Known Problems Maternal Uncle    • No Known Problems Paternal Aunt    • No Known Problems Paternal Uncle    • No Known Problems Maternal Grandmother    • No Known Problems Maternal Grandfather    • No Known Problems Paternal Grandmother    • No Known Problems Paternal Grandfather    • Anemia Neg Hx    • Arrhythmia Neg Hx    • Asthma Neg Hx    • Clotting disorder Neg Hx    • Fainting Neg Hx    • Heart attack Neg Hx    • Heart failure Neg Hx    • Hyperlipidemia Neg Hx    • Hypertension Neg Hx      Social History     Tobacco Use   • Smoking status: Former     Packs/day: 1.00      Types: Cigarettes     Quit date: 7/3/2022     Years since quittin.5   • Smokeless tobacco: Former     Quit date: 7/3/2022   Vaping Use   • Vaping Use: Never used   Substance Use Topics   • Alcohol use: Not Currently   • Drug use: Never       Allergies:  Patient has no known allergies.    Review of Systems   General:  positive for fatigue and tiredness  Eyes: No redness  Cardiovascular: No chest pain, no palpitations  Respiratory:   positive for class 3 shortness of breath  Gastrointestinal: No nausea or vomiting, bleeding  Genitourinary: no hematuria or dysuria  Musculoskeletal: No arthralgia or myalgia  Skin: No rash  Neurologic: No numbness, tingling, syncope  Hematologic/Lymphatic: No abnormal bleeding      Physical Exam  VITALS REVIEWED  Blood pressure 151/78.  Pulse rate is 70 patient is afebrile respiration 12 times a minute  General:      well developed, well nourished, in no acute distress.    Head:      normocephalic and atraumatic.    Eyes:      PERRL/EOM intact, conjunctivae and sclerae clear without nystagmus.    Neck:      no masses, thyromegaly,  trachea central with normal respiratory effort   Lungs:  Reduced breath sounds in bilateral bases without wheezing    Heart:      Sinus rhythm and muffled heart sounds  Msk:      no deformity or scoliosis noted of thoracic or lumbar spine.    Pulses:      pulses normal in all 4 extremities.    Extremities:       no cyanosis or clubbing--trace left pedal edema and trace right pedal edema.    Neurologic:      no focal deficits.   alert oriented x3  Skin:      intact without lesions or rashes.    Psych:      alert and cooperative; normal mood and affect; normal attention span and concentration.          CBC    Results from last 7 days   Lab Units 23  1902   WBC 10*3/mm3 6.60   HEMOGLOBIN g/dL 11.5*   PLATELETS 10*3/mm3 179     BMP   Results from last 7 days   Lab Units 23  1902   SODIUM mmol/L 141   POTASSIUM mmol/L 3.9   CHLORIDE mmol/L 101    CO2 mmol/L 28.0   BUN mg/dL 13   CREATININE mg/dL 0.75*   GLUCOSE mg/dL 218*     Infection   Results from last 7 days   Lab Units 01/12/23  1902   PROCALCITONIN ng/mL 0.06     CMP   Results from last 7 days   Lab Units 01/12/23  1902   SODIUM mmol/L 141   POTASSIUM mmol/L 3.9   CHLORIDE mmol/L 101   CO2 mmol/L 28.0   BUN mg/dL 13   CREATININE mg/dL 0.75*   GLUCOSE mg/dL 218*   ALBUMIN g/dL 4.3   BILIRUBIN mg/dL 0.4   ALK PHOS U/L 71   AST (SGOT) U/L 16   ALT (SGPT) U/L 15     Radiology(recent) XR Chest 1 View    Result Date: 1/12/2023  Impression: 1.Mild basilar predominant interstitial opacities which may indicate edema or could be seen with COPD. 2.Mild cardiomegaly and prominent pulmonary vasculature. Electronically Signed: Vimal Chilo  1/12/2023 7:58 PM EST  Workstation ID: [id number]    CT Angiogram Chest Pulmonary Embolism    Result Date: 1/12/2023  Impression: 1. No evidence of pulmonary embolism 2. Interlobular septal thickening that likely represents mild interstitial edema. Atypical pneumonia less likely. 3. Trace pleural effusions 4. Emphysema 5. Mediastinal and hilar adenopathy, likely congestive or reactive. Recommend chest CT with the patient's clinically baseline to assess for resolution Electronically Signed: Vitaliy Sesay  1/12/2023 9:14 PM EST  Workstation ID: [id number]      Assessment and plan    Patient underwent stress test which was markedly abnormal suggestive of a large area of inferior and inferoseptal ischemia.  Patient educated about this stress test results.  Normal EF is noted.  Patient to undergo cardiac catheterization to evaluate extent of coronary artery disease.  Discussed with interventional cardiologist and scheduled in the morning.    COPD stable on oxygen and being followed by pulmonologist    Diabetes under the care of hospitalist    Hypertension fairly controlled    Hyperlipidemia treated    Electronically signed by Luis Antonio White MD, 01/14/23, 5:48 PM EST.

## 2023-01-15 PROBLEM — R94.39 ABNORMAL NUCLEAR STRESS TEST: Status: ACTIVE | Noted: 2023-01-12

## 2023-01-15 LAB
GLUCOSE BLDC GLUCOMTR-MCNC: 146 MG/DL (ref 70–105)
GLUCOSE BLDC GLUCOMTR-MCNC: 163 MG/DL (ref 70–105)
GLUCOSE BLDC GLUCOMTR-MCNC: 203 MG/DL (ref 70–105)
GLUCOSE BLDC GLUCOMTR-MCNC: 220 MG/DL (ref 70–105)

## 2023-01-15 PROCEDURE — 63710000001 LISINOPRIL 20 MG TABLET: Performed by: INTERNAL MEDICINE

## 2023-01-15 PROCEDURE — 94660 CPAP INITIATION&MGMT: CPT

## 2023-01-15 PROCEDURE — 25010000002 FUROSEMIDE PER 20 MG: Performed by: INTERNAL MEDICINE

## 2023-01-15 PROCEDURE — 0 IOPAMIDOL PER 1 ML: Performed by: INTERNAL MEDICINE

## 2023-01-15 PROCEDURE — 63710000001 GABAPENTIN 300 MG CAPSULE: Performed by: INTERNAL MEDICINE

## 2023-01-15 PROCEDURE — 82962 GLUCOSE BLOOD TEST: CPT

## 2023-01-15 PROCEDURE — 93458 L HRT ARTERY/VENTRICLE ANGIO: CPT | Performed by: INTERNAL MEDICINE

## 2023-01-15 PROCEDURE — 63710000001 FERROUS SULFATE 324 (65 FE) MG TABLET DELAYED-RELEASE: Performed by: INTERNAL MEDICINE

## 2023-01-15 PROCEDURE — 94799 UNLISTED PULMONARY SVC/PX: CPT

## 2023-01-15 PROCEDURE — C1769 GUIDE WIRE: HCPCS | Performed by: INTERNAL MEDICINE

## 2023-01-15 PROCEDURE — 63710000001 PREDNISONE PER 1 MG: Performed by: INTERNAL MEDICINE

## 2023-01-15 PROCEDURE — 63710000001 INSULIN LISPRO (HUMAN) PER 5 UNITS: Performed by: INTERNAL MEDICINE

## 2023-01-15 PROCEDURE — 63710000001 SERTRALINE 50 MG TABLET: Performed by: INTERNAL MEDICINE

## 2023-01-15 PROCEDURE — G0378 HOSPITAL OBSERVATION PER HR: HCPCS

## 2023-01-15 PROCEDURE — 99152 MOD SED SAME PHYS/QHP 5/>YRS: CPT | Performed by: INTERNAL MEDICINE

## 2023-01-15 PROCEDURE — 25010000002 MIDAZOLAM PER 1 MG: Performed by: INTERNAL MEDICINE

## 2023-01-15 PROCEDURE — C1760 CLOSURE DEV, VASC: HCPCS | Performed by: INTERNAL MEDICINE

## 2023-01-15 PROCEDURE — 94664 DEMO&/EVAL PT USE INHALER: CPT

## 2023-01-15 PROCEDURE — 96376 TX/PRO/DX INJ SAME DRUG ADON: CPT

## 2023-01-15 PROCEDURE — 99153 MOD SED SAME PHYS/QHP EA: CPT | Performed by: INTERNAL MEDICINE

## 2023-01-15 PROCEDURE — A9270 NON-COVERED ITEM OR SERVICE: HCPCS | Performed by: INTERNAL MEDICINE

## 2023-01-15 PROCEDURE — 63710000001 BISOPROLOL 5 MG TABLET: Performed by: INTERNAL MEDICINE

## 2023-01-15 PROCEDURE — 99214 OFFICE O/P EST MOD 30 MIN: CPT | Performed by: INTERNAL MEDICINE

## 2023-01-15 PROCEDURE — 94761 N-INVAS EAR/PLS OXIMETRY MLT: CPT

## 2023-01-15 PROCEDURE — 63710000001 ATORVASTATIN 10 MG TABLET: Performed by: INTERNAL MEDICINE

## 2023-01-15 PROCEDURE — 25010000002 FENTANYL CITRATE (PF) 100 MCG/2ML SOLUTION: Performed by: INTERNAL MEDICINE

## 2023-01-15 PROCEDURE — 63710000001 AMLODIPINE 5 MG TABLET: Performed by: INTERNAL MEDICINE

## 2023-01-15 PROCEDURE — C1894 INTRO/SHEATH, NON-LASER: HCPCS | Performed by: INTERNAL MEDICINE

## 2023-01-15 RX ORDER — FENTANYL CITRATE 50 UG/ML
INJECTION, SOLUTION INTRAMUSCULAR; INTRAVENOUS
Status: DISCONTINUED | OUTPATIENT
Start: 2023-01-15 | End: 2023-01-15 | Stop reason: HOSPADM

## 2023-01-15 RX ORDER — LIDOCAINE HYDROCHLORIDE 20 MG/ML
INJECTION, SOLUTION INFILTRATION; PERINEURAL
Status: DISCONTINUED | OUTPATIENT
Start: 2023-01-15 | End: 2023-01-15 | Stop reason: HOSPADM

## 2023-01-15 RX ORDER — MIDAZOLAM HYDROCHLORIDE 1 MG/ML
INJECTION INTRAMUSCULAR; INTRAVENOUS
Status: DISCONTINUED | OUTPATIENT
Start: 2023-01-15 | End: 2023-01-15 | Stop reason: HOSPADM

## 2023-01-15 RX ORDER — SODIUM CHLORIDE 0.9 % (FLUSH) 0.9 %
3 SYRINGE (ML) INJECTION EVERY 12 HOURS SCHEDULED
Status: DISCONTINUED | OUTPATIENT
Start: 2023-01-15 | End: 2023-01-16 | Stop reason: HOSPADM

## 2023-01-15 RX ORDER — SODIUM CHLORIDE 9 MG/ML
250 INJECTION, SOLUTION INTRAVENOUS ONCE AS NEEDED
Status: DISCONTINUED | OUTPATIENT
Start: 2023-01-15 | End: 2023-01-16 | Stop reason: HOSPADM

## 2023-01-15 RX ORDER — SODIUM CHLORIDE 9 MG/ML
40 INJECTION, SOLUTION INTRAVENOUS AS NEEDED
Status: DISCONTINUED | OUTPATIENT
Start: 2023-01-15 | End: 2023-01-16 | Stop reason: HOSPADM

## 2023-01-15 RX ORDER — SODIUM CHLORIDE 0.9 % (FLUSH) 0.9 %
3-10 SYRINGE (ML) INJECTION AS NEEDED
Status: DISCONTINUED | OUTPATIENT
Start: 2023-01-15 | End: 2023-01-16 | Stop reason: HOSPADM

## 2023-01-15 RX ADMIN — ATORVASTATIN CALCIUM 10 MG: 10 TABLET, FILM COATED ORAL at 15:02

## 2023-01-15 RX ADMIN — GABAPENTIN 300 MG: 300 CAPSULE ORAL at 17:19

## 2023-01-15 RX ADMIN — FUROSEMIDE 40 MG: 10 INJECTION, SOLUTION INTRAMUSCULAR; INTRAVENOUS at 08:35

## 2023-01-15 RX ADMIN — AMLODIPINE BESYLATE 5 MG: 5 TABLET ORAL at 20:55

## 2023-01-15 RX ADMIN — INSULIN LISPRO 3 UNITS: 100 INJECTION, SOLUTION INTRAVENOUS; SUBCUTANEOUS at 17:19

## 2023-01-15 RX ADMIN — BISOPROLOL FUMARATE 5 MG: 5 TABLET ORAL at 15:02

## 2023-01-15 RX ADMIN — LISINOPRIL 40 MG: 20 TABLET ORAL at 20:55

## 2023-01-15 RX ADMIN — PREDNISONE 40 MG: 20 TABLET ORAL at 15:02

## 2023-01-15 RX ADMIN — SERTRALINE 50 MG: 50 TABLET, FILM COATED ORAL at 20:55

## 2023-01-15 RX ADMIN — IPRATROPIUM BROMIDE AND ALBUTEROL SULFATE 3 ML: 2.5; .5 SOLUTION RESPIRATORY (INHALATION) at 07:35

## 2023-01-15 RX ADMIN — INSULIN LISPRO 3 UNITS: 100 INJECTION, SOLUTION INTRAVENOUS; SUBCUTANEOUS at 21:29

## 2023-01-15 RX ADMIN — FERROUS SULFATE TAB EC 324 MG (65 MG FE EQUIVALENT) 324 MG: 324 (65 FE) TABLET DELAYED RESPONSE at 15:02

## 2023-01-15 RX ADMIN — GABAPENTIN 300 MG: 300 CAPSULE ORAL at 20:55

## 2023-01-15 RX ADMIN — FUROSEMIDE 40 MG: 10 INJECTION, SOLUTION INTRAMUSCULAR; INTRAVENOUS at 17:19

## 2023-01-15 RX ADMIN — IPRATROPIUM BROMIDE AND ALBUTEROL SULFATE 3 ML: 2.5; .5 SOLUTION RESPIRATORY (INHALATION) at 22:24

## 2023-01-15 RX ADMIN — Medication 10 ML: at 20:55

## 2023-01-15 NOTE — PROGRESS NOTES
Daily Progress Note        Acute on chronic respiratory failure with hypoxia (HCC)    Abnormal nuclear stress test      Assessment:     COPD with acute exacerbation (HCC)  Acute broncitis/Bronchiectasis  Ex-smoker: Quit July 2022  Diabetes mellitus  Hypertension  Chronic coronary artery disease  Chronic back pain  Dyslipidemia  Obesity        Recommendations:        Oxygen supplement and titration to maintain saturation 90 to 95%:      prednison wean     Doxycyline     Mucinex     Pulmicort and DuoNebs     Heart cath today for abnormal stress test 1/14/2023  Left ventricular ejection fraction is normal (Calculated EF = 55%).  •  Myocardial perfusion imaging indicates a moderate-to-severe area of ischemia .    Patient felt better with diuretics           Review of Sytems:  Review of Systems   Respiratory: Positive for cough and shortness of breath.    Cardiovascular: Positive for palpitations.          LOS: 0 days     Subjective         Objective     Vital signs for last 24 hours:  Vitals:    01/15/23 1330 01/15/23 1344 01/15/23 1358 01/15/23 1413   BP: 128/63 143/63 137/67 144/69   BP Location:       Patient Position:       Pulse: 65 61 64 61   Resp: 14 10 15 13   Temp:       TempSrc:       SpO2: 95% 93% 93% 97%   Weight:       Height:           Intake/Output last 3 shifts:  I/O last 3 completed shifts:  In: 720 [P.O.:720]  Out: 500 [Urine:500]  Intake/Output this shift:  No intake/output data recorded.      Radiology  Imaging Results (Last 24 Hours)     ** No results found for the last 24 hours. **          Labs:  Results from last 7 days   Lab Units 01/12/23  1902   WBC 10*3/mm3 6.60   HEMOGLOBIN g/dL 11.5*   HEMATOCRIT % 36.1*   PLATELETS 10*3/mm3 179     Results from last 7 days   Lab Units 01/12/23  1902   SODIUM mmol/L 141   POTASSIUM mmol/L 3.9   CHLORIDE mmol/L 101   CO2 mmol/L 28.0   BUN mg/dL 13   CREATININE mg/dL 0.75*   CALCIUM mg/dL 9.3   BILIRUBIN mg/dL 0.4   ALK PHOS U/L 71   ALT (SGPT) U/L 15   AST  (SGOT) U/L 16   GLUCOSE mg/dL 218*         Results from last 7 days   Lab Units 01/12/23 1902   ALBUMIN g/dL 4.3     Results from last 7 days   Lab Units 01/12/23 1902   TROPONIN T ng/mL <0.010             Results from last 7 days   Lab Units 01/12/23 1902   INR  1.06               Meds:   SCHEDULE  amLODIPine, 5 mg, Oral, Nightly  [MAR Hold] atorvastatin, 10 mg, Oral, Daily  bisoprolol, 5 mg, Oral, Daily  [MAR Hold] enoxaparin, 40 mg, Subcutaneous, Q24H  [MAR Hold] ferrous sulfate, 324 mg, Oral, Daily With Breakfast  [MAR Hold] furosemide, 40 mg, Intravenous, BID  gabapentin, 300 mg, Oral, TID  [MAR Hold] insulin lispro, 2-7 Units, Subcutaneous, 4x Daily AC & at Bedtime  [MAR Hold] ipratropium-albuterol, 3 mL, Nebulization, Q8H - RT  lisinopril, 40 mg, Oral, Nightly  [MAR Hold] predniSONE, 40 mg, Oral, Daily With Breakfast  [MAR Hold] sertraline, 50 mg, Oral, Nightly  [MAR Hold] sodium chloride, 10 mL, Intravenous, Q12H  [MAR Hold] sodium chloride, 3 mL, Intravenous, Q12H      Infusions     PRNs  •  [MAR Hold] acetaminophen  •  [MAR Hold] albuterol  •  [MAR Hold] dextrose  •  [MAR Hold] dextrose  •  [MAR Hold] glucagon (human recombinant)  •  [MAR Hold] HYDROcodone-acetaminophen  •  [MAR Hold] melatonin  •  [COMPLETED] Insert Peripheral IV **AND** [MAR Hold] sodium chloride  •  [MAR Hold] sodium chloride  •  [MAR Hold] sodium chloride  •  [MAR Hold] sodium chloride  •  [MAR Hold] sodium chloride    Physical Exam:  Physical Exam  Cardiovascular:      Heart sounds: Murmur heard.   Pulmonary:      Breath sounds: Rhonchi and rales present.   Abdominal:      General: There is no distension.      Tenderness: There is no abdominal tenderness.         ROS  Review of Systems   Respiratory: Positive for cough and shortness of breath.    Cardiovascular: Negative for chest pain, palpitations and leg swelling.             Total time spent with patient greater than: 45 Minutes

## 2023-01-15 NOTE — PLAN OF CARE
Goal Outcome Evaluation:              Outcome Evaluation: pt resting well thoughout shift with no complaints. Currently NPO for heart cath this AM. Continue to monitor.

## 2023-01-15 NOTE — PROGRESS NOTES
LOS: 0 days   Admiting Physician- Dunia Liz MD    Reason For Followup:    Shortness of breath  Chest discomfort  COPD exacerbation  Abnormal stress test    Subjective     Patient is feeling better.  Shortness of breath is improved    Objective     Hemodynamics are stable    Review of Systems:   Review of Systems   Constitutional: Negative for chills and fever.   HENT: Negative for ear discharge and nosebleeds.    Eyes: Negative for discharge and redness.   Cardiovascular: Negative for chest pain, orthopnea, palpitations, paroxysmal nocturnal dyspnea and syncope.   Respiratory: Positive for shortness of breath. Negative for cough and wheezing.    Endocrine: Negative for heat intolerance.   Skin: Negative for rash.   Musculoskeletal: Negative for arthritis and myalgias.   Gastrointestinal: Negative for abdominal pain, melena, nausea and vomiting.   Genitourinary: Negative for dysuria and hematuria.   Neurological: Negative for dizziness, light-headedness, numbness and tremors.   Psychiatric/Behavioral: Negative for depression. The patient is not nervous/anxious.          Vital Signs  Vitals:    01/15/23 1330 01/15/23 1344 01/15/23 1358 01/15/23 1413   BP: 128/63 143/63 137/67 144/69   BP Location:       Patient Position:       Pulse: 65 61 64 61   Resp: 14 10 15 13   Temp:       TempSrc:       SpO2: 95% 93% 93% 97%   Weight:       Height:         Wt Readings from Last 1 Encounters:   01/12/23 99.8 kg (220 lb)       Intake/Output Summary (Last 24 hours) at 1/15/2023 1422  Last data filed at 1/14/2023 1743  Gross per 24 hour   Intake 720 ml   Output --   Net 720 ml     Physical Exam:  Constitutional:       Appearance: Well-developed.   Eyes:      General: No scleral icterus.        Right eye: No discharge.   HENT:      Head: Normocephalic and atraumatic.   Neck:      Thyroid: No thyromegaly.      Lymphadenopathy: No cervical adenopathy.   Pulmonary:      Effort: Pulmonary effort is normal. No respiratory  distress.      Breath sounds: Normal breath sounds. No wheezing. No rales.   Cardiovascular:      Normal rate. Regular rhythm.      No gallop.   Edema:     Peripheral edema absent.   Abdominal:      Tenderness: There is no abdominal tenderness.   Skin:     Findings: No erythema or rash.   Neurological:      Mental Status: Alert and oriented to person, place, and time.         Results Review:   Lab Results (last 24 hours)     Procedure Component Value Units Date/Time    POC Glucose Once [[id number]]  (Abnormal) Collected: 01/15/23 1123    Specimen: Blood Updated: 01/15/23 1124     Glucose 163 mg/dL      Comment: Serial Number: [id number]:  [id number]       POC Glucose Once [[id number]]  (Abnormal) Collected: 01/15/23 0720    Specimen: Blood Updated: 01/15/23 0721     Glucose 146 mg/dL      Comment: Serial Number: [id number]:  [id number]       POC Glucose Once [[id number]]  (Abnormal) Collected: 01/14/23 1941    Specimen: Blood Updated: 01/14/23 1943     Glucose 318 mg/dL      Comment: Serial Number: [id number]:  [id number]       POC Glucose Once [[id number]]  (Abnormal) Collected: 01/14/23 1617    Specimen: Blood Updated: 01/14/23 1618     Glucose 259 mg/dL      Comment: Serial Number: [id number]:  [id number]           Imaging Results (Last 72 Hours)     Procedure Component Value Units Date/Time    CT Angiogram Chest Pulmonary Embolism [[id number]] Collected: 01/12/23 2107     Updated: 01/12/23 2116    Narrative:      CT ANGIOGRAM CHEST PULMONARY EMBOLISM    Date of Exam: 1/12/2023 8:49 PM EST    Indication: Pulmonary embolism (PE) suspected, positive D-dimer.    Comparison: 12/27/2021    Technique: Axial CT images were obtained of the chest before and after the uneventful intravenous administration of iodinated contrast utilizing pulmonary embolism protocol.  Sagittal and coronal reconstructions were performed.  Automated exposure   control and iterative reconstruction methods were used.      Findings:  Pulmonary arteries: Adequately opacified. No emboli demonstrated    Tiff/mediastinum: Enlarged mediastinal and bilateral hilar lymph nodes. These appear slightly larger than on the comparison study. Precarinal node measures 2.6 x 2.2 cm. Subcarinal node measures 1.8 cm short axis thoracic aorta normal in caliber.   Coronary artery calcification. No pericardial effusion    Lungs/pleura: Emphysema. Interlobular septal thickening most pronounced in the apices and bases. Interval resolution of bronchiolitis changes from the prior study. Trace pleural effusions with minimal dependent atelectasis in the lower lobes. Calcified   granuloma in the right middle lobe.    Upper abdomen: Unremarkable. Normal-sized spleen. No upper abdominal adenopathy    Bones/soft tissues: No axillary adenopathy. No acute bony abnormality. Degenerative disc disease in the midthoracic spine      Impression:      Impression:    1. No evidence of pulmonary embolism  2. Interlobular septal thickening that likely represents mild interstitial edema. Atypical pneumonia less likely.  3. Trace pleural effusions  4. Emphysema  5. Mediastinal and hilar adenopathy, likely congestive or reactive. Recommend chest CT with the patient's clinically baseline to assess for resolution    Electronically Signed: Vitaliy Sesay    1/12/2023 9:14 PM EST    Workstation ID: [id number]    XR Chest 1 View [[id number]] Collected: 01/12/23 1956     Updated: 01/12/23 2000    Narrative:      XR CHEST 1 VW    Date of Exam: 1/12/2023 7:10 PM EST    Indication: sob.    Comparison: September 4, 2022, December 27, 2021    Findings:  The heart appears mildly enlarged. There is evidence of remote granulomatous disease. There appears to be prominence of central pulmonary vasculature. The lungs appear hyperinflated which may be seen with COPD. There is suggestion of mild basilar   predominant interstitial opacities. No significant effusion or pneumothorax is seen.       Impression:      Impression:  1.Mild basilar predominant interstitial opacities which may indicate edema or could be seen with COPD.  2.Mild cardiomegaly and prominent pulmonary vasculature.    Electronically Signed: Vimal Woo    1/12/2023 7:58 PM EST    Workstation ID: [id number]        ECG/EMG Results (most recent)     Procedure Component Value Units Date/Time    ECG 12 Lead Other; SOA [[id number]] Collected: 01/12/23 1856     Updated: 01/13/23 1610     QT Interval 428 ms     Narrative:      HEART RATE= 71  bpm  RR Interval= 848  ms  AZ Interval= 198  ms  P Horizontal Axis= -44  deg  P Front Axis= 62  deg  QRSD Interval= 167  ms  QT Interval= 428  ms  QRS Axis= 132  deg  T Wave Axis= 42  deg  - ABNORMAL ECG -  Sinus rhythm  Right bundle branch block  Anteroseptal infarct, age indeterminate  When compared with ECG of 05-Sep-2022 9:56:20,  No significant change  Electronically Signed By: Aditya Caldwell (KATERIN) 13-Jan-2023 16:10:08  Date and Time of Study: 2023-01-12 18:56:59        CBC    Results from last 7 days   Lab Units 01/12/23  1902   WBC 10*3/mm3 6.60   HEMOGLOBIN g/dL 11.5*   PLATELETS 10*3/mm3 179     BMP   Results from last 7 days   Lab Units 01/12/23  1902   SODIUM mmol/L 141   POTASSIUM mmol/L 3.9   CHLORIDE mmol/L 101   CO2 mmol/L 28.0   BUN mg/dL 13   CREATININE mg/dL 0.75*   GLUCOSE mg/dL 218*     CMP   Results from last 7 days   Lab Units 01/12/23  1902   SODIUM mmol/L 141   POTASSIUM mmol/L 3.9   CHLORIDE mmol/L 101   CO2 mmol/L 28.0   BUN mg/dL 13   CREATININE mg/dL 0.75*   GLUCOSE mg/dL 218*   ALBUMIN g/dL 4.3   BILIRUBIN mg/dL 0.4   ALK PHOS U/L 71   AST (SGOT) U/L 16   ALT (SGPT) U/L 15     Cardiac Studies:  Echo- Results for orders placed during the hospital encounter of 09/04/22    Adult Transthoracic Echo Complete w/ Color, Spectral and Contrast if necessary per protocol    Interpretation Summary  · Left ventricular ejection fraction appears to be 61 - 65%.  · The right ventricular cavity is  mildly dilated.  · Mild dilation of the aortic root is present.  · No pericardial effusion noted    Stress Myoview-  Cath-      Medication Review:   Scheduled Meds:amLODIPine, 5 mg, Oral, Nightly  [MAR Hold] atorvastatin, 10 mg, Oral, Daily  bisoprolol, 5 mg, Oral, Daily  [MAR Hold] enoxaparin, 40 mg, Subcutaneous, Q24H  [MAR Hold] ferrous sulfate, 324 mg, Oral, Daily With Breakfast  [MAR Hold] furosemide, 40 mg, Intravenous, BID  gabapentin, 300 mg, Oral, TID  [MAR Hold] insulin lispro, 2-7 Units, Subcutaneous, 4x Daily AC & at Bedtime  [MAR Hold] ipratropium-albuterol, 3 mL, Nebulization, Q8H - RT  lisinopril, 40 mg, Oral, Nightly  [MAR Hold] predniSONE, 40 mg, Oral, Daily With Breakfast  [MAR Hold] sertraline, 50 mg, Oral, Nightly  [MAR Hold] sodium chloride, 10 mL, Intravenous, Q12H  [MAR Hold] sodium chloride, 3 mL, Intravenous, Q12H      Continuous Infusions:   PRN Meds:.•  [MAR Hold] acetaminophen  •  [MAR Hold] albuterol  •  [MAR Hold] dextrose  •  [MAR Hold] dextrose  •  [MAR Hold] glucagon (human recombinant)  •  [MAR Hold] HYDROcodone-acetaminophen  •  [MAR Hold] melatonin  •  [COMPLETED] Insert Peripheral IV **AND** [MAR Hold] sodium chloride  •  [MAR Hold] sodium chloride  •  [MAR Hold] sodium chloride  •  [MAR Hold] sodium chloride  •  [MAR Hold] sodium chloride      Assessment & Plan   Patient Active Problem List   Diagnosis   • Primary localized osteoarthrosis of right lower leg   • Type 2 diabetes mellitus without complication, without long-term current use of insulin ([facility])   • Mixed hyperlipidemia   • Essential hypertension   • Primary osteoarthritis of knees, bilateral   • Hx of total knee replacement, right   • Morbid obesity ([facility])   • Acquired trigger finger   • Benign prostatic hyperplasia   • Coronary artery disease   • Depression   • Diabetic peripheral neuropathy ([facility])   • Microalbuminuria due to type 2 diabetes mellitus ([facility])   • Multinodular goiter   • Murmur   • Tobacco dependence  syndrome   • Vitamin D deficiency   • Swelling of joint of right knee   • Chronic pain of left knee   • COPD (chronic obstructive pulmonary disease) ([facility])   • COPD exacerbation ([facility])   • Sepsis ([facility])   • Acute on chronic respiratory failure with hypoxia ([facility])   • Abnormal nuclear stress test     MDM:    1.  Abnormal stress test:    Patient underwent stress test and it showed inferior wall ischemia.  Patient underwent cardiac catheterization and no significant CAD noted.  Medical treatment is recommended    2.  COPD exacerbation:    Continue current treatment.        3.  Hyperlipidemia:    Patient is on Lipitor.    Shawn Baker MD  01/15/23  14:22 EST

## 2023-01-15 NOTE — PLAN OF CARE
Goal Outcome Evaluation:  Plan of Care Reviewed With: patient        Progress: improving  Outcome Evaluation: patient resting in bed throughout shift. no complaints. heart cath performed this afternoon. VSS and pulses stable.

## 2023-01-15 NOTE — PROGRESS NOTES
[facility] Medicine Services  Daily progress note     Patient Name: Brenden Peter  : 1950  MRN: [id number]  Primary Care Physician:  Bert Rojas MD  Date of admission: 2023        Subjective       Chief Complaint: Shortness of breath     History of Present Illness: Brenden Peter is a 72 y.o. male with a medical history notable for morbid obesity, COPD C/B chronic hypoxic respiratory failure on home O2 at 4 L, type 2 diabetes mellitus, hypertension, and other comorbidities who presented to [facility] on 2023 complaining of worsening shortness of breath.  For the past 2 to 3 days     Patient has been having worsening shortness of breath.  He noted on his home pulse oximeter that his O2 sats were going down.  He is adherent to his CPAP machine and he takes his medications as prescribed.  However he did mention he has not been using his nebulizer treatment as often.  He typically uses it about 2-3 times daily, but has only been using it about once daily recently.  He thinks he seems to get sick around this time yearly.  He stated his wife was feeling ill with a stomach bug but no other known sick contacts.     While in the ED, the patient received methylprednisolone 125mg IV, Duoneb treatment x1 dose, and doxycycline 100mg IV.  Patient stated after receiving medications, he feels much better and feels close to his baseline.   2023; patient sitting at the bedside, upon speaking to the patient he endorses he has been having this difficulty of breathing/dyspnea on minimal exertion in the last 1 year every 2 months he has these recurrences and has not been sleeping well, he was just prescribed CPAP 1 month ago, denies any chest pain no nausea or vomiting, hemodynamically stable     2023  Feels better with breathing, denies for any chest pain, no nausea or vomiting.     1/15/2023  Feels better with breathing but still not back to normal. Denies for any nausea  or vomiting, no chest pain.     Review of Systems   Constitutional: Negative for chills and fever.   HENT: Negative.    Cardiovascular: Positive for dyspnea on exertion. Negative for chest pain, leg swelling and palpitations.   Respiratory: Positive for shortness of breath. Negative for cough, sputum production and wheezing.    Skin: Negative.    Musculoskeletal: Positive for arthritis.   Gastrointestinal: Negative for abdominal pain, nausea and vomiting.   Genitourinary: Negative for dysuria.   Neurological: Negative.    Psychiatric/Behavioral: Negative.          Personal History      Medical History        Past Medical History:   Diagnosis Date   • Abnormal EKG 2/3/2015   • Anxiety     • COPD (chronic obstructive pulmonary disease) ([facility])     • Coronary artery disease 3/3/2015   • Diabetes ([facility])       type 2   • Hyperlipidemia     • Hypertension     • Microalbuminuria due to type 2 diabetes mellitus ([facility]) 2/5/2020   • Multinodular goiter 4/18/2019   • Murmur 2/3/2015   • Primary osteoarthritis of left knee 10/22/2021            Surgical History         Past Surgical History:   Procedure Laterality Date   • EYE SURGERY         cataract   • TOTAL KNEE ARTHROPLASTY Right 10/21/2020     Procedure: TOTAL KNEE ARTHROPLASTY;  Surgeon: Ravi Fagan MD;  Location: [facility];  Service: Orthopedics;  Laterality: Right;   • TOTAL KNEE ARTHROPLASTY REVISION Right 5/19/2021     Procedure: KNEE POLY INSERT EXCHANGE with irrigation;  Surgeon: Ravi Fagan MD;  Location: [facility];  Service: Orthopedics;  Laterality: Right;            Family History: family history includes Cancer in an other family member; Diabetes in an other family member; Heart disease in an other family member; No Known Problems in his brother, father, maternal aunt, maternal grandfather, maternal grandmother, maternal uncle, mother, paternal aunt, paternal grandfather, paternal grandmother, paternal uncle, and sister.      Social History:   reports that he quit smoking about 6 months ago. His smoking use included cigarettes. He smoked an average of 1 pack per day. He quit smokeless tobacco use about 6 months ago. He reports that he does not currently use alcohol. He reports that he does not use drugs.     Home Medications:          Prior to Admission Medications      Prescriptions Last Dose Informant Patient Reported? Taking?     albuterol (PROVENTIL) (2.5 MG/3ML) 0.083% nebulizer solution     Yes No     Take 3 mL by nebulization 3 (Three) Times a Day As Needed for Wheezing or Shortness of Air.     albuterol sulfate  (90 Base) MCG/ACT inhaler     Yes No     Inhale 2 puffs Every 6 (Six) Hours As Needed.     amLODIPine (NORVASC) 5 MG tablet     Yes No     Take 5 mg by mouth Every Night.     bisoprolol (ZEBeta) 5 MG tablet     Yes No     Take 5 mg by mouth Daily.     gabapentin (NEURONTIN) 300 MG capsule     Yes No     Take 300 mg by mouth 3 (Three) Times a Day.     glipizide (GLUCOTROL) 10 MG tablet     Yes No     Take 10 mg by mouth Every Night.     HYDROcodone-acetaminophen (NORCO) 5-325 MG per tablet     No No     Take 1 tablet by mouth Every 6 (Six) Hours As Needed for Moderate Pain .     ipratropium-albuterol (DUO-NEB) 0.5-2.5 mg/3 ml nebulizer     Yes No     3 mL Every 6 (Six) Hours.     lisinopril (PRINIVIL,ZESTRIL) 40 MG tablet     Yes No     Take 40 mg by mouth Every Night.     metFORMIN (GLUCOPHAGE) 1000 MG tablet     Yes No     Take 1,000 mg by mouth 2 (Two) Times a Day With Meals.     pioglitazone (ACTOS) 30 MG tablet     Yes No     Take 30 mg by mouth Every Night.     pravastatin (PRAVACHOL) 20 MG tablet     Yes No     Take 20 mg by mouth Daily.     sertraline (ZOLOFT) 50 MG tablet     Yes No     Take 50 mg by mouth Every Night.     umeclidinium-vilanterol (ANORO ELLIPTA) 62.5-25 MCG/INH aerosol powder  inhaler     Yes No     Inhale 1 puff Daily.     vitamin D (ERGOCALCIFEROL) 1.25 MG (36039 UT) capsule capsule     Yes No     50,000  Units 2 (Two) Times a Week. Tues and Fri                Allergies:  No Known Allergies     Objective       Vitals:   Temp:  [97.5 °F (36.4 °C)-98.3 °F (36.8 °C)] 97.5 °F (36.4 °C)  Heart Rate:  [57-95] 78  Resp:  [10-18] 14  BP: (122-161)/(61-77) 131/67  Flow (L/min):  [4] 4  Physical Exam      General:  Appears in no acute distress, nontoxic appearing  HEENT:  Normocephalic. Normal conjunctivae, EOM intact bilaterally, pupils equal and reactive to light. No JVD. Thyroid not visibly enlarged. No mucosal pallor or cyanosis. No oral lesions.   Respiratory: Respirations regular, increased shortness of breath with minimal exertion, bilateral breath sounds with good air entry in all fields. No crackles, rubs or wheezes auscultated  Cardiovascular: S1S2 Regular rate and rhythm. No murmur, rub or gallop. No pretibial pitting edema  Gastrointestinal: Abdomen soft, flat, nontender. Bowel sounds present. No rebound or guarding.   Musculoskeletal: Moves all extremities voluntarily. No abnormal movements  Extremities: No digital clubbing or cyanosis  Skin: Color pink. Skin warm and dry to touch. No rashes    Neuro: AAO x3, CN II-XII grossly intact, comprehension intact, follows commands appropriately  Psych: Mood and affect normal, pleasant and cooperative        Result Review    Result Review:  I have personally reviewed the results from the time of this admission to 1/13/2023 05:49 EST and agree with these findings:  [x]?  Laboratory  []?  Microbiology  [x]?  Radiology  [x]?  EKG/Telemetry           []?  Cardiology/Vascular   []?  Pathology  []?  Old records  []?  Other:     LAB RESULTS:          Lab 01/12/23 1902   WBC 6.60   HEMOGLOBIN 11.5*   HEMATOCRIT 36.1*   PLATELETS 179   NEUTROS ABS 5.10   LYMPHS ABS 1.00   MONOS ABS 0.40   EOS ABS 0.10   MCV 89.4   PROCALCITONIN 0.06   PROTIME 10.9              Lab 01/12/23 1902   SODIUM 141   POTASSIUM 3.9   CHLORIDE 101   CO2 28.0   ANION GAP 12.0   BUN 13   CREATININE 0.75*    EGFR 95.9   GLUCOSE 218*   CALCIUM 9.3              Lab 01/12/23 1902   TOTAL PROTEIN 7.8   ALBUMIN 4.3   GLOBULIN 3.5   ALT (SGPT) 15   AST (SGOT) 16   BILIRUBIN 0.4   ALK PHOS 71              Lab 01/12/23 1902   PROBNP 354.3   TROPONIN T <0.010   PROTIME 10.9   INR 1.06              XR CHEST 1 VW   Date of Exam: 1/12/2023 7:10 PM EST      Findings:   The heart appears mildly enlarged. There is evidence of remote granulomatous disease. There appears to be prominence of central pulmonary vasculature. The lungs appear hyperinflated which may be seen with COPD. There is suggestion of mild basilar   predominant interstitial opacities. No significant effusion or pneumothorax is seen.      Impression:   1.Mild basilar predominant interstitial opacities which may indicate edema or could be seen with COPD.   2.Mild cardiomegaly and prominent pulmonary vasculature.         CT ANGIOGRAM CHEST PULMONARY EMBOLISM   Date of Exam: 1/12/2023 8:49 PM EST      Findings:   Pulmonary arteries: Adequately opacified. No emboli demonstrated      Tiff/mediastinum: Enlarged mediastinal and bilateral hilar lymph nodes. These appear slightly larger than on the comparison study. Precarinal node measures 2.6 x 2.2 cm. Subcarinal node measures 1.8 cm short axis thoracic aorta normal in caliber.   Coronary artery calcification. No pericardial effusion      Lungs/pleura: Emphysema. Interlobular septal thickening most pronounced in the apices and bases. Interval resolution of bronchiolitis changes from the prior study. Trace pleural effusions with minimal dependent atelectasis in the lower lobes. Calcified   granuloma in the right middle lobe.      Upper abdomen: Unremarkable. Normal-sized spleen. No upper abdominal adenopathy      Bones/soft tissues: No axillary adenopathy. No acute bony abnormality. Degenerative disc disease in the midthoracic spine      Impression:   1. No evidence of pulmonary embolism   2. Interlobular septal thickening  that likely represents mild interstitial edema. Atypical pneumonia less likely.   3. Trace pleural effusions   4. Emphysema   5. Mediastinal and hilar adenopathy, likely congestive or reactive. Recommend chest CT with the patient's clinically baseline to assess for resolution               Assessment & Plan          Plan:   Acute on chronic respiratory failure with hypoxia, baseline 4 L at home currently requiring 9 L with minimal exertion/WOOD/orthopnea, now improved oxygenation on 3 litre.  Likely due to COPD exacerbation versus cardiac in origin with history of CAD.  Versus SONJA, CTA of chest negative for PE. ProBNP WNL r/o acute heart failure exacerbation.,  EKG with right bundle branch block no new changes, will get stress Cardiolite test and consult cardiology and pulmonology  - continue supplemental O2; goal O2 88-92%  - will treat underlying COPD   - taper off oxygen as tolerated.  - can be discharge am if coming along well.      COPD exacerbation improving.  - 5 day course of prednisone 40mg daily  - standing Duonebs q8h since Anoro inhaler not on hospital formulary     Anemia  -Check iron, TIBC, and ferritin  -Recommend outpatient follow-up and age-appropriate cancer screening if indicated     Hypertension  -Amlodipine 5 mg daily  -Lisinopril 40 mg daily     Type 2 DM c/b peripheral neuropathy  -Carb consistent diet  -POC glucose 3 times daily before meals and at bedtime  -Sliding scale correction insulin protocol  -Continue home regimen of gabapentin  -Hold home regimen of metformin, glipizide, pioglitazone     CAD/  -Bisoprolol 5 mg daily  -Atorvastatin 10 mg; pravastatin not on hospital formulary     Mediastinal and hilar adenopathy  Etiology unknown - may be reactive vs due to congestion. However, findings warrant close f/u  - recommend repeat CT chest when pt at baseline. Consider repeat CT chest in 3 months.  Pulmonology consult     Chronic pain, stable  -Continue home regimen of  hydrocodone-acetaminophen 5/325 mg every 6 hours as needed     Mood disorder, stable  -Continue sertraline 50 mg nightly     DVT prophylaxis:  Medical DVT prophylaxis orders are present.     CODE STATUS:    Admission Status:  I believe this patient meets inpatient observation status.  His symptoms remain improved and patient at baseline O2 requirements, stress Cardiolite test, cardiology and pulmonology consult likely discharge in 3 to 4 days     I discussed the patient's findings and my recommendations with patient and nursing staff.    Electronically signed by Macarena Marte MD, 01/13/23, 12:46 PM EST.

## 2023-01-16 ENCOUNTER — READMISSION MANAGEMENT (OUTPATIENT)
Dept: CALL CENTER | Facility: HOSPITAL | Age: 73
End: 2023-01-16
Payer: MEDICARE

## 2023-01-16 VITALS
OXYGEN SATURATION: 96 % | RESPIRATION RATE: 15 BRPM | TEMPERATURE: 97.8 F | HEIGHT: 67 IN | HEART RATE: 59 BPM | SYSTOLIC BLOOD PRESSURE: 139 MMHG | DIASTOLIC BLOOD PRESSURE: 85 MMHG | WEIGHT: 220 LBS | BODY MASS INDEX: 34.53 KG/M2

## 2023-01-16 LAB
ANION GAP SERPL CALCULATED.3IONS-SCNC: 10 MMOL/L (ref 5–15)
BUN SERPL-MCNC: 22 MG/DL (ref 8–23)
BUN/CREAT SERPL: 34.9 (ref 7–25)
CALCIUM SPEC-SCNC: 9 MG/DL (ref 8.6–10.5)
CHLORIDE SERPL-SCNC: 99 MMOL/L (ref 98–107)
CHOLEST SERPL-MCNC: 145 MG/DL (ref 0–200)
CO2 SERPL-SCNC: 30 MMOL/L (ref 22–29)
CREAT SERPL-MCNC: 0.63 MG/DL (ref 0.76–1.27)
DEPRECATED RDW RBC AUTO: 45.1 FL (ref 37–54)
EGFRCR SERPLBLD CKD-EPI 2021: 101.1 ML/MIN/1.73
ERYTHROCYTE [DISTWIDTH] IN BLOOD BY AUTOMATED COUNT: 14.5 % (ref 12.3–15.4)
GLUCOSE BLDC GLUCOMTR-MCNC: 204 MG/DL (ref 70–105)
GLUCOSE BLDC GLUCOMTR-MCNC: 207 MG/DL (ref 70–105)
GLUCOSE BLDC GLUCOMTR-MCNC: 247 MG/DL (ref 70–105)
GLUCOSE SERPL-MCNC: 203 MG/DL (ref 65–99)
HCT VFR BLD AUTO: 35.8 % (ref 37.5–51)
HDLC SERPL-MCNC: 33 MG/DL (ref 40–60)
HGB BLD-MCNC: 11.4 G/DL (ref 13–17.7)
LDLC SERPL CALC-MCNC: 93 MG/DL (ref 0–100)
LDLC/HDLC SERPL: 2.77 {RATIO}
MCH RBC QN AUTO: 28.5 PG (ref 26.6–33)
MCHC RBC AUTO-ENTMCNC: 31.9 G/DL (ref 31.5–35.7)
MCV RBC AUTO: 89.4 FL (ref 79–97)
PLATELET # BLD AUTO: 170 10*3/MM3 (ref 140–450)
PMV BLD AUTO: 9.5 FL (ref 6–12)
POTASSIUM SERPL-SCNC: 3.7 MMOL/L (ref 3.5–5.2)
RBC # BLD AUTO: 4 10*6/MM3 (ref 4.14–5.8)
SODIUM SERPL-SCNC: 139 MMOL/L (ref 136–145)
TRIGL SERPL-MCNC: 103 MG/DL (ref 0–150)
VLDLC SERPL-MCNC: 19 MG/DL (ref 5–40)
WBC NRBC COR # BLD: 6 10*3/MM3 (ref 3.4–10.8)

## 2023-01-16 PROCEDURE — A9270 NON-COVERED ITEM OR SERVICE: HCPCS | Performed by: INTERNAL MEDICINE

## 2023-01-16 PROCEDURE — 80048 BASIC METABOLIC PNL TOTAL CA: CPT | Performed by: INTERNAL MEDICINE

## 2023-01-16 PROCEDURE — 99214 OFFICE O/P EST MOD 30 MIN: CPT | Performed by: NURSE PRACTITIONER

## 2023-01-16 PROCEDURE — G0378 HOSPITAL OBSERVATION PER HR: HCPCS

## 2023-01-16 PROCEDURE — 94660 CPAP INITIATION&MGMT: CPT

## 2023-01-16 PROCEDURE — 94664 DEMO&/EVAL PT USE INHALER: CPT

## 2023-01-16 PROCEDURE — 85027 COMPLETE CBC AUTOMATED: CPT | Performed by: INTERNAL MEDICINE

## 2023-01-16 PROCEDURE — 80061 LIPID PANEL: CPT | Performed by: INTERNAL MEDICINE

## 2023-01-16 PROCEDURE — 63710000001 PREDNISONE PER 1 MG: Performed by: INTERNAL MEDICINE

## 2023-01-16 PROCEDURE — 94799 UNLISTED PULMONARY SVC/PX: CPT

## 2023-01-16 PROCEDURE — 93005 ELECTROCARDIOGRAM TRACING: CPT | Performed by: INTERNAL MEDICINE

## 2023-01-16 PROCEDURE — 63710000001 FERROUS SULFATE 324 (65 FE) MG TABLET DELAYED-RELEASE: Performed by: INTERNAL MEDICINE

## 2023-01-16 PROCEDURE — 63710000001 GABAPENTIN 300 MG CAPSULE: Performed by: INTERNAL MEDICINE

## 2023-01-16 PROCEDURE — 82962 GLUCOSE BLOOD TEST: CPT

## 2023-01-16 PROCEDURE — 25010000002 FUROSEMIDE PER 20 MG: Performed by: INTERNAL MEDICINE

## 2023-01-16 PROCEDURE — 63710000001 BISOPROLOL 5 MG TABLET: Performed by: INTERNAL MEDICINE

## 2023-01-16 PROCEDURE — 93010 ELECTROCARDIOGRAM REPORT: CPT | Performed by: INTERNAL MEDICINE

## 2023-01-16 PROCEDURE — 63710000001 ATORVASTATIN 10 MG TABLET: Performed by: INTERNAL MEDICINE

## 2023-01-16 PROCEDURE — 63710000001 INSULIN LISPRO (HUMAN) PER 5 UNITS: Performed by: INTERNAL MEDICINE

## 2023-01-16 RX ORDER — FUROSEMIDE 40 MG/1
40 TABLET ORAL DAILY
Status: DISCONTINUED | OUTPATIENT
Start: 2023-01-17 | End: 2023-01-16 | Stop reason: HOSPADM

## 2023-01-16 RX ORDER — ASPIRIN 81 MG/1
81 TABLET ORAL DAILY
Status: DISCONTINUED | OUTPATIENT
Start: 2023-01-16 | End: 2023-01-16 | Stop reason: HOSPADM

## 2023-01-16 RX ORDER — FUROSEMIDE 40 MG/1
40 TABLET ORAL DAILY
Qty: 90 TABLET | Refills: 0 | Status: ON HOLD | OUTPATIENT
Start: 2023-01-16 | End: 2023-04-16

## 2023-01-16 RX ORDER — PREDNISONE 20 MG/1
40 TABLET ORAL
Qty: 2 TABLET | Refills: 0 | Status: SHIPPED | OUTPATIENT
Start: 2023-01-17 | End: 2023-01-18

## 2023-01-16 RX ORDER — ATORVASTATIN CALCIUM 40 MG/1
40 TABLET, FILM COATED ORAL DAILY
Status: DISCONTINUED | OUTPATIENT
Start: 2023-01-17 | End: 2023-01-16 | Stop reason: HOSPADM

## 2023-01-16 RX ORDER — ASPIRIN 81 MG/1
81 TABLET ORAL DAILY
Qty: 90 TABLET | Refills: 0 | Status: ON HOLD | OUTPATIENT
Start: 2023-01-16 | End: 2023-04-16

## 2023-01-16 RX ORDER — ATORVASTATIN CALCIUM 40 MG/1
40 TABLET, FILM COATED ORAL DAILY
Qty: 90 TABLET | Refills: 0 | Status: ON HOLD | OUTPATIENT
Start: 2023-01-17 | End: 2023-04-17

## 2023-01-16 RX ADMIN — IPRATROPIUM BROMIDE AND ALBUTEROL SULFATE 3 ML: 2.5; .5 SOLUTION RESPIRATORY (INHALATION) at 10:50

## 2023-01-16 RX ADMIN — Medication 10 ML: at 10:00

## 2023-01-16 RX ADMIN — INSULIN LISPRO 3 UNITS: 100 INJECTION, SOLUTION INTRAVENOUS; SUBCUTANEOUS at 10:00

## 2023-01-16 RX ADMIN — GABAPENTIN 300 MG: 300 CAPSULE ORAL at 09:59

## 2023-01-16 RX ADMIN — ATORVASTATIN CALCIUM 10 MG: 10 TABLET, FILM COATED ORAL at 09:59

## 2023-01-16 RX ADMIN — FERROUS SULFATE TAB EC 324 MG (65 MG FE EQUIVALENT) 324 MG: 324 (65 FE) TABLET DELAYED RESPONSE at 09:59

## 2023-01-16 RX ADMIN — BISOPROLOL FUMARATE 5 MG: 5 TABLET ORAL at 09:59

## 2023-01-16 RX ADMIN — FUROSEMIDE 40 MG: 10 INJECTION, SOLUTION INTRAMUSCULAR; INTRAVENOUS at 10:00

## 2023-01-16 RX ADMIN — INSULIN LISPRO 3 UNITS: 100 INJECTION, SOLUTION INTRAVENOUS; SUBCUTANEOUS at 11:42

## 2023-01-16 RX ADMIN — PREDNISONE 40 MG: 20 TABLET ORAL at 10:00

## 2023-01-16 RX ADMIN — Medication 3 ML: at 10:00

## 2023-01-16 NOTE — DISCHARGE SUMMARY
"              [facility] Medicine Services  Discharge Summary    Date of Service: 23,  Patient Name: Brenden Peter  : 1950  MRN: [id number]    Date of Admission: 2023  Discharge Diagnosis: Please see the problem list below  Date of Discharge:  23,  Primary Care Physician: Bert Rojas MD    Presenting Problem:   Acute exacerbation of chronic obstructive pulmonary disease (COPD) (HCC) [J44.1]  Acute on chronic respiratory failure with hypoxia (HCC) [J96.21]  Acute hypoxemic respiratory failure (HCC) [J96.01]    Active and Resolved Hospital Problems:  Active Hospital Problems    Diagnosis POA   • **Acute on chronic respiratory failure with hypoxia (HCC) [J96.21] Yes   • Abnormal nuclear stress test [R94.39] Unknown      Resolved Hospital Problems   No resolved problems to display.         Hospital Course     Hospital Course:  \"72 y.o. male with a medical history notable for morbid obesity, COPD C/B chronic hypoxic respiratory failure on home O2 at 4 L, type 2 diabetes mellitus, hypertension, and other comorbidities who presented to [facility] on 2023 complaining of worsening shortness of breath.  For the past 2 to 3 days     Patient has been having worsening shortness of breath.  He noted on his home pulse oximeter that his O2 sats were going down.  He is adherent to his CPAP machine and he takes his medications as prescribed.  However he did mention he has not been using his nebulizer treatment as often.  He typically uses it about 2-3 times daily, but has only been using it about once daily recently.  He thinks he seems to get sick around this time yearly.  He stated his wife was feeling ill with a stomach bug but no other known sick contacts.     While in the ED, the patient received methylprednisolone 125mg IV, Duoneb treatment x1 dose, and doxycycline 100mg IV.  Patient stated after receiving medications, he feels much better and feels close to his " "baseline.\"    Acute on chronic respiratory failure with hypoxia, baseline 4 L   COPD exacerbation improving.  -Patient back to baseline O2  - Complete treatment with prednisone for COPD exacerbation     Anemia  -Check iron, TIBC, and ferritin  -Recommend outpatient follow-up and age-appropriate cancer screening if indicated     Hypertension  -Amlodipine 5 mg daily  -Lisinopril 40 mg daily     Type 2 DM c/b peripheral neuropathy  -Carb consistent diet  -POC glucose 3 times daily before meals and at bedtime  -Sliding scale correction insulin protocol  -Continue home regimen of gabapentin  -Hold home regimen of metformin, glipizide, pioglitazone     CAD/  -Bisoprolol 5 mg daily  -Atorvastatin 10 mg; pravastatin not on hospital formulary     Mediastinal and hilar adenopathy  Etiology unknown - may be reactive vs due to congestion. However, findings warrant close f/u  - recommend repeat CT chest when pt at baseline. Consider repeat CT chest in 3 months.  Pulmonology consult     Chronic pain, stable  -Continue home regimen of hydrocodone-acetaminophen 5/325 mg every 6 hours as needed     Mood disorder, stable  -Continue sertraline 50 mg nightly    Patient is cleared for discharge by cardiology and pulmonary.  Abnormal stress test but follow-up C showing mild CAD.  Needs to follow-up with cardiology in clinic as well as get a repeat CT of the chest in 3 months to evaluate for resolution of lymphadenopathy.    Day of Discharge     Vital Signs:  Temp:  [97.5 °F (36.4 °C)-98.4 °F (36.9 °C)] 97.8 °F (36.6 °C)  Heart Rate:  [56-78] 59  Resp:  [14-19] 15  BP: (131-159)/(66-85) 139/85  Flow (L/min):  [4] 4    Physical Exam:  Constitutional:       Appearance: Normal appearance.   HENT:      Head: Normocephalic and atraumatic.   Eyes:      Extraocular Movements: Extraocular movements intact.      Pupils: Pupils are equal, round, and reactive to light.   Cardiovascular:      Rate and Rhythm: Normal rate and regular rhythm.      " Pulses: Normal pulses.      Heart sounds: Normal heart sounds.   Pulmonary:     Lungs are clear to auscultation bilaterally; no wheezes appreciated  Abdominal:      General: Abdomen is flat. Bowel sounds are normal. There is no distension.   Musculoskeletal:      Cervical back: Normal range of motion and neck supple.      Right lower leg: No edema.      Left lower leg: No edema.   Skin:     General: Skin is warm.   Neurological:      General: No focal deficit present.      Mental Status: He is alert and oriented to person, place, and time.   Psychiatric:         Mood and Affect: Mood normal.         Behavior: Behavior normal.    Pertinent  and/or Most Recent Results     LAB RESULTS:      Lab 01/16/23  0701 01/12/23  1902   WBC 6.00 6.60   HEMOGLOBIN 11.4* 11.5*   HEMATOCRIT 35.8* 36.1*   PLATELETS 170 179   NEUTROS ABS  --  5.10   LYMPHS ABS  --  1.00   MONOS ABS  --  0.40   EOS ABS  --  0.10   MCV 89.4 89.4   PROCALCITONIN  --  0.06   PROTIME  --  10.9         Lab 01/16/23  0701 01/12/23  1902   SODIUM 139 141   POTASSIUM 3.7 3.9   CHLORIDE 99 101   CO2 30.0* 28.0   ANION GAP 10.0 12.0   BUN 22 13   CREATININE 0.63* 0.75*   EGFR 101.1 95.9   GLUCOSE 203* 218*   CALCIUM 9.0 9.3         Lab 01/12/23  1902   TOTAL PROTEIN 7.8   ALBUMIN 4.3   GLOBULIN 3.5   ALT (SGPT) 15   AST (SGOT) 16   BILIRUBIN 0.4   ALK PHOS 71         Lab 01/12/23  1902   PROBNP 354.3   TROPONIN T <0.010   PROTIME 10.9   INR 1.06         Lab 01/16/23  0701   CHOLESTEROL 145   LDL CHOL 93   HDL CHOL 33*   TRIGLYCERIDES 103         Lab 01/13/23  0600   IRON 48*   IRON SATURATION 10*   TIBC 466   TRANSFERRIN 313   FERRITIN 134.50         Brief Urine Lab Results     None        Microbiology Results (last 10 days)     ** No results found for the last 240 hours. **          XR Chest 1 View    Result Date: 1/12/2023  Impression: Impression: 1.Mild basilar predominant interstitial opacities which may indicate edema or could be seen with COPD. 2.Mild  cardiomegaly and prominent pulmonary vasculature. Electronically Signed: Vimal Woo  1/12/2023 7:58 PM EST  Workstation ID: [id number]    CT Angiogram Chest Pulmonary Embolism    Result Date: 1/12/2023  Impression: Impression: 1. No evidence of pulmonary embolism 2. Interlobular septal thickening that likely represents mild interstitial edema. Atypical pneumonia less likely. 3. Trace pleural effusions 4. Emphysema 5. Mediastinal and hilar adenopathy, likely congestive or reactive. Recommend chest CT with the patient's clinically baseline to assess for resolution Electronically Signed: Vitaliy Sesay  1/12/2023 9:14 PM EST  Workstation ID: [id number]              Results for orders placed during the hospital encounter of 09/04/22    Adult Transthoracic Echo Complete w/ Color, Spectral and Contrast if necessary per protocol    Interpretation Summary  · Left ventricular ejection fraction appears to be 61 - 65%.  · The right ventricular cavity is mildly dilated.  · Mild dilation of the aortic root is present.  · No pericardial effusion noted      Labs Pending at Discharge:      Procedures Performed  Procedure(s):  Left Heart Cath and coronary angiogram         Consults:   Consults     Date and Time Order Name Status Description    1/13/2023 12:06 PM Inpatient Pulmonology Consult Completed     1/13/2023 12:06 PM Inpatient Cardiology Consult Completed             Discharge Details        Discharge Medications      New Medications      Instructions Start Date   aspirin 81 MG EC tablet   81 mg, Oral, Daily      atorvastatin 40 MG tablet  Commonly known as: LIPITOR  Replaces: pravastatin 20 MG tablet   40 mg, Oral, Daily   Start Date: January 17, 2023     furosemide 40 MG tablet  Commonly known as: LASIX   40 mg, Oral, Daily      predniSONE 20 MG tablet  Commonly known as: DELTASONE   40 mg, Oral, Daily With Breakfast   Start Date: January 17, 2023        Continue These Medications      Instructions Start Date   albuterol (2.5  MG/3ML) 0.083% nebulizer solution  Commonly known as: PROVENTIL   3 mL, Nebulization, 3 Times Daily PRN      albuterol sulfate  (90 Base) MCG/ACT inhaler  Commonly known as: PROVENTIL HFA;VENTOLIN HFA;PROAIR HFA   2 puffs, Inhalation, Every 6 Hours PRN      amLODIPine 5 MG tablet  Commonly known as: NORVASC   5 mg, Oral, Nightly      bisoprolol 5 MG tablet  Commonly known as: ZEBeta   5 mg, Oral, Daily      gabapentin 300 MG capsule  Commonly known as: NEURONTIN   300 mg, Oral, 3 Times Daily      glipizide 10 MG tablet  Commonly known as: GLUCOTROL   10 mg, Oral, Nightly      ipratropium-albuterol 0.5-2.5 mg/3 ml nebulizer  Commonly known as: DUO-NEB   3 mL, Every 6 Hours PRN      lisinopril 40 MG tablet  Commonly known as: PRINIVIL,ZESTRIL   40 mg, Oral, Nightly      metFORMIN 1000 MG tablet  Commonly known as: GLUCOPHAGE   1,000 mg, Oral, 2 Times Daily With Meals      pioglitazone 30 MG tablet  Commonly known as: ACTOS   30 mg, Oral, Nightly      sertraline 50 MG tablet  Commonly known as: ZOLOFT   50 mg, Oral, Nightly      umeclidinium-vilanterol 62.5-25 MCG/INH aerosol powder  inhaler  Commonly known as: ANORO ELLIPTA   1 puff, Daily - RT      vitamin D 1.25 MG (91698 UT) capsule capsule  Commonly known as: ERGOCALCIFEROL   50,000 Units, Every 7 Days, Wednesday         Stop These Medications    pravastatin 20 MG tablet  Commonly known as: PRAVACHOL  Replaced by: atorvastatin 40 MG tablet            No Known Allergies      Discharge Disposition:  Home or Self Care    Diet:  Hospital:  Diet Order   Procedures   • Diet: Cardiac Diets; Healthy Heart (2-3 Na+); Texture: Regular Texture (IDDSI 7); Fluid Consistency: Thin (IDDSI 0)         Discharge Activity:         CODE STATUS:  There are no questions and answers to display.         No future appointments.        Time spent on Discharge including face to face service: 35 minutes    Signature: Electronically signed by Darius Porter MD, 01/16/23, 15:54  EST.  [facility]

## 2023-01-16 NOTE — PROGRESS NOTES
Daily Progress Note        Acute on chronic respiratory failure with hypoxia (HCC)    Abnormal nuclear stress test      Assessment:     COPD with acute exacerbation (HCC)  Acute broncitis/Bronchiectasis  Ex-smoker: Quit July 2022  Diabetes mellitus  Hypertension  Chronic coronary artery disease  Chronic back pain  Dyslipidemia  Obesity       S/p Heart cath today for abnormal stress test 1/14/2023  Showed mild coronary artery disease followed by cardiology  Left ventricular ejection fraction is normal (Calculated EF = 55%).  •  Myocardial perfusion imaging indicates a moderate-to-severe area of ischemia .        Recommendations:        Oxygen supplement and titration to maintain saturation 90 to 95%:      prednison wean     Doxycyline 7 days     Mucinex     Pulmicort and DuoNebs    Patient felt better with diuretics           Review of Sytems:  Review of Systems   Respiratory: Positive for cough and shortness of breath.    Cardiovascular: Positive for palpitations.          LOS: 0 days     Subjective         Objective     Vital signs for last 24 hours:  Vitals:    01/16/23 0711 01/16/23 1050 01/16/23 1054 01/16/23 1109   BP: 146/71   139/85   BP Location: Right arm   Right arm   Patient Position: Sitting   Sitting   Pulse: 56 60 60 59   Resp: 16 16 16 15   Temp: 97.8 °F (36.6 °C)      TempSrc: Oral      SpO2: 96%   96%   Weight:       Height:           Intake/Output last 3 shifts:  I/O last 3 completed shifts:  In: 340 [P.O.:340]  Out: 2700 [Urine:2700]  Intake/Output this shift:  I/O this shift:  In: 240 [P.O.:240]  Out: -       Radiology  Imaging Results (Last 24 Hours)     ** No results found for the last 24 hours. **          Labs:  Results from last 7 days   Lab Units 01/16/23  0701   WBC 10*3/mm3 6.00   HEMOGLOBIN g/dL 11.4*   HEMATOCRIT % 35.8*   PLATELETS 10*3/mm3 170     Results from last 7 days   Lab Units 01/16/23  0701 01/12/23  1902   SODIUM mmol/L 139 141   POTASSIUM mmol/L 3.7 3.9   CHLORIDE mmol/L 99  101   CO2 mmol/L 30.0* 28.0   BUN mg/dL 22 13   CREATININE mg/dL 0.63* 0.75*   CALCIUM mg/dL 9.0 9.3   BILIRUBIN mg/dL  --  0.4   ALK PHOS U/L  --  71   ALT (SGPT) U/L  --  15   AST (SGOT) U/L  --  16   GLUCOSE mg/dL 203* 218*         Results from last 7 days   Lab Units 01/12/23 1902   ALBUMIN g/dL 4.3     Results from last 7 days   Lab Units 01/12/23 1902   TROPONIN T ng/mL <0.010             Results from last 7 days   Lab Units 01/12/23 1902   INR  1.06               Meds:   SCHEDULE  amLODIPine, 5 mg, Oral, Nightly  aspirin, 81 mg, Oral, Daily  [START ON 1/17/2023] atorvastatin, 40 mg, Oral, Daily  bisoprolol, 5 mg, Oral, Daily  ferrous sulfate, 324 mg, Oral, Daily With Breakfast  [START ON 1/17/2023] furosemide, 40 mg, Oral, Daily  gabapentin, 300 mg, Oral, TID  insulin lispro, 2-7 Units, Subcutaneous, 4x Daily AC & at Bedtime  ipratropium-albuterol, 3 mL, Nebulization, Q8H - RT  lisinopril, 40 mg, Oral, Nightly  predniSONE, 40 mg, Oral, Daily With Breakfast  sertraline, 50 mg, Oral, Nightly  sodium chloride, 10 mL, Intravenous, Q12H  sodium chloride, 3 mL, Intravenous, Q12H      Infusions     PRNs  •  acetaminophen  •  albuterol  •  atropine  •  dextrose  •  dextrose  •  glucagon (human recombinant)  •  HYDROcodone-acetaminophen  •  melatonin  •  [COMPLETED] Insert Peripheral IV **AND** sodium chloride  •  sodium chloride  •  sodium chloride  •  sodium chloride  •  sodium chloride  •  sodium chloride    Physical Exam:  Physical Exam  Cardiovascular:      Heart sounds: Murmur heard.   Pulmonary:      Breath sounds: Rhonchi and rales present.   Abdominal:      General: There is no distension.      Tenderness: There is no abdominal tenderness.         ROS  Review of Systems   Respiratory: Positive for cough and shortness of breath.    Cardiovascular: Negative for chest pain, palpitations and leg swelling.             Total time spent with patient greater than: 45 Minutes

## 2023-01-17 NOTE — OUTREACH NOTE
Prep Survey    Flowsheet Row Responses   [facility] from? [facility]   Is LACE score < 7 ? No   Eligibility Readm Mgmt   Discharge diagnosis Acute on chronic respiratory with hypoxia COPD exacerbation   Does the patient have one of the following disease processes/diagnoses(primary or secondary)? COPD   Is there a DME ordered? Yes   What DME was ordered? [facility]    Prep survey completed? Yes          BETTY MCLAUGHLIN - Registered Nurse

## 2023-01-17 NOTE — SIGNIFICANT NOTE
Case Management Discharge Note      Final Note: (P) d/c home         Selected Continued Care - Discharged on 1/16/2023 Admission date: 1/12/2023 - Discharge disposition: Home or Self Care                              Final Discharge Disposition Code: (P) 01 - home or self-care

## 2023-01-19 ENCOUNTER — READMISSION MANAGEMENT (OUTPATIENT)
Dept: CALL CENTER | Facility: HOSPITAL | Age: 73
End: 2023-01-19
Payer: MEDICARE

## 2023-01-19 NOTE — OUTREACH NOTE
COPD/PN Week 1 Survey    Flowsheet Row Responses   [facility] patient discharged from? [facility]   Does the patient have one of the following disease processes/diagnoses(primary or secondary)? COPD   Week 1 attempt successful? Yes   Call start time 0939   Call end time 0949   Meds reviewed with patient/caregiver? Yes   Is the patient having any side effects they believe may be caused by any medication additions or changes? No   Does the patient have all medications ordered at discharge? Yes   Is the patient taking all medications as directed (includes completed medication regime)? Yes   Comments regarding appointments PCP appt 01/26/23. Cardiology appt 02/01/23.   Does the patient have a primary care provider?  Yes   Does the patient have an appointment with their PCP or specialist within 7 days of discharge? Greater than 7 days   What is preventing the patient from scheduling follow up appointments within 7 days of discharge? Earlier appointment not available   Nursing Interventions Verified appointment date/time/provider   Has the patient kept scheduled appointments due by today? N/A   Has home health visited the patient within 72 hours of discharge? N/A   DME comments States using home O2 at 4L.   Pulse Ox monitoring Intermittent   Pulse Ox device source Patient   O2 Sat comments 95% on 4L home O2.   O2 Sat: education provided Sat levels   Psychosocial issues? No   Did the patient receive a copy of their discharge instructions? Yes   Nursing interventions Reviewed instructions with patient   What is the patient's perception of their health status since discharge? Improving   Nursing Interventions Nurse provided patient education   Are the patient's immunizations up to date?  Yes   Nursing interventions Advised patient to discuss with provider at next visit   If the patient is a current smoker, are they able to teach back resources for cessation? Not a smoker   Is the patient/caregiver able to teach back the  hierarchy of who to call/visit for symptoms/problems? PCP, Specialist, Home health nurse, Urgent Care, ED, 911 Yes   Is the patient able to teach back COPD zones? No   Nursing interventions Education provided on various zones   Patient reports what zone on this call? Green Zone   Green Zone Reports doing well, Usual amounts of cough and phlegm/mucous, Breathing without shortness of breath, Slept well last night, Usual activity and exercise level, Appetite is good   Green Zone interventions: Take daily medications, Continue regular exercise/diet plan, Use oxygen as prescribed, At all times avoid cigarette smoking, vaping and inhaled irritants   Week 1 call completed? Yes   Is the patient interested in additional calls from an ambulatory ?  NOTE:  applies to high risk patients requiring additional follow-up. No   Wrap up additional comments Patient states is feeling much better. States O2 sats are staying good, has good energy level. States will call cardiologist office regarding removal of groin site dressing. Denies any s/s of infection at groin site. Denies any needs/concerns today.          MARIO TRUONG - Registered Nurse

## 2023-01-21 LAB — QT INTERVAL: 461 MS

## 2023-02-01 ENCOUNTER — OFFICE VISIT (OUTPATIENT)
Dept: CARDIOLOGY | Facility: CLINIC | Age: 73
End: 2023-02-01
Payer: MEDICARE

## 2023-02-01 VITALS
OXYGEN SATURATION: 94 % | BODY MASS INDEX: 34.53 KG/M2 | WEIGHT: 220 LBS | HEART RATE: 67 BPM | HEIGHT: 67 IN | DIASTOLIC BLOOD PRESSURE: 75 MMHG | SYSTOLIC BLOOD PRESSURE: 139 MMHG

## 2023-02-01 DIAGNOSIS — I25.10 CORONARY ARTERY DISEASE INVOLVING NATIVE CORONARY ARTERY OF NATIVE HEART WITHOUT ANGINA PECTORIS: ICD-10-CM

## 2023-02-01 DIAGNOSIS — E11.9 TYPE 2 DIABETES MELLITUS WITHOUT COMPLICATION, WITHOUT LONG-TERM CURRENT USE OF INSULIN: Primary | Chronic | ICD-10-CM

## 2023-02-01 DIAGNOSIS — E78.2 MIXED HYPERLIPIDEMIA: Chronic | ICD-10-CM

## 2023-02-01 DIAGNOSIS — I10 ESSENTIAL HYPERTENSION: Chronic | ICD-10-CM

## 2023-02-01 PROCEDURE — 99214 OFFICE O/P EST MOD 30 MIN: CPT | Performed by: INTERNAL MEDICINE

## 2023-03-08 ENCOUNTER — HOSPITAL ENCOUNTER (INPATIENT)
Facility: HOSPITAL | Age: 73
LOS: 3 days | Discharge: HOME | DRG: 190 | End: 2023-03-13
Attending: EMERGENCY MEDICINE | Admitting: INTERNAL MEDICINE
Payer: MEDICARE

## 2023-03-08 ENCOUNTER — APPOINTMENT (OUTPATIENT)
Dept: GENERAL RADIOLOGY | Facility: HOSPITAL | Age: 73
DRG: 190 | End: 2023-03-08
Payer: MEDICARE

## 2023-03-08 DIAGNOSIS — R09.02 HYPOXIA: ICD-10-CM

## 2023-03-08 DIAGNOSIS — R06.03 ACUTE RESPIRATORY DISTRESS: Primary | ICD-10-CM

## 2023-03-08 DIAGNOSIS — J44.1 CHRONIC OBSTRUCTIVE PULMONARY DISEASE WITH ACUTE EXACERBATION: ICD-10-CM

## 2023-03-08 LAB
ALBUMIN SERPL-MCNC: 4 G/DL (ref 3.5–5.2)
ALBUMIN/GLOB SERPL: 1 G/DL
ALP SERPL-CCNC: 69 U/L (ref 39–117)
ALT SERPL W P-5'-P-CCNC: 15 U/L (ref 1–41)
ANION GAP SERPL CALCULATED.3IONS-SCNC: 11 MMOL/L (ref 5–15)
ARTERIAL PATENCY WRIST A: POSITIVE
AST SERPL-CCNC: 16 U/L (ref 1–40)
ATMOSPHERIC PRESS: ABNORMAL MM[HG]
B PARAPERT DNA SPEC QL NAA+PROBE: NOT DETECTED
B PERT DNA SPEC QL NAA+PROBE: NOT DETECTED
BASE EXCESS BLDA CALC-SCNC: 6 MMOL/L (ref 0–3)
BASOPHILS # BLD AUTO: 0 10*3/MM3 (ref 0–0.2)
BASOPHILS NFR BLD AUTO: 0.5 % (ref 0–1.5)
BDY SITE: ABNORMAL
BILIRUB SERPL-MCNC: 0.5 MG/DL (ref 0–1.2)
BUN SERPL-MCNC: 12 MG/DL (ref 8–23)
BUN/CREAT SERPL: 22.2 (ref 7–25)
C PNEUM DNA NPH QL NAA+NON-PROBE: NOT DETECTED
CALCIUM SPEC-SCNC: 9.4 MG/DL (ref 8.6–10.5)
CHLORIDE SERPL-SCNC: 100 MMOL/L (ref 98–107)
CHOLEST SERPL-MCNC: 114 MG/DL (ref 0–200)
CO2 BLDA-SCNC: 34 MMOL/L (ref 22–29)
CO2 SERPL-SCNC: 31 MMOL/L (ref 22–29)
CREAT SERPL-MCNC: 0.54 MG/DL (ref 0.76–1.27)
D-LACTATE SERPL-SCNC: 2 MMOL/L (ref 0.3–2)
DEPRECATED RDW RBC AUTO: 53.8 FL (ref 37–54)
EGFRCR SERPLBLD CKD-EPI 2021: 105.9 ML/MIN/1.73
EOSINOPHIL # BLD AUTO: 0.1 10*3/MM3 (ref 0–0.4)
EOSINOPHIL NFR BLD AUTO: 1.4 % (ref 0.3–6.2)
ERYTHROCYTE [DISTWIDTH] IN BLOOD BY AUTOMATED COUNT: 16.5 % (ref 12.3–15.4)
FLUAV SUBTYP SPEC NAA+PROBE: NOT DETECTED
FLUBV RNA ISLT QL NAA+PROBE: NOT DETECTED
GLOBULIN UR ELPH-MCNC: 3.9 GM/DL
GLUCOSE BLDC GLUCOMTR-MCNC: 317 MG/DL (ref 70–105)
GLUCOSE SERPL-MCNC: 147 MG/DL (ref 65–99)
HADV DNA SPEC NAA+PROBE: NOT DETECTED
HBA1C MFR BLD: 6.4 % (ref 4.8–5.6)
HCO3 BLDA-SCNC: 32.3 MMOL/L (ref 21–28)
HCOV 229E RNA SPEC QL NAA+PROBE: NOT DETECTED
HCOV HKU1 RNA SPEC QL NAA+PROBE: NOT DETECTED
HCOV NL63 RNA SPEC QL NAA+PROBE: NOT DETECTED
HCOV OC43 RNA SPEC QL NAA+PROBE: NOT DETECTED
HCT VFR BLD AUTO: 33.8 % (ref 37.5–51)
HDLC SERPL-MCNC: 26 MG/DL (ref 40–60)
HEMODILUTION: NO
HGB BLD-MCNC: 11 G/DL (ref 13–17.7)
HMPV RNA NPH QL NAA+NON-PROBE: NOT DETECTED
HOLD SPECIMEN: NORMAL
HOLD SPECIMEN: NORMAL
HPIV1 RNA ISLT QL NAA+PROBE: NOT DETECTED
HPIV2 RNA SPEC QL NAA+PROBE: NOT DETECTED
HPIV3 RNA NPH QL NAA+PROBE: NOT DETECTED
HPIV4 P GENE NPH QL NAA+PROBE: NOT DETECTED
INHALED O2 CONCENTRATION: 100 %
LDLC SERPL CALC-MCNC: 66 MG/DL (ref 0–100)
LDLC/HDLC SERPL: 2.46 {RATIO}
LYMPHOCYTES # BLD AUTO: 1.4 10*3/MM3 (ref 0.7–3.1)
LYMPHOCYTES NFR BLD AUTO: 25.4 % (ref 19.6–45.3)
M PNEUMO IGG SER IA-ACNC: NOT DETECTED
MCH RBC QN AUTO: 28.2 PG (ref 26.6–33)
MCHC RBC AUTO-ENTMCNC: 32.4 G/DL (ref 31.5–35.7)
MCV RBC AUTO: 87.2 FL (ref 79–97)
MODALITY: ABNORMAL
MONOCYTES # BLD AUTO: 0.4 10*3/MM3 (ref 0.1–0.9)
MONOCYTES NFR BLD AUTO: 6.8 % (ref 5–12)
NEUTROPHILS NFR BLD AUTO: 3.5 10*3/MM3 (ref 1.7–7)
NEUTROPHILS NFR BLD AUTO: 65.9 % (ref 42.7–76)
NRBC BLD AUTO-RTO: 0.1 /100 WBC (ref 0–0.2)
NT-PROBNP SERPL-MCNC: 367.5 PG/ML (ref 0–900)
PCO2 BLDA: 53.7 MM HG (ref 35–48)
PH BLDA: 7.39 PH UNITS (ref 7.35–7.45)
PLATELET # BLD AUTO: 183 10*3/MM3 (ref 140–450)
PMV BLD AUTO: 9.2 FL (ref 6–12)
PO2 BLDA: 113.8 MM HG (ref 83–108)
POTASSIUM SERPL-SCNC: 3.7 MMOL/L (ref 3.5–5.2)
PROT SERPL-MCNC: 7.9 G/DL (ref 6–8.5)
RBC # BLD AUTO: 3.88 10*6/MM3 (ref 4.14–5.8)
RHINOVIRUS RNA SPEC NAA+PROBE: NOT DETECTED
RSV RNA NPH QL NAA+NON-PROBE: NOT DETECTED
SAO2 % BLDCOA: 98.3 % (ref 94–98)
SARS-COV-2 RNA NPH QL NAA+NON-PROBE: NOT DETECTED
SODIUM SERPL-SCNC: 142 MMOL/L (ref 136–145)
TRIGL SERPL-MCNC: 120 MG/DL (ref 0–150)
TROPONIN T SERPL HS-MCNC: 21 NG/L
TSH SERPL DL<=0.05 MIU/L-ACNC: 1.79 UIU/ML (ref 0.27–4.2)
VLDLC SERPL-MCNC: 22 MG/DL (ref 5–40)
WBC NRBC COR # BLD: 5.3 10*3/MM3 (ref 3.4–10.8)
WHOLE BLOOD HOLD COAG: NORMAL
WHOLE BLOOD HOLD SPECIMEN: NORMAL

## 2023-03-08 PROCEDURE — 82962 GLUCOSE BLOOD TEST: CPT

## 2023-03-08 PROCEDURE — G0378 HOSPITAL OBSERVATION PER HR: HCPCS

## 2023-03-08 PROCEDURE — 84443 ASSAY THYROID STIM HORMONE: CPT | Performed by: NURSE PRACTITIONER

## 2023-03-08 PROCEDURE — 36415 COLL VENOUS BLD VENIPUNCTURE: CPT | Performed by: EMERGENCY MEDICINE

## 2023-03-08 PROCEDURE — 36600 WITHDRAWAL OF ARTERIAL BLOOD: CPT

## 2023-03-08 PROCEDURE — 87040 BLOOD CULTURE FOR BACTERIA: CPT | Performed by: EMERGENCY MEDICINE

## 2023-03-08 PROCEDURE — 83605 ASSAY OF LACTIC ACID: CPT

## 2023-03-08 PROCEDURE — 85025 COMPLETE CBC W/AUTO DIFF WBC: CPT | Performed by: EMERGENCY MEDICINE

## 2023-03-08 PROCEDURE — 94799 UNLISTED PULMONARY SVC/PX: CPT

## 2023-03-08 PROCEDURE — 93005 ELECTROCARDIOGRAM TRACING: CPT | Performed by: EMERGENCY MEDICINE

## 2023-03-08 PROCEDURE — 83880 ASSAY OF NATRIURETIC PEPTIDE: CPT | Performed by: EMERGENCY MEDICINE

## 2023-03-08 PROCEDURE — 71045 X-RAY EXAM CHEST 1 VIEW: CPT

## 2023-03-08 PROCEDURE — 25010000002 METHYLPREDNISOLONE PER 125 MG: Performed by: EMERGENCY MEDICINE

## 2023-03-08 PROCEDURE — 80053 COMPREHEN METABOLIC PANEL: CPT | Performed by: EMERGENCY MEDICINE

## 2023-03-08 PROCEDURE — 84484 ASSAY OF TROPONIN QUANT: CPT | Performed by: EMERGENCY MEDICINE

## 2023-03-08 PROCEDURE — 94660 CPAP INITIATION&MGMT: CPT

## 2023-03-08 PROCEDURE — 0202U NFCT DS 22 TRGT SARS-COV-2: CPT | Performed by: EMERGENCY MEDICINE

## 2023-03-08 PROCEDURE — 80061 LIPID PANEL: CPT | Performed by: NURSE PRACTITIONER

## 2023-03-08 PROCEDURE — 83036 HEMOGLOBIN GLYCOSYLATED A1C: CPT | Performed by: NURSE PRACTITIONER

## 2023-03-08 PROCEDURE — 99285 EMERGENCY DEPT VISIT HI MDM: CPT

## 2023-03-08 PROCEDURE — 94640 AIRWAY INHALATION TREATMENT: CPT

## 2023-03-08 PROCEDURE — 25010000002 METHYLPREDNISOLONE PER 40 MG: Performed by: NURSE PRACTITIONER

## 2023-03-08 PROCEDURE — 82803 BLOOD GASES ANY COMBINATION: CPT

## 2023-03-08 RX ORDER — SODIUM CHLORIDE 0.9 % (FLUSH) 0.9 %
10 SYRINGE (ML) INJECTION AS NEEDED
Status: DISCONTINUED | OUTPATIENT
Start: 2023-03-08 | End: 2023-03-13 | Stop reason: HOSPADM

## 2023-03-08 RX ORDER — SODIUM CHLORIDE 9 MG/ML
40 INJECTION, SOLUTION INTRAVENOUS AS NEEDED
Status: DISCONTINUED | OUTPATIENT
Start: 2023-03-08 | End: 2023-03-13 | Stop reason: HOSPADM

## 2023-03-08 RX ORDER — BUDESONIDE 0.5 MG/2ML
0.5 INHALANT ORAL
Status: DISCONTINUED | OUTPATIENT
Start: 2023-03-08 | End: 2023-03-13 | Stop reason: HOSPADM

## 2023-03-08 RX ORDER — IPRATROPIUM BROMIDE AND ALBUTEROL SULFATE 2.5; .5 MG/3ML; MG/3ML
3 SOLUTION RESPIRATORY (INHALATION) ONCE
Status: COMPLETED | OUTPATIENT
Start: 2023-03-08 | End: 2023-03-08

## 2023-03-08 RX ORDER — METHYLPREDNISOLONE SODIUM SUCCINATE 40 MG/ML
40 INJECTION, POWDER, LYOPHILIZED, FOR SOLUTION INTRAMUSCULAR; INTRAVENOUS EVERY 8 HOURS
Status: DISCONTINUED | OUTPATIENT
Start: 2023-03-08 | End: 2023-03-09

## 2023-03-08 RX ORDER — METHYLPREDNISOLONE SODIUM SUCCINATE 125 MG/2ML
125 INJECTION, POWDER, LYOPHILIZED, FOR SOLUTION INTRAMUSCULAR; INTRAVENOUS ONCE
Status: COMPLETED | OUTPATIENT
Start: 2023-03-08 | End: 2023-03-08

## 2023-03-08 RX ORDER — ACETAMINOPHEN 325 MG/1
650 TABLET ORAL EVERY 4 HOURS PRN
Status: DISCONTINUED | OUTPATIENT
Start: 2023-03-08 | End: 2023-03-13 | Stop reason: HOSPADM

## 2023-03-08 RX ORDER — CHOLECALCIFEROL (VITAMIN D3) 125 MCG
5 CAPSULE ORAL NIGHTLY PRN
Status: DISCONTINUED | OUTPATIENT
Start: 2023-03-08 | End: 2023-03-13 | Stop reason: HOSPADM

## 2023-03-08 RX ORDER — NITROGLYCERIN 0.4 MG/1
0.4 TABLET SUBLINGUAL
Status: DISCONTINUED | OUTPATIENT
Start: 2023-03-08 | End: 2023-03-13 | Stop reason: HOSPADM

## 2023-03-08 RX ORDER — IPRATROPIUM BROMIDE AND ALBUTEROL SULFATE 2.5; .5 MG/3ML; MG/3ML
3 SOLUTION RESPIRATORY (INHALATION)
Status: DISCONTINUED | OUTPATIENT
Start: 2023-03-08 | End: 2023-03-13 | Stop reason: HOSPADM

## 2023-03-08 RX ORDER — ONDANSETRON 2 MG/ML
4 INJECTION INTRAMUSCULAR; INTRAVENOUS EVERY 6 HOURS PRN
Status: DISCONTINUED | OUTPATIENT
Start: 2023-03-08 | End: 2023-03-13 | Stop reason: HOSPADM

## 2023-03-08 RX ORDER — SODIUM CHLORIDE 0.9 % (FLUSH) 0.9 %
10 SYRINGE (ML) INJECTION EVERY 12 HOURS SCHEDULED
Status: DISCONTINUED | OUTPATIENT
Start: 2023-03-08 | End: 2023-03-13 | Stop reason: HOSPADM

## 2023-03-08 RX ORDER — ALBUTEROL SULFATE 2.5 MG/3ML
2.5 SOLUTION RESPIRATORY (INHALATION) ONCE
Status: COMPLETED | OUTPATIENT
Start: 2023-03-08 | End: 2023-03-08

## 2023-03-08 RX ADMIN — METHYLPREDNISOLONE SODIUM SUCCINATE 125 MG: 125 INJECTION, POWDER, FOR SOLUTION INTRAMUSCULAR; INTRAVENOUS at 14:30

## 2023-03-08 RX ADMIN — IPRATROPIUM BROMIDE AND ALBUTEROL SULFATE 3 ML: 2.5; .5 SOLUTION RESPIRATORY (INHALATION) at 14:17

## 2023-03-08 RX ADMIN — Medication 10 ML: at 21:25

## 2023-03-08 RX ADMIN — ALBUTEROL SULFATE 2.5 MG: 2.5 SOLUTION RESPIRATORY (INHALATION) at 14:14

## 2023-03-08 RX ADMIN — IPRATROPIUM BROMIDE AND ALBUTEROL SULFATE 3 ML: 2.5; .5 SOLUTION RESPIRATORY (INHALATION) at 18:19

## 2023-03-08 RX ADMIN — METHYLPREDNISOLONE SODIUM SUCCINATE 40 MG: 40 INJECTION, POWDER, FOR SOLUTION INTRAMUSCULAR; INTRAVENOUS at 21:24

## 2023-03-08 RX ADMIN — BUDESONIDE 0.5 MG: 0.5 INHALANT RESPIRATORY (INHALATION) at 18:25

## 2023-03-08 NOTE — H&P
[facility] Medicine Services  History & Physical    Patient Name: Brenden Peter  : 1950  MRN: [id number]  Primary Care Physician:  Bert Rojas MD  Date of admission: 3/8/2023  Date and Time of Service: 2023   at 17:57 EST    Subjective      Chief Complaint: Increased shortness of breath    History of Present Illness: Brenden Peter is a 72 y.o. male who presented to [facility] on 3/8/2023 complaining of increased shortness of breath.  Patient says he is been worsening over the last several days.  He did see Dr. Greenwood in the clinic yesterday and is supposed to have a CT tomorrow morning at [facility].  Neuro admits to using a CPAP at night with his oxygen bled into it.  Denies any fevers or chills.    In ED, afebrile, /53 and initially on a nonrebreather currently on high flow nasal cannula 10 L.  ABG at 2:40 PM on NRB: pH 7.38, CO2 53, PO2 113, bicarb 32  Lactic 2.0, high-sensitivity troponin 21, proBNP 365, glucose 147, creatinine 0.54, WBC 5.30, hemoglobin 11, blood cultures pending, and respiratory panel negative.    XR Chest 1 View    Result Date: 3/8/2023  Impression: Radiographic findings consistent with congestive heart failure and pulmonary edema. Electronically Signed: Leo SUBRAMANIAN Shakira  3/8/2023 3:58 PM EST  Workstation ID: [id number]    ECG 12 Lead Dyspnea   Preliminary Result   HEART RATE= 61  bpm   RR Interval= 984  ms   CA Interval= 210  ms   P Horizontal Axis= -10  deg   P Front Axis= 41  deg   QRSD Interval= 167  ms   QT Interval= 454  ms   QRS Axis= 107  deg   T Wave Axis= 50  deg   - ABNORMAL ECG -   Sinus rhythm   Right bundle branch block   When compared with ECG of 2023 6:55:22,   Significant repolarization change   Electronically Signed By:    Date and Time of Study: 2023 14:34:05            Review of Systems   Respiratory: Positive for cough and shortness of breath.         Wears 4L NC at home, and CPAP at night with O2.  Reports feeling very SOB every morning.    Psychiatric/Behavioral: The patient is nervous/anxious.         Admits to being worried about his breathing.    All other systems reviewed and are negative.       Personal History     Past Medical History:   Diagnosis Date   • Abnormal EKG 2/3/2015   • Anxiety    • COPD (chronic obstructive pulmonary disease) ([facility])    • Coronary artery disease 3/3/2015   • Diabetes ([facility])     type 2   • Hyperlipidemia    • Hypertension    • Microalbuminuria due to type 2 diabetes mellitus ([facility]) 2/5/2020   • Multinodular goiter 4/18/2019   • Murmur 2/3/2015   • Primary osteoarthritis of left knee 10/22/2021       Past Surgical History:   Procedure Laterality Date   • CARDIAC CATHETERIZATION Left 1/15/2023    Procedure: Left Heart Cath and coronary angiogram;  Surgeon: Shawn Baker MD;  [facility];  Service: Cardiovascular;  Laterality: Left;   • EYE SURGERY      cataract   • TOTAL KNEE ARTHROPLASTY Right 10/21/2020    Procedure: TOTAL KNEE ARTHROPLASTY;  Surgeon: Ravi Fagan MD;  Location[facility];  Service: Orthopedics;  Laterality: Right;   • TOTAL KNEE ARTHROPLASTY REVISION Right 5/19/2021    Procedure: KNEE POLY INSERT EXCHANGE with irrigation;  Surgeon: Ravi Fagan MD;  Location[facility];  Service: Orthopedics;  Laterality: Right;       Family History: family history includes Cancer in an other family member; Diabetes in an other family member; Heart disease in an other family member; No Known Problems in his brother, father, maternal aunt, maternal grandfather, maternal grandmother, maternal uncle, mother, paternal aunt, paternal grandfather, paternal grandmother, paternal uncle, and sister. Otherwise pertinent FHx was reviewed and not pertinent to current issue.    Social History:  reports that he quit smoking about 8 months ago. His smoking use included cigarettes. He smoked an average of 1 pack per day. He quit smokeless tobacco use  about 8 months ago. He reports that he does not currently use alcohol. He reports that he does not use drugs.    Home Medications:  Prior to Admission Medications     Prescriptions Last Dose Informant Patient Reported? Taking?    albuterol (PROVENTIL) (2.5 MG/3ML) 0.083% nebulizer solution   Yes No    Take 3 mL by nebulization 3 (Three) Times a Day As Needed for Wheezing or Shortness of Air.    albuterol sulfate  (90 Base) MCG/ACT inhaler   Yes No    Inhale 2 puffs Every 6 (Six) Hours As Needed.    amLODIPine (NORVASC) 5 MG tablet   Yes No    Take 5 mg by mouth Every Night.    aspirin 81 MG EC tablet   No No    Take 1 tablet by mouth Daily for 90 days.    atorvastatin (LIPITOR) 40 MG tablet   No No    Take 1 tablet by mouth Daily for 90 days.    bisoprolol (ZEBeta) 5 MG tablet   Yes No    Take 5 mg by mouth Daily.    furosemide (LASIX) 40 MG tablet   No No    Take 1 tablet by mouth Daily for 90 days.    gabapentin (NEURONTIN) 300 MG capsule   Yes No    Take 300 mg by mouth 3 (Three) Times a Day.    glipizide (GLUCOTROL) 10 MG tablet   Yes No    Take 10 mg by mouth Every Night.    ipratropium-albuterol (DUO-NEB) 0.5-2.5 mg/3 ml nebulizer   Yes No    3 mL Every 6 (Six) Hours As Needed.    lisinopril (PRINIVIL,ZESTRIL) 40 MG tablet   Yes No    Take 40 mg by mouth Every Night.    metFORMIN (GLUCOPHAGE) 1000 MG tablet   Yes No    Take 1,000 mg by mouth 2 (Two) Times a Day With Meals.    pioglitazone (ACTOS) 30 MG tablet   Yes No    Take 30 mg by mouth Every Night.    sertraline (ZOLOFT) 50 MG tablet   Yes No    Take 50 mg by mouth Every Night.    umeclidinium-vilanterol (ANORO ELLIPTA) 62.5-25 MCG/INH aerosol powder  inhaler   Yes No    Inhale 1 puff Daily.    vitamin D (ERGOCALCIFEROL) 1.25 MG (76711 UT) capsule capsule   Yes No    50,000 Units Every 7 (Seven) Days. Wednesday            Allergies:  No Known Allergies    Objective      Vitals:   Temp:  [97.7 °F (36.5 °C)] 97.7 °F (36.5 °C)  Heart Rate:  [58-98]  63  Resp:  [21-25] 25  BP: (129-162)/(53-69) 129/53  Flow (L/min):  [10-15] 10    Physical Exam  Vitals reviewed.   Constitutional:       Appearance: Normal appearance. He is obese.   HENT:      Head: Normocephalic.      Mouth/Throat:      Mouth: Mucous membranes are moist.   Eyes:      Extraocular Movements: Extraocular movements intact.      Pupils: Pupils are equal, round, and reactive to light.   Pulmonary:      Effort: Pulmonary effort is normal.      Breath sounds: Normal breath sounds.   Abdominal:      General: Bowel sounds are normal.      Palpations: Abdomen is soft.   Neurological:      Mental Status: He is alert.          Result Review    Result Review:  I have personally reviewed the results from the time of this admission to 3/8/2023 17:57 EST and agree with these findings:  [x]  Laboratory  [x]  Microbiology  [x]  Radiology  [x]  EKG/Telemetry   []  Cardiology/Vascular   []  Pathology  []  Old records  []  Other:      Assessment & Plan        Active Hospital Problems:  There are no active hospital problems to display for this patient.    Plan:     COPD exacerbation  Obstructive sleep apnea  - Wears 4 L at home, currently on 2 L HFNC  - continue to wean O2 and maintain O2 sat > 90%  - CXR mentions pulmonary edema but BNP is negative.  - Respiratory panel negative  - Continue Solu-Medrol  - Continue DuoNebs and Pulmicort  - BiPAP as needed/at bedtime  - CT chest without contrast  - Hold home on oral  - Continue home Lasix    Hypertension  CAD/HLD  -/53  - Recent cardiac cath January 2023 which just showed mild CAD  - Continue home statin, ASA, bisoprolol, norvasc, and lisinopril    Type 2 diabetes  -Glucose 147  -Check A1c  - Hold home actos, metformin and glipizide  - Sliding scale insulin and Accu-Cheks before meals and at bedtime    Anxiety  - Continue home Zoloft    DVT prophylaxis  -Lovenox    Obesity  - BMI 34.46  - Encourage lifestyle changes    CODE STATUS: Full code     Admission Status:   I believe this patient meets observation status.    I discussed the patient's findings and my recommendations with patient.      Signature: Electronically signed by LENNIE Villalba, 03/08/23, 17:57 EST.  [facility] Hospitalist Team

## 2023-03-08 NOTE — ED PROVIDER NOTES
Subjective   History of Present Illness  Chief complaint increasing shortness of breath    History of present illness 72-year-old male with history of COPD on 4 L of oxygen at home who states he is always short of breath but over about the last week he has had increasing shortness of breath and oxygen saturation is decreasing.  Today the patient was in the office and the patient's sats were down in the 60s and 70s EMS prior to the hospital placed him on nonrebreather.  He denies any fever chills sweats has had a cough nonproductive no chest pain neck arm jaw pain.  Worse with exertion better with rest moderate severe continuous 1 week.  No leg pain or swelling out of the ordinary he has had recent hospitalizations.        Review of Systems   Constitutional: Negative for chills and fever.   Eyes: Negative for photophobia.   Respiratory: Positive for cough and shortness of breath. Negative for chest tightness.    Cardiovascular: Negative for chest pain and leg swelling.   Gastrointestinal: Negative for abdominal pain and vomiting.   Musculoskeletal: Negative for back pain and neck pain.   Skin: Negative for rash and wound.   Psychiatric/Behavioral: Negative for agitation and confusion.       Past Medical History:   Diagnosis Date   • Abnormal EKG 2/3/2015   • Anxiety    • COPD (chronic obstructive pulmonary disease) ([facility])    • Coronary artery disease 3/3/2015   • Diabetes ([facility])     type 2   • Hyperlipidemia    • Hypertension    • Microalbuminuria due to type 2 diabetes mellitus ([facility]) 2/5/2020   • Multinodular goiter 4/18/2019   • Murmur 2/3/2015   • Primary osteoarthritis of left knee 10/22/2021       No Known Allergies    Past Surgical History:   Procedure Laterality Date   • CARDIAC CATHETERIZATION Left 1/15/2023    Procedure: Left Heart Cath and coronary angiogram;  Surgeon: Shawn Baker MD;  Location: [facility] INVASIVE LOCATION;  Service: Cardiovascular;  Laterality: Left;   • EYE SURGERY      cataract   •  TOTAL KNEE ARTHROPLASTY Right 10/21/2020    Procedure: TOTAL KNEE ARTHROPLASTY;  Surgeon: Ravi Fagan MD;  Location: [facility] MAIN OR;  Service: Orthopedics;  Laterality: Right;   • TOTAL KNEE ARTHROPLASTY REVISION Right 2021    Procedure: KNEE POLY INSERT EXCHANGE with irrigation;  Surgeon: Ravi Fagan MD;  Location: [facility] MAIN OR;  Service: Orthopedics;  Laterality: Right;       Family History   Problem Relation Age of Onset   • Cancer Other    • Diabetes Other    • Heart disease Other    • No Known Problems Mother    • No Known Problems Father    • No Known Problems Sister    • No Known Problems Brother    • No Known Problems Maternal Aunt    • No Known Problems Maternal Uncle    • No Known Problems Paternal Aunt    • No Known Problems Paternal Uncle    • No Known Problems Maternal Grandmother    • No Known Problems Maternal Grandfather    • No Known Problems Paternal Grandmother    • No Known Problems Paternal Grandfather    • Anemia Neg Hx    • Arrhythmia Neg Hx    • Asthma Neg Hx    • Clotting disorder Neg Hx    • Fainting Neg Hx    • Heart attack Neg Hx    • Heart failure Neg Hx    • Hyperlipidemia Neg Hx    • Hypertension Neg Hx        Social History     Socioeconomic History   • Marital status:    Tobacco Use   • Smoking status: Former     Packs/day: 1.00     Types: Cigarettes     Quit date: 7/3/2022     Years since quittin.6   • Smokeless tobacco: Former     Quit date: 7/3/2022   Vaping Use   • Vaping Use: Never used   Substance and Sexual Activity   • Alcohol use: Not Currently   • Drug use: Never   • Sexual activity: Defer     Prior to Admission medications    Medication Sig Start Date End Date Taking? Authorizing Provider   albuterol (PROVENTIL) (2.5 MG/3ML) 0.083% nebulizer solution Take 3 mL by nebulization 3 (Three) Times a Day As Needed for Wheezing or Shortness of Air.    Provider, MD Felix   albuterol sulfate  (90 Base) MCG/ACT inhaler Inhale 2 puffs Every 6 (Six)  Hours As Needed. 10/13/21   Felix Solano MD   amLODIPine (NORVASC) 5 MG tablet Take 5 mg by mouth Every Night. 4/14/20   Felix Solano MD   aspirin 81 MG EC tablet Take 1 tablet by mouth Daily for 90 days. 1/16/23 4/16/23  Darius Porter MD   atorvastatin (LIPITOR) 40 MG tablet Take 1 tablet by mouth Daily for 90 days. 1/17/23 4/17/23  Darius Porter MD   bisoprolol (ZEBeta) 5 MG tablet Take 5 mg by mouth Daily. 10/6/20   Felix Solano MD   furosemide (LASIX) 40 MG tablet Take 1 tablet by mouth Daily for 90 days. 1/16/23 4/16/23  Darius Porter MD   gabapentin (NEURONTIN) 300 MG capsule Take 300 mg by mouth 3 (Three) Times a Day.    Felix Solano MD   glipizide (GLUCOTROL) 10 MG tablet Take 10 mg by mouth Every Night.    Felix Solano MD   ipratropium-albuterol (DUO-NEB) 0.5-2.5 mg/3 ml nebulizer 3 mL Every 6 (Six) Hours As Needed.    Felix Solano MD   lisinopril (PRINIVIL,ZESTRIL) 40 MG tablet Take 40 mg by mouth Every Night.    Felix Solano MD   metFORMIN (GLUCOPHAGE) 1000 MG tablet Take 1,000 mg by mouth 2 (Two) Times a Day With Meals. 4/14/20   Felix Solano MD   pioglitazone (ACTOS) 30 MG tablet Take 30 mg by mouth Every Night.    Felix Solano MD   sertraline (ZOLOFT) 50 MG tablet Take 50 mg by mouth Every Night. 4/14/20   Felix Solano MD   umeclidinium-vilanterol (ANORO ELLIPTA) 62.5-25 MCG/INH aerosol powder  inhaler Inhale 1 puff Daily.    Felix Solano MD   vitamin D (ERGOCALCIFEROL) 1.25 MG (46836 UT) capsule capsule 50,000 Units Every 7 (Seven) Days. Wednesday 4/14/20   Felix Solano MD             Objective   Physical Exam  Constitutional 72-year-old male awake alert moderate distress but nontoxic-appearing he is on nonrebreather sats at 98%.  Triage vital signs reviewed.  HEENT extraocular muscles are intact pupils equal round reactive sclera clear.  Neck supple no adenopathy no JV no bruits  lungs diffuse scattered wheezes rhonchi throughout no retractions heart regular without murmur abdomen soft without tenderness or pulsatile mass extremities patient has some edema bilateral but no palp cords or Homans' sign or evidence of DVT.  Skin warm and dry without rashes neurologic awake alert follows commands monitorings normal without focal weakness  Procedures           ED Course      Results for orders placed or performed during the hospital encounter of 03/08/23   Respiratory Panel PCR w/COVID-19(SARS-CoV-2) IAN/NAYELY/KATERIN/PAD/COR/MAD/KRISH In-House, NP Swab in UTM/VTM, 3-4 HR TAT - Swab, Nasopharynx    Specimen: Nasopharynx; Swab   Result Value Ref Range    ADENOVIRUS, PCR Not Detected Not Detected    Coronavirus 229E Not Detected Not Detected    Coronavirus HKU1 Not Detected Not Detected    Coronavirus NL63 Not Detected Not Detected    Coronavirus OC43 Not Detected Not Detected    COVID19 Not Detected Not Detected - Ref. Range    Human Metapneumovirus Not Detected Not Detected    Human Rhinovirus/Enterovirus Not Detected Not Detected    Influenza A PCR Not Detected Not Detected    Influenza B PCR Not Detected Not Detected    Parainfluenza Virus 1 Not Detected Not Detected    Parainfluenza Virus 2 Not Detected Not Detected    Parainfluenza Virus 3 Not Detected Not Detected    Parainfluenza Virus 4 Not Detected Not Detected    RSV, PCR Not Detected Not Detected    Bordetella pertussis pcr Not Detected Not Detected    Bordetella parapertussis PCR Not Detected Not Detected    Chlamydophila pneumoniae PCR Not Detected Not Detected    Mycoplasma pneumo by PCR Not Detected Not Detected   Comprehensive Metabolic Panel    Specimen: Arm, Right; Blood   Result Value Ref Range    Glucose 147 (H) 65 - 99 mg/dL    BUN 12 8 - 23 mg/dL    Creatinine 0.54 (L) 0.76 - 1.27 mg/dL    Sodium 142 136 - 145 mmol/L    Potassium 3.7 3.5 - 5.2 mmol/L    Chloride 100 98 - 107 mmol/L    CO2 31.0 (H) 22.0 - 29.0 mmol/L    Calcium 9.4 8.6  - 10.5 mg/dL    Total Protein 7.9 6.0 - 8.5 g/dL    Albumin 4.0 3.5 - 5.2 g/dL    ALT (SGPT) 15 1 - 41 U/L    AST (SGOT) 16 1 - 40 U/L    Alkaline Phosphatase 69 39 - 117 U/L    Total Bilirubin 0.5 0.0 - 1.2 mg/dL    Globulin 3.9 gm/dL    A/G Ratio 1.0 g/dL    BUN/Creatinine Ratio 22.2 7.0 - 25.0    Anion Gap 11.0 5.0 - 15.0 mmol/L    eGFR 105.9 >60.0 mL/min/1.73   CBC Auto Differential    Specimen: Arm, Right; Blood   Result Value Ref Range    WBC 5.30 3.40 - 10.80 10*3/mm3    RBC 3.88 (L) 4.14 - 5.80 10*6/mm3    Hemoglobin 11.0 (L) 13.0 - 17.7 g/dL    Hematocrit 33.8 (L) 37.5 - 51.0 %    MCV 87.2 79.0 - 97.0 fL    MCH 28.2 26.6 - 33.0 pg    MCHC 32.4 31.5 - 35.7 g/dL    RDW 16.5 (H) 12.3 - 15.4 %    RDW-SD 53.8 37.0 - 54.0 fl    MPV 9.2 6.0 - 12.0 fL    Platelets 183 140 - 450 10*3/mm3    Neutrophil % 65.9 42.7 - 76.0 %    Lymphocyte % 25.4 19.6 - 45.3 %    Monocyte % 6.8 5.0 - 12.0 %    Eosinophil % 1.4 0.3 - 6.2 %    Basophil % 0.5 0.0 - 1.5 %    Neutrophils, Absolute 3.50 1.70 - 7.00 10*3/mm3    Lymphocytes, Absolute 1.40 0.70 - 3.10 10*3/mm3    Monocytes, Absolute 0.40 0.10 - 0.90 10*3/mm3    Eosinophils, Absolute 0.10 0.00 - 0.40 10*3/mm3    Basophils, Absolute 0.00 0.00 - 0.20 10*3/mm3    nRBC 0.1 0.0 - 0.2 /100 WBC   BNP    Specimen: Arm, Right; Blood   Result Value Ref Range    proBNP 367.5 0.0 - 900.0 pg/mL   Single High Sensitivity Troponin T    Specimen: Arm, Right; Blood   Result Value Ref Range    HS Troponin T 21 (H) <15 ng/L   Blood Gas, Arterial -    Specimen: Arterial Blood   Result Value Ref Range    Site Right Radial     Scar's Test Positive     pH, Arterial 7.388 7.350 - 7.450 pH units    pCO2, Arterial 53.7 (H) 35.0 - 48.0 mm Hg    pO2, Arterial 113.8 (H) 83.0 - 108.0 mm Hg    HCO3, Arterial 32.3 (H) 21.0 - 28.0 mmol/L    Base Excess, Arterial 6.0 (H) 0.0 - 3.0 mmol/L    O2 Saturation, Arterial 98.3 (H) 94.0 - 98.0 %    CO2 Content 34.0 (H) 22 - 29 mmol/L    Barometric Pressure for Blood Gas       Modality NRB     FIO2 100 %    Hemodilution No    POC Lactate    Specimen: Blood   Result Value Ref Range    Lactate 2.0 0.3 - 2.0 mmol/L   ECG 12 Lead Dyspnea   Result Value Ref Range    QT Interval 454 ms   Green Top (Gel)   Result Value Ref Range    Extra Tube Hold for add-ons.    Lavender Top   Result Value Ref Range    Extra Tube hold for add-on    Gold Top - SST   Result Value Ref Range    Extra Tube Hold for add-ons.    Light Blue Top   Result Value Ref Range    Extra Tube Hold for add-ons.      XR Chest 1 View    Result Date: 3/8/2023  Impression: Radiographic findings consistent with congestive heart failure and pulmonary edema. Electronically Signed: Leo Rosenberg  3/8/2023 3:58 PM EST  Workstation ID: [id number]    Medications   sodium chloride 0.9 % flush 10 mL (has no administration in time range)   sodium chloride 0.9 % flush 10 mL (has no administration in time range)   ipratropium-albuterol (DUO-NEB) nebulizer solution 3 mL (3 mL Nebulization Given 3/8/23 1417)   albuterol (PROVENTIL) nebulizer solution 0.083% 2.5 mg/3mL (2.5 mg Nebulization Given 3/8/23 1414)   methylPREDNISolone sodium succinate (SOLU-Medrol) injection 125 mg (125 mg Intravenous Given 3/8/23 1430)          EKG my interpretation normal sinus rhythm rate 61 right bundle branch block QTc 458 is unchanged from previous EKG compared to 1/16/2023 abnormal     Review of records patient had a heart catheterization here in January 15, 2023 25 to 30% LAD 10 to 20% and a left circumflex and 25% in the RCA.  Echocardiogram on September 9, 2022 showed ejection fraction of 65%                        Admission in January with a COPD and hypoxia and respiratory failure       Medical Decision Making  Medical decision making.  Patient had IV established placed on the monitor with a sinus rhythm on my review.  He is on nonrebreather sats at 98% on room air.  Patient given DuoNeb and albuterol 125 mg of Solu-Medrol IV.  He underwent laboratory  evaluation.  Initial blood gas independent reviewed by me pH 7.38 PCO2 of 53 PO2 of 113.  proBNP normal troponin 21.  Comprehensive metabolic panel for unremarkable CBC unremarkable and hemoglobin of 11, patient had a respiratory panel which was negative.  Chest x-ray independently by me shows chronic COPD AND pneumonia.  Radiology felt like there was some congestive heart failure and pulmonary edema.  EKG reviewed by me showed a sinus rhythm with right bundle branch block this is unchanged from previous compared to 1/16/2023.  Review of the patient's records show he had a recent admission here he was in the hospital in January on the 15th 2023 he had a heart cath which showed a 25 to 30% LAD 10 to 20% circumflex and 25% RCA echo in September 2022 showed an ejection fraction of 65%.  Today's proBNP is normal.  I suspect this is primary related to COPD and it is not infectious or pneumonia or CHF or cardiac etiology.  I do not see evidence of an acute STEMI I do not see evidence of any acute DVT pulmonary embolism or dissection or sepsis based on my clinical history and physical exam findings here in ER.  The patient was able to be placed on high flow oxygen to keep his sats at 94% he was resting comfortably he was answering questions appropriately he had to the treatments his lungs still has some scattered wheezes but improved.  He felt much better.  He states that this is the best he is felt all week.  We talked about the findings I talked to the hospitalist nurse practitioner discussed the case.  And he will be admitted for further work-up and care stable unremarkable ER course.  Improved    Acute respiratory distress: acute illness or injury  Chronic obstructive pulmonary disease with acute exacerbation (HCC): acute illness or injury  Hypoxia: acute illness or injury  Amount and/or Complexity of Data Reviewed  External Data Reviewed: ECG and notes.  Labs: ordered. Decision-making details documented in ED  Course.  Radiology: ordered and independent interpretation performed. Decision-making details documented in ED Course.  ECG/medicine tests: ordered and independent interpretation performed. Decision-making details documented in ED Course.      Risk  Decision regarding hospitalization.          Final diagnoses:   Acute respiratory distress   Chronic obstructive pulmonary disease with acute exacerbation (HCC)   Hypoxia       ED Disposition  ED Disposition     ED Disposition   Decision to Admit    Condition   --    Comment   Level of Care: Telemetry [5]   Admitting Physician: CARSON WHITE [[id number]]   Attending Physician: CARSON WHITE [[id number]]   Bed Request Comments: pcu               No follow-up provider specified.       Medication List      No changes were made to your prescriptions during this visit.          Leo Hughes MD  03/09/23 1122

## 2023-03-09 ENCOUNTER — APPOINTMENT (OUTPATIENT)
Dept: CT IMAGING | Facility: HOSPITAL | Age: 73
DRG: 190 | End: 2023-03-09
Payer: MEDICARE

## 2023-03-09 LAB
ANION GAP SERPL CALCULATED.3IONS-SCNC: 9 MMOL/L (ref 5–15)
BASOPHILS # BLD AUTO: 0 10*3/MM3 (ref 0–0.2)
BASOPHILS NFR BLD AUTO: 0.1 % (ref 0–1.5)
BUN SERPL-MCNC: 20 MG/DL (ref 8–23)
BUN/CREAT SERPL: 37 (ref 7–25)
CALCIUM SPEC-SCNC: 9 MG/DL (ref 8.6–10.5)
CHLORIDE SERPL-SCNC: 100 MMOL/L (ref 98–107)
CO2 SERPL-SCNC: 30 MMOL/L (ref 22–29)
CREAT SERPL-MCNC: 0.54 MG/DL (ref 0.76–1.27)
DEPRECATED RDW RBC AUTO: 51.6 FL (ref 37–54)
EGFRCR SERPLBLD CKD-EPI 2021: 105.9 ML/MIN/1.73
EOSINOPHIL # BLD AUTO: 0 10*3/MM3 (ref 0–0.4)
EOSINOPHIL NFR BLD AUTO: 0 % (ref 0.3–6.2)
ERYTHROCYTE [DISTWIDTH] IN BLOOD BY AUTOMATED COUNT: 16.5 % (ref 12.3–15.4)
GLUCOSE BLDC GLUCOMTR-MCNC: 213 MG/DL (ref 70–105)
GLUCOSE BLDC GLUCOMTR-MCNC: 311 MG/DL (ref 70–105)
GLUCOSE BLDC GLUCOMTR-MCNC: 368 MG/DL (ref 70–105)
GLUCOSE BLDC GLUCOMTR-MCNC: 371 MG/DL (ref 70–105)
GLUCOSE SERPL-MCNC: 241 MG/DL (ref 65–99)
HCT VFR BLD AUTO: 30.8 % (ref 37.5–51)
HGB BLD-MCNC: 9.7 G/DL (ref 13–17.7)
LYMPHOCYTES # BLD AUTO: 0.4 10*3/MM3 (ref 0.7–3.1)
LYMPHOCYTES NFR BLD AUTO: 8.5 % (ref 19.6–45.3)
MCH RBC QN AUTO: 27.9 PG (ref 26.6–33)
MCHC RBC AUTO-ENTMCNC: 31.5 G/DL (ref 31.5–35.7)
MCV RBC AUTO: 88.5 FL (ref 79–97)
MONOCYTES # BLD AUTO: 0.1 10*3/MM3 (ref 0.1–0.9)
MONOCYTES NFR BLD AUTO: 1.8 % (ref 5–12)
NEUTROPHILS NFR BLD AUTO: 4.6 10*3/MM3 (ref 1.7–7)
NEUTROPHILS NFR BLD AUTO: 89.6 % (ref 42.7–76)
NRBC BLD AUTO-RTO: 0.1 /100 WBC (ref 0–0.2)
PLATELET # BLD AUTO: 166 10*3/MM3 (ref 140–450)
PMV BLD AUTO: 9.7 FL (ref 6–12)
POTASSIUM SERPL-SCNC: 4.2 MMOL/L (ref 3.5–5.2)
RBC # BLD AUTO: 3.48 10*6/MM3 (ref 4.14–5.8)
SODIUM SERPL-SCNC: 139 MMOL/L (ref 136–145)
WBC NRBC COR # BLD: 5.1 10*3/MM3 (ref 3.4–10.8)

## 2023-03-09 PROCEDURE — 71250 CT THORAX DX C-: CPT

## 2023-03-09 PROCEDURE — 63710000001 PREDNISONE PER 1 MG

## 2023-03-09 PROCEDURE — 80048 BASIC METABOLIC PNL TOTAL CA: CPT | Performed by: NURSE PRACTITIONER

## 2023-03-09 PROCEDURE — 94664 DEMO&/EVAL PT USE INHALER: CPT

## 2023-03-09 PROCEDURE — 94799 UNLISTED PULMONARY SVC/PX: CPT

## 2023-03-09 PROCEDURE — 25010000002 METHYLPREDNISOLONE PER 40 MG: Performed by: NURSE PRACTITIONER

## 2023-03-09 PROCEDURE — 85025 COMPLETE CBC W/AUTO DIFF WBC: CPT | Performed by: NURSE PRACTITIONER

## 2023-03-09 PROCEDURE — G0378 HOSPITAL OBSERVATION PER HR: HCPCS

## 2023-03-09 PROCEDURE — 25010000002 FUROSEMIDE PER 20 MG: Performed by: INTERNAL MEDICINE

## 2023-03-09 PROCEDURE — 82962 GLUCOSE BLOOD TEST: CPT

## 2023-03-09 PROCEDURE — 94660 CPAP INITIATION&MGMT: CPT

## 2023-03-09 PROCEDURE — 97162 PT EVAL MOD COMPLEX 30 MIN: CPT

## 2023-03-09 PROCEDURE — 63710000001 PREDNISONE PER 5 MG

## 2023-03-09 PROCEDURE — 63710000001 INSULIN LISPRO (HUMAN) PER 5 UNITS: Performed by: NURSE PRACTITIONER

## 2023-03-09 RX ORDER — AZITHROMYCIN 250 MG/1
250 TABLET, FILM COATED ORAL
Status: DISCONTINUED | OUTPATIENT
Start: 2023-03-10 | End: 2023-03-10

## 2023-03-09 RX ORDER — OXYMETAZOLINE HYDROCHLORIDE 0.05 G/100ML
2 SPRAY NASAL 2 TIMES DAILY
Status: DISPENSED | OUTPATIENT
Start: 2023-03-09 | End: 2023-03-12

## 2023-03-09 RX ORDER — FUROSEMIDE 10 MG/ML
40 INJECTION INTRAMUSCULAR; INTRAVENOUS EVERY 12 HOURS
Status: DISCONTINUED | OUTPATIENT
Start: 2023-03-09 | End: 2023-03-09

## 2023-03-09 RX ORDER — NICOTINE POLACRILEX 4 MG
15 LOZENGE BUCCAL
Status: DISCONTINUED | OUTPATIENT
Start: 2023-03-09 | End: 2023-03-13 | Stop reason: HOSPADM

## 2023-03-09 RX ORDER — ASPIRIN 81 MG/1
81 TABLET ORAL DAILY
Status: DISCONTINUED | OUTPATIENT
Start: 2023-03-10 | End: 2023-03-13 | Stop reason: HOSPADM

## 2023-03-09 RX ORDER — PREDNISONE 20 MG/1
20 TABLET ORAL DAILY
Status: COMPLETED | OUTPATIENT
Start: 2023-03-11 | End: 2023-03-12

## 2023-03-09 RX ORDER — INSULIN LISPRO 100 [IU]/ML
2-9 INJECTION, SOLUTION INTRAVENOUS; SUBCUTANEOUS
Status: DISCONTINUED | OUTPATIENT
Start: 2023-03-09 | End: 2023-03-13 | Stop reason: HOSPADM

## 2023-03-09 RX ORDER — BISOPROLOL FUMARATE 5 MG/1
5 TABLET, FILM COATED ORAL DAILY
Status: DISCONTINUED | OUTPATIENT
Start: 2023-03-10 | End: 2023-03-13 | Stop reason: HOSPADM

## 2023-03-09 RX ORDER — ATORVASTATIN CALCIUM 40 MG/1
40 TABLET, FILM COATED ORAL DAILY
Status: DISCONTINUED | OUTPATIENT
Start: 2023-03-10 | End: 2023-03-13 | Stop reason: HOSPADM

## 2023-03-09 RX ORDER — IPRATROPIUM BROMIDE AND ALBUTEROL SULFATE 2.5; .5 MG/3ML; MG/3ML
3 SOLUTION RESPIRATORY (INHALATION)
Status: DISCONTINUED | OUTPATIENT
Start: 2023-03-09 | End: 2023-03-09 | Stop reason: SDUPTHER

## 2023-03-09 RX ORDER — LISINOPRIL 20 MG/1
40 TABLET ORAL NIGHTLY
Status: DISCONTINUED | OUTPATIENT
Start: 2023-03-09 | End: 2023-03-13 | Stop reason: HOSPADM

## 2023-03-09 RX ORDER — PREDNISONE 10 MG/1
10 TABLET ORAL DAILY
Status: DISCONTINUED | OUTPATIENT
Start: 2023-03-13 | End: 2023-03-13 | Stop reason: HOSPADM

## 2023-03-09 RX ORDER — OLANZAPINE 10 MG/2ML
1 INJECTION, POWDER, LYOPHILIZED, FOR SOLUTION INTRAMUSCULAR
Status: DISCONTINUED | OUTPATIENT
Start: 2023-03-09 | End: 2023-03-13 | Stop reason: HOSPADM

## 2023-03-09 RX ORDER — AZITHROMYCIN 250 MG/1
250 TABLET, FILM COATED ORAL
Status: DISCONTINUED | OUTPATIENT
Start: 2023-03-09 | End: 2023-03-09

## 2023-03-09 RX ORDER — AMLODIPINE BESYLATE 5 MG/1
5 TABLET ORAL NIGHTLY
Status: DISCONTINUED | OUTPATIENT
Start: 2023-03-09 | End: 2023-03-13 | Stop reason: HOSPADM

## 2023-03-09 RX ORDER — FUROSEMIDE 10 MG/ML
40 INJECTION INTRAMUSCULAR; INTRAVENOUS DAILY
Status: DISCONTINUED | OUTPATIENT
Start: 2023-03-09 | End: 2023-03-11

## 2023-03-09 RX ORDER — ALBUTEROL SULFATE 2.5 MG/3ML
2.5 SOLUTION RESPIRATORY (INHALATION) 3 TIMES DAILY PRN
Status: DISCONTINUED | OUTPATIENT
Start: 2023-03-09 | End: 2023-03-13 | Stop reason: HOSPADM

## 2023-03-09 RX ORDER — DEXTROSE MONOHYDRATE 25 G/50ML
25 INJECTION, SOLUTION INTRAVENOUS
Status: DISCONTINUED | OUTPATIENT
Start: 2023-03-09 | End: 2023-03-13 | Stop reason: HOSPADM

## 2023-03-09 RX ORDER — GABAPENTIN 300 MG/1
300 CAPSULE ORAL 3 TIMES DAILY
Status: DISCONTINUED | OUTPATIENT
Start: 2023-03-09 | End: 2023-03-13 | Stop reason: HOSPADM

## 2023-03-09 RX ADMIN — IPRATROPIUM BROMIDE AND ALBUTEROL SULFATE 3 ML: 2.5; .5 SOLUTION RESPIRATORY (INHALATION) at 07:59

## 2023-03-09 RX ADMIN — NASAL DECONGESTANT 2 SPRAY: 0.05 SPRAY NASAL at 22:09

## 2023-03-09 RX ADMIN — Medication 10 ML: at 09:29

## 2023-03-09 RX ADMIN — IPRATROPIUM BROMIDE AND ALBUTEROL SULFATE 3 ML: 2.5; .5 SOLUTION RESPIRATORY (INHALATION) at 11:06

## 2023-03-09 RX ADMIN — Medication 10 ML: at 20:24

## 2023-03-09 RX ADMIN — METHYLPREDNISOLONE SODIUM SUCCINATE 40 MG: 40 INJECTION, POWDER, FOR SOLUTION INTRAMUSCULAR; INTRAVENOUS at 05:15

## 2023-03-09 RX ADMIN — BUDESONIDE 0.5 MG: 0.5 INHALANT RESPIRATORY (INHALATION) at 08:05

## 2023-03-09 RX ADMIN — ALBUTEROL SULFATE 2.5 MG: 2.5 SOLUTION RESPIRATORY (INHALATION) at 23:56

## 2023-03-09 RX ADMIN — PREDNISONE 30 MG: 20 TABLET ORAL at 09:28

## 2023-03-09 RX ADMIN — IPRATROPIUM BROMIDE AND ALBUTEROL SULFATE 3 ML: 2.5; .5 SOLUTION RESPIRATORY (INHALATION) at 15:49

## 2023-03-09 RX ADMIN — AZITHROMYCIN MONOHYDRATE 250 MG: 250 TABLET ORAL at 09:29

## 2023-03-09 RX ADMIN — SERTRALINE 50 MG: 50 TABLET, FILM COATED ORAL at 22:09

## 2023-03-09 RX ADMIN — GABAPENTIN 300 MG: 300 CAPSULE ORAL at 22:09

## 2023-03-09 RX ADMIN — INSULIN LISPRO 8 UNITS: 100 INJECTION, SOLUTION INTRAVENOUS; SUBCUTANEOUS at 22:08

## 2023-03-09 RX ADMIN — NYSTATIN 500000 UNITS: 100000 SUSPENSION ORAL at 20:45

## 2023-03-09 RX ADMIN — IPRATROPIUM BROMIDE AND ALBUTEROL SULFATE 3 ML: 2.5; .5 SOLUTION RESPIRATORY (INHALATION) at 19:35

## 2023-03-09 RX ADMIN — BUDESONIDE 0.5 MG: 0.5 INHALANT RESPIRATORY (INHALATION) at 19:40

## 2023-03-09 RX ADMIN — FUROSEMIDE 40 MG: 10 INJECTION, SOLUTION INTRAMUSCULAR; INTRAVENOUS at 16:41

## 2023-03-09 RX ADMIN — Medication 10 ML: at 16:41

## 2023-03-09 NOTE — PLAN OF CARE
Goal Outcome Evaluation:         Patient came to unit from ER approx 7pm on 15L high flow n/c, admitting dx of ARDS and COPD exacerbation, patient states that his home o2 is 4Ln/c and fed thru his cpap at HS. BIPAP was ordered for here, RT setup and patient compliant with use. Consult called to pulmonology ans service, spoke to Alissa for Dr Greenwood. RAC peripheral access flushes without difficulty, CT of chest completed per MD orders, patient stated that he feels much better this morning than he did last night.

## 2023-03-09 NOTE — PROGRESS NOTES
Patient Story (Not Part of Legal Medical Record)  Nursing report ED to floor  Brenden Peter  72 y.o.  male    HPI:   Chief Complaint   Patient presents with    Shortness of Breath     So[place] for a couple weeks, went to PMD today and they told her his oxygen was 69 percent to come to ER.           Admitting doctor:   Javier Marcum MD    Admitting diagnosis:   The primary encounter diagnosis was Acute respiratory distress. Diagnoses of Chronic obstructive pulmonary disease with acute exacerbation (HCC) and Hypoxia were also pertinent to this visit.    Code status:   Current Code Status       Date Active Code Status Order ID Comments User Context       3/8/2023 1806 CPR (Attempt to Resuscitate) [id number]  Kayleigh Bell APRN ED        Question Answer    Code Status (Patient has no pulse and is not breathing) CPR (Attempt to Resuscitate)    Medical Interventions (Patient has pulse or is breathing) Full Support    Level Of Support Discussed With Patient                    Allergies:   Patient has no known allergies.    Isolation:  Enhanced Droplet/Contact      Fall Risk:  Fall Risk Assessment was completed, and patient is at moderate risk for falls.   Predictive Model Details         18 (Low) Factor Value    Calculated 3/8/2023 18:07 Age 72    Risk of Fall Model Musculoskeletal Assessment WDL     Active Peripheral IV Present     Imaging order in this encounter Present     Respiratory Rate 25     Diastolic BP 53     Skin Assessment WDL     Magnesium not on file     Financial Class Medicare     Drug Use No     Creatinine 0.54 mg/dL     Tobacco Use Quit     Enzo Scale not on file     Number of Distinct Medication Classes administered 2     Peripheral Vascular Assessment WDL     Chloride 100 mmol/L     Clinically Relevant Sex Not Female     Gastrointestinal Assessment WDL     Albumin 4 g/dL     Cardiac Assessment WDL     Total Bilirubin 0.5 mg/dL     Calcium 9.4 mg/dL     Days after Admission 0.175     ALT 15 U/L      Potassium 3.7 mmol/L         Weight:       03/08/23  1352   Weight: 99.8 kg (220 lb)       Intake and Output  No intake or output data in the 24 hours ending 03/08/23 1918    Diet:   Dietary Orders (From admission, onward)       Start     Ordered    03/08/23 1806  Diet: Diabetic Diets; Consistent Carbohydrate; Texture: Regular Texture (IDDSI 7); Fluid Consistency: Thin (IDDSI 0)  Diet Effective Now        References:    Diet Order [facility]   Question Answer Comment   Diets: Diabetic Diets    Diabetic Diet: Consistent Carbohydrate    Texture: Regular Texture (IDDSI 7)    Fluid Consistency: Thin (IDDSI 0)        03/08/23 1806                     Most recent vitals:   Vitals:    03/08/23 1823 03/08/23 1824 03/08/23 1830 03/08/23 1831   BP:    133/68   BP Location:       Patient Position:       Pulse: 68 66 67 66   Resp: 22 24 19    Temp:       SpO2: 91% 93% 93% 94%   Weight:       Height:           Active LDAs/IV Access:   Lines, Drains & Airways       Active LDAs       Name Placement date Placement time Site Days    Peripheral IV 03/08/23 1422 Right Antecubital 03/08/23  1422  Antecubital  less than 1                    Skin Condition:   Skin Assessments (last day)       Date/Time Skin WDL Skin Temperature Skin Moisture Skin Elasticity    03/08/23 14:43:55 WDL;characteristics warm dry firm             Labs (abnormal labs have a star):   Labs Reviewed   COMPREHENSIVE METABOLIC PANEL - Abnormal; Notable for the following components:       Result Value    Glucose 147 (*)     Creatinine 0.54 (*)     CO2 31.0 (*)     All other components within normal limits    Narrative:     GFR Normal >60  Chronic Kidney Disease <60  Kidney Failure <15    The GFR formula is only valid for adults with stable renal function between ages 18 and 70.   CBC WITH AUTO DIFFERENTIAL - Abnormal; Notable for the following components:    RBC 3.88 (*)     Hemoglobin 11.0 (*)     Hematocrit 33.8 (*)     RDW 16.5 (*)     All other components within  normal limits   SINGLE HSTROPONIN T - Abnormal; Notable for the following components:    HS Troponin T 21 (*)     All other components within normal limits    Narrative:     High Sensitive Troponin T Reference Range:  <10.0 ng/L- Negative Female for AMI  <15.0 ng/L- Negative Male for AMI  >=10 - Abnormal Female indicating possible myocardial injury.  >=15 - Abnormal Male indicating possible myocardial injury.   Clinicians would have to utilize clinical acumen, EKG, Troponin, and serial changes to determine if it is an Acute Myocardial Infarction or myocardial injury due to an underlying chronic condition.        BLOOD GAS, ARTERIAL - Abnormal; Notable for the following components:    pCO2, Arterial 53.7 (*)     pO2, Arterial 113.8 (*)     HCO3, Arterial 32.3 (*)     Base Excess, Arterial 6.0 (*)     O2 Saturation, Arterial 98.3 (*)     CO2 Content 34.0 (*)     All other components within normal limits   HEMOGLOBIN A1C - Abnormal; Notable for the following components:    Hemoglobin A1C 6.40 (*)     All other components within normal limits   RESPIRATORY PANEL PCR W/ COVID-19 (SARS-COV-2) IAN/NAYELY/KATERIN/PAD/COR/MAD/KRISH IN-HOUSE, [facility], 3-4 HR TAT - Normal    Narrative:     In the setting of a positive respiratory panel with a viral infection PLUS a negative procalcitonin without other underlying concern for bacterial infection, consider observing off antibiotics or discontinuation of antibiotics and continue supportive care. If the respiratory panel is positive for atypical bacterial infection (Bordetella pertussis, Chlamydophila pneumoniae, or Mycoplasma pneumoniae), consider antibiotic de-escalation to target atypical bacterial infection.   BNP (IN-HOUSE) - Normal    Narrative:     Among patients with dyspnea, NT-proBNP is highly sensitive for the detection of acute congestive heart failure. In addition NT-proBNP of <300 pg/ml effectively rules out acute congestive heart failure with 99% negative  predictive value.    Results may be falsely decreased if patient taking Biotin.     POC LACTATE - Normal   BLOOD CULTURE   BLOOD CULTURE   RAINBOW DRAW    Narrative:     The following orders were created for panel order [place] Draw.  Procedure                               Abnormality         Status                     ---------                               -----------         ------                     Green Top (Gel)[[id number]]                                  Final result               Lavender Top[[id number]]                                     Final result               Gold Top - SST[[id number]]                                   Final result               Light Blue Top[[id number]]                                   Final result                 Please view results for these tests on the individual orders.   BLOOD GAS, ARTERIAL   TSH   LIPID PANEL   POC LACTATE   CBC AND DIFFERENTIAL    Narrative:     The following orders were created for panel order CBC & Differential.  Procedure                               Abnormality         Status                     ---------                               -----------         ------                     CBC Auto Differential[[id number]]        Abnormal            Final result                 Please view results for these tests on the individual orders.   GREEN TOP   LAVENDER TOP   GOLD TOP - SST   LIGHT BLUE TOP       LOC: Person, Place, Time, and Situation    Telemetry:  Telemetry    Cardiac Monitoring Ordered: yes    EKG:   ECG 12 Lead Dyspnea   Preliminary Result   HEART RATE= 61  bpm   RR Interval= 984  ms   MO Interval= 210  ms   P Horizontal Axis= -10  deg   P Front Axis= 41  deg   QRSD Interval= 167  ms   QT Interval= 454  ms   QRS Axis= 107  deg   T Wave Axis= 50  deg   - ABNORMAL ECG -   Sinus rhythm   Right bundle branch block   When compared with ECG of 16-Jan-2023 6:55:22,   Significant repolarization change   Electronically Signed By:    Date and Time of Study:  2023 14:34:05          Medications Given in the ED:   Medications   sodium chloride 0.9 % flush 10 mL (has no administration in time range)   sodium chloride 0.9 % flush 10 mL (has no administration in time range)   ipratropium-albuterol (DUO-NEB) nebulizer solution 3 mL ( Nebulization Canceled Entry 3/8/23 2030)   budesonide (PULMICORT) nebulizer solution 0.5 mg ( Nebulization Canceled Entry 3/8/23 2030)   methylPREDNISolone sodium succinate (SOLU-Medrol) injection 40 mg (has no administration in time range)   sodium chloride 0.9 % flush 10 mL (has no administration in time range)   sodium chloride 0.9 % flush 10 mL (has no administration in time range)   sodium chloride 0.9 % infusion 40 mL (has no administration in time range)   nitroglycerin (NITROSTAT) SL tablet 0.4 mg (has no administration in time range)   acetaminophen (TYLENOL) tablet 650 mg (has no administration in time range)   ondansetron (ZOFRAN) injection 4 mg (has no administration in time range)   melatonin tablet 5 mg (has no administration in time range)   ipratropium-albuterol (DUO-NEB) nebulizer solution 3 mL (3 mL Nebulization Given 3/8/23 1417)   albuterol (PROVENTIL) nebulizer solution 0.083% 2.5 mg/3mL (2.5 mg Nebulization Given 3/8/23 1414)   methylPREDNISolone sodium succinate (SOLU-Medrol) injection 125 mg (125 mg Intravenous Given 3/8/23 1430)       Imaging results:  XR Chest 1 View    Result Date: 3/8/2023  Impression: Radiographic findings consistent with congestive heart failure and pulmonary edema. Electronically Signed: Leo Rosenberg  3/8/2023 3:58 PM EST  Workstation ID: [id number]     Social issues:   Social History     Socioeconomic History    Marital status:    Tobacco Use    Smoking status: Former     Packs/day: 1.00     Types: Cigarettes     Quit date: 7/3/2022     Years since quittin.6    Smokeless tobacco: Former     Quit date: 7/3/2022   Vaping Use    Vaping Use: Never used   Substance and Sexual Activity     Alcohol use: Not Currently    Drug use: Never    Sexual activity: Defer       NIH Stroke Scale:  Interval: (not recorded)  1a. Level of Consciousness: (not recorded)  1b. LOC Questions: (not recorded)  1c. LOC Commands: (not recorded)  2. Best Gaze: (not recorded)  3. Visual: (not recorded)  4. Facial Palsy: (not recorded)  5a. Motor Arm, Left: (not recorded)  5b. Motor Arm, Right: (not recorded)  6a. Motor Leg, Left: (not recorded)  6b. Motor Leg, Right: (not recorded)  7. Limb Ataxia: (not recorded)  8. Sensory: (not recorded)  9. Best Language: (not recorded)  10. Dysarthria: (not recorded)  11. Extinction and Inattention (formerly Neglect): (not recorded)    Total (NIH Stroke Scale): (not recorded)     Additional notable assessment information:     Nursing report ED to floor Ignacia Parker RN   03/08/23 19:18 EST  Nursing report ED to floor  Brenden Castilloer  72 y.o.  male    HPI:   Chief Complaint   Patient presents with    Shortness of Breath     [place] for a couple weeks, went to PMD today and they told her his oxygen was 69 percent to come to ER.           Admitting doctor:   Javier Marcum MD    Admitting diagnosis:   The primary encounter diagnosis was Acute respiratory distress. Diagnoses of Chronic obstructive pulmonary disease with acute exacerbation (HCC) and Hypoxia were also pertinent to this visit.    Code status:   Current Code Status       Date Active Code Status Order ID Comments User Context       3/8/2023 1806 CPR (Attempt to Resuscitate) [id number]  Kayleigh Bell APRN ED        Question Answer    Code Status (Patient has no pulse and is not breathing) CPR (Attempt to Resuscitate)    Medical Interventions (Patient has pulse or is breathing) Full Support    Level Of Support Discussed With Patient                    Allergies:   Patient has no known allergies.    Isolation:  Enhanced Droplet/Contact      Fall Risk:  Fall Risk Assessment was completed, and patient is at high risk for  falls.   Predictive Model Details         18 (Low) Factor Value    Calculated 3/8/2023 18:07 Age 72    Risk of Fall Model Musculoskeletal Assessment WDL     Active Peripheral IV Present     Imaging order in this encounter Present     Respiratory Rate 25     Diastolic BP 53     Skin Assessment WDL     Magnesium not on file     Financial Class Medicare     Drug Use No     Creatinine 0.54 mg/dL     Tobacco Use Quit     Enzo Scale not on file     Number of Distinct Medication Classes administered 2     Peripheral Vascular Assessment WDL     Chloride 100 mmol/L     Clinically Relevant Sex Not Female     Gastrointestinal Assessment WDL     Albumin 4 g/dL     Cardiac Assessment WDL     Total Bilirubin 0.5 mg/dL     Calcium 9.4 mg/dL     Days after Admission 0.175     ALT 15 U/L     Potassium 3.7 mmol/L         Weight:       03/08/23  1352   Weight: 99.8 kg (220 lb)       Intake and Output  No intake or output data in the 24 hours ending 03/08/23 1918    Diet:   Dietary Orders (From admission, onward)       Start     Ordered    03/08/23 1806  Diet: Diabetic Diets; Consistent Carbohydrate; Texture: Regular Texture (IDDSI 7); Fluid Consistency: Thin (IDDSI 0)  Diet Effective Now        References:    Diet Order Crosswalk   Question Answer Comment   Diets: Diabetic Diets    Diabetic Diet: Consistent Carbohydrate    Texture: Regular Texture (IDDSI 7)    Fluid Consistency: Thin (IDDSI 0)        03/08/23 1806                     Most recent vitals:   Vitals:    03/08/23 1823 03/08/23 1824 03/08/23 1830 03/08/23 1831   BP:    133/68   BP Location:       Patient Position:       Pulse: 68 66 67 66   Resp: 22 24 19    Temp:       SpO2: 91% 93% 93% 94%   Weight:       Height:           Active LDAs/IV Access:   Lines, Drains & Airways       Active LDAs       Name Placement date Placement time Site Days    Peripheral IV 03/08/23 1422 Right Antecubital 03/08/23  1422  Antecubital  less than 1                    Skin Condition:   Skin  Assessments (last day)       Date/Time Skin WDL Skin Temperature Skin Moisture Skin Elasticity    03/08/23 14:43:55 WDL;characteristics warm dry firm             Labs (abnormal labs have a star):   Labs Reviewed   COMPREHENSIVE METABOLIC PANEL - Abnormal; Notable for the following components:       Result Value    Glucose 147 (*)     Creatinine 0.54 (*)     CO2 31.0 (*)     All other components within normal limits    Narrative:     GFR Normal >60  Chronic Kidney Disease <60  Kidney Failure <15    The GFR formula is only valid for adults with stable renal function between ages 18 and 70.   CBC WITH AUTO DIFFERENTIAL - Abnormal; Notable for the following components:    RBC 3.88 (*)     Hemoglobin 11.0 (*)     Hematocrit 33.8 (*)     RDW 16.5 (*)     All other components within normal limits   SINGLE HSTROPONIN T - Abnormal; Notable for the following components:    HS Troponin T 21 (*)     All other components within normal limits    Narrative:     High Sensitive Troponin T Reference Range:  <10.0 ng/L- Negative Female for AMI  <15.0 ng/L- Negative Male for AMI  >=10 - Abnormal Female indicating possible myocardial injury.  >=15 - Abnormal Male indicating possible myocardial injury.   Clinicians would have to utilize clinical acumen, EKG, Troponin, and serial changes to determine if it is an Acute Myocardial Infarction or myocardial injury due to an underlying chronic condition.        BLOOD GAS, ARTERIAL - Abnormal; Notable for the following components:    pCO2, Arterial 53.7 (*)     pO2, Arterial 113.8 (*)     HCO3, Arterial 32.3 (*)     Base Excess, Arterial 6.0 (*)     O2 Saturation, Arterial 98.3 (*)     CO2 Content 34.0 (*)     All other components within normal limits   HEMOGLOBIN A1C - Abnormal; Notable for the following components:    Hemoglobin A1C 6.40 (*)     All other components within normal limits   RESPIRATORY PANEL PCR W/ COVID-19 (SARS-COV-2) IAN/NAYELY/KATERIN/PAD/COR/MAD/KRISH IN-HOUSE, [facility],  3-4 HR TAT - Normal    Narrative:     In the setting of a positive respiratory panel with a viral infection PLUS a negative procalcitonin without other underlying concern for bacterial infection, consider observing off antibiotics or discontinuation of antibiotics and continue supportive care. If the respiratory panel is positive for atypical bacterial infection (Bordetella pertussis, Chlamydophila pneumoniae, or Mycoplasma pneumoniae), consider antibiotic de-escalation to target atypical bacterial infection.   BNP (IN-HOUSE) - Normal    Narrative:     Among patients with dyspnea, NT-proBNP is highly sensitive for the detection of acute congestive heart failure. In addition NT-proBNP of <300 pg/ml effectively rules out acute congestive heart failure with 99% negative predictive value.    Results may be falsely decreased if patient taking Biotin.     POC LACTATE - Normal   BLOOD CULTURE   BLOOD CULTURE   RAINBOW DRAW    Narrative:     The following orders were created for panel order [place] Draw.  Procedure                               Abnormality         Status                     ---------                               -----------         ------                     Green Top (Gel)[[id number]]                                  Final result               Lavender Top[[id number]]                                     Final result               Gold Top - SST[[id number]]                                   Final result               Light Blue Top[[id number]]                                   Final result                 Please view results for these tests on the individual orders.   BLOOD GAS, ARTERIAL   TSH   LIPID PANEL   POC LACTATE   CBC AND DIFFERENTIAL    Narrative:     The following orders were created for panel order CBC & Differential.  Procedure                               Abnormality         Status                     ---------                               -----------         ------                     CBC Auto  Differential[[id number]]        Abnormal            Final result                 Please view results for these tests on the individual orders.   GREEN TOP   LAVENDER TOP   GOLD TOP - [facility]   LIGHT BLUE TOP       LOC: Person, Place, Time, and Situation    Telemetry:  Telemetry    Cardiac Monitoring Ordered: yes    EKG:   ECG 12 Lead Dyspnea   Preliminary Result   HEART RATE= 61  bpm   RR Interval= 984  ms   AZ Interval= 210  ms   P Horizontal Axis= -10  deg   P Front Axis= 41  deg   QRSD Interval= 167  ms   QT Interval= 454  ms   QRS Axis= 107  deg   T Wave Axis= 50  deg   - ABNORMAL ECG -   Sinus rhythm   Right bundle branch block   When compared with ECG of 16-Jan-2023 6:55:22,   Significant repolarization change   Electronically Signed By:    Date and Time of Study: 2023-03-08 14:34:05          Medications Given in the ED:   Medications   sodium chloride 0.9 % flush 10 mL (has no administration in time range)   sodium chloride 0.9 % flush 10 mL (has no administration in time range)   ipratropium-albuterol (DUO-NEB) nebulizer solution 3 mL ( Nebulization Canceled Entry 3/8/23 2030)   budesonide (PULMICORT) nebulizer solution 0.5 mg ( Nebulization Canceled Entry 3/8/23 2030)   methylPREDNISolone sodium succinate (SOLU-Medrol) injection 40 mg (has no administration in time range)   sodium chloride 0.9 % flush 10 mL (has no administration in time range)   sodium chloride 0.9 % flush 10 mL (has no administration in time range)   sodium chloride 0.9 % infusion 40 mL (has no administration in time range)   nitroglycerin (NITROSTAT) SL tablet 0.4 mg (has no administration in time range)   acetaminophen (TYLENOL) tablet 650 mg (has no administration in time range)   ondansetron (ZOFRAN) injection 4 mg (has no administration in time range)   melatonin tablet 5 mg (has no administration in time range)   ipratropium-albuterol (DUO-NEB) nebulizer solution 3 mL (3 mL Nebulization Given 3/8/23 1417)   albuterol (PROVENTIL)  nebulizer solution 0.083% 2.5 mg/3mL (2.5 mg Nebulization Given 3/8/23 1414)   methylPREDNISolone sodium succinate (SOLU-Medrol) injection 125 mg (125 mg Intravenous Given 3/8/23 1430)       Imaging results:  XR Chest 1 View    Result Date: 3/8/2023  Impression: Radiographic findings consistent with congestive heart failure and pulmonary edema. Electronically Signed: Leo Rosenberg  3/8/2023 3:58 PM EST  Workstation ID: [id number]     Social issues:   Social History     Socioeconomic History    Marital status:    Tobacco Use    Smoking status: Former     Packs/day: 1.00     Types: Cigarettes     Quit date: 7/3/2022     Years since quittin.6    Smokeless tobacco: Former     Quit date: 7/3/2022   Vaping Use    Vaping Use: Never used   Substance and Sexual Activity    Alcohol use: Not Currently    Drug use: Never    Sexual activity: Defer       NIH Stroke Scale:  Interval: (not recorded)  1a. Level of Consciousness: (not recorded)  1b. LOC Questions: (not recorded)  1c. LOC Commands: (not recorded)  2. Best Gaze: (not recorded)  3. Visual: (not recorded)  4. Facial Palsy: (not recorded)  5a. Motor Arm, Left: (not recorded)  5b. Motor Arm, Right: (not recorded)  6a. Motor Leg, Left: (not recorded)  6b. Motor Leg, Right: (not recorded)  7. Limb Ataxia: (not recorded)  8. Sensory: (not recorded)  9. Best Language: (not recorded)  10. Dysarthria: (not recorded)  11. Extinction and Inattention (formerly Neglect): (not recorded)    Total (NIH Stroke Scale): (not recorded)     Additional notable assessment information:     Nursing report ED to floorSue    Bailey Parker RN   23 19:18 EST         Other (Not Part of Legal Medical Record)         ED to Floor Handoff (Not Part of Legal Medical Record)

## 2023-03-09 NOTE — PLAN OF CARE
Goal Outcome Evaluation:  Plan of Care Reviewed With: patient           Outcome Evaluation: Pt is a 73 YO M admitted with COPD exacerbation, currently on 10L O2. Pt states he lives at home with spouse and is typically independent with all ADLs, ambulation wihtout AD and no recent falls. Pt states he has RWx at home and can use it as needed. Pt this date demonstrates good mobiltiy, but desats with ambualtion and transfers. Pt came to sitting EOB and required seated rest break to recover, then stood and ambulated in room, VS monitored throughout and O2 saturations maintained during ambulation but immediatly desat to 85% and required increased time to recover. Pt likely safe to return home with family assist, but may benefit from OP pulm therapy at d/c.

## 2023-03-09 NOTE — CONSULTS
Group: [facility]         CONSULT NOTE    Patient Identification:  Brenden Peter  72 y.o.  male  1950  [id number]            Requesting physician: Attending physician    Reason for Consultation: COPD exacerbation      History of Present Illness:    Brenden Peter is a 72-year-old male who presents to [facility] ED on 3/8/2023 with complaints of worsening shortness of breath.  He reports his symptoms have progressed over the past several days.  He has a past medical history of COPD, SONJA on PAP therapy, coronary artery disease, hypertension, hyperlipidemia, diabetes mellitus, and murmur    Assessment:    Acute on chronic respiratory failure with hypercapnia  ABG pH 7.388, PCO2 53.7, PO2 113.8, HCO3 32.3  Chronic obstructive pulmonary disease, patient wears 4 L of home oxygen  Obstructive sleep apnea, on Pap device    Coronary artery disease    Hypertension   Hyperlipidemia  Diabetes mellitus    Office note 3/2/2023  72 y/o x-smoker (55 year history-quit 7-3-22)  dyspnea and hypoxia.  SONJA, residual AHI: 1 , No snoring, no Leak, [id number] % > 4 hours  Severe COPD:  PFT FEV1 1.12L which is 41%, post BD 51%, ratio 57, ERV 17%, %, TLC 82%, DLCO 27%, obstructive sleep apnea features  mild interstitial lung disease  PMH:  HTN, DM, HLD      Recommendations:    Start azithromycin 250 mg p.o. daily x7 days  DC IV steroids and start prednisone 6-day taper    We will schedule BiPAP titration as outpatient    Titrate oxygen, currently requiring 4L high flow,   CPAP at at bedtime  - Patient wears 4 L of home oxygen    Bronchodilator\inhaled corticosteroid        Review of Sytems:  Review of Systems   Respiratory: Positive for shortness of breath. Negative for cough, wheezing and stridor.    Cardiovascular: Negative for chest pain, palpitations and leg swelling.       Past Medical History:  Past Medical History:   Diagnosis Date   • Abnormal EKG 2/3/2015   • Anxiety    • COPD (chronic  obstructive pulmonary disease) ([facility])    • Coronary artery disease 3/3/2015   • Diabetes ([facility])     type 2   • Hyperlipidemia    • Hypertension    • Microalbuminuria due to type 2 diabetes mellitus ([facility]) 2/5/2020   • Multinodular goiter 4/18/2019   • Murmur 2/3/2015   • Primary osteoarthritis of left knee 10/22/2021       Past Surgical History:  Past Surgical History:   Procedure Laterality Date   • CARDIAC CATHETERIZATION Left 1/15/2023    Procedure: Left Heart Cath and coronary angiogram;  Surgeon: Shawn Baker MD;  Location: [facility] CATH INVASIVE LOCATION;  Service: Cardiovascular;  Laterality: Left;   • EYE SURGERY      cataract   • TOTAL KNEE ARTHROPLASTY Right 10/21/2020    Procedure: TOTAL KNEE ARTHROPLASTY;  Surgeon: Ravi Fagan MD;  Location: [facility] MAIN OR;  Service: Orthopedics;  Laterality: Right;   • TOTAL KNEE ARTHROPLASTY REVISION Right 5/19/2021    Procedure: KNEE POLY INSERT EXCHANGE with irrigation;  Surgeon: Ravi Fagan MD;  Location: [facility] MAIN OR;  Service: Orthopedics;  Laterality: Right;        Home Meds:  Medications Prior to Admission   Medication Sig Dispense Refill Last Dose   • albuterol (PROVENTIL) (2.5 MG/3ML) 0.083% nebulizer solution Take 2.5 mg by nebulization 3 (Three) Times a Day As Needed for Wheezing or Shortness of Air.   Past Week   • albuterol sulfate  (90 Base) MCG/ACT inhaler Inhale 2 puffs Every 6 (Six) Hours As Needed.   Past Week   • amLODIPine (NORVASC) 5 MG tablet Take 1 tablet by mouth Every Night.   3/7/2023   • aspirin 81 MG EC tablet Take 1 tablet by mouth Daily for 90 days. 90 tablet 0 3/8/2023   • atorvastatin (LIPITOR) 40 MG tablet Take 1 tablet by mouth Daily for 90 days. 90 tablet 0 3/7/2023   • bisoprolol (ZEBeta) 5 MG tablet Take 1 tablet by mouth Daily.   3/8/2023   • furosemide (LASIX) 40 MG tablet Take 1 tablet by mouth Daily for 90 days. 90 tablet 0 3/8/2023   • gabapentin (NEURONTIN) 300 MG capsule Take 1 capsule by mouth 3 (Three) Times a  Day.   3/8/2023   • glipizide (GLUCOTROL) 10 MG tablet Take 1 tablet by mouth Every Night.   3/7/2023   • ipratropium-albuterol (DUO-NEB) 0.5-2.5 mg/3 ml nebulizer 3 mL Every 6 (Six) Hours As Needed.   Past Week   • lisinopril (PRINIVIL,ZESTRIL) 40 MG tablet Take 1 tablet by mouth Every Night.   3/7/2023   • metFORMIN (GLUCOPHAGE) 1000 MG tablet Take 1 tablet by mouth 2 (Two) Times a Day With Meals.   3/8/2023   • pioglitazone (ACTOS) 30 MG tablet Take 1 tablet by mouth Every Night.   3/7/2023   • sertraline (ZOLOFT) 50 MG tablet Take 1 tablet by mouth Every Night.   3/7/2023   • umeclidinium-vilanterol (ANORO ELLIPTA) 62.5-25 MCG/INH aerosol powder  inhaler Inhale 1 puff Daily.   3/8/2023   • vitamin D (ERGOCALCIFEROL) 1.25 MG (55280 UT) capsule capsule 1 capsule Every 7 (Seven) Days. Wednesday   3/8/2023       Allergies:  No Known Allergies    Social History:   Social History     Socioeconomic History   • Marital status:    Tobacco Use   • Smoking status: Former     Packs/day: 1.00     Types: Cigarettes     Quit date: 7/3/2022     Years since quittin.6   • Smokeless tobacco: Former     Quit date: 7/3/2022   Vaping Use   • Vaping Use: Never used   Substance and Sexual Activity   • Alcohol use: Not Currently   • Drug use: Never   • Sexual activity: Defer       Family History:  Family History   Problem Relation Age of Onset   • Cancer Other    • Diabetes Other    • Heart disease Other    • No Known Problems Mother    • No Known Problems Father    • No Known Problems Sister    • No Known Problems Brother    • No Known Problems Maternal Aunt    • No Known Problems Maternal Uncle    • No Known Problems Paternal Aunt    • No Known Problems Paternal Uncle    • No Known Problems Maternal Grandmother    • No Known Problems Maternal Grandfather    • No Known Problems Paternal Grandmother    • No Known Problems Paternal Grandfather    • Anemia Neg Hx    • Arrhythmia Neg Hx    • Asthma Neg Hx    • Clotting disorder  "Neg Hx    • Fainting Neg Hx    • Heart attack Neg Hx    • Heart failure Neg Hx    • Hyperlipidemia Neg Hx    • Hypertension Neg Hx        Physical Exam:  /72 (BP Location: Left arm, Patient Position: Lying)   Pulse 70   Temp 98.2 °F (36.8 °C) (Oral)   Resp 22   Ht 170.2 cm (67\")   Wt 106 kg (234 lb 5.6 oz)   SpO2 98%   BMI 36.70 kg/m²  Body mass index is 36.7 kg/m². 98% 106 kg (234 lb 5.6 oz)  Physical Exam  Vitals reviewed.   Pulmonary:      Effort: Pulmonary effort is normal.      Breath sounds: Rhonchi present. No wheezing.   Skin:     General: Skin is warm and dry.   Neurological:      Mental Status: He is alert and oriented to person, place, and time.         LABS:  Lab Results   Component Value Date    CALCIUM 9.4 03/08/2023    PHOS 2.8 09/04/2022     Results from last 7 days   Lab Units 03/09/23  0611 03/08/23  1420   SODIUM mmol/L  --  142   POTASSIUM mmol/L  --  3.7   CHLORIDE mmol/L  --  100   CO2 mmol/L  --  31.0*   BUN mg/dL  --  12   CREATININE mg/dL  --  0.54*   GLUCOSE mg/dL  --  147*   CALCIUM mg/dL  --  9.4   WBC 10*3/mm3 5.10 5.30   HEMOGLOBIN g/dL 9.7* 11.0*   PLATELETS 10*3/mm3 166 183   ALT (SGPT) U/L  --  15   AST (SGOT) U/L  --  16   PROBNP pg/mL  --  367.5     Lab Results   Component Value Date    TROPONINT 21 (H) 03/08/2023     Results from last 7 days   Lab Units 03/08/23  1420   HSTROP T ng/L 21*         Results from last 7 days   Lab Units 03/08/23  1425   LACTATE mmol/L 2.0     Results from last 7 days   Lab Units 03/08/23  1440   PH, ARTERIAL pH units 7.388   PCO2, ARTERIAL mm Hg 53.7*   PO2 ART mm Hg 113.8*   O2 SATURATION ART % 98.3*   MODALITY  NRB     Results from last 7 days   Lab Units 03/08/23  1613   ADENOVIRUS DETECTION BY PCR  Not Detected   CORONAVIRUS 229E  Not Detected   CORONAVIRUS HKU1  Not Detected   CORONAVIRUS NL63  Not Detected   CORONAVIRUS OC43  Not Detected   HUMAN METAPNEUMOVIRUS  Not Detected   HUMAN RHINOVIRUS/ENTEROVIRUS  Not Detected   INFLUENZA B " PCR  Not Detected   PARAINFLUENZA 1  Not Detected   PARAINFLUENZA VIRUS 2  Not Detected   PARAINFLUENZA VIRUS 3  Not Detected   PARAINFLUENZA VIRUS 4  Not Detected   BORDETELLA PERTUSSIS PCR  Not Detected   CHLAMYDOPHILA PNEUMONIAE PCR  Not Detected   MYCOPLAMA PNEUMO PCR  Not Detected   INFLUENZA A PCR  Not Detected   RSV, PCR  Not Detected             Lab Results   Component Value Date    TSH 1.790 03/08/2023     Estimated Creatinine Clearance: 143.6 mL/min (A) (by C-G formula based on SCr of 0.54 mg/dL (L)).         Imaging:  Imaging Results (Last 24 Hours)     Procedure Component Value Units Date/Time    CT Chest Without Contrast Diagnostic [[id number]] Resulted: 03/09/23 0046     Updated: 03/09/23 0049    XR Chest 1 View [[id number]] Collected: 03/08/23 1557     Updated: 03/08/23 1600    Narrative:      XR CHEST 1 VW    Date of Exam: 3/8/2023 3:45 PM EST    Indication: Short of breath.    Comparison: 1/12/2023    Findings:  The heart size is enlarged. There is pulmonary vascular congestion with interstitial pulmonary edema. No pleural fluid is identified. No focal consolidation is seen.      Impression:      Impression:  Radiographic findings consistent with congestive heart failure and pulmonary edema.    Electronically Signed: Leo Rosenberg    3/8/2023 3:58 PM EST    Workstation ID: [id number]            Current Meds:   SCHEDULE  budesonide, 0.5 mg, Nebulization, BID - RT  ipratropium-albuterol, 3 mL, Nebulization, 4x Daily - RT  methylPREDNISolone sodium succinate, 40 mg, Intravenous, Q8H  sodium chloride, 10 mL, Intravenous, Q12H      Infusions     PRNs  •  acetaminophen  •  melatonin  •  nitroglycerin  •  ondansetron  •  sodium chloride  •  [COMPLETED] Insert Peripheral IV **AND** sodium chloride  •  sodium chloride  •  sodium chloride        LENNIE Santa  3/9/2023  07:05 EST      Much of this encounter note is an electronic transcription/translation of spoken language to printed text using Dragon  Software.

## 2023-03-09 NOTE — CASE MANAGEMENT/SOCIAL WORK
Discharge Planning Assessment  [facility]     Patient Name: Brenden Peter  MRN: [id number]  Today's Date: 3/9/2023    Admit Date: 3/8/2023    Plan: Anticipate home with spouse. Current home oxygen (4L) with [employer].   Discharge Needs Assessment     Row Name 03/09/23 1344       Living Environment    People in Home spouse    Current Living Arrangements home    Primary Care Provided by self    Provides Primary Care For no one    Family Caregiver if Needed spouse;child(kenia), adult    Quality of Family Relationships helpful    Able to Return to Prior Arrangements yes       Resource/Environmental Concerns    Resource/Environmental Concerns none    Transportation Concerns none       Transition Planning    Patient/Family Anticipates Transition to home with family    Patient/Family Anticipated Services at Transition none    Transportation Anticipated family or friend will provide       Discharge Needs Assessment    Equipment Currently Used at Home oxygen;cpap;glucometer;pulse ox;cane, straight;walker, rolling;nebulizer    Concerns to be Addressed denies needs/concerns at this time    Anticipated Changes Related to Illness none    Equipment Needed After Discharge none               Discharge Plan     Row Name 03/09/23 7667       Plan    Plan Anticipate home with spouse. Current home oxygen (4L) with [employer].    Patient/Family in Agreement with Plan yes    Plan Comments  spoke to patient at bedside wearing mask and keeping distance greater than 6 feet and spent less than 15 minutes in room. Patient lives with spouse, is IADLs and drives. PCP and pharmacy verified-patient reports difficulty affording some of his inhalers. CM unable to find specific coupons for patient's current inhalers, but provided drug company number for patient assistance. Patient denies any further d/c needs at this time and spouse will provide transport at d/c. Barrier to D/C: weaning oxygen, pulmonology following.                  Expected  Discharge Date and Time     Expected Discharge Date Expected Discharge Time    Mar 10, 2023          Demographic Summary     Row Name 03/09/23 1344       General Information    Admission Type observation    Arrived From emergency department    Referral Source admission list    Reason for Consult discharge planning    Preferred Language English       Contact Information    Permission Granted to Share Info With                Functional Status     Row Name 03/09/23 1344       Functional Status    Usual Activity Tolerance moderate    Current Activity Tolerance moderate       Functional Status, IADL    Medications independent    Meal Preparation independent    Housekeeping independent    Laundry independent    Shopping independent       Mental Status    General Appearance WDL WDL       Mental Status Summary    Recent Changes in Mental Status/Cognitive Functioning no changes                   Patient Forms     Row Name 03/09/23 1348       Patient Forms    Important Message from Medicare (IMM) --  QUIÑONES 3/8/23 by registration              Met with patient in room wearing PPE: mask.    Maintained distance greater than six feet and spent less than 15 minutes in the room.        RUFINO AguayoN, RN    [address] [address]    Office: [phone]  Fax: [phone]

## 2023-03-09 NOTE — THERAPY EVALUATION
Patient Name: Brenden Peter  : 1950    MRN: [id number]                              Today's Date: 3/9/2023       Admit Date: 3/8/2023    Visit Dx:     ICD-10-CM ICD-9-CM   1. Acute respiratory distress  R06.03 518.82   2. Chronic obstructive pulmonary disease with acute exacerbation (HCC)  J44.1 491.21   3. Hypoxia  R09.02 799.02     Patient Active Problem List   Diagnosis   • Primary localized osteoarthrosis of right lower leg   • Type 2 diabetes mellitus without complication, without long-term current use of insulin ([facility])   • Mixed hyperlipidemia   • Essential hypertension   • Primary osteoarthritis of knees, bilateral   • Hx of total knee replacement, right   • Morbid obesity ([facility])   • Acquired trigger finger   • Benign prostatic hyperplasia   • Coronary artery disease involving native coronary artery of native heart without angina pectoris   • Depression   • Diabetic peripheral neuropathy ([facility])   • Microalbuminuria due to type 2 diabetes mellitus ([facility])   • Multinodular goiter   • Murmur   • Tobacco dependence syndrome   • Vitamin D deficiency   • Swelling of joint of right knee   • Chronic pain of left knee   • COPD (chronic obstructive pulmonary disease) ([facility])   • COPD exacerbation ([facility])   • Sepsis ([facility])   • Acute on chronic respiratory failure with hypoxia ([facility])   • Abnormal nuclear stress test   • Acute respiratory distress     Past Medical History:   Diagnosis Date   • Abnormal EKG 2/3/2015   • Anxiety    • COPD (chronic obstructive pulmonary disease) ([facility])    • Coronary artery disease 3/3/2015   • Diabetes ([facility])     type 2   • Hyperlipidemia    • Hypertension    • Microalbuminuria due to type 2 diabetes mellitus ([facility]) 2020   • Multinodular goiter 2019   • Murmur 2/3/2015   • Primary osteoarthritis of left knee 10/22/2021     Past Surgical History:   Procedure Laterality Date   • CARDIAC CATHETERIZATION Left 1/15/2023    Procedure: Left Heart Cath and coronary angiogram;  Surgeon: Samuel  Shawn MCLAUGHLIN MD;  Location: [facility] CATH INVASIVE LOCATION;  Service: Cardiovascular;  Laterality: Left;   • EYE SURGERY      cataract   • TOTAL KNEE ARTHROPLASTY Right 10/21/2020    Procedure: TOTAL KNEE ARTHROPLASTY;  Surgeon: Ravi Fagan MD;  Location: [facility] MAIN OR;  Service: Orthopedics;  Laterality: Right;   • TOTAL KNEE ARTHROPLASTY REVISION Right 5/19/2021    Procedure: KNEE POLY INSERT EXCHANGE with irrigation;  Surgeon: Ravi Fagan MD;  Location: [facility] MAIN OR;  Service: Orthopedics;  Laterality: Right;      General Information     Row Name 03/09/23 1533          Physical Therapy Time and Intention    Document Type evaluation  -EL     Mode of Treatment individual therapy;physical therapy  -EL     Row Name 03/09/23 1533          General Information    Patient Profile Reviewed yes  -EL     Prior Level of Function independent:;all household mobility;ADL's;driving;yard work  -EL     Row Name 03/09/23 1533          Living Environment    People in Home spouse  -EL     Row Name 03/09/23 1533          Home Main Entrance    Number of Stairs, Main Entrance none  -EL     Row Name 03/09/23 1533          Stairs Within Home, Primary    Number of Stairs, Within Home, Primary none  -EL     Row Name 03/09/23 1533          Cognition    Orientation Status (Cognition) oriented x 4  -EL     Row Name 03/09/23 1533          Safety Issues, Functional Mobility    Impairments Affecting Function (Mobility) endurance/activity tolerance;shortness of breath  -EL           User Key  (r) = Recorded By, (t) = Taken By, (c) = Cosigned By    Initials Name Provider Type    EL Bo Loo PT Physical Therapist               Mobility     Row Name 03/09/23 1541          Bed Mobility    Bed Mobility bed mobility (all) activities  -EL     All Activities, [place] (Bed Mobility) modified independence  -EL     Assistive Device (Bed Mobility) bed rails  -EL     Comment, (Bed Mobility) SOA following transition to EOB  -EL     Row Name 03/09/23  1541          Bed-Chair Transfer    Bed-Chair [place] (Transfers) contact guard  -EL     Row Name 03/09/23 1541          Sit-Stand Transfer    Sit-Stand [place] (Transfers) contact guard  -EL     Row Name 03/09/23 1541          Gait/Stairs (Locomotion)    [place] Level (Gait) contact guard  -EL     Assistive Device (Gait) walker, front-wheeled  -EL     Distance in Feet (Gait) 50  -EL     Deviations/Abnormal Patterns (Gait) gait speed decreased  -EL     Comment, (Gait/Stairs) SOA following, desat to 87%, requiring approx 2 min to recover >90%  -EL           User Key  (r) = Recorded By, (t) = Taken By, (c) = Cosigned By    Initials Name Provider Type    Bo Woodard PT Physical Therapist               Obj/Interventions     Row Name 03/09/23 1552          Range of Motion Comprehensive    General Range of Motion bilateral lower extremity ROM WFL  -[facility] Name 03/09/23 1552          Strength Comprehensive (MMT)    General Manual Muscle Testing (MMT) Assessment no strength deficits identified  -EL     Comment, General Manual Muscle Testing (MMT) Assessment BLE WFL  -EL     [facility] Name 03/09/23 1552          Balance    Balance Assessment sitting static balance;standing static balance;standing dynamic balance  -EL     Static Sitting Balance independent  -EL     Static Standing Balance contact guard  -EL     Dynamic Standing Balance contact guard  -EL     Position/Device Used, Standing Balance walker, front-wheeled  -EL     Row Name 03/09/23 1552          Sensory Assessment (Somatosensory)    Sensory Assessment (Somatosensory) sensation intact  -EL           User Key  (r) = Recorded By, (t) = Taken By, (c) = Cosigned By    Initials Name Provider Type    Bo Woodard PT Physical Therapist               Goals/Plan     Row Name 03/09/23 1556          Bed Mobility Goal 1 (PT)    Activity/Assistive Device (Bed Mobility Goal 1, PT) bed mobility activities, all  -EL     [place] Level/Cues Needed (Bed  Mobility Goal 1, PT) modified independence  -EL     Time Frame (Bed Mobility Goal 1, PT) long term goal (LTG);2 weeks  -EL     Row Name 03/09/23 1556          Transfer Goal 1 (PT)    Activity/Assistive Device (Transfer Goal 1, PT) transfers, all;walker, rolling  -EL     [place] Level/Cues Needed (Transfer Goal 1, PT) modified independence  -EL     Time Frame (Transfer Goal 1, PT) long term goal (LTG);2 weeks  -EL     Row Name 03/09/23 1556          Gait Training Goal 1 (PT)    Activity/Assistive Device (Gait Training Goal 1, PT) gait (walking locomotion);walker, rolling  -EL     [place] Level (Gait Training Goal 1, PT) modified independence  -EL     Distance (Gait Training Goal 1, PT) 150  -EL     Time Frame (Gait Training Goal 1, PT) long term goal (LTG);2 weeks  -EL     Strategies/Barriers (Gait Training Goal 1, PT) O2 maintaining above 90%  -EL     Row Name 03/09/23 1556          Therapy Assessment/Plan (PT)    Planned Therapy Interventions (PT) balance training;neuromuscular re-education;bed mobility training;transfer training;gait training;patient/family education;strengthening  -EL           User Key  (r) = Recorded By, (t) = Taken By, (c) = Cosigned By    Initials Name Provider Type    Bo Woodard PT Physical Therapist               Clinical Impression     Row Name 03/09/23 1553          Pain    Pretreatment Pain Rating 0/10 - no pain  -EL     Posttreatment Pain Rating 0/10 - no pain  -EL     Row Name 03/09/23 1553          Plan of Care Review    Plan of Care Reviewed With patient  -EL     Outcome Evaluation Pt is a 73 YO M admitted with COPD exacerbation, currently on 10L O2. Pt states he lives at home with spouse and is typically independent with all ADLs, ambulation wihtout AD and no recent falls. Pt states he has RWx at home and can use it as needed. Pt this date demonstrates good mobiltiy, but desats with ambualtion and transfers. Pt came to sitting EOB and required seated rest break to  recover, then stood and ambulated in room, VS monitored throughout and O2 saturations maintained during ambulation but immediatly desat to 85% and required increased time to recover. Pt likely safe to return home with family assist, but may benefit from OP pulm therapy at d/c.  -EL     Row Name 03/09/23 1556          Therapy Assessment/Plan (PT)    Rehab Potential (PT) good, to achieve stated therapy goals  -EL     Criteria for Skilled Interventions Met (PT) yes  -EL     Therapy Frequency (PT) 3 times/wk  -EL     Predicted Duration of Therapy Intervention (PT) Until d/c.  -EL     Row Name 03/09/23 1553          Vital Signs    Pre SpO2 (%) 94  -EL     O2 Delivery Pre Treatment supplemental O2  -EL     Intra SpO2 (%) 87  -EL     O2 Delivery Intra Treatment supplemental O2  -EL     Post SpO2 (%) 93  -EL     O2 Delivery Post Treatment supplemental O2  -EL     Pre Patient Position Supine  -EL     Intra Patient Position Standing  -EL     Post Patient Position Sitting  -EL     Row Name 03/09/23 5708          Positioning and Restraints    Pre-Treatment Position in bed  -EL     Post Treatment Position chair  -EL     In Chair notified nsg;sitting;call light within reach;encouraged to call for assist  -EL           User Key  (r) = Recorded By, (t) = Taken By, (c) = Cosigned By    Initials Name Provider Type    Bo Woodard, PT Physical Therapist               Outcome Measures     Row Name 03/09/23 9582          How much help from another person do you currently need...    Turning from your back to your side while in flat bed without using bedrails? 4  -EL     Moving from lying on back to sitting on the side of a flat bed without bedrails? 3  -EL     Moving to and from a bed to a chair (including a wheelchair)? 3  -EL     Standing up from a chair using your arms (e.g., wheelchair, bedside chair)? 3  -EL     Climbing 3-5 steps with a railing? 3  -EL     To walk in hospital room? 3  -EL     AM-PAC 6 Clicks Score (PT) 19  -EL      Highest level of mobility 6 --> Walked 10 steps or more  -     Row Name 03/09/23 1557          Functional Assessment    Outcome Measure Options AM-PAC 6 Clicks Basic Mobility (PT)  -           User Key  (r) = Recorded By, (t) = Taken By, (c) = Cosigned By    Initials Name Provider Type    EL Bo Loo PT Physical Therapist                             Physical Therapy Education     Title: PT OT SLP Therapies (Done)     Topic: Physical Therapy (Done)     Point: Mobility training (Done)     Learning Progress Summary           Patient Acceptance, E,TB, VU by  at 3/9/2023 1602                   Point: Precautions (Done)     Learning Progress Summary           Patient Acceptance, E,TB, VU by  at 3/9/2023 1602                               User Key     Initials Effective Dates Name Provider Type Discipline     06/23/20 -  Bo Loo PT Physical Therapist PT              PT Recommendation and Plan  Planned Therapy Interventions (PT): balance training, neuromuscular re-education, bed mobility training, transfer training, gait training, patient/family education, strengthening  Plan of Care Reviewed With: patient  Outcome Evaluation: Pt is a 71 YO M admitted with COPD exacerbation, currently on 10L O2. Pt states he lives at home with spouse and is typically independent with all ADLs, ambulation wihtout AD and no recent falls. Pt states he has RWx at home and can use it as needed. Pt this date demonstrates good mobiltiy, but desats with ambualtion and transfers. Pt came to sitting EOB and required seated rest break to recover, then stood and ambulated in room, VS monitored throughout and O2 saturations maintained during ambulation but immediatly desat to 85% and required increased time to recover. Pt likely safe to return home with family assist, but may benefit from OP pulm therapy at d/c.     Time Calculation:    PT Charges     Row Name 03/09/23 1602             Time Calculation    Start Time 1415  -EL      Stop  Time 1434  -EL      Time Calculation (min) 19 min  -EL      PT Received On 03/09/23  -EL      PT - Next Appointment 03/10/23  -EL      PT Goal Re-Cert Due Date 03/23/23  -EL            User Key  (r) = Recorded By, (t) = Taken By, (c) = Cosigned By    Initials Name Provider Type    Bo Woodard, PT Physical Therapist              Therapy Charges for Today     Code Description Service Date Service Provider Modifiers Qty    [id number] HC PT EVAL MOD COMPLEXITY 3 3/9/2023 Bo Loo, PT GP 1          PT G-Codes  Outcome Measure Options: AM-PAC 6 Clicks Basic Mobility (PT)  AM-PAC 6 Clicks Score (PT): 19  PT Discharge Summary  Anticipated Discharge Disposition (PT): home with outpatient therapy services, home with assist    Bo Loo PT  3/9/2023

## 2023-03-09 NOTE — PLAN OF CARE
Goal Outcome Evaluation:     Pt is A&O x 4. Got up to chair with 1PA and walker. Patient becomes SOA with exertion. At start of shift, patient on 11L O2, but was  decreased to 4L by APRN. Patient tolerated this liter flow for several hours, maintaining O2 sats 88-92% . When patient requested to go back to bed, SpO2 decreased to 78-83%, very slow to recover. O2 was increased to a titrated level of 8L. SpO2 then recovered to 89-94%. Patient understands and independently practices slow, controlled, pursed-lip breathing to recover when SOA.

## 2023-03-09 NOTE — PROGRESS NOTES
[facility] Medicine Services   Daily Progress Note    Patient Name: Brenden Peter  : 1950  MRN: [id number]  Primary Care Physician:  Bert Rojas MD  Date of admission: 3/8/2023  Date and Time of Service: 3/9/2023    Subjective      Chief Complaint: SOB increased O2 demands, and   Patient Reports patient was seen and examined today, mention no wheezing but increasing cough producing phlegm, no fever or chills, no abdominal pain fever chills    ROS all review of systems negative except for mentioned above    Objective      Vitals:   Temp:  [97.5 °F (36.4 °C)-98.5 °F (36.9 °C)] 97.5 °F (36.4 °C)  Heart Rate:  [] 64  Resp:  [12-24] 16  BP: (118-149)/(53-73) 128/54  Flow (L/min):  [4-12] 8    Physical Exam  Constitutional:       Appearance: Normal appearance.   HENT:      Head: Normocephalic.      Right Ear: Tympanic membrane normal.      Left Ear: Tympanic membrane normal.      Nose: Nose normal.      Mouth/Throat:      Mouth: Mucous membranes are moist.   Eyes:      Pupils: Pupils are equal, round, and reactive to light.   Cardiovascular:      Rate and Rhythm: Normal rate.      Pulses: Normal pulses.   Pulmonary:      Comments: Diminished breathing sounds bilaterally  Abdominal:      General: Abdomen is flat.   Musculoskeletal:         General: Normal range of motion.      Cervical back: Normal range of motion.   Skin:     General: Skin is warm.      Capillary Refill: Capillary refill takes less than 2 seconds.   Neurological:      General: No focal deficit present.      Mental Status: He is alert.   Psychiatric:         Mood and Affect: Mood normal.            Result Review    Result Review:  I have personally reviewed the results from the time of this admission to 3/9/2023 16:19 EST and agree with these findings:  [x]  Laboratory  []  Microbiology  [x]  Radiology  []  EKG/Telemetry   []  Cardiology/Vascular   []  Pathology  []  Old records  []  Other:  Most notable findings  include: Diffuse interstitial infiltrates similar to lung edema        Assessment & Plan      Brief Patient Summary:  Brenden Peter is a 72 y.o. male who has history of COPD diastolic CHF and morbid obesity is also obstructive sleep apnea on CPAP at home presented with shortness of breath and his oxygen demand, his oxygen demands 4 L at home and here in the hospital was needing 4 to 10 L, was started for treatment of atypical pneumonia and COPD exacerbation, proBNP is low and has no pedal edema however chest x-ray is similar to the picture we can see in pulmonary edema, thus I gave him a dose of IV Lasix, patient is using CPAP as needed he feels improved, however his oxygen demand still 6 to 10 L instead of his home dose of 4 L      [START ON 3/10/2023] azithromycin, 250 mg, Oral, Q24H  budesonide, 0.5 mg, Nebulization, BID - RT  furosemide, 40 mg, Intravenous, Q12H  ipratropium-albuterol, 3 mL, Nebulization, 4x Daily - RT  predniSONE, 30 mg, Oral, Daily   Followed by  [START ON 3/11/2023] predniSONE, 20 mg, Oral, Daily   Followed by  [START ON 3/13/2023] predniSONE, 10 mg, Oral, Daily  sodium chloride, 10 mL, Intravenous, Q12H             Active Hospital Problems:  Active Hospital Problems    Diagnosis    • **Acute respiratory distress      Plan:   COPD exacerbation  Obstructive sleep apnea  - Wears 4 L at home, currently on 2 L HFNC  - continue to wean O2 and maintain O2 sat > 90%  - CXR mentions pulmonary edema but BNP is negative.  - Respiratory panel negative  - Continue Solu-Medrol  - Continue DuoNebs and Pulmicort  - BiPAP as needed/at bedtime  - CT chest without contrast  - Hold home on oral  - Continue home Lasix     Hypertension  CAD/HLD  -/53  - Recent cardiac cath January 2023 which just showed mild CAD  - Continue home statin, ASA, bisoprolol, norvasc, and lisinopril     Type 2 diabetes  -Glucose 147  -Check A1c  - Hold home actos, metformin and glipizide  - Sliding scale insulin and Accu-Cheks  before meals and at bedtime     Anxiety  - Continue home Zoloft     DVT prophylaxis  -Lovenox     Obesity  - BMI 34.46  - Encourage lifestyle changes    DVT prophylaxis:  Mechanical DVT prophylaxis orders are present.    CODE STATUS:    Level Of Support Discussed With: Patient  Code Status (Patient has no pulse and is not breathing): CPR (Attempt to Resuscitate)  Medical Interventions (Patient has pulse or is breathing): Full Support      Disposition:  I expect patient to be discharged 3 days    This patient has been examined wearing appropriate Personal Protective Equipment and discussed with hospital infection control department. 03/09/23      Electronically signed by Javier Marcum MD, 03/09/23, 16:19 EST.  [facility]ist Team

## 2023-03-10 ENCOUNTER — APPOINTMENT (OUTPATIENT)
Dept: CARDIOLOGY | Facility: HOSPITAL | Age: 73
DRG: 190 | End: 2023-03-10
Payer: MEDICARE

## 2023-03-10 LAB
ANION GAP SERPL CALCULATED.3IONS-SCNC: 9 MMOL/L (ref 5–15)
BASOPHILS # BLD AUTO: 0 10*3/MM3 (ref 0–0.2)
BASOPHILS NFR BLD AUTO: 0.2 % (ref 0–1.5)
BUN SERPL-MCNC: 24 MG/DL (ref 8–23)
BUN/CREAT SERPL: 38.1 (ref 7–25)
CALCIUM SPEC-SCNC: 9.2 MG/DL (ref 8.6–10.5)
CHLORIDE SERPL-SCNC: 102 MMOL/L (ref 98–107)
CO2 SERPL-SCNC: 30 MMOL/L (ref 22–29)
CREAT SERPL-MCNC: 0.63 MG/DL (ref 0.76–1.27)
DEPRECATED RDW RBC AUTO: 55.1 FL (ref 37–54)
EGFRCR SERPLBLD CKD-EPI 2021: 101.1 ML/MIN/1.73
EOSINOPHIL # BLD AUTO: 0 10*3/MM3 (ref 0–0.4)
EOSINOPHIL NFR BLD AUTO: 0 % (ref 0.3–6.2)
ERYTHROCYTE [DISTWIDTH] IN BLOOD BY AUTOMATED COUNT: 16.9 % (ref 12.3–15.4)
GLUCOSE BLDC GLUCOMTR-MCNC: 173 MG/DL (ref 70–105)
GLUCOSE BLDC GLUCOMTR-MCNC: 186 MG/DL (ref 70–105)
GLUCOSE BLDC GLUCOMTR-MCNC: 240 MG/DL (ref 70–105)
GLUCOSE BLDC GLUCOMTR-MCNC: 358 MG/DL (ref 70–105)
GLUCOSE BLDC GLUCOMTR-MCNC: 374 MG/DL (ref 70–105)
GLUCOSE BLDC GLUCOMTR-MCNC: 400 MG/DL (ref 70–105)
GLUCOSE SERPL-MCNC: 188 MG/DL (ref 65–99)
HCT VFR BLD AUTO: 30.6 % (ref 37.5–51)
HGB BLD-MCNC: 9.8 G/DL (ref 13–17.7)
LYMPHOCYTES # BLD AUTO: 1.2 10*3/MM3 (ref 0.7–3.1)
LYMPHOCYTES NFR BLD AUTO: 12.6 % (ref 19.6–45.3)
MCH RBC QN AUTO: 28.3 PG (ref 26.6–33)
MCHC RBC AUTO-ENTMCNC: 32.2 G/DL (ref 31.5–35.7)
MCV RBC AUTO: 87.9 FL (ref 79–97)
MONOCYTES # BLD AUTO: 0.6 10*3/MM3 (ref 0.1–0.9)
MONOCYTES NFR BLD AUTO: 6.1 % (ref 5–12)
NEUTROPHILS NFR BLD AUTO: 7.8 10*3/MM3 (ref 1.7–7)
NEUTROPHILS NFR BLD AUTO: 81.1 % (ref 42.7–76)
NRBC BLD AUTO-RTO: 0 /100 WBC (ref 0–0.2)
PLATELET # BLD AUTO: 183 10*3/MM3 (ref 140–450)
PMV BLD AUTO: 9.5 FL (ref 6–12)
POTASSIUM SERPL-SCNC: 3.6 MMOL/L (ref 3.5–5.2)
QT INTERVAL: 454 MS
RBC # BLD AUTO: 3.48 10*6/MM3 (ref 4.14–5.8)
SODIUM SERPL-SCNC: 141 MMOL/L (ref 136–145)
WBC NRBC COR # BLD: 9.6 10*3/MM3 (ref 3.4–10.8)

## 2023-03-10 PROCEDURE — 85025 COMPLETE CBC W/AUTO DIFF WBC: CPT | Performed by: NURSE PRACTITIONER

## 2023-03-10 PROCEDURE — 94799 UNLISTED PULMONARY SVC/PX: CPT

## 2023-03-10 PROCEDURE — 93306 TTE W/DOPPLER COMPLETE: CPT

## 2023-03-10 PROCEDURE — 63710000001 PREDNISONE PER 1 MG

## 2023-03-10 PROCEDURE — 36415 COLL VENOUS BLD VENIPUNCTURE: CPT | Performed by: NURSE PRACTITIONER

## 2023-03-10 PROCEDURE — 63710000001 INSULIN REGULAR HUMAN PER 5 UNITS: Performed by: NURSE PRACTITIONER

## 2023-03-10 PROCEDURE — 94660 CPAP INITIATION&MGMT: CPT

## 2023-03-10 PROCEDURE — 93306 TTE W/DOPPLER COMPLETE: CPT | Performed by: INTERNAL MEDICINE

## 2023-03-10 PROCEDURE — 82962 GLUCOSE BLOOD TEST: CPT

## 2023-03-10 PROCEDURE — 63710000001 INSULIN LISPRO (HUMAN) PER 5 UNITS: Performed by: NURSE PRACTITIONER

## 2023-03-10 PROCEDURE — 94761 N-INVAS EAR/PLS OXIMETRY MLT: CPT

## 2023-03-10 PROCEDURE — 25010000002 FUROSEMIDE PER 20 MG: Performed by: INTERNAL MEDICINE

## 2023-03-10 PROCEDURE — 80048 BASIC METABOLIC PNL TOTAL CA: CPT | Performed by: NURSE PRACTITIONER

## 2023-03-10 PROCEDURE — 63710000001 PREDNISONE PER 5 MG

## 2023-03-10 PROCEDURE — 94664 DEMO&/EVAL PT USE INHALER: CPT

## 2023-03-10 RX ORDER — PIOGLITAZONEHYDROCHLORIDE 30 MG/1
30 TABLET ORAL NIGHTLY
Status: DISCONTINUED | OUTPATIENT
Start: 2023-03-10 | End: 2023-03-13 | Stop reason: HOSPADM

## 2023-03-10 RX ORDER — GLIPIZIDE 5 MG/1
10 TABLET ORAL NIGHTLY
Status: DISCONTINUED | OUTPATIENT
Start: 2023-03-10 | End: 2023-03-13 | Stop reason: HOSPADM

## 2023-03-10 RX ORDER — AZITHROMYCIN 250 MG/1
250 TABLET, FILM COATED ORAL
Status: DISCONTINUED | OUTPATIENT
Start: 2023-03-11 | End: 2023-03-11

## 2023-03-10 RX ORDER — IPRATROPIUM BROMIDE AND ALBUTEROL SULFATE 2.5; .5 MG/3ML; MG/3ML
3 SOLUTION RESPIRATORY (INHALATION) EVERY 6 HOURS PRN
Status: DISCONTINUED | OUTPATIENT
Start: 2023-03-10 | End: 2023-03-13 | Stop reason: HOSPADM

## 2023-03-10 RX ORDER — GUAIFENESIN 600 MG/1
600 TABLET, EXTENDED RELEASE ORAL EVERY 12 HOURS SCHEDULED
Status: DISCONTINUED | OUTPATIENT
Start: 2023-03-10 | End: 2023-03-13 | Stop reason: HOSPADM

## 2023-03-10 RX ADMIN — IPRATROPIUM BROMIDE AND ALBUTEROL SULFATE 3 ML: 2.5; .5 SOLUTION RESPIRATORY (INHALATION) at 11:48

## 2023-03-10 RX ADMIN — ATORVASTATIN CALCIUM 40 MG: 40 TABLET, FILM COATED ORAL at 08:52

## 2023-03-10 RX ADMIN — BISOPROLOL FUMARATE 5 MG: 5 TABLET ORAL at 08:52

## 2023-03-10 RX ADMIN — INSULIN LISPRO 4 UNITS: 100 INJECTION, SOLUTION INTRAVENOUS; SUBCUTANEOUS at 12:29

## 2023-03-10 RX ADMIN — FUROSEMIDE 40 MG: 10 INJECTION, SOLUTION INTRAMUSCULAR; INTRAVENOUS at 08:54

## 2023-03-10 RX ADMIN — GABAPENTIN 300 MG: 300 CAPSULE ORAL at 17:08

## 2023-03-10 RX ADMIN — IPRATROPIUM BROMIDE AND ALBUTEROL SULFATE 3 ML: 2.5; .5 SOLUTION RESPIRATORY (INHALATION) at 14:55

## 2023-03-10 RX ADMIN — NYSTATIN 500000 UNITS: 100000 SUSPENSION ORAL at 20:05

## 2023-03-10 RX ADMIN — METFORMIN HYDROCHLORIDE 1000 MG: 500 TABLET ORAL at 17:08

## 2023-03-10 RX ADMIN — Medication 10 ML: at 20:05

## 2023-03-10 RX ADMIN — PIOGLITAZONE 30 MG: 30 TABLET ORAL at 20:07

## 2023-03-10 RX ADMIN — ASPIRIN 81 MG: 81 TABLET, COATED ORAL at 08:53

## 2023-03-10 RX ADMIN — LISINOPRIL 40 MG: 20 TABLET ORAL at 20:06

## 2023-03-10 RX ADMIN — Medication 10 ML: at 08:57

## 2023-03-10 RX ADMIN — NYSTATIN 500000 UNITS: 100000 SUSPENSION ORAL at 17:09

## 2023-03-10 RX ADMIN — IPRATROPIUM BROMIDE AND ALBUTEROL SULFATE 3 ML: 2.5; .5 SOLUTION RESPIRATORY (INHALATION) at 07:50

## 2023-03-10 RX ADMIN — GUAIFENESIN 600 MG: 600 TABLET, EXTENDED RELEASE ORAL at 20:06

## 2023-03-10 RX ADMIN — INSULIN LISPRO 8 UNITS: 100 INJECTION, SOLUTION INTRAVENOUS; SUBCUTANEOUS at 20:09

## 2023-03-10 RX ADMIN — INSULIN LISPRO 8 UNITS: 100 INJECTION, SOLUTION INTRAVENOUS; SUBCUTANEOUS at 17:08

## 2023-03-10 RX ADMIN — BUDESONIDE 0.5 MG: 0.5 INHALANT RESPIRATORY (INHALATION) at 21:04

## 2023-03-10 RX ADMIN — AMLODIPINE BESYLATE 5 MG: 5 TABLET ORAL at 20:06

## 2023-03-10 RX ADMIN — SERTRALINE 50 MG: 50 TABLET, FILM COATED ORAL at 20:06

## 2023-03-10 RX ADMIN — PREDNISONE 30 MG: 20 TABLET ORAL at 08:53

## 2023-03-10 RX ADMIN — GABAPENTIN 300 MG: 300 CAPSULE ORAL at 20:06

## 2023-03-10 RX ADMIN — INSULIN HUMAN 6 UNITS: 100 INJECTION, SOLUTION PARENTERAL at 08:52

## 2023-03-10 RX ADMIN — IPRATROPIUM BROMIDE AND ALBUTEROL SULFATE 3 ML: 2.5; .5 SOLUTION RESPIRATORY (INHALATION) at 21:00

## 2023-03-10 RX ADMIN — AZITHROMYCIN MONOHYDRATE 250 MG: 250 TABLET ORAL at 08:52

## 2023-03-10 RX ADMIN — NYSTATIN 500000 UNITS: 100000 SUSPENSION ORAL at 08:54

## 2023-03-10 RX ADMIN — BUDESONIDE 0.5 MG: 0.5 INHALANT RESPIRATORY (INHALATION) at 07:50

## 2023-03-10 RX ADMIN — GLIPIZIDE 10 MG: 5 TABLET ORAL at 20:06

## 2023-03-10 RX ADMIN — NYSTATIN 500000 UNITS: 100000 SUSPENSION ORAL at 12:32

## 2023-03-10 RX ADMIN — GUAIFENESIN 600 MG: 600 TABLET, EXTENDED RELEASE ORAL at 12:28

## 2023-03-10 RX ADMIN — GABAPENTIN 300 MG: 300 CAPSULE ORAL at 08:53

## 2023-03-10 RX ADMIN — INSULIN LISPRO 2 UNITS: 100 INJECTION, SOLUTION INTRAVENOUS; SUBCUTANEOUS at 08:51

## 2023-03-10 NOTE — PLAN OF CARE
Goal Outcome Evaluation:   Patient reports feeling much better today. LSCTA. No reports of SOA. Patient weaned down to 4L O2, maintaining sats 89-94%. No c/o pain, anxious to discharge to home.

## 2023-03-10 NOTE — PLAN OF CARE
Problem: Adult Inpatient Plan of Care  Goal: Plan of Care Review  Outcome: Ongoing, Progressing  Flowsheets (Taken 3/10/2023 0407)  Progress: improving  Outcome Evaluation: Pt currently on 5L per humidified high flow O2. Pt C/O SOA on exertion. + sepsis screen. Pt can take some time to recover. Pt with elevated blood glucose. N.O received for insulin administration. BP medications held r/t BP WDL. No C/O voiced per pt at this time. Pt currently resting abed. Will continue to monitor.  Goal: Patient-Specific Goal (Individualized)  Outcome: Ongoing, Progressing  Goal: Absence of Hospital-Acquired Illness or Injury  Outcome: Ongoing, Progressing  Intervention: Identify and Manage Fall Risk  Recent Flowsheet Documentation  Taken 3/10/2023 0205 by Nicolette Woods RN  Safety Promotion/Fall Prevention:   safety round/check completed   fall prevention program maintained  Taken 3/10/2023 0048 by Nicolette Woods RN  Safety Promotion/Fall Prevention:   safety round/check completed   fall prevention program maintained  Taken 3/9/2023 2238 by Nicolette Woods RN  Safety Promotion/Fall Prevention:   safety round/check completed   fall prevention program maintained  Taken 3/9/2023 2028 by Nicolette Woods RN  Safety Promotion/Fall Prevention:   safety round/check completed   nonskid shoes/slippers when out of bed   fall prevention program maintained   clutter free environment maintained   assistive device/personal items within reach   activity supervised  Intervention: Prevent Skin Injury  Recent Flowsheet Documentation  Taken 3/10/2023 0048 by Nicolette Woods RN  Skin Protection:   adhesive use limited   incontinence pads utilized   tubing/devices free from skin contact  Taken 3/9/2023 2028 by Nicolette Wodos RN  Skin Protection:   adhesive use limited   tubing/devices free from skin contact  Intervention: Prevent and Manage VTE (Venous Thromboembolism) Risk  Recent Flowsheet Documentation  Taken 3/9/2023 2028 by Peggy  NITHYA Will  Activity Management:   up in chair   back to bed  Intervention: Prevent Infection  Recent Flowsheet Documentation  Taken 3/10/2023 0205 by Nicolette Woods RN  Infection Prevention:   hand hygiene promoted   personal protective equipment utilized   rest/sleep promoted   single patient room provided  Taken 3/10/2023 0048 by Nicolette Woods RN  Infection Prevention:   hand hygiene promoted   personal protective equipment utilized   rest/sleep promoted   single patient room provided  Taken 3/9/2023 2238 by Nicolette Woods RN  Infection Prevention:   hand hygiene promoted   personal protective equipment utilized   rest/sleep promoted   single patient room provided  Taken 3/9/2023 2028 by Nicolette Woods RN  Infection Prevention:   hand hygiene promoted   personal protective equipment utilized   rest/sleep promoted   single patient room provided  Goal: Optimal Comfort and Wellbeing  Outcome: Ongoing, Progressing  Intervention: Provide Person-Centered Care  Recent Flowsheet Documentation  Taken 3/9/2023 2028 by Nicolette Woods RN  Trust Relationship/Rapport:   care explained   choices provided   questions answered   questions encouraged   reassurance provided   thoughts/feelings acknowledged  Goal: Readiness for Transition of Care  Outcome: Ongoing, Progressing     Problem: Fall Injury Risk  Goal: Absence of Fall and Fall-Related Injury  Outcome: Ongoing, Progressing  Intervention: Identify and Manage Contributors  Recent Flowsheet Documentation  Taken 3/10/2023 0205 by Nicolette Woods RN  Medication Review/Management: medications reviewed  Taken 3/10/2023 0048 by Nicolette Woods, RN  Medication Review/Management: medications reviewed  Taken 3/9/2023 2238 by Nicolette Woods, RN  Medication Review/Management: medications reviewed  Taken 3/9/2023 2028 by Nicolette Woods RN  Medication Review/Management: medications reviewed  Intervention: Promote Injury-Free Environment  Recent Flowsheet  Documentation  Taken 3/10/2023 0205 by Nicolette Woods RN  Safety Promotion/Fall Prevention:   safety round/check completed   fall prevention program maintained  Taken 3/10/2023 0048 by Nicolette Woods RN  Safety Promotion/Fall Prevention:   safety round/check completed   fall prevention program maintained  Taken 3/9/2023 2238 by Nicolette Woods RN  Safety Promotion/Fall Prevention:   safety round/check completed   fall prevention program maintained  Taken 3/9/2023 2028 by Nicolette Woods RN  Safety Promotion/Fall Prevention:   safety round/check completed   nonskid shoes/slippers when out of bed   fall prevention program maintained   clutter free environment maintained   assistive device/personal items within reach   activity supervised     Problem: Asthma Comorbidity  Goal: Maintenance of Asthma Control  Outcome: Ongoing, Progressing  Intervention: Maintain Asthma Symptom Control  Recent Flowsheet Documentation  Taken 3/10/2023 0205 by Nicolette Woods RN  Medication Review/Management: medications reviewed  Taken 3/10/2023 0048 by Nicolette Woods RN  Medication Review/Management: medications reviewed  Taken 3/9/2023 2238 by Nicolette Woods RN  Medication Review/Management: medications reviewed  Taken 3/9/2023 2028 by Nicolette Woods RN  Medication Review/Management: medications reviewed     Problem: Behavioral Health Comorbidity  Goal: Maintenance of Behavioral Health Symptom Control  Outcome: Ongoing, Progressing  Intervention: Maintain Behavioral Health Symptom Control  Recent Flowsheet Documentation  Taken 3/10/2023 0205 by Nicolette Woods RN  Medication Review/Management: medications reviewed  Taken 3/10/2023 0048 by Nicolette Woods RN  Medication Review/Management: medications reviewed  Taken 3/9/2023 2238 by Nicolette Woods RN  Medication Review/Management: medications reviewed  Taken 3/9/2023 2028 by Nicolette Woods RN  Medication Review/Management: medications reviewed     Problem: COPD (Chronic  Obstructive Pulmonary Disease) Comorbidity  Goal: Maintenance of COPD Symptom Control  Outcome: Ongoing, Progressing  Intervention: Maintain COPD-Symptom Control  Recent Flowsheet Documentation  Taken 3/10/2023 0205 by Nicolette Woods RN  Medication Review/Management: medications reviewed  Taken 3/10/2023 0048 by Nicolette Woods RN  Medication Review/Management: medications reviewed  Taken 3/9/2023 2238 by Nicolette Woods RN  Medication Review/Management: medications reviewed  Taken 3/9/2023 2028 by Nicolette Woods RN  Supportive Measures:   active listening utilized   verbalization of feelings encouraged  Medication Review/Management: medications reviewed     Problem: Diabetes Comorbidity  Goal: Blood Glucose Level Within Targeted Range  Outcome: Ongoing, Progressing  Intervention: Monitor and Manage Glycemia  Recent Flowsheet Documentation  Taken 3/10/2023 0048 by Nicolette Woods RN  Glycemic Management: blood glucose monitored  Taken 3/9/2023 2028 by Nicolette Woods RN  Glycemic Management: blood glucose monitored     Problem: Heart Failure Comorbidity  Goal: Maintenance of Heart Failure Symptom Control  Outcome: Ongoing, Progressing  Intervention: Maintain Heart Failure-Management  Recent Flowsheet Documentation  Taken 3/10/2023 0205 by Nicolette Woods RN  Medication Review/Management: medications reviewed  Taken 3/10/2023 0048 by Nicolette Woods RN  Medication Review/Management: medications reviewed  Taken 3/9/2023 2238 by Nicolette Woods RN  Medication Review/Management: medications reviewed  Taken 3/9/2023 2028 by Nicolette Woods RN  Medication Review/Management: medications reviewed     Problem: Hypertension Comorbidity  Goal: Blood Pressure in Desired Range  Outcome: Ongoing, Progressing  Intervention: Maintain Blood Pressure Management  Recent Flowsheet Documentation  Taken 3/10/2023 0205 by Nicolette Woods RN  Medication Review/Management: medications reviewed  Taken 3/10/2023 0048 by Peggy  NITHYA Will  Medication Review/Management: medications reviewed  Taken 3/9/2023 2238 by Nicolette Woods RN  Medication Review/Management: medications reviewed  Taken 3/9/2023 2028 by Nicolette Woods RN  Medication Review/Management: medications reviewed     Problem: Obstructive Sleep Apnea Risk or Actual Comorbidity Management  Goal: Unobstructed Breathing During Sleep  Outcome: Ongoing, Progressing     Problem: Osteoarthritis Comorbidity  Goal: Maintenance of Osteoarthritis Symptom Control  Outcome: Ongoing, Progressing  Intervention: Maintain Osteoarthritis Symptom Control  Recent Flowsheet Documentation  Taken 3/10/2023 0205 by Nicolette Woods RN  Medication Review/Management: medications reviewed  Taken 3/10/2023 0048 by Nicolette Woods RN  Medication Review/Management: medications reviewed  Taken 3/9/2023 2238 by Nicolette Woods RN  Medication Review/Management: medications reviewed  Taken 3/9/2023 2028 by Nicolette Woods RN  Activity Management:   up in chair   back to bed  Medication Review/Management: medications reviewed     Problem: Pain Chronic (Persistent) (Comorbidity Management)  Goal: Acceptable Pain Control and Functional Ability  Outcome: Ongoing, Progressing  Intervention: Manage Persistent Pain  Recent Flowsheet Documentation  Taken 3/10/2023 0205 by Nicolette Woods RN  Medication Review/Management: medications reviewed  Taken 3/10/2023 0048 by Nicolette Woods RN  Medication Review/Management: medications reviewed  Taken 3/9/2023 2238 by Nicolette Woods RN  Medication Review/Management: medications reviewed  Taken 3/9/2023 2028 by Nicolette Woods RN  Sleep/Rest Enhancement:   awakenings minimized   consistent schedule promoted   regular sleep/rest pattern promoted   relaxation techniques promoted   room darkened  Medication Review/Management: medications reviewed  Intervention: Optimize Psychosocial Wellbeing  Recent Flowsheet Documentation  Taken 3/9/2023 2028 by Nicolette Woods  RN  Supportive Measures:   active listening utilized   verbalization of feelings encouraged  Diversional Activities: television     Problem: Seizure Disorder Comorbidity  Goal: Maintenance of Seizure Control  Outcome: Ongoing, Progressing     Problem: Skin Injury Risk Increased  Goal: Skin Health and Integrity  Outcome: Ongoing, Progressing  Intervention: Optimize Skin Protection  Recent Flowsheet Documentation  Taken 3/10/2023 0048 by Nicolette Woods RN  Pressure Reduction Techniques: frequent weight shift encouraged  Pressure Reduction Devices: pressure-redistributing mattress utilized  Skin Protection:   adhesive use limited   incontinence pads utilized   tubing/devices free from skin contact  Taken 3/9/2023 2028 by Nicolette Woods RN  Skin Protection:   adhesive use limited   tubing/devices free from skin contact   Goal Outcome Evaluation:           Progress: improving  Outcome Evaluation: Pt currently on 5L per humidified high flow O2. Pt C/O SOA on exertion. + sepsis screen. Pt can take some time to recover. Pt with elevated blood glucose. N.O received for insulin administration. BP medications held r/t BP WDL. No C/O voiced per pt at this time. Pt currently resting abed. Will continue to monitor.

## 2023-03-10 NOTE — PROGRESS NOTES
Daily Progress Note        Acute respiratory distress      Assessment    Acute on chronic respiratory failure with hypercapnia  ABG pH 7.388, PCO2 53.7, PO2 113.8, HCO3 32.3  Chronic obstructive pulmonary disease, patient wears 4 L of home oxygen  Pulmonary edema  Obstructive sleep apnea, on Pap device     Coronary artery disease     Hypertension   Hyperlipidemia  Diabetes mellitus     Office note 3/2/2023  72 y/o x-smoker (55 year history-quit 7-3-22)  dyspnea and hypoxia.  SONJA, residual AHI: 1 , No snoring, no Leak, [id number] % > 4 hours  Severe COPD:  PFT FEV1 1.12L which is 41%, post BD 51%, ratio 57, ERV 17%, %, TLC 82%, DLCO 27%, obstructive sleep apnea features  mild interstitial lung disease  PMH:  HTN, DM, HLD        Recommendations:     Add Mucinex     Titrate oxygen, currently requiring 5L high flow,   CPAP at at bedtime  - Patient wears 4 L of home oxygen     azithromycin 250 mg p.o. daily x5 days  prednisone 6-day taper  Lasix 40 mg daily  Bronchodilator\inhaled corticosteroid  Glycemic control    We will schedule BiPAP titration as outpatient          LOS: 0 days     Subjective         Objective     Vital signs for last 24 hours:  Vitals:    03/10/23 0753 03/10/23 0754 03/10/23 0757 03/10/23 0852   BP:    138/63   BP Location:       Patient Position:       Pulse: 59 60 55 68   Resp: 14 14 16    Temp:    97.3 °F (36.3 °C)   TempSrc:    Axillary   SpO2: 96% 94% 96%    Weight:       Height:           Intake/Output last 3 shifts:  I/O last 3 completed shifts:  In: 2880 [P.O.:2880]  Out: 2150 [Urine:2150]  Intake/Output this shift:  No intake/output data recorded.      Radiology  Imaging Results (Last 24 Hours)     ** No results found for the last 24 hours. **          Labs:  Results from last 7 days   Lab Units 03/10/23  0734   WBC 10*3/mm3 9.60   HEMOGLOBIN g/dL 9.8*   HEMATOCRIT % 30.6*   PLATELETS 10*3/mm3 183     Results from last 7 days   Lab Units 03/10/23  0734 03/09/23  0611 03/08/23  1420    SODIUM mmol/L 141   < > 142   POTASSIUM mmol/L 3.6   < > 3.7   CHLORIDE mmol/L 102   < > 100   CO2 mmol/L 30.0*   < > 31.0*   BUN mg/dL 24*   < > 12   CREATININE mg/dL 0.63*   < > 0.54*   CALCIUM mg/dL 9.2   < > 9.4   BILIRUBIN mg/dL  --   --  0.5   ALK PHOS U/L  --   --  69   ALT (SGPT) U/L  --   --  15   AST (SGOT) U/L  --   --  16   GLUCOSE mg/dL 188*   < > 147*    < > = values in this interval not displayed.     Results from last 7 days   Lab Units 03/08/23  1440   PH, ARTERIAL pH units 7.388   PO2 ART mm Hg 113.8*   PCO2, ARTERIAL mm Hg 53.7*   HCO3 ART mmol/L 32.3*     Results from last 7 days   Lab Units 03/08/23  1420   ALBUMIN g/dL 4.0     Results from last 7 days   Lab Units 03/08/23  1420   HSTROP T ng/L 21*                 Results from last 7 days   Lab Units 03/08/23  1420   TSH uIU/mL 1.790           Meds:   SCHEDULE  amLODIPine, 5 mg, Oral, Nightly  aspirin, 81 mg, Oral, Daily  atorvastatin, 40 mg, Oral, Daily  azithromycin, 250 mg, Oral, Q24H  bisoprolol, 5 mg, Oral, Daily  budesonide, 0.5 mg, Nebulization, BID - RT  furosemide, 40 mg, Intravenous, Daily  gabapentin, 300 mg, Oral, TID  insulin lispro, 2-9 Units, Subcutaneous, 4x Daily With Meals & Nightly  ipratropium-albuterol, 3 mL, Nebulization, 4x Daily - RT  lisinopril, 40 mg, Oral, Nightly  nystatin, 5 mL, Swish & Swallow, 4x Daily  oxymetazoline, 2 spray, Each Nare, BID  [START ON 3/11/2023] predniSONE, 20 mg, Oral, Daily   Followed by  [START ON 3/13/2023] predniSONE, 10 mg, Oral, Daily  sertraline, 50 mg, Oral, Nightly  sodium chloride, 10 mL, Intravenous, Q12H      Infusions  Pharmacy Consult - Steroid Insulin Protocol,       PRNs  •  acetaminophen  •  albuterol  •  dextrose  •  dextrose  •  glucagon (human recombinant)  •  melatonin  •  nitroglycerin  •  ondansetron  •  Pharmacy Consult - Steroid Insulin Protocol  •  sodium chloride  •  [COMPLETED] Insert Peripheral IV **AND** sodium chloride  •  sodium chloride  •  sodium  chloride    Physical Exam:  Physical Exam  Vitals reviewed.   Pulmonary:      Effort: Pulmonary effort is normal. No respiratory distress.      Breath sounds: Wheezing and rhonchi present.   Skin:     General: Skin is warm and dry.   Neurological:      Mental Status: He is alert and oriented to person, place, and time.         ROS  Review of Systems   Respiratory: Positive for shortness of breath.        I have reviewed the patient's new clinical results.    Electronically signed by Flip Greenwood MD

## 2023-03-10 NOTE — PROGRESS NOTES
[facility] Medicine Services   Daily Progress Note    Patient Name: Brenden Peter  : 1950  MRN: [id number]  Primary Care Physician:  Bert Rojas MD  Date of admission: 3/8/2023  Date and Time of Service: 3/10/2023    Subjective      Chief Complaint: SOB increased O2 demands, and   Patient Reports patient was seen and examined today, mention no wheezing but increasing cough producing phlegm, no fever or chills, no abdominal pain fever chills    3/10/2023: Patient's shortness of breath is significant improved today also improved oxygen demand, no chest pain no abdominal pain no fever or chills    ROS all review of systems negative except for mentioned above    Objective      Vitals:   Temp:  [97.3 °F (36.3 °C)-97.8 °F (36.6 °C)] 97.3 °F (36.3 °C)  Heart Rate:  [55-82] 69  Resp:  [12-24] 16  BP: (118-138)/(54-63) 138/63  Flow (L/min):  [4-13] 5    Physical Exam  Constitutional:       Appearance: Normal appearance.   HENT:      Head: Normocephalic.      Right Ear: Tympanic membrane normal.      Left Ear: Tympanic membrane normal.      Nose: Nose normal.      Mouth/Throat:      Mouth: Mucous membranes are moist.   Eyes:      Pupils: Pupils are equal, round, and reactive to light.   Cardiovascular:      Rate and Rhythm: Normal rate.      Pulses: Normal pulses.   Pulmonary:      Comments: Significant improved air entry in both sides  Abdominal:      General: Abdomen is flat.   Musculoskeletal:         General: Normal range of motion.      Cervical back: Normal range of motion.   Skin:     General: Skin is warm.      Capillary Refill: Capillary refill takes less than 2 seconds.   Neurological:      General: No focal deficit present.      Mental Status: He is alert.   Psychiatric:         Mood and Affect: Mood normal.            Result Review    Result Review:  I have personally reviewed the results from the time of this admission to 3/10/2023 13:00 EST and agree with these findings:  [x]   Laboratory  []  Microbiology  [x]  Radiology  []  EKG/Telemetry   []  Cardiology/Vascular   []  Pathology  []  Old records  []  Other:  Most notable findings include: Diffuse interstitial infiltrates similar to lung edema        Assessment & Plan      Brief Patient Summary:  Brenden Peter is a 72 y.o. male who has history of COPD diastolic CHF and morbid obesity is also obstructive sleep apnea on CPAP at home presented with shortness of breath and his oxygen demand, his oxygen demands 4 L at home and here in the hospital was needing 4 to 10 L, was started for treatment of atypical pneumonia and COPD exacerbation, proBNP is low and has no pedal edema however chest x-ray is similar to the picture we can see in pulmonary edema, thus I gave him a dose of IV Lasix, patient is using CPAP as needed he feels improved, however his oxygen demand still 6 to 10 L instead of his home dose of 4 L    3/10/2023: I have noticed that air entry is significant proved on both sides, also his oxygen demand has improved, his 4 to 5 L now compared to 6 to 10 L yesterday.    amLODIPine, 5 mg, Oral, Nightly  aspirin, 81 mg, Oral, Daily  atorvastatin, 40 mg, Oral, Daily  [START ON 3/11/2023] azithromycin, 250 mg, Oral, Q24H  bisoprolol, 5 mg, Oral, Daily  budesonide, 0.5 mg, Nebulization, BID - RT  furosemide, 40 mg, Intravenous, Daily  gabapentin, 300 mg, Oral, TID  guaiFENesin, 600 mg, Oral, Q12H  insulin glargine, 10 Units, Subcutaneous, Nightly  insulin lispro, 2-9 Units, Subcutaneous, 4x Daily With Meals & Nightly  [START ON 3/11/2023] insulin regular, 4 Units, Subcutaneous, Daily  ipratropium-albuterol, 3 mL, Nebulization, 4x Daily - RT  lisinopril, 40 mg, Oral, Nightly  nystatin, 5 mL, Swish & Swallow, 4x Daily  oxymetazoline, 2 spray, Each Nare, BID  [START ON 3/11/2023] predniSONE, 20 mg, Oral, Daily   Followed by  [START ON 3/13/2023] predniSONE, 10 mg, Oral, Daily  sertraline, 50 mg, Oral, Nightly  sodium chloride, 10 mL,  Intravenous, Q12H       Pharmacy Consult - Steroid Insulin Protocol,          Active Hospital Problems:  Active Hospital Problems    Diagnosis    • **Acute respiratory distress      Plan:   COPD exacerbation  Obstructive sleep apnea  - Wears 4 L at home, currently on 2 L HFNC  - continue to wean O2 and maintain O2 sat > 90%  - CXR mentions pulmonary edema but BNP is negative.  - Respiratory panel negative  - Continue Solu-Medrol  - Continue DuoNebs and Pulmicort  - BiPAP as needed/at bedtime  - CT chest without contrast  - Hold home on oral  - Continue home Lasix     Hypertension  CAD/HLD  -/53  - Recent cardiac cath January 2023 which just showed mild CAD  - Continue home statin, ASA, bisoprolol, norvasc, and lisinopril     Type 2 diabetes  -Glucose 147  -Check A1c  - Hold home actos, metformin and glipizide  - Sliding scale insulin and Accu-Cheks before meals and at bedtime     Anxiety  - Continue home Zoloft     DVT prophylaxis  -Lovenox     Obesity  - BMI 34.46  - Encourage lifestyle changes    DVT prophylaxis:  Mechanical DVT prophylaxis orders are present.    CODE STATUS:    Level Of Support Discussed With: Patient  Code Status (Patient has no pulse and is not breathing): CPR (Attempt to Resuscitate)  Medical Interventions (Patient has pulse or is breathing): Full Support      Disposition:  I expect patient to be discharged 1 to 2 days     This patient has been examined wearing appropriate Personal Protective Equipment and discussed with hospital infection control department. 03/10/23      Electronically signed by Javier Marcum MD, 03/10/23, 13:00 EST.  [facility] Hospitalist Team

## 2023-03-10 NOTE — CASE MANAGEMENT/SOCIAL WORK
Discharge Planning Assessment  [facility]     Patient Name: Brenden Peter  MRN: [id number]  Today's Date: 3/10/2023    Admit Date: 3/8/2023    Plan: Anticipate home with spouse. Current home oxygen (4L) with [facility] [employer].         Discharge Plan     Row Name 03/10/23 1522       Plan    Plan Anticipate home with spouse. Current home oxygen (4L) with [employer] [facility].    Plan Comments  spoke to patient at bedside wearing mask and keeping distance greater than 6 feet and spent less than 15 minutes in room. IMM letter reviewed with patient, verbal consent obtained, copy left at bedside. Patient denies any d/c needs at this time. Barrier to D/C: IV lasix, monitoring respiratory needs.                  Expected Discharge Date and Time     Expected Discharge Date Expected Discharge Time    Mar 12, 2023              Patient Forms     Row Name 03/10/23 1523       Patient Forms    Important Message from Medicare (IMM) Delivered  3/10/23    Delivered to Patient    Method of delivery In person  reviewed with patient, verbal consent and copy left at bedside              Met with patient in room wearing PPE: mask.    Maintained distance greater than six feet and spent less than 15 minutes in the room.      RUFINO AguayoN, RN    [address] [address]    Office: [phone]  Fax: [phone]

## 2023-03-11 LAB
ANION GAP SERPL CALCULATED.3IONS-SCNC: 9 MMOL/L (ref 5–15)
BASOPHILS # BLD AUTO: 0 10*3/MM3 (ref 0–0.2)
BASOPHILS NFR BLD AUTO: 0.3 % (ref 0–1.5)
BH CV ECHO MEAS - ACS: 2.1 CM
BH CV ECHO MEAS - AI P1/2T: 742 MSEC
BH CV ECHO MEAS - AO MAX PG: 12 MMHG
BH CV ECHO MEAS - AO MEAN PG: 7 MMHG
BH CV ECHO MEAS - AO ROOT DIAM: 3.2 CM
BH CV ECHO MEAS - AO V2 MAX: 173 CM/SEC
BH CV ECHO MEAS - AO V2 VTI: 38.8 CM
BH CV ECHO MEAS - AVA(I,D): 2.6 CM2
BH CV ECHO MEAS - EDV(CUBED): 85.2 ML
BH CV ECHO MEAS - EDV(MOD-SP2): 98.4 ML
BH CV ECHO MEAS - EDV(MOD-SP4): 119 ML
BH CV ECHO MEAS - EF(MOD-BP): 65.5 %
BH CV ECHO MEAS - EF(MOD-SP2): 64.2 %
BH CV ECHO MEAS - EF(MOD-SP4): 65.1 %
BH CV ECHO MEAS - ESV(CUBED): 19.7 ML
BH CV ECHO MEAS - ESV(MOD-SP2): 35.2 ML
BH CV ECHO MEAS - ESV(MOD-SP4): 41.5 ML
BH CV ECHO MEAS - FS: 38.6 %
BH CV ECHO MEAS - IVS/LVPW: 1 CM
BH CV ECHO MEAS - IVSD: 1.1 CM
BH CV ECHO MEAS - LA DIMENSION: 3.9 CM
BH CV ECHO MEAS - LAT PEAK E' VEL: 11 CM/SEC
BH CV ECHO MEAS - LV DIASTOLIC VOL/BSA (35-75): 54.9 CM2
BH CV ECHO MEAS - LV MASS(C)D: 168.9 GRAMS
BH CV ECHO MEAS - LV MAX PG: 7.8 MMHG
BH CV ECHO MEAS - LV MEAN PG: 4 MMHG
BH CV ECHO MEAS - LV SYSTOLIC VOL/BSA (12-30): 19.1 CM2
BH CV ECHO MEAS - LV V1 MAX: 140 CM/SEC
BH CV ECHO MEAS - LV V1 VTI: 31.5 CM
BH CV ECHO MEAS - LVIDD: 4.4 CM
BH CV ECHO MEAS - LVIDS: 2.7 CM
BH CV ECHO MEAS - LVOT AREA: 3.1 CM2
BH CV ECHO MEAS - LVOT DIAM: 2 CM
BH CV ECHO MEAS - LVPWD: 1.1 CM
BH CV ECHO MEAS - MED PEAK E' VEL: 8.4 CM/SEC
BH CV ECHO MEAS - MR MAX PG: 14.4 MMHG
BH CV ECHO MEAS - MR MAX VEL: 190 CM/SEC
BH CV ECHO MEAS - MV A DUR: 0.14 SEC
BH CV ECHO MEAS - MV A MAX VEL: 114 CM/SEC
BH CV ECHO MEAS - MV DEC SLOPE: 546 CM/SEC2
BH CV ECHO MEAS - MV DEC TIME: 0.17 MSEC
BH CV ECHO MEAS - MV E MAX VEL: 130 CM/SEC
BH CV ECHO MEAS - MV E/A: 1.14
BH CV ECHO MEAS - MV MAX PG: 6.4 MMHG
BH CV ECHO MEAS - MV MEAN PG: 3 MMHG
BH CV ECHO MEAS - MV P1/2T: 64.4 MSEC
BH CV ECHO MEAS - MV V2 VTI: 40.6 CM
BH CV ECHO MEAS - MVA(P1/2T): 3.4 CM2
BH CV ECHO MEAS - MVA(VTI): 2.44 CM2
BH CV ECHO MEAS - PA V2 MAX: 98.6 CM/SEC
BH CV ECHO MEAS - PULM A REVS DUR: 0.11 SEC
BH CV ECHO MEAS - PULM A REVS VEL: 23.5 CM/SEC
BH CV ECHO MEAS - PULM DIAS VEL: 13.1 CM/SEC
BH CV ECHO MEAS - PULM S/D: 1.21
BH CV ECHO MEAS - PULM SYS VEL: 15.9 CM/SEC
BH CV ECHO MEAS - RAP SYSTOLE: 3 MMHG
BH CV ECHO MEAS - RV MAX PG: 1.47 MMHG
BH CV ECHO MEAS - RV V1 MAX: 60.7 CM/SEC
BH CV ECHO MEAS - RV V1 VTI: 15.1 CM
BH CV ECHO MEAS - RVSP: 57.5 MMHG
BH CV ECHO MEAS - SI(MOD-SP2): 29.1 ML/M2
BH CV ECHO MEAS - SI(MOD-SP4): 35.7 ML/M2
BH CV ECHO MEAS - SV(LVOT): 99 ML
BH CV ECHO MEAS - SV(MOD-SP2): 63.2 ML
BH CV ECHO MEAS - SV(MOD-SP4): 77.5 ML
BH CV ECHO MEAS - TAPSE (>1.6): 2.5 CM
BH CV ECHO MEAS - TR MAX PG: 54.5 MMHG
BH CV ECHO MEAS - TR MAX VEL: 369 CM/SEC
BH CV ECHO MEASUREMENTS AVERAGE E/E' RATIO: 13.4
BH CV XLRA - RV BASE: 4.6 CM
BH CV XLRA - RV LENGTH: 7.5 CM
BH CV XLRA - RV MID: 2.5 CM
BH CV XLRA - TDI S': 8.3 CM/SEC
BUN SERPL-MCNC: 24 MG/DL (ref 8–23)
BUN/CREAT SERPL: 38.7 (ref 7–25)
CALCIUM SPEC-SCNC: 8.8 MG/DL (ref 8.6–10.5)
CHLORIDE SERPL-SCNC: 102 MMOL/L (ref 98–107)
CO2 SERPL-SCNC: 31 MMOL/L (ref 22–29)
CREAT SERPL-MCNC: 0.62 MG/DL (ref 0.76–1.27)
DEPRECATED RDW RBC AUTO: 52.9 FL (ref 37–54)
EGFRCR SERPLBLD CKD-EPI 2021: 101.6 ML/MIN/1.73
EOSINOPHIL # BLD AUTO: 0 10*3/MM3 (ref 0–0.4)
EOSINOPHIL NFR BLD AUTO: 0 % (ref 0.3–6.2)
ERYTHROCYTE [DISTWIDTH] IN BLOOD BY AUTOMATED COUNT: 16.5 % (ref 12.3–15.4)
FERRITIN SERPL-MCNC: 95.81 NG/ML (ref 30–400)
GLUCOSE BLDC GLUCOMTR-MCNC: 180 MG/DL (ref 70–105)
GLUCOSE BLDC GLUCOMTR-MCNC: 285 MG/DL (ref 70–105)
GLUCOSE BLDC GLUCOMTR-MCNC: 354 MG/DL (ref 70–105)
GLUCOSE BLDC GLUCOMTR-MCNC: 90 MG/DL (ref 70–105)
GLUCOSE SERPL-MCNC: 171 MG/DL (ref 65–99)
HCT VFR BLD AUTO: 28.5 % (ref 37.5–51)
HGB BLD-MCNC: 9.6 G/DL (ref 13–17.7)
IRON 24H UR-MRATE: 46 MCG/DL (ref 59–158)
IRON SATN MFR SERPL: 10 % (ref 20–50)
LEFT ATRIUM VOLUME INDEX: 33.9 ML/M2
LYMPHOCYTES # BLD AUTO: 1.2 10*3/MM3 (ref 0.7–3.1)
LYMPHOCYTES NFR BLD AUTO: 14.7 % (ref 19.6–45.3)
MAXIMAL PREDICTED HEART RATE: 148 BPM
MCH RBC QN AUTO: 29.3 PG (ref 26.6–33)
MCHC RBC AUTO-ENTMCNC: 33.7 G/DL (ref 31.5–35.7)
MCV RBC AUTO: 86.9 FL (ref 79–97)
MONOCYTES # BLD AUTO: 0.7 10*3/MM3 (ref 0.1–0.9)
MONOCYTES NFR BLD AUTO: 8.2 % (ref 5–12)
NEUTROPHILS NFR BLD AUTO: 6.2 10*3/MM3 (ref 1.7–7)
NEUTROPHILS NFR BLD AUTO: 76.8 % (ref 42.7–76)
NRBC BLD AUTO-RTO: 0.1 /100 WBC (ref 0–0.2)
PLATELET # BLD AUTO: 166 10*3/MM3 (ref 140–450)
PMV BLD AUTO: 9.6 FL (ref 6–12)
POTASSIUM SERPL-SCNC: 3.6 MMOL/L (ref 3.5–5.2)
RBC # BLD AUTO: 3.28 10*6/MM3 (ref 4.14–5.8)
SINUS: 3.6 CM
SODIUM SERPL-SCNC: 142 MMOL/L (ref 136–145)
STJ: 2.5 CM
STRESS TARGET HR: 126 BPM
TIBC SERPL-MCNC: 457 MCG/DL (ref 298–536)
TRANSFERRIN SERPL-MCNC: 307 MG/DL (ref 200–360)
WBC NRBC COR # BLD: 8 10*3/MM3 (ref 3.4–10.8)

## 2023-03-11 PROCEDURE — 94664 DEMO&/EVAL PT USE INHALER: CPT

## 2023-03-11 PROCEDURE — 83540 ASSAY OF IRON: CPT | Performed by: INTERNAL MEDICINE

## 2023-03-11 PROCEDURE — 63710000001 INSULIN LISPRO (HUMAN) PER 5 UNITS: Performed by: NURSE PRACTITIONER

## 2023-03-11 PROCEDURE — 94799 UNLISTED PULMONARY SVC/PX: CPT

## 2023-03-11 PROCEDURE — 63710000001 INSULIN REGULAR HUMAN PER 5 UNITS: Performed by: NURSE PRACTITIONER

## 2023-03-11 PROCEDURE — 94660 CPAP INITIATION&MGMT: CPT

## 2023-03-11 PROCEDURE — 84466 ASSAY OF TRANSFERRIN: CPT | Performed by: INTERNAL MEDICINE

## 2023-03-11 PROCEDURE — 80048 BASIC METABOLIC PNL TOTAL CA: CPT | Performed by: NURSE PRACTITIONER

## 2023-03-11 PROCEDURE — 94761 N-INVAS EAR/PLS OXIMETRY MLT: CPT

## 2023-03-11 PROCEDURE — 82728 ASSAY OF FERRITIN: CPT | Performed by: INTERNAL MEDICINE

## 2023-03-11 PROCEDURE — 85025 COMPLETE CBC W/AUTO DIFF WBC: CPT | Performed by: NURSE PRACTITIONER

## 2023-03-11 PROCEDURE — 82962 GLUCOSE BLOOD TEST: CPT

## 2023-03-11 PROCEDURE — 97116 GAIT TRAINING THERAPY: CPT

## 2023-03-11 PROCEDURE — 63710000001 PREDNISONE PER 1 MG

## 2023-03-11 RX ORDER — FUROSEMIDE 40 MG/1
40 TABLET ORAL DAILY
Status: DISCONTINUED | OUTPATIENT
Start: 2023-03-11 | End: 2023-03-13 | Stop reason: HOSPADM

## 2023-03-11 RX ORDER — FUROSEMIDE 40 MG/1
40 TABLET ORAL DAILY
Status: DISCONTINUED | OUTPATIENT
Start: 2023-03-12 | End: 2023-03-11

## 2023-03-11 RX ADMIN — IPRATROPIUM BROMIDE AND ALBUTEROL SULFATE 3 ML: 2.5; .5 SOLUTION RESPIRATORY (INHALATION) at 08:26

## 2023-03-11 RX ADMIN — ATORVASTATIN CALCIUM 40 MG: 40 TABLET, FILM COATED ORAL at 09:16

## 2023-03-11 RX ADMIN — AZITHROMYCIN MONOHYDRATE 250 MG: 250 TABLET ORAL at 09:16

## 2023-03-11 RX ADMIN — Medication 10 ML: at 09:16

## 2023-03-11 RX ADMIN — Medication 10 ML: at 21:06

## 2023-03-11 RX ADMIN — INSULIN HUMAN 4 UNITS: 100 INJECTION, SOLUTION PARENTERAL at 09:14

## 2023-03-11 RX ADMIN — LISINOPRIL 40 MG: 20 TABLET ORAL at 21:14

## 2023-03-11 RX ADMIN — IPRATROPIUM BROMIDE AND ALBUTEROL SULFATE 3 ML: 2.5; .5 SOLUTION RESPIRATORY (INHALATION) at 15:19

## 2023-03-11 RX ADMIN — GUAIFENESIN 600 MG: 600 TABLET, EXTENDED RELEASE ORAL at 09:14

## 2023-03-11 RX ADMIN — IPRATROPIUM BROMIDE AND ALBUTEROL SULFATE 3 ML: 2.5; .5 SOLUTION RESPIRATORY (INHALATION) at 11:11

## 2023-03-11 RX ADMIN — INSULIN LISPRO 2 UNITS: 100 INJECTION, SOLUTION INTRAVENOUS; SUBCUTANEOUS at 12:10

## 2023-03-11 RX ADMIN — BUDESONIDE 0.5 MG: 0.5 INHALANT RESPIRATORY (INHALATION) at 08:19

## 2023-03-11 RX ADMIN — INSULIN LISPRO 8 UNITS: 100 INJECTION, SOLUTION INTRAVENOUS; SUBCUTANEOUS at 21:07

## 2023-03-11 RX ADMIN — GLIPIZIDE 10 MG: 5 TABLET ORAL at 21:03

## 2023-03-11 RX ADMIN — NYSTATIN 500000 UNITS: 100000 SUSPENSION ORAL at 12:11

## 2023-03-11 RX ADMIN — ASPIRIN 81 MG: 81 TABLET, COATED ORAL at 09:15

## 2023-03-11 RX ADMIN — PIOGLITAZONE 30 MG: 30 TABLET ORAL at 21:03

## 2023-03-11 RX ADMIN — GABAPENTIN 300 MG: 300 CAPSULE ORAL at 21:03

## 2023-03-11 RX ADMIN — AMLODIPINE BESYLATE 5 MG: 5 TABLET ORAL at 21:03

## 2023-03-11 RX ADMIN — GABAPENTIN 300 MG: 300 CAPSULE ORAL at 17:06

## 2023-03-11 RX ADMIN — FUROSEMIDE 40 MG: 40 TABLET ORAL at 12:11

## 2023-03-11 RX ADMIN — NYSTATIN 500000 UNITS: 100000 SUSPENSION ORAL at 17:05

## 2023-03-11 RX ADMIN — NYSTATIN 500000 UNITS: 100000 SUSPENSION ORAL at 09:14

## 2023-03-11 RX ADMIN — INSULIN LISPRO 6 UNITS: 100 INJECTION, SOLUTION INTRAVENOUS; SUBCUTANEOUS at 17:06

## 2023-03-11 RX ADMIN — GABAPENTIN 300 MG: 300 CAPSULE ORAL at 09:14

## 2023-03-11 RX ADMIN — NYSTATIN 500000 UNITS: 100000 SUSPENSION ORAL at 21:04

## 2023-03-11 RX ADMIN — SERTRALINE 50 MG: 50 TABLET, FILM COATED ORAL at 21:03

## 2023-03-11 RX ADMIN — METFORMIN HYDROCHLORIDE 1000 MG: 500 TABLET ORAL at 17:05

## 2023-03-11 RX ADMIN — NASAL DECONGESTANT 2 SPRAY: 0.05 SPRAY NASAL at 09:13

## 2023-03-11 RX ADMIN — IPRATROPIUM BROMIDE AND ALBUTEROL SULFATE 3 ML: 2.5; .5 SOLUTION RESPIRATORY (INHALATION) at 21:33

## 2023-03-11 RX ADMIN — GUAIFENESIN 600 MG: 600 TABLET, EXTENDED RELEASE ORAL at 21:03

## 2023-03-11 RX ADMIN — PREDNISONE 20 MG: 20 TABLET ORAL at 09:15

## 2023-03-11 RX ADMIN — BUDESONIDE 0.5 MG: 0.5 INHALANT RESPIRATORY (INHALATION) at 21:33

## 2023-03-11 NOTE — PLAN OF CARE
Goal Outcome Evaluation:  Plan of Care Reviewed With: patient    Patient still on 4L of Oxygen. Tolerating. No voiced complaint will continue to monitor. VS has been stable on my shift

## 2023-03-11 NOTE — THERAPY TREATMENT NOTE
Subjective: Pt agreeable to therapeutic plan of care.    Objective:     Bed mobility - Modified-Independent  Transfers - Supervision and with rolling walker  Ambulation - 70 feet SBA and with rolling walker    Vitals: Desaturates on 4L hf, sats dropped from 94%. 85%  But recovers quickly.    Pain: 0 VAS     Education: Provided education on the importance of mobility in the acute care setting, Verbal/Tactile Cues and Gait Training    Assessment: Brenden Peter presents with functional mobility impairments which indicate the need for skilled intervention. Tolerating session today without incident. Pt very cooperative and motivated to improve mobility and endurance. Should do well at home with assist as needed and no further Pt at [place].Will continue to follow and progress as tolerated.     Plan/Recommendations:   No ongoing therapy recommended post-acute care. No therapy needs.. Pt requires no DME at discharge.     Pt desires Home with family assist at discharge. Pt cooperative; agreeable to therapeutic recommendations and plan of care.         Basic Mobility 6-click:  Rollin = Total, A lot = 2, A little = 3; 4 = None  Supine>Sit:   1 = Total, A lot = 2, A little = 3; 4 = None   Sit>Stand with arms:  1 = Total, A lot = 2, A little = 3; 4 = None  Bed>Chair:   1 = Total, A lot = 2, A little = 3; 4 = None  Ambulate in room:  1 = Total, A lot = 2, A little = 3; 4 = None  3-5 Steps with railin = Total, A lot = 2, A little = 3; 4 = None  Score: 23    Post-Tx Position: Supine with HOB Elevated, Alarms activated and Call light and personal items within reach  PPE: gloves and surgical mask

## 2023-03-11 NOTE — PROGRESS NOTES
Daily Progress Note        Acute respiratory distress      Assessment    Acute on chronic respiratory failure with hypercapnia  ABG pH 7.388, PCO2 53.7, PO2 113.8, HCO3 32.3  Chronic obstructive pulmonary disease, patient wears 4 L of home oxygen  Pulmonary edema  Obstructive sleep apnea, on Pap device     Coronary artery disease     Hypertension   Hyperlipidemia  Diabetes mellitus     Office note 3/2/2023  70 y/o x-smoker (55 year history-quit 7-3-22)  dyspnea and hypoxia.  SONJA, residual AHI: 1 , No snoring, no Leak, [id number] % > 4 hours  Severe COPD:  PFT FEV1 1.12L which is 41%, post BD 51%, ratio 57, ERV 17%, %, TLC 82%, DLCO 27%, obstructive sleep apnea features  mild interstitial lung disease  PMH:  HTN, DM, HLD        Recommendations:     Titrate oxygen, currently requiring 4L high flow,   CPAP at at bedtime  - Patient wears 4 L of home oxygen     azithromycin 250 mg p.o. daily x5 days  prednisone 6-day taper  Mucinex  Lasix 40 mg daily  Bronchodilator\inhaled corticosteroid  Glycemic control    We will schedule BiPAP titration as outpatient          LOS: 1 day     Subjective         Objective     Vital signs for last 24 hours:  Vitals:    03/11/23 0825 03/11/23 0826 03/11/23 0827 03/11/23 0856   BP:    103/48   BP Location:    Left arm   Patient Position:    Sitting   Pulse: 65 63 62    Resp: 18 18 18 20   Temp:    97.3 °F (36.3 °C)   TempSrc:    Axillary   SpO2: 100% 99% 99% 90%   Weight:       Height:           Intake/Output last 3 shifts:  I/O last 3 completed shifts:  In: 2040 [P.O.:2040]  Out: 1675 [Urine:1675]  Intake/Output this shift:  I/O this shift:  In: 120 [P.O.:120]  Out: -       Radiology  Imaging Results (Last 24 Hours)     ** No results found for the last 24 hours. **          Labs:  Results from last 7 days   Lab Units 03/11/23 0047   WBC 10*3/mm3 8.00   HEMOGLOBIN g/dL 9.6*   HEMATOCRIT % 28.5*   PLATELETS 10*3/mm3 166     Results from last 7 days   Lab Units 03/11/23 0047  03/09/23  0611 03/08/23  1420   SODIUM mmol/L 142   < > 142   POTASSIUM mmol/L 3.6   < > 3.7   CHLORIDE mmol/L 102   < > 100   CO2 mmol/L 31.0*   < > 31.0*   BUN mg/dL 24*   < > 12   CREATININE mg/dL 0.62*   < > 0.54*   CALCIUM mg/dL 8.8   < > 9.4   BILIRUBIN mg/dL  --   --  0.5   ALK PHOS U/L  --   --  69   ALT (SGPT) U/L  --   --  15   AST (SGOT) U/L  --   --  16   GLUCOSE mg/dL 171*   < > 147*    < > = values in this interval not displayed.     Results from last 7 days   Lab Units 03/08/23  1440   PH, ARTERIAL pH units 7.388   PO2 ART mm Hg 113.8*   PCO2, ARTERIAL mm Hg 53.7*   HCO3 ART mmol/L 32.3*     Results from last 7 days   Lab Units 03/08/23  1420   ALBUMIN g/dL 4.0     Results from last 7 days   Lab Units 03/08/23  1420   HSTROP T ng/L 21*                 Results from last 7 days   Lab Units 03/08/23  1420   TSH uIU/mL 1.790           Meds:   SCHEDULE  amLODIPine, 5 mg, Oral, Nightly  aspirin, 81 mg, Oral, Daily  atorvastatin, 40 mg, Oral, Daily  azithromycin, 250 mg, Oral, Q24H  bisoprolol, 5 mg, Oral, Daily  budesonide, 0.5 mg, Nebulization, BID - RT  furosemide, 40 mg, Intravenous, Daily  gabapentin, 300 mg, Oral, TID  glipizide, 10 mg, Oral, Nightly  guaiFENesin, 600 mg, Oral, Q12H  insulin lispro, 2-9 Units, Subcutaneous, 4x Daily With Meals & Nightly  insulin regular, 4 Units, Subcutaneous, Daily  ipratropium-albuterol, 3 mL, Nebulization, 4x Daily - RT  lisinopril, 40 mg, Oral, Nightly  metFORMIN, 1,000 mg, Oral, BID With Meals  nystatin, 5 mL, Swish & Swallow, 4x Daily  oxymetazoline, 2 spray, Each Nare, BID  pioglitazone, 30 mg, Oral, Nightly  predniSONE, 20 mg, Oral, Daily   Followed by  [START ON 3/13/2023] predniSONE, 10 mg, Oral, Daily  sertraline, 50 mg, Oral, Nightly  sodium chloride, 10 mL, Intravenous, Q12H      Infusions  Pharmacy Consult - Steroid Insulin Protocol,       PRNs  •  acetaminophen  •  albuterol  •  dextrose  •  dextrose  •  glucagon (human recombinant)  •   ipratropium-albuterol  •  melatonin  •  nitroglycerin  •  ondansetron  •  Pharmacy Consult - Steroid Insulin Protocol  •  sodium chloride  •  [COMPLETED] Insert Peripheral IV **AND** sodium chloride  •  sodium chloride  •  sodium chloride    Physical Exam:  Physical Exam  Vitals reviewed.   Pulmonary:      Effort: Pulmonary effort is normal. No respiratory distress.      Breath sounds: Wheezing and rhonchi present.   Skin:     General: Skin is warm and dry.   Neurological:      Mental Status: He is alert and oriented to person, place, and time.         ROS  Review of Systems   Respiratory: Positive for shortness of breath.        I have reviewed the patient's new clinical results.    Electronically signed by Flip Greenwood MD

## 2023-03-11 NOTE — PLAN OF CARE
Assessment: Brenden Peter presents with functional mobility impairments which indicate the need for skilled intervention. Tolerating session today without incident. Pt very cooperative and motivated to improve mobility and endurance. Should do well at home with assist as needed and no further Pt at [place].Will continue to follow and progress as tolerated.

## 2023-03-11 NOTE — PLAN OF CARE
Goal Outcome Evaluation:                Pt resting at this time, no complaints of pain, vss, no new orders at this time, plan of care continues at this time.

## 2023-03-11 NOTE — PROGRESS NOTES
[facility] Medicine Services   Daily Progress Note    Patient Name: Brenden Peter  : 1950  MRN: [id number]  Primary Care Physician:  Bert Rojas MD  Date of admission: 3/8/2023  Date and Time of Service: 3/11/2023    Subjective      Chief Complaint: SOB increased O2 demands, and   Patient Reports patient was seen and examined today, mention no wheezing but increasing cough producing phlegm, no fever or chills, no abdominal pain fever chills    3/10/2023: Patient's shortness of breath is significant improved today also improved oxygen demand, no chest pain no abdominal pain no fever or chills    3/11/2023: I seen examined this patient today, his oxygen demand is 4 to 5 L now, lung exam is very good, I stopped IV azithromycin, I switched IV Lasix to p.o. Lasix today, and I am okay with him getting extra dose of p.o. today in addition to his regular dose of IV, noticed that his hemoglobin is 9.6, 2 months back was 12-13, anemia work-up was ordered added to his blood work today, patient is doing better expecting him to be able to be discharged tomorrow if he tolerated oral therapy.    ROS   all review of systems negative except for mentioned above    Objective      Vitals:   Temp:  [97.3 °F (36.3 °C)-98.1 °F (36.7 °C)] 97.3 °F (36.3 °C)  Heart Rate:  [60-73] 60  Resp:  [15-22] 19  BP: (103-145)/(48-80) 103/48  Flow (L/min):  [4-5] 4    Physical Exam  Constitutional:       Appearance: Normal appearance.   HENT:      Head: Normocephalic.      Right Ear: Tympanic membrane normal.      Left Ear: Tympanic membrane normal.      Nose: Nose normal.      Mouth/Throat:      Mouth: Mucous membranes are moist.   Eyes:      Pupils: Pupils are equal, round, and reactive to light.   Cardiovascular:      Rate and Rhythm: Normal rate.      Pulses: Normal pulses.   Pulmonary:      Comments: Significant improved air entry in both sides  Abdominal:      General: Abdomen is flat.   Musculoskeletal:          General: Normal range of motion.      Cervical back: Normal range of motion.   Skin:     General: Skin is warm.      Capillary Refill: Capillary refill takes less than 2 seconds.   Neurological:      General: No focal deficit present.      Mental Status: He is alert.   Psychiatric:         Mood and Affect: Mood normal.            Result Review    Result Review:  I have personally reviewed the results from the time of this admission to 3/11/2023 11:46 EST and agree with these findings:  [x]  Laboratory  []  Microbiology  [x]  Radiology  []  EKG/Telemetry   []  Cardiology/Vascular   []  Pathology  []  Old records  []  Other:  Most notable findings include: Diffuse interstitial infiltrates similar to lung edema        Assessment & Plan      Brief Patient Summary:  Brenden Peter is a 72 y.o. male who has history of COPD diastolic CHF and morbid obesity is also obstructive sleep apnea on CPAP at home presented with shortness of breath and his oxygen demand, his oxygen demands 4 L at home and here in the hospital was needing 4 to 10 L, was started for treatment of atypical pneumonia and COPD exacerbation, proBNP is low and has no pedal edema however chest x-ray is similar to the picture we can see in pulmonary edema, thus I gave him a dose of IV Lasix, patient is using CPAP as needed he feels improved, however his oxygen demand still 6 to 10 L instead of his home dose of 4 L    3/10/2023: I have noticed that air entry is significant proved on both sides, also his oxygen demand has improved, his 4 to 5 L now compared to 6 to 10 L yesterday.    amLODIPine, 5 mg, Oral, Nightly  aspirin, 81 mg, Oral, Daily  atorvastatin, 40 mg, Oral, Daily  bisoprolol, 5 mg, Oral, Daily  budesonide, 0.5 mg, Nebulization, BID - RT  [START ON 3/12/2023] furosemide, 40 mg, Oral, Daily  gabapentin, 300 mg, Oral, TID  glipizide, 10 mg, Oral, Nightly  guaiFENesin, 600 mg, Oral, Q12H  insulin lispro, 2-9 Units, Subcutaneous, 4x Daily With Meals  & Nightly  insulin regular, 4 Units, Subcutaneous, Daily  ipratropium-albuterol, 3 mL, Nebulization, 4x Daily - RT  lisinopril, 40 mg, Oral, Nightly  metFORMIN, 1,000 mg, Oral, BID With Meals  nystatin, 5 mL, Swish & Swallow, 4x Daily  oxymetazoline, 2 spray, Each Nare, BID  pioglitazone, 30 mg, Oral, Nightly  predniSONE, 20 mg, Oral, Daily   Followed by  [START ON 3/13/2023] predniSONE, 10 mg, Oral, Daily  sertraline, 50 mg, Oral, Nightly  sodium chloride, 10 mL, Intravenous, Q12H       Pharmacy Consult - Steroid Insulin Protocol,          Active Hospital Problems:  Active Hospital Problems    Diagnosis    • **Acute respiratory distress      Plan:   COPD exacerbation  Obstructive sleep apnea  - Wears 4 L at home, currently on 2 L HFNC  - continue to wean O2 and maintain O2 sat > 90%  - CXR mentions pulmonary edema but BNP is negative.  - Respiratory panel negative  - Continue Solu-Medrol  - Continue DuoNebs and Pulmicort  - BiPAP as needed/at bedtime  - CT chest without contrast  - Hold home on oral  - Continue home Lasix     Hypertension  CAD/HLD  -/53  - Recent cardiac cath January 2023 which just showed mild CAD  - Continue home statin, ASA, bisoprolol, norvasc, and lisinopril     Type 2 diabetes  -Glucose 147  -Check A1c  - Hold home actos, metformin and glipizide  - Sliding scale insulin and Accu-Cheks before meals and at bedtime     Anxiety  - Continue home Zoloft     DVT prophylaxis  -Lovenox     Obesity  - BMI 34.46  - Encourage lifestyle changes    Congestive heart failure with preserved ejection fraction, responded to IV Lasix, waiting for echocardiogram results.    Anemia no evidence of bleeding at this point anemia work-up is pending.      DVT prophylaxis:  Mechanical DVT prophylaxis orders are present.    CODE STATUS:    Level Of Support Discussed With: Patient  Code Status (Patient has no pulse and is not breathing): CPR (Attempt to Resuscitate)  Medical Interventions (Patient has pulse or is  breathing): Full Support      Disposition:  I expect patient to be discharged 1 day    This patient has been examined wearing appropriate Personal Protective Equipment and discussed with hospital infection control department. 03/11/23      Electronically signed by Javier Marcum MD, 03/11/23, 11:46 EST.  [facility]ist [facility]

## 2023-03-12 LAB
ANION GAP SERPL CALCULATED.3IONS-SCNC: 9 MMOL/L (ref 5–15)
BUN SERPL-MCNC: 22 MG/DL (ref 8–23)
BUN/CREAT SERPL: 40 (ref 7–25)
CALCIUM SPEC-SCNC: 8.7 MG/DL (ref 8.6–10.5)
CHLORIDE SERPL-SCNC: 104 MMOL/L (ref 98–107)
CO2 SERPL-SCNC: 29 MMOL/L (ref 22–29)
CREAT SERPL-MCNC: 0.55 MG/DL (ref 0.76–1.27)
DEPRECATED RDW RBC AUTO: 51.2 FL (ref 37–54)
EGFRCR SERPLBLD CKD-EPI 2021: 105.3 ML/MIN/1.73
ERYTHROCYTE [DISTWIDTH] IN BLOOD BY AUTOMATED COUNT: 16.6 % (ref 12.3–15.4)
GLUCOSE BLDC GLUCOMTR-MCNC: 169 MG/DL (ref 70–105)
GLUCOSE BLDC GLUCOMTR-MCNC: 285 MG/DL (ref 70–105)
GLUCOSE BLDC GLUCOMTR-MCNC: 294 MG/DL (ref 70–105)
GLUCOSE BLDC GLUCOMTR-MCNC: 86 MG/DL (ref 70–105)
GLUCOSE SERPL-MCNC: 131 MG/DL (ref 65–99)
HCT VFR BLD AUTO: 30.9 % (ref 37.5–51)
HGB BLD-MCNC: 9.7 G/DL (ref 13–17.7)
MCH RBC QN AUTO: 27.7 PG (ref 26.6–33)
MCHC RBC AUTO-ENTMCNC: 31.3 G/DL (ref 31.5–35.7)
MCV RBC AUTO: 88.5 FL (ref 79–97)
PLATELET # BLD AUTO: 182 10*3/MM3 (ref 140–450)
PMV BLD AUTO: 9.5 FL (ref 6–12)
POTASSIUM SERPL-SCNC: 3.8 MMOL/L (ref 3.5–5.2)
RBC # BLD AUTO: 3.49 10*6/MM3 (ref 4.14–5.8)
SODIUM SERPL-SCNC: 142 MMOL/L (ref 136–145)
WBC NRBC COR # BLD: 6.4 10*3/MM3 (ref 3.4–10.8)

## 2023-03-12 PROCEDURE — 94761 N-INVAS EAR/PLS OXIMETRY MLT: CPT

## 2023-03-12 PROCEDURE — 94799 UNLISTED PULMONARY SVC/PX: CPT

## 2023-03-12 PROCEDURE — 63710000001 INSULIN REGULAR HUMAN PER 5 UNITS: Performed by: NURSE PRACTITIONER

## 2023-03-12 PROCEDURE — 80048 BASIC METABOLIC PNL TOTAL CA: CPT | Performed by: INTERNAL MEDICINE

## 2023-03-12 PROCEDURE — 82962 GLUCOSE BLOOD TEST: CPT

## 2023-03-12 PROCEDURE — 63710000001 PREDNISONE PER 1 MG

## 2023-03-12 PROCEDURE — 94664 DEMO&/EVAL PT USE INHALER: CPT

## 2023-03-12 PROCEDURE — 63710000001 INSULIN LISPRO (HUMAN) PER 5 UNITS: Performed by: NURSE PRACTITIONER

## 2023-03-12 PROCEDURE — 85027 COMPLETE CBC AUTOMATED: CPT | Performed by: INTERNAL MEDICINE

## 2023-03-12 PROCEDURE — 94660 CPAP INITIATION&MGMT: CPT

## 2023-03-12 RX ADMIN — INSULIN HUMAN 4 UNITS: 100 INJECTION, SOLUTION PARENTERAL at 08:52

## 2023-03-12 RX ADMIN — BISOPROLOL FUMARATE 5 MG: 5 TABLET ORAL at 08:52

## 2023-03-12 RX ADMIN — INSULIN LISPRO 2 UNITS: 100 INJECTION, SOLUTION INTRAVENOUS; SUBCUTANEOUS at 11:14

## 2023-03-12 RX ADMIN — ASPIRIN 81 MG: 81 TABLET, COATED ORAL at 08:51

## 2023-03-12 RX ADMIN — PREDNISONE 20 MG: 20 TABLET ORAL at 08:52

## 2023-03-12 RX ADMIN — Medication 10 ML: at 08:52

## 2023-03-12 RX ADMIN — ATORVASTATIN CALCIUM 40 MG: 40 TABLET, FILM COATED ORAL at 08:51

## 2023-03-12 RX ADMIN — INSULIN LISPRO 6 UNITS: 100 INJECTION, SOLUTION INTRAVENOUS; SUBCUTANEOUS at 20:32

## 2023-03-12 RX ADMIN — GABAPENTIN 300 MG: 300 CAPSULE ORAL at 08:52

## 2023-03-12 RX ADMIN — GABAPENTIN 300 MG: 300 CAPSULE ORAL at 20:32

## 2023-03-12 RX ADMIN — GLIPIZIDE 10 MG: 5 TABLET ORAL at 20:32

## 2023-03-12 RX ADMIN — IPRATROPIUM BROMIDE AND ALBUTEROL SULFATE 3 ML: 2.5; .5 SOLUTION RESPIRATORY (INHALATION) at 06:35

## 2023-03-12 RX ADMIN — GUAIFENESIN 600 MG: 600 TABLET, EXTENDED RELEASE ORAL at 08:52

## 2023-03-12 RX ADMIN — IPRATROPIUM BROMIDE AND ALBUTEROL SULFATE 3 ML: 2.5; .5 SOLUTION RESPIRATORY (INHALATION) at 20:37

## 2023-03-12 RX ADMIN — METFORMIN HYDROCHLORIDE 1000 MG: 500 TABLET ORAL at 17:27

## 2023-03-12 RX ADMIN — NASAL DECONGESTANT 2 SPRAY: 0.05 SPRAY NASAL at 08:52

## 2023-03-12 RX ADMIN — Medication 10 ML: at 20:35

## 2023-03-12 RX ADMIN — GUAIFENESIN 600 MG: 600 TABLET, EXTENDED RELEASE ORAL at 20:32

## 2023-03-12 RX ADMIN — Medication 10 ML: at 20:33

## 2023-03-12 RX ADMIN — SERTRALINE 50 MG: 50 TABLET, FILM COATED ORAL at 21:55

## 2023-03-12 RX ADMIN — PIOGLITAZONE 30 MG: 30 TABLET ORAL at 20:35

## 2023-03-12 RX ADMIN — IPRATROPIUM BROMIDE AND ALBUTEROL SULFATE 3 ML: 2.5; .5 SOLUTION RESPIRATORY (INHALATION) at 14:17

## 2023-03-12 RX ADMIN — GABAPENTIN 300 MG: 300 CAPSULE ORAL at 17:27

## 2023-03-12 RX ADMIN — IPRATROPIUM BROMIDE AND ALBUTEROL SULFATE 3 ML: 2.5; .5 SOLUTION RESPIRATORY (INHALATION) at 10:37

## 2023-03-12 RX ADMIN — NYSTATIN 500000 UNITS: 100000 SUSPENSION ORAL at 08:52

## 2023-03-12 RX ADMIN — FUROSEMIDE 40 MG: 40 TABLET ORAL at 08:52

## 2023-03-12 RX ADMIN — NYSTATIN 500000 UNITS: 100000 SUSPENSION ORAL at 17:27

## 2023-03-12 RX ADMIN — INSULIN LISPRO 6 UNITS: 100 INJECTION, SOLUTION INTRAVENOUS; SUBCUTANEOUS at 17:27

## 2023-03-12 RX ADMIN — AMLODIPINE BESYLATE 5 MG: 5 TABLET ORAL at 20:33

## 2023-03-12 RX ADMIN — BUDESONIDE 0.5 MG: 0.5 INHALANT RESPIRATORY (INHALATION) at 06:31

## 2023-03-12 RX ADMIN — NYSTATIN 500000 UNITS: 100000 SUSPENSION ORAL at 11:14

## 2023-03-12 RX ADMIN — NYSTATIN 500000 UNITS: 100000 SUSPENSION ORAL at 20:32

## 2023-03-12 RX ADMIN — LISINOPRIL 40 MG: 20 TABLET ORAL at 20:33

## 2023-03-12 RX ADMIN — BUDESONIDE 0.5 MG: 0.5 INHALANT RESPIRATORY (INHALATION) at 20:37

## 2023-03-12 RX ADMIN — METFORMIN HYDROCHLORIDE 1000 MG: 500 TABLET ORAL at 08:52

## 2023-03-12 NOTE — PROGRESS NOTES
Seen and examined  Still wheezing    Review of Systems   Respiratory: Positive for shortness of breath and wheezing.      Temp:  [97.4 °F (36.3 °C)-98.6 °F (37 °C)] 98 °F (36.7 °C)  Heart Rate:  [59-86] 62  Resp:  [15-21] 19  BP: (122-141)/(51-69) 136/63  I/O last 3 completed shifts:  In: 720 [P.O.:720]  Out: 1750 [Urine:1750]  I/O this shift:  In: 120 [P.O.:120]  Out: 250 [Urine:250]    Physical Exam  Eyes:      Pupils: Pupils are equal, round, and reactive to light.   Cardiovascular:      Rate and Rhythm: Normal rate and regular rhythm.      Pulses: Normal pulses.   Pulmonary:      Effort: Pulmonary effort is normal.   Neurological:      Mental Status: He is alert.           Acute respiratory distress      COPD exacerbation  Obstructive sleep apnea  - Wears 4 L at home, currently on 2 L HFNC  - continue to wean O2 and maintain O2 sat > 90%  - CXR mentions pulmonary edema but BNP is negative.  - Respiratory panel negative  - Continue Solu-Medrol  - Continue DuoNebs and Pulmicort  - BiPAP as needed/at bedtime  - CT chest without contrast  - Hold home on oral  - Continue home Lasix     Hypertension  CAD/HLD  -/53  - Recent cardiac cath January 2023 which just showed mild CAD  - Continue home statin, ASA, bisoprolol, norvasc, and lisinopril     Type 2 diabetes  -Glucose 147  -Check A1c  - Hold home actos, metformin and glipizide  - Sliding scale insulin and Accu-Cheks before meals and at bedtime     Anxiety  - Continue home Zoloft     DVT prophylaxis  -Lovenox     Obesity  - BMI 34.46  - Encourage lifestyle changes     Congestive heart failure with preserved ejection fraction, responded to IV Lasix, waiting for echocardiogram results.     Anemia no evidence of bleeding at this point anemia work-up is pending.        DVT prophylaxis:

## 2023-03-12 NOTE — PLAN OF CARE
Goal Outcome Evaluation:  Plan of Care Reviewed With: patient        Progress: improving  Outcome Evaluation: Pt currently on 4L high flow O2. Pt c/o SOA with exertion during transfer, O2 drops but pt is able to quickly return to above 90% with high flow NC. Pt currently sitting up in recliner, call light and personal items within reach. Possible d/c home today.

## 2023-03-12 NOTE — PROGRESS NOTES
Daily Progress Note        Acute respiratory distress      Assessment    Acute on chronic respiratory failure with hypercapnia  ABG pH 7.388, PCO2 53.7, PO2 113.8, HCO3 32.3  Chronic obstructive pulmonary disease, patient wears 4 L of home oxygen  Pulmonary edema  Obstructive sleep apnea, on Pap device     Coronary artery disease     Hypertension   Hyperlipidemia  Diabetes mellitus     Office note 3/2/2023  70 y/o x-smoker (55 year history-quit 7-3-22)  dyspnea and hypoxia.  SONJA, residual AHI: 1 , No snoring, no Leak, [id number] % > 4 hours  Severe COPD:  PFT FEV1 1.12L which is 41%, post BD 51%, ratio 57, ERV 17%, %, TLC 82%, DLCO 27%, obstructive sleep apnea features  mild interstitial lung disease  PMH:  HTN, DM, HLD        Recommendations:     Titrate oxygen, currently requiring 4L high flow,   CPAP at at bedtime  - Patient wears 4 L of home oxygen     Completed azithromycin 250 mg p.o. daily x5 days  prednisone 6-day taper  Mucinex  Lasix 40 mg daily  Bronchodilator\inhaled corticosteroid  Glycemic control    We will schedule BiPAP titration as outpatient          LOS: 2 days     Subjective         Objective     Vital signs for last 24 hours:  Vitals:    03/12/23 0631 03/12/23 0634 03/12/23 0635 03/12/23 0638   BP:       BP Location:       Patient Position:       Pulse: 64 65 61 59   Resp: 15 15 15 16   Temp:       TempSrc:       SpO2: 92% 95% 98% 99%   Weight:       Height:           Intake/Output last 3 shifts:  I/O last 3 completed shifts:  In: 720 [P.O.:720]  Out: 1750 [Urine:1750]  Intake/Output this shift:  No intake/output data recorded.      Radiology  Imaging Results (Last 24 Hours)     ** No results found for the last 24 hours. **          Labs:  Results from last 7 days   Lab Units 03/12/23  0009   WBC 10*3/mm3 6.40   HEMOGLOBIN g/dL 9.7*   HEMATOCRIT % 30.9*   PLATELETS 10*3/mm3 182     Results from last 7 days   Lab Units 03/12/23  0009 03/09/23  0611 03/08/23  1420   SODIUM mmol/L 142   <  > 142   POTASSIUM mmol/L 3.8   < > 3.7   CHLORIDE mmol/L 104   < > 100   CO2 mmol/L 29.0   < > 31.0*   BUN mg/dL 22   < > 12   CREATININE mg/dL 0.55*   < > 0.54*   CALCIUM mg/dL 8.7   < > 9.4   BILIRUBIN mg/dL  --   --  0.5   ALK PHOS U/L  --   --  69   ALT (SGPT) U/L  --   --  15   AST (SGOT) U/L  --   --  16   GLUCOSE mg/dL 131*   < > 147*    < > = values in this interval not displayed.     Results from last 7 days   Lab Units 03/08/23  1440   PH, ARTERIAL pH units 7.388   PO2 ART mm Hg 113.8*   PCO2, ARTERIAL mm Hg 53.7*   HCO3 ART mmol/L 32.3*     Results from last 7 days   Lab Units 03/08/23  1420   ALBUMIN g/dL 4.0     Results from last 7 days   Lab Units 03/08/23  1420   HSTROP T ng/L 21*                 Results from last 7 days   Lab Units 03/08/23  1420   TSH uIU/mL 1.790           Meds:   SCHEDULE  amLODIPine, 5 mg, Oral, Nightly  aspirin, 81 mg, Oral, Daily  atorvastatin, 40 mg, Oral, Daily  bisoprolol, 5 mg, Oral, Daily  budesonide, 0.5 mg, Nebulization, BID - RT  furosemide, 40 mg, Oral, Daily  gabapentin, 300 mg, Oral, TID  glipizide, 10 mg, Oral, Nightly  guaiFENesin, 600 mg, Oral, Q12H  insulin lispro, 2-9 Units, Subcutaneous, 4x Daily With Meals & Nightly  insulin regular, 4 Units, Subcutaneous, Daily  ipratropium-albuterol, 3 mL, Nebulization, 4x Daily - RT  lisinopril, 40 mg, Oral, Nightly  metFORMIN, 1,000 mg, Oral, BID With Meals  nystatin, 5 mL, Swish & Swallow, 4x Daily  oxymetazoline, 2 spray, Each Nare, BID  pioglitazone, 30 mg, Oral, Nightly  predniSONE, 20 mg, Oral, Daily   Followed by  [START ON 3/13/2023] predniSONE, 10 mg, Oral, Daily  sertraline, 50 mg, Oral, Nightly  sodium chloride, 10 mL, Intravenous, Q12H      Infusions  Pharmacy Consult - Steroid Insulin Protocol,       PRNs  •  acetaminophen  •  albuterol  •  dextrose  •  dextrose  •  glucagon (human recombinant)  •  ipratropium-albuterol  •  melatonin  •  nitroglycerin  •  ondansetron  •  Pharmacy Consult - Steroid Insulin  Protocol  •  sodium chloride  •  [COMPLETED] Insert Peripheral IV **AND** sodium chloride  •  sodium chloride  •  sodium chloride    Physical Exam:  Physical Exam  Vitals reviewed.   Pulmonary:      Effort: Pulmonary effort is normal. No respiratory distress.      Breath sounds: Wheezing and rhonchi present.   Skin:     General: Skin is warm and dry.   Neurological:      Mental Status: He is alert and oriented to person, place, and time.         ROS  Review of Systems   Respiratory: Positive for shortness of breath.        I have reviewed the patient's new clinical results.    Electronically signed by Flip Greenwood MD

## 2023-03-12 NOTE — PLAN OF CARE
Goal Outcome Evaluation:                Pt is resting at this time no complaints of pain, vss, no new orders, MD aware, plan of care continues at this time.

## 2023-03-13 ENCOUNTER — READMISSION MANAGEMENT (OUTPATIENT)
Dept: CALL CENTER | Facility: HOSPITAL | Age: 73
End: 2023-03-13
Payer: MEDICARE

## 2023-03-13 VITALS
DIASTOLIC BLOOD PRESSURE: 59 MMHG | RESPIRATION RATE: 19 BRPM | SYSTOLIC BLOOD PRESSURE: 134 MMHG | TEMPERATURE: 97.3 F | OXYGEN SATURATION: 96 % | HEART RATE: 70 BPM | BODY MASS INDEX: 36.98 KG/M2 | HEIGHT: 67 IN | WEIGHT: 235.6 LBS

## 2023-03-13 LAB
BACTERIA SPEC AEROBE CULT: NORMAL
BACTERIA SPEC AEROBE CULT: NORMAL
GLUCOSE BLDC GLUCOMTR-MCNC: 151 MG/DL (ref 70–105)
GLUCOSE BLDC GLUCOMTR-MCNC: 85 MG/DL (ref 70–105)
HEMOCCULT STL QL IA: NEGATIVE

## 2023-03-13 PROCEDURE — 94799 UNLISTED PULMONARY SVC/PX: CPT

## 2023-03-13 PROCEDURE — 94660 CPAP INITIATION&MGMT: CPT

## 2023-03-13 PROCEDURE — 82274 ASSAY TEST FOR BLOOD FECAL: CPT | Performed by: INTERNAL MEDICINE

## 2023-03-13 PROCEDURE — 94664 DEMO&/EVAL PT USE INHALER: CPT

## 2023-03-13 PROCEDURE — 97116 GAIT TRAINING THERAPY: CPT

## 2023-03-13 PROCEDURE — 63710000001 PREDNISONE PER 5 MG

## 2023-03-13 PROCEDURE — 82962 GLUCOSE BLOOD TEST: CPT

## 2023-03-13 RX ORDER — METHYLPREDNISOLONE 4 MG/1
TABLET ORAL
Qty: 21 TABLET | Refills: 0 | Status: SHIPPED | OUTPATIENT
Start: 2023-03-13 | End: 2023-04-03 | Stop reason: HOSPADM

## 2023-03-13 RX ADMIN — NYSTATIN 500000 UNITS: 100000 SUSPENSION ORAL at 08:30

## 2023-03-13 RX ADMIN — BISOPROLOL FUMARATE 5 MG: 5 TABLET ORAL at 08:30

## 2023-03-13 RX ADMIN — GUAIFENESIN 600 MG: 600 TABLET, EXTENDED RELEASE ORAL at 08:30

## 2023-03-13 RX ADMIN — ASPIRIN 81 MG: 81 TABLET, COATED ORAL at 08:31

## 2023-03-13 RX ADMIN — ATORVASTATIN CALCIUM 40 MG: 40 TABLET, FILM COATED ORAL at 08:31

## 2023-03-13 RX ADMIN — Medication 10 ML: at 08:31

## 2023-03-13 RX ADMIN — IPRATROPIUM BROMIDE AND ALBUTEROL SULFATE 3 ML: 2.5; .5 SOLUTION RESPIRATORY (INHALATION) at 06:35

## 2023-03-13 RX ADMIN — NYSTATIN 500000 UNITS: 100000 SUSPENSION ORAL at 12:39

## 2023-03-13 RX ADMIN — GABAPENTIN 300 MG: 300 CAPSULE ORAL at 15:32

## 2023-03-13 RX ADMIN — BUDESONIDE 0.5 MG: 0.5 INHALANT RESPIRATORY (INHALATION) at 06:35

## 2023-03-13 RX ADMIN — METFORMIN HYDROCHLORIDE 1000 MG: 500 TABLET ORAL at 08:31

## 2023-03-13 RX ADMIN — PREDNISONE 10 MG: 10 TABLET ORAL at 08:31

## 2023-03-13 RX ADMIN — FUROSEMIDE 40 MG: 40 TABLET ORAL at 08:30

## 2023-03-13 RX ADMIN — GABAPENTIN 300 MG: 300 CAPSULE ORAL at 08:31

## 2023-03-13 NOTE — PLAN OF CARE
Assessment: Brenden Peter is a 73 y/o M admitted with COPD exacerbation. He presents with functional mobility impairments which indicate the need for skilled intervention. Tolerating session today without incident. Pt was agreeable to therapy following coaxing and education on safe d/c home. He was able to amb 200' CGA with no AD this session. No LOB or balance deficits noted without use of AD, although decreased step crissy and step length. Shows improved CV fitness this session with minimal complaints of SOA, stating symptoms are his baseline. O2 sats dropped slightly with increased activity, but rebounded with 2min rest EOB. Will continue to progress pt activity tolerance with stable O2 saturation.

## 2023-03-13 NOTE — PROGRESS NOTES
"PULMONARY CRITICAL CARE PROGRESS  NOTE      PATIENT IDENTIFICATION:  Name: Brenden Peter  MRN: [id number]  :  1950     Age: 72 y.o.  Sex: male    DATE OF Note:  3/13/2023   Referring Physician: Javier Marcum MD                  Subjective:   Feeling better, sitting in chair at bedside, no SOB, no chest or abd pain, no bowel or bladder issues     Objective:  tMax 24 hrs: Temp (24hrs), Av.6 °F (36.4 °C), Min:97.1 °F (36.2 °C), Max:98.7 °F (37.1 °C)      Vitals Ranges:   Temp:  [97.1 °F (36.2 °C)-98.7 °F (37.1 °C)] 97.3 °F (36.3 °C)  Heart Rate:  [60-72] 70  Resp:  [15-20] 19  BP: (124-148)/(58-74) 134/59    Intake and Output Last 3 Shifts:   I/O last 3 completed shifts:  In: 740 [P.O.:740]  Out: 850 [Urine:850]    Exam:  /59 (BP Location: Right arm, Patient Position: Lying)   Pulse 70   Temp 97.3 °F (36.3 °C) (Oral)   Resp 19   Ht 170.2 cm (67\")   Wt 107 kg (235 lb 9.6 oz)   SpO2 96%   BMI 36.90 kg/m²     General Appearance: AAO  HEENT:  Normocephalic, without obvious abnormality. Conjunctivae/corneas clear.  Normal external ear canals. Nares normal, no drainage     Neck:  Supple, symmetrical, trachea midline. No JVD.  Lungs /Chest wall:   Bilateral basal rhonchi improved, respirations unlabored, symmetrical wall movement.     Heart:  Regular rate and rhythm, systolic murmur PMI left sternal border  Abdomen: Soft, nontender, no masses, no organomegaly.    Extremities: Trace edema, no clubbing or cyanosis        Medications:  amLODIPine, 5 mg, Oral, Nightly  aspirin, 81 mg, Oral, Daily  atorvastatin, 40 mg, Oral, Daily  bisoprolol, 5 mg, Oral, Daily  budesonide, 0.5 mg, Nebulization, BID - RT  furosemide, 40 mg, Oral, Daily  gabapentin, 300 mg, Oral, TID  glipizide, 10 mg, Oral, Nightly  guaiFENesin, 600 mg, Oral, Q12H  insulin lispro, 2-9 Units, Subcutaneous, 4x Daily With Meals & Nightly  ipratropium-albuterol, 3 mL, Nebulization, 4x Daily - RT  lisinopril, 40 mg, Oral, " Nightly  metFORMIN, 1,000 mg, Oral, BID With Meals  nystatin, 5 mL, Swish & Swallow, 4x Daily  pioglitazone, 30 mg, Oral, Nightly  predniSONE, 10 mg, Oral, Daily  sertraline, 50 mg, Oral, Nightly  sodium chloride, 10 mL, Intravenous, Q12H        Infusion:  Pharmacy Consult - Steroid Insulin Protocol,          PRN:    acetaminophen    albuterol    dextrose    dextrose    glucagon (human recombinant)    ipratropium-albuterol    melatonin    nitroglycerin    ondansetron    Pharmacy Consult - Steroid Insulin Protocol    sodium chloride    [COMPLETED] Insert Peripheral IV **AND** sodium chloride    sodium chloride    sodium chloride  Data Review:  All labs (24hrs):   Recent Results (from the past 24 hour(s))   POC Glucose Once    Collection Time: 03/12/23  8:25 PM    Specimen: Blood   Result Value Ref Range    Glucose 285 (H) 70 - 105 mg/dL   POC Glucose Once    Collection Time: 03/13/23  7:47 AM    Specimen: Blood   Result Value Ref Range    Glucose 85 70 - 105 mg/dL   Occult Blood, Fecal By Immunoassay - Stool, Per Rectum    Collection Time: 03/13/23  8:06 AM    Specimen: Per Rectum; Stool   Result Value Ref Range    Occult Blood, Fecal by Immunoassay Negative Negative   POC Glucose Once    Collection Time: 03/13/23 11:40 AM    Specimen: Blood   Result Value Ref Range    Glucose 151 (H) 70 - 105 mg/dL        Imaging:  Adult Transthoracic Echo Complete W/ Cont if Necessary Per Protocol    Left ventricular systolic function is normal. Calculated left   ventricular EF = 65.5% Left ventricular ejection fraction appears to be 61   - 65%.    Left ventricular diastolic dysfunction is noted.    Both atrial cavities are dilated.    Estimated right ventricular systolic pressure from tricuspid   regurgitation is markedly elevated (>55 mmHg).       ASSESSMENT:  Acute hypercapnic respiratory failure  COPD  SONJA  Pulmonary edema  CAD  HTN  Hyperlipidemia  DM II       PLAN:  May discharge and follow up with our service in 2 weeks    Bronchodilators  Inhaled corticosteroids  PO steroid tapering dose  Will need BiPAP titration study as OP  Electrolytes/ glycemic control  No DVT and GI prophylaxis    Discussed with Dr Liliana Tyler, APRN   3/13/2023  17:47 EDT      I personally have examined  and interviewed the patient. I have reviewed the history, data, problems, assessment and plan with our NP.  Total Critical care time in direct medical management (   ) minutes, This time specifically excludes time spent performing procedures.    Jeannine Ford MD   3/14/2023  00:02 EDT

## 2023-03-13 NOTE — DISCHARGE SUMMARY
[facility]  DISCHARGE SUMMARY    Patient Name: Brenden Peter  : 1950  MRN: [id number]    Discharge condition: Stabilized  Date of Admission: 3/8/2023  Discharge Diagnosis: Acute on chronic respiratory failure due to COPD exacerbation  Date of Discharge: 2023  Primary Care Physician: Bert Rojas MD      Presenting Problem:   Acute respiratory distress [R06.03]  Hypoxia [R09.02]  Chronic obstructive pulmonary disease with acute exacerbation (HCC) [J44.1]    Active and Resolved Hospital Problems:  Active Hospital Problems    Diagnosis POA   • **Acute respiratory distress [R06.03] Yes      Resolved Hospital Problems   No resolved problems to display.         Hospital Course     Hospital Course:  Brenden Peter is a 72 y.o. male who presented to the hospital with shortness of breath.  The patient has chronic COPD with 4 L of oxygen at home.  He was requiring higher levels of oxygen and was severely short of breath.  Patient was provided with steroids nebulizers and supplemental oxygen.  The patient eventually stabilized and was back to baseline 4 L of oxygen without any wheezing.  He was then discharged home in stable condition with stable vitals on a Medrol Dosepak.  He was instructed to follow-up with the primary care doctor regarding his lymphadenopathy in the mediastinum and repeat CT imaging despite benign appearance.              Reasons For Change In Medications and Indications for New Medications:      Day of Discharge     Vital Signs:  Temp:  [97.1 °F (36.2 °C)-98.7 °F (37.1 °C)] 97.3 °F (36.3 °C)  Heart Rate:  [60-72] 70  Resp:  [15-20] 19  BP: (124-148)/(58-74) 134/59  Flow (L/min):  [4] 4    Physical Exam:  Physical Exam  Vitals reviewed.   Constitutional:       Comments: Wearing 4 L nasal cannula   HENT:      Head: Normocephalic.   Cardiovascular:      Rate and Rhythm: Normal rate.   Pulmonary:      Effort: Pulmonary effort is  normal.      Comments: No wheezing noted  Abdominal:      General: Abdomen is flat.      Palpations: Abdomen is soft.   Musculoskeletal:         General: Normal range of motion.      Cervical back: Normal range of motion.   Skin:     General: Skin is warm.   Neurological:      General: No focal deficit present.      Mental Status: He is alert and oriented to person, place, and time.   Psychiatric:         Mood and Affect: Mood normal.         Behavior: Behavior normal.            Pertinent  and/or Most Recent Results     LAB RESULTS:      Lab 03/12/23  0009 03/11/23  0047 03/10/23  0734 03/09/23  0611 03/08/23  1425 03/08/23  1420   WBC 6.40 8.00 9.60 5.10  --  5.30   HEMOGLOBIN 9.7* 9.6* 9.8* 9.7*  --  11.0*   HEMATOCRIT 30.9* 28.5* 30.6* 30.8*  --  33.8*   PLATELETS 182 166 183 166  --  183   NEUTROS ABS  --  6.20 7.80* 4.60  --  3.50   LYMPHS ABS  --  1.20 1.20 0.40*  --  1.40   MONOS ABS  --  0.70 0.60 0.10  --  0.40   EOS ABS  --  0.00 0.00 0.00  --  0.10   MCV 88.5 86.9 87.9 88.5  --  87.2   LACTATE  --   --   --   --  2.0  --          Lab 03/12/23  0009 03/11/23  0047 03/10/23  0734 03/09/23  0611 03/08/23  1420   SODIUM 142 142 141 139 142   POTASSIUM 3.8 3.6 3.6 4.2 3.7   CHLORIDE 104 102 102 100 100   CO2 29.0 31.0* 30.0* 30.0* 31.0*   ANION GAP 9.0 9.0 9.0 9.0 11.0   BUN 22 24* 24* 20 12   CREATININE 0.55* 0.62* 0.63* 0.54* 0.54*   EGFR 105.3 101.6 101.1 105.9 105.9   GLUCOSE 131* 171* 188* 241* 147*   CALCIUM 8.7 8.8 9.2 9.0 9.4   HEMOGLOBIN A1C  --   --   --   --  6.40*   TSH  --   --   --   --  1.790         Lab 03/08/23  1420   TOTAL PROTEIN 7.9   ALBUMIN 4.0   GLOBULIN 3.9   ALT (SGPT) 15   AST (SGOT) 16   BILIRUBIN 0.5   ALK PHOS 69         Lab 03/08/23  1420   PROBNP 367.5   HSTROP T 21*         Lab 03/08/23  1420   CHOLESTEROL 114   LDL CHOL 66   HDL CHOL 26*   TRIGLYCERIDES 120         Lab 03/11/23  0047   IRON 46*   IRON SATURATION 10*   TIBC 457   TRANSFERRIN 307   FERRITIN 95.81         Lab  03/08/23  1440   PH, ARTERIAL 7.388   PCO2, ARTERIAL 53.7*   PO2 .8*   O2 SATURATION ART 98.3*   FIO2 100   HCO3 ART 32.3*   BASE EXCESS ART 6.0*     Brief Urine Lab Results     None        Microbiology Results (last 10 days)     Procedure Component Value - Date/Time    Respiratory Panel PCR w/COVID-19(SARS-CoV-2) IAN/NAYELY/KATERIN/PAD/COR/MAD/KRISH In-House, NP Swab in UTM/VTM, 3-4 HR TAT - Swab, Nasopharynx [[id number]]  (Normal) Collected: 03/08/23 1613    Lab Status: Final result Specimen: Swab from Nasopharynx Updated: 03/08/23 1711     ADENOVIRUS, PCR Not Detected     Coronavirus 229E Not Detected     Coronavirus HKU1 Not Detected     Coronavirus NL63 Not Detected     Coronavirus OC43 Not Detected     COVID19 Not Detected     Human Metapneumovirus Not Detected     Human Rhinovirus/Enterovirus Not Detected     Influenza A PCR Not Detected     Influenza B PCR Not Detected     Parainfluenza Virus 1 Not Detected     Parainfluenza Virus 2 Not Detected     Parainfluenza Virus 3 Not Detected     Parainfluenza Virus 4 Not Detected     RSV, PCR Not Detected     Bordetella pertussis pcr Not Detected     Bordetella parapertussis PCR Not Detected     Chlamydophila pneumoniae PCR Not Detected     Mycoplasma pneumo by PCR Not Detected    Narrative:      In the setting of a positive respiratory panel with a viral infection PLUS a negative procalcitonin without other underlying concern for bacterial infection, consider observing off antibiotics or discontinuation of antibiotics and continue supportive care. If the respiratory panel is positive for atypical bacterial infection (Bordetella pertussis, Chlamydophila pneumoniae, or Mycoplasma pneumoniae), consider antibiotic de-escalation to target atypical bacterial infection.    Blood Culture - Blood, Arm, Left [[id number]]  (Normal) Collected: 03/08/23 1445    Lab Status: Preliminary result Specimen: Blood from Arm, Left Updated: 03/12/23 1600     Blood Culture No growth at 4 days     Blood Culture - Blood, Arm, Right [[id number]]  (Normal) Collected: 03/08/23 1420    Lab Status: Preliminary result Specimen: Blood from Arm, Right Updated: 03/12/23 1531     Blood Culture No growth at 4 days          CT Chest Without Contrast Diagnostic    Result Date: 3/9/2023  Impression: 1. Mild interstitial edema is suggested in the lung bases with minimal posterior bibasilar atelectasis. No acute lung consolidations are seen. 2. Moderate emphysema. 3. Mediastinal adenopathy is unchanged. Some these lymph nodes are calcified suggesting granulomatous process. These are favored to represent benign, reactive findings. Electronically Signed: Sheryl Torrez  3/9/2023 8:20 AM EST  Workstation ID: [id number]    XR Chest 1 View    Result Date: 3/8/2023  Impression: Impression: Radiographic findings consistent with congestive heart failure and pulmonary edema. Electronically Signed: Leo Rosenberg  3/8/2023 3:58 PM EST  Workstation ID: [id number]              Results for orders placed during the hospital encounter of 03/08/23    Adult Transthoracic Echo Complete W/ Cont if Necessary Per Protocol    Interpretation Summary  •  Left ventricular systolic function is normal. Calculated left ventricular EF = 65.5% Left ventricular ejection fraction appears to be 61 - 65%.  •  Left ventricular diastolic dysfunction is noted.  •  Both atrial cavities are dilated.  •  Estimated right ventricular systolic pressure from tricuspid regurgitation is markedly elevated (>55 mmHg).      Labs Pending at Discharge:  Pending Labs     Order Current Status    Blood Culture - Blood, Arm, Left Preliminary result    Blood Culture - Blood, Arm, Right Preliminary result          Procedures Performed           Consults:   Consults     Date and Time Order Name Status Description    3/8/2023  6:25 PM Inpatient Pulmonology Consult Completed     3/8/2023  5:03 PM Hospitalist (on-call MD unless specified)              Discharge Details        Discharge  Medications      New Medications      Instructions Start Date   methylPREDNISolone 4 MG dose pack  Commonly known as: MEDROL   Take as directed on package instructions.         Continue These Medications      Instructions Start Date   albuterol (2.5 MG/3ML) 0.083% nebulizer solution  Commonly known as: PROVENTIL   3 mL, Nebulization, 3 Times Daily PRN      albuterol sulfate  (90 Base) MCG/ACT inhaler  Commonly known as: PROVENTIL HFA;VENTOLIN HFA;PROAIR HFA   2 puffs, Inhalation, Every 6 Hours PRN      amLODIPine 5 MG tablet  Commonly known as: NORVASC   5 mg, Oral, Nightly      Aspirin Low Dose 81 MG EC tablet  Generic drug: aspirin   81 mg, Oral, Daily      atorvastatin 40 MG tablet  Commonly known as: LIPITOR   40 mg, Oral, Daily      bisoprolol 5 MG tablet  Commonly known as: ZEBeta   5 mg, Oral, Daily      furosemide 40 MG tablet  Commonly known as: LASIX   40 mg, Oral, Daily      gabapentin 300 MG capsule  Commonly known as: NEURONTIN   300 mg, Oral, 3 Times Daily      glipizide 10 MG tablet  Commonly known as: GLUCOTROL   10 mg, Oral, Nightly      ipratropium-albuterol 0.5-2.5 mg/3 ml nebulizer  Commonly known as: DUO-NEB   3 mL, Every 6 Hours PRN      lisinopril 40 MG tablet  Commonly known as: PRINIVIL,ZESTRIL   40 mg, Oral, Nightly      metFORMIN 1000 MG tablet  Commonly known as: GLUCOPHAGE   1,000 mg, Oral, 2 Times Daily With Meals      pioglitazone 30 MG tablet  Commonly known as: ACTOS   30 mg, Oral, Nightly      sertraline 50 MG tablet  Commonly known as: ZOLOFT   50 mg, Oral, Nightly      umeclidinium-vilanterol 62.5-25 MCG/INH aerosol powder  inhaler  Commonly known as: ANORO ELLIPTA   1 puff, Inhalation, Daily - RT      vitamin D 1.25 MG (01819 UT) capsule capsule  Commonly known as: ERGOCALCIFEROL   50,000 Units, Every 7 Days, Wednesday             No Known Allergies      Discharge Disposition:   Home or Self Care    Diet:  Hospital:  Diet Order   Procedures   • Diet: Diabetic Diets;  Consistent Carbohydrate; Texture: Regular Texture (IDDSI 7); Fluid Consistency: Thin (IDDSI 0)         Discharge Activity:         CODE STATUS:  Code Status and Medical Interventions:   Ordered at: 03/08/23 1806     Level Of Support Discussed With:    Patient     Code Status (Patient has no pulse and is not breathing):    CPR (Attempt to Resuscitate)     Medical Interventions (Patient has pulse or is breathing):    Full Support         No future appointments.        Time spent on Discharge including face to face service:  45 minutes    This patient has been examined wearing appropriate Personal Protective Equipment and discussed with Pulmonary. 03/13/23      Signature: Sukhdeep Prince MD

## 2023-03-13 NOTE — CASE MANAGEMENT/SOCIAL WORK
Continued Stay Note  MARIA GUADALUPE Sanabria     Patient Name: Brenden Peter  MRN: [id number]  Today's Date: 3/13/2023    Admit Date: 3/8/2023    Plan: Anticipate home with spouse. Current home oxygen (4L) with [employer].   Discharge Plan     Row Name 03/13/23 1509       Plan    Patient/Family in Agreement with Plan yes    Plan Comments Discussed with MD and RN in unit rounds that discharge is anticipated once cleared by pulmonology. CM met with patient at bedside to discuss dc plan and IMM letter.           Met with patient in room wearing PPE: mask.      Maintained distance greater than six feet and spent less than 15 minutes in the room.      Latasha Spears RN     Office Phone: [phone]  Office Cell: [phone]

## 2023-03-13 NOTE — PLAN OF CARE
Goal Outcome Evaluation:  Plan of Care Reviewed With: patient       Patient stated that he feels a lot better today. No voiced complaint. VS has been stabel. Will continue to monitor.

## 2023-03-13 NOTE — PLAN OF CARE
Goal Outcome Evaluation:      Patient d/c to home with spouse. Left via private vehicle. VSS. Belongings sent with patient. Discharge instructions provided to patient, who verbalizes understanding. Spouse to bring patient's own oxygen tank for transport home. Patient left floor via w/c.

## 2023-03-13 NOTE — THERAPY TREATMENT NOTE
Subjective: Pt agreeable to therapeutic plan of care. Pt reluctant to work with therapy this session and voices frustrations with being held at hospital.    Objective:     Bed mobility - Independent supine>sit and rolling L<>R  Transfers - SBA and CGA STSx2  Ambulation - 200 feet CGA with no AD    Vitals: Desaturates. Initial reading of O2 sats at 95% on 4L, decreased to 87% during ambulation bout, and increased back to 92% with 2min rest sitting EOB.    Pain: 0/10 VAS   Location: N/A  Intervention for pain: N/A    Education: Provided education on the importance of mobility in the acute care setting, Verbal/Tactile Cues, Gait Training and Energy conservation strategies    Assessment: Brenden Peter is a 73 y/o M admitted with COPD exacerbation. He presents with functional mobility impairments which indicate the need for skilled intervention. Tolerating session today without incident. Pt was agreeable to therapy following coaxing and education on safe d/c home. He was able to amb 200' CGA with no AD this session. No LOB or balance deficits noted without use of AD, although decreased step crissy and step length. Shows improved CV fitness this session with minimal complaints of SOA, stating symptoms are his baseline. O2 sats dropped slightly with increased activity, but rebounded with 2min rest EOB. Will continue to progress pt activity tolerance with stable O2 saturation.    Plan/Recommendations:   No ongoing therapy recommended post-acute care. No therapy needs.. Pt requires no DME at discharge.     Pt desires Home with family assist at discharge. Pt cooperative; agreeable to therapeutic recommendations and plan of care.         Basic Mobility 6-click:  Rollin = Total, A lot = 2, A little = 3; 4 = None  Supine>Sit:   1 = Total, A lot = 2, A little = 3; 4 = None   Sit>Stand with arms:  1 = Total, A lot = 2, A little = 3; 4 = None  Bed>Chair:   1 = Total, A lot = 2, A little = 3; 4 = None  Ambulate in  room:  1 = Total, A lot = 2, A little = 3; 4 = None  3-5 Steps with railin = Total, A lot = 2, A little = 3; 4 = None  Score: 23    Modified [place]: N/A = No pre-op stroke/TIA    Post-Tx Position: Supine with HOB Elevated, Alarms activated and Call light and personal items within reach  PPE: gloves and surgical mask

## 2023-03-14 NOTE — OUTREACH NOTE
Prep Survey    Flowsheet Row Responses   [facility] facility patient discharged from? [facility]   Is LACE score < 7 ? No   Eligibility Readm Mgmt   Discharge diagnosis Acute hypercapnic respiratory failure, COPD   Does the patient have one of the following disease processes/diagnoses(primary or secondary)? COPD   Does the patient have Home health ordered? No   Is there a DME ordered? No   Prep survey completed? Yes          Sarika RIGGINS - Registered Nurse

## 2023-03-14 NOTE — CASE MANAGEMENT/SOCIAL WORK
Case Management Discharge Note                Selected Continued Care - Discharged on 3/13/2023 Admission date: 3/8/2023 - Discharge disposition: Home or Self Care            Transportation Services  Private: Car    Final Discharge Disposition Code: 01 - home or self-care

## 2023-03-16 ENCOUNTER — READMISSION MANAGEMENT (OUTPATIENT)
Dept: CALL CENTER | Facility: HOSPITAL | Age: 73
End: 2023-03-16
Payer: MEDICARE

## 2023-03-16 NOTE — OUTREACH NOTE
COPD/PN Week 1 Survey    Flowsheet Row Responses   [facility] facility patient discharged from? [facility]   Does the patient have one of the following disease processes/diagnoses(primary or secondary)? COPD   Week 1 attempt successful? No   Unsuccessful attempts Attempt 1          Anu Euceda Registered Nurse

## 2023-03-18 NOTE — PROGRESS NOTES
Enter Query Response Below      Query Response:     Unable to determine. Only took care of this patient for a few hours on day of discharge.       Electronically signed by Sukhdeep Prince MD, 23, 8:42 AM EDT.               If applicable, please update the problem list.   Patient: Brenden Peter        : 1950  Account: [id number]           Admit Date: 3/8/2023        How to Respond to this query:       a. Click New Note     b. Answer query within the yellow box.                c. Update the Problem List, if applicable.      If you have any questions about this query contact me at: [email or web address]    Dr. Prince,     72-year-old male with PMH CHF, morbid obesity, SONJA, chronic respiratory failure on chronic 4L [place] home O2, admitted with COPD exacerbation, acute respiratory failure.   Hospitalist progress notes 3/9-3/11, include, “started for treatment of atypical pneumonia and COPD exacerbation”.   Treatment included: PO azithromycin 3/9-3/11, prednisone 6-day taper  The diagnosis of atypical pneumonia does not appear on the DC summary.     The diagnosis of pneumonia does not appear on the DC summary.     Please clarify the following:    -Pneumonia ruled in  -Pneumonia ruled out  -Other (please specify): _____  -Unable to determine    By submitting this query, we are merely seeking further clarification of documentation to accurately reflect all conditions that you are monitoring, evaluating, treating or that extend the hospitalization or utilize additional resources of care. Please utilize your independent clinical judgment when addressing the question(s) above.     This query and your response, once completed, will be entered into the legal medical record.    Sincerely,  Lizzie Segovia  Clinical Documentation Integrity Program

## 2023-03-23 ENCOUNTER — READMISSION MANAGEMENT (OUTPATIENT)
Dept: CALL CENTER | Facility: HOSPITAL | Age: 73
End: 2023-03-23
Payer: MEDICARE

## 2023-03-23 NOTE — OUTREACH NOTE
COPD/PN Week 2 Survey    Flowsheet Row Responses   [facility] [employer] facility patient discharged from? [facility]   Does the patient have one of the following disease processes/diagnoses(primary or secondary)? COPD   Week 2 attempt successful? No   Unsuccessful attempts Attempt 1          Kezia Euceda Licensed Nurse

## 2023-03-27 ENCOUNTER — READMISSION MANAGEMENT (OUTPATIENT)
Dept: CALL CENTER | Facility: HOSPITAL | Age: 73
End: 2023-03-27
Payer: MEDICARE

## 2023-03-27 NOTE — OUTREACH NOTE
COPD/PN Week 2 Survey    Flowsheet Row Responses   [employer] facility patient discharged from? [facility]   Does the patient have one of the following disease processes/diagnoses(primary or secondary)? COPD   Week 2 attempt successful? No   Unsuccessful attempts Attempt 2  [All numbers listed were attempted-no answer]          Kasandra H - Registered Nurse

## 2023-03-31 ENCOUNTER — APPOINTMENT (OUTPATIENT)
Dept: GENERAL RADIOLOGY | Facility: HOSPITAL | Age: 73
DRG: 190 | End: 2023-03-31
Payer: MEDICARE

## 2023-03-31 ENCOUNTER — HOSPITAL ENCOUNTER (INPATIENT)
Facility: HOSPITAL | Age: 73
LOS: 2 days | Discharge: HOME | DRG: 190 | End: 2023-04-03
Attending: EMERGENCY MEDICINE | Admitting: HOSPITALIST
Payer: MEDICARE

## 2023-03-31 DIAGNOSIS — J44.1 ACUTE EXACERBATION OF CHRONIC OBSTRUCTIVE PULMONARY DISEASE (COPD): ICD-10-CM

## 2023-03-31 DIAGNOSIS — J84.9 INTERSTITIAL PNEUMONIA: Primary | ICD-10-CM

## 2023-03-31 DIAGNOSIS — R09.02 HYPOXEMIA: ICD-10-CM

## 2023-03-31 LAB
ALBUMIN SERPL-MCNC: 3.9 G/DL (ref 3.5–5.2)
ALBUMIN/GLOB SERPL: 1.1 G/DL
ALP SERPL-CCNC: 63 U/L (ref 39–117)
ALT SERPL W P-5'-P-CCNC: 19 U/L (ref 1–41)
ANION GAP SERPL CALCULATED.3IONS-SCNC: 11 MMOL/L (ref 5–15)
AST SERPL-CCNC: 22 U/L (ref 1–40)
BASOPHILS # BLD AUTO: 0 10*3/MM3 (ref 0–0.2)
BASOPHILS NFR BLD AUTO: 0.4 % (ref 0–1.5)
BILIRUB SERPL-MCNC: 0.4 MG/DL (ref 0–1.2)
BUN SERPL-MCNC: 10 MG/DL (ref 8–23)
BUN/CREAT SERPL: 14.7 (ref 7–25)
CALCIUM SPEC-SCNC: 8.8 MG/DL (ref 8.6–10.5)
CHLORIDE SERPL-SCNC: 100 MMOL/L (ref 98–107)
CO2 SERPL-SCNC: 30 MMOL/L (ref 22–29)
CREAT SERPL-MCNC: 0.68 MG/DL (ref 0.76–1.27)
D-LACTATE SERPL-SCNC: 1.2 MMOL/L (ref 0.3–2)
DEPRECATED RDW RBC AUTO: 51.2 FL (ref 37–54)
EGFRCR SERPLBLD CKD-EPI 2021: 98.8 ML/MIN/1.73
EOSINOPHIL # BLD AUTO: 0 10*3/MM3 (ref 0–0.4)
EOSINOPHIL NFR BLD AUTO: 0.5 % (ref 0.3–6.2)
ERYTHROCYTE [DISTWIDTH] IN BLOOD BY AUTOMATED COUNT: 16.4 % (ref 12.3–15.4)
FLUAV SUBTYP SPEC NAA+PROBE: NOT DETECTED
FLUBV RNA ISLT QL NAA+PROBE: NOT DETECTED
GLOBULIN UR ELPH-MCNC: 3.4 GM/DL
GLUCOSE SERPL-MCNC: 141 MG/DL (ref 65–99)
HCT VFR BLD AUTO: 34.5 % (ref 37.5–51)
HGB BLD-MCNC: 10.9 G/DL (ref 13–17.7)
LYMPHOCYTES # BLD AUTO: 0.7 10*3/MM3 (ref 0.7–3.1)
LYMPHOCYTES NFR BLD AUTO: 13.2 % (ref 19.6–45.3)
MCH RBC QN AUTO: 28 PG (ref 26.6–33)
MCHC RBC AUTO-ENTMCNC: 31.5 G/DL (ref 31.5–35.7)
MCV RBC AUTO: 89 FL (ref 79–97)
MONOCYTES # BLD AUTO: 0.7 10*3/MM3 (ref 0.1–0.9)
MONOCYTES NFR BLD AUTO: 11.8 % (ref 5–12)
NEUTROPHILS NFR BLD AUTO: 4.1 10*3/MM3 (ref 1.7–7)
NEUTROPHILS NFR BLD AUTO: 74.1 % (ref 42.7–76)
NRBC BLD AUTO-RTO: 0 /100 WBC (ref 0–0.2)
NT-PROBNP SERPL-MCNC: 549.4 PG/ML (ref 0–900)
PLATELET # BLD AUTO: 144 10*3/MM3 (ref 140–450)
PMV BLD AUTO: 9.5 FL (ref 6–12)
POTASSIUM SERPL-SCNC: 3.8 MMOL/L (ref 3.5–5.2)
PROT SERPL-MCNC: 7.3 G/DL (ref 6–8.5)
RBC # BLD AUTO: 3.88 10*6/MM3 (ref 4.14–5.8)
SARS-COV-2 RNA RESP QL NAA+PROBE: NOT DETECTED
SODIUM SERPL-SCNC: 141 MMOL/L (ref 136–145)
TROPONIN T SERPL HS-MCNC: 23 NG/L
WBC NRBC COR # BLD: 5.6 10*3/MM3 (ref 3.4–10.8)

## 2023-03-31 PROCEDURE — 94664 DEMO&/EVAL PT USE INHALER: CPT

## 2023-03-31 PROCEDURE — 36415 COLL VENOUS BLD VENIPUNCTURE: CPT

## 2023-03-31 PROCEDURE — 94640 AIRWAY INHALATION TREATMENT: CPT

## 2023-03-31 PROCEDURE — 87040 BLOOD CULTURE FOR BACTERIA: CPT | Performed by: EMERGENCY MEDICINE

## 2023-03-31 PROCEDURE — 83605 ASSAY OF LACTIC ACID: CPT

## 2023-03-31 PROCEDURE — 94799 UNLISTED PULMONARY SVC/PX: CPT

## 2023-03-31 PROCEDURE — 93005 ELECTROCARDIOGRAM TRACING: CPT

## 2023-03-31 PROCEDURE — 71045 X-RAY EXAM CHEST 1 VIEW: CPT

## 2023-03-31 PROCEDURE — 84484 ASSAY OF TROPONIN QUANT: CPT | Performed by: EMERGENCY MEDICINE

## 2023-03-31 PROCEDURE — 99285 EMERGENCY DEPT VISIT HI MDM: CPT

## 2023-03-31 PROCEDURE — 80053 COMPREHEN METABOLIC PANEL: CPT | Performed by: EMERGENCY MEDICINE

## 2023-03-31 PROCEDURE — 85025 COMPLETE CBC W/AUTO DIFF WBC: CPT | Performed by: EMERGENCY MEDICINE

## 2023-03-31 PROCEDURE — 83880 ASSAY OF NATRIURETIC PEPTIDE: CPT | Performed by: EMERGENCY MEDICINE

## 2023-03-31 PROCEDURE — 87636 SARSCOV2 & INF A&B AMP PRB: CPT | Performed by: EMERGENCY MEDICINE

## 2023-03-31 PROCEDURE — 84145 PROCALCITONIN (PCT): CPT | Performed by: PHYSICIAN ASSISTANT

## 2023-03-31 PROCEDURE — 25010000002 METHYLPREDNISOLONE PER 125 MG: Performed by: EMERGENCY MEDICINE

## 2023-03-31 RX ORDER — METHYLPREDNISOLONE SODIUM SUCCINATE 125 MG/2ML
125 INJECTION, POWDER, LYOPHILIZED, FOR SOLUTION INTRAMUSCULAR; INTRAVENOUS ONCE
Status: COMPLETED | OUTPATIENT
Start: 2023-03-31 | End: 2023-03-31

## 2023-03-31 RX ORDER — IPRATROPIUM BROMIDE AND ALBUTEROL SULFATE 2.5; .5 MG/3ML; MG/3ML
3 SOLUTION RESPIRATORY (INHALATION) ONCE
Status: COMPLETED | OUTPATIENT
Start: 2023-03-31 | End: 2023-03-31

## 2023-03-31 RX ADMIN — IPRATROPIUM BROMIDE AND ALBUTEROL SULFATE 3 ML: 2.5; .5 SOLUTION RESPIRATORY (INHALATION) at 23:25

## 2023-03-31 RX ADMIN — METHYLPREDNISOLONE SODIUM SUCCINATE 125 MG: 125 INJECTION, POWDER, FOR SOLUTION INTRAMUSCULAR; INTRAVENOUS at 19:16

## 2023-03-31 RX ADMIN — IPRATROPIUM BROMIDE AND ALBUTEROL SULFATE 3 ML: 2.5; .5 SOLUTION RESPIRATORY (INHALATION) at 19:06

## 2023-03-31 NOTE — ED TRIAGE NOTES
Pt reports to having a cough yesterday with shortness of breath.  Contacted PCP yesterday and was started on a Z-pack.  No additional testing or medications started.

## 2023-03-31 NOTE — Clinical Note
Level of Care: Progressive Care [20]   Diagnosis: Interstitial pneumonia (HCC) [957118]   Admitting Physician: GUERDA ZAMORANO [[id number]]   Attending Physician: GUERDA ZAMORANO [[id number]]

## 2023-04-01 ENCOUNTER — READMISSION MANAGEMENT (OUTPATIENT)
Dept: CALL CENTER | Facility: HOSPITAL | Age: 73
End: 2023-04-01
Payer: MEDICARE

## 2023-04-01 PROBLEM — I50.30 (HFPEF) HEART FAILURE WITH PRESERVED EJECTION FRACTION: Status: ACTIVE | Noted: 2023-04-01

## 2023-04-01 PROBLEM — J84.9 INTERSTITIAL PNEUMONIA: Status: ACTIVE | Noted: 2023-04-01

## 2023-04-01 LAB
ANION GAP SERPL CALCULATED.3IONS-SCNC: 13 MMOL/L (ref 5–15)
B PARAPERT DNA SPEC QL NAA+PROBE: NOT DETECTED
B PERT DNA SPEC QL NAA+PROBE: NOT DETECTED
BASOPHILS # BLD AUTO: 0 10*3/MM3 (ref 0–0.2)
BASOPHILS NFR BLD AUTO: 0.2 % (ref 0–1.5)
BUN SERPL-MCNC: 18 MG/DL (ref 8–23)
BUN/CREAT SERPL: 23.1 (ref 7–25)
C PNEUM DNA NPH QL NAA+NON-PROBE: NOT DETECTED
CALCIUM SPEC-SCNC: 8.7 MG/DL (ref 8.6–10.5)
CHLORIDE SERPL-SCNC: 97 MMOL/L (ref 98–107)
CO2 SERPL-SCNC: 27 MMOL/L (ref 22–29)
CREAT SERPL-MCNC: 0.78 MG/DL (ref 0.76–1.27)
DEPRECATED RDW RBC AUTO: 52.1 FL (ref 37–54)
EGFRCR SERPLBLD CKD-EPI 2021: 94.8 ML/MIN/1.73
EOSINOPHIL # BLD AUTO: 0 10*3/MM3 (ref 0–0.4)
EOSINOPHIL NFR BLD AUTO: 0 % (ref 0.3–6.2)
ERYTHROCYTE [DISTWIDTH] IN BLOOD BY AUTOMATED COUNT: 16.1 % (ref 12.3–15.4)
FLUAV SUBTYP SPEC NAA+PROBE: NOT DETECTED
FLUBV RNA ISLT QL NAA+PROBE: NOT DETECTED
GEN 5 2HR TROPONIN T REFLEX: 18 NG/L
GLUCOSE BLDC GLUCOMTR-MCNC: 282 MG/DL (ref 70–105)
GLUCOSE BLDC GLUCOMTR-MCNC: 344 MG/DL (ref 70–105)
GLUCOSE BLDC GLUCOMTR-MCNC: 345 MG/DL (ref 70–105)
GLUCOSE BLDC GLUCOMTR-MCNC: 477 MG/DL (ref 70–105)
GLUCOSE SERPL-MCNC: 351 MG/DL (ref 65–99)
HADV DNA SPEC NAA+PROBE: NOT DETECTED
HCOV 229E RNA SPEC QL NAA+PROBE: NOT DETECTED
HCOV HKU1 RNA SPEC QL NAA+PROBE: NOT DETECTED
HCOV NL63 RNA SPEC QL NAA+PROBE: NOT DETECTED
HCOV OC43 RNA SPEC QL NAA+PROBE: NOT DETECTED
HCT VFR BLD AUTO: 33.8 % (ref 37.5–51)
HGB BLD-MCNC: 11.1 G/DL (ref 13–17.7)
HMPV RNA NPH QL NAA+NON-PROBE: NOT DETECTED
HPIV1 RNA ISLT QL NAA+PROBE: NOT DETECTED
HPIV2 RNA SPEC QL NAA+PROBE: NOT DETECTED
HPIV3 RNA NPH QL NAA+PROBE: NOT DETECTED
HPIV4 P GENE NPH QL NAA+PROBE: NOT DETECTED
LYMPHOCYTES # BLD AUTO: 0.4 10*3/MM3 (ref 0.7–3.1)
LYMPHOCYTES NFR BLD AUTO: 5.7 % (ref 19.6–45.3)
M PNEUMO IGG SER IA-ACNC: NOT DETECTED
MAGNESIUM SERPL-MCNC: 1.6 MG/DL (ref 1.6–2.4)
MCH RBC QN AUTO: 28.6 PG (ref 26.6–33)
MCHC RBC AUTO-ENTMCNC: 32.9 G/DL (ref 31.5–35.7)
MCV RBC AUTO: 86.9 FL (ref 79–97)
MONOCYTES # BLD AUTO: 0.1 10*3/MM3 (ref 0.1–0.9)
MONOCYTES NFR BLD AUTO: 1.2 % (ref 5–12)
NEUTROPHILS NFR BLD AUTO: 6.7 10*3/MM3 (ref 1.7–7)
NEUTROPHILS NFR BLD AUTO: 92.9 % (ref 42.7–76)
NRBC BLD AUTO-RTO: 0 /100 WBC (ref 0–0.2)
PLATELET # BLD AUTO: 136 10*3/MM3 (ref 140–450)
PMV BLD AUTO: 9.8 FL (ref 6–12)
POTASSIUM SERPL-SCNC: 4.1 MMOL/L (ref 3.5–5.2)
PROCALCITONIN SERPL-MCNC: 0.08 NG/ML (ref 0–0.25)
RBC # BLD AUTO: 3.88 10*6/MM3 (ref 4.14–5.8)
RHINOVIRUS RNA SPEC NAA+PROBE: DETECTED
RSV RNA NPH QL NAA+NON-PROBE: NOT DETECTED
SARS-COV-2 RNA NPH QL NAA+NON-PROBE: NOT DETECTED
SODIUM SERPL-SCNC: 137 MMOL/L (ref 136–145)
TROPONIN T DELTA: 1 NG/L
TROPONIN T SERPL HS-MCNC: 17 NG/L
WBC NRBC COR # BLD: 7.3 10*3/MM3 (ref 3.4–10.8)

## 2023-04-01 PROCEDURE — 85025 COMPLETE CBC W/AUTO DIFF WBC: CPT | Performed by: PHYSICIAN ASSISTANT

## 2023-04-01 PROCEDURE — 63710000001 INSULIN LISPRO (HUMAN) PER 5 UNITS: Performed by: PHYSICIAN ASSISTANT

## 2023-04-01 PROCEDURE — 87205 SMEAR GRAM STAIN: CPT | Performed by: INTERNAL MEDICINE

## 2023-04-01 PROCEDURE — 94799 UNLISTED PULMONARY SVC/PX: CPT

## 2023-04-01 PROCEDURE — 82962 GLUCOSE BLOOD TEST: CPT

## 2023-04-01 PROCEDURE — 0202U NFCT DS 22 TRGT SARS-COV-2: CPT | Performed by: PHYSICIAN ASSISTANT

## 2023-04-01 PROCEDURE — 94761 N-INVAS EAR/PLS OXIMETRY MLT: CPT

## 2023-04-01 PROCEDURE — 97161 PT EVAL LOW COMPLEX 20 MIN: CPT

## 2023-04-01 PROCEDURE — 80048 BASIC METABOLIC PNL TOTAL CA: CPT | Performed by: PHYSICIAN ASSISTANT

## 2023-04-01 PROCEDURE — 87070 CULTURE OTHR SPECIMN AEROBIC: CPT | Performed by: INTERNAL MEDICINE

## 2023-04-01 PROCEDURE — 84484 ASSAY OF TROPONIN QUANT: CPT | Performed by: PHYSICIAN ASSISTANT

## 2023-04-01 PROCEDURE — 83735 ASSAY OF MAGNESIUM: CPT | Performed by: PHYSICIAN ASSISTANT

## 2023-04-01 PROCEDURE — 25010000002 METHYLPREDNISOLONE PER 40 MG: Performed by: PHYSICIAN ASSISTANT

## 2023-04-01 PROCEDURE — 25010000002 ENOXAPARIN PER 10 MG: Performed by: PHYSICIAN ASSISTANT

## 2023-04-01 PROCEDURE — 25010000002 CEFTRIAXONE PER 250 MG: Performed by: NURSE PRACTITIONER

## 2023-04-01 PROCEDURE — 63710000001 INSULIN REGULAR HUMAN PER 5 UNITS: Performed by: INTERNAL MEDICINE

## 2023-04-01 RX ORDER — INSULIN LISPRO 100 [IU]/ML
3-14 INJECTION, SOLUTION INTRAVENOUS; SUBCUTANEOUS
Status: DISCONTINUED | OUTPATIENT
Start: 2023-04-01 | End: 2023-04-03 | Stop reason: HOSPADM

## 2023-04-01 RX ORDER — FUROSEMIDE 40 MG/1
40 TABLET ORAL DAILY
Status: DISCONTINUED | OUTPATIENT
Start: 2023-04-01 | End: 2023-04-03 | Stop reason: HOSPADM

## 2023-04-01 RX ORDER — GABAPENTIN 300 MG/1
300 CAPSULE ORAL 3 TIMES DAILY
Status: DISCONTINUED | OUTPATIENT
Start: 2023-04-01 | End: 2023-04-03 | Stop reason: HOSPADM

## 2023-04-01 RX ORDER — IPRATROPIUM BROMIDE AND ALBUTEROL SULFATE 2.5; .5 MG/3ML; MG/3ML
3 SOLUTION RESPIRATORY (INHALATION)
Status: DISCONTINUED | OUTPATIENT
Start: 2023-04-01 | End: 2023-04-01

## 2023-04-01 RX ORDER — POLYETHYLENE GLYCOL 3350 17 G/17G
17 POWDER, FOR SOLUTION ORAL DAILY
Status: DISCONTINUED | OUTPATIENT
Start: 2023-04-01 | End: 2023-04-03 | Stop reason: HOSPADM

## 2023-04-01 RX ORDER — ONDANSETRON 4 MG/1
4 TABLET, FILM COATED ORAL EVERY 6 HOURS PRN
Status: DISCONTINUED | OUTPATIENT
Start: 2023-04-01 | End: 2023-04-03 | Stop reason: HOSPADM

## 2023-04-01 RX ORDER — OLANZAPINE 10 MG/2ML
1 INJECTION, POWDER, LYOPHILIZED, FOR SOLUTION INTRAMUSCULAR
Status: DISCONTINUED | OUTPATIENT
Start: 2023-04-01 | End: 2023-04-03 | Stop reason: HOSPADM

## 2023-04-01 RX ORDER — DEXTROSE MONOHYDRATE 25 G/50ML
25 INJECTION, SOLUTION INTRAVENOUS
Status: DISCONTINUED | OUTPATIENT
Start: 2023-04-01 | End: 2023-04-03 | Stop reason: HOSPADM

## 2023-04-01 RX ORDER — ASPIRIN 81 MG/1
81 TABLET ORAL DAILY
Status: DISCONTINUED | OUTPATIENT
Start: 2023-04-01 | End: 2023-04-03 | Stop reason: HOSPADM

## 2023-04-01 RX ORDER — NICOTINE POLACRILEX 4 MG
15 LOZENGE BUCCAL
Status: DISCONTINUED | OUTPATIENT
Start: 2023-04-01 | End: 2023-04-03 | Stop reason: HOSPADM

## 2023-04-01 RX ORDER — BENZONATATE 100 MG/1
200 CAPSULE ORAL 3 TIMES DAILY PRN
Status: DISCONTINUED | OUTPATIENT
Start: 2023-04-01 | End: 2023-04-03 | Stop reason: HOSPADM

## 2023-04-01 RX ORDER — IPRATROPIUM BROMIDE AND ALBUTEROL SULFATE 2.5; .5 MG/3ML; MG/3ML
3 SOLUTION RESPIRATORY (INHALATION) EVERY 4 HOURS PRN
Status: DISCONTINUED | OUTPATIENT
Start: 2023-04-01 | End: 2023-04-01

## 2023-04-01 RX ORDER — ATORVASTATIN CALCIUM 40 MG/1
40 TABLET, FILM COATED ORAL DAILY
Status: DISCONTINUED | OUTPATIENT
Start: 2023-04-01 | End: 2023-04-03 | Stop reason: HOSPADM

## 2023-04-01 RX ORDER — ONDANSETRON 2 MG/ML
4 INJECTION INTRAMUSCULAR; INTRAVENOUS EVERY 6 HOURS PRN
Status: DISCONTINUED | OUTPATIENT
Start: 2023-04-01 | End: 2023-04-03 | Stop reason: HOSPADM

## 2023-04-01 RX ORDER — GUAIFENESIN 600 MG/1
1200 TABLET, EXTENDED RELEASE ORAL EVERY 12 HOURS
Status: DISCONTINUED | OUTPATIENT
Start: 2023-04-01 | End: 2023-04-03 | Stop reason: HOSPADM

## 2023-04-01 RX ORDER — ROFLUMILAST 500 UG/1
250 TABLET ORAL DAILY
Status: DISCONTINUED | OUTPATIENT
Start: 2023-04-01 | End: 2023-04-03 | Stop reason: HOSPADM

## 2023-04-01 RX ORDER — PIOGLITAZONEHYDROCHLORIDE 30 MG/1
30 TABLET ORAL NIGHTLY
Status: DISCONTINUED | OUTPATIENT
Start: 2023-04-01 | End: 2023-04-01

## 2023-04-01 RX ORDER — SODIUM CHLORIDE 0.9 % (FLUSH) 0.9 %
10 SYRINGE (ML) INJECTION EVERY 12 HOURS SCHEDULED
Status: DISCONTINUED | OUTPATIENT
Start: 2023-04-01 | End: 2023-04-03 | Stop reason: HOSPADM

## 2023-04-01 RX ORDER — BUDESONIDE 0.5 MG/2ML
0.5 INHALANT ORAL
Status: DISCONTINUED | OUTPATIENT
Start: 2023-04-01 | End: 2023-04-03 | Stop reason: HOSPADM

## 2023-04-01 RX ORDER — METHYLPREDNISOLONE SODIUM SUCCINATE 40 MG/ML
40 INJECTION, POWDER, LYOPHILIZED, FOR SOLUTION INTRAMUSCULAR; INTRAVENOUS EVERY 8 HOURS
Status: DISCONTINUED | OUTPATIENT
Start: 2023-04-01 | End: 2023-04-02

## 2023-04-01 RX ORDER — CHOLECALCIFEROL (VITAMIN D3) 125 MCG
5 CAPSULE ORAL NIGHTLY PRN
Status: DISCONTINUED | OUTPATIENT
Start: 2023-04-01 | End: 2023-04-03 | Stop reason: HOSPADM

## 2023-04-01 RX ORDER — SODIUM CHLORIDE 0.9 % (FLUSH) 0.9 %
10 SYRINGE (ML) INJECTION AS NEEDED
Status: DISCONTINUED | OUTPATIENT
Start: 2023-04-01 | End: 2023-04-03 | Stop reason: HOSPADM

## 2023-04-01 RX ORDER — AMOXICILLIN 250 MG
2 CAPSULE ORAL 2 TIMES DAILY
Status: DISCONTINUED | OUTPATIENT
Start: 2023-04-01 | End: 2023-04-03 | Stop reason: HOSPADM

## 2023-04-01 RX ORDER — ALBUTEROL SULFATE 2.5 MG/3ML
2.5 SOLUTION RESPIRATORY (INHALATION) 3 TIMES DAILY PRN
Status: DISCONTINUED | OUTPATIENT
Start: 2023-04-01 | End: 2023-04-01

## 2023-04-01 RX ORDER — IPRATROPIUM BROMIDE AND ALBUTEROL SULFATE 2.5; .5 MG/3ML; MG/3ML
3 SOLUTION RESPIRATORY (INHALATION) EVERY 4 HOURS PRN
Status: DISCONTINUED | OUTPATIENT
Start: 2023-04-01 | End: 2023-04-03 | Stop reason: HOSPADM

## 2023-04-01 RX ORDER — SODIUM CHLORIDE 9 MG/ML
40 INJECTION, SOLUTION INTRAVENOUS AS NEEDED
Status: DISCONTINUED | OUTPATIENT
Start: 2023-04-01 | End: 2023-04-03 | Stop reason: HOSPADM

## 2023-04-01 RX ORDER — ENOXAPARIN SODIUM 100 MG/ML
40 INJECTION SUBCUTANEOUS EVERY 24 HOURS
Status: DISCONTINUED | OUTPATIENT
Start: 2023-04-01 | End: 2023-04-03 | Stop reason: HOSPADM

## 2023-04-01 RX ORDER — IPRATROPIUM BROMIDE AND ALBUTEROL SULFATE 2.5; .5 MG/3ML; MG/3ML
3 SOLUTION RESPIRATORY (INHALATION) EVERY 4 HOURS PRN
Status: DISCONTINUED | OUTPATIENT
Start: 2023-04-01 | End: 2023-04-01 | Stop reason: SDUPTHER

## 2023-04-01 RX ORDER — LISINOPRIL 20 MG/1
40 TABLET ORAL NIGHTLY
Status: DISCONTINUED | OUTPATIENT
Start: 2023-04-01 | End: 2023-04-03 | Stop reason: HOSPADM

## 2023-04-01 RX ORDER — ARFORMOTEROL TARTRATE 15 UG/2ML
15 SOLUTION RESPIRATORY (INHALATION)
Status: DISCONTINUED | OUTPATIENT
Start: 2023-04-01 | End: 2023-04-03 | Stop reason: HOSPADM

## 2023-04-01 RX ORDER — AMLODIPINE BESYLATE 5 MG/1
5 TABLET ORAL NIGHTLY
Status: DISCONTINUED | OUTPATIENT
Start: 2023-04-01 | End: 2023-04-03 | Stop reason: HOSPADM

## 2023-04-01 RX ORDER — BISOPROLOL FUMARATE 5 MG/1
5 TABLET, FILM COATED ORAL DAILY
Status: DISCONTINUED | OUTPATIENT
Start: 2023-04-01 | End: 2023-04-03 | Stop reason: HOSPADM

## 2023-04-01 RX ORDER — BUDESONIDE AND FORMOTEROL FUMARATE DIHYDRATE 160; 4.5 UG/1; UG/1
2 AEROSOL RESPIRATORY (INHALATION)
Status: DISCONTINUED | OUTPATIENT
Start: 2023-04-01 | End: 2023-04-01

## 2023-04-01 RX ORDER — DOXYCYCLINE 100 MG/1
100 TABLET ORAL EVERY 12 HOURS SCHEDULED
Status: DISCONTINUED | OUTPATIENT
Start: 2023-04-01 | End: 2023-04-03

## 2023-04-01 RX ADMIN — INSULIN LISPRO 10 UNITS: 100 INJECTION, SOLUTION INTRAVENOUS; SUBCUTANEOUS at 18:05

## 2023-04-01 RX ADMIN — Medication 10 ML: at 20:35

## 2023-04-01 RX ADMIN — METHYLPREDNISOLONE SODIUM SUCCINATE 40 MG: 40 INJECTION, POWDER, FOR SOLUTION INTRAMUSCULAR; INTRAVENOUS at 12:02

## 2023-04-01 RX ADMIN — BISOPROLOL FUMARATE 5 MG: 5 TABLET ORAL at 08:19

## 2023-04-01 RX ADMIN — DOXYCYCLINE 100 MG: 100 TABLET ORAL at 20:34

## 2023-04-01 RX ADMIN — SENNOSIDES AND DOCUSATE SODIUM 2 TABLET: 50; 8.6 TABLET ORAL at 20:34

## 2023-04-01 RX ADMIN — FUROSEMIDE 40 MG: 40 TABLET ORAL at 08:19

## 2023-04-01 RX ADMIN — BUDESONIDE AND FORMOTEROL FUMARATE DIHYDRATE 2 PUFF: 160; 4.5 AEROSOL RESPIRATORY (INHALATION) at 07:24

## 2023-04-01 RX ADMIN — GABAPENTIN 300 MG: 300 CAPSULE ORAL at 15:45

## 2023-04-01 RX ADMIN — ASPIRIN 81 MG: 81 TABLET, COATED ORAL at 08:19

## 2023-04-01 RX ADMIN — POLYETHYLENE GLYCOL 3350 17 G: 17 POWDER, FOR SOLUTION ORAL at 15:45

## 2023-04-01 RX ADMIN — GABAPENTIN 300 MG: 300 CAPSULE ORAL at 20:34

## 2023-04-01 RX ADMIN — Medication 5 MG: at 20:38

## 2023-04-01 RX ADMIN — METHYLPREDNISOLONE SODIUM SUCCINATE 40 MG: 40 INJECTION, POWDER, FOR SOLUTION INTRAMUSCULAR; INTRAVENOUS at 18:05

## 2023-04-01 RX ADMIN — INSULIN HUMAN 4 UNITS: 100 INJECTION, SOLUTION PARENTERAL at 18:05

## 2023-04-01 RX ADMIN — ROFLUMILAST 250 MCG: 500 TABLET ORAL at 12:01

## 2023-04-01 RX ADMIN — INSULIN LISPRO 8 UNITS: 100 INJECTION, SOLUTION INTRAVENOUS; SUBCUTANEOUS at 12:01

## 2023-04-01 RX ADMIN — INSULIN LISPRO 14 UNITS: 100 INJECTION, SOLUTION INTRAVENOUS; SUBCUTANEOUS at 08:20

## 2023-04-01 RX ADMIN — Medication 10 ML: at 08:26

## 2023-04-01 RX ADMIN — LISINOPRIL 40 MG: 20 TABLET ORAL at 20:34

## 2023-04-01 RX ADMIN — CEFTRIAXONE 1 G: 1 INJECTION, POWDER, FOR SOLUTION INTRAMUSCULAR; INTRAVENOUS at 15:45

## 2023-04-01 RX ADMIN — ATORVASTATIN CALCIUM 40 MG: 40 TABLET, FILM COATED ORAL at 08:19

## 2023-04-01 RX ADMIN — ARFORMOTEROL TARTRATE 15 MCG: 15 SOLUTION RESPIRATORY (INHALATION) at 19:24

## 2023-04-01 RX ADMIN — GUAIFENESIN 1200 MG: 600 TABLET, EXTENDED RELEASE ORAL at 18:05

## 2023-04-01 RX ADMIN — GABAPENTIN 300 MG: 300 CAPSULE ORAL at 08:19

## 2023-04-01 RX ADMIN — AMLODIPINE BESYLATE 5 MG: 5 TABLET ORAL at 20:35

## 2023-04-01 RX ADMIN — GUAIFENESIN 1200 MG: 600 TABLET, EXTENDED RELEASE ORAL at 08:29

## 2023-04-01 RX ADMIN — BUDESONIDE 0.5 MG: 0.5 INHALANT RESPIRATORY (INHALATION) at 19:24

## 2023-04-01 RX ADMIN — SERTRALINE 50 MG: 50 TABLET, FILM COATED ORAL at 20:34

## 2023-04-01 RX ADMIN — DOXYCYCLINE 100 MG: 100 TABLET ORAL at 15:45

## 2023-04-01 RX ADMIN — ENOXAPARIN SODIUM 40 MG: 100 INJECTION SUBCUTANEOUS at 15:45

## 2023-04-01 NOTE — H&P
[facility] Medicine Services  History & Physical    Patient Name: Brenden Peter  : 1950  MRN: [id number]  Primary Care Physician:  Bert Rojas MD  Date of admission: 3/31/2023  Date and Time of Service: 2023 at 0 215    Subjective      Chief Complaint: Cough and dyspnea    History of Present Illness: Brenden Peter is a 72 y.o. male who presented to [facility] on 3/31/2023 complaining of worsening cough and dyspnea.  Patient reports some baseline dyspnea and wears continuous O2 at home.  Over the past several days he has had increasing dyspnea as well as a nonproductive cough and a fever with a Tmax of 102.0.  Some nausea without vomiting was also reported but he denies any pain including chest pain, palpitations, peripheral edema, lightheadedness, syncope or near syncope.  Patient was recently started on azithromycin by primary care provider without significant improvement noted.  Generally patient reports that when he becomes short of breath he is able to rest which results in improvement but this has been ineffective lately.  He confirms compliance with all of his outpatient medical therapies.      Review of Systems   Constitutional: Positive for fever.   HENT: Negative.    Eyes: Negative.    Cardiovascular: Positive for dyspnea on exertion. Negative for chest pain.   Respiratory: Positive for cough and shortness of breath. Negative for sputum production.    Skin: Negative.    Musculoskeletal: Negative.    Gastrointestinal: Negative.    Genitourinary: Negative.    Neurological: Negative.    Psychiatric/Behavioral: Negative.         Personal History     Past Medical History:   Diagnosis Date   • Abnormal EKG 2/3/2015   • Anxiety    • COPD (chronic obstructive pulmonary disease) ([facility])    • Coronary artery disease 3/3/2015   • Diabetes ([facility])     type 2   • Hyperlipidemia    • Hypertension    • Microalbuminuria due to type 2 diabetes mellitus ([facility]) 2020   •  Multinodular goiter 4/18/2019   • Murmur 2/3/2015   • Primary osteoarthritis of left knee 10/22/2021       Past Surgical History:   Procedure Laterality Date   • CARDIAC CATHETERIZATION Left 1/15/2023    Procedure: Left Heart Cath and coronary angiogram;  Surgeon: Shawn Baker MD;  Location: [facility] CATH INVASIVE LOCATION;  Service: Cardiovascular;  Laterality: Left;   • EYE SURGERY      cataract   • TOTAL KNEE ARTHROPLASTY Right 10/21/2020    Procedure: TOTAL KNEE ARTHROPLASTY;  Surgeon: Ravi Fagan MD;  Location: [facility] MAIN OR;  Service: Orthopedics;  Laterality: Right;   • TOTAL KNEE ARTHROPLASTY REVISION Right 5/19/2021    Procedure: KNEE POLY INSERT EXCHANGE with irrigation;  Surgeon: Ravi Fagan MD;  Location: [facility] MAIN OR;  Service: Orthopedics;  Laterality: Right;       Family History: family history includes Cancer in an other family member; Diabetes in an other family member; Heart disease in an other family member; No Known Problems in his brother, father, maternal aunt, maternal grandfather, maternal grandmother, maternal uncle, mother, paternal aunt, paternal grandfather, paternal grandmother, paternal uncle, and sister. Otherwise pertinent FHx was reviewed and not pertinent to current issue.    Social History:  reports that he quit smoking about 8 months ago. His smoking use included cigarettes. He has a 5.00 pack-year smoking history. He has been exposed to tobacco smoke. He quit smokeless tobacco use about 8 months ago. He reports that he does not currently use alcohol. He reports that he does not use drugs.    Home Medications:  Prior to Admission Medications     Prescriptions Last Dose Informant Patient Reported? Taking?    albuterol (PROVENTIL) (2.5 MG/3ML) 0.083% nebulizer solution 3/31/2023  Yes Yes    Take 2.5 mg by nebulization 3 (Three) Times a Day As Needed for Wheezing or Shortness of Air.    albuterol sulfate  (90 Base) MCG/ACT inhaler 3/31/2023  Yes Yes    Inhale 2 puffs  Every 6 (Six) Hours As Needed.    amLODIPine (NORVASC) 5 MG tablet 3/30/2023  Yes Yes    Take 1 tablet by mouth Every Night.    aspirin 81 MG EC tablet 3/30/2023  No Yes    Take 1 tablet by mouth Daily for 90 days.    atorvastatin (LIPITOR) 40 MG tablet 3/31/2023  No Yes    Take 1 tablet by mouth Daily for 90 days.    bisoprolol (ZEBeta) 5 MG tablet 3/31/2023  Yes Yes    Take 1 tablet by mouth Daily.    furosemide (LASIX) 40 MG tablet 3/31/2023  No Yes    Take 1 tablet by mouth Daily for 90 days.    gabapentin (NEURONTIN) 300 MG capsule 3/31/2023  Yes Yes    Take 1 capsule by mouth 3 (Three) Times a Day.    glipizide (GLUCOTROL) 10 MG tablet 3/31/2023  Yes Yes    Take 1 tablet by mouth Every Night.    ipratropium-albuterol (DUO-NEB) 0.5-2.5 mg/3 ml nebulizer 3/30/2023  Yes Yes    3 mL Every 6 (Six) Hours As Needed.    lisinopril (PRINIVIL,ZESTRIL) 40 MG tablet 3/31/2023  Yes Yes    Take 1 tablet by mouth Every Night.    metFORMIN (GLUCOPHAGE) 1000 MG tablet 3/31/2023  Yes Yes    Take 1 tablet by mouth 2 (Two) Times a Day With Meals.    pioglitazone (ACTOS) 30 MG tablet 3/30/2023  Yes Yes    Take 1 tablet by mouth Every Night.    sertraline (ZOLOFT) 50 MG tablet 3/30/2023  Yes Yes    Take 1 tablet by mouth Every Night.    umeclidinium-vilanterol (ANORO ELLIPTA) 62.5-25 MCG/INH aerosol powder  inhaler Past Week  Yes Yes    Inhale 1 puff Daily.    vitamin D (ERGOCALCIFEROL) 1.25 MG (87266 UT) capsule capsule Past Week  Yes Yes    1 capsule Every 7 (Seven) Days. Wednesday    methylPREDNISolone (MEDROL) 4 MG dose pack   No No    Take as directed on package instructions.            Allergies:  No Known Allergies    Objective      Vitals:   Temp:  [97.8 °F (36.6 °C)-102 °F (38.9 °C)] 97.8 °F (36.6 °C)  Heart Rate:  [65-83] 72  Resp:  [21-26] 24  BP: (104-155)/(45-88) 115/51  Flow (L/min):  [15] 15    Physical Exam  Vitals reviewed.   Constitutional:       General: He is not in acute distress.     Appearance: Normal  appearance. He is obese. He is not ill-appearing, toxic-appearing or diaphoretic.   HENT:      Head: Normocephalic.      Right Ear: External ear normal.      Left Ear: External ear normal.      Nose: Nose normal.      Mouth/Throat:      Mouth: Mucous membranes are moist.   Eyes:      Extraocular Movements: Extraocular movements intact.   Cardiovascular:      Rate and Rhythm: Normal rate and regular rhythm.      Pulses: Normal pulses.      Heart sounds: Normal heart sounds.   Pulmonary:      Effort: Pulmonary effort is normal.      Breath sounds: Wheezing and rhonchi present.   Abdominal:      General: Bowel sounds are normal.      Palpations: Abdomen is soft.      Tenderness: There is no abdominal tenderness.   Musculoskeletal:         General: Normal range of motion.      Cervical back: Normal range of motion.      Right lower leg: No edema.      Left lower leg: No edema.   Skin:     General: Skin is warm and dry.      Capillary Refill: Capillary refill takes less than 2 seconds.   Neurological:      General: No focal deficit present.      Mental Status: He is alert and oriented to person, place, and time.   Psychiatric:         Mood and Affect: Mood normal.         Behavior: Behavior normal.         Thought Content: Thought content normal.         Judgment: Judgment normal.          Result Review    Result Review:  I have personally reviewed the results from the time of this admission to 4/1/2023 02:16 EDT and agree with these findings:  [x]  Laboratory  [x]  Microbiology  [x]  Radiology  [x]  EKG/Telemetry   []  Cardiology/Vascular   []  Pathology  []  Old records  []  Other:  Most notable findings include: Troponin, proBNP, CMP, CBC, lactate, chest x-ray, COVID-19 test and EKG      Assessment & Plan        Active Hospital Problems:  Active Hospital Problems    Diagnosis    • COPD exacerbation (HCC)    • Type 2 diabetes mellitus without complication, without long-term current use of insulin (HCC)    • Essential  hypertension    • Coronary artery disease involving native coronary artery of native heart without angina pectoris    • Obesity (BMI 30-39.9)    • Depression    • Mixed hyperlipidemia      Plan:     AECOPD  -O2 saturations reported in the 70s-80s, has improved to mid to high 90s on high flow nasal cannula  -Troponin: 23, trend  -proBNP: 549.4  -WBCs: 5.60  -COVID-19 testing negative  -Chest x-ray showed no acute process  -EKG showed a sinus rhythm at 76 with PVCs noted along with some baseline artifact and a borderline AL interval of 199 ms  -In the ED patient given Solu-Medrol and DuoNeb treatments, continue  -Mucinex and Tessalon along with incentive spirometry  -Continue previously prescribed azithromycin  -Continue telemetry and pulse oximetry  -May benefit from pulmonology consult based on clinical course    Diabetes mellitus  -Well controlled   Lab Results   Component Value Date    GLUCOSE 141 (H) 03/31/2023    GLUCOSE 131 (H) 03/12/2023    GLUCOSE 171 (H) 03/11/2023    GLUCOSE 188 (H) 03/10/2023   -Hold metformin and glipizide  -Correctional insulin ordered  -Diabetic diet  -Monitor closely especially while receiving steroid    Hypertension  -Well controlled   BP Readings from Last 1 Encounters:   04/01/23 115/51   - Continue beta-blocker, amlodipine and lisinopril  - Monitor while admitted    CAD  -Beta-blocker, statin, aspirin  -Telemetry    Heart failure with preserved ejection fraction  -Echocardiogram from March 2023 showed an EF of 61-65% with diastolic dysfunction noted  -Continue Lasix  -Monitor I's and O's and daily weights    Hyperlipidemia  -Statin    Depression  -Zoloft    Obesity (BMI: 31.79)  -Encourage diet lifestyle modification    DVT prophylaxis:  No DVT prophylaxis order currently exists.    CODE STATUS:       Admission Status:  I believe this patient meets observation status.    I discussed the patient's findings and my recommendations with patient and nursing staff.      Signature:  Electronically signed by Kenan Narvaez PA-C, 04/01/23, 02:16 EDT.  [facility]

## 2023-04-01 NOTE — PLAN OF CARE
Problem: Adult Inpatient Plan of Care  Goal: Plan of Care Review  4/1/2023 0450 by Delores Cordero RN  Outcome: Ongoing, Progressing  Flowsheets (Taken 4/1/2023 0450)  Progress: improving  Plan of Care Reviewed With: patient  4/1/2023 0448 by Delores Cordero RN  Outcome: Ongoing, Progressing  Flowsheets (Taken 4/1/2023 0448)  Progress: improving  Plan of Care Reviewed With: patient  Goal: Patient-Specific Goal (Individualized)  4/1/2023 0450 by Delores Cordero RN  Outcome: Ongoing, Progressing  4/1/2023 0448 by Delores Cordero RN  Outcome: Ongoing, Progressing  Goal: Absence of Hospital-Acquired Illness or Injury  4/1/2023 0450 by Delores Cordero RN  Outcome: Ongoing, Progressing  4/1/2023 0448 by Delores Cordero RN  Outcome: Ongoing, Progressing  Intervention: Identify and Manage Fall Risk  Recent Flowsheet Documentation  Taken 4/1/2023 0435 by Delores Cordero RN  Safety Promotion/Fall Prevention:   activity supervised   assistive device/personal items within reach   clutter free environment maintained   lighting adjusted   nonskid shoes/slippers when out of bed   room organization consistent   safety round/check completed   toileting scheduled  Intervention: Prevent Skin Injury  Recent Flowsheet Documentation  Taken 4/1/2023 0435 by Delores Cordero RN  Body Position: position changed independently  Skin Protection:   adhesive use limited   tubing/devices free from skin contact  Intervention: Prevent and Manage VTE (Venous Thromboembolism) Risk  Recent Flowsheet Documentation  Taken 4/1/2023 0435 by Delores Cordero RN  Activity Management:   activity adjusted per tolerance   ambulated to bathroom   back to bed  VTE Prevention/Management:   bilateral   sequential compression devices off   patient refused intervention  Range of Motion:   active ROM (range of motion) encouraged   ROM (range of motion) performed  Intervention: Prevent Infection  Recent Flowsheet Documentation  Taken 4/1/2023 0435 by Delores Cordero  RN  Infection Prevention:   cohorting utilized   environmental surveillance performed   equipment surfaces disinfected   hand hygiene promoted   personal protective equipment utilized   rest/sleep promoted   single patient room provided  Goal: Optimal Comfort and Wellbeing  4/1/2023 0450 by Delores Cordero RN  Outcome: Ongoing, Progressing  4/1/2023 0448 by Delores Cordero RN  Outcome: Ongoing, Progressing  Intervention: Monitor Pain and Promote Comfort  Recent Flowsheet Documentation  Taken 4/1/2023 0435 by Delores Cordero RN  Pain Management Interventions:   pain management plan reviewed with patient/caregiver   care clustered   pillow support provided   position adjusted   quiet environment facilitated   relaxation techniques promoted   unnecessary movement minimized  Intervention: Provide Person-Centered Care  Recent Flowsheet Documentation  Taken 4/1/2023 0435 by Delores Cordero RN  Trust Relationship/Rapport:   care explained   choices provided   emotional support provided   empathic listening provided   questions answered   questions encouraged   reassurance provided   thoughts/feelings acknowledged  Goal: Readiness for Transition of Care  4/1/2023 0450 by Delores Cordero RN  Outcome: Ongoing, Progressing  4/1/2023 0448 by Delores Cordero RN  Outcome: Ongoing, Progressing  Intervention: Mutually Develop Transition Plan  Recent Flowsheet Documentation  Taken 4/1/2023 0431 by Delores Cordero RN  Equipment Currently Used at Home: grab bar  Transportation Anticipated: family or friend will provide  Patient/Family Anticipated Services at Transition: none  Patient/Family Anticipates Transition to: home with family     Problem: COPD (Chronic Obstructive Pulmonary Disease) Comorbidity  Goal: Maintenance of COPD Symptom Control  4/1/2023 0450 by Delores Cordero RN  Outcome: Ongoing, Progressing  4/1/2023 0448 by Delores Cordero RN  Outcome: Ongoing, Progressing  Intervention: Maintain COPD-Symptom Control  Recent  Flowsheet Documentation  Taken 4/1/2023 0435 by Delores Cordero RN  Supportive Measures:   active listening utilized   goal-setting facilitated   problem-solving facilitated   positive reinforcement provided   self-care encouraged   self-reflection promoted   self-responsibility promoted   verbalization of feelings encouraged  Medication Review/Management:   medications reviewed   high-risk medications identified     Problem: Heart Failure Comorbidity  Goal: Maintenance of Heart Failure Symptom Control  4/1/2023 0450 by Delores Cordero RN  Outcome: Ongoing, Progressing  4/1/2023 0448 by Delores Cordero RN  Outcome: Ongoing, Progressing  Intervention: Maintain Heart Failure-Management  Recent Flowsheet Documentation  Taken 4/1/2023 0435 by Delores Cordero RN  Medication Review/Management:   medications reviewed   high-risk medications identified     Problem: Hypertension Comorbidity  Goal: Blood Pressure in Desired Range  4/1/2023 0450 by Delores Cordero RN  Outcome: Ongoing, Progressing  4/1/2023 0448 by Delores Cordero RN  Outcome: Ongoing, Progressing  Intervention: Maintain Blood Pressure Management  Recent Flowsheet Documentation  Taken 4/1/2023 0435 by Delores Cordero RN  Medication Review/Management:   medications reviewed   high-risk medications identified     Problem: Pain Acute  Goal: Acceptable Pain Control and Functional Ability  4/1/2023 0450 by Delores Cordero RN  Outcome: Ongoing, Progressing  4/1/2023 0448 by Delores Cordero RN  Outcome: Ongoing, Progressing  Intervention: Prevent or Manage Pain  Recent Flowsheet Documentation  Taken 4/1/2023 0435 by Delores Cordero RN  Medication Review/Management:   medications reviewed   high-risk medications identified  Intervention: Develop Pain Management Plan  Recent Flowsheet Documentation  Taken 4/1/2023 0435 by Delores Cordero RN  Pain Management Interventions:   pain management plan reviewed with patient/caregiver   care clustered   pillow support  provided   position adjusted   quiet environment facilitated   relaxation techniques promoted   unnecessary movement minimized  Intervention: Optimize Psychosocial Wellbeing  Recent Flowsheet Documentation  Taken 4/1/2023 0435 by Delores Cordero RN  Supportive Measures:   active listening utilized   goal-setting facilitated   problem-solving facilitated   positive reinforcement provided   self-care encouraged   self-reflection promoted   self-responsibility promoted   verbalization of feelings encouraged  Diversional Activities: television     Problem: Fall Injury Risk  Goal: Absence of Fall and Fall-Related Injury  4/1/2023 0450 by Delores Cordero RN  Outcome: Ongoing, Progressing  4/1/2023 0448 by Delores Cordero RN  Outcome: Ongoing, Progressing  Intervention: Identify and Manage Contributors  Recent Flowsheet Documentation  Taken 4/1/2023 0435 by Delores Cordero RN  Medication Review/Management:   medications reviewed   high-risk medications identified  Intervention: Promote Injury-Free Environment  Recent Flowsheet Documentation  Taken 4/1/2023 0435 by Delores Cordero RN  Safety Promotion/Fall Prevention:   activity supervised   assistive device/personal items within reach   clutter free environment maintained   lighting adjusted   nonskid shoes/slippers when out of bed   room organization consistent   safety round/check completed   toileting scheduled   Goal Outcome Evaluation:  Plan of Care Reviewed With: patient        Progress: improving

## 2023-04-01 NOTE — OUTREACH NOTE
COPD/PN Week 3 Survey    Flowsheet Row Responses   Denominational facility patient discharged from? [facility]   Does the patient have one of the following disease processes/diagnoses(primary or secondary)? COPD   Week 3 attempt successful? No   Unsuccessful attempts Attempt 1   Revoke Readmitted          BETTY MCLAUGHLIN - Registered Nurse

## 2023-04-01 NOTE — PLAN OF CARE
Goal Outcome Evaluation:           Problem: Adult Inpatient Plan of Care  Goal: Plan of Care Review  Outcome: Ongoing, Progressing  Goal: Patient-Specific Goal (Individualized)  Outcome: Ongoing, Progressing  Goal: Absence of Hospital-Acquired Illness or Injury  Outcome: Ongoing, Progressing  Intervention: Identify and Manage Fall Risk  Recent Flowsheet Documentation  Taken 4/1/2023 1400 by Shital Mccullough LPN  Safety Promotion/Fall Prevention:   activity supervised   assistive device/personal items within reach   elopement precautions   fall prevention program maintained   clutter free environment maintained   gait belt   nonskid shoes/slippers when out of bed   safety round/check completed  Taken 4/1/2023 1000 by Shital Mccullough LPN  Safety Promotion/Fall Prevention:   activity supervised   assistive device/personal items within reach   clutter free environment maintained   elopement precautions   fall prevention program maintained   gait belt   nonskid shoes/slippers when out of bed   safety round/check completed  Taken 4/1/2023 0800 by Shital Mccullough LPN  Safety Promotion/Fall Prevention:   activity supervised   clutter free environment maintained   elopement precautions   fall prevention program maintained   assistive device/personal items within reach   gait belt   nonskid shoes/slippers when out of bed   safety round/check completed  Intervention: Prevent Skin Injury  Recent Flowsheet Documentation  Taken 4/1/2023 0800 by Shital Mccullough LPN  Skin Protection:   adhesive use limited   incontinence pads utilized   skin sealant/moisture barrier applied   skin-to-device areas padded  Intervention: Prevent and Manage VTE (Venous Thromboembolism) Risk  Recent Flowsheet Documentation  Taken 4/1/2023 0800 by Shital Mccullough LPN  VTE Prevention/Management:   sequential compression devices off   bilateral   patient refused intervention  Range of Motion: ROM (range of motion) performed  Intervention: Prevent  Infection  Recent Flowsheet Documentation  Taken 4/1/2023 1400 by Shital Mccullough LPN  Infection Prevention:   environmental surveillance performed   equipment surfaces disinfected   hand hygiene promoted   single patient room provided  Taken 4/1/2023 1000 by Shital Mccullough LPN  Infection Prevention:   environmental surveillance performed   equipment surfaces disinfected   hand hygiene promoted   single patient room provided  Taken 4/1/2023 0800 by Shital Mccullough LPN  Infection Prevention:   environmental surveillance performed   equipment surfaces disinfected   hand hygiene promoted   single patient room provided  Goal: Optimal Comfort and Wellbeing  Outcome: Ongoing, Progressing  Intervention: Provide Person-Centered Care  Recent Flowsheet Documentation  Taken 4/1/2023 0800 by Shital Mccullough LPN  Trust Relationship/Rapport:   care explained   questions answered   questions encouraged   thoughts/feelings acknowledged  Goal: Readiness for Transition of Care  Outcome: Ongoing, Progressing     Problem: COPD (Chronic Obstructive Pulmonary Disease) Comorbidity  Goal: Maintenance of COPD Symptom Control  Outcome: Ongoing, Progressing  Intervention: Maintain COPD-Symptom Control  Recent Flowsheet Documentation  Taken 4/1/2023 1400 by Shital Mccullough LPN  Medication Review/Management: medications reviewed  Taken 4/1/2023 1000 by Shital Mccullough LPN  Medication Review/Management: medications reviewed  Taken 4/1/2023 0800 by Shital Mccullough LPN  Supportive Measures:   active listening utilized   decision-making supported   goal-setting facilitated   self-care encouraged  Medication Review/Management: medications reviewed     Problem: Heart Failure Comorbidity  Goal: Maintenance of Heart Failure Symptom Control  Outcome: Ongoing, Progressing  Intervention: Maintain Heart Failure-Management  Recent Flowsheet Documentation  Taken 4/1/2023 1400 by Shital Mccullough LPN  Medication Review/Management: medications reviewed  Taken  4/1/2023 1000 by Shital Mccullough LPN  Medication Review/Management: medications reviewed  Taken 4/1/2023 0800 by Shital Mccullough LPN  Medication Review/Management: medications reviewed     Problem: Hypertension Comorbidity  Goal: Blood Pressure in Desired Range  Outcome: Ongoing, Progressing  Intervention: Maintain Blood Pressure Management  Recent Flowsheet Documentation  Taken 4/1/2023 1400 by Shital Mccullough LPN  Medication Review/Management: medications reviewed  Taken 4/1/2023 1000 by Shital Mccullough LPN  Medication Review/Management: medications reviewed  Taken 4/1/2023 0800 by Shital Mccullough LPN  Syncope Management:   legs elevated   position changed slowly  Medication Review/Management: medications reviewed     Problem: Pain Acute  Goal: Acceptable Pain Control and Functional Ability  Outcome: Ongoing, Progressing  Intervention: Prevent or Manage Pain  Recent Flowsheet Documentation  Taken 4/1/2023 1400 by Shital Mccullough LPN  Medication Review/Management: medications reviewed  Taken 4/1/2023 1000 by Shital Mccullough LPN  Medication Review/Management: medications reviewed  Taken 4/1/2023 0800 by Shital Mccullough LPN  Sensory Stimulation Regulation:   care clustered   lighting decreased   television on  Medication Review/Management: medications reviewed  Intervention: Optimize Psychosocial Wellbeing  Recent Flowsheet Documentation  Taken 4/1/2023 0800 by Shital Mccullough LPN  Supportive Measures:   active listening utilized   decision-making supported   goal-setting facilitated   self-care encouraged  Diversional Activities:   television   smartphone     Problem: Fall Injury Risk  Goal: Absence of Fall and Fall-Related Injury  Outcome: Ongoing, Progressing  Intervention: Identify and Manage Contributors  Recent Flowsheet Documentation  Taken 4/1/2023 1400 by Shital Mccullough LPN  Medication Review/Management: medications reviewed  Taken 4/1/2023 1000 by Shital Mccullough LPN  Medication Review/Management:  medications reviewed  Taken 4/1/2023 0800 by Shital Mccullough LPN  Medication Review/Management: medications reviewed  Intervention: Promote Injury-Free Environment  Recent Flowsheet Documentation  Taken 4/1/2023 1400 by Shital Mccullough LPN  Safety Promotion/Fall Prevention:   activity supervised   assistive device/personal items within reach   elopement precautions   fall prevention program maintained   clutter free environment maintained   gait belt   nonskid shoes/slippers when out of bed   safety round/check completed  Taken 4/1/2023 1000 by Shital Mccullough LPN  Safety Promotion/Fall Prevention:   activity supervised   assistive device/personal items within reach   clutter free environment maintained   elopement precautions   fall prevention program maintained   gait belt   nonskid shoes/slippers when out of bed   safety round/check completed  Taken 4/1/2023 0800 by Shital Mccullough LPN  Safety Promotion/Fall Prevention:   activity supervised   clutter free environment maintained   elopement precautions   fall prevention program maintained   assistive device/personal items within reach   gait belt   nonskid shoes/slippers when out of bed   safety round/check completed     Problem: Gas Exchange Impaired  Goal: Optimal Gas Exchange  Outcome: Ongoing, Progressing

## 2023-04-01 NOTE — CONSULTS
PULMONARY CRITICAL CARE CONSULT NOTE      PATIENT IDENTIFICATION:  Name: Brenden Peter  MRN: [id number]  :  1950     Age: 72 y.o.  Sex: male        DATE OF CONSULTATION:  2023  PRIMARY CARE PHYSICIAN    Bert Rojas MD                  CHIEF COMPLAINT: SOB    History of Present Illness:   Brenden Peter is a 72 y.o. male with a COPD, CAD, diabetes mellitus type 2, hyperlipidemia, hypertension, osteoarthritis, and anxiety who came to the ER complaining of worsening shortness of breath and cough.  Patient does report some baseline dyspnea wears continuous O2 at home.  Over the past several days patient has said he has had increased dyspnea as well as a nonproductive cough and a fever of 102.  In the ER patient noted with pneumonia and admitted for further treatment.      Review of Systems:   Constitutional:  As above   Eyes: negative   ENT/oropharynx: negative   Cardiovascular: negative   Respiratory:  As above   Gastrointestinal: negative   Genitourinary: negative   Neurological: negative   Musculoskeletal: negative   Integument/breast: negative   Endocrine: negative   Allergic/Immunologic: negative     Past Medical History:  Past Medical History:   Diagnosis Date   • (HFpEF) heart failure with preserved ejection fraction 2023   • Abnormal EKG 2/3/2015   • Anxiety    • COPD (chronic obstructive pulmonary disease)    • Coronary artery disease 3/3/2015   • Diabetes     type 2   • Hyperlipidemia    • Hypertension    • Microalbuminuria due to type 2 diabetes mellitus 2020   • Multinodular goiter 2019   • Murmur 2/3/2015   • Primary osteoarthritis of left knee 10/22/2021       Past Surgical History:  Past Surgical History:   Procedure Laterality Date   • CARDIAC CATHETERIZATION Left 1/15/2023    Procedure: Left Heart Cath and coronary angiogram;  Surgeon: Shawn Baker MD;  Location: [facility] CATH INVASIVE LOCATION;  Service: Cardiovascular;  Laterality: Left;   • EYE SURGERY       cataract   • TOTAL KNEE ARTHROPLASTY Right 10/21/2020    Procedure: TOTAL KNEE ARTHROPLASTY;  Surgeon: Ravi Fagan MD;  Location: [facility] MAIN OR;  Service: Orthopedics;  Laterality: Right;   • TOTAL KNEE ARTHROPLASTY REVISION Right 2021    Procedure: KNEE POLY INSERT EXCHANGE with irrigation;  Surgeon: Ravi Fagan MD;  Location: [facility] MAIN OR;  Service: Orthopedics;  Laterality: Right;        Family History:  Family History   Problem Relation Age of Onset   • Cancer Other    • Diabetes Other    • Heart disease Other    • No Known Problems Mother    • No Known Problems Father    • No Known Problems Sister    • No Known Problems Brother    • No Known Problems Maternal Aunt    • No Known Problems Maternal Uncle    • No Known Problems Paternal Aunt    • No Known Problems Paternal Uncle    • No Known Problems Maternal Grandmother    • No Known Problems Maternal Grandfather    • No Known Problems Paternal Grandmother    • No Known Problems Paternal Grandfather    • Anemia Neg Hx    • Arrhythmia Neg Hx    • Asthma Neg Hx    • Clotting disorder Neg Hx    • Fainting Neg Hx    • Heart attack Neg Hx    • Heart failure Neg Hx    • Hyperlipidemia Neg Hx    • Hypertension Neg Hx         Social History:   Social History     Tobacco Use   • Smoking status: Former     Packs/day: 1.00     Years: 5.00     Pack years: 5.00     Types: Cigarettes     Quit date: 7/3/2022     Years since quittin.7     Passive exposure: Past   • Smokeless tobacco: Former     Quit date: 7/3/2022   Substance Use Topics   • Alcohol use: Not Currently        Allergies:  No Known Allergies    Home Meds:  Medications Prior to Admission   Medication Sig Dispense Refill Last Dose   • albuterol (PROVENTIL) (2.5 MG/3ML) 0.083% nebulizer solution Take 2.5 mg by nebulization 3 (Three) Times a Day As Needed for Wheezing or Shortness of Air.   3/31/2023   • albuterol sulfate  (90 Base) MCG/ACT inhaler Inhale 2 puffs Every 6 (Six) Hours As Needed.    3/31/2023   • amLODIPine (NORVASC) 5 MG tablet Take 1 tablet by mouth Every Night.   3/30/2023   • aspirin 81 MG EC tablet Take 1 tablet by mouth Daily for 90 days. 90 tablet 0 3/30/2023   • atorvastatin (LIPITOR) 40 MG tablet Take 1 tablet by mouth Daily for 90 days. 90 tablet 0 3/31/2023   • bisoprolol (ZEBeta) 5 MG tablet Take 1 tablet by mouth Daily.   3/31/2023   • furosemide (LASIX) 40 MG tablet Take 1 tablet by mouth Daily for 90 days. 90 tablet 0 3/31/2023   • gabapentin (NEURONTIN) 300 MG capsule Take 1 capsule by mouth 3 (Three) Times a Day.   3/31/2023   • glipizide (GLUCOTROL) 10 MG tablet Take 1 tablet by mouth Every Night.   3/31/2023   • ipratropium-albuterol (DUO-NEB) 0.5-2.5 mg/3 ml nebulizer 3 mL Every 6 (Six) Hours As Needed.   3/30/2023   • lisinopril (PRINIVIL,ZESTRIL) 40 MG tablet Take 1 tablet by mouth Every Night.   3/31/2023   • metFORMIN (GLUCOPHAGE) 1000 MG tablet Take 1 tablet by mouth 2 (Two) Times a Day With Meals.   3/31/2023   • pioglitazone (ACTOS) 30 MG tablet Take 1 tablet by mouth Every Night.   3/30/2023   • sertraline (ZOLOFT) 50 MG tablet Take 1 tablet by mouth Every Night.   3/30/2023   • umeclidinium-vilanterol (ANORO ELLIPTA) 62.5-25 MCG/INH aerosol powder  inhaler Inhale 1 puff Daily.   Past Week   • vitamin D (ERGOCALCIFEROL) 1.25 MG (92915 UT) capsule capsule 1 capsule Every 7 (Seven) Days. Wednesday   Past Week   • methylPREDNISolone (MEDROL) 4 MG dose pack Take as directed on package instructions. 21 tablet 0        Objective:  tMax 24 hrs: Temp (24hrs), Av.7 °F (37.1 °C), Min:97.3 °F (36.3 °C), Max:102 °F (38.9 °C)      Vitals Ranges:   Temp:  [97.3 °F (36.3 °C)-102 °F (38.9 °C)] 97.3 °F (36.3 °C)  Heart Rate:  [65-83] 78  Resp:  [16-26] 16  BP: (104-163)/() 121/73    Intake and Output Last 3 Shifts:   No intake/output data recorded.    Exam:  /73 (BP Location: Left arm, Patient Position: Lying)   Pulse 78   Temp 97.3 °F (36.3 °C) (Oral)   Resp 16    "Ht 170.2 cm (67\")   Wt 104 kg (229 lb 0.9 oz)   SpO2 98%   BMI 35.88 kg/m²       04/01/23  0414 04/01/23  0517   Weight: 104 kg (229 lb 0.9 oz) 104 kg (229 lb 0.9 oz)     General Appearance: Alert     HEENT:  Normocephalic, without obvious abnormality, atraumatic. Conjunctivae/corneas clear.  Normal external ear canals. Nares normal, no drainage.  Neck:  Supple, symmetrical, trachea midline. No JVD.  Lungs /Chest wall:   Bilateral basal rhonchi, respirations unlabored, symmetrical wall movement.     Heart:  Regular rate and rhythm, systolic murmur PMI left sternal border  Abdomen: Soft, nontender, no masses, no organomegaly.    Extremities: Trace edema, no clubbing or cyanosis        Data Review:  All labs (24hrs):   Recent Results (from the past 24 hour(s))   ECG 12 Lead Dyspnea    Collection Time: 03/31/23  6:46 PM   Result Value Ref Range    QT Interval 405 ms   COVID-19 and FLU A/B PCR - Swab, Nasopharynx    Collection Time: 03/31/23  7:09 PM    Specimen: Nasopharynx; Swab   Result Value Ref Range    COVID19 Not Detected Not Detected - Ref. Range    Influenza A PCR Not Detected Not Detected    Influenza B PCR Not Detected Not Detected   Comprehensive Metabolic Panel    Collection Time: 03/31/23  7:09 PM    Specimen: Blood   Result Value Ref Range    Glucose 141 (H) 65 - 99 mg/dL    BUN 10 8 - 23 mg/dL    Creatinine 0.68 (L) 0.76 - 1.27 mg/dL    Sodium 141 136 - 145 mmol/L    Potassium 3.8 3.5 - 5.2 mmol/L    Chloride 100 98 - 107 mmol/L    CO2 30.0 (H) 22.0 - 29.0 mmol/L    Calcium 8.8 8.6 - 10.5 mg/dL    Total Protein 7.3 6.0 - 8.5 g/dL    Albumin 3.9 3.5 - 5.2 g/dL    ALT (SGPT) 19 1 - 41 U/L    AST (SGOT) 22 1 - 40 U/L    Alkaline Phosphatase 63 39 - 117 U/L    Total Bilirubin 0.4 0.0 - 1.2 mg/dL    Globulin 3.4 gm/dL    A/G Ratio 1.1 g/dL    BUN/Creatinine Ratio 14.7 7.0 - 25.0    Anion Gap 11.0 5.0 - 15.0 mmol/L    eGFR 98.8 >60.0 mL/min/1.73   BNP    Collection Time: 03/31/23  7:09 PM    Specimen: Blood "   Result Value Ref Range    proBNP 549.4 0.0 - 900.0 pg/mL   Single High Sensitivity Troponin T    Collection Time: 03/31/23  7:09 PM    Specimen: Blood   Result Value Ref Range    HS Troponin T 23 (H) <15 ng/L   CBC Auto Differential    Collection Time: 03/31/23  7:09 PM    Specimen: Blood   Result Value Ref Range    WBC 5.60 3.40 - 10.80 10*3/mm3    RBC 3.88 (L) 4.14 - 5.80 10*6/mm3    Hemoglobin 10.9 (L) 13.0 - 17.7 g/dL    Hematocrit 34.5 (L) 37.5 - 51.0 %    MCV 89.0 79.0 - 97.0 fL    MCH 28.0 26.6 - 33.0 pg    MCHC 31.5 31.5 - 35.7 g/dL    RDW 16.4 (H) 12.3 - 15.4 %    RDW-SD 51.2 37.0 - 54.0 fl    MPV 9.5 6.0 - 12.0 fL    Platelets 144 140 - 450 10*3/mm3    Neutrophil % 74.1 42.7 - 76.0 %    Lymphocyte % 13.2 (L) 19.6 - 45.3 %    Monocyte % 11.8 5.0 - 12.0 %    Eosinophil % 0.5 0.3 - 6.2 %    Basophil % 0.4 0.0 - 1.5 %    Neutrophils, Absolute 4.10 1.70 - 7.00 10*3/mm3    Lymphocytes, Absolute 0.70 0.70 - 3.10 10*3/mm3    Monocytes, Absolute 0.70 0.10 - 0.90 10*3/mm3    Eosinophils, Absolute 0.00 0.00 - 0.40 10*3/mm3    Basophils, Absolute 0.00 0.00 - 0.20 10*3/mm3    nRBC 0.0 0.0 - 0.2 /100 WBC   Procalcitonin    Collection Time: 03/31/23  7:09 PM    Specimen: Blood   Result Value Ref Range    Procalcitonin 0.08 0.00 - 0.25 ng/mL   POC Lactate    Collection Time: 03/31/23  7:13 PM    Specimen: Blood   Result Value Ref Range    Lactate 1.2 0.3 - 2.0 mmol/L   CBC Auto Differential    Collection Time: 04/01/23  4:22 AM    Specimen: Blood   Result Value Ref Range    WBC 7.30 3.40 - 10.80 10*3/mm3    RBC 3.88 (L) 4.14 - 5.80 10*6/mm3    Hemoglobin 11.1 (L) 13.0 - 17.7 g/dL    Hematocrit 33.8 (L) 37.5 - 51.0 %    MCV 86.9 79.0 - 97.0 fL    MCH 28.6 26.6 - 33.0 pg    MCHC 32.9 31.5 - 35.7 g/dL    RDW 16.1 (H) 12.3 - 15.4 %    RDW-SD 52.1 37.0 - 54.0 fl    MPV 9.8 6.0 - 12.0 fL    Platelets 136 (L) 140 - 450 10*3/mm3    Neutrophil % 92.9 (H) 42.7 - 76.0 %    Lymphocyte % 5.7 (L) 19.6 - 45.3 %    Monocyte % 1.2 (L)  5.0 - 12.0 %    Eosinophil % 0.0 (L) 0.3 - 6.2 %    Basophil % 0.2 0.0 - 1.5 %    Neutrophils, Absolute 6.70 1.70 - 7.00 10*3/mm3    Lymphocytes, Absolute 0.40 (L) 0.70 - 3.10 10*3/mm3    Monocytes, Absolute 0.10 0.10 - 0.90 10*3/mm3    Eosinophils, Absolute 0.00 0.00 - 0.40 10*3/mm3    Basophils, Absolute 0.00 0.00 - 0.20 10*3/mm3    nRBC 0.0 0.0 - 0.2 /100 WBC   Basic Metabolic Panel    Collection Time: 04/01/23  4:22 AM    Specimen: Blood   Result Value Ref Range    Glucose 351 (H) 65 - 99 mg/dL    BUN 18 8 - 23 mg/dL    Creatinine 0.78 0.76 - 1.27 mg/dL    Sodium 137 136 - 145 mmol/L    Potassium 4.1 3.5 - 5.2 mmol/L    Chloride 97 (L) 98 - 107 mmol/L    CO2 27.0 22.0 - 29.0 mmol/L    Calcium 8.7 8.6 - 10.5 mg/dL    BUN/Creatinine Ratio 23.1 7.0 - 25.0    Anion Gap 13.0 5.0 - 15.0 mmol/L    eGFR 94.8 >60.0 mL/min/1.73   Magnesium    Collection Time: 04/01/23  4:22 AM    Specimen: Blood   Result Value Ref Range    Magnesium 1.6 1.6 - 2.4 mg/dL   High Sensitivity Troponin T    Collection Time: 04/01/23  4:22 AM    Specimen: Blood   Result Value Ref Range    HS Troponin T 17 (H) <15 ng/L   Respiratory Panel PCR w/COVID-19(SARS-CoV-2) IAN/NAYELY/KATERIN/PAD/COR/MAD/KRISH In-House, NP Swab in [facility], 3-4 HR TAT - Swab, Nasopharynx    Collection Time: 04/01/23  4:26 AM    Specimen: Nasopharynx; Swab   Result Value Ref Range    ADENOVIRUS, PCR Not Detected Not Detected    Coronavirus 229E Not Detected Not Detected    Coronavirus HKU1 Not Detected Not Detected    Coronavirus NL63 Not Detected Not Detected    Coronavirus OC43 Not Detected Not Detected    COVID19 Not Detected Not Detected - Ref. Range    Human Metapneumovirus Not Detected Not Detected    Human Rhinovirus/Enterovirus Detected (A) Not Detected    Influenza A PCR Not Detected Not Detected    Influenza B PCR Not Detected Not Detected    Parainfluenza Virus 1 Not Detected Not Detected    Parainfluenza Virus 2 Not Detected Not Detected    Parainfluenza Virus 3 Not  Detected Not Detected    Parainfluenza Virus 4 Not Detected Not Detected    RSV, PCR Not Detected Not Detected    Bordetella pertussis pcr Not Detected Not Detected    Bordetella parapertussis PCR Not Detected Not Detected    Chlamydophila pneumoniae PCR Not Detected Not Detected    Mycoplasma pneumo by PCR Not Detected Not Detected   POC Glucose Once    Collection Time: 04/01/23  7:21 AM    Specimen: Blood   Result Value Ref Range    Glucose 477 (C) 70 - 105 mg/dL   High Sensitivity Troponin T 2Hr    Collection Time: 04/01/23  8:18 AM    Specimen: Blood   Result Value Ref Range    HS Troponin T 18 (H) <15 ng/L    Troponin T Delta 1 >=-4 - <+4 ng/L   POC Glucose Once    Collection Time: 04/01/23 11:27 AM    Specimen: Blood   Result Value Ref Range    Glucose 282 (H) 70 - 105 mg/dL        Imaging:  Adult Transthoracic Echo Complete W/ Cont if Necessary Per Protocol    Result Date: 3/11/2023  •  Left ventricular systolic function is normal. Calculated left ventricular EF = 65.5% Left ventricular ejection fraction appears to be 61 - 65%. •  Left ventricular diastolic dysfunction is noted. •  Both atrial cavities are dilated. •  Estimated right ventricular systolic pressure from tricuspid regurgitation is markedly elevated (>55 mmHg).     CT Chest Without Contrast Diagnostic    Result Date: 3/9/2023  CT CHEST WO CONTRAST DIAGNOSTIC Date of Exam: 3/9/2023 12:46 AM EST Indication: Dyspnea, chronic, unclear etiology Respiratory illness, nondiagnostic xray. Comparison: AP portable chest 3/8/2023, CT chest 1/12/2023. Technique: Axial CT images were obtained of the chest without contrast administration.  Sagittal and coronal reconstructions were performed.  Automated exposure control and iterative reconstruction methods were used. Findings: Moderate centrilobular and paraseptal emphysematous changes are redemonstrated. No acute lung consolidations are identified. There is mild smooth interstitial thickening in the bases,  suggesting a component of mild interstitial edema. This is very similar in appearance to the 1/12/2013 examination. There is mild posterior bibasilar linear atelectasis in the costophrenic angles. No abnormal bronchial wall thickening or bronchiectasis is identified. Enlarged mediastinal lymph nodes are unchanged. Dominant precarinal node measuring 2.2 cm short axis and a dominant subcarinal lymph node measuring 1.7 cm short axis are stable, partially calcified, most in keeping with benign granulomatous changes. An AP window lymph node measuring 1.6 cm short axis is not calcified, and is stable. No new or progressive adenopathy is seen. The heart size is normal. Coronary artery calcifications are present. There is no pericardial effusion. No pleural effusion is appreciated. The imaged upper abdominal organs have a normal noncontrast appearance. No acute or suspicious osseous abnormality is identified.     1. Mild interstitial edema is suggested in the lung bases with minimal posterior bibasilar atelectasis. No acute lung consolidations are seen. 2. Moderate emphysema. 3. Mediastinal adenopathy is unchanged. Some these lymph nodes are calcified suggesting granulomatous process. These are favored to represent benign, reactive findings. Electronically Signed: Sheryl Torrez  3/9/2023 8:20 AM EST  Workstation ID: [id number]    XR Chest 1 View    Result Date: 3/31/2023  XR CHEST 1 VW Date of Exam: 3/31/2023 7:04 PM EDT Indication: fever cough. Comparison: CT chest without contrast 3/9/2023 Findings: Heart mildly enlarged, stable. Prominence of the interstitium related to emphysema. No pneumothorax or pleural effusion. No new consolidation. Osseous structures grossly intact.     Impression: Stable chronic findings without acute process. Electronically Signed: Wang Alba  3/31/2023 7:21 PM EDT  Workstation ID: [id number]    XR Chest 1 View    Result Date: 3/8/2023  XR CHEST 1 VW Date of Exam: 3/8/2023 3:45 PM EST Indication:  Short of breath. Comparison: 1/12/2023 Findings: The heart size is enlarged. There is pulmonary vascular congestion with interstitial pulmonary edema. No pleural fluid is identified. No focal consolidation is seen.     Impression: Radiographic findings consistent with congestive heart failure and pulmonary edema. Electronically Signed: Leo Rosenberg  3/8/2023 3:58 PM EST  Workstation ID: [id number]       Current:  amLODIPine, 5 mg, Oral, Nightly  arformoterol, 15 mcg, Nebulization, BID - RT  aspirin, 81 mg, Oral, Daily  atorvastatin, 40 mg, Oral, Daily  bisoprolol, 5 mg, Oral, Daily  budesonide, 0.5 mg, Nebulization, BID - RT  enoxaparin, 40 mg, Subcutaneous, Q24H  furosemide, 40 mg, Oral, Daily  gabapentin, 300 mg, Oral, TID  guaiFENesin, 1,200 mg, Oral, Q12H  insulin lispro, 3-14 Units, Subcutaneous, TID With Meals  lisinopril, 40 mg, Oral, Nightly  methylPREDNISolone sodium succinate, 40 mg, Intravenous, Q8H  roflumilast, 250 mcg, Oral, Daily  sertraline, 50 mg, Oral, Nightly  sodium chloride, 10 mL, Intravenous, Q12H        Infusion:        PRN:  •  benzonatate  •  dextrose  •  dextrose  •  glucagon (human recombinant)  •  ipratropium-albuterol  •  melatonin  •  ondansetron **OR** ondansetron  •  sodium chloride  •  sodium chloride    ASSESSMENT:  Acute hypoxic respiratory failure  Interstitial pneumonia  AE COPD  DM2  Hypertension  CAD  Depression  Obesity  CHF       PLAN:  Antibiotics  Bronchodilators  Inhaled corticosteroids  Incentive spirometer  Diuresis and monitor MIGDALIA's  Bowel regimen   Electrolytes/ glycemic control  DVT and GI prophylaxis    Discussed with Dr. Liliana Tyler, APRN   4/1/2023  13:42 EDT        I personally have examined  and interviewed the patient. I have reviewed the history, data, problems, assessment and plan with our NP.  Critical care time in direct medical management (   ) minutes  Electronically signed by Jeannine Ford MD. D, ABSM.

## 2023-04-01 NOTE — ED PROVIDER NOTES
Subjective   History of Present Illness  70-year-old male history of COPD presents today with the onset of severe respiratory distress.  The patient states that he has been increasing over the last 2 days.  He states yesterday he was started on Zithromax by his primary care provider.  He states he was not prescribed steroids.  The patient reports that he has had subjective fever as well as shaking chills.  He reports severe dyspnea on exertion but denies orthopnea reports no leg pain or swelling.  He reports no night sweats or hemoptysis.    He reports some pleuritic type chest discomfort.  O2 saturations were 79% at home and 80% at triage    Review of Systems   Constitutional: Positive for chills, fatigue and fever.   HENT: Positive for rhinorrhea. Negative for sore throat and trouble swallowing.    Eyes: Negative for discharge.   Respiratory: Positive for cough, chest tightness, shortness of breath and wheezing.    Cardiovascular: Positive for chest pain. Negative for leg swelling.   Gastrointestinal: Positive for nausea.   Musculoskeletal: Positive for arthralgias and myalgias. Negative for neck pain and neck stiffness.   Skin: Positive for rash.   Neurological: Negative for headaches.   Hematological: Does not bruise/bleed easily.   All other systems reviewed and are negative.      Past Medical History:   Diagnosis Date   • (HFpEF) heart failure with preserved ejection fraction 4/1/2023   • Abnormal EKG 2/3/2015   • Anxiety    • COPD (chronic obstructive pulmonary disease)    • Coronary artery disease 3/3/2015   • Diabetes     type 2   • Hyperlipidemia    • Hypertension    • Microalbuminuria due to type 2 diabetes mellitus 2/5/2020   • Multinodular goiter 4/18/2019   • Murmur 2/3/2015   • Primary osteoarthritis of left knee 10/22/2021       No Known Allergies    Past Surgical History:   Procedure Laterality Date   • CARDIAC CATHETERIZATION Left 1/15/2023    Procedure: Left Heart Cath and coronary angiogram;   Surgeon: Shawn Baker MD;  Location: [facility] CATH INVASIVE LOCATION;  Service: Cardiovascular;  Laterality: Left;   • EYE SURGERY      cataract   • TOTAL KNEE ARTHROPLASTY Right 10/21/2020    Procedure: TOTAL KNEE ARTHROPLASTY;  Surgeon: Ravi Fagan MD;  Location: [facility] MAIN OR;  Service: Orthopedics;  Laterality: Right;   • TOTAL KNEE ARTHROPLASTY REVISION Right 2021    Procedure: KNEE POLY INSERT EXCHANGE with irrigation;  Surgeon: Ravi Fagan MD;  Location: [facility] MAIN OR;  Service: Orthopedics;  Laterality: Right;       Family History   Problem Relation Age of Onset   • Cancer Other    • Diabetes Other    • Heart disease Other    • No Known Problems Mother    • No Known Problems Father    • No Known Problems Sister    • No Known Problems Brother    • No Known Problems Maternal Aunt    • No Known Problems Maternal Uncle    • No Known Problems Paternal Aunt    • No Known Problems Paternal Uncle    • No Known Problems Maternal Grandmother    • No Known Problems Maternal Grandfather    • No Known Problems Paternal Grandmother    • No Known Problems Paternal Grandfather    • Anemia Neg Hx    • Arrhythmia Neg Hx    • Asthma Neg Hx    • Clotting disorder Neg Hx    • Fainting Neg Hx    • Heart attack Neg Hx    • Heart failure Neg Hx    • Hyperlipidemia Neg Hx    • Hypertension Neg Hx        Social History     Socioeconomic History   • Marital status:    Tobacco Use   • Smoking status: Former     Packs/day: 1.00     Years: 5.00     Pack years: 5.00     Types: Cigarettes     Quit date: 7/3/2022     Years since quittin.7     Passive exposure: Past   • Smokeless tobacco: Former     Quit date: 7/3/2022   Vaping Use   • Vaping Use: Never used   Substance and Sexual Activity   • Alcohol use: Not Currently   • Drug use: Never   • Sexual activity: Defer       Reports no unusual food water travel or activity    Objective   Physical Exam  Alert [place] Coma Scale 15   HEENT: Pupils equal and reactive to  light. Conjunctivae are not injected. Normal tympanic membranes. Oropharynx and nares are normal.   Neck: Supple. Midline trachea. No JVD. No goiter.   Chest: Tight inspiratory extra wheezes as well as rales bilaterally and equal breath sounds bilaterally, regular rate and rhythm without murmur or rub.   Abdomen: Positive bowel sounds, nontender, nondistended. No rebound or peritoneal signs. No CVA tenderness.   Extremities no clubbing. cyanosis or edema. Motor sensory exam is normal. The full range of motion is intact   Skin: Warm and dry, no rashes or petechia.   Lymphatic: No regional lymphadenopathy. No calf pain, swelling or Homans sign    Procedures           ED Course  ED Course as of 04/11/23 0749   Sat Apr 01, 2023   0206 Consulted the hospitalist for admission after request from charge nurse for orders in reading previous provider note.  [LB]   0214 Discussed with hospitalist.  [LB]      ED Course User Index  [LB] Jigna Dennis, APRN      Labs Reviewed   RESPIRATORY PANEL PCR W/ COVID-19 (SARS-COV-2) IAN/NAYELY/KATERIN/PAD/COR/MAD/KRISH IN-HOUSE, NP SWAB IN [facility], 3-4 HR TAT - Abnormal; Notable for the following components:       Result Value    Human Rhinovirus/Enterovirus Detected (*)     All other components within normal limits    Narrative:     In the setting of a positive respiratory panel with a viral infection PLUS a negative procalcitonin without other underlying concern for bacterial infection, consider observing off antibiotics or discontinuation of antibiotics and continue supportive care. If the respiratory panel is positive for atypical bacterial infection (Bordetella pertussis, Chlamydophila pneumoniae, or Mycoplasma pneumoniae), consider antibiotic de-escalation to target atypical bacterial infection.   COMPREHENSIVE METABOLIC PANEL - Abnormal; Notable for the following components:    Glucose 141 (*)     Creatinine 0.68 (*)     CO2 30.0 (*)     All other components within normal limits     Narrative:     GFR Normal >60  Chronic Kidney Disease <60  Kidney Failure <15    The GFR formula is only valid for adults with stable renal function between ages 18 and 70.   SINGLE HSTROPONIN T - Abnormal; Notable for the following components:    HS Troponin T 23 (*)     All other components within normal limits    Narrative:     High Sensitive Troponin T Reference Range:  <10.0 ng/L- Negative Female for AMI  <15.0 ng/L- Negative Male for AMI  >=10 - Abnormal Female indicating possible myocardial injury.  >=15 - Abnormal Male indicating possible myocardial injury.   Clinicians would have to utilize clinical acumen, EKG, Troponin, and serial changes to determine if it is an Acute Myocardial Infarction or myocardial injury due to an underlying chronic condition.        CBC WITH AUTO DIFFERENTIAL - Abnormal; Notable for the following components:    RBC 3.88 (*)     Hemoglobin 10.9 (*)     Hematocrit 34.5 (*)     RDW 16.4 (*)     Lymphocyte % 13.2 (*)     All other components within normal limits   CBC WITH AUTO DIFFERENTIAL - Abnormal; Notable for the following components:    RBC 3.88 (*)     Hemoglobin 11.1 (*)     Hematocrit 33.8 (*)     RDW 16.1 (*)     Platelets 136 (*)     Neutrophil % 92.9 (*)     Lymphocyte % 5.7 (*)     Monocyte % 1.2 (*)     Eosinophil % 0.0 (*)     Lymphocytes, Absolute 0.40 (*)     All other components within normal limits   BASIC METABOLIC PANEL - Abnormal; Notable for the following components:    Glucose 351 (*)     Chloride 97 (*)     All other components within normal limits    Narrative:     GFR Normal >60  Chronic Kidney Disease <60  Kidney Failure <15    The GFR formula is only valid for adults with stable renal function between ages 18 and 70.   TROPONIN - Abnormal; Notable for the following components:    HS Troponin T 17 (*)     All other components within normal limits    Narrative:     High Sensitive Troponin T Reference Range:  <10.0 ng/L- Negative Female for AMI  <15.0  ng/L- Negative Male for AMI  >=10 - Abnormal Female indicating possible myocardial injury.  >=15 - Abnormal Male indicating possible myocardial injury.   Clinicians would have to utilize clinical acumen, EKG, Troponin, and serial changes to determine if it is an Acute Myocardial Infarction or myocardial injury due to an underlying chronic condition.        HIGH SENSITIVITIY TROPONIN T 2HR - Abnormal; Notable for the following components:    HS Troponin T 18 (*)     All other components within normal limits    Narrative:     High Sensitive Troponin T Reference Range:  <10.0 ng/L- Negative Female for AMI  <15.0 ng/L- Negative Male for AMI  >=10 - Abnormal Female indicating possible myocardial injury.  >=15 - Abnormal Male indicating possible myocardial injury.   Clinicians would have to utilize clinical acumen, EKG, Troponin, and serial changes to determine if it is an Acute Myocardial Infarction or myocardial injury due to an underlying chronic condition.        CBC WITH AUTO DIFFERENTIAL - Abnormal; Notable for the following components:    RBC 3.84 (*)     Hemoglobin 10.9 (*)     Hematocrit 33.1 (*)     RDW 15.9 (*)     Neutrophil % 88.8 (*)     Lymphocyte % 7.5 (*)     Monocyte % 3.6 (*)     Eosinophil % 0.0 (*)     Neutrophils, Absolute 7.20 (*)     Lymphocytes, Absolute 0.60 (*)     All other components within normal limits   BASIC METABOLIC PANEL - Abnormal; Notable for the following components:    Glucose 237 (*)     BUN 25 (*)     Creatinine 0.59 (*)     CO2 30.0 (*)     BUN/Creatinine Ratio 42.4 (*)     All other components within normal limits    Narrative:     GFR Normal >60  Chronic Kidney Disease <60  Kidney Failure <15    The GFR formula is only valid for adults with stable renal function between ages 18 and 70.   CBC WITH AUTO DIFFERENTIAL - Abnormal; Notable for the following components:    RBC 3.92 (*)     Hemoglobin 11.0 (*)     Hematocrit 33.7 (*)     RDW 15.5 (*)     Neutrophil % 82.8 (*)      Lymphocyte % 9.8 (*)     Eosinophil % 0.1 (*)     All other components within normal limits   BASIC METABOLIC PANEL - Abnormal; Notable for the following components:    Glucose 277 (*)     BUN 24 (*)     Creatinine 0.57 (*)     CO2 30.0 (*)     BUN/Creatinine Ratio 42.1 (*)     All other components within normal limits    Narrative:     GFR Normal >60  Chronic Kidney Disease <60  Kidney Failure <15    The GFR formula is only valid for adults with stable renal function between ages 18 and 70.   POCT GLUCOSE FINGERSTICK - Abnormal; Notable for the following components:    Glucose 477 (*)     All other components within normal limits   POCT GLUCOSE FINGERSTICK - Abnormal; Notable for the following components:    Glucose 282 (*)     All other components within normal limits   POCT GLUCOSE FINGERSTICK - Abnormal; Notable for the following components:    Glucose 345 (*)     All other components within normal limits   POCT GLUCOSE FINGERSTICK - Abnormal; Notable for the following components:    Glucose 344 (*)     All other components within normal limits   POCT GLUCOSE FINGERSTICK - Abnormal; Notable for the following components:    Glucose 252 (*)     All other components within normal limits   POCT GLUCOSE FINGERSTICK - Abnormal; Notable for the following components:    Glucose 259 (*)     All other components within normal limits   POCT GLUCOSE FINGERSTICK - Abnormal; Notable for the following components:    Glucose 388 (*)     All other components within normal limits   POCT GLUCOSE FINGERSTICK - Abnormal; Notable for the following components:    Glucose 275 (*)     All other components within normal limits   POCT GLUCOSE FINGERSTICK - Abnormal; Notable for the following components:    Glucose 254 (*)     All other components within normal limits   POCT GLUCOSE FINGERSTICK - Abnormal; Notable for the following components:    Glucose 318 (*)     All other components within normal limits   COVID-19 AND FLU A/B, NP SWAB  "IN TRANSPORT MEDIA 8-12 HR TAT - Normal    Narrative:     Fact sheet for providers: https://www.fda.gov/media/748919/download    Fact sheet for patients: https://www.fda.gov/media/896180/download    Test performed by PCR.   BLOOD CULTURE - Normal   BLOOD CULTURE - Normal   BNP (IN-HOUSE) - Normal    Narrative:     Among patients with dyspnea, NT-proBNP is highly sensitive for the detection of acute congestive heart failure. In addition NT-proBNP of <300 pg/ml effectively rules out acute congestive heart failure with 99% negative predictive value.     PROCALCITONIN - Normal    Narrative:     As a Marker for Sepsis (Non-Neonates):    1. <0.5 ng/mL represents a low risk of severe sepsis and/or septic shock.  2. >2 ng/mL represents a high risk of severe sepsis and/or septic shock.    As a Marker for Lower Respiratory Tract Infections that require antibiotic therapy:    PCT on Admission    Antibiotic Therapy       6-12 Hrs later    >0.5                Strongly Recommended  >0.25 - <0.5        Recommended   0.1 - 0.25          Discouraged              Remeasure/reassess PCT  <0.1                Strongly Discouraged     Remeasure/reassess PCT    As 28 day mortality risk marker: \"Change in Procalcitonin Result\" (>80% or <=80%) if Day 0 (or Day 1) and Day 4 values are available. Refer to http://www.[employer]-pct-calculator.com    Change in PCT <=80%  A decrease of PCT levels below or equal to 80% defines a positive change in PCT test result representing a higher risk for 28-day all-cause mortality of patients diagnosed with severe sepsis for septic shock.    Change in PCT >80%  A decrease of PCT levels of more than 80% defines a negative change in PCT result representing a lower risk for 28-day all-cause mortality of patients diagnosed with severe sepsis or septic shock.      MAGNESIUM - Normal   MAGNESIUM - Normal   POC LACTATE - Normal   RESPIRATORY CULTURE   RESPIRATORY CULTURE   POC LACTATE   POCT GLUCOSE FINGERSTICK   POCT " GLUCOSE FINGERSTICK   POCT GLUCOSE FINGERSTICK   POCT GLUCOSE FINGERSTICK   POCT GLUCOSE FINGERSTICK   POCT GLUCOSE FINGERSTICK   POCT GLUCOSE FINGERSTICK   POCT GLUCOSE FINGERSTICK   POCT GLUCOSE FINGERSTICK   CBC AND DIFFERENTIAL    Narrative:     The following orders were created for panel order CBC & Differential.  Procedure                               Abnormality         Status                     ---------                               -----------         ------                     CBC Auto Differential[[id number]]        Abnormal            Final result                 Please view results for these tests on the individual orders.     Medications   ipratropium-albuterol (DUO-NEB) nebulizer solution 3 mL (3 mL Nebulization Given 3/31/23 1906)   methylPREDNISolone sodium succinate (SOLU-Medrol) injection 125 mg (125 mg Intravenous Given 3/31/23 1916)   ipratropium-albuterol (DUO-NEB) nebulizer solution 3 mL (3 mL Nebulization Given 3/31/23 2325)   predniSONE (DELTASONE) tablet 30 mg (30 mg Oral Given 4/3/23 0814)     XR Chest 1 View    Result Date: 4/10/2023  Impression: Findings of pulmonary edema. The perihilar opacities appear slightly improved. Electronically Signed: Denisedar Galicia  4/10/2023 3:27 PM EDT  Workstation ID: [id number]                                         Medical Decision Making  Patient markedly improved with ER therapy.  The patient was stepdown from the initial nonrebreather mask.  The patient stated that he was already feeling better.  The patient will be admitted for continued intravenous antibiotics and steroids as well as oxygen support and bronchodilators he was agreeable to this plan to treat    Amount and/or Complexity of Data Reviewed  Independent Historian: spouse  External Data Reviewed: labs, radiology, ECG and notes.  Labs: ordered. Decision-making details documented in ED Course.  Radiology: ordered and independent interpretation performed.     Details: Interstitial changes were  attributed to COPD by radiologist however I cannot exclude interstitial pneumonia  ECG/medicine tests: ordered and independent interpretation performed.     Details: Sinus rhythm, history of right bundle branch block compared to previous 3/8/2023      Risk  Prescription drug management.  Decision regarding hospitalization.          Final diagnoses:   Interstitial pneumonia   Acute exacerbation of chronic obstructive pulmonary disease (COPD)   Hypoxemia       ED Disposition  ED Disposition     ED Disposition   Decision to Admit    Condition   --    Comment   Level of Care: Telemetry [5]   Diagnosis: Interstitial pneumonia (HCC) [026275]   Admitting Physician: GUERDA ZAMORANO [[id number]]   Attending Physician: GUERDA ZAMORANO [[id number]]               Bert Rojas MD  [address] [place] [address] [phone]      1 week    Jeannine Ford MD  [address] [facility] [place] [address] [phone]    Call in 3 week(s)           Medication List      New Prescriptions    budesonide 0.5 MG/2ML nebulizer solution  Commonly known as: PULMICORT  Take 2 mL by nebulization 2 (Two) Times a Day. J44.9     cefdinir 300 MG capsule  Commonly known as: OMNICEF  Take 1 capsule by mouth 2 (Two) Times a Day.     doxycycline 100 MG tablet  Commonly known as: ADOXA  Take 1 tablet by mouth Every 12 (Twelve) Hours for 15 doses. Indications: Infection with Dysregulated Inflammatory Response     guaiFENesin 600 MG 12 hr tablet  Commonly known as: MUCINEX  Take 2 tablets by mouth Every 12 (Twelve) Hours.        Stop    albuterol (2.5 MG/3ML) 0.083% nebulizer solution  Commonly known as: PROVENTIL     albuterol sulfate  (90 Base) MCG/ACT inhaler  Commonly known as: PROVENTIL HFA;VENTOLIN HFA;PROAIR HFA     azithromycin 250 MG tablet  Commonly known as: ZITHROMAX     methylPREDNISolone 4 MG dose pack  Commonly known as: MEDROL     umeclidinium-vilanterol 62.5-25 MCG/INH aerosol powder  inhaler  Commonly known as:  ANORO ELLIPTA           Where to Get Your Medications      These medications were sent to [id number] - [place], [place] - [address] AT [facility] OF [address] [facility] - [phone]  - [phone] FX  [address] [facility] [address] [facility] [place] [address]    Phone: [phone]   · budesonide 0.5 MG/2ML nebulizer solution  · cefdinir 300 MG capsule  · doxycycline 100 MG tablet  · guaiFENesin 600 MG 12 hr tablet          Matt Alejo MD  03/31/23 1549       Matt Alejo MD  04/11/23 2493

## 2023-04-02 LAB
ANION GAP SERPL CALCULATED.3IONS-SCNC: 9 MMOL/L (ref 5–15)
BASOPHILS # BLD AUTO: 0 10*3/MM3 (ref 0–0.2)
BASOPHILS NFR BLD AUTO: 0.1 % (ref 0–1.5)
BUN SERPL-MCNC: 25 MG/DL (ref 8–23)
BUN/CREAT SERPL: 42.4 (ref 7–25)
CALCIUM SPEC-SCNC: 9.1 MG/DL (ref 8.6–10.5)
CHLORIDE SERPL-SCNC: 99 MMOL/L (ref 98–107)
CO2 SERPL-SCNC: 30 MMOL/L (ref 22–29)
CREAT SERPL-MCNC: 0.59 MG/DL (ref 0.76–1.27)
DEPRECATED RDW RBC AUTO: 51.2 FL (ref 37–54)
EGFRCR SERPLBLD CKD-EPI 2021: 103.1 ML/MIN/1.73
EOSINOPHIL # BLD AUTO: 0 10*3/MM3 (ref 0–0.4)
EOSINOPHIL NFR BLD AUTO: 0 % (ref 0.3–6.2)
ERYTHROCYTE [DISTWIDTH] IN BLOOD BY AUTOMATED COUNT: 15.9 % (ref 12.3–15.4)
GLUCOSE BLDC GLUCOMTR-MCNC: 252 MG/DL (ref 70–105)
GLUCOSE BLDC GLUCOMTR-MCNC: 259 MG/DL (ref 70–105)
GLUCOSE BLDC GLUCOMTR-MCNC: 388 MG/DL (ref 70–105)
GLUCOSE SERPL-MCNC: 237 MG/DL (ref 65–99)
HCT VFR BLD AUTO: 33.1 % (ref 37.5–51)
HGB BLD-MCNC: 10.9 G/DL (ref 13–17.7)
LYMPHOCYTES # BLD AUTO: 0.6 10*3/MM3 (ref 0.7–3.1)
LYMPHOCYTES NFR BLD AUTO: 7.5 % (ref 19.6–45.3)
MAGNESIUM SERPL-MCNC: 2 MG/DL (ref 1.6–2.4)
MCH RBC QN AUTO: 28.3 PG (ref 26.6–33)
MCHC RBC AUTO-ENTMCNC: 32.9 G/DL (ref 31.5–35.7)
MCV RBC AUTO: 86 FL (ref 79–97)
MONOCYTES # BLD AUTO: 0.3 10*3/MM3 (ref 0.1–0.9)
MONOCYTES NFR BLD AUTO: 3.6 % (ref 5–12)
NEUTROPHILS NFR BLD AUTO: 7.2 10*3/MM3 (ref 1.7–7)
NEUTROPHILS NFR BLD AUTO: 88.8 % (ref 42.7–76)
NRBC BLD AUTO-RTO: 0 /100 WBC (ref 0–0.2)
PLATELET # BLD AUTO: 154 10*3/MM3 (ref 140–450)
PMV BLD AUTO: 10.1 FL (ref 6–12)
POTASSIUM SERPL-SCNC: 4.3 MMOL/L (ref 3.5–5.2)
RBC # BLD AUTO: 3.84 10*6/MM3 (ref 4.14–5.8)
SODIUM SERPL-SCNC: 138 MMOL/L (ref 136–145)
WBC NRBC COR # BLD: 8.1 10*3/MM3 (ref 3.4–10.8)

## 2023-04-02 PROCEDURE — 63710000001 PREDNISONE PER 1 MG: Performed by: INTERNAL MEDICINE

## 2023-04-02 PROCEDURE — 80048 BASIC METABOLIC PNL TOTAL CA: CPT | Performed by: PHYSICIAN ASSISTANT

## 2023-04-02 PROCEDURE — 94664 DEMO&/EVAL PT USE INHALER: CPT

## 2023-04-02 PROCEDURE — 63710000001 INSULIN REGULAR HUMAN PER 5 UNITS: Performed by: INTERNAL MEDICINE

## 2023-04-02 PROCEDURE — 83735 ASSAY OF MAGNESIUM: CPT | Performed by: PHYSICIAN ASSISTANT

## 2023-04-02 PROCEDURE — 25010000002 ONDANSETRON PER 1 MG: Performed by: PHYSICIAN ASSISTANT

## 2023-04-02 PROCEDURE — 63710000001 PREDNISONE PER 5 MG: Performed by: INTERNAL MEDICINE

## 2023-04-02 PROCEDURE — 94799 UNLISTED PULMONARY SVC/PX: CPT

## 2023-04-02 PROCEDURE — 82962 GLUCOSE BLOOD TEST: CPT

## 2023-04-02 PROCEDURE — 97110 THERAPEUTIC EXERCISES: CPT | Performed by: OCCUPATIONAL THERAPIST

## 2023-04-02 PROCEDURE — 25010000002 ENOXAPARIN PER 10 MG: Performed by: PHYSICIAN ASSISTANT

## 2023-04-02 PROCEDURE — 94761 N-INVAS EAR/PLS OXIMETRY MLT: CPT

## 2023-04-02 PROCEDURE — 25010000002 METHYLPREDNISOLONE PER 40 MG: Performed by: PHYSICIAN ASSISTANT

## 2023-04-02 PROCEDURE — 25010000002 CEFTRIAXONE PER 250 MG: Performed by: NURSE PRACTITIONER

## 2023-04-02 PROCEDURE — 63710000001 INSULIN LISPRO (HUMAN) PER 5 UNITS: Performed by: PHYSICIAN ASSISTANT

## 2023-04-02 PROCEDURE — 85025 COMPLETE CBC W/AUTO DIFF WBC: CPT | Performed by: PHYSICIAN ASSISTANT

## 2023-04-02 PROCEDURE — 97166 OT EVAL MOD COMPLEX 45 MIN: CPT | Performed by: OCCUPATIONAL THERAPIST

## 2023-04-02 RX ORDER — PREDNISONE 20 MG/1
20 TABLET ORAL DAILY
Status: DISCONTINUED | OUTPATIENT
Start: 2023-04-04 | End: 2023-04-03 | Stop reason: HOSPADM

## 2023-04-02 RX ORDER — CARBOXYMETHYLCELLULOSE SODIUM 10 MG/ML
2 GEL OPHTHALMIC 3 TIMES DAILY PRN
Status: DISCONTINUED | OUTPATIENT
Start: 2023-04-02 | End: 2023-04-03 | Stop reason: HOSPADM

## 2023-04-02 RX ORDER — PREDNISONE 10 MG/1
10 TABLET ORAL DAILY
Status: DISCONTINUED | OUTPATIENT
Start: 2023-04-06 | End: 2023-04-03 | Stop reason: HOSPADM

## 2023-04-02 RX ORDER — IPRATROPIUM BROMIDE AND ALBUTEROL SULFATE 2.5; .5 MG/3ML; MG/3ML
3 SOLUTION RESPIRATORY (INHALATION)
Status: DISCONTINUED | OUTPATIENT
Start: 2023-04-02 | End: 2023-04-02

## 2023-04-02 RX ORDER — AZITHROMYCIN 250 MG/1
250 TABLET, FILM COATED ORAL DAILY
COMMUNITY
End: 2023-04-03 | Stop reason: HOSPADM

## 2023-04-02 RX ADMIN — GABAPENTIN 300 MG: 300 CAPSULE ORAL at 08:38

## 2023-04-02 RX ADMIN — DOXYCYCLINE 100 MG: 100 TABLET ORAL at 08:37

## 2023-04-02 RX ADMIN — Medication 10 ML: at 20:22

## 2023-04-02 RX ADMIN — ONDANSETRON 4 MG: 2 INJECTION INTRAMUSCULAR; INTRAVENOUS at 00:27

## 2023-04-02 RX ADMIN — METHYLPREDNISOLONE SODIUM SUCCINATE 40 MG: 40 INJECTION, POWDER, FOR SOLUTION INTRAMUSCULAR; INTRAVENOUS at 02:58

## 2023-04-02 RX ADMIN — BUDESONIDE 0.5 MG: 0.5 INHALANT RESPIRATORY (INHALATION) at 10:00

## 2023-04-02 RX ADMIN — ENOXAPARIN SODIUM 40 MG: 100 INJECTION SUBCUTANEOUS at 17:21

## 2023-04-02 RX ADMIN — ASPIRIN 81 MG: 81 TABLET, COATED ORAL at 08:38

## 2023-04-02 RX ADMIN — Medication 5 MG: at 20:21

## 2023-04-02 RX ADMIN — DOXYCYCLINE 100 MG: 100 TABLET ORAL at 20:15

## 2023-04-02 RX ADMIN — LISINOPRIL 40 MG: 20 TABLET ORAL at 20:15

## 2023-04-02 RX ADMIN — INSULIN LISPRO 8 UNITS: 100 INJECTION, SOLUTION INTRAVENOUS; SUBCUTANEOUS at 08:37

## 2023-04-02 RX ADMIN — FUROSEMIDE 40 MG: 40 TABLET ORAL at 08:37

## 2023-04-02 RX ADMIN — ATORVASTATIN CALCIUM 40 MG: 40 TABLET, FILM COATED ORAL at 08:37

## 2023-04-02 RX ADMIN — INSULIN LISPRO 12 UNITS: 100 INJECTION, SOLUTION INTRAVENOUS; SUBCUTANEOUS at 17:21

## 2023-04-02 RX ADMIN — ARFORMOTEROL TARTRATE 15 MCG: 15 SOLUTION RESPIRATORY (INHALATION) at 10:04

## 2023-04-02 RX ADMIN — INSULIN HUMAN 6 UNITS: 100 INJECTION, SOLUTION PARENTERAL at 12:15

## 2023-04-02 RX ADMIN — INSULIN HUMAN 4 UNITS: 100 INJECTION, SOLUTION PARENTERAL at 02:58

## 2023-04-02 RX ADMIN — MINERAL OIL, WHITE PETROLATUM 1 APPLICATION: .03; .94 OINTMENT OPHTHALMIC at 20:21

## 2023-04-02 RX ADMIN — CEFTRIAXONE 1 G: 1 INJECTION, POWDER, FOR SOLUTION INTRAMUSCULAR; INTRAVENOUS at 17:22

## 2023-04-02 RX ADMIN — POLYETHYLENE GLYCOL 3350 17 G: 17 POWDER, FOR SOLUTION ORAL at 08:38

## 2023-04-02 RX ADMIN — INSULIN LISPRO 8 UNITS: 100 INJECTION, SOLUTION INTRAVENOUS; SUBCUTANEOUS at 12:15

## 2023-04-02 RX ADMIN — ROFLUMILAST 250 MCG: 500 TABLET ORAL at 08:37

## 2023-04-02 RX ADMIN — BUDESONIDE 0.5 MG: 0.5 INHALANT RESPIRATORY (INHALATION) at 20:15

## 2023-04-02 RX ADMIN — SERTRALINE 50 MG: 50 TABLET, FILM COATED ORAL at 20:15

## 2023-04-02 RX ADMIN — GABAPENTIN 300 MG: 300 CAPSULE ORAL at 17:22

## 2023-04-02 RX ADMIN — SENNOSIDES AND DOCUSATE SODIUM 2 TABLET: 50; 8.6 TABLET ORAL at 08:38

## 2023-04-02 RX ADMIN — GABAPENTIN 300 MG: 300 CAPSULE ORAL at 20:15

## 2023-04-02 RX ADMIN — AMLODIPINE BESYLATE 5 MG: 5 TABLET ORAL at 20:15

## 2023-04-02 RX ADMIN — GUAIFENESIN 1200 MG: 600 TABLET, EXTENDED RELEASE ORAL at 05:30

## 2023-04-02 RX ADMIN — PREDNISONE 30 MG: 20 TABLET ORAL at 12:15

## 2023-04-02 RX ADMIN — Medication 10 ML: at 08:38

## 2023-04-02 RX ADMIN — ARFORMOTEROL TARTRATE 15 MCG: 15 SOLUTION RESPIRATORY (INHALATION) at 20:15

## 2023-04-02 RX ADMIN — GUAIFENESIN 1200 MG: 600 TABLET, EXTENDED RELEASE ORAL at 17:22

## 2023-04-02 RX ADMIN — BISOPROLOL FUMARATE 5 MG: 5 TABLET ORAL at 08:37

## 2023-04-02 NOTE — THERAPY EVALUATION
Patient Name: Brenden Peter  : 1950    MRN: [id number]                              Today's Date: 2023       Admit Date: 3/31/2023    Visit Dx:     ICD-10-CM ICD-9-CM   1. Interstitial pneumonia  J84.9 516.8   2. Acute exacerbation of chronic obstructive pulmonary disease (COPD)  J44.1 491.21   3. Hypoxemia  R09.02 799.02     Patient Active Problem List   Diagnosis   • Primary localized osteoarthrosis of right lower leg   • Type 2 diabetes mellitus without complication, without long-term current use of insulin (HCC)   • Mixed hyperlipidemia   • Essential hypertension   • Primary osteoarthritis of knees, bilateral   • Hx of total knee replacement, right   • Morbid obesity (HCC)   • Acquired trigger finger   • Benign prostatic hyperplasia   • Coronary artery disease involving native coronary artery of native heart without angina pectoris   • Depression   • Diabetic peripheral neuropathy   • Microalbuminuria due to type 2 diabetes mellitus   • Multinodular goiter   • Murmur   • Tobacco dependence syndrome   • Vitamin D deficiency   • Swelling of joint of right knee   • Chronic pain of left knee   • COPD (chronic obstructive pulmonary disease)   • Obesity (BMI 30-39.9)   • COPD exacerbation   • Sepsis   • Acute on chronic respiratory failure with hypoxia   • Abnormal nuclear stress test   • Acute respiratory distress   • (HFpEF) heart failure with preserved ejection fraction   • Interstitial pneumonia     Past Medical History:   Diagnosis Date   • (HFpEF) heart failure with preserved ejection fraction 2023   • Abnormal EKG 2/3/2015   • Anxiety    • COPD (chronic obstructive pulmonary disease)    • Coronary artery disease 3/3/2015   • Diabetes     type 2   • Hyperlipidemia    • Hypertension    • Microalbuminuria due to type 2 diabetes mellitus 2020   • Multinodular goiter 2019   • Murmur 2/3/2015   • Primary osteoarthritis of left knee 10/22/2021     Past Surgical History:   Procedure  Laterality Date   • CARDIAC CATHETERIZATION Left 1/15/2023    Procedure: Left Heart Cath and coronary angiogram;  Surgeon: Shawn Baker MD;  Location: [facility] CATH INVASIVE LOCATION;  Service: Cardiovascular;  Laterality: Left;   • EYE SURGERY      cataract   • TOTAL KNEE ARTHROPLASTY Right 10/21/2020    Procedure: TOTAL KNEE ARTHROPLASTY;  Surgeon: Ravi Fagan MD;  Location: [facility] MAIN OR;  Service: Orthopedics;  Laterality: Right;   • TOTAL KNEE ARTHROPLASTY REVISION Right 5/19/2021    Procedure: KNEE POLY INSERT EXCHANGE with irrigation;  Surgeon: Ravi Fagan MD;  Location: [facility] MAIN OR;  Service: Orthopedics;  Laterality: Right;      General Information     Row Name 04/02/23 1354          OT Time and Intention    Document Type evaluation  -DT     Mode of Treatment occupational therapy  -DT     Row Name 04/02/23 1354          General Information    Patient Profile Reviewed yes  -DT     Prior Level of Function independent:  -DT     Existing Precautions/Restrictions oxygen therapy device and L/min  -DT     Barriers to Rehab none identified  -DT     Row Name 04/02/23 1354          Living Environment    People in Home spouse  -DT     Row Name 04/02/23 1354          Home Main Entrance    Number of Stairs, Main Entrance one  -DT     Row Name 04/02/23 1354          Stairs Within Home, Primary    Stair Railings, Within Home, Primary none  -DT     Row Name 04/02/23 1354          Cognition    Orientation Status (Cognition) oriented x 4  -DT     Row Name 04/02/23 1354          Safety Issues, Functional Mobility    Impairments Affecting Function (Mobility) balance;endurance/activity tolerance;shortness of breath;strength  -DT           User Key  (r) = Recorded By, (t) = Taken By, (c) = Cosigned By    Initials Name Provider Type    DT Diana Tellez, OT Occupational Therapist                 Mobility/ADL's     Row Name 04/02/23 1351          Sit-Stand Transfer    Sit-Stand [place] (Transfers)  independent  -DT     [facility] Name 04/02/23 1355          Activities of Daily Living    BADL Assessment/Intervention lower body dressing  -DT     Row Name 04/02/23 1355          Lower Body Dressing Assessment/Training    [place] Level (Lower Body Dressing) minimum assist (75% patient effort)  -DT     Position (Lower Body Dressing) edge of bed sitting  -DT     Comment, (Lower Body Dressing) Dec. endurance, SOA with activity.  -DT           User Key  (r) = Recorded By, (t) = Taken By, (c) = Cosigned By    Initials Name Provider Type    DT Diana Tellez OT Occupational Therapist               Obj/Interventions     Row Name 04/02/23 1356          Range of Motion Comprehensive    General Range of Motion bilateral upper extremity ROM WFL;bilateral lower extremity ROM WFL  -DT     [facility] Name 04/02/23 1356          Strength Comprehensive (MMT)    General Manual Muscle Testing (MMT) Assessment no strength deficits identified  -DT     Row Name 04/02/23 1356          Balance    Balance Assessment sitting static balance;sitting dynamic balance;standing static balance;standing dynamic balance  -DT     Static Sitting Balance independent  -DT     Dynamic Sitting Balance independent  -DT     Static Standing Balance independent  -DT     Dynamic Standing Balance independent  -DT           User Key  (r) = Recorded By, (t) = Taken By, (c) = Cosigned By    Initials Name Provider Type    DT Diana Tellez, OT Occupational Therapist               Goals/Plan     Row Name 04/02/23 3792          Bathing Goal 1 (OT)    Activity/Device (Bathing Goal 1, OT) bathing skills, all  with good demo of EC/WS tech & O2 >90%  -DT     [place] Level/Cues Needed (Bathing Goal 1, OT) independent  -DT     Time Frame (Bathing Goal 1, OT) 2 weeks  -DT     [facility] Name 04/02/23 140          Dressing Goal 1 (OT)    Activity/Device (Dressing Goal 1, OT) lower body dressing  with good demo of EC/WS tech & O2 >90%  -DT     [place]/Cues  Needed (Dressing Goal 1, OT) independent  -DT     Time Frame (Dressing Goal 1, OT) 2 weeks  -DT     Row Name 04/02/23 1409          Grooming Goal 1 (OT)    Activity/Device (Grooming Goal 1, OT) grooming skills, all  with good demo of EC/WS tech & O2 >90%  -DT     [place] (Grooming Goal 1, OT) independent  standing at sink X10 min  -DT     Time Frame (Grooming Goal 1, OT) 2 weeks  -DT     Row Name 04/02/23 1403          Therapy Assessment/Plan (OT)    Planned Therapy Interventions (OT) activity tolerance training;patient/caregiver education/training;strengthening exercise;occupation/activity based interventions  -DT           User Key  (r) = Recorded By, (t) = Taken By, (c) = Cosigned By    Initials Name Provider Type    DT Diana Tellez, OT Occupational Therapist               Clinical Impression     Row Name 04/02/23 6134          Pain Assessment    Pretreatment Pain Rating 0/10 - no pain  -DT     Posttreatment Pain Rating 0/10 - no pain  -DT     Row Name 04/02/23 1355          Plan of Care Review    Plan of Care Reviewed With patient  -DT     Outcome Evaluation Pt is a 72 y.o. M adm on 03/31/2023 with c/o cough, fever, dyspnea, chills & resp distress. He has hx of COPD, DM, HTN, CAD. At baseline, pt lives with spouse in a 1 story home with a basement that he accesses intermittently to help with laundry. He is ind with all ADLs & amb ind most of the time, but occassionally uses a single point cane. He uses 4L O2 at home.  He also still drives. Upon eval, pt sitting EOB with good balance. He is A&O X4. He is currently on 8L O2. He requires min A for donning/doffing socks & requires multiple tries to complete due to inc. SOA. He is able to complete all other ADLs ind, but has decreased endurance & inc. SOA with activity; therefore, he requires extended periods of time to complete activities. His O2 sats averaged 93-98% on 8L O2.  Nursing states they are actively titrating he O2 down today. Educated pt  on EC/WS tech & pursed lip breathing tech. Will continue to follow for OT tx while in the hospital. Recommend pt return home with spouse upon discharge.  -DT     Row Name 04/02/23 1352          Therapy Assessment/Plan (OT)    Rehab Potential (OT) good, to achieve stated therapy goals  -DT     Criteria for Skilled Therapeutic Interventions Met (OT) yes;meets criteria;skilled treatment is necessary  -DT     Row Name 04/02/23 1358          Therapy Plan Review/Discharge Plan (OT)    Anticipated Discharge Disposition (OT) home  -DT     Row Name 04/02/23 1356          Vital Signs    O2 Delivery Pre Treatment hi-flow  8L  -DT     O2 Delivery Intra Treatment hi-flow  8L  -DT     O2 Delivery Post Treatment hi-flow  8L  -DT     Row Name 04/02/23 1351          Positioning and Restraints    Pre-Treatment Position other (comment)  sitting EOB  -DT     Post Treatment Position bed  -DT     In Bed call light within reach;encouraged to call for assist;notified nsg  sitting EOB  -DT           User Key  (r) = Recorded By, (t) = Taken By, (c) = Cosigned By    Initials Name Provider Type    Diana Sky, OT Occupational Therapist               Outcome Measures    No documentation.                 Occupational Therapy Education     Title: PT OT SLP Therapies (Done)     Topic: Occupational Therapy (Done)     Point: ADL training (Done)     Description:   Instruct learner(s) on proper safety adaptation and remediation techniques during self care or transfers.   Instruct in proper use of assistive devices.              Learning Progress Summary           Patient Acceptance, E,TB, VU,DU,NR by DT at 4/2/2023 1410    Comment: role of OT, goals & POC, HEP, EC/WS tech training.                   Point: Home exercise program (Done)     Description:   Instruct learner(s) on appropriate technique for monitoring, assisting and/or progressing therapeutic exercises/activities.              Learning Progress Summary           Patient  Acceptance, E,TB, VU,DU,NR by DT at 4/2/2023 1410    Comment: role of OT, goals & POC, HEP, EC/WS tech training.                   Point: Precautions (Done)     Description:   Instruct learner(s) on prescribed precautions during self-care and functional transfers.              Learning Progress Summary           Patient Acceptance, E,TB, VU,DU,NR by DT at 4/2/2023 1410    Comment: role of OT, goals & POC, HEP, EC/WS tech training.                   Point: Body mechanics (Done)     Description:   Instruct learner(s) on proper positioning and spine alignment during self-care, functional mobility activities and/or exercises.              Learning Progress Summary           Patient Acceptance, E,TB, VU,DU,NR by DT at 4/2/2023 1410    Comment: role of OT, goals & POC, HEP, EC/WS tech training.                               User Key     Initials Effective Dates Name Provider Type Discipline    DT 11/03/22 -  Diana Tellez, OT Occupational Therapist OT              OT Recommendation and Plan  Planned Therapy Interventions (OT): activity tolerance training, patient/caregiver education/training, strengthening exercise, occupation/activity based interventions  Plan of Care Review  Plan of Care Reviewed With: patient  Outcome Evaluation: Pt is a 72 y.o. M adm on 03/31/2023 with c/o cough, fever, dyspnea, chills & resp distress. He has hx of COPD, DM, HTN, CAD. At baseline, pt lives with spouse in a 1 story home with a basement that he accesses intermittently to help with laundry. He is ind with all ADLs & amb ind most of the time, but occassionally uses a single point cane. He uses 4L O2 at home.  He also still drives. Upon eval, pt sitting EOB with good balance. He is A&O X4. He is currently on 8L O2. He requires min A for donning/doffing socks & requires multiple tries to complete due to inc. SOA. He is able to complete all other ADLs ind, but has decreased endurance & inc. SOA with activity; therefore, he requires  extended periods of time to complete activities. His O2 sats averaged 93-98% on 8L O2.  Nursing states they are actively titrating he O2 down today. Educated pt on EC/WS tech & pursed lip breathing tech. Will continue to follow for OT tx while in the hospital. Recommend pt return home with spouse upon discharge.     Time Calculation:    Time Calculation- OT     Row Name 04/02/23 1411             Time Calculation- OT    OT Start Time 0912  -DT      OT Stop Time 0949  -DT      OT Time Calculation (min) 37 min  -DT      Total Timed Code Minutes- OT 10 minute(s)  -DT      OT Received On 04/02/23  -DT      OT - Next Appointment 04/03/23  -DT      OT Goal Re-Cert Due Date 04/16/23  -DT            User Key  (r) = Recorded By, (t) = Taken By, (c) = Cosigned By    Initials Name Provider Type    DT Diana Tellez OT Occupational Therapist              Therapy Charges for Today     Code Description Service Date Service Provider Modifiers Qty    [id number]  OT THER PROC EA 15 MIN 4/2/2023 Diana Tellez OT GO 1    [id number]  OT EVAL MOD COMPLEXITY 4 4/2/2023 Diana Tellez OT GO 1               Diana Tellez OT  4/2/2023

## 2023-04-02 NOTE — THERAPY EVALUATION
Patient Name: Brenden Peter  : 1950    MRN: [id number]                              Today's Date: 2023       Admit Date: 3/31/2023    Visit Dx:     ICD-10-CM ICD-9-CM   1. Interstitial pneumonia  J84.9 516.8   2. Acute exacerbation of chronic obstructive pulmonary disease (COPD)  J44.1 491.21   3. Hypoxemia  R09.02 799.02     Patient Active Problem List   Diagnosis   • Primary localized osteoarthrosis of right lower leg   • Type 2 diabetes mellitus without complication, without long-term current use of insulin (HCC)   • Mixed hyperlipidemia   • Essential hypertension   • Primary osteoarthritis of knees, bilateral   • Hx of total knee replacement, right   • Morbid obesity (HCC)   • Acquired trigger finger   • Benign prostatic hyperplasia   • Coronary artery disease involving native coronary artery of native heart without angina pectoris   • Depression   • Diabetic peripheral neuropathy   • Microalbuminuria due to type 2 diabetes mellitus   • Multinodular goiter   • Murmur   • Tobacco dependence syndrome   • Vitamin D deficiency   • Swelling of joint of right knee   • Chronic pain of left knee   • COPD (chronic obstructive pulmonary disease)   • Obesity (BMI 30-39.9)   • COPD exacerbation   • Sepsis   • Acute on chronic respiratory failure with hypoxia   • Abnormal nuclear stress test   • Acute respiratory distress   • (HFpEF) heart failure with preserved ejection fraction   • Interstitial pneumonia     Past Medical History:   Diagnosis Date   • (HFpEF) heart failure with preserved ejection fraction 2023   • Abnormal EKG 2/3/2015   • Anxiety    • COPD (chronic obstructive pulmonary disease)    • Coronary artery disease 3/3/2015   • Diabetes     type 2   • Hyperlipidemia    • Hypertension    • Microalbuminuria due to type 2 diabetes mellitus 2020   • Multinodular goiter 2019   • Murmur 2/3/2015   • Primary osteoarthritis of left knee 10/22/2021     Past Surgical History:   Procedure  Laterality Date   • CARDIAC CATHETERIZATION Left 1/15/2023    Procedure: Left Heart Cath and coronary angiogram;  Surgeon: Shawn Baker MD;  Location: [facility] CATH INVASIVE LOCATION;  Service: Cardiovascular;  Laterality: Left;   • EYE SURGERY      cataract   • TOTAL KNEE ARTHROPLASTY Right 10/21/2020    Procedure: TOTAL KNEE ARTHROPLASTY;  Surgeon: Ravi Fagan MD;  Location: [facility] MAIN OR;  Service: Orthopedics;  Laterality: Right;   • TOTAL KNEE ARTHROPLASTY REVISION Right 5/19/2021    Procedure: KNEE POLY INSERT EXCHANGE with irrigation;  Surgeon: Ravi Fagan MD;  Location: [facility] MAIN OR;  Service: Orthopedics;  Laterality: Right;      General Information     Row Name 04/01/23 2137          Physical Therapy Time and Intention    Document Type evaluation  -PG     Mode of Treatment physical therapy  -PG     Row Name 04/01/23 2137          General Information    Patient Profile Reviewed yes  -PG     Prior Level of Function independent:  -PG     Existing Precautions/Restrictions oxygen therapy device and L/min  -PG     Barriers to Rehab none identified  -PG     Row Name 04/01/23 2137          Living Environment    People in Home spouse  -PG     Row Name 04/01/23 2137          Home Main Entrance    Number of Stairs, Main Entrance one  -PG     Row Name 04/01/23 2137          Cognition    Orientation Status (Cognition) oriented x 4  -PG     Row Name 04/01/23 2137          Safety Issues, Functional Mobility    Safety Issues Affecting Function (Mobility) safety precaution awareness  -PG     Impairments Affecting Function (Mobility) balance;postural/trunk control;endurance/activity tolerance;shortness of breath;strength  -PG           User Key  (r) = Recorded By, (t) = Taken By, (c) = Cosigned By    Initials Name Provider Type    PG Letitia Brown, PT Physical Therapist               Mobility     Row Name 04/01/23 2138          Bed Mobility    Bed Mobility bed mobility (all) activities  -PG     Comment, (Bed  Mobility) --  Patient was sitting at the EOB  -PG     Row Name 04/01/23 2138          Sit-Stand Transfer    Sit-Stand [place] (Transfers) independent  -PG     Row Name 04/01/23 2138          Gait/Stairs (Locomotion)    [place] Level (Gait) independent  -PG     Distance in Feet (Gait) 30ft  -PG           User Key  (r) = Recorded By, (t) = Taken By, (c) = Cosigned By    Initials Name Provider Type    Letitia Lujan PT Physical Therapist               Obj/Interventions     Row Name 04/01/23 2138          Range of Motion Comprehensive    General Range of Motion bilateral upper extremity ROM WFL;bilateral lower extremity ROM WFL  -PG     Row Name 04/01/23 2138          Strength Comprehensive (MMT)    General Manual Muscle Testing (MMT) Assessment no strength deficits identified  -PG     Row Name 04/01/23 2138          Balance    Balance Assessment sitting static balance;sitting dynamic balance;sit to stand dynamic balance;standing dynamic balance  -PG     Static Sitting Balance modified independence  -PG     Dynamic Sitting Balance independent  -PG     Position, Sitting Balance sitting edge of bed  -PG     Sit to Stand Dynamic Balance independent  -PG     Dynamic Standing Balance independent  -PG           User Key  (r) = Recorded By, (t) = Taken By, (c) = Cosigned By    Initials Name Provider Type    Letitia Lujan, VANESSA Physical Therapist               Goals/Plan    No documentation.                Clinical Impression     Row Name 04/01/23 2139          Pain    Pretreatment Pain Rating 0/10 - no pain  -PG     Posttreatment Pain Rating 0/10 - no pain  -PG     Row Name 04/01/23 2139          Plan of Care Review    Plan of Care Reviewed With patient  -PG     Progress improving  -PG     Outcome Evaluation Patient 73 yo male admitted to the hospital with the complaint of Cough and dyspnea. Recent Dx includes AECOPD, Diabetes mellitus, Hypertension, CAD, Heart failure, Hyperlipidemia. At baseline, patient  resides with spouse in [facility] and is independent in mobility and ADLs with occassional use of a cane. As per today's evaluation, patient is Ind in transfers and ambulation of around 30ft. Patient reported no falls in the last 6 months. Once medically stable, patient is safe to go home with family at d/c.  -PG     Row Name 04/01/23 2139          Therapy Assessment/Plan (PT)    Criteria for Skilled Interventions Met (PT) no;no problems identified which require skilled intervention  -PG     Therapy Frequency (PT) evaluation only  -PG     Row Name 04/01/23 2139          Positioning and Restraints    Pre-Treatment Position in bed  -PG     Post Treatment Position bed  -PG     In Bed notified nsg;call light within reach;encouraged to call for assist;sitting EOB  -PG           User Key  (r) = Recorded By, (t) = Taken By, (c) = Cosigned By    Initials Name Provider Type    Letitia Lujan PT Physical Therapist               Outcome Measures     Row Name 04/01/23 2142          How much help from another person do you currently need...    Turning from your back to your side while in flat bed without using bedrails? 4  -PG     Moving from lying on back to sitting on the side of a flat bed without bedrails? 4  -PG     Moving to and from a bed to a chair (including a wheelchair)? 4  -PG     Standing up from a chair using your arms (e.g., wheelchair, bedside chair)? 4  -PG     Climbing 3-5 steps with a railing? 4  -PG     To walk in hospital room? 4  -PG     AM-PAC 6 Clicks Score (PT) 24  -PG     Highest level of mobility 8 --> Walked 250 feet or more  -PG     Row Name 04/01/23 2142          Functional Assessment    Outcome Measure Options AM-PAC 6 Clicks Basic Mobility (PT)  -PG           User Key  (r) = Recorded By, (t) = Taken By, (c) = Cosigned By    Initials Name Provider Type    Letitia Lujan PT Physical Therapist                             Physical Therapy Education     Title: PT OT SLP Therapies (Done)      Topic: Physical Therapy (Done)     Point: Precautions (Done)     Learning Progress Summary           Patient Acceptance, E, VU by PG at 4/1/2023 2142                               User Key     Initials Effective Dates Name Provider Type Discipline    PG 09/01/22 -  Letitia Brown PT Physical Therapist PT              PT Recommendation and Plan     Plan of Care Reviewed With: patient  Progress: improving  Outcome Evaluation: Patient 73 yo male admitted to the hospital with the complaint of Cough and dyspnea. Recent Dx includes AECOPD, Diabetes mellitus, Hypertension, CAD, Heart failure, Hyperlipidemia. At baseline, patient resides with spouse in [facility] and is independent in mobility and ADLs with occassional use of a cane. As per today's evaluation, patient is Ind in transfers and ambulation of around 30ft. Patient reported no falls in the last 6 months. Once medically stable, patient is safe to go home with family at d/c.     Time Calculation:    PT Charges     Row Name 04/01/23 2143             Time Calculation    Start Time 1504  -PG      Stop Time 1514  -PG      Time Calculation (min) 10 min  -PG      PT Received On 04/01/23  -PG         Time Calculation- PT    Total Timed Code Minutes- PT 0 minute(s)  -PG            User Key  (r) = Recorded By, (t) = Taken By, (c) = Cosigned By    Initials Name Provider Type    PG Letitia Brown PT Physical Therapist              Therapy Charges for Today     Code Description Service Date Service Provider Modifiers Qty    64343625843 HC PT EVAL LOW COMPLEXITY 3 4/1/2023 Letitia Brown PT GP 1          PT G-Codes  Outcome Measure Options: AM-PAC 6 Clicks Basic Mobility (PT)  AM-PAC 6 Clicks Score (PT): 24  PT Discharge Summary  Anticipated Discharge Disposition (PT): home, home with assist    Letitia Brown PT  4/1/2023

## 2023-04-02 NOTE — PROGRESS NOTES
"PULMONARY CRITICAL CARE PROGRESS  NOTE      PATIENT IDENTIFICATION:  Name: Brenden Peter  MRN: [id number]  :  1950     Age: 72 y.o.  Sex: male    DATE OF Note:  2023   Referring Physician: Dunia Liz MD                  Subjective:   Feeling better, no new issue, no SOB, no chest pain, no nausea or vomiting, no change in bowel habit, no dysuria,  no new  skin rash or itching.      Objective:  tMax 24 hrs: Temp (24hrs), Av.3 °F (36.3 °C), Min:96.4 °F (35.8 °C), Max:98.3 °F (36.8 °C)      Vitals Ranges:   Temp:  [96.4 °F (35.8 °C)-98.3 °F (36.8 °C)] 97.5 °F (36.4 °C)  Heart Rate:  [60-74] 70  Resp:  [11-20] 18  BP: (107-157)/() 115/62    Intake and Output Last 3 Shifts:   I/O last 3 completed shifts:  In: 960 [P.O.:960]  Out: 1600 [Urine:1600]    Exam:  /62   Pulse 70   Temp 97.5 °F (36.4 °C) (Oral)   Resp 18   Ht 170.2 cm (67\")   Wt 104 kg (228 lb 13.4 oz)   SpO2 96%   BMI 35.84 kg/m²     General Appearance:     HEENT:  Normocephalic, without obvious abnormality. Conjunctivae/corneas clear.  Normal external ear canals. Nares normal, no drainage     Neck:  Supple, symmetrical, trachea midline. No JVD.  Lungs /Chest wall:   Bilateral basal rhonchi, respirations unlabored, symmetrical wall movement.     Heart:  Regular rate and rhythm, systolic murmur PMI left sternal border  Abdomen: Soft, nontender, no masses, no organomegaly.    Extremities: Trace edema, no clubbing or cyanosis        Medications:  amLODIPine, 5 mg, Oral, Nightly  arformoterol, 15 mcg, Nebulization, BID - RT  aspirin, 81 mg, Oral, Daily  atorvastatin, 40 mg, Oral, Daily  bisoprolol, 5 mg, Oral, Daily  budesonide, 0.5 mg, Nebulization, BID - RT  cefTRIAXone, 1 g, Intravenous, Q24H  doxycycline, 100 mg, Oral, Q12H  enoxaparin, 40 mg, Subcutaneous, Q24H  furosemide, 40 mg, Oral, Daily  gabapentin, 300 mg, Oral, TID  guaiFENesin, 1,200 mg, Oral, Q12H  insulin lispro, 3-14 Units, Subcutaneous, TID With " Meals  insulin regular, 8 Units, Subcutaneous, Q8H  lisinopril, 40 mg, Oral, Nightly  methylPREDNISolone sodium succinate, 40 mg, Intravenous, Q8H  polyethylene glycol, 17 g, Oral, Daily  roflumilast, 250 mcg, Oral, Daily  senna-docusate sodium, 2 tablet, Oral, BID  sertraline, 50 mg, Oral, Nightly  sodium chloride, 10 mL, Intravenous, Q12H        Infusion:        PRN:  •  benzonatate  •  dextrose  •  dextrose  •  glucagon (human recombinant)  •  ipratropium-albuterol  •  melatonin  •  ondansetron **OR** ondansetron  •  sodium chloride  •  sodium chloride  Data Review:  All labs (24hrs):   Recent Results (from the past 24 hour(s))   POC Glucose Once    Collection Time: 04/01/23 11:27 AM    Specimen: Blood   Result Value Ref Range    Glucose 282 (H) 70 - 105 mg/dL   POC Glucose Once    Collection Time: 04/01/23  4:13 PM    Specimen: Blood   Result Value Ref Range    Glucose 345 (H) 70 - 105 mg/dL   Respiratory Culture - Sputum, Cough    Collection Time: 04/01/23  4:14 PM    Specimen: Cough; Sputum   Result Value Ref Range    Gram Stain Many (4+) WBCs per low power field     Gram Stain Moderate (3+) Epithelial cells per low power field     Gram Stain Many (4+) Mixed gram positive genny    POC Glucose Once    Collection Time: 04/01/23  7:34 PM    Specimen: Blood   Result Value Ref Range    Glucose 344 (H) 70 - 105 mg/dL   POC Glucose Once    Collection Time: 04/02/23  7:21 AM    Specimen: Blood   Result Value Ref Range    Glucose 252 (H) 70 - 105 mg/dL   CBC Auto Differential    Collection Time: 04/02/23  8:24 AM    Specimen: Blood   Result Value Ref Range    WBC 8.10 3.40 - 10.80 10*3/mm3    RBC 3.84 (L) 4.14 - 5.80 10*6/mm3    Hemoglobin 10.9 (L) 13.0 - 17.7 g/dL    Hematocrit 33.1 (L) 37.5 - 51.0 %    MCV 86.0 79.0 - 97.0 fL    MCH 28.3 26.6 - 33.0 pg    MCHC 32.9 31.5 - 35.7 g/dL    RDW 15.9 (H) 12.3 - 15.4 %    RDW-SD 51.2 37.0 - 54.0 fl    MPV 10.1 6.0 - 12.0 fL    Platelets 154 140 - 450 10*3/mm3    Neutrophil %  88.8 (H) 42.7 - 76.0 %    Lymphocyte % 7.5 (L) 19.6 - 45.3 %    Monocyte % 3.6 (L) 5.0 - 12.0 %    Eosinophil % 0.0 (L) 0.3 - 6.2 %    Basophil % 0.1 0.0 - 1.5 %    Neutrophils, Absolute 7.20 (H) 1.70 - 7.00 10*3/mm3    Lymphocytes, Absolute 0.60 (L) 0.70 - 3.10 10*3/mm3    Monocytes, Absolute 0.30 0.10 - 0.90 10*3/mm3    Eosinophils, Absolute 0.00 0.00 - 0.40 10*3/mm3    Basophils, Absolute 0.00 0.00 - 0.20 10*3/mm3    nRBC 0.0 0.0 - 0.2 /100 WBC   Basic Metabolic Panel    Collection Time: 04/02/23  8:24 AM    Specimen: Blood   Result Value Ref Range    Glucose 237 (H) 65 - 99 mg/dL    BUN 25 (H) 8 - 23 mg/dL    Creatinine 0.59 (L) 0.76 - 1.27 mg/dL    Sodium 138 136 - 145 mmol/L    Potassium 4.3 3.5 - 5.2 mmol/L    Chloride 99 98 - 107 mmol/L    CO2 30.0 (H) 22.0 - 29.0 mmol/L    Calcium 9.1 8.6 - 10.5 mg/dL    BUN/Creatinine Ratio 42.4 (H) 7.0 - 25.0    Anion Gap 9.0 5.0 - 15.0 mmol/L    eGFR 103.1 >60.0 mL/min/1.73   Magnesium    Collection Time: 04/02/23  8:24 AM    Specimen: Blood   Result Value Ref Range    Magnesium 2.0 1.6 - 2.4 mg/dL        Imaging:  XR Chest 1 View  Narrative: XR CHEST 1 VW    Date of Exam: 3/31/2023 7:04 PM EDT    Indication: fever cough.    Comparison: CT chest without contrast 3/9/2023    Findings:  Heart mildly enlarged, stable. Prominence of the interstitium related to emphysema. No pneumothorax or pleural effusion. No new consolidation. Osseous structures grossly intact.  Impression: Impression:  Stable chronic findings without acute process.    Electronically Signed: Wang Alba    3/31/2023 7:21 PM EDT    Workstation ID: [id number]       ASSESSMENT:  Acute hypoxic respiratory failure  Interstitial pneumonia  AE COPD  DM2  Hypertension  CAD  Depression  Obesity  CHF        PLAN:  Antibiotics  Change to oral steroid  Bronchodilators  Inhaled corticosteroids  Incentive spirometer  Diuresis and monitor MIGDALIA's  Bowel regimen   Electrolytes/ glycemic control  DVT and GI  prophylaxis    Total Critical care time in direct medical management (   ) minutes. This time specifically excludes time spent performing procedures.  Jeannine Ford MD. D, ABSM.     4/2/2023  10:24 EDT

## 2023-04-02 NOTE — PLAN OF CARE
Goal Outcome Evaluation:  Plan of Care Reviewed With: patient        Progress: improving  Outcome Evaluation: Patient 73 yo male admitted to the hospital with the complaint of Cough and dyspnea. Recent Dx includes AECOPD, Diabetes mellitus, Hypertension, CAD, Heart failure, Hyperlipidemia. At baseline, patient resides with spouse in [facility] and is independent in mobility and ADLs with occassional use of a cane. As per today's evaluation, patient is Ind in transfers and ambulation of around 30ft. Patient reported no falls in the last 6 months. Once medically stable, patient is safe to go home with family at d/c.

## 2023-04-02 NOTE — PLAN OF CARE
Goal Outcome Evaluation:        Problem: Adult Inpatient Plan of Care  Goal: Plan of Care Review  Outcome: Ongoing, Progressing  Goal: Patient-Specific Goal (Individualized)  Outcome: Ongoing, Progressing  Goal: Absence of Hospital-Acquired Illness or Injury  Outcome: Ongoing, Progressing  Goal: Optimal Comfort and Wellbeing  Outcome: Ongoing, Progressing  Goal: Readiness for Transition of Care  Outcome: Ongoing, Progressing     Problem: COPD (Chronic Obstructive Pulmonary Disease) Comorbidity  Goal: Maintenance of COPD Symptom Control  Outcome: Ongoing, Progressing     Problem: Heart Failure Comorbidity  Goal: Maintenance of Heart Failure Symptom Control  Outcome: Ongoing, Progressing     Problem: Hypertension Comorbidity  Goal: Blood Pressure in Desired Range  Outcome: Ongoing, Progressing     Problem: Pain Acute  Goal: Acceptable Pain Control and Functional Ability  Outcome: Ongoing, Progressing     Problem: Fall Injury Risk  Goal: Absence of Fall and Fall-Related Injury  Outcome: Ongoing, Progressing     Problem: Gas Exchange Impaired  Goal: Optimal Gas Exchange  Outcome: Ongoing, Progressing

## 2023-04-02 NOTE — PLAN OF CARE
Problem: Adult Inpatient Plan of Care  Goal: Plan of Care Review  Outcome: Ongoing, Progressing  Flowsheets (Taken 4/1/2023 2139 by Letitia Brown, PT)  Progress: improving  Plan of Care Reviewed With: patient  Outcome Evaluation: Patient 73 yo male admitted to the hospital with the complaint of Cough and dyspnea. Recent Dx includes AECOPD, Diabetes mellitus, Hypertension, CAD, Heart failure, Hyperlipidemia. At baseline, patient resides with spouse in [facility] and is independent in mobility and ADLs with occassional use of a cane. As per today's evaluation, patient is Ind in transfers and ambulation of around 30ft. Patient reported no falls in the last 6 months. Once medically stable, patient is safe to go home with family at d/c.  Goal: Patient-Specific Goal (Individualized)  Outcome: Ongoing, Progressing  Goal: Absence of Hospital-Acquired Illness or Injury  Outcome: Ongoing, Progressing  Intervention: Identify and Manage Fall Risk  Recent Flowsheet Documentation  Taken 4/2/2023 0200 by Delores Cordero RN  Safety Promotion/Fall Prevention:   safety round/check completed   activity supervised   assistive device/personal items within reach   clutter free environment maintained   fall prevention program maintained   lighting adjusted   mobility aid in reach   muscle strengthening facilitated   nonskid shoes/slippers when out of bed   room organization consistent   toileting scheduled  Taken 4/2/2023 0000 by Delores Cordero, RN  Safety Promotion/Fall Prevention:   activity supervised   assistive device/personal items within reach   clutter free environment maintained   fall prevention program maintained   lighting adjusted   muscle strengthening facilitated   nonskid shoes/slippers when out of bed   room organization consistent   safety round/check completed   toileting scheduled  Taken 4/1/2023 2200 by Delores Cordero, RN  Safety Promotion/Fall Prevention:   safety round/check completed   activity supervised    assistive device/personal items within reach   clutter free environment maintained   fall prevention program maintained   lighting adjusted   muscle strengthening facilitated   mobility aid in reach   nonskid shoes/slippers when out of bed   room organization consistent   toileting scheduled  Taken 4/1/2023 2000 by Delores Crodero RN  Safety Promotion/Fall Prevention:   activity supervised   assistive device/personal items within reach   clutter free environment maintained   fall prevention program maintained   lighting adjusted   mobility aid in reach   muscle strengthening facilitated   nonskid shoes/slippers when out of bed   room organization consistent   safety round/check completed   toileting scheduled  Intervention: Prevent Skin Injury  Recent Flowsheet Documentation  Taken 4/2/2023 0200 by Delores Cordero RN  Body Position:   position changed independently   supine  Taken 4/2/2023 0000 by Delores Cordero RN  Body Position:   position changed independently   turned   right  Taken 4/1/2023 2200 by Delores Cordero RN  Body Position:   position changed independently   turned   supine  Taken 4/1/2023 2000 by Delores Cordero RN  Body Position:   position changed independently   left   turned  Skin Protection:   adhesive use limited   incontinence pads utilized   pulse oximeter probe site changed   tubing/devices free from skin contact  Intervention: Prevent and Manage VTE (Venous Thromboembolism) Risk  Recent Flowsheet Documentation  Taken 4/2/2023 0000 by Delores Cordero RN  VTE Prevention/Management:   bilateral   sequential compression devices on  Range of Motion: active ROM (range of motion) encouraged  Taken 4/1/2023 2000 by Delores Cordero RN  Activity Management: back to bed  VTE Prevention/Management:   bilateral   sequential compression devices on  Range of Motion:   active ROM (range of motion) encouraged   ROM (range of motion) performed  Intervention: Prevent Infection  Recent Flowsheet  Documentation  Taken 4/2/2023 0200 by Delores Cordero RN  Infection Prevention:   cohorting utilized   environmental surveillance performed   hand hygiene promoted   personal protective equipment utilized   rest/sleep promoted   single patient room provided  Taken 4/2/2023 0000 by Delores Cordero RN  Infection Prevention:   cohorting utilized   environmental surveillance performed   hand hygiene promoted   rest/sleep promoted   personal protective equipment utilized   single patient room provided  Taken 4/1/2023 2200 by Delores Cordero RN  Infection Prevention:   cohorting utilized   environmental surveillance performed   hand hygiene promoted   personal protective equipment utilized   rest/sleep promoted   single patient room provided  Taken 4/1/2023 2000 by Delores Cordero RN  Infection Prevention:   cohorting utilized   environmental surveillance performed   equipment surfaces disinfected   hand hygiene promoted   rest/sleep promoted   personal protective equipment utilized   single patient room provided  Goal: Optimal Comfort and Wellbeing  Outcome: Ongoing, Progressing  Intervention: Monitor Pain and Promote Comfort  Recent Flowsheet Documentation  Taken 4/2/2023 0200 by Delores Cordero RN  Pain Management Interventions:   quiet environment facilitated   relaxation techniques promoted   unnecessary movement minimized  Taken 4/2/2023 0000 by Delores Cordero RN  Pain Management Interventions:   pain management plan reviewed with patient/caregiver   care clustered   pillow support provided   position adjusted   quiet environment facilitated   relaxation techniques promoted   unnecessary movement minimized  Taken 4/1/2023 2200 by Delores Cordero RN  Pain Management Interventions:   quiet environment facilitated   relaxation techniques promoted   unnecessary movement minimized   care clustered   pain management plan reviewed with patient/caregiver   pillow support provided   position adjusted  Taken 4/1/2023 2000 by  Delores Cordero RN  Pain Management Interventions:   pain management plan reviewed with patient/caregiver   care clustered   pillow support provided   position adjusted   quiet environment facilitated   relaxation techniques promoted   unnecessary movement minimized  Intervention: Provide Person-Centered Care  Recent Flowsheet Documentation  Taken 4/2/2023 0000 by Delores Cordero RN  Trust Relationship/Rapport:   care explained   choices provided   emotional support provided   empathic listening provided   questions answered   questions encouraged   reassurance provided   thoughts/feelings acknowledged  Taken 4/1/2023 2000 by Delores Cordero RN  Trust Relationship/Rapport:   care explained   choices provided   emotional support provided   empathic listening provided   questions answered   reassurance provided   questions encouraged   thoughts/feelings acknowledged  Goal: Readiness for Transition of Care  Outcome: Ongoing, Progressing     Problem: COPD (Chronic Obstructive Pulmonary Disease) Comorbidity  Goal: Maintenance of COPD Symptom Control  Outcome: Ongoing, Progressing  Intervention: Maintain COPD-Symptom Control  Recent Flowsheet Documentation  Taken 4/2/2023 0200 by Delores Cordero RN  Medication Review/Management:   medications reviewed   high-risk medications identified  Taken 4/2/2023 0000 by Delores Cordero RN  Supportive Measures:   active listening utilized   relaxation techniques promoted   problem-solving facilitated   self-reflection promoted   self-responsibility promoted   verbalization of feelings encouraged   self-care encouraged  Medication Review/Management:   medications reviewed   high-risk medications identified  Taken 4/1/2023 2200 by Delores Cordero RN  Medication Review/Management:   medications reviewed   high-risk medications identified  Taken 4/1/2023 2000 by Delores Cordero RN  Supportive Measures:   active listening utilized   goal-setting facilitated   positive reinforcement  provided   problem-solving facilitated   relaxation techniques promoted   self-reflection promoted   self-care encouraged   self-responsibility promoted   verbalization of feelings encouraged  Medication Review/Management:   medications reviewed   high-risk medications identified     Problem: Heart Failure Comorbidity  Goal: Maintenance of Heart Failure Symptom Control  Outcome: Ongoing, Progressing  Intervention: Maintain Heart Failure-Management  Recent Flowsheet Documentation  Taken 4/2/2023 0200 by Delores Cordero RN  Medication Review/Management:   medications reviewed   high-risk medications identified  Taken 4/2/2023 0000 by Delores Cordero RN  Medication Review/Management:   medications reviewed   high-risk medications identified  Taken 4/1/2023 2200 by Delores Cordero RN  Medication Review/Management:   medications reviewed   high-risk medications identified  Taken 4/1/2023 2000 by Delores Cordero RN  Medication Review/Management:   medications reviewed   high-risk medications identified     Problem: Hypertension Comorbidity  Goal: Blood Pressure in Desired Range  Outcome: Ongoing, Progressing  Intervention: Maintain Blood Pressure Management  Recent Flowsheet Documentation  Taken 4/2/2023 0200 by Delores Cordero RN  Medication Review/Management:   medications reviewed   high-risk medications identified  Taken 4/2/2023 0000 by Delores Cordero RN  Medication Review/Management:   medications reviewed   high-risk medications identified  Taken 4/1/2023 2200 by Delores Cordero RN  Medication Review/Management:   medications reviewed   high-risk medications identified  Taken 4/1/2023 2000 by Delores Cordero RN  Medication Review/Management:   medications reviewed   high-risk medications identified     Problem: Pain Acute  Goal: Acceptable Pain Control and Functional Ability  Outcome: Ongoing, Progressing  Intervention: Prevent or Manage Pain  Recent Flowsheet Documentation  Taken 4/2/2023 0200 by Delores Cordero  RN  Medication Review/Management:   medications reviewed   high-risk medications identified  Taken 4/2/2023 0000 by Delores Cordero RN  Sensory Stimulation Regulation:   lighting decreased   care clustered   quiet environment promoted   television on  Sleep/Rest Enhancement:   awakenings minimized   consistent schedule promoted   family presence promoted   noise level reduced   relaxation techniques promoted   room darkened   regular sleep/rest pattern promoted  Medication Review/Management:   medications reviewed   high-risk medications identified  Taken 4/1/2023 2200 by Delores Cordero RN  Medication Review/Management:   medications reviewed   high-risk medications identified  Taken 4/1/2023 2000 by Delores Cordero RN  Sensory Stimulation Regulation:   care clustered   lighting decreased   quiet environment promoted   television on  Bowel Elimination Promotion:   adequate fluid intake promoted   privacy promoted  Sleep/Rest Enhancement:   awakenings minimized   consistent schedule promoted   family presence promoted   noise level reduced   regular sleep/rest pattern promoted   relaxation techniques promoted   room darkened  Medication Review/Management:   medications reviewed   high-risk medications identified  Intervention: Develop Pain Management Plan  Recent Flowsheet Documentation  Taken 4/2/2023 0200 by Delores Cordero RN  Pain Management Interventions:   quiet environment facilitated   relaxation techniques promoted   unnecessary movement minimized  Taken 4/2/2023 0000 by Delores Cordero RN  Pain Management Interventions:   pain management plan reviewed with patient/caregiver   care clustered   pillow support provided   position adjusted   quiet environment facilitated   relaxation techniques promoted   unnecessary movement minimized  Taken 4/1/2023 2200 by Delores Cordero RN  Pain Management Interventions:   quiet environment facilitated   relaxation techniques promoted   unnecessary movement minimized    care clustered   pain management plan reviewed with patient/caregiver   pillow support provided   position adjusted  Taken 4/1/2023 2000 by Delores Cordero RN  Pain Management Interventions:   pain management plan reviewed with patient/caregiver   care clustered   pillow support provided   position adjusted   quiet environment facilitated   relaxation techniques promoted   unnecessary movement minimized  Intervention: Optimize Psychosocial Wellbeing  Recent Flowsheet Documentation  Taken 4/2/2023 0000 by Delores Cordero RN  Supportive Measures:   active listening utilized   relaxation techniques promoted   problem-solving facilitated   self-reflection promoted   self-responsibility promoted   verbalization of feelings encouraged   self-care encouraged  Diversional Activities: television  Spiritual Activities Assistance: hope instilled  Taken 4/1/2023 2000 by Delores Cordero RN  Supportive Measures:   active listening utilized   goal-setting facilitated   positive reinforcement provided   problem-solving facilitated   relaxation techniques promoted   self-reflection promoted   self-care encouraged   self-responsibility promoted   verbalization of feelings encouraged  Diversional Activities: television  Spiritual Activities Assistance: hope instilled     Problem: Fall Injury Risk  Goal: Absence of Fall and Fall-Related Injury  Outcome: Ongoing, Progressing  Intervention: Identify and Manage Contributors  Recent Flowsheet Documentation  Taken 4/2/2023 0200 by Delores Cordero RN  Medication Review/Management:   medications reviewed   high-risk medications identified  Taken 4/2/2023 0000 by Delores Cordero RN  Medication Review/Management:   medications reviewed   high-risk medications identified  Taken 4/1/2023 2200 by Delores Cordero RN  Medication Review/Management:   medications reviewed   high-risk medications identified  Taken 4/1/2023 2000 by Delores Cordero RN  Medication Review/Management:   medications  reviewed   high-risk medications identified  Intervention: Promote Injury-Free Environment  Recent Flowsheet Documentation  Taken 4/2/2023 0200 by Delores Cordero RN  Safety Promotion/Fall Prevention:   safety round/check completed   activity supervised   assistive device/personal items within reach   clutter free environment maintained   fall prevention program maintained   lighting adjusted   mobility aid in reach   muscle strengthening facilitated   nonskid shoes/slippers when out of bed   room organization consistent   toileting scheduled  Taken 4/2/2023 0000 by Delores Cordero RN  Safety Promotion/Fall Prevention:   activity supervised   assistive device/personal items within reach   clutter free environment maintained   fall prevention program maintained   lighting adjusted   muscle strengthening facilitated   nonskid shoes/slippers when out of bed   room organization consistent   safety round/check completed   toileting scheduled  Taken 4/1/2023 2200 by Delores Cordero RN  Safety Promotion/Fall Prevention:   safety round/check completed   activity supervised   assistive device/personal items within reach   clutter free environment maintained   fall prevention program maintained   lighting adjusted   muscle strengthening facilitated   mobility aid in reach   nonskid shoes/slippers when out of bed   room organization consistent   toileting scheduled  Taken 4/1/2023 2000 by Delores Cordero RN  Safety Promotion/Fall Prevention:   activity supervised   assistive device/personal items within reach   clutter free environment maintained   fall prevention program maintained   lighting adjusted   mobility aid in reach   muscle strengthening facilitated   nonskid shoes/slippers when out of bed   room organization consistent   safety round/check completed   toileting scheduled     Problem: Gas Exchange Impaired  Goal: Optimal Gas Exchange  Outcome: Ongoing, Progressing  Intervention: Optimize Oxygenation and  Ventilation  Recent Flowsheet Documentation  Taken 4/2/2023 0200 by Delores Cordero RN  Head of Bed (HOB) Positioning: HOB at 30-45 degrees  Taken 4/2/2023 0000 by Delores Cordero RN  Head of Bed ([facility] [place]) Positioning: HOB at 30-45 degrees  Taken 4/1/2023 2200 by Delores Cordero RN  Head of Bed (HOB) Positioning: HOB at 60 degrees  Taken 4/1/2023 2000 by Delores Cordero RN  Head of Bed ([facility] [place]) Positioning: HOB at 60 degrees  Airway/Ventilation Management:   airway patency maintained   humidification applied   calming measures promoted   pulmonary hygiene promoted   Goal Outcome Evaluation:  Plan of Care Reviewed With: patient        Progress: improving

## 2023-04-02 NOTE — PROGRESS NOTES
[facility] Medicine Services Daily Progress Note    Patient Name: Brenden Peter  : 1950  MRN: [id number]  Primary Care Physician:  Bert Rojas MD  Date of admission: 3/31/2023      Subjective      Chief Complaint: Cough and dyspnea     History of Present Illness: Brenden Peter is a 72 y.o. male who presented to [facility] on 3/31/2023 complaining of worsening cough and dyspnea.  Patient reports some baseline dyspnea and wears continuous O2 at home.  Over the past several days he has had increasing dyspnea as well as a nonproductive cough and a fever with a Tmax of 102.0.  Some nausea without vomiting was also reported but he denies any pain including chest pain, palpitations, peripheral edema, lightheadedness, syncope or near syncope.  Patient was recently started on azithromycin by primary care provider without significant improvement noted.  Generally patient reports that when he becomes short of breath he is able to rest which results in improvement but this has been ineffective lately.  He confirms compliance with all of his outpatient medical therapies.        23-vss Feeling better, denies SOB, no chest pain, no nausea or vomiting, no change in bowel habit, no dysuria,  no new  skin rash or itching.       ROS Review of Systems   Constitutional: Positive for fever.   HENT: Negative.    Eyes: Negative.    Cardiovascular: Positive for dyspnea on exertion. Negative for chest pain.   Respiratory: Positive for cough and shortness of breath. Negative for sputum production.    Skin: Negative.    Musculoskeletal: Negative.    Gastrointestinal: Negative.    Genitourinary: Negative.    Neurological: Negative.    Psychiatric/Behavioral: Negative.    Objective      Vitals:   Temp:  [96.4 °F (35.8 °C)-98.3 °F (36.8 °C)] 97.5 °F (36.4 °C)  Heart Rate:  [60-74] 70  Resp:  [11-20] 18  BP: (107-157)/() 115/62  Flow (L/min):  [8-15] 8    Physical Exam    General: He is not in acute distress.     Appearance: Normal appearance. He is obese. He is not ill-appearing, toxic-appearing or diaphoretic.   HENT:      Head: Normocephalic.      Right Ear: External ear normal.      Left Ear: External ear normal.      Nose: Nose normal.      Mouth/Throat:      Mouth: Mucous membranes are moist.   Eyes:      Extraocular Movements: Extraocular movements intact.   Cardiovascular:      Rate and Rhythm: Normal rate and regular rhythm.      Pulses: Normal pulses.      Heart sounds: Normal heart sounds.   Pulmonary:      Effort: Pulmonary effort is normal.      Breath sounds: Wheezing and rhonchi present.   Abdominal:      General: Bowel sounds are normal.      Palpations: Abdomen is soft.      Tenderness: There is no abdominal tenderness.   Musculoskeletal:         General: Normal range of motion.      Cervical back: Normal range of motion.      Right lower leg: No edema.      Left lower leg: No edema.   Skin:     General: Skin is warm and dry.      Capillary Refill: Capillary refill takes less than 2 seconds.   Neurological:      General: No focal deficit present.      Mental Status: He is alert and oriented to person, place, and time.   Psychiatric:         Mood and Affect: Mood normal.         Behavior: Behavior normal.         Thought Content: Thought content normal.         Judgment: Judgment normal.        Result Review    Result Review:  I have personally reviewed the results from the time of this admission to 4/2/2023 10:14 EDT and agree with these findings:  []  Laboratory  []  Microbiology  []  Radiology  []  EKG/Telemetry   []  Cardiology/Vascular   []  Pathology  []  Old records  []  Other:  Most notable findings include:   CBC:      Lab 04/02/23  0824 04/01/23  0422 03/31/23  1909   WBC 8.10 7.30 5.60   HEMOGLOBIN 10.9* 11.1* 10.9*   HEMATOCRIT 33.1* 33.8* 34.5*   PLATELETS 154 136* 144   NEUTROS ABS 7.20* 6.70 4.10   LYMPHS ABS 0.60* 0.40* 0.70   MONOS ABS 0.30 0.10 0.70    EOS ABS 0.00 0.00 0.00   MCV 86.0 86.9 89.0     CMP:      Lab 04/02/23  0824 04/01/23  0422 03/31/23  1909   SODIUM 138 137 141   POTASSIUM 4.3 4.1 3.8   CHLORIDE 99 97* 100   CO2 30.0* 27.0 30.0*   ANION GAP 9.0 13.0 11.0   BUN 25* 18 10   CREATININE 0.59* 0.78 0.68*   EGFR 103.1 94.8 98.8   GLUCOSE 237* 351* 141*   CALCIUM 9.1 8.7 8.8   MAGNESIUM 2.0 1.6  --    TOTAL PROTEIN  --   --  7.3   ALBUMIN  --   --  3.9   GLOBULIN  --   --  3.4   ALT (SGPT)  --   --  19   AST (SGOT)  --   --  22   BILIRUBIN  --   --  0.4   ALK PHOS  --   --  63     Blood Culture   Date Value Ref Range Status   03/31/2023 No growth at 24 hours  Preliminary   03/31/2023 No growth at 24 hours  Preliminary     No results found for: BCIDPCR, CXREFLEX, CSFCX, CULTURETIS  No results found for: CULTURES, HSVCX, URCX  No results found for: EYECULTURE, GCCX, HSVCULTURE, LABHSV  No results found for: LEGIONELLA, MRSACX, MUMPSCX, MYCOPLASCX  No results found for: NOCARDIACX, STOOLCX  No results found for: THROATCX, UNSTIMCULT, URINECX, CULTURE, VZVCULTUR  No results found for: VIRALCULTU, WOUNDCX      Assessment & Plan      Brief Patient Summary:  Brenden Peter is a 72 y.o. male who       amLODIPine, 5 mg, Oral, Nightly  arformoterol, 15 mcg, Nebulization, BID - RT  aspirin, 81 mg, Oral, Daily  atorvastatin, 40 mg, Oral, Daily  bisoprolol, 5 mg, Oral, Daily  budesonide, 0.5 mg, Nebulization, BID - RT  cefTRIAXone, 1 g, Intravenous, Q24H  doxycycline, 100 mg, Oral, Q12H  enoxaparin, 40 mg, Subcutaneous, Q24H  furosemide, 40 mg, Oral, Daily  gabapentin, 300 mg, Oral, TID  guaiFENesin, 1,200 mg, Oral, Q12H  insulin lispro, 3-14 Units, Subcutaneous, TID With Meals  insulin regular, 8 Units, Subcutaneous, Q8H  lisinopril, 40 mg, Oral, Nightly  methylPREDNISolone sodium succinate, 40 mg, Intravenous, Q8H  polyethylene glycol, 17 g, Oral, Daily  roflumilast, 250 mcg, Oral, Daily  senna-docusate sodium, 2 tablet, Oral, BID  sertraline, 50 mg, Oral,  Nightly  sodium chloride, 10 mL, Intravenous, Q12H             Active Hospital Problems:  Active Hospital Problems    Diagnosis    • **Interstitial pneumonia    • (HFpEF) heart failure with preserved ejection fraction    • COPD exacerbation    • Obesity (BMI 30-39.9)    • Depression    • Type 2 diabetes mellitus without complication, without long-term current use of insulin (HCC)    • Mixed hyperlipidemia    • Essential hypertension    • Coronary artery disease involving native coronary artery of native heart without angina pectoris      AECOPD  -O2 saturations reported in the 70s-80s, has improved to mid to high 90s on high flow nasal cannula  -Troponin: 23, trend  -proBNP: 549.4  -WBCs: 5.60  -COVID-19 testing negative  -Chest x-ray showed no acute process  -EKG showed a sinus rhythm at 76 with PVCs noted along with some baseline artifact and a borderline CO interval of 199 ms  -In the ED patient given Solu-Medrol and DuoNeb treatments, continue  -Mucinex and Tessalon along with incentive spirometry  -Continue previously prescribed azithromycin  -Continue telemetry and pulse oximetry  -May benefit from pulmonology consult based on clinical course     Diabetes mellitus-Well controlled   -Hold metformin and glipizide  -Correctional insulin ordered  -Diabetic diet  -Monitor closely especially while receiving steroid     Hypertension-Well controlled   - Continue beta-blocker, amlodipine and lisinopril  - Monitor while admitted     CAD  -Beta-blocker, statin, aspirin  -Telemetry     Heart failure with preserved ejection fraction  -Echocardiogram from March 2023 showed an EF of 61-65% with diastolic dysfunction noted  -Continue Lasix  -Monitor I's and O's and daily weights     Hyperlipidemia  -Statin     Depression  -Zoloft     Obesity (BMI: 31.79)  -Encourage diet lifestyle modification     DVT prophylaxis:  Medical DVT prophylaxis orders are present.    CODE STATUS:    Code Status (Patient has no pulse and is not  breathing): CPR (Attempt to Resuscitate)  Medical Interventions (Patient has pulse or is breathing): Full Support      Disposition:  I expect patient to be discharged home.    I have utilized all available, immediate resources to obtain, update, or review the patient's current medications including all prescriptions, over-the-counter products, herbals, cannabis/cannabidiol products, and vitamin.mineral/dietary (nutritional) supplements.      Code Status (Patient has no pulse and is not breathing): CPR (Attempt to Resuscitate)  Medical Interventions (Patient has pulse or is breathing): Full Support    Next of kin of Power of :     Admission Status:  I believe this patient meets admit* status.    Hospital Medicine Quality Measures for Heart Failure:  The patient has a history of heart transplant or LVAD. no            This patient has been examined wearing appropriate Personal Protective Equipment and discussed with rn. 04/02/23      Electronically signed by Kareem Landa MD, 04/02/23, 10:14 EDT.  [facility] [place] Hospitalist Team

## 2023-04-02 NOTE — PLAN OF CARE
Goal Outcome Evaluation:  Plan of Care Reviewed With: patient           Outcome Evaluation: Pt is a 72 y.o. M adm on 03/31/2023 with c/o cough, fever, dyspnea, chills & resp distress. He has hx of COPD, DM, HTN, CAD. At baseline, pt lives with spouse in a 1 story home with a basement that he accesses intermittently to help with laundry. He is ind with all ADLs & amb ind most of the time, but occassionally uses a single point cane. He uses 4L O2 at home.  He also still drives. Upon eval, pt sitting EOB with good balance. He is A&O X4. He is currently on 8L O2. He requires min A for donning/doffing socks & requires multiple tries to complete due to inc. SOA. He is able to complete all other ADLs ind, but has decreased endurance & inc. SOA with activity; therefore, he requires extended periods of time to complete activities. His O2 sats averaged 93-98% on 8L O2.  Nursing states they are actively titrating he O2 down today. Educated pt on EC/WS tech & pursed lip breathing tech. Will continue to follow for OT tx while in the hospital. Recommend pt return home with spouse upon discharge.

## 2023-04-02 NOTE — PROGRESS NOTES
During admission med rec, both patient and wife inquired about pulmicort.  Patient not on this med at home, but currently patient is on it now inpatient and feels this is working well for him.      Per test claim, this would have to be filled through normal pharmacy Part B only.   Patient will not be able to get through [facility].

## 2023-04-03 ENCOUNTER — READMISSION MANAGEMENT (OUTPATIENT)
Dept: CALL CENTER | Facility: HOSPITAL | Age: 73
End: 2023-04-03
Payer: MEDICARE

## 2023-04-03 VITALS
DIASTOLIC BLOOD PRESSURE: 62 MMHG | WEIGHT: 230 LBS | TEMPERATURE: 97.5 F | BODY MASS INDEX: 36.1 KG/M2 | HEART RATE: 63 BPM | HEIGHT: 67 IN | OXYGEN SATURATION: 95 % | SYSTOLIC BLOOD PRESSURE: 127 MMHG | RESPIRATION RATE: 14 BRPM

## 2023-04-03 LAB
ANION GAP SERPL CALCULATED.3IONS-SCNC: 10 MMOL/L (ref 5–15)
BASOPHILS # BLD AUTO: 0 10*3/MM3 (ref 0–0.2)
BASOPHILS NFR BLD AUTO: 0.1 % (ref 0–1.5)
BUN SERPL-MCNC: 24 MG/DL (ref 8–23)
BUN/CREAT SERPL: 42.1 (ref 7–25)
CALCIUM SPEC-SCNC: 9.1 MG/DL (ref 8.6–10.5)
CHLORIDE SERPL-SCNC: 99 MMOL/L (ref 98–107)
CO2 SERPL-SCNC: 30 MMOL/L (ref 22–29)
CREAT SERPL-MCNC: 0.57 MG/DL (ref 0.76–1.27)
DEPRECATED RDW RBC AUTO: 49.4 FL (ref 37–54)
EGFRCR SERPLBLD CKD-EPI 2021: 104.2 ML/MIN/1.73
EOSINOPHIL # BLD AUTO: 0 10*3/MM3 (ref 0–0.4)
EOSINOPHIL NFR BLD AUTO: 0.1 % (ref 0.3–6.2)
ERYTHROCYTE [DISTWIDTH] IN BLOOD BY AUTOMATED COUNT: 15.5 % (ref 12.3–15.4)
GLUCOSE BLDC GLUCOMTR-MCNC: 254 MG/DL (ref 70–105)
GLUCOSE BLDC GLUCOMTR-MCNC: 275 MG/DL (ref 70–105)
GLUCOSE BLDC GLUCOMTR-MCNC: 318 MG/DL (ref 70–105)
GLUCOSE SERPL-MCNC: 277 MG/DL (ref 65–99)
HCT VFR BLD AUTO: 33.7 % (ref 37.5–51)
HGB BLD-MCNC: 11 G/DL (ref 13–17.7)
LYMPHOCYTES # BLD AUTO: 0.7 10*3/MM3 (ref 0.7–3.1)
LYMPHOCYTES NFR BLD AUTO: 9.8 % (ref 19.6–45.3)
MCH RBC QN AUTO: 28 PG (ref 26.6–33)
MCHC RBC AUTO-ENTMCNC: 32.5 G/DL (ref 31.5–35.7)
MCV RBC AUTO: 86.2 FL (ref 79–97)
MONOCYTES # BLD AUTO: 0.5 10*3/MM3 (ref 0.1–0.9)
MONOCYTES NFR BLD AUTO: 7.2 % (ref 5–12)
NEUTROPHILS NFR BLD AUTO: 5.9 10*3/MM3 (ref 1.7–7)
NEUTROPHILS NFR BLD AUTO: 82.8 % (ref 42.7–76)
NRBC BLD AUTO-RTO: 0.1 /100 WBC (ref 0–0.2)
PLATELET # BLD AUTO: 167 10*3/MM3 (ref 140–450)
PMV BLD AUTO: 9.7 FL (ref 6–12)
POTASSIUM SERPL-SCNC: 4 MMOL/L (ref 3.5–5.2)
QT INTERVAL: 405 MS
RBC # BLD AUTO: 3.92 10*6/MM3 (ref 4.14–5.8)
SODIUM SERPL-SCNC: 139 MMOL/L (ref 136–145)
WBC NRBC COR # BLD: 7.1 10*3/MM3 (ref 3.4–10.8)

## 2023-04-03 PROCEDURE — 94618 PULMONARY STRESS TESTING: CPT

## 2023-04-03 PROCEDURE — 63710000001 INSULIN GLARGINE PER 5 UNITS: Performed by: INTERNAL MEDICINE

## 2023-04-03 PROCEDURE — 63710000001 PREDNISONE PER 5 MG: Performed by: INTERNAL MEDICINE

## 2023-04-03 PROCEDURE — 63710000001 INSULIN REGULAR HUMAN PER 5 UNITS: Performed by: INTERNAL MEDICINE

## 2023-04-03 PROCEDURE — 94664 DEMO&/EVAL PT USE INHALER: CPT

## 2023-04-03 PROCEDURE — 94799 UNLISTED PULMONARY SVC/PX: CPT

## 2023-04-03 PROCEDURE — 85025 COMPLETE CBC W/AUTO DIFF WBC: CPT | Performed by: PHYSICIAN ASSISTANT

## 2023-04-03 PROCEDURE — 80048 BASIC METABOLIC PNL TOTAL CA: CPT | Performed by: PHYSICIAN ASSISTANT

## 2023-04-03 PROCEDURE — 94761 N-INVAS EAR/PLS OXIMETRY MLT: CPT

## 2023-04-03 PROCEDURE — 63710000001 INSULIN LISPRO (HUMAN) PER 5 UNITS: Performed by: PHYSICIAN ASSISTANT

## 2023-04-03 PROCEDURE — 82962 GLUCOSE BLOOD TEST: CPT

## 2023-04-03 PROCEDURE — 63710000001 PREDNISONE PER 1 MG: Performed by: INTERNAL MEDICINE

## 2023-04-03 RX ORDER — BUDESONIDE 0.5 MG/2ML
0.5 INHALANT ORAL
Qty: 60 EACH | Refills: 0 | Status: SHIPPED | OUTPATIENT
Start: 2023-04-03

## 2023-04-03 RX ORDER — CEFDINIR 300 MG/1
300 CAPSULE ORAL 2 TIMES DAILY
Qty: 14 CAPSULE | Refills: 0 | Status: SHIPPED | OUTPATIENT
Start: 2023-04-03 | End: 2023-04-11 | Stop reason: HOSPADM

## 2023-04-03 RX ORDER — GUAIFENESIN 600 MG/1
1200 TABLET, EXTENDED RELEASE ORAL EVERY 12 HOURS
Qty: 60 TABLET | Refills: 0 | Status: SHIPPED | OUTPATIENT
Start: 2023-04-03

## 2023-04-03 RX ORDER — DOXYCYCLINE 100 MG/1
100 TABLET ORAL EVERY 12 HOURS SCHEDULED
Qty: 15 TABLET | Refills: 0 | Status: SHIPPED | OUTPATIENT
Start: 2023-04-03 | End: 2023-04-11 | Stop reason: HOSPADM

## 2023-04-03 RX ORDER — PREDNISONE 10 MG/1
10 TABLET ORAL DAILY
Qty: 2 TABLET | Refills: 0 | Status: ON HOLD | OUTPATIENT
Start: 2023-04-06 | End: 2023-04-07

## 2023-04-03 RX ORDER — PREDNISONE 20 MG/1
20 TABLET ORAL DAILY
Qty: 2 TABLET | Refills: 0 | Status: ON HOLD | OUTPATIENT
Start: 2023-04-04 | End: 2023-04-07

## 2023-04-03 RX ORDER — ROFLUMILAST 250 UG/1
250 TABLET ORAL DAILY
Qty: 30 TABLET | Refills: 0 | Status: ON HOLD | OUTPATIENT
Start: 2023-04-04 | End: 2023-04-07

## 2023-04-03 RX ORDER — ARFORMOTEROL TARTRATE 15 UG/2ML
15 SOLUTION RESPIRATORY (INHALATION)
Qty: 120 ML | Refills: 3 | Status: ON HOLD | OUTPATIENT
Start: 2023-04-03 | End: 2023-04-07

## 2023-04-03 RX ADMIN — ARFORMOTEROL TARTRATE 15 MCG: 15 SOLUTION RESPIRATORY (INHALATION) at 07:35

## 2023-04-03 RX ADMIN — ASPIRIN 81 MG: 81 TABLET, COATED ORAL at 08:15

## 2023-04-03 RX ADMIN — ATORVASTATIN CALCIUM 40 MG: 40 TABLET, FILM COATED ORAL at 08:15

## 2023-04-03 RX ADMIN — INSULIN LISPRO 8 UNITS: 100 INJECTION, SOLUTION INTRAVENOUS; SUBCUTANEOUS at 12:14

## 2023-04-03 RX ADMIN — SENNOSIDES AND DOCUSATE SODIUM 2 TABLET: 50; 8.6 TABLET ORAL at 08:15

## 2023-04-03 RX ADMIN — GABAPENTIN 300 MG: 300 CAPSULE ORAL at 17:09

## 2023-04-03 RX ADMIN — POLYETHYLENE GLYCOL 3350 17 G: 17 POWDER, FOR SOLUTION ORAL at 08:14

## 2023-04-03 RX ADMIN — GUAIFENESIN 1200 MG: 600 TABLET, EXTENDED RELEASE ORAL at 06:06

## 2023-04-03 RX ADMIN — INSULIN LISPRO 8 UNITS: 100 INJECTION, SOLUTION INTRAVENOUS; SUBCUTANEOUS at 08:14

## 2023-04-03 RX ADMIN — ROFLUMILAST 250 MCG: 500 TABLET ORAL at 08:14

## 2023-04-03 RX ADMIN — PREDNISONE 30 MG: 20 TABLET ORAL at 08:14

## 2023-04-03 RX ADMIN — FUROSEMIDE 40 MG: 40 TABLET ORAL at 08:15

## 2023-04-03 RX ADMIN — GABAPENTIN 300 MG: 300 CAPSULE ORAL at 08:15

## 2023-04-03 RX ADMIN — INSULIN HUMAN 6 UNITS: 100 INJECTION, SOLUTION PARENTERAL at 08:14

## 2023-04-03 RX ADMIN — Medication 10 ML: at 08:15

## 2023-04-03 RX ADMIN — BISOPROLOL FUMARATE 5 MG: 5 TABLET ORAL at 08:15

## 2023-04-03 RX ADMIN — DOXYCYCLINE 100 MG: 100 TABLET ORAL at 08:15

## 2023-04-03 RX ADMIN — IPRATROPIUM BROMIDE AND ALBUTEROL SULFATE 3 ML: 2.5; .5 SOLUTION RESPIRATORY (INHALATION) at 07:36

## 2023-04-03 RX ADMIN — INSULIN GLARGINE 10 UNITS: 100 INJECTION, SOLUTION SUBCUTANEOUS at 12:14

## 2023-04-03 NOTE — DISCHARGE PLACEMENT REQUEST
"Brenden Lackey (72 y.o. Male)     Date of Birth   1950    Social Security Number       Address   [address] [place]    Home Phone   [phone]    MRN   [id number]       Yarsanism   None    Marital Status                               Admission Date   3/31/23    Admission Type   Emergency    Admitting Provider   Dunia Liz MD    Attending Provider   Kareem Landa MD    Department, Room/Bed   [facility] NEURO HEART, 261/1       Discharge Date       Discharge Disposition   Home or Self Care    Discharge Destination                               Attending Provider: Kareem Landa MD    Allergies: No Known Allergies    Isolation: Droplet   Infection: Rhinovirus  (04/01/23)   Code Status: CPR    Ht: 170.2 cm (67\")   Wt: 104 kg (230 lb)    Admission Cmt: None   Principal Problem: Interstitial pneumonia [J84.9]                 Active Insurance as of 3/31/2023     Primary Coverage     Payor Plan Insurance Group Employer/Plan Group    MEDICARE MEDICARE A & B      Payor Plan Address Payor Plan Phone Number Payor Plan Fax Number Effective Dates    [address] [phone]  10/1/2015 - None Entered    [facility] [id number]       Subscriber Name Subscriber Birth Date Member ID       BRENDEN LACKEY 1950 [id number]           Secondary Coverage     Payor Plan Insurance Group Employer/Plan Group    [employer] SUP AAR HEALTH CARE OPTIONS N     Payor Plan Address Payor Plan Phone Number Payor Plan Fax Number Effective Dates    [facility] [phone]  1/1/2020 - None Entered    [address]       [facility] [id number]       Subscriber Name Subscriber Birth Date Member ID       BRENDEN LACKEY 1950 [id number]                 Emergency Contacts      (Rel.) Home Phone Work Phone Mobile Phone    RomeChasDonna SHANNAN (Spouse) [phone] -- [phone]    Rigoberto Lackey (Son) -- -- [phone]    RomeRenetta (Daughter) [phone] -- -- "          Exercise Oximetry     Patient Name:Brenden Peter   MRN: [id number]   Date: 04/03/23                                                                                                     ROOM AIR BASELINE   SpO2% 84   Heart Rate    Blood Pressure       EXERCISE ON ROOM AIR SpO2% EXERCISE ON O2 @  4 LPM SpO2%   1 MINUTE   1 MINUTE 90   2 MINUTES   2 MINUTES 88   3 MINUTES   3 MINUTES 87   4 MINUTES   4 MINUTES 84   5 MINUTES   5 MINUTES on 6liters 89   6 MINUTES   6 MINUTES 90                                                                                                                 Distance Walked   Distance Walked   Dyspnea (Miranda Scale)   Dyspnea (Miranda Scale)   Fatigue (Miranda Scale)   Fatigue (Miranda Scale)   SpO2% Post Exercise  92on 4 liters  SpO2% Post Exercise   HR Post Exercise   HR Post Exercise   Time to Recovery   Time to Recovery      Comments:

## 2023-04-03 NOTE — NURSING NOTE
0915: RN reached out to pulm to see if patient can dc. Awaiting call back.    1607: Pulm stated that patient can dc

## 2023-04-03 NOTE — PLAN OF CARE
Problem: Adult Inpatient Plan of Care  Goal: Absence of Hospital-Acquired Illness or Injury  Intervention: Identify and Manage Fall Risk  Recent Flowsheet Documentation  Taken 4/3/2023 0010 by Sita Houston RN  Safety Promotion/Fall Prevention:   clutter free environment maintained   assistive device/personal items within reach   lighting adjusted   nonskid shoes/slippers when out of bed   room organization consistent   safety round/check completed   fall prevention program maintained  Taken 4/2/2023 2002 by Sita Houston RN  Safety Promotion/Fall Prevention:   assistive device/personal items within reach   clutter free environment maintained   fall prevention program maintained   nonskid shoes/slippers when out of bed   room organization consistent   safety round/check completed   lighting adjusted  Intervention: Prevent Skin Injury  Recent Flowsheet Documentation  Taken 4/3/2023 0010 by Sita Houston RN  Body Position:   position changed independently   right   side-lying  Taken 4/2/2023 2002 by Sita Houston RN  Body Position: position changed independently  Skin Protection:   adhesive use limited   tubing/devices free from skin contact  Intervention: Prevent and Manage VTE (Venous Thromboembolism) Risk  Recent Flowsheet Documentation  Taken 4/3/2023 0010 by Sita Houston RN  Activity Management:   activity adjusted per tolerance   ambulated to bathroom  Taken 4/2/2023 2002 by Stia Houston RN  Activity Management:   activity adjusted per tolerance   ambulated in room  Range of Motion: active ROM (range of motion) encouraged  Intervention: Prevent Infection  Recent Flowsheet Documentation  Taken 4/3/2023 0010 by Sita Houston RN  Infection Prevention:   rest/sleep promoted   single patient room provided   hand hygiene promoted   equipment surfaces disinfected  Taken 4/2/2023 2002 by Sita Houston RN  Infection Prevention:   rest/sleep promoted   hand hygiene promoted   equipment  surfaces disinfected   single patient room provided  Goal: Optimal Comfort and Wellbeing  Intervention: Provide Person-Centered Care  Recent Flowsheet Documentation  Taken 4/3/2023 0010 by Sita Houston RN  Trust Relationship/Rapport:   care explained   thoughts/feelings acknowledged   questions answered   questions encouraged   empathic listening provided  Taken 4/2/2023 2002 by Sita Houston RN  Trust Relationship/Rapport:   choices provided   care explained   thoughts/feelings acknowledged   questions answered   questions encouraged   reassurance provided     Problem: COPD (Chronic Obstructive Pulmonary Disease) Comorbidity  Goal: Maintenance of COPD Symptom Control  Intervention: Maintain COPD-Symptom Control  Recent Flowsheet Documentation  Taken 4/3/2023 0010 by Sita Houston RN  Supportive Measures:   active listening utilized   problem-solving facilitated   verbalization of feelings encouraged   goal-setting facilitated   decision-making supported  Medication Review/Management: medications reviewed  Taken 4/2/2023 2002 by Sita Houston RN  Supportive Measures:   active listening utilized   goal-setting facilitated   positive reinforcement provided   problem-solving facilitated  Medication Review/Management: medications reviewed     Problem: Heart Failure Comorbidity  Goal: Maintenance of Heart Failure Symptom Control  Intervention: Maintain Heart Failure-Management  Recent Flowsheet Documentation  Taken 4/3/2023 0010 by Sita Houston RN  Medication Review/Management: medications reviewed  Taken 4/2/2023 2002 by Sita Houston RN  Medication Review/Management: medications reviewed     Problem: Hypertension Comorbidity  Goal: Blood Pressure in Desired Range  Intervention: Maintain Blood Pressure Management  Recent Flowsheet Documentation  Taken 4/3/2023 0010 by Sita Houston RN  Medication Review/Management: medications reviewed  Taken 4/2/2023 2002 by Sita Houston  RN  Medication Review/Management: medications reviewed     Problem: Pain Acute  Goal: Acceptable Pain Control and Functional Ability  Intervention: Prevent or Manage Pain  Recent Flowsheet Documentation  Taken 4/3/2023 0010 by Sita Houston RN  Medication Review/Management: medications reviewed  Taken 4/2/2023 2002 by Sita Houston RN  Sensory Stimulation Regulation:   care clustered   lighting decreased   quiet environment promoted  Sleep/Rest Enhancement:   noise level reduced   consistent schedule promoted   awakenings minimized   relaxation techniques promoted  Medication Review/Management: medications reviewed  Intervention: Optimize Psychosocial Wellbeing  Recent Flowsheet Documentation  Taken 4/3/2023 0010 by Sita Houston RN  Supportive Measures:   active listening utilized   problem-solving facilitated   verbalization of feelings encouraged   goal-setting facilitated   decision-making supported  Diversional Activities:   television   smartphone  Taken 4/2/2023 2002 by Sita Houston RN  Supportive Measures:   active listening utilized   goal-setting facilitated   positive reinforcement provided   problem-solving facilitated  Diversional Activities:   television   smartphone     Problem: Fall Injury Risk  Goal: Absence of Fall and Fall-Related Injury  Intervention: Identify and Manage Contributors  Recent Flowsheet Documentation  Taken 4/3/2023 0010 by Sita Houston RN  Medication Review/Management: medications reviewed  Taken 4/2/2023 2002 by Sita Houston RN  Medication Review/Management: medications reviewed  Self-Care Promotion:   independence encouraged   BADL personal objects within reach   BADL personal routines maintained  Intervention: Promote Injury-Free Environment  Recent Flowsheet Documentation  Taken 4/3/2023 0010 by Sita Houston RN  Safety Promotion/Fall Prevention:   clutter free environment maintained   assistive device/personal items within reach   lighting  adjusted   nonskid shoes/slippers when out of bed   room organization consistent   safety round/check completed   fall prevention program maintained  Taken 4/2/2023 2002 by Sita Houston, RN  Safety Promotion/Fall Prevention:   assistive device/personal items within reach   clutter free environment maintained   fall prevention program maintained   nonskid shoes/slippers when out of bed   room organization consistent   safety round/check completed   lighting adjusted     Problem: Gas Exchange Impaired  Goal: Optimal Gas Exchange  Intervention: Optimize Oxygenation and Ventilation  Recent Flowsheet Documentation  Taken 4/3/2023 0010 by Sita Houston, RN  Head of Bed (HOB) Positioning: HOB at 30 degrees  Taken 4/2/2023 2002 by Sita Houston, RN  Head of Bed (HOB) Positioning: HOB at 20-30 degrees   Goal Outcome Evaluation:

## 2023-04-03 NOTE — PROGRESS NOTES
Daily Progress Note          Assessment    Acute hypoxic respiratory failure  Interstitial pneumonia  AE COPD  DM2  Hypertension  CAD  Depression  Obesity  CHF        PLAN:  From pulm standpoint patient has improved enough to be discharged home antibiotics  Change to oral steroid  Bronchodilators  Inhaled corticosteroids  Incentive spirometer  Diuresis and monitor MIGDALIA's  Bowel regimen   Electrolytes/ glycemic control           LOS: 2 days     Subjective     Patient reports significant improvement in his shortness of breath and cough and would like to go home    Objective     Vital signs for last 24 hours:  Vitals:    04/03/23 0739 04/03/23 0742 04/03/23 0900 04/03/23 1352   BP:   121/68 127/62   BP Location:   Right arm Right arm   Patient Position:   Lying Sitting   Pulse: 57 61 59 63   Resp: 18 18 13 14   Temp:   97.4 °F (36.3 °C) 97.5 °F (36.4 °C)   TempSrc:   Axillary Oral   SpO2: 97% 97% 92% 95%   Weight:       Height:           Intake/Output last 3 shifts:  I/O last 3 completed shifts:  In: 960 [P.O.:960]  Out: 1375 [Urine:1375]  Intake/Output this shift:  I/O this shift:  In: 480 [P.O.:480]  Out: -       Radiology  Imaging Results (Last 24 Hours)     ** No results found for the last 24 hours. **          Labs:  Results from last 7 days   Lab Units 04/03/23  1115   WBC 10*3/mm3 7.10   HEMOGLOBIN g/dL 11.0*   HEMATOCRIT % 33.7*   PLATELETS 10*3/mm3 167     Results from last 7 days   Lab Units 04/03/23  1115 04/01/23  0422 03/31/23  1909   SODIUM mmol/L 139   < > 141   POTASSIUM mmol/L 4.0   < > 3.8   CHLORIDE mmol/L 99   < > 100   CO2 mmol/L 30.0*   < > 30.0*   BUN mg/dL 24*   < > 10   CREATININE mg/dL 0.57*   < > 0.68*   CALCIUM mg/dL 9.1   < > 8.8   BILIRUBIN mg/dL  --   --  0.4   ALK PHOS U/L  --   --  63   ALT (SGPT) U/L  --   --  19   AST (SGOT) U/L  --   --  22   GLUCOSE mg/dL 277*   < > 141*    < > = values in this interval not displayed.         Results from last 7 days   Lab Units 03/31/23  3581    ALBUMIN g/dL 3.9     Results from last 7 days   Lab Units 04/01/23  0818 04/01/23  0422 03/31/23  1909   HSTROP T ng/L 18* 17* 23*         Results from last 7 days   Lab Units 04/02/23  0824   MAGNESIUM mg/dL 2.0                   Meds:   SCHEDULE  amLODIPine, 5 mg, Oral, Nightly  arformoterol, 15 mcg, Nebulization, BID - RT  aspirin, 81 mg, Oral, Daily  atorvastatin, 40 mg, Oral, Daily  bisoprolol, 5 mg, Oral, Daily  budesonide, 0.5 mg, Nebulization, BID - RT  cefTRIAXone, 1 g, Intravenous, Q24H  enoxaparin, 40 mg, Subcutaneous, Q24H  furosemide, 40 mg, Oral, Daily  gabapentin, 300 mg, Oral, TID  guaiFENesin, 1,200 mg, Oral, Q12H  insulin glargine, 10 Units, Subcutaneous, QAM  insulin lispro, 3-14 Units, Subcutaneous, TID With Meals  [START ON 4/4/2023] insulin regular, 4 Units, Subcutaneous, Daily  lisinopril, 40 mg, Oral, Nightly  polyethylene glycol, 17 g, Oral, Daily  [START ON 4/4/2023] predniSONE, 20 mg, Oral, Daily   Followed by  [START ON 4/6/2023] predniSONE, 10 mg, Oral, Daily  roflumilast, 250 mcg, Oral, Daily  senna-docusate sodium, 2 tablet, Oral, BID  sertraline, 50 mg, Oral, Nightly  sodium chloride, 10 mL, Intravenous, Q12H      Infusions     PRNs  •  artificial tears  •  benzonatate  •  carboxymethylcellulose sod  •  dextrose  •  dextrose  •  glucagon (human recombinant)  •  ipratropium-albuterol  •  melatonin  •  ondansetron **OR** ondansetron  •  sodium chloride  •  sodium chloride    Physical Exam:  General Appearance:  Alert   HEENT:  Normocephalic, without obvious abnormality, Conjunctiva/corneas clear,.   Nares normal, no drainage     Neck:  Supple, symmetrical, trachea midline.   Lungs /Chest wall: Good respiratory effort with bilateral basal rhonchi, respirations unlabored, symmetrical wall movement.     Heart:  Regular rate and rhythm, S1 S2 normal  Abdomen: Soft, non-tender, no masses, no organomegaly.    Extremities: No edema, no clubbing or cyanosis     ROS  Constitutional: Negative  for chills, fever and malaise/fatigue.   HENT: Negative.    Eyes: Negative.    Cardiovascular: Negative.    Respiratory: Positive for cough and shortness of breath.    Skin: Negative.    Musculoskeletal: Negative.    Gastrointestinal: Negative.    Genitourinary: Negative.    Neurological: Negative.    Psychiatric/Behavioral: Negative.      I reviewed the recent clinical results    Much of this encounter note is an electronic transcription/translation of spoken language to printed text using Dragon Software which might include inadvertent errors in transcription.

## 2023-04-03 NOTE — PROGRESS NOTES
[facility] Medicine [facility] Daily Progress Note    Patient Name: Brenden Peter  : 1950  MRN: [id number]  Primary Care Physician:  Bert Rojas MD  Date of admission: 3/31/2023      Subjective      Chief Complaint: Cough and dyspnea     History of Present Illness: Brenden Peter is a 72 y.o. male who presented to [facility] on 3/31/2023 complaining of worsening cough and dyspnea.  Patient reports some baseline dyspnea and wears continuous O2 at home.  Over the past several days he has had increasing dyspnea as well as a nonproductive cough and a fever with a Tmax of 102.0.  Some nausea without vomiting was also reported but he denies any pain including chest pain, palpitations, peripheral edema, lightheadedness, syncope or near syncope.  Patient was recently started on azithromycin by primary care provider without significant improvement noted.  Generally patient reports that when he becomes short of breath he is able to rest which results in improvement but this has been ineffective lately.  He confirms compliance with all of his outpatient medical therapies.        23-vss Feeling better, denies SOB, no chest pain, no nausea or vomiting, no change in bowel habit, no dysuria,  no new  skin rash or itching.   4/3/23 doing better today, anxious to go home, DAI RN.    ROS Review of Systems   Constitutional: Positive for fever.   HENT: Negative.    Eyes: Negative.    Cardiovascular: Positive for dyspnea on exertion. Negative for chest pain.   Respiratory: Positive for cough and shortness of breath. Negative for sputum production.    Skin: Negative.    Musculoskeletal: Negative.    Gastrointestinal: Negative.    Genitourinary: Negative.    Neurological: Negative.    Psychiatric/Behavioral: Negative.    Objective      Vitals:   Temp:  [96.9 °F (36.1 °C)-98.1 °F (36.7 °C)] 96.9 °F (36.1 °C)  Heart Rate:  [57-70] 61  Resp:  [12-22] 18  BP: (115-149)/(61-78)  138/61  Flow (L/min):  [4-8] 4    Physical Exam   General: He is not in acute distress.     Appearance: Normal appearance. He is obese. He is not ill-appearing, toxic-appearing or diaphoretic.   HENT:      Head: Normocephalic.      Right Ear: External ear normal.      Left Ear: External ear normal.      Nose: Nose normal.      Mouth/Throat:      Mouth: Mucous membranes are moist.   Eyes:      Extraocular Movements: Extraocular movements intact.   Cardiovascular:      Rate and Rhythm: Normal rate and regular rhythm.      Pulses: Normal pulses.      Heart sounds: Normal heart sounds.   Pulmonary:      Effort: Pulmonary effort is normal.      Breath sounds: Wheezing and rhonchi present.   Abdominal:      General: Bowel sounds are normal.      Palpations: Abdomen is soft.      Tenderness: There is no abdominal tenderness.   Musculoskeletal:         General: Normal range of motion.      Cervical back: Normal range of motion.      Right lower leg: No edema.      Left lower leg: No edema.   Skin:     General: Skin is warm and dry.      Capillary Refill: Capillary refill takes less than 2 seconds.   Neurological:      General: No focal deficit present.      Mental Status: He is alert and oriented to person, place, and time.   Psychiatric:         Mood and Affect: Mood normal.         Behavior: Behavior normal.         Thought Content: Thought content normal.         Judgment: Judgment normal.        Result Review    Result Review:  I have personally reviewed the results from the time of this admission to 4/3/2023 08:35 EDT and agree with these findings:  []  Laboratory  []  Microbiology  []  Radiology  []  EKG/Telemetry   []  Cardiology/Vascular   []  Pathology  []  Old records  []  Other:  Most notable findings include:   CBC:      Lab 04/02/23  0824 04/01/23  0422 03/31/23  1909   WBC 8.10 7.30 5.60   HEMOGLOBIN 10.9* 11.1* 10.9*   HEMATOCRIT 33.1* 33.8* 34.5*   PLATELETS 154 136* 144   NEUTROS ABS 7.20* 6.70 4.10    LYMPHS ABS 0.60* 0.40* 0.70   MONOS ABS 0.30 0.10 0.70   EOS ABS 0.00 0.00 0.00   MCV 86.0 86.9 89.0     CMP:        Lab 04/02/23  0824 04/01/23  0422 03/31/23  1909   SODIUM 138 137 141   POTASSIUM 4.3 4.1 3.8   CHLORIDE 99 97* 100   CO2 30.0* 27.0 30.0*   ANION GAP 9.0 13.0 11.0   BUN 25* 18 10   CREATININE 0.59* 0.78 0.68*   EGFR 103.1 94.8 98.8   GLUCOSE 237* 351* 141*   CALCIUM 9.1 8.7 8.8   MAGNESIUM 2.0 1.6  --    TOTAL PROTEIN  --   --  7.3   ALBUMIN  --   --  3.9   GLOBULIN  --   --  3.4   ALT (SGPT)  --   --  19   AST (SGOT)  --   --  22   BILIRUBIN  --   --  0.4   ALK PHOS  --   --  63     Blood Culture   Date Value Ref Range Status   03/31/2023 No growth at 24 hours  Preliminary   03/31/2023 No growth at 24 hours  Preliminary     No results found for: BCIDPCR, CXREFLEX, CSFCX, CULTURETIS  No results found for: CULTURES, HSVCX, URCX  No results found for: EYECULTURE, GCCX, HSVCULTURE, LABHSV  No results found for: LEGIONELLA, MRSACX, MUMPSCX, MYCOPLASCX  No results found for: NOCARDIACX, STOOLCX  No results found for: THROATCX, UNSTIMCULT, URINECX, CULTURE, VZVCULTUR  No results found for: VIRALCULTU, WOUNDCX      Assessment & Plan      Brief Patient Summary:  Brenden Peter is a 72 y.o. male who       amLODIPine, 5 mg, Oral, Nightly  arformoterol, 15 mcg, Nebulization, BID - RT  aspirin, 81 mg, Oral, Daily  atorvastatin, 40 mg, Oral, Daily  bisoprolol, 5 mg, Oral, Daily  budesonide, 0.5 mg, Nebulization, BID - RT  cefTRIAXone, 1 g, Intravenous, Q24H  doxycycline, 100 mg, Oral, Q12H  enoxaparin, 40 mg, Subcutaneous, Q24H  furosemide, 40 mg, Oral, Daily  gabapentin, 300 mg, Oral, TID  guaiFENesin, 1,200 mg, Oral, Q12H  insulin lispro, 3-14 Units, Subcutaneous, TID With Meals  insulin regular, 6 Units, Subcutaneous, Daily  lisinopril, 40 mg, Oral, Nightly  polyethylene glycol, 17 g, Oral, Daily  [START ON 4/4/2023] predniSONE, 20 mg, Oral, Daily   Followed by  [START ON 4/6/2023] predniSONE, 10 mg,  Oral, Daily  roflumilast, 250 mcg, Oral, Daily  senna-docusate sodium, 2 tablet, Oral, BID  sertraline, 50 mg, Oral, Nightly  sodium chloride, 10 mL, Intravenous, Q12H             Active Hospital Problems:  Active Hospital Problems    Diagnosis    • **Interstitial pneumonia    • (HFpEF) heart failure with preserved ejection fraction    • COPD exacerbation    • Obesity (BMI 30-39.9)    • Depression    • Type 2 diabetes mellitus without complication, without long-term current use of insulin ([facility])    • Mixed hyperlipidemia    • Essential hypertension    • Coronary artery disease involving native coronary artery of native heart without angina pectoris      AECOPD  -O2 saturations reported in the 70s-80s, has improved to mid to high 90s on high flow nasal cannula  -Troponin: 23,   -proBNP: 549.4  -WBCs: 5.60  -COVID-19 testing negative  -Chest x-ray showed no acute process  -EKG showed a sinus rhythm at 76 with PVCs noted along with some baseline artifact and a borderline ND interval of 199 ms  -In the ED patient given Solu-Medrol and DuoNeb treatments, continue  -Mucinex and Tessalon along with incentive spirometry  -Continue previously prescribed azithromycin  -Continue telemetry and pulse oximetry  -May benefit from pulmonology consult based on clinical course     Diabetes mellitus-Well controlled   -Hold metformin and glipizide  -Correctional insulin ordered  -Diabetic diet  -Monitor closely especially while receiving steroid     Hypertension-Well controlled   - Continue beta-blocker, amlodipine and lisinopril  - Monitor while admitted     CAD  -Beta-blocker, statin, aspirin  -Telemetry     Heart failure with preserved ejection fraction  -Echocardiogram from March 2023 showed an EF of 61-65% with diastolic dysfunction noted  -Continue Lasix  -Monitor I's and O's and daily weights     Hyperlipidemia  -Statin     Depression  -Zoloft     Obesity (BMI: 31.79)  -Encourage diet lifestyle modification     DVT  prophylaxis:  Medical DVT prophylaxis orders are present.    CODE STATUS:    Code Status (Patient has no pulse and is not breathing): CPR (Attempt to Resuscitate)  Medical Interventions (Patient has pulse or is breathing): Full Support      Disposition:  I expect patient to be discharged home.    I have utilized all available, immediate resources to obtain, update, or review the patient's current medications including all prescriptions, over-the-counter products, herbals, cannabis/cannabidiol products, and vitamin.mineral/dietary (nutritional) supplements.      Code Status (Patient has no pulse and is not breathing): CPR (Attempt to Resuscitate)  Medical Interventions (Patient has pulse or is breathing): Full Support    Next of kin of Power of :     Admission Status:  I believe this patient meets admit* status.    Hospital Medicine Quality Measures for Heart Failure:  The patient has a history of heart transplant or LVAD. no            This patient has been examined wearing appropriate Personal Protective Equipment and discussed with rn. 04/03/23      Electronically signed by Kareem Landa MD, 04/03/23, 08:35 EDT.  [facility], [place], operated by [facility] Hospitalist Team

## 2023-04-03 NOTE — CASE MANAGEMENT/SOCIAL WORK
Discharge Planning Assessment  [facility]     Patient Name: Brenden Peter  MRN: [id number]  Today's Date: 4/3/2023    Admit Date: 3/31/2023    Plan: DC PLAN: Routine home. Current home02 at 4L Current thru [employer]  Needs Continuous 4L, 6L with ambulation - [employer] notified     Discharge Needs Assessment     Row Name 04/03/23 1046       Living Environment    People in Home spouse    Current Living Arrangements home    Primary Care Provided by self    Provides Primary Care For no one    Family Caregiver if Needed spouse    Quality of Family Relationships helpful;involved    Able to Return to Prior Arrangements yes       Resource/Environmental Concerns    Resource/Environmental Concerns none       Food Insecurity    Within the past 12 months, you worried that your food would run out before you got the money to buy more. Never true    Within the past 12 months, the food you bought just didn't last and you didn't have money to get more. Never true       Transition Planning    Patient/Family Anticipates Transition to home with family    Patient/Family Anticipated Services at Transition     Transportation Anticipated family or friend will provide;car, drives self       Discharge Needs Assessment    Equipment Currently Used at Home grab bar;oxygen;glucometer               Discharge Plan     Row Name 04/03/23 1048       Plan    Plan DC PLAN: Routine home. Current home02 at 4L    Patient/Family in Agreement with Plan yes    Plan Comments Met with patient at bedside, from routine home with wife. Independent with ADl's uses a cane from time to time. PCP is Dr. Tompkins and pharmacy is [facility] in [place]. Able to afford medications and denies any transportation issues. Current home oxygen at 4L thru [employer] or [employer]. Unsure which company. 6 min walk completed, will need continous 024L and 6L while ambulating. DCP report sent to [facility] and message sent to In[facility] Schafer. Awaiting response. Re Admission questions  reviewed. Patient denies any concerns about returning home. DC BARRIERS: Pulmonary consult.  Spoke with Herb with [facility] at [phone] who requests new 6 min walk be faxed to [phone]. But states patients tanks go up to 6L and should not be a problem. Faxed requested information to number above.                     Expected Discharge Date and Time     Expected Discharge Date Expected Discharge Time    Apr 3, 2023          Demographic Summary     Row Name 04/03/23 1045       General Information    Admission Type inpatient    Arrived From emergency department    Referral Source admission list    Reason for Consult discharge planning    Preferred Language English       Contact Information    Permission Granted to Share Info With     Contact Information Obtained for                Functional Status     Row Name 04/03/23 1046       Functional Status    Usual Activity Tolerance moderate    Current Activity Tolerance moderate       Assessment of Health Literacy    How often do you have someone help you read hospital materials? Sometimes    How often do you have problems learning about your medical condition because of difficulty understanding written information? Sometimes    How often do you have a problem understanding what is told to you about your medical condition? Sometimes    How confident are you filling out medical forms by yourself? Somewhat    Health Literacy Moderate       Functional Status, IADL    Medications independent    Meal Preparation independent    Housekeeping independent    Laundry independent    Shopping independent              Case Management Readmission Assessment Note    Case Management Readmission Assessment (all recorded)     Readmission Interview     Row Name 04/03/23 104             Readmission Indications    Is this hospitalization related to the prior hospital diagnosis? Yes      What was the reason you were admitted? Dyspnea      Row Name 04/03/23 104              Recommendation for rehospitalization    Did you speak with your physician prior to coming to the hospital Yes      If yes, what physician did you speak with? Dr. Tompkins. Called office and they called in a Zpak      Who recommended you return to the hospital? PCP      Did you seek care elsewhere prior to coming to the hospital? No      Row Name 04/03/23 1044             Follow up appointment    Do you have a PCP? Yes      Did you have an appointment with PCP/specialist after hospitalization within 7 days? No      Did you keep your appointment? No      If you did not keep appointment, why? No appointment available      Row Name 04/03/23 1044             Medications    Did you have newly prescribed medications at discharge? Yes      Did you understand the reasons for your medications at discharge and how to take them? Yes      Did you understand the side effects of your medications? Yes      Are you taking all of you prescribed medications? Yes      Row Name 04/03/23 1044             Discharge Instructions    Did you understand your discharge instructions? Yes      Did your family/caregiver hear your instructions? Yes      Were you told to eat a special diet? No      Did you adhere to the diet? No      Were you given a number of someone to call if you had questions or concerns? Yes      Row Name 04/03/23 1044             Index discharge location/services    Where did you go upon discharge? Home      Do you have supportive family or friends in the home? Yes      [facility] Name 04/03/23 1044             Discharge Readiness    On a scale of 1-5 (5 being well prepared), how ready were you for discharge 5      Recommendation based on interview Goals of care discussion/advanced care planning                  3/13- Discharged home with no services. History of COPD and home oxygen. . Home about 3 weeks and developed dyspnea. Called PCP office and Zpak was called into pharmacy, symptoms did not improved.  3/31- Admitted to ED  with dyspnea, fever, Covid-, Continue Azithromycin, Pulmonary consult.    Christine Giron RN      Office phone: [phone]  Cell phone: [phone]

## 2023-04-03 NOTE — PROGRESS NOTES
Exercise Oximetry    Patient Name:Brenden Peter   MRN: [id number]   Date: 04/03/23             ROOM AIR BASELINE   SpO2% 84   Heart Rate    Blood Pressure      EXERCISE ON ROOM AIR SpO2% EXERCISE ON O2 @  4 LPM SpO2%   1 MINUTE  1 MINUTE 90   2 MINUTES  2 MINUTES 88   3 MINUTES  3 MINUTES 87   4 MINUTES  4 MINUTES 84   5 MINUTES  5 MINUTES on 6liters 89   6 MINUTES  6 MINUTES 90              Distance Walked   Distance Walked   Dyspnea (Miranda Scale)   Dyspnea (Miranda Scale)   Fatigue (Mirnada Scale)   Fatigue (Miranda Scale)   SpO2% Post Exercise  92on 4 liters  SpO2% Post Exercise   HR Post Exercise   HR Post Exercise   Time to Recovery   Time to Recovery     Comments:

## 2023-04-03 NOTE — PLAN OF CARE
Problem: Adult Inpatient Plan of Care  Goal: Plan of Care Review  Outcome: Adequate for Care Transition  Goal: Patient-Specific Goal (Individualized)  Outcome: Adequate for Care Transition  Goal: Absence of Hospital-Acquired Illness or Injury  Outcome: Adequate for Care Transition  Intervention: Identify and Manage Fall Risk  Recent Flowsheet Documentation  Taken 4/3/2023 1605 by Domi Salguero RN  Safety Promotion/Fall Prevention:   safety round/check completed   room organization consistent   nonskid shoes/slippers when out of bed   muscle strengthening facilitated   lighting adjusted   fall prevention program maintained   clutter free environment maintained   assistive device/personal items within reach   activity supervised  Taken 4/3/2023 1420 by Domi Salguero RN  Safety Promotion/Fall Prevention:   safety round/check completed   room organization consistent   nonskid shoes/slippers when out of bed   muscle strengthening facilitated   lighting adjusted   fall prevention program maintained   clutter free environment maintained   assistive device/personal items within reach   activity supervised  Taken 4/3/2023 1220 by Domi Salguero RN  Safety Promotion/Fall Prevention:   safety round/check completed   room organization consistent   nonskid shoes/slippers when out of bed   muscle strengthening facilitated   lighting adjusted   fall prevention program maintained   clutter free environment maintained   assistive device/personal items within reach   activity supervised  Taken 4/3/2023 1020 by Domi Salguero RN  Safety Promotion/Fall Prevention:   safety round/check completed   room organization consistent   nonskid shoes/slippers when out of bed   muscle strengthening facilitated   lighting adjusted   fall prevention program maintained   clutter free environment maintained   assistive device/personal items within reach   activity supervised  Taken 4/3/2023 0820 by Domi Salguero RN  Safety Promotion/Fall Prevention:    safety round/check completed   room organization consistent   nonskid shoes/slippers when out of bed   muscle strengthening facilitated   lighting adjusted   fall prevention program maintained   clutter free environment maintained   assistive device/personal items within reach   activity supervised  Intervention: Prevent Skin Injury  Recent Flowsheet Documentation  Taken 4/3/2023 1605 by Domi Salguero RN  Body Position:   position changed independently   weight shifting  Taken 4/3/2023 1420 by Domi Salguero RN  Body Position:   position changed independently   weight shifting  Taken 4/3/2023 1220 by Domi Salguero RN  Body Position:   position changed independently   weight shifting  Taken 4/3/2023 1020 by Domi Salugero RN  Body Position:   position changed independently   weight shifting  Taken 4/3/2023 0820 by Domi Salguero RN  Body Position:   position changed independently   weight shifting  Intervention: Prevent and Manage VTE (Venous Thromboembolism) Risk  Recent Flowsheet Documentation  Taken 4/3/2023 1605 by Domi Salguero RN  Activity Management: activity adjusted per tolerance  VTE Prevention/Management:   bilateral   sequential compression devices off   patient refused intervention  Taken 4/3/2023 1420 by Domi Salguero RN  Activity Management: activity adjusted per tolerance  Taken 4/3/2023 1220 by Domi Salguero RN  Activity Management: activity adjusted per tolerance  VTE Prevention/Management:   bilateral   sequential compression devices off   patient refused intervention  Taken 4/3/2023 1020 by Domi Salguero RN  Activity Management: activity adjusted per tolerance  Taken 4/3/2023 0820 by Domi Salguero RN  Activity Management: activity adjusted per tolerance  VTE Prevention/Management:   bilateral   sequential compression devices off   patient refused intervention  Range of Motion: active ROM (range of motion) encouraged  Intervention: Prevent Infection  Recent Flowsheet Documentation  Taken 4/3/2023 1605 by Noemy  NITHYA Duron  Infection Prevention:   single patient room provided   rest/sleep promoted   hand hygiene promoted   equipment surfaces disinfected  Taken 4/3/2023 1420 by Domi Salguero RN  Infection Prevention:   single patient room provided   rest/sleep promoted   hand hygiene promoted   equipment surfaces disinfected  Taken 4/3/2023 1220 by Domi Salguero RN  Infection Prevention:   single patient room provided   rest/sleep promoted   hand hygiene promoted   equipment surfaces disinfected  Taken 4/3/2023 1020 by Domi Salguero RN  Infection Prevention:   single patient room provided   rest/sleep promoted   hand hygiene promoted   equipment surfaces disinfected  Taken 4/3/2023 0820 by Domi Salguero RN  Infection Prevention:   single patient room provided   rest/sleep promoted   hand hygiene promoted   equipment surfaces disinfected  Goal: Optimal Comfort and Wellbeing  Outcome: Adequate for Care Transition  Intervention: Provide Person-Centered Care  Recent Flowsheet Documentation  Taken 4/3/2023 1605 by Domi Salguero RN  Trust Relationship/Rapport:   care explained   choices provided  Taken 4/3/2023 1220 by Domi Salguero RN  Trust Relationship/Rapport:   care explained   choices provided  Taken 4/3/2023 0820 by Domi Salguero RN  Trust Relationship/Rapport:   care explained   choices provided  Goal: Readiness for Transition of Care  Outcome: Adequate for Care Transition     Problem: COPD (Chronic Obstructive Pulmonary Disease) Comorbidity  Goal: Maintenance of COPD Symptom Control  Outcome: Adequate for Care Transition  Intervention: Maintain COPD-Symptom Control  Recent Flowsheet Documentation  Taken 4/3/2023 1605 by Domi Salguero RN  Medication Review/Management: medications reviewed  Taken 4/3/2023 1420 by Domi Salguero RN  Medication Review/Management: medications reviewed  Taken 4/3/2023 1220 by Domi Salguero RN  Medication Review/Management: medications reviewed  Taken 4/3/2023 1020 by Domi Salguero, RN  Medication  Review/Management: medications reviewed  Taken 4/3/2023 0820 by Domi Salguero RN  Medication Review/Management: medications reviewed     Problem: Heart Failure Comorbidity  Goal: Maintenance of Heart Failure Symptom Control  Outcome: Adequate for Care Transition  Intervention: Maintain Heart Failure-Management  Recent Flowsheet Documentation  Taken 4/3/2023 1605 by Domi Salguero RN  Medication Review/Management: medications reviewed  Taken 4/3/2023 1420 by Domi Salguero RN  Medication Review/Management: medications reviewed  Taken 4/3/2023 1220 by Domi Salguero RN  Medication Review/Management: medications reviewed  Taken 4/3/2023 1020 by Domi Salguero RN  Medication Review/Management: medications reviewed  Taken 4/3/2023 0820 by Domi Salguero RN  Medication Review/Management: medications reviewed     Problem: Hypertension Comorbidity  Goal: Blood Pressure in Desired Range  Outcome: Adequate for Care Transition  Intervention: Maintain Blood Pressure Management  Recent Flowsheet Documentation  Taken 4/3/2023 1605 by Domi Salguero RN  Medication Review/Management: medications reviewed  Taken 4/3/2023 1420 by Domi Salguero RN  Medication Review/Management: medications reviewed  Taken 4/3/2023 1220 by Domi Salguero RN  Medication Review/Management: medications reviewed  Taken 4/3/2023 1020 by Domi Salguero RN  Medication Review/Management: medications reviewed  Taken 4/3/2023 0820 by Domi Salguero RN  Medication Review/Management: medications reviewed     Problem: Pain Acute  Goal: Acceptable Pain Control and Functional Ability  Outcome: Adequate for Care Transition  Intervention: Prevent or Manage Pain  Recent Flowsheet Documentation  Taken 4/3/2023 1605 by Domi Salguero RN  Medication Review/Management: medications reviewed  Taken 4/3/2023 1420 by Domi Salguero RN  Medication Review/Management: medications reviewed  Taken 4/3/2023 1220 by Domi Salguero RN  Medication Review/Management: medications reviewed  Taken 4/3/2023 1020 by  Buren, Domi, RN  Medication Review/Management: medications reviewed  Taken 4/3/2023 0820 by Domi Salguero RN  Medication Review/Management: medications reviewed  Intervention: Optimize Psychosocial Wellbeing  Recent Flowsheet Documentation  Taken 4/3/2023 1605 by Domi Salguero RN  Diversional Activities:   smartphone   television  Taken 4/3/2023 1220 by Domi Salguero RN  Diversional Activities:   smartphone   television  Taken 4/3/2023 0820 by Domi Salguero RN  Diversional Activities:   smartphone   television     Problem: Fall Injury Risk  Goal: Absence of Fall and Fall-Related Injury  Outcome: Adequate for Care Transition  Intervention: Identify and Manage Contributors  Recent Flowsheet Documentation  Taken 4/3/2023 1605 by Domi Salguero RN  Medication Review/Management: medications reviewed  Taken 4/3/2023 1420 by Domi Salguero RN  Medication Review/Management: medications reviewed  Taken 4/3/2023 1220 by Domi Salguero RN  Medication Review/Management: medications reviewed  Taken 4/3/2023 1020 by Domi Salguero RN  Medication Review/Management: medications reviewed  Taken 4/3/2023 0820 by Domi Salguero RN  Medication Review/Management: medications reviewed  Intervention: Promote Injury-Free Environment  Recent Flowsheet Documentation  Taken 4/3/2023 1605 by Domi Salguero RN  Safety Promotion/Fall Prevention:   safety round/check completed   room organization consistent   nonskid shoes/slippers when out of bed   muscle strengthening facilitated   lighting adjusted   fall prevention program maintained   clutter free environment maintained   assistive device/personal items within reach   activity supervised  Taken 4/3/2023 1420 by Domi Salguero RN  Safety Promotion/Fall Prevention:   safety round/check completed   room organization consistent   nonskid shoes/slippers when out of bed   muscle strengthening facilitated   lighting adjusted   fall prevention program maintained   clutter free environment maintained   assistive  device/personal items within reach   activity supervised  Taken 4/3/2023 1220 by Domi Salguero RN  Safety Promotion/Fall Prevention:   safety round/check completed   room organization consistent   nonskid shoes/slippers when out of bed   muscle strengthening facilitated   lighting adjusted   fall prevention program maintained   clutter free environment maintained   assistive device/personal items within reach   activity supervised  Taken 4/3/2023 1020 by Domi Salguero RN  Safety Promotion/Fall Prevention:   safety round/check completed   room organization consistent   nonskid shoes/slippers when out of bed   muscle strengthening facilitated   lighting adjusted   fall prevention program maintained   clutter free environment maintained   assistive device/personal items within reach   activity supervised  Taken 4/3/2023 0820 by Domi Salguero RN  Safety Promotion/Fall Prevention:   safety round/check completed   room organization consistent   nonskid shoes/slippers when out of bed   muscle strengthening facilitated   lighting adjusted   fall prevention program maintained   clutter free environment maintained   assistive device/personal items within reach   activity supervised     Problem: Gas Exchange Impaired  Goal: Optimal Gas Exchange  Outcome: Adequate for Care Transition  Intervention: Optimize Oxygenation and Ventilation  Recent Flowsheet Documentation  Taken 4/3/2023 1605 by Domi Salguero RN  Head of Bed (HOB) Positioning: HOB at 30 degrees  Taken 4/3/2023 1420 by Domi Salguero RN  Head of Bed (HOB) Positioning: HOB at 30 degrees  Taken 4/3/2023 1220 by Domi Salguero RN  Head of Bed (HOB) Positioning: HOB at 30 degrees  Taken 4/3/2023 1020 by Domi Salguero RN  Head of Bed (HOB) Positioning: HOB at 30 degrees  Taken 4/3/2023 0820 by Domi Salguero RN  Head of Bed (HOB) Positioning: HOB at 30 degrees   Goal Outcome Evaluation:

## 2023-04-04 LAB
BACTERIA SPEC RESP CULT: NORMAL
GRAM STN SPEC: NORMAL

## 2023-04-04 NOTE — CASE MANAGEMENT/SOCIAL WORK
Case Management Discharge Note      Final Note: Routine Home         Selected Continued Care - Discharged on 4/3/2023 Admission date: 3/31/2023 - Discharge disposition: Home or Self Care         Transportation Services  Private: Car    Final Discharge Disposition Code: 01 - home or self-care

## 2023-04-04 NOTE — OUTREACH NOTE
Prep Survey    Flowsheet Row Responses   [facility] patient discharged from? [facility]   Is LACE score < 7 ? No   Eligibility Readm Mgmt   Discharge diagnosis Inerstitial PNA   Does the patient have one of the following disease processes/diagnoses(primary or secondary)? Pneumonia   Does the patient have Home health ordered? No   Is there a DME ordered? Yes   What DME was ordered? [place] for DME   Prep survey completed? Yes          LULY LEUNG - Registered Nurse

## 2023-04-05 LAB
BACTERIA SPEC AEROBE CULT: NORMAL
BACTERIA SPEC AEROBE CULT: NORMAL

## 2023-04-07 ENCOUNTER — HOSPITAL ENCOUNTER (INPATIENT)
Facility: HOSPITAL | Age: 73
LOS: 2 days | Discharge: HOME | DRG: 193 | End: 2023-04-11
Attending: EMERGENCY MEDICINE | Admitting: INTERNAL MEDICINE
Payer: MEDICARE

## 2023-04-07 ENCOUNTER — READMISSION MANAGEMENT (OUTPATIENT)
Dept: CALL CENTER | Facility: HOSPITAL | Age: 73
End: 2023-04-07
Payer: MEDICARE

## 2023-04-07 ENCOUNTER — APPOINTMENT (OUTPATIENT)
Dept: GENERAL RADIOLOGY | Facility: HOSPITAL | Age: 73
DRG: 193 | End: 2023-04-07
Payer: MEDICARE

## 2023-04-07 DIAGNOSIS — R06.00 DYSPNEA, UNSPECIFIED TYPE: ICD-10-CM

## 2023-04-07 DIAGNOSIS — B34.8 RHINOVIRUS: ICD-10-CM

## 2023-04-07 DIAGNOSIS — J84.9 INTERSTITIAL PNEUMONIA: ICD-10-CM

## 2023-04-07 DIAGNOSIS — J18.9 PNEUMONIA DUE TO INFECTIOUS ORGANISM, UNSPECIFIED LATERALITY, UNSPECIFIED PART OF LUNG: Primary | ICD-10-CM

## 2023-04-07 PROBLEM — I25.10 CHRONIC CORONARY ARTERY DISEASE: Status: ACTIVE | Noted: 2023-04-07

## 2023-04-07 PROBLEM — R09.02 HYPOXEMIA: Status: ACTIVE | Noted: 2023-04-07

## 2023-04-07 PROBLEM — J96.21 ACUTE ON CHRONIC RESPIRATORY FAILURE WITH HYPOXIA: Status: RESOLVED | Noted: 2023-01-12 | Resolved: 2023-04-07

## 2023-04-07 PROBLEM — J20.6 ACUTE BRONCHITIS DUE TO RHINOVIRUS: Status: ACTIVE | Noted: 2023-04-07

## 2023-04-07 LAB
ANION GAP SERPL CALCULATED.3IONS-SCNC: 10 MMOL/L (ref 5–15)
ARTERIAL PATENCY WRIST A: POSITIVE
ATMOSPHERIC PRESS: ABNORMAL MM[HG]
B PARAPERT DNA SPEC QL NAA+PROBE: NOT DETECTED
B PERT DNA SPEC QL NAA+PROBE: NOT DETECTED
BASE EXCESS BLDA CALC-SCNC: 6.8 MMOL/L (ref 0–3)
BASOPHILS # BLD AUTO: 0.1 10*3/MM3 (ref 0–0.2)
BASOPHILS NFR BLD AUTO: 0.7 % (ref 0–1.5)
BDY SITE: ABNORMAL
BUN SERPL-MCNC: 14 MG/DL (ref 8–23)
BUN/CREAT SERPL: 20.9 (ref 7–25)
C PNEUM DNA NPH QL NAA+NON-PROBE: NOT DETECTED
CALCIUM SPEC-SCNC: 9.3 MG/DL (ref 8.6–10.5)
CHLORIDE SERPL-SCNC: 95 MMOL/L (ref 98–107)
CO2 BLDA-SCNC: 33.9 MMOL/L (ref 22–29)
CO2 SERPL-SCNC: 33 MMOL/L (ref 22–29)
CREAT SERPL-MCNC: 0.67 MG/DL (ref 0.76–1.27)
DEPRECATED RDW RBC AUTO: 48.1 FL (ref 37–54)
EGFRCR SERPLBLD CKD-EPI 2021: 99.2 ML/MIN/1.73
EOSINOPHIL # BLD AUTO: 0 10*3/MM3 (ref 0–0.4)
EOSINOPHIL NFR BLD AUTO: 0.3 % (ref 0.3–6.2)
ERYTHROCYTE [DISTWIDTH] IN BLOOD BY AUTOMATED COUNT: 15.7 % (ref 12.3–15.4)
FLUAV SUBTYP SPEC NAA+PROBE: NOT DETECTED
FLUBV RNA ISLT QL NAA+PROBE: NOT DETECTED
GLUCOSE SERPL-MCNC: 281 MG/DL (ref 65–99)
HADV DNA SPEC NAA+PROBE: NOT DETECTED
HCO3 BLDA-SCNC: 32.4 MMOL/L (ref 21–28)
HCOV 229E RNA SPEC QL NAA+PROBE: NOT DETECTED
HCOV HKU1 RNA SPEC QL NAA+PROBE: NOT DETECTED
HCOV NL63 RNA SPEC QL NAA+PROBE: NOT DETECTED
HCOV OC43 RNA SPEC QL NAA+PROBE: NOT DETECTED
HCT VFR BLD AUTO: 35.3 % (ref 37.5–51)
HEMODILUTION: NO
HGB BLD-MCNC: 11.3 G/DL (ref 13–17.7)
HMPV RNA NPH QL NAA+NON-PROBE: NOT DETECTED
HOLD SPECIMEN: NORMAL
HOLD SPECIMEN: NORMAL
HPIV1 RNA ISLT QL NAA+PROBE: NOT DETECTED
HPIV2 RNA SPEC QL NAA+PROBE: NOT DETECTED
HPIV3 RNA NPH QL NAA+PROBE: NOT DETECTED
HPIV4 P GENE NPH QL NAA+PROBE: NOT DETECTED
INHALED O2 CONCENTRATION: 36 %
L PNEUMO1 AG UR QL IA: NEGATIVE
LYMPHOCYTES # BLD AUTO: 0.7 10*3/MM3 (ref 0.7–3.1)
LYMPHOCYTES NFR BLD AUTO: 9.6 % (ref 19.6–45.3)
M PNEUMO IGG SER IA-ACNC: NOT DETECTED
MCH RBC QN AUTO: 28.1 PG (ref 26.6–33)
MCHC RBC AUTO-ENTMCNC: 32.1 G/DL (ref 31.5–35.7)
MCV RBC AUTO: 87.7 FL (ref 79–97)
MODALITY: ABNORMAL
MONOCYTES # BLD AUTO: 0.7 10*3/MM3 (ref 0.1–0.9)
MONOCYTES NFR BLD AUTO: 8.5 % (ref 5–12)
MRSA DNA SPEC QL NAA+PROBE: NORMAL
NEUTROPHILS NFR BLD AUTO: 6.3 10*3/MM3 (ref 1.7–7)
NEUTROPHILS NFR BLD AUTO: 80.9 % (ref 42.7–76)
NRBC BLD AUTO-RTO: 0 /100 WBC (ref 0–0.2)
PCO2 BLDA: 49.7 MM HG (ref 35–48)
PH BLDA: 7.42 PH UNITS (ref 7.35–7.45)
PLATELET # BLD AUTO: 223 10*3/MM3 (ref 140–450)
PMV BLD AUTO: 9.4 FL (ref 6–12)
PO2 BLDA: 65.5 MM HG (ref 83–108)
POTASSIUM SERPL-SCNC: 3.6 MMOL/L (ref 3.5–5.2)
PROCALCITONIN SERPL-MCNC: 0.11 NG/ML (ref 0–0.25)
RBC # BLD AUTO: 4.03 10*6/MM3 (ref 4.14–5.8)
RHINOVIRUS RNA SPEC NAA+PROBE: DETECTED
RSV RNA NPH QL NAA+NON-PROBE: NOT DETECTED
S PNEUM AG SPEC QL LA: NEGATIVE
SAO2 % BLDCOA: 92.6 % (ref 94–98)
SARS-COV-2 RNA NPH QL NAA+NON-PROBE: NOT DETECTED
SODIUM SERPL-SCNC: 138 MMOL/L (ref 136–145)
WBC NRBC COR # BLD: 7.8 10*3/MM3 (ref 3.4–10.8)
WHOLE BLOOD HOLD COAG: NORMAL
WHOLE BLOOD HOLD SPECIMEN: NORMAL

## 2023-04-07 PROCEDURE — 94799 UNLISTED PULMONARY SVC/PX: CPT

## 2023-04-07 PROCEDURE — 87641 MR-STAPH DNA AMP PROBE: CPT | Performed by: NURSE PRACTITIONER

## 2023-04-07 PROCEDURE — 25010000002 ENOXAPARIN PER 10 MG: Performed by: INTERNAL MEDICINE

## 2023-04-07 PROCEDURE — 84145 PROCALCITONIN (PCT): CPT | Performed by: NURSE PRACTITIONER

## 2023-04-07 PROCEDURE — 36600 WITHDRAWAL OF ARTERIAL BLOOD: CPT

## 2023-04-07 PROCEDURE — 25010000002 CEFTRIAXONE PER 250 MG: Performed by: NURSE PRACTITIONER

## 2023-04-07 PROCEDURE — 80048 BASIC METABOLIC PNL TOTAL CA: CPT | Performed by: NURSE PRACTITIONER

## 2023-04-07 PROCEDURE — 93005 ELECTROCARDIOGRAM TRACING: CPT | Performed by: EMERGENCY MEDICINE

## 2023-04-07 PROCEDURE — 94640 AIRWAY INHALATION TREATMENT: CPT

## 2023-04-07 PROCEDURE — G0378 HOSPITAL OBSERVATION PER HR: HCPCS

## 2023-04-07 PROCEDURE — 99285 EMERGENCY DEPT VISIT HI MDM: CPT

## 2023-04-07 PROCEDURE — 85025 COMPLETE CBC W/AUTO DIFF WBC: CPT | Performed by: NURSE PRACTITIONER

## 2023-04-07 PROCEDURE — 87077 CULTURE AEROBIC IDENTIFY: CPT | Performed by: NURSE PRACTITIONER

## 2023-04-07 PROCEDURE — 94664 DEMO&/EVAL PT USE INHALER: CPT

## 2023-04-07 PROCEDURE — 71046 X-RAY EXAM CHEST 2 VIEWS: CPT

## 2023-04-07 PROCEDURE — 87205 SMEAR GRAM STAIN: CPT | Performed by: NURSE PRACTITIONER

## 2023-04-07 PROCEDURE — 0202U NFCT DS 22 TRGT SARS-COV-2: CPT | Performed by: NURSE PRACTITIONER

## 2023-04-07 PROCEDURE — 87449 NOS EACH ORGANISM AG IA: CPT | Performed by: NURSE PRACTITIONER

## 2023-04-07 PROCEDURE — 25010000002 DEXAMETHASONE PER 1 MG: Performed by: NURSE PRACTITIONER

## 2023-04-07 PROCEDURE — 87186 SC STD MICRODIL/AGAR DIL: CPT | Performed by: NURSE PRACTITIONER

## 2023-04-07 PROCEDURE — 82803 BLOOD GASES ANY COMBINATION: CPT

## 2023-04-07 PROCEDURE — 87070 CULTURE OTHR SPECIMN AEROBIC: CPT | Performed by: NURSE PRACTITIONER

## 2023-04-07 PROCEDURE — 87040 BLOOD CULTURE FOR BACTERIA: CPT | Performed by: NURSE PRACTITIONER

## 2023-04-07 RX ORDER — METRONIDAZOLE 500 MG/100ML
500 INJECTION, SOLUTION INTRAVENOUS ONCE
Status: COMPLETED | OUTPATIENT
Start: 2023-04-07 | End: 2023-04-07

## 2023-04-07 RX ORDER — BISACODYL 10 MG
10 SUPPOSITORY, RECTAL RECTAL DAILY PRN
Status: DISCONTINUED | OUTPATIENT
Start: 2023-04-07 | End: 2023-04-11 | Stop reason: HOSPADM

## 2023-04-07 RX ORDER — SODIUM CHLORIDE 0.9 % (FLUSH) 0.9 %
10 SYRINGE (ML) INJECTION EVERY 12 HOURS SCHEDULED
Status: DISCONTINUED | OUTPATIENT
Start: 2023-04-07 | End: 2023-04-11 | Stop reason: HOSPADM

## 2023-04-07 RX ORDER — BISACODYL 5 MG/1
5 TABLET, DELAYED RELEASE ORAL DAILY PRN
Status: DISCONTINUED | OUTPATIENT
Start: 2023-04-07 | End: 2023-04-11 | Stop reason: HOSPADM

## 2023-04-07 RX ORDER — ATORVASTATIN CALCIUM 40 MG/1
40 TABLET, FILM COATED ORAL DAILY
Status: DISCONTINUED | OUTPATIENT
Start: 2023-04-08 | End: 2023-04-11 | Stop reason: HOSPADM

## 2023-04-07 RX ORDER — ALBUTEROL SULFATE 2.5 MG/3ML
2.5 SOLUTION RESPIRATORY (INHALATION) ONCE
Status: DISCONTINUED | OUTPATIENT
Start: 2023-04-07 | End: 2023-04-07 | Stop reason: SDUPTHER

## 2023-04-07 RX ORDER — ASPIRIN 81 MG/1
81 TABLET ORAL DAILY
Status: DISCONTINUED | OUTPATIENT
Start: 2023-04-08 | End: 2023-04-11 | Stop reason: HOSPADM

## 2023-04-07 RX ORDER — METRONIDAZOLE 500 MG/100ML
500 INJECTION, SOLUTION INTRAVENOUS EVERY 8 HOURS SCHEDULED
Status: DISCONTINUED | OUTPATIENT
Start: 2023-04-08 | End: 2023-04-08

## 2023-04-07 RX ORDER — POLYETHYLENE GLYCOL 3350 17 G/17G
17 POWDER, FOR SOLUTION ORAL DAILY PRN
Status: DISCONTINUED | OUTPATIENT
Start: 2023-04-07 | End: 2023-04-11 | Stop reason: HOSPADM

## 2023-04-07 RX ORDER — GLIPIZIDE 5 MG/1
10 TABLET ORAL NIGHTLY
Status: DISCONTINUED | OUTPATIENT
Start: 2023-04-07 | End: 2023-04-08

## 2023-04-07 RX ORDER — GABAPENTIN 300 MG/1
300 CAPSULE ORAL 3 TIMES DAILY
Status: DISCONTINUED | OUTPATIENT
Start: 2023-04-07 | End: 2023-04-11 | Stop reason: HOSPADM

## 2023-04-07 RX ORDER — DEXAMETHASONE SODIUM PHOSPHATE 4 MG/ML
6 INJECTION, SOLUTION INTRA-ARTICULAR; INTRALESIONAL; INTRAMUSCULAR; INTRAVENOUS; SOFT TISSUE ONCE
Status: COMPLETED | OUTPATIENT
Start: 2023-04-07 | End: 2023-04-07

## 2023-04-07 RX ORDER — ALUMINA, MAGNESIA, AND SIMETHICONE 2400; 2400; 240 MG/30ML; MG/30ML; MG/30ML
15 SUSPENSION ORAL EVERY 6 HOURS PRN
Status: DISCONTINUED | OUTPATIENT
Start: 2023-04-07 | End: 2023-04-11 | Stop reason: HOSPADM

## 2023-04-07 RX ORDER — CALCIUM CARBONATE 200(500)MG
1 TABLET,CHEWABLE ORAL 2 TIMES DAILY PRN
Status: DISCONTINUED | OUTPATIENT
Start: 2023-04-07 | End: 2023-04-11 | Stop reason: HOSPADM

## 2023-04-07 RX ORDER — ARFORMOTEROL TARTRATE 15 UG/2ML
15 SOLUTION RESPIRATORY (INHALATION)
Status: DISCONTINUED | OUTPATIENT
Start: 2023-04-07 | End: 2023-04-07

## 2023-04-07 RX ORDER — ONDANSETRON 2 MG/ML
4 INJECTION INTRAMUSCULAR; INTRAVENOUS EVERY 6 HOURS PRN
Status: DISCONTINUED | OUTPATIENT
Start: 2023-04-07 | End: 2023-04-11 | Stop reason: HOSPADM

## 2023-04-07 RX ORDER — BUDESONIDE AND FORMOTEROL FUMARATE DIHYDRATE 160; 4.5 UG/1; UG/1
2 AEROSOL RESPIRATORY (INHALATION)
Status: DISCONTINUED | OUTPATIENT
Start: 2023-04-07 | End: 2023-04-09

## 2023-04-07 RX ORDER — PIOGLITAZONEHYDROCHLORIDE 30 MG/1
30 TABLET ORAL NIGHTLY
Status: DISCONTINUED | OUTPATIENT
Start: 2023-04-07 | End: 2023-04-09

## 2023-04-07 RX ORDER — IPRATROPIUM BROMIDE AND ALBUTEROL SULFATE 2.5; .5 MG/3ML; MG/3ML
3 SOLUTION RESPIRATORY (INHALATION) EVERY 6 HOURS PRN
Status: DISCONTINUED | OUTPATIENT
Start: 2023-04-07 | End: 2023-04-11 | Stop reason: HOSPADM

## 2023-04-07 RX ORDER — IPRATROPIUM BROMIDE AND ALBUTEROL SULFATE 2.5; .5 MG/3ML; MG/3ML
3 SOLUTION RESPIRATORY (INHALATION) ONCE
Status: COMPLETED | OUTPATIENT
Start: 2023-04-07 | End: 2023-04-07

## 2023-04-07 RX ORDER — ENOXAPARIN SODIUM 100 MG/ML
40 INJECTION SUBCUTANEOUS DAILY
Status: DISCONTINUED | OUTPATIENT
Start: 2023-04-07 | End: 2023-04-11 | Stop reason: HOSPADM

## 2023-04-07 RX ORDER — AMOXICILLIN 250 MG
2 CAPSULE ORAL 2 TIMES DAILY
Status: DISCONTINUED | OUTPATIENT
Start: 2023-04-07 | End: 2023-04-11 | Stop reason: HOSPADM

## 2023-04-07 RX ORDER — ACETAMINOPHEN 160 MG/5ML
650 SOLUTION ORAL EVERY 4 HOURS PRN
Status: DISCONTINUED | OUTPATIENT
Start: 2023-04-07 | End: 2023-04-11 | Stop reason: HOSPADM

## 2023-04-07 RX ORDER — PREDNISONE 20 MG/1
40 TABLET ORAL
Status: DISCONTINUED | OUTPATIENT
Start: 2023-04-08 | End: 2023-04-11 | Stop reason: HOSPADM

## 2023-04-07 RX ORDER — ROFLUMILAST 500 UG/1
250 TABLET ORAL DAILY
Status: DISCONTINUED | OUTPATIENT
Start: 2023-04-08 | End: 2023-04-11 | Stop reason: HOSPADM

## 2023-04-07 RX ORDER — SODIUM CHLORIDE 0.9 % (FLUSH) 0.9 %
10 SYRINGE (ML) INJECTION AS NEEDED
Status: DISCONTINUED | OUTPATIENT
Start: 2023-04-07 | End: 2023-04-11 | Stop reason: HOSPADM

## 2023-04-07 RX ORDER — ACETAMINOPHEN 325 MG/1
650 TABLET ORAL EVERY 4 HOURS PRN
Status: DISCONTINUED | OUTPATIENT
Start: 2023-04-07 | End: 2023-04-11 | Stop reason: HOSPADM

## 2023-04-07 RX ORDER — OXYCODONE HYDROCHLORIDE 5 MG/1
5 TABLET ORAL EVERY 4 HOURS PRN
Status: DISCONTINUED | OUTPATIENT
Start: 2023-04-07 | End: 2023-04-11 | Stop reason: HOSPADM

## 2023-04-07 RX ORDER — AMLODIPINE BESYLATE 5 MG/1
5 TABLET ORAL NIGHTLY
Status: DISCONTINUED | OUTPATIENT
Start: 2023-04-07 | End: 2023-04-11 | Stop reason: HOSPADM

## 2023-04-07 RX ORDER — ALBUTEROL SULFATE 2.5 MG/3ML
2.5 SOLUTION RESPIRATORY (INHALATION) ONCE
Status: COMPLETED | OUTPATIENT
Start: 2023-04-07 | End: 2023-04-07

## 2023-04-07 RX ORDER — SODIUM CHLORIDE 9 MG/ML
40 INJECTION, SOLUTION INTRAVENOUS AS NEEDED
Status: DISCONTINUED | OUTPATIENT
Start: 2023-04-07 | End: 2023-04-11 | Stop reason: HOSPADM

## 2023-04-07 RX ORDER — LISINOPRIL 20 MG/1
40 TABLET ORAL NIGHTLY
Status: DISCONTINUED | OUTPATIENT
Start: 2023-04-07 | End: 2023-04-11 | Stop reason: HOSPADM

## 2023-04-07 RX ORDER — GUAIFENESIN 600 MG/1
1200 TABLET, EXTENDED RELEASE ORAL EVERY 12 HOURS
Status: DISCONTINUED | OUTPATIENT
Start: 2023-04-07 | End: 2023-04-11 | Stop reason: HOSPADM

## 2023-04-07 RX ORDER — ACETAMINOPHEN 650 MG/1
650 SUPPOSITORY RECTAL EVERY 4 HOURS PRN
Status: DISCONTINUED | OUTPATIENT
Start: 2023-04-07 | End: 2023-04-11 | Stop reason: HOSPADM

## 2023-04-07 RX ORDER — BISOPROLOL FUMARATE 5 MG/1
5 TABLET, FILM COATED ORAL DAILY
Status: DISCONTINUED | OUTPATIENT
Start: 2023-04-08 | End: 2023-04-11 | Stop reason: HOSPADM

## 2023-04-07 RX ADMIN — Medication 10 ML: at 21:49

## 2023-04-07 RX ADMIN — ENOXAPARIN SODIUM 40 MG: 100 INJECTION SUBCUTANEOUS at 21:48

## 2023-04-07 RX ADMIN — BUDESONIDE AND FORMOTEROL FUMARATE DIHYDRATE 2 PUFF: 160; 4.5 AEROSOL RESPIRATORY (INHALATION) at 18:51

## 2023-04-07 RX ADMIN — LISINOPRIL 40 MG: 20 TABLET ORAL at 21:48

## 2023-04-07 RX ADMIN — CEFTRIAXONE 2 G: 2 INJECTION, POWDER, FOR SOLUTION INTRAMUSCULAR; INTRAVENOUS at 16:47

## 2023-04-07 RX ADMIN — GLIPIZIDE 10 MG: 5 TABLET ORAL at 21:49

## 2023-04-07 RX ADMIN — DEXAMETHASONE SODIUM PHOSPHATE 6 MG: 4 INJECTION, SOLUTION INTRAMUSCULAR; INTRAVENOUS at 15:48

## 2023-04-07 RX ADMIN — METRONIDAZOLE 500 MG: 500 INJECTION, SOLUTION INTRAVENOUS at 16:47

## 2023-04-07 RX ADMIN — SERTRALINE 50 MG: 50 TABLET, FILM COATED ORAL at 21:49

## 2023-04-07 RX ADMIN — IPRATROPIUM BROMIDE AND ALBUTEROL SULFATE 3 ML: 2.5; .5 SOLUTION RESPIRATORY (INHALATION) at 16:05

## 2023-04-07 RX ADMIN — ALBUTEROL SULFATE 2.5 MG: 2.5 SOLUTION RESPIRATORY (INHALATION) at 16:32

## 2023-04-07 RX ADMIN — GUAIFENESIN 1200 MG: 600 TABLET, EXTENDED RELEASE ORAL at 21:48

## 2023-04-07 RX ADMIN — PIOGLITAZONE 30 MG: 30 TABLET ORAL at 21:49

## 2023-04-07 RX ADMIN — AMLODIPINE BESYLATE 5 MG: 5 TABLET ORAL at 21:48

## 2023-04-07 RX ADMIN — GABAPENTIN 300 MG: 300 CAPSULE ORAL at 21:48

## 2023-04-07 NOTE — H&P
[facility] Medicine Services      Patient Name: Brenden Peter  : 1950  MRN: [id number]  Primary Care Physician:  Bert Rojas MD  Date of admission: 2023      Subjective      Chief Complaint: Shortness of breath.    History of Present Illness: Brenden Peter is a 72 y.o. male who presented to [facility] on 2023 complaining of shortness of breath.  Patient is a 72-year-old male with past medical history of COPD on home oxygen 4 L continuous, diabetes mellitus, diabetic peripheral neuropathy, hypertension, hyperlipidemia, anxiety and congestive heart failure who presented to the emergency room because of  shortness of breath.  Patient reported worsening of of his symptoms and presented to the emergency room for further assessment.  Patient reported that he felt like he could not get enough air with his home oxygen.  Patient was seen in the emergency room and was diagnosed with pneumonia and a COPD and was recommended for admission for further treatment and management.  Patient was started on antibiotics for pneumonia and cultures were completed.      Patient reported no fever or chills, no headache or visual obscurations, no chest pain or abdominal pain, no hot or cold intolerance, no constipation or diarrhea no leg swelling or leg pain.      Review of Systems   Constitutional: Negative.   HENT: Negative.    Eyes: Negative.    Cardiovascular: Negative.    Respiratory: Positive for cough and shortness of breath.    Endocrine: Negative.    Hematologic/Lymphatic: Negative.    Skin: Negative.    Musculoskeletal: Negative.    Gastrointestinal: Negative.    Genitourinary: Negative.    Neurological: Negative.    Psychiatric/Behavioral: Negative.    Allergic/Immunologic: Negative.           Personal History     Past Medical History:   Diagnosis Date   • (HFpEF) heart failure with preserved ejection fraction 2023   • Abnormal EKG 2/3/2015   • Anxiety    •  COPD (chronic obstructive pulmonary disease)    • Coronary artery disease 3/3/2015   • Diabetes     type 2   • Hyperlipidemia    • Hypertension    • Microalbuminuria due to type 2 diabetes mellitus 2/5/2020   • Multinodular goiter 4/18/2019   • Murmur 2/3/2015   • Primary osteoarthritis of left knee 10/22/2021       Past Surgical History:   Procedure Laterality Date   • CARDIAC CATHETERIZATION Left 1/15/2023    Procedure: Left Heart Cath and coronary angiogram;  Surgeon: Shawn Baker MD;  Location: [facility] CATH INVASIVE LOCATION;  Service: Cardiovascular;  Laterality: Left;   • EYE SURGERY      cataract   • TOTAL KNEE ARTHROPLASTY Right 10/21/2020    Procedure: TOTAL KNEE ARTHROPLASTY;  Surgeon: Ravi Fagan MD;  Location: [facility] MAIN OR;  Service: Orthopedics;  Laterality: Right;   • TOTAL KNEE ARTHROPLASTY REVISION Right 5/19/2021    Procedure: KNEE POLY INSERT EXCHANGE with irrigation;  Surgeon: Ravi Fagan MD;  Location: [facility] MAIN OR;  Service: Orthopedics;  Laterality: Right;       Family History: family history includes Cancer in an other family member; Diabetes in an other family member; Heart disease in an other family member; No Known Problems in his brother, father, maternal aunt, maternal grandfather, maternal grandmother, maternal uncle, mother, paternal aunt, paternal grandfather, paternal grandmother, paternal uncle, and sister. Otherwise pertinent FHx was reviewed and not pertinent to current issue.    Social History:  reports that he quit smoking about 9 months ago. His smoking use included cigarettes. He has a 5.00 pack-year smoking history. He has been exposed to tobacco smoke. He quit smokeless tobacco use about 9 months ago. He reports that he does not currently use alcohol. He reports that he does not use drugs.    Home Medications:  Prior to Admission Medications     Prescriptions Last Dose Informant Patient Reported? Taking?    amLODIPine (NORVASC) 5 MG tablet   Yes No    Take 1 tablet  by mouth Every Night.    arformoterol (BROVANA) 15 MCG/2ML nebulizer solution   No No    Take 2 mL by nebulization 2 (Two) Times a Day. Copd j44.9    aspirin 81 MG EC tablet   No No    Take 1 tablet by mouth Daily for 90 days.    atorvastatin (LIPITOR) 40 MG tablet   No No    Take 1 tablet by mouth Daily for 90 days.    bisoprolol (ZEBeta) 5 MG tablet   Yes No    Take 1 tablet by mouth Daily.    budesonide (PULMICORT) 0.5 MG/2ML nebulizer solution   No No    Take 2 mL by nebulization 2 (Two) Times a Day. J44.9    cefdinir (OMNICEF) 300 MG capsule   No No    Take 1 capsule by mouth 2 (Two) Times a Day.    doxycycline (ADOXA) 100 MG tablet   No No    Take 1 tablet by mouth Every 12 (Twelve) Hours for 15 doses. Indications: Infection with Dysregulated Inflammatory Response    furosemide (LASIX) 40 MG tablet   No No    Take 1 tablet by mouth Daily for 90 days.    gabapentin (NEURONTIN) 300 MG capsule   Yes No    Take 1 capsule by mouth 3 (Three) Times a Day.    glipizide (GLUCOTROL) 10 MG tablet   Yes No    Take 1 tablet by mouth Every Night.    guaiFENesin (MUCINEX) 600 MG 12 hr tablet   No No    Take 2 tablets by mouth Every 12 (Twelve) Hours.    ipratropium-albuterol (DUO-NEB) 0.5-2.5 mg/3 ml nebulizer   Yes No    3 mL Every 6 (Six) Hours As Needed.    lisinopril (PRINIVIL,ZESTRIL) 40 MG tablet   Yes No    Take 1 tablet by mouth Every Night.    metFORMIN (GLUCOPHAGE) 1000 MG tablet   Yes No    Take 1 tablet by mouth 2 (Two) Times a Day With Meals.    pioglitazone (ACTOS) 30 MG tablet   Yes No    Take 1 tablet by mouth Every Night.    predniSONE (DELTASONE) 10 MG tablet   No No    Take 1 tablet by mouth Daily for 2 doses.    predniSONE (DELTASONE) 20 MG tablet   No No    Take 1 tablet by mouth Daily for 2 doses.    roflumilast (DALIRESP) 250 MCG tablet tablet   No No    Take 1 tablet by mouth Daily.    sertraline (ZOLOFT) 50 MG tablet   Yes No    Take 1 tablet by mouth Every Night.    vitamin D (ERGOCALCIFEROL) 1.25  MG (61986 UT) capsule capsule   Yes No    1 capsule Every 7 (Seven) Days. Wednesday            Allergies:  No Known Allergies    Objective      Vitals:   Temp:  [98.7 °F (37.1 °C)] 98.7 °F (37.1 °C)  Heart Rate:  [72-86] 86  Resp:  [16-30] 22  BP: (126-145)/(56-74) 145/69  Flow (L/min):  [4-6] 6    Physical Exam  Vitals reviewed.   Constitutional:       General: He is not in acute distress.     Appearance: Normal appearance. He is ill-appearing.   HENT:      Head: Normocephalic.      Nose: Nose normal.      Mouth/Throat:      Mouth: Mucous membranes are dry.      Pharynx: Oropharynx is clear.   Eyes:      Extraocular Movements: Extraocular movements intact.      Conjunctiva/sclera: Conjunctivae normal.      Pupils: Pupils are equal, round, and reactive to light.   Cardiovascular:      Rate and Rhythm: Normal rate and regular rhythm.      Pulses: Normal pulses.      Heart sounds: No murmur heard.    No friction rub. No gallop.   Pulmonary:      Breath sounds: No stridor. Wheezing present. No rales.      Comments: Decreased air entry bilaterally.  Positive wheezing.  Poor air movement.  Chest:      Chest wall: No tenderness.   Abdominal:      General: Bowel sounds are normal. There is no distension.      Palpations: Abdomen is soft.      Tenderness: There is no abdominal tenderness. There is no right CVA tenderness or guarding.   Musculoskeletal:         General: No swelling, tenderness, deformity or signs of injury.      Cervical back: Normal range of motion. No rigidity.      Right lower leg: No edema.      Left lower leg: No edema.   Skin:     General: Skin is warm and dry.      Capillary Refill: Capillary refill takes less than 2 seconds.      Coloration: Skin is not jaundiced.      Findings: No bruising, erythema, lesion or rash.   Neurological:      Mental Status: He is alert.      Comments: No facial asymmetry noted.  Gait and station not tested.   Psychiatric:      Comments: No agitation.            Result  Review    Result Review:  I have personally reviewed the results from the time of this admission to 4/7/2023 19:39 EDT and agree with these findings:  []  Laboratory  []  Microbiology  []  Radiology  []  EKG/Telemetry   []  Cardiology/Vascular   []  Pathology  []  Old records  []  Other:  Most notable findings include:       Assessment & Plan        Active Hospital Problems:  Active Hospital Problems    Diagnosis    • **Pneumonia due to infectious organism, unspecified laterality, unspecified part of lung    • Hypoxemia    • Chronic coronary artery disease    • COPD (chronic obstructive pulmonary disease)    • Benign prostatic hyperplasia    • Diabetic peripheral neuropathy    • Chronic depression    • Essential hypertension    • Mixed hyperlipidemia    • Type 2 diabetes mellitus without complication, without long-term current use of insulin (HCC)          Plan:      -Continue appropriate patient's home medications for other chronic medical conditions.  -Continue the present level of care.  -Patient and family agreed with the plan of care.  -Treat pneumonia with antibiotics.  -Treat for COPD with nebs.  -Treated diabetes mellitus with insulin therapy and oral meds.  -Treat peripheral neuropathy with gabapentin.  -Treat hyperlipidemia with Lipitor.  -Treat hypoxia with oxygen therapy.        DVT prophylaxis:  Medical and mechanical DVT prophylaxis orders are present.    CODE STATUS:    Level Of Support Discussed With: Patient  Code Status (Patient has no pulse and is not breathing): CPR (Attempt to Resuscitate)  Medical Interventions (Patient has pulse or is breathing): Full Support    Admission Status:  I believe this patient meets observation status.    I discussed the patient's findings and my recommendations with patient, family, nursing staff, primary care team and consulting provider.    This patient has been examined wearing appropriate Personal Protective Equipment and discussed with hospital infection  control department, state health department, infectious disease specialist and pulmonologist. 04/07/23      Signature:Electronically signed by Aditya Liang MD, 04/07/23, 7:39 PM EDT.

## 2023-04-07 NOTE — ED NOTES
Nursing report ED to floor  Brenden Peter  72 y.o.  male    HPI:   Chief Complaint   Patient presents with   • Shortness of Breath       Admitting doctor:   Aditya Liang MD    Admitting diagnosis:   The primary encounter diagnosis was Pneumonia due to infectious organism, unspecified laterality, unspecified part of lung. Diagnoses of Rhinovirus and Dyspnea, unspecified type were also pertinent to this visit.    Code status:   Current Code Status     Date Active Code Status Order ID Comments User Context       4/7/2023 1740 CPR (Attempt to Resuscitate) [id number]  Aditya Liang MD ED      Question Answer    Code Status (Patient has no pulse and is not breathing) CPR (Attempt to Resuscitate)    Medical Interventions (Patient has pulse or is breathing) Full Support    Level Of Support Discussed With Patient                Allergies:   Patient has no known allergies.    Isolation:  Droplet     Fall Risk:  Fall Risk Assessment was completed, and patient is at moderate risk for falls.   Predictive Model Details         18 (Low) Factor Value    Calculated 4/7/2023 18:07 Age 72    Risk of Fall Model Musculoskeletal Assessment WDL     Active Peripheral IV Present     Imaging order in this encounter Present     Respiratory Rate 24     Skin Assessment WDL     Number of Distinct Medication Classes administered 5     Magnesium not on file     Financial Class Medicare     Drug Use No     Tobacco Use Quit     Diastolic BP 69     Enzo Scale not on file     Chloride 95 mmol/L     Peripheral Vascular Assessment WDL     Creatinine 0.67 mg/dL     Clinically Relevant Sex Not Female     Gastrointestinal Assessment WDL     Albumin not on file     Cardiac Assessment WDL     Calcium 9.3 mg/dL     Total Bilirubin not on file     Days after Admission 0.143     Potassium 3.6 mmol/L     ALT not on file         Weight:       04/07/23  1441   Weight: 102 kg (225 lb)       Intake and Output    Intake/Output Summary (Last 24 hours) at  4/7/2023 1931  Last data filed at 4/7/2023 1729  Gross per 24 hour   Intake 200 ml   Output --   Net 200 ml       Diet:   Dietary Orders (From admission, onward)     Start     Ordered    04/07/23 1741  Diet: Cardiac Diets, Diabetic Diets; Healthy Heart (2-3 Na+); Consistent Carbohydrate; Texture: Regular Texture (IDDSI 7); Fluid Consistency: Thin (IDDSI 0)  Diet Effective Now        References:    Diet Order [place]   Question Answer Comment   Diets: Cardiac Diets    Diets: Diabetic Diets    Cardiac Diet: Healthy Heart (2-3 Na+)    Diabetic Diet: Consistent Carbohydrate    Texture: Regular Texture (IDDSI 7)    Fluid Consistency: Thin (IDDSI 0)        04/07/23 1740                 Most recent vitals:   Vitals:    04/07/23 1616 04/07/23 1728 04/07/23 1851 04/07/23 1853   BP: 135/56 145/69     BP Location:  Left arm     Patient Position: Lying Lying     Pulse: 72 77 81 86   Resp: (!) 30 24 22 22   Temp:       SpO2: 94% 91% 90% 91%   Weight:       Height:           Active LDAs/IV Access:   Lines, Drains & Airways     Active LDAs     Name Placement date Placement time Site Days    Peripheral IV Right Antecubital --  --  Antecubital  --                Skin Condition:   Skin Assessments (last day)     None           Labs (abnormal labs have a star):   Labs Reviewed   RESPIRATORY PANEL PCR W/ COVID-19 (SARS-COV-2) IAN/NAYELY/KATERIN/PAD/[facility] IN-HOUSE, NP SWAB IN UTM/VTP, 3-4 HR TAT - Abnormal; Notable for the following components:       Result Value    Human Rhinovirus/Enterovirus Detected (*)     All other components within normal limits    Narrative:     In the setting of a positive respiratory panel with a viral infection PLUS a negative procalcitonin without other underlying concern for bacterial infection, consider observing off antibiotics or discontinuation of antibiotics and continue supportive care. If the respiratory panel is positive for atypical bacterial infection (Bordetella pertussis, Chlamydophila  pneumoniae, or Mycoplasma pneumoniae), consider antibiotic de-escalation to target atypical bacterial infection.   BASIC METABOLIC PANEL - Abnormal; Notable for the following components:    Glucose 281 (*)     Creatinine 0.67 (*)     Chloride 95 (*)     CO2 33.0 (*)     All other components within normal limits    Narrative:     GFR Normal >60  Chronic Kidney Disease <60  Kidney Failure <15    The GFR formula is only valid for adults with stable renal function between ages 18 and 70.   CBC WITH AUTO DIFFERENTIAL - Abnormal; Notable for the following components:    RBC 4.03 (*)     Hemoglobin 11.3 (*)     Hematocrit 35.3 (*)     RDW 15.7 (*)     Neutrophil % 80.9 (*)     Lymphocyte % 9.6 (*)     All other components within normal limits   BLOOD GAS, ARTERIAL - Abnormal; Notable for the following components:    pCO2, Arterial 49.7 (*)     pO2, Arterial 65.5 (*)     HCO3, Arterial 32.4 (*)     Base Excess, Arterial 6.8 (*)     O2 Saturation, Arterial 92.6 (*)     CO2 Content 33.9 (*)     All other components within normal limits   MRSA SCREEN, PCR - Normal    Narrative:     The negative predictive value of this diagnostic test is high and should only be used to consider de-escalating anti-MRSA therapy. A positive result may indicate colonization with MRSA and must be correlated clinically.   STREP PNEUMO AG, URINE OR CSF - Normal   LEGIONELLA ANTIGEN, URINE - Normal   PROCALCITONIN - Normal    Narrative:     As a Marker for Sepsis (Non-Neonates):    1. <0.5 ng/mL represents a low risk of severe sepsis and/or septic shock.  2. >2 ng/mL represents a high risk of severe sepsis and/or septic shock.    As a Marker for Lower Respiratory Tract Infections that require antibiotic therapy:    PCT on Admission    Antibiotic Therapy       6-12 Hrs later    >0.5                Strongly Recommended  >0.25 - <0.5        Recommended   0.1 - 0.25          Discouraged              Remeasure/reassess PCT  <0.1                Strongly  "Discouraged     Remeasure/reassess PCT    As 28 day mortality risk marker: \"Change in Procalcitonin Result\" (>80% or <=80%) if Day 0 (or Day 1) and Day 4 values are available. Refer to [email or web address]://[email or web address]-pct-calculator.com    Change in PCT <=80%  A decrease of PCT levels below or equal to 80% defines a positive change in PCT test result representing a higher risk for 28-day all-cause mortality of patients diagnosed with severe sepsis for septic shock.    Change in PCT >80%  A decrease of PCT levels of more than 80% defines a negative change in PCT result representing a lower risk for 28-day all-cause mortality of patients diagnosed with severe sepsis or septic shock.      BLOOD CULTURE   BLOOD CULTURE   RESPIRATORY CULTURE   RAINBOW DRAW    Narrative:     The following orders were created for panel order [place] Draw.  Procedure                               Abnormality         Status                     ---------                               -----------         ------                     Green Top (Gel)[[id number]]                                  Final result               Lavender Top[[id number]]                                     Final result               Gold Top - SST[[id number]]                                   Final result               Light Blue Top[[id number]]                                   Final result                 Please view results for these tests on the individual orders.   BLOOD GAS, ARTERIAL   D-DIMER, QUANTITATIVE   GREEN TOP   LAVENDER TOP   GOLD TOP - SST   LIGHT BLUE TOP   CBC AND DIFFERENTIAL    Narrative:     The following orders were created for panel order CBC & Differential.  Procedure                               Abnormality         Status                     ---------                               -----------         ------                     CBC Auto Differential[[id number]]        Abnormal            Final result                 Please view results for these tests on the " individual orders.       LOC: Alert and Oriented x 4, person, place, time, and situation.     Telemetry:  Med/Surg    Cardiac Monitoring Ordered: Yes    EKG:   ECG 12 Lead Dyspnea   Preliminary Result   HEART RATE= 73  bpm   RR Interval= 820  ms   WI Interval= 178  ms   P Horizontal Axis= -42  deg   P Front Axis= 30  deg   QRSD Interval= 164  ms   QT Interval= 416  ms   QRS Axis= -81  deg   T Wave Axis= 47  deg   - ABNORMAL ECG -   Sinus rhythm   Atrial premature complex   Probable left atrial enlargement   Right bundle branch block   Left anterior fascicular block   When compared with ECG of 31-Mar-2023 18:46:10,   New conduction abnormality   Electronically Signed By:    Date and Time of Study: 2023-04-07 14:57:25      ECG 12 Lead Dyspnea    (Results Pending)       Medications Given in the ED:   Medications   sodium chloride 0.9 % flush 10 mL (has no administration in time range)   sodium chloride 0.9 % flush 10 mL (has no administration in time range)   sodium chloride 0.9 % flush 10 mL (has no administration in time range)   sodium chloride 0.9 % infusion 40 mL (has no administration in time range)   Enoxaparin Sodium (LOVENOX) syringe 40 mg (has no administration in time range)   acetaminophen (TYLENOL) tablet 650 mg (has no administration in time range)     Or   acetaminophen (TYLENOL) 160 MG/5ML solution 650 mg (has no administration in time range)     Or   acetaminophen (TYLENOL) suppository 650 mg (has no administration in time range)   oxyCODONE (ROXICODONE) immediate release tablet 5 mg (has no administration in time range)   sennosides-docusate (PERICOLACE) 8.6-50 MG per tablet 2 tablet (has no administration in time range)     And   polyethylene glycol (MIRALAX) packet 17 g (has no administration in time range)     And   bisacodyl (DULCOLAX) EC tablet 5 mg (has no administration in time range)     And   bisacodyl (DULCOLAX) suppository 10 mg (has no administration in time range)   ondansetron (ZOFRAN)  injection 4 mg (has no administration in time range)   aluminum-magnesium hydroxide-simethicone (MAALOX MAX) 400-400-40 MG/5ML suspension 15 mL (has no administration in time range)   calcium carbonate (TUMS) chewable tablet 500 mg (200 mg elemental) (has no administration in time range)   amLODIPine (NORVASC) tablet 5 mg (has no administration in time range)   aspirin EC tablet 81 mg (has no administration in time range)   atorvastatin (LIPITOR) tablet 40 mg (has no administration in time range)   bisoprolol (ZEBeta) tablet 5 mg (has no administration in time range)   gabapentin (NEURONTIN) capsule 300 mg (has no administration in time range)   glipizide (GLUCOTROL) tablet 10 mg (has no administration in time range)   guaiFENesin (MUCINEX) 12 hr tablet 1,200 mg (has no administration in time range)   ipratropium-albuterol (DUO-NEB) nebulizer solution 3 mL (has no administration in time range)   lisinopril (PRINIVIL,ZESTRIL) tablet 40 mg (has no administration in time range)   metFORMIN (GLUCOPHAGE) tablet 1,000 mg (has no administration in time range)   pioglitazone (ACTOS) tablet 30 mg (has no administration in time range)   roflumilast (DALIRESP) tablet 250 mcg (has no administration in time range)   sertraline (ZOLOFT) tablet 50 mg (has no administration in time range)   predniSONE (DELTASONE) tablet 40 mg (has no administration in time range)   budesonide-formoterol (SYMBICORT) 160-4.5 MCG/ACT inhaler 2 puff ( Inhalation Canceled Entry 4/7/23 2130)   dexamethasone (DECADRON) injection 6 mg (6 mg Intravenous Given 4/7/23 1548)   ipratropium-albuterol (DUO-NEB) nebulizer solution 3 mL (3 mL Nebulization Given 4/7/23 1605)   cefTRIAXone (ROCEPHIN) 2 g in sodium chloride 0.9 % 100 mL IVPB (0 g Intravenous Stopped 4/7/23 1729)   metroNIDAZOLE (FLAGYL) IVPB 500 mg (0 mg Intravenous Stopped 4/7/23 1729)   albuterol (PROVENTIL) nebulizer solution 0.083% 2.5 mg/3mL (2.5 mg Nebulization Given 4/7/23 2326)       Imaging  results:  XR Chest 2 View    Result Date: 2023  Impression: 1. Diffuse interstitial prominence in the lungs which may represent mild fibrosis. 2. Right upper lobe infiltrate. Electronically Signed: Leo Rosenberg  2023 3:45 PM EDT  Workstation ID: [id number]      Social issues:   Social History     Socioeconomic History   • Marital status:    Tobacco Use   • Smoking status: Former     Packs/day: 1.00     Years: 5.00     Pack years: 5.00     Types: Cigarettes     Quit date: 7/3/2022     Years since quittin.7     Passive exposure: Past   • Smokeless tobacco: Former     Quit date: 7/3/2022   Vaping Use   • Vaping Use: Never used   Substance and Sexual Activity   • Alcohol use: Not Currently   • Drug use: Never   • Sexual activity: Defer       NIH Stroke Scale:  Interval: (not recorded)  1a. Level of Consciousness: (not recorded)  1b. LOC Questions: (not recorded)  1c. LOC Commands: (not recorded)  2. Best Gaze: (not recorded)  3. Visual: (not recorded)  4. Facial Palsy: (not recorded)  5a. Motor Arm, Left: (not recorded)  5b. Motor Arm, Right: (not recorded)  6a. Motor Leg, Left: (not recorded)  6b. Motor Leg, Right: (not recorded)  7. Limb Ataxia: (not recorded)  8. Sensory: (not recorded)  9. Best Language: (not recorded)  10. Dysarthria: (not recorded)  11. Extinction and Inattention (formerly Neglect): (not recorded)    Total (NIH Stroke Scale): (not recorded)     Additional notable assessment information:  Patient tested positive for rhinovirus prior to todays visit. Patient reports normally being on home O2 on 4L. Patient currently on O2 @ 6L per NC related to Dr. Liang present in the patient room for rounding at 1900 and verbalized increase in O2 because of O2 sats of 85% on 5L. Patient will have a decrease in O2 sats upon exertion. For safety of patient, stand by assist.     Nursing report ED to floor:  Report called to 2C. Report given to Lonnie Collier RN   23 19:31 EDT

## 2023-04-07 NOTE — Clinical Note
Level of Care: Med/Surg [1]   Diagnosis: Pneumonia due to infectious organism, unspecified laterality, unspecified part of lung [[id number]]   Admitting Physician: STEFFEN MARQUES [[id number]]   Attending Physician: STEFFEN MARQUES [[id number]]

## 2023-04-07 NOTE — ED PROVIDER NOTES
Subjective   History of Present Illness  Chief complaint: Shortness of breath      Context: Patient is a 72-year-old male presents with his wife from home with complaints of increasing shortness of breath and nonproductive cough.  He states he was recently discharged from the hospital 4 days ago after being admitted with rhinovirus and COPD exacerbation.  States he is chronically on 4 L nasal cannula.  Wife states he was taken off of his DuoNebs and started on twice a day Brovon and seems to be worsening.  He states he has been compliantly taking his cefdinir and doxycycline that he was sent home with.  He denies any fever but does note worsening exertional dyspnea.        PCP: Leda Greenwood          Review of Systems   Constitutional: Negative for fever.   Respiratory: Positive for cough and shortness of breath.    Cardiovascular: Negative for leg swelling.   Gastrointestinal: Negative for abdominal pain.   Skin: Negative for rash.       Past Medical History:   Diagnosis Date   • (HFpEF) heart failure with preserved ejection fraction 4/1/2023   • Abnormal EKG 2/3/2015   • Anxiety    • COPD (chronic obstructive pulmonary disease)    • Coronary artery disease 3/3/2015   • Diabetes     type 2   • Hyperlipidemia    • Hypertension    • Microalbuminuria due to type 2 diabetes mellitus 2/5/2020   • Multinodular goiter 4/18/2019   • Murmur 2/3/2015   • Primary osteoarthritis of left knee 10/22/2021       No Known Allergies    Past Surgical History:   Procedure Laterality Date   • CARDIAC CATHETERIZATION Left 1/15/2023    Procedure: Left Heart Cath and coronary angiogram;  Surgeon: Shawn Baker MD;  Location: [facility] CATH INVASIVE LOCATION;  Service: Cardiovascular;  Laterality: Left;   • EYE SURGERY      cataract   • TOTAL KNEE ARTHROPLASTY Right 10/21/2020    Procedure: TOTAL KNEE ARTHROPLASTY;  Surgeon: Ravi Fagan MD;  Location: [facility] MAIN OR;  Service: Orthopedics;  Laterality: Right;   • TOTAL KNEE ARTHROPLASTY  REVISION Right 2021    Procedure: KNEE POLY INSERT EXCHANGE with irrigation;  Surgeon: Ravi Fagan MD;  Location: [facility] MAIN OR;  Service: Orthopedics;  Laterality: Right;       Family History   Problem Relation Age of Onset   • Cancer Other    • Diabetes Other    • Heart disease Other    • No Known Problems Mother    • No Known Problems Father    • No Known Problems Sister    • No Known Problems Brother    • No Known Problems Maternal Aunt    • No Known Problems Maternal Uncle    • No Known Problems Paternal Aunt    • No Known Problems Paternal Uncle    • No Known Problems Maternal Grandmother    • No Known Problems Maternal Grandfather    • No Known Problems Paternal Grandmother    • No Known Problems Paternal Grandfather    • Anemia Neg Hx    • Arrhythmia Neg Hx    • Asthma Neg Hx    • Clotting disorder Neg Hx    • Fainting Neg Hx    • Heart attack Neg Hx    • Heart failure Neg Hx    • Hyperlipidemia Neg Hx    • Hypertension Neg Hx        Social History     Socioeconomic History   • Marital status:    Tobacco Use   • Smoking status: Former     Packs/day: 1.00     Years: 5.00     Pack years: 5.00     Types: Cigarettes     Quit date: 7/3/2022     Years since quittin.7     Passive exposure: Past   • Smokeless tobacco: Former     Quit date: 7/3/2022   Vaping Use   • Vaping Use: Never used   Substance and Sexual Activity   • Alcohol use: Not Currently   • Drug use: Never   • Sexual activity: Defer           Objective   Physical Exam     Vital signs and triage nurse note reviewed.   Constitutional: Awake, alert; uncomfortable.  Spouse at bedside  HEENT: Normocephalic, atraumatic;   with intact EOM; oropharynx is pink and moist without exudate or erythema.   Neck: Supple, full range of motion without pain;     Cardiovascular: Regular rate and rhythm, normal S1-S2.   Pulmonary: Respiratory effort regular nonlabored, breath sounds diffuse expiratory wheezes bilateral upper and lower lobes with  decreased air movement   Abdomen: Soft, nontender nondistended with normoactive bowel sounds; no rebound or guarding.   Musculoskeletal: Independent range of motion of all extremities with no palpable tenderness or edema.   Neuro: Alert oriented x3, speech is clear and appropriate, GCS 15   Skin:  Fleshtone warm, dry, intact; no erythematous or petechial rash or lesion       Procedures           ED Course      Labs Reviewed   RESPIRATORY PANEL PCR W/ COVID-19 (SARS-COV-2) IAN/NAYELY/KATERIN/PAD/COR/MAD/[facility] IN-HOUSE, NP SWAB IN UTM/VTP, 3-4 HR TAT - Abnormal; Notable for the following components:       Result Value    Human Rhinovirus/Enterovirus Detected (*)     All other components within normal limits    Narrative:     In the setting of a positive respiratory panel with a viral infection PLUS a negative procalcitonin without other underlying concern for bacterial infection, consider observing off antibiotics or discontinuation of antibiotics and continue supportive care. If the respiratory panel is positive for atypical bacterial infection (Bordetella pertussis, Chlamydophila pneumoniae, or Mycoplasma pneumoniae), consider antibiotic de-escalation to target atypical bacterial infection.   BASIC METABOLIC PANEL - Abnormal; Notable for the following components:    Glucose 281 (*)     Creatinine 0.67 (*)     Chloride 95 (*)     CO2 33.0 (*)     All other components within normal limits    Narrative:     GFR Normal >60  Chronic Kidney Disease <60  Kidney Failure <15    The GFR formula is only valid for adults with stable renal function between ages 18 and 70.   CBC WITH AUTO DIFFERENTIAL - Abnormal; Notable for the following components:    RBC 4.03 (*)     Hemoglobin 11.3 (*)     Hematocrit 35.3 (*)     RDW 15.7 (*)     Neutrophil % 80.9 (*)     Lymphocyte % 9.6 (*)     All other components within normal limits   BLOOD GAS, ARTERIAL - Abnormal; Notable for the following components:    pCO2, Arterial 49.7 (*)     pO2,  "Arterial 65.5 (*)     HCO3, Arterial 32.4 (*)     Base Excess, Arterial 6.8 (*)     O2 Saturation, Arterial 92.6 (*)     CO2 Content 33.9 (*)     All other components within normal limits   MRSA SCREEN, PCR - Normal    Narrative:     The negative predictive value of this diagnostic test is high and should only be used to consider de-escalating anti-MRSA therapy. A positive result may indicate colonization with MRSA and must be correlated clinically.   STREP PNEUMO AG, URINE OR CSF - Normal   LEGIONELLA ANTIGEN, URINE - Normal   PROCALCITONIN - Normal    Narrative:     As a Marker for Sepsis (Non-Neonates):    1. <0.5 ng/mL represents a low risk of severe sepsis and/or septic shock.  2. >2 ng/mL represents a high risk of severe sepsis and/or septic shock.    As a Marker for Lower Respiratory Tract Infections that require antibiotic therapy:    PCT on Admission    Antibiotic Therapy       6-12 Hrs later    >0.5                Strongly Recommended  >0.25 - <0.5        Recommended   0.1 - 0.25          Discouraged              Remeasure/reassess PCT  <0.1                Strongly Discouraged     Remeasure/reassess PCT    As 28 day mortality risk marker: \"Change in Procalcitonin Result\" (>80% or <=80%) if Day 0 (or Day 1) and Day 4 values are available. Refer to http://[email or web address]-pct-calculator.com    Change in PCT <=80%  A decrease of PCT levels below or equal to 80% defines a positive change in PCT test result representing a higher risk for 28-day all-cause mortality of patients diagnosed with severe sepsis for septic shock.    Change in PCT >80%  A decrease of PCT levels of more than 80% defines a negative change in PCT result representing a lower risk for 28-day all-cause mortality of patients diagnosed with severe sepsis or septic shock.      BLOOD CULTURE   BLOOD CULTURE   RESPIRATORY CULTURE   RAINBOW DRAW    Narrative:     The following orders were created for panel order [place] Draw.  Procedure                   "             Abnormality         Status                     ---------                               -----------         ------                     Green Top (Gel)[[id number]]                                  Final result               Lavender Top[[id number]]                                     Final result               Gold Top - SST[[id number]]                                   Final result               Light Blue Top[[id number]]                                   Final result                 Please view results for these tests on the individual orders.   BLOOD GAS, ARTERIAL   D-DIMER, QUANTITATIVE   POCT GLUCOSE FINGERSTICK   POCT GLUCOSE FINGERSTICK   POCT GLUCOSE FINGERSTICK   POCT GLUCOSE FINGERSTICK   GREEN TOP   LAVENDER TOP   GOLD TOP - SST   LIGHT BLUE TOP   CBC AND DIFFERENTIAL    Narrative:     The following orders were created for panel order CBC & Differential.  Procedure                               Abnormality         Status                     ---------                               -----------         ------                     CBC Auto Differential[[id number]]        Abnormal            Final result                 Please view results for these tests on the individual orders.     Medications   sodium chloride 0.9 % flush 10 mL (has no administration in time range)   sodium chloride 0.9 % flush 10 mL (has no administration in time range)   sodium chloride 0.9 % flush 10 mL (has no administration in time range)   sodium chloride 0.9 % infusion 40 mL (has no administration in time range)   Enoxaparin Sodium (LOVENOX) syringe 40 mg (has no administration in time range)   acetaminophen (TYLENOL) tablet 650 mg (has no administration in time range)     Or   acetaminophen (TYLENOL) 160 MG/5ML solution 650 mg (has no administration in time range)     Or   acetaminophen (TYLENOL) suppository 650 mg (has no administration in time range)   oxyCODONE (ROXICODONE) immediate release tablet 5 mg (has no  administration in time range)   sennosides-docusate (PERICOLACE) 8.6-50 MG per tablet 2 tablet (has no administration in time range)     And   polyethylene glycol (MIRALAX) packet 17 g (has no administration in time range)     And   bisacodyl (DULCOLAX) EC tablet 5 mg (has no administration in time range)     And   bisacodyl (DULCOLAX) suppository 10 mg (has no administration in time range)   ondansetron (ZOFRAN) injection 4 mg (has no administration in time range)   aluminum-magnesium hydroxide-simethicone (MAALOX MAX) 400-400-40 MG/5ML suspension 15 mL (has no administration in time range)   calcium carbonate (TUMS) chewable tablet 500 mg (200 mg elemental) (has no administration in time range)   amLODIPine (NORVASC) tablet 5 mg (has no administration in time range)   aspirin EC tablet 81 mg (has no administration in time range)   atorvastatin (LIPITOR) tablet 40 mg (has no administration in time range)   bisoprolol (ZEBeta) tablet 5 mg (has no administration in time range)   gabapentin (NEURONTIN) capsule 300 mg (has no administration in time range)   glipizide (GLUCOTROL) tablet 10 mg (has no administration in time range)   guaiFENesin (MUCINEX) 12 hr tablet 1,200 mg (has no administration in time range)   ipratropium-albuterol (DUO-NEB) nebulizer solution 3 mL (has no administration in time range)   lisinopril (PRINIVIL,ZESTRIL) tablet 40 mg (has no administration in time range)   metFORMIN (GLUCOPHAGE) tablet 1,000 mg (has no administration in time range)   pioglitazone (ACTOS) tablet 30 mg (has no administration in time range)   roflumilast (DALIRESP) tablet 250 mcg (has no administration in time range)   sertraline (ZOLOFT) tablet 50 mg (has no administration in time range)   predniSONE (DELTASONE) tablet 40 mg (has no administration in time range)   budesonide-formoterol (SYMBICORT) 160-4.5 MCG/ACT inhaler 2 puff ( Inhalation Canceled Entry 4/7/23 6406)   Pharmacy Consult - Pharmacy to dose (has no  administration in time range)   Pharmacy Consult - Steroid Insulin Protocol (has no administration in time range)   dexamethasone (DECADRON) injection 6 mg (6 mg Intravenous Given 4/7/23 1548)   ipratropium-albuterol (DUO-NEB) nebulizer solution 3 mL (3 mL Nebulization Given 4/7/23 1605)   cefTRIAXone (ROCEPHIN) 2 g in sodium chloride 0.9 % 100 mL IVPB (0 g Intravenous Stopped 4/7/23 1729)   metroNIDAZOLE (FLAGYL) IVPB 500 mg (0 mg Intravenous Stopped 4/7/23 1729)   albuterol (PROVENTIL) nebulizer solution 0.083% 2.5 mg/3mL (2.5 mg Nebulization Given 4/7/23 1632)     XR Chest 2 View    Result Date: 4/7/2023  Impression: 1. Diffuse interstitial prominence in the lungs which may represent mild fibrosis. 2. Right upper lobe infiltrate. Electronically Signed: Leo SUBRAMANIAN Shakira  4/7/2023 3:45 PM EDT  Workstation ID: [id number]    Prior to Admission medications    Medication Sig Start Date End Date Taking? Authorizing Provider   amLODIPine (NORVASC) 5 MG tablet Take 1 tablet by mouth Every Night. 4/14/20   Felix Solano MD   arformoterol (BROVANA) 15 MCG/2ML nebulizer solution Take 2 mL by nebulization 2 (Two) Times a Day. Copd j44.9 4/3/23   Kareem Landa MD   aspirin 81 MG EC tablet Take 1 tablet by mouth Daily for 90 days. 1/16/23 4/16/23  Darius Porter MD   atorvastatin (LIPITOR) 40 MG tablet Take 1 tablet by mouth Daily for 90 days. 1/17/23 4/17/23  Darius Porter MD   bisoprolol (ZEBeta) 5 MG tablet Take 1 tablet by mouth Daily. 10/6/20   ProviderFelix MD   budesonide (PULMICORT) 0.5 MG/2ML nebulizer solution Take 2 mL by nebulization 2 (Two) Times a Day. J44.9 4/3/23   Kareem Landa MD   cefdinir (OMNICEF) 300 MG capsule Take 1 capsule by mouth 2 (Two) Times a Day. 4/3/23   Kareem Landa MD   doxycycline (ADOXA) 100 MG tablet Take 1 tablet by mouth Every 12 (Twelve) Hours for 15 doses. Indications: Infection with Dysregulated Inflammatory Response 4/3/23 4/11/23  Cordell  "Kareem PIERCE MD   furosemide (LASIX) 40 MG tablet Take 1 tablet by mouth Daily for 90 days. 1/16/23 4/16/23  Darius Porter MD   gabapentin (NEURONTIN) 300 MG capsule Take 1 capsule by mouth 3 (Three) Times a Day.    Felix Solano MD   glipizide (GLUCOTROL) 10 MG tablet Take 1 tablet by mouth Every Night.    Felix Solano MD   guaiFENesin (MUCINEX) 600 MG 12 hr tablet Take 2 tablets by mouth Every 12 (Twelve) Hours. 4/3/23   Kareem Landa MD   ipratropium-albuterol (DUO-NEB) 0.5-2.5 mg/3 ml nebulizer 3 mL Every 6 (Six) Hours As Needed.    Felix Solano MD   lisinopril (PRINIVIL,ZESTRIL) 40 MG tablet Take 1 tablet by mouth Every Night.    Felix Solano MD   metFORMIN (GLUCOPHAGE) 1000 MG tablet Take 1 tablet by mouth 2 (Two) Times a Day With Meals. 4/14/20   Felix Solano MD   pioglitazone (ACTOS) 30 MG tablet Take 1 tablet by mouth Every Night.    Felix Solano MD   predniSONE (DELTASONE) 10 MG tablet Take 1 tablet by mouth Daily for 2 doses. 4/6/23 4/8/23  Kareem Landa MD   predniSONE (DELTASONE) 20 MG tablet Take 1 tablet by mouth Daily for 2 doses. 4/4/23 4/6/23  Kareem Landa MD   roflumilast (DALIRESP) 250 MCG tablet tablet Take 1 tablet by mouth Daily. 4/4/23   Kareem Landa MD   sertraline (ZOLOFT) 50 MG tablet Take 1 tablet by mouth Every Night. 4/14/20   Felix Solano MD   vitamin D (ERGOCALCIFEROL) 1.25 MG (39422 UT) capsule capsule 1 capsule Every 7 (Seven) Days. Wednesday 4/14/20   Felix Solano MD                                          Medical Decision Making      /55 (BP Location: Left arm, Patient Position: Lying)   Pulse 85   Temp 97.6 °F (36.4 °C) (Oral)   Resp 22   Ht 170.2 cm (67\")   Wt 102 kg (225 lb)   SpO2 90%   BMI 35.24 kg/m²      Chart review: 3/31/2023 through 4/3/2023 Dr. Landa discharge note patient was notably positive for rhinovirus on April 1 and was discharged home with " Brovon a cefdinir doxycycline    Radiology interpretation:  X-rays reviewed independently and interpreted by me: Focal infiltrate  Further interpretation by radiologist as above  Lab interpretation:  Labs all viewed by me and significant for, RPP negative for rhino, pH 7.42 PCO2 49 PO2 65 glucose 281, white blood cell count 7.8    EKG viewed and interpreted by me and corroborated by Dr. Friedman, sinus rhythm rate of 73 PAC  comparison: 3/31/2023 sinus rhythm rate of 76            Appropriate PPE worn during exam.  Patient had an IV established labs x-ray was obtained to evaluate for new pneumonia sepsis bacteremia.  He was given several DuoNebs while in the ER steroids and Rocephin and Flagyl after discussion with [facility] pharmacist.  We will hold Vanco until MRSA swab has resulted.  He notably was taken off of his DuoNebs and started on Brovana and seems to be worsening since then.  Patient will be admitted to Dr. mccabe who agreed to accept the patient and continue management of care for his new pneumonia and dyspnea.         i discussed findings with patient who voices understanding of admission  This document is intended for medical expert use only. Reading of this document by patients and/or patient's family without participating medical staff guidance may result in misinterpretation and unintended morbidity.  Any interpretation of such data is the responsibility of the patient and/or family member responsible for the patient in concert with their primary or specialist providers, not to be left for sources of online searches such as [employer], [employer] or similar queries. Relying on these approaches to knowledge may result in misinterpretation, misguided goals of care and even death should patients or family members try recommendations outside of the realm of professional medical care in a supervised inpatient environment.       Dyspnea, unspecified type: acute illness or injury  Pneumonia due to infectious organism,  unspecified laterality, unspecified part of lung: acute illness or injury  Rhinovirus: acute illness or injury  Amount and/or Complexity of Data Reviewed  Labs: ordered.  Radiology: ordered.  ECG/medicine tests: ordered.      Risk  Prescription drug management.  Decision regarding hospitalization.          Final diagnoses:   Pneumonia due to infectious organism, unspecified laterality, unspecified part of lung   Rhinovirus   Dyspnea, unspecified type       ED Disposition  ED Disposition     ED Disposition   Decision to Admit    Condition   --    Comment   Level of Care: Med/Surg [1]   Diagnosis: Pneumonia due to infectious organism, unspecified laterality, unspecified part of lung [[id number]]   Admitting Physician: STEFFEN MARQUES [[id number]]   Attending Physician: STEFFEN MARQUES [[id number]]               No follow-up provider specified.       Medication List      ASK your doctor about these medications    predniSONE 20 MG tablet  Commonly known as: DELTASONE  Take 1 tablet by mouth Daily for 2 doses.  Ask about: Should I take this medication?             Amna Castro, APRN  04/07/23 2044

## 2023-04-07 NOTE — OUTREACH NOTE
COPD/PN Week 1 Survey    Flowsheet Row Responses   [facility] facility patient discharged from? [facility]   Does the patient have one of the following disease processes/diagnoses(primary or secondary)? Pneumonia   Week 1 attempt successful? No   Call start time 1136   Unsuccessful attempts Attempt 1  [wife states condition has deteriorated, plans to return to ED today]          Shabana ZAVALETA - Registered Nurse

## 2023-04-08 LAB
ANION GAP SERPL CALCULATED.3IONS-SCNC: 9 MMOL/L (ref 5–15)
BASOPHILS # BLD AUTO: 0 10*3/MM3 (ref 0–0.2)
BASOPHILS NFR BLD AUTO: 0.1 % (ref 0–1.5)
BUN SERPL-MCNC: 18 MG/DL (ref 8–23)
BUN/CREAT SERPL: 31.6 (ref 7–25)
CALCIUM SPEC-SCNC: 9.3 MG/DL (ref 8.6–10.5)
CHLORIDE SERPL-SCNC: 101 MMOL/L (ref 98–107)
CO2 SERPL-SCNC: 32 MMOL/L (ref 22–29)
CREAT SERPL-MCNC: 0.57 MG/DL (ref 0.76–1.27)
DEPRECATED RDW RBC AUTO: 48.6 FL (ref 37–54)
EGFRCR SERPLBLD CKD-EPI 2021: 104.2 ML/MIN/1.73
EOSINOPHIL # BLD AUTO: 0 10*3/MM3 (ref 0–0.4)
EOSINOPHIL NFR BLD AUTO: 0.1 % (ref 0.3–6.2)
ERYTHROCYTE [DISTWIDTH] IN BLOOD BY AUTOMATED COUNT: 15.9 % (ref 12.3–15.4)
GLUCOSE BLDC GLUCOMTR-MCNC: 260 MG/DL (ref 70–105)
GLUCOSE BLDC GLUCOMTR-MCNC: 301 MG/DL (ref 70–105)
GLUCOSE BLDC GLUCOMTR-MCNC: 307 MG/DL (ref 70–105)
GLUCOSE BLDC GLUCOMTR-MCNC: 324 MG/DL (ref 70–105)
GLUCOSE BLDC GLUCOMTR-MCNC: 355 MG/DL (ref 70–105)
GLUCOSE SERPL-MCNC: 261 MG/DL (ref 65–99)
HBA1C MFR BLD: 8.3 % (ref 4.8–5.6)
HCT VFR BLD AUTO: 33 % (ref 37.5–51)
HGB BLD-MCNC: 10.2 G/DL (ref 13–17.7)
LYMPHOCYTES # BLD AUTO: 0.6 10*3/MM3 (ref 0.7–3.1)
LYMPHOCYTES NFR BLD AUTO: 9.5 % (ref 19.6–45.3)
MAGNESIUM SERPL-MCNC: 2 MG/DL (ref 1.6–2.4)
MCH RBC QN AUTO: 27.1 PG (ref 26.6–33)
MCHC RBC AUTO-ENTMCNC: 31 G/DL (ref 31.5–35.7)
MCV RBC AUTO: 87.6 FL (ref 79–97)
MONOCYTES # BLD AUTO: 0.4 10*3/MM3 (ref 0.1–0.9)
MONOCYTES NFR BLD AUTO: 6.3 % (ref 5–12)
NEUTROPHILS NFR BLD AUTO: 5.4 10*3/MM3 (ref 1.7–7)
NEUTROPHILS NFR BLD AUTO: 84 % (ref 42.7–76)
NRBC BLD AUTO-RTO: 0.1 /100 WBC (ref 0–0.2)
PLATELET # BLD AUTO: 209 10*3/MM3 (ref 140–450)
PMV BLD AUTO: 9.3 FL (ref 6–12)
POTASSIUM SERPL-SCNC: 4 MMOL/L (ref 3.5–5.2)
QT INTERVAL: 416 MS
RBC # BLD AUTO: 3.76 10*6/MM3 (ref 4.14–5.8)
SODIUM SERPL-SCNC: 142 MMOL/L (ref 136–145)
WBC NRBC COR # BLD: 6.5 10*3/MM3 (ref 3.4–10.8)

## 2023-04-08 PROCEDURE — 63710000001 INSULIN LISPRO (HUMAN) PER 5 UNITS: Performed by: INTERNAL MEDICINE

## 2023-04-08 PROCEDURE — 82962 GLUCOSE BLOOD TEST: CPT

## 2023-04-08 PROCEDURE — 83735 ASSAY OF MAGNESIUM: CPT | Performed by: INTERNAL MEDICINE

## 2023-04-08 PROCEDURE — 94799 UNLISTED PULMONARY SVC/PX: CPT

## 2023-04-08 PROCEDURE — G0378 HOSPITAL OBSERVATION PER HR: HCPCS

## 2023-04-08 PROCEDURE — 63710000001 PREDNISONE PER 1 MG: Performed by: INTERNAL MEDICINE

## 2023-04-08 PROCEDURE — 63710000001 INSULIN GLARGINE PER 5 UNITS: Performed by: INTERNAL MEDICINE

## 2023-04-08 PROCEDURE — 83036 HEMOGLOBIN GLYCOSYLATED A1C: CPT | Performed by: INTERNAL MEDICINE

## 2023-04-08 PROCEDURE — 36415 COLL VENOUS BLD VENIPUNCTURE: CPT | Performed by: INTERNAL MEDICINE

## 2023-04-08 PROCEDURE — 94761 N-INVAS EAR/PLS OXIMETRY MLT: CPT

## 2023-04-08 PROCEDURE — 80048 BASIC METABOLIC PNL TOTAL CA: CPT | Performed by: INTERNAL MEDICINE

## 2023-04-08 PROCEDURE — 85025 COMPLETE CBC W/AUTO DIFF WBC: CPT | Performed by: INTERNAL MEDICINE

## 2023-04-08 PROCEDURE — 25010000002 AMPICILLIN-SULBACTAM PER 1.5 G: Performed by: INTERNAL MEDICINE

## 2023-04-08 PROCEDURE — 94664 DEMO&/EVAL PT USE INHALER: CPT

## 2023-04-08 PROCEDURE — 25010000002 ENOXAPARIN PER 10 MG: Performed by: INTERNAL MEDICINE

## 2023-04-08 RX ORDER — NICOTINE POLACRILEX 4 MG
15 LOZENGE BUCCAL
Status: DISCONTINUED | OUTPATIENT
Start: 2023-04-08 | End: 2023-04-11 | Stop reason: HOSPADM

## 2023-04-08 RX ORDER — DEXTROSE MONOHYDRATE 25 G/50ML
25 INJECTION, SOLUTION INTRAVENOUS
Status: DISCONTINUED | OUTPATIENT
Start: 2023-04-08 | End: 2023-04-11 | Stop reason: HOSPADM

## 2023-04-08 RX ORDER — IBUPROFEN 600 MG/1
1 TABLET ORAL
Status: DISCONTINUED | OUTPATIENT
Start: 2023-04-08 | End: 2023-04-11 | Stop reason: HOSPADM

## 2023-04-08 RX ORDER — INSULIN LISPRO 100 [IU]/ML
4-24 INJECTION, SOLUTION INTRAVENOUS; SUBCUTANEOUS
Status: DISCONTINUED | OUTPATIENT
Start: 2023-04-08 | End: 2023-04-11 | Stop reason: HOSPADM

## 2023-04-08 RX ADMIN — Medication 10 ML: at 20:51

## 2023-04-08 RX ADMIN — METRONIDAZOLE 500 MG: 500 INJECTION, SOLUTION INTRAVENOUS at 01:00

## 2023-04-08 RX ADMIN — GABAPENTIN 300 MG: 300 CAPSULE ORAL at 16:10

## 2023-04-08 RX ADMIN — GUAIFENESIN 1200 MG: 600 TABLET, EXTENDED RELEASE ORAL at 22:10

## 2023-04-08 RX ADMIN — METRONIDAZOLE 500 MG: 500 INJECTION, SOLUTION INTRAVENOUS at 10:26

## 2023-04-08 RX ADMIN — BISOPROLOL FUMARATE 5 MG: 5 TABLET ORAL at 09:55

## 2023-04-08 RX ADMIN — PREDNISONE 40 MG: 20 TABLET ORAL at 09:55

## 2023-04-08 RX ADMIN — AMPICILLIN SODIUM AND SULBACTAM SODIUM 3 G: 2; 1 INJECTION, POWDER, FOR SOLUTION INTRAMUSCULAR; INTRAVENOUS at 16:11

## 2023-04-08 RX ADMIN — BUDESONIDE AND FORMOTEROL FUMARATE DIHYDRATE 2 PUFF: 160; 4.5 AEROSOL RESPIRATORY (INHALATION) at 20:54

## 2023-04-08 RX ADMIN — METFORMIN HYDROCHLORIDE 1000 MG: 500 TABLET, FILM COATED ORAL at 09:55

## 2023-04-08 RX ADMIN — ASPIRIN 81 MG: 81 TABLET, COATED ORAL at 09:55

## 2023-04-08 RX ADMIN — SENNOSIDES AND DOCUSATE SODIUM 2 TABLET: 50; 8.6 TABLET ORAL at 20:46

## 2023-04-08 RX ADMIN — GABAPENTIN 300 MG: 300 CAPSULE ORAL at 09:55

## 2023-04-08 RX ADMIN — INSULIN GLARGINE 15 UNITS: 100 INJECTION, SOLUTION SUBCUTANEOUS at 12:17

## 2023-04-08 RX ADMIN — ATORVASTATIN CALCIUM 40 MG: 40 TABLET, FILM COATED ORAL at 09:55

## 2023-04-08 RX ADMIN — IPRATROPIUM BROMIDE AND ALBUTEROL SULFATE 3 ML: 2.5; .5 SOLUTION RESPIRATORY (INHALATION) at 08:10

## 2023-04-08 RX ADMIN — INSULIN LISPRO 16 UNITS: 100 INJECTION, SOLUTION INTRAVENOUS; SUBCUTANEOUS at 12:17

## 2023-04-08 RX ADMIN — ENOXAPARIN SODIUM 40 MG: 100 INJECTION SUBCUTANEOUS at 16:10

## 2023-04-08 RX ADMIN — SERTRALINE 50 MG: 50 TABLET, FILM COATED ORAL at 20:46

## 2023-04-08 RX ADMIN — AMPICILLIN SODIUM AND SULBACTAM SODIUM 3 G: 2; 1 INJECTION, POWDER, FOR SOLUTION INTRAMUSCULAR; INTRAVENOUS at 22:10

## 2023-04-08 RX ADMIN — PIOGLITAZONE 30 MG: 30 TABLET ORAL at 20:46

## 2023-04-08 RX ADMIN — INSULIN LISPRO 12 UNITS: 100 INJECTION, SOLUTION INTRAVENOUS; SUBCUTANEOUS at 18:26

## 2023-04-08 RX ADMIN — LISINOPRIL 40 MG: 20 TABLET ORAL at 20:46

## 2023-04-08 RX ADMIN — BUDESONIDE AND FORMOTEROL FUMARATE DIHYDRATE 2 PUFF: 160; 4.5 AEROSOL RESPIRATORY (INHALATION) at 08:05

## 2023-04-08 RX ADMIN — IPRATROPIUM BROMIDE AND ALBUTEROL SULFATE 3 ML: 2.5; .5 SOLUTION RESPIRATORY (INHALATION) at 20:50

## 2023-04-08 RX ADMIN — GABAPENTIN 300 MG: 300 CAPSULE ORAL at 20:46

## 2023-04-08 RX ADMIN — Medication 10 ML: at 10:31

## 2023-04-08 RX ADMIN — GUAIFENESIN 1200 MG: 600 TABLET, EXTENDED RELEASE ORAL at 11:49

## 2023-04-08 RX ADMIN — AMLODIPINE BESYLATE 5 MG: 5 TABLET ORAL at 20:46

## 2023-04-08 RX ADMIN — INSULIN GLARGINE 15 UNITS: 100 INJECTION, SOLUTION SUBCUTANEOUS at 20:47

## 2023-04-08 RX ADMIN — ROFLUMILAST 250 MCG: 500 TABLET ORAL at 09:55

## 2023-04-08 NOTE — PLAN OF CARE
Goal Outcome Evaluation:              Outcome Evaluation: Patient will be having bronch tomorrow. Requiring 7L 02.

## 2023-04-08 NOTE — DISCHARGE SUMMARY
[facility] Medicine Services  DISCHARGE SUMMARY    Patient Name: Brenden Peter  : 1950  MRN: [id number]    Date of Admission: 3/31/2023  Discharge Diagnosis: AE-COPD  Date of Discharge: 4/3/23  Primary Care Physician: Bert Rojas MD      Presenting Problem:   Hypoxemia [R09.02]  Acute exacerbation of chronic obstructive pulmonary disease (COPD) [J44.1]  Interstitial pneumonia [J84.9]    Active and Resolved Hospital Problems:  Active Hospital Problems    Diagnosis POA   • **Interstitial pneumonia [J84.9] Yes   • (HFpEF) heart failure with preserved ejection fraction [I50.30] Unknown   • COPD exacerbation [J44.1] Yes   • Obesity (BMI 30-39.9) [E66.9] Yes   • Chronic depression [F32.A] Yes   • Type 2 diabetes mellitus without complication, without long-term current use of insulin (HCC) [E11.9] Yes   • Mixed hyperlipidemia [E78.2] Yes   • Essential hypertension [I10] Yes   • Coronary artery disease involving native coronary artery of native heart without angina pectoris [I25.10] Yes      Resolved Hospital Problems   No resolved problems to display.     AECOPD  -O2 saturations reported in the 70s-80s, has improved to mid to high 90s on high flow nasal cannula  -Troponin: 23,   -proBNP: 549.4  -WBCs: 5.60  -COVID-19 testing negative  -Chest x-ray showed no acute process  -EKG showed a sinus rhythm at 76 with PVCs noted along with some baseline artifact and a borderline CO interval of 199 ms  -In the ED patient given Solu-Medrol and DuoNeb treatments, continue  -Mucinex and Tessalon along with incentive spirometry  -Continue previously prescribed azithromycin  -Continue telemetry and pulse oximetry  -May benefit from pulmonology consult based on clinical course     Diabetes mellitus-Well controlled   -Hold metformin and glipizide  -Correctional insulin ordered  -Diabetic diet  -Monitor closely especially while receiving steroid     Hypertension-Well controlled   -  Continue beta-blocker, amlodipine and lisinopril  - Monitor while admitted     CAD  -Beta-blocker, statin, aspirin  -Telemetry     Heart failure with preserved ejection fraction  -Echocardiogram from March 2023 showed an EF of 61-65% with diastolic dysfunction noted  -Continue Lasix  -Monitor I's and O's and daily weights     Hyperlipidemia  -Statin     Depression  -Zoloft     Obesity (BMI: 31.79)  -Encourage diet lifestyle modification    Hospital Course     History of Present Illness: Brenden Peter is a 72 y.o. male who presented to [facility] on 3/31/2023 complaining of worsening cough and dyspnea.  Patient reports some baseline dyspnea and wears continuous O2 at home.  Over the past several days he has had increasing dyspnea as well as a nonproductive cough and a fever with a Tmax of 102.0.  Some nausea without vomiting was also reported but he denies any pain including chest pain, palpitations, peripheral edema, lightheadedness, syncope or near syncope.  Patient was recently started on azithromycin by primary care provider without significant improvement noted.  Generally patient reports that when he becomes short of breath he is able to rest which results in improvement but this has been ineffective lately.  He confirms compliance with all of his outpatient medical therapies.        4/2/23-vss Feeling better, denies SOB, no chest pain, no nausea or vomiting, no change in bowel habit, no dysuria,  no new  skin rash or itching.   4/3/23 doing better today, anxious to go home, DAI RN. Will dc home condition on dc stable.    DISCHARGE Follow Up Recommendations for labs and diagnostics: follow with pcp in one week      Reasons For Change In Medications and Indications for New Medications:    Day of Discharge     Vital Signs:     Physical Exam General: He is not in acute distress.     Appearance: Normal appearance. He is obese. He is not ill-appearing, toxic-appearing or diaphoretic.   HENT:      Head:  Normocephalic.      Right Ear: External ear normal.      Left Ear: External ear normal.      Nose: Nose normal.      Mouth/Throat:      Mouth: Mucous membranes are moist.   Eyes:      Extraocular Movements: Extraocular movements intact.   Cardiovascular:      Rate and Rhythm: Normal rate and regular rhythm.      Pulses: Normal pulses.      Heart sounds: Normal heart sounds.   Pulmonary:      Effort: Pulmonary effort is normal.      Breath sounds: Wheezing and rhonchi present.   Abdominal:      General: Bowel sounds are normal.      Palpations: Abdomen is soft.      Tenderness: There is no abdominal tenderness.   Musculoskeletal:         General: Normal range of motion.      Cervical back: Normal range of motion.      Right lower leg: No edema.      Left lower leg: No edema.   Skin:     General: Skin is warm and dry.      Capillary Refill: Capillary refill takes less than 2 seconds.   Neurological:      General: No focal deficit present.      Mental Status: He is alert and oriented to person, place, and time.   Psychiatric:         Mood and Affect: Mood normal.         Behavior: Behavior normal.         Thought Content: Thought content normal.         Judgment: Judgment normal.                 Pertinent  and/or Most Recent Results     LAB RESULTS:      Lab 04/08/23  0820 04/07/23  1503 04/07/23  1502 04/03/23  1115 04/02/23  0824   WBC 6.50  --  7.80 7.10 8.10   HEMOGLOBIN 10.2*  --  11.3* 11.0* 10.9*   HEMATOCRIT 33.0*  --  35.3* 33.7* 33.1*   PLATELETS 209  --  223 167 154   NEUTROS ABS 5.40  --  6.30 5.90 7.20*   LYMPHS ABS 0.60*  --  0.70 0.70 0.60*   MONOS ABS 0.40  --  0.70 0.50 0.30   EOS ABS 0.00  --  0.00 0.00 0.00   MCV 87.6  --  87.7 86.2 86.0   PROCALCITONIN  --  0.11  --   --   --          Lab 04/08/23  0820 04/07/23  1502 04/03/23  1115 04/02/23  0824   SODIUM 142 138 139 138   POTASSIUM 4.0 3.6 4.0 4.3   CHLORIDE 101 95* 99 99   CO2 32.0* 33.0* 30.0* 30.0*   ANION GAP 9.0 10.0 10.0 9.0   BUN 18 14 24*  25*   CREATININE 0.57* 0.67* 0.57* 0.59*   EGFR 104.2 99.2 104.2 103.1   GLUCOSE 261* 281* 277* 237*   CALCIUM 9.3 9.3 9.1 9.1   MAGNESIUM 2.0  --   --  2.0                         Lab 04/07/23  1622   PH, ARTERIAL 7.422   PCO2, ARTERIAL 49.7*   PO2 ART 65.5*   O2 SATURATION ART 92.6*   FIO2 36   HCO3 ART 32.4*   BASE EXCESS ART 6.8*     Brief Urine Lab Results     None        Microbiology Results (last 10 days)     Procedure Component Value - Date/Time    S. Pneumo Ag Urine or CSF - Urine, Urine, Clean Catch [[id number]]  (Normal) Collected: 04/07/23 1733    Lab Status: Final result Specimen: Urine, Clean Catch Updated: 04/07/23 1759     Strep Pneumo Ag Negative    Legionella Antigen, Urine - Urine, Urine, Clean Catch [[id number]]  (Normal) Collected: 04/07/23 1733    Lab Status: Final result Specimen: Urine, Clean Catch Updated: 04/07/23 1759     LEGIONELLA ANTIGEN, URINE Negative    MRSA Screen, PCR (Inpatient) - Swab, Nares [[id number]]  (Normal) Collected: 04/07/23 1646    Lab Status: Final result Specimen: Swab from Nares Updated: 04/07/23 1813     MRSA PCR No MRSA Detected    Narrative:      The negative predictive value of this diagnostic test is high and should only be used to consider de-escalating anti-MRSA therapy. A positive result may indicate colonization with MRSA and must be correlated clinically.    Respiratory Panel PCR w/COVID-19(SARS-CoV-2) IAN/NAYELY/KATERIN/PAD/COR/MAD/KRISH In-House, NP Swab in UTM/[facility], 3-4 HR TAT - Swab, Nasopharynx [[id number]]  (Abnormal) Collected: 04/07/23 1646    Lab Status: Final result Specimen: Swab from Nasopharynx Updated: 04/07/23 1753     ADENOVIRUS, PCR Not Detected     Coronavirus 229E Not Detected     Coronavirus HKU1 Not Detected     Coronavirus NL63 Not Detected     Coronavirus OC43 Not Detected     COVID19 Not Detected     Human Metapneumovirus Not Detected     Human Rhinovirus/Enterovirus Detected     Influenza A PCR Not Detected     Influenza B PCR Not Detected      Parainfluenza Virus 1 Not Detected     Parainfluenza Virus 2 Not Detected     Parainfluenza Virus 3 Not Detected     Parainfluenza Virus 4 Not Detected     RSV, PCR Not Detected     Bordetella pertussis pcr Not Detected     Bordetella parapertussis PCR Not Detected     Chlamydophila pneumoniae PCR Not Detected     Mycoplasma pneumo by PCR Not Detected    Narrative:      In the setting of a positive respiratory panel with a viral infection PLUS a negative procalcitonin without other underlying concern for bacterial infection, consider observing off antibiotics or discontinuation of antibiotics and continue supportive care. If the respiratory panel is positive for atypical bacterial infection (Bordetella pertussis, Chlamydophila pneumoniae, or Mycoplasma pneumoniae), consider antibiotic de-escalation to target atypical bacterial infection.    Respiratory Culture - Sputum, Cough [[id number]]  (Abnormal) Collected: 04/07/23 1552    Lab Status: Preliminary result Specimen: Sputum from Cough Updated: 04/08/23 1216     Respiratory Culture No Normal Respiratory Genny     Gram Stain Many (4+) WBCs per low power field      Few (2+) Epithelial cells per low power field      Moderate (3+) Gram positive cocci in pairs, chains and clusters    Narrative:      Organism under investigation.4/8/23        Respiratory Culture - Sputum, Cough [[id number]] Collected: 04/01/23 1614    Lab Status: Final result Specimen: Sputum from Cough Updated: 04/04/23 1111     Respiratory Culture Light growth (2+) Normal respiratory genny. No S. aureus or Pseudomonas aeruginosa detected. Final report.     Gram Stain Many (4+) WBCs per low power field      Moderate (3+) Epithelial cells per low power field      Many (4+) Mixed gram positive genny    Respiratory Panel PCR w/COVID-19(SARS-CoV-2) IAN/NAYELY/KATERIN/PAD/COR/MAD/KRISH In-House, NP Swab in UTM/VTM, 3-4 HR TAT - Swab, Nasopharynx [[id number]]  (Abnormal) Collected: 04/01/23 0426    Lab Status: Final  result Specimen: Swab from Nasopharynx Updated: 04/01/23 0530     ADENOVIRUS, PCR Not Detected     Coronavirus 229E Not Detected     Coronavirus HKU1 Not Detected     Coronavirus NL63 Not Detected     Coronavirus OC43 Not Detected     COVID19 Not Detected     Human Metapneumovirus Not Detected     Human Rhinovirus/Enterovirus Detected     Influenza A PCR Not Detected     Influenza B PCR Not Detected     Parainfluenza Virus 1 Not Detected     Parainfluenza Virus 2 Not Detected     Parainfluenza Virus 3 Not Detected     Parainfluenza Virus 4 Not Detected     RSV, PCR Not Detected     Bordetella pertussis pcr Not Detected     Bordetella parapertussis PCR Not Detected     Chlamydophila pneumoniae PCR Not Detected     Mycoplasma pneumo by PCR Not Detected    Narrative:      In the setting of a positive respiratory panel with a viral infection PLUS a negative procalcitonin without other underlying concern for bacterial infection, consider observing off antibiotics or discontinuation of antibiotics and continue supportive care. If the respiratory panel is positive for atypical bacterial infection (Bordetella pertussis, Chlamydophila pneumoniae, or Mycoplasma pneumoniae), consider antibiotic de-escalation to target atypical bacterial infection.    COVID-19 and FLU A/B PCR - Swab, Nasopharynx [[id number]]  (Normal) Collected: 03/31/23 1909    Lab Status: Final result Specimen: Swab from Nasopharynx Updated: 03/31/23 2026     COVID19 Not Detected     Influenza A PCR Not Detected     Influenza B PCR Not Detected    Narrative:      Fact sheet for providers: https://www.fda.gov/media/818660/download    Fact sheet for patients: https://www.fda.gov/media/986683/download    Test performed by PCR.    Blood Culture - Blood, Hand, Right [[id number]]  (Normal) Collected: 03/31/23 1909    Lab Status: Final result Specimen: Blood from Hand, Right Updated: 04/05/23 1931     Blood Culture No growth at 5 days    Blood Culture - Blood, Arm,  Left [[id number]]  (Normal) Collected: 03/31/23 1909    Lab Status: Final result Specimen: Blood from Arm, Left Updated: 04/05/23 1931     Blood Culture No growth at 5 days          XR Chest 2 View    Result Date: 4/7/2023  Impression: Impression: 1. Diffuse interstitial prominence in the lungs which may represent mild fibrosis. 2. Right upper lobe infiltrate. Electronically Signed: Leo Rosenberg  4/7/2023 3:45 PM EDT  Workstation ID: [id number]    XR Chest 1 View    Result Date: 3/31/2023  Impression: Impression: Stable chronic findings without acute process. Electronically Signed: Wang Alba  3/31/2023 7:21 PM EDT  Workstation ID: [id number]              Results for orders placed during the hospital encounter of 03/08/23    Adult Transthoracic Echo Complete W/ Cont if Necessary Per Protocol    Interpretation Summary  •  Left ventricular systolic function is normal. Calculated left ventricular EF = 65.5% Left ventricular ejection fraction appears to be 61 - 65%.  •  Left ventricular diastolic dysfunction is noted.  •  Both atrial cavities are dilated.  •  Estimated right ventricular systolic pressure from tricuspid regurgitation is markedly elevated (>55 mmHg).      Labs Pending at Discharge:      Procedures Performed           Consults:   Consults     Date and Time Order Name Status Description    4/1/2023  9:51 AM Inpatient Pulmonology Consult Completed     3/8/2023  6:25 PM Inpatient Pulmonology Consult Completed             Discharge Details        Discharge Medications      New Medications      Instructions Start Date   budesonide 0.5 MG/2ML nebulizer solution  Commonly known as: PULMICORT   0.5 mg, Nebulization, 2 Times Daily - RT, J44.9      cefdinir 300 MG capsule  Commonly known as: OMNICEF   300 mg, Oral, 2 Times Daily      doxycycline 100 MG tablet  Commonly known as: ADOXA   100 mg, Oral, Every 12 Hours Scheduled      guaiFENesin 600 MG 12 hr tablet  Commonly known as: MUCINEX   1,200 mg, Oral, Every 12  Hours         Continue These Medications      Instructions Start Date   amLODIPine 5 MG tablet  Commonly known as: NORVASC   5 mg, Oral, Nightly      Aspirin Low Dose 81 MG EC tablet  Generic drug: aspirin   81 mg, Oral, Daily      atorvastatin 40 MG tablet  Commonly known as: LIPITOR   40 mg, Oral, Daily      bisoprolol 5 MG tablet  Commonly known as: ZEBeta   5 mg, Oral, Daily      furosemide 40 MG tablet  Commonly known as: LASIX   40 mg, Oral, Daily      gabapentin 300 MG capsule  Commonly known as: NEURONTIN   300 mg, Oral, 3 Times Daily      glipizide 10 MG tablet  Commonly known as: GLUCOTROL   10 mg, Oral, Nightly      lisinopril 40 MG tablet  Commonly known as: PRINIVIL,ZESTRIL   40 mg, Oral, Nightly      metFORMIN 1000 MG tablet  Commonly known as: GLUCOPHAGE   1,000 mg, Oral, 2 Times Daily With Meals      pioglitazone 30 MG tablet  Commonly known as: ACTOS   30 mg, Oral, Nightly      sertraline 50 MG tablet  Commonly known as: ZOLOFT   50 mg, Oral, Nightly      vitamin D 1.25 MG (93437 UT) capsule capsule  Commonly known as: ERGOCALCIFEROL   50,000 Units, Every 7 Days, Wednesday         Stop These Medications    albuterol (2.5 MG/3ML) 0.083% nebulizer solution  Commonly known as: PROVENTIL     albuterol sulfate  (90 Base) MCG/ACT inhaler  Commonly known as: PROVENTIL HFA;VENTOLIN HFA;PROAIR HFA     azithromycin 250 MG tablet  Commonly known as: ZITHROMAX     methylPREDNISolone 4 MG dose pack  Commonly known as: MEDROL     umeclidinium-vilanterol 62.5-25 MCG/INH aerosol powder  inhaler  Commonly known as: ANORO ELLIPTA            No Known Allergies      Discharge Disposition:   Home or Self Care    Diet:  Hospital:No active diet order        Discharge Activity:   Activity Instructions     Gradually Increase Activity Until at Pre-Hospitalization Level              CODE STATUS:  Code Status and Medical Interventions:   Ordered at: 04/01/23 0227     Code Status (Patient has no pulse and is not  breathing):    CPR (Attempt to Resuscitate)     Medical Interventions (Patient has pulse or is breathing):    Full Support     I have utilized all available, immediate resources to obtain, update, or review the patient's current medications including all prescriptions, over-the-counter products, herbals, cannabis/cannabidiol products, and vitamin.mineral/dietary (nutritional) supplements.      Code Status (Patient has no pulse and is not breathing): CPR (Attempt to Resuscitate)  Medical Interventions (Patient has pulse or is breathing): Full Support    Next of kin of Power of :       Admission Status:  I believe this patient meets 34 status.              No future appointments.    Additional Instructions for the Follow-ups that You Need to Schedule     Discharge Follow-up with PCP   As directed       Currently Documented PCP:    Bert Rojas MD    PCP Phone Number:    [phone]     Follow Up Details: 1 week         Discharge Follow-up with Specified Provider: pulmonary; 1 Week   As directed      To: pulmonary    Follow Up: 1 Week               Time spent on Discharge including face to face service:  34 minutes    This patient has been examined wearing appropriate Personal Protective Equipment and discussed with rn. 04/08/23      Signature: Electronically signed by Kareem Landa MD, 04/08/23, 2:19 PM EDT.

## 2023-04-08 NOTE — OUTREACH NOTE
COPD/PN Week 1 Survey    Flowsheet Row Responses   [facility] facility patient discharged from? [facility]   Does the patient have one of the following disease processes/diagnoses(primary or secondary)? Pneumonia   Week 1 attempt successful? No   Unsuccessful attempts Attempt 1   Revoke Readmitted          Jyoti ZAVALETA - Registered Nurse

## 2023-04-08 NOTE — CONSULTS
Group: [facility]         CONSULT NOTE    Patient Identification:  Brenden Peter  72 y.o.  male  1950  [id number]            Requesting physician: Attending physician    Reason for Consultation: Pneumonia      History of Present Illness:  72-year-old male with history of COPD, CAD, diabetes mellitus type 2, hyperlipidemia, hypertension, osteoarthritis, and anxiety who came to the ER for 7/20/2023 complaining of worsening shortness of breath and cough.  He had similar presentation a week earlier and again in March 2023 and January 2023.  Patient was started on antibiotics, steroids and bronchodilators.    Assessment:  Recurrent pneumonia  Chronic obstructive pulmonary disease,   Acute on chronic respiratory failure with hypoxemia and hypercapnia: ABGs 7.42/49.7/65.5, patient wears 4 L of home oxygen  Respiratory panel is positive for human rhinovirus  Obstructive sleep apnea, on BiPAP     Coronary artery disease     Hypertension   Hyperlipidemia  Diabetes mellitus     Office note 3/2/2023   x-smoker (55 year history-quit 7-3-22)  dyspnea and hypoxia.  SONJA, residual AHI: 1 , No snoring, no Leak, [id number] % > 4 hours  Severe COPD:  PFT FEV1 1.12L which is 41%, post BD 51%, ratio 57, ERV 17%, %, TLC 82%, DLCO 27%, obstructive sleep apnea features  mild interstitial lung disease  PMH:  HTN, DM, HLD       Recommendations:  The chest x-ray shows progressive bilateral interstitial pneumonia: Plan on 4/9/2023 bronchoscopy to rule out opportunistic infections  Oxygen supplement and titration to maintain saturation 90 to 95%: Currently requiring 6 L per nasal cannula    Patient may use his home BiPAP device at night  Bronchodilatores  Antibiotics: Unasyn  Inhaled corticosteroids  Oral prednisone  Daliresp  Blood pressure control  Mucinex  Glucose control  Atorvastatin  DVT/GI prophylaxis          Review of Sytems:  Constitutional: Negative for chills, fever and positive for malaise/fatigue.    HENT: Negative.    Eyes: Negative.    Cardiovascular: Negative.    Respiratory: Positive for cough and shortness of breath.    Skin: Negative.    Musculoskeletal: Negative.    Gastrointestinal: Negative.    Genitourinary: Negative.    Neurological: Negative.    Psychiatric/Behavioral: Negative.    Past Medical History:  Past Medical History:   Diagnosis Date   • (HFpEF) heart failure with preserved ejection fraction 4/1/2023   • Abnormal EKG 2/3/2015   • Anxiety    • COPD (chronic obstructive pulmonary disease)    • Coronary artery disease 3/3/2015   • Diabetes     type 2   • Hyperlipidemia    • Hypertension    • Microalbuminuria due to type 2 diabetes mellitus 2/5/2020   • Multinodular goiter 4/18/2019   • Murmur 2/3/2015   • Primary osteoarthritis of left knee 10/22/2021       Past Surgical History:  Past Surgical History:   Procedure Laterality Date   • CARDIAC CATHETERIZATION Left 1/15/2023    Procedure: Left Heart Cath and coronary angiogram;  Surgeon: Shawn Baker MD;  Location: [facility] CATH INVASIVE LOCATION;  Service: Cardiovascular;  Laterality: Left;   • EYE SURGERY      cataract   • TOTAL KNEE ARTHROPLASTY Right 10/21/2020    Procedure: TOTAL KNEE ARTHROPLASTY;  Surgeon: Ravi Fagan MD;  Location: [facility] OR;  Service: Orthopedics;  Laterality: Right;   • TOTAL KNEE ARTHROPLASTY REVISION Right 5/19/2021    Procedure: KNEE POLY INSERT EXCHANGE with irrigation;  Surgeon: Ravi Fagan MD;  Location: [facility] OR;  Service: Orthopedics;  Laterality: Right;        Home Meds:  Medications Prior to Admission   Medication Sig Dispense Refill Last Dose   • amLODIPine (NORVASC) 5 MG tablet Take 1 tablet by mouth Every Night.      • aspirin 81 MG EC tablet Take 1 tablet by mouth Daily for 90 days. 90 tablet 0    • atorvastatin (LIPITOR) 40 MG tablet Take 1 tablet by mouth Daily for 90 days. 90 tablet 0    • bisoprolol (ZEBeta) 5 MG tablet Take 1 tablet by mouth Daily.      • budesonide (PULMICORT) 0.5  MG/2ML nebulizer solution Take 2 mL by nebulization 2 (Two) Times a Day. J44.9 60 each 0    • cefdinir (OMNICEF) 300 MG capsule Take 1 capsule by mouth 2 (Two) Times a Day. 14 capsule 0    • doxycycline (ADOXA) 100 MG tablet Take 1 tablet by mouth Every 12 (Twelve) Hours for 15 doses. Indications: Infection with Dysregulated Inflammatory Response 15 tablet 0    • furosemide (LASIX) 40 MG tablet Take 1 tablet by mouth Daily for 90 days. 90 tablet 0    • gabapentin (NEURONTIN) 300 MG capsule Take 1 capsule by mouth 3 (Three) Times a Day.      • glipizide (GLUCOTROL) 10 MG tablet Take 1 tablet by mouth Every Night.      • guaiFENesin (MUCINEX) 600 MG 12 hr tablet Take 2 tablets by mouth Every 12 (Twelve) Hours. 60 tablet 0    • lisinopril (PRINIVIL,ZESTRIL) 40 MG tablet Take 1 tablet by mouth Every Night.      • metFORMIN (GLUCOPHAGE) 1000 MG tablet Take 1 tablet by mouth 2 (Two) Times a Day With Meals.      • pioglitazone (ACTOS) 30 MG tablet Take 1 tablet by mouth Every Night.      • sertraline (ZOLOFT) 50 MG tablet Take 1 tablet by mouth Every Night.      • vitamin D (ERGOCALCIFEROL) 1.25 MG (60629 UT) capsule capsule 1 capsule Every 7 (Seven) Days. Wednesday          Allergies:  No Known Allergies    Social History:   Social History     Socioeconomic History   • Marital status:    Tobacco Use   • Smoking status: Former     Packs/day: 1.00     Years: 5.00     Pack years: 5.00     Types: Cigarettes     Quit date: 7/3/2022     Years since quittin.7     Passive exposure: Past   • Smokeless tobacco: Former     Quit date: 7/3/2022   Vaping Use   • Vaping Use: Never used   Substance and Sexual Activity   • Alcohol use: Not Currently   • Drug use: Never   • Sexual activity: Defer       Family History:  Family History   Problem Relation Age of Onset   • Cancer Other    • Diabetes Other    • Heart disease Other    • No Known Problems Mother    • No Known Problems Father    • No Known Problems Sister    • No Known  "Problems Brother    • No Known Problems Maternal Aunt    • No Known Problems Maternal Uncle    • No Known Problems Paternal Aunt    • No Known Problems Paternal Uncle    • No Known Problems Maternal Grandmother    • No Known Problems Maternal Grandfather    • No Known Problems Paternal Grandmother    • No Known Problems Paternal Grandfather    • Anemia Neg Hx    • Arrhythmia Neg Hx    • Asthma Neg Hx    • Clotting disorder Neg Hx    • Fainting Neg Hx    • Heart attack Neg Hx    • Heart failure Neg Hx    • Hyperlipidemia Neg Hx    • Hypertension Neg Hx        Physical Exam:  /51   Pulse 76   Temp 98.4 °F (36.9 °C) (Oral)   Resp 22   Ht 170.2 cm (67\")   Wt 102 kg (225 lb)   SpO2 95%   BMI 35.24 kg/m²  Body mass index is 35.24 kg/m². 95% 102 kg (225 lb)  General Appearance:  Alert   HEENT:  Normocephalic, without obvious abnormality, Conjunctiva/corneas clear,.   Nares normal, no drainage     Neck:  Supple, symmetrical, trachea midline. No JVD.  Lungs /Chest wall: Expiratory wheezing and scattered bilateral  rhonchi, respirations unlabored, symmetrical wall movement.     Heart:  Regular rate and rhythm, S1 S2 normal  Abdomen: Soft, non-tender, no masses, no organomegaly.    Extremities: Trace edema, no clubbing or cyanosis    LABS:  Lab Results   Component Value Date    CALCIUM 9.3 04/08/2023    PHOS 2.8 09/04/2022     Results from last 7 days   Lab Units 04/08/23  0820 04/07/23  1503 04/07/23  1502 04/03/23  1115 04/02/23  0824   MAGNESIUM mg/dL 2.0  --   --   --  2.0   SODIUM mmol/L 142  --  138 139 138   POTASSIUM mmol/L 4.0  --  3.6 4.0 4.3   CHLORIDE mmol/L 101  --  95* 99 99   CO2 mmol/L 32.0*  --  33.0* 30.0* 30.0*   BUN mg/dL 18  --  14 24* 25*   CREATININE mg/dL 0.57*  --  0.67* 0.57* 0.59*   GLUCOSE mg/dL 261*  --  281* 277* 237*   CALCIUM mg/dL 9.3  --  9.3 9.1 9.1   WBC 10*3/mm3 6.50  --  7.80 7.10 8.10   HEMOGLOBIN g/dL 10.2*  --  11.3* 11.0* 10.9*   PLATELETS 10*3/mm3 209  --  223 167 154 "   PROCALCITONIN ng/mL  --  0.11  --   --   --      Lab Results   Component Value Date    TROPONINT 18 (H) 04/01/2023         Results from last 7 days   Lab Units 04/07/23  1552 04/01/23  1614   RESPCX  No Normal Respiratory Genny* Light growth (2+) Normal respiratory genny. No S. aureus or Pseudomonas aeruginosa detected. Final report.     Results from last 7 days   Lab Units 04/07/23  1503   PROCALCITONIN ng/mL 0.11     Results from last 7 days   Lab Units 04/07/23  1622   PH, ARTERIAL pH units 7.422   PCO2, ARTERIAL mm Hg 49.7*   PO2 ART mm Hg 65.5*   O2 SATURATION ART % 92.6*   MODALITY  Cannula     Results from last 7 days   Lab Units 04/07/23  1646   ADENOVIRUS DETECTION BY PCR  Not Detected   CORONAVIRUS 229E  Not Detected   CORONAVIRUS HKU1  Not Detected   CORONAVIRUS NL63  Not Detected   CORONAVIRUS OC43  Not Detected   HUMAN METAPNEUMOVIRUS  Not Detected   HUMAN RHINOVIRUS/ENTEROVIRUS  Detected*   INFLUENZA B PCR  Not Detected   PARAINFLUENZA 1  Not Detected   PARAINFLUENZA VIRUS 2  Not Detected   PARAINFLUENZA VIRUS 3  Not Detected   PARAINFLUENZA VIRUS 4  Not Detected   BORDETELLA PERTUSSIS PCR  Not Detected   CHLAMYDOPHILA PNEUMONIAE PCR  Not Detected   MYCOPLAMA PNEUMO PCR  Not Detected   INFLUENZA A PCR  Not Detected   RSV, PCR  Not Detected         Results from last 7 days   Lab Units 04/07/23  1552 04/01/23  1614   RESPCX  No Normal Respiratory Genny* Light growth (2+) Normal respiratory genny. No S. aureus or Pseudomonas aeruginosa detected. Final report.     Lab Results   Component Value Date    TSH 1.790 03/08/2023     Estimated Creatinine Clearance: 133.4 mL/min (A) (by C-G formula based on SCr of 0.57 mg/dL (L)).         Imaging:  Imaging Results (Last 24 Hours)     Procedure Component Value Units Date/Time    XR Chest 2 View [[id number]] Collected: 04/07/23 1544     Updated: 04/07/23 1547    Narrative:      XR CHEST 2 VW    Date of Exam: 4/7/2023 3:35 PM EDT    Indication: hypoxia, worsening  dyspnea, +rhino 4/1; on chronic 4L for copd.    Comparison: 3/31/2023    Findings:  Heart size is within normal limits. There is diffuse interstitial prominence in both lungs. The pulmonary vascular pattern the chest appears normal. There is an infiltrate identified in the right upper lobe, best seen on the patient's lateral chest.      Impression:      Impression:    1. Diffuse interstitial prominence in the lungs which may represent mild fibrosis.  2. Right upper lobe infiltrate.    Electronically Signed: Leo Rosenberg    4/7/2023 3:45 PM EDT    Workstation ID: [id number]            Current Meds:   SCHEDULE  amLODIPine, 5 mg, Oral, Nightly  aspirin, 81 mg, Oral, Daily  atorvastatin, 40 mg, Oral, Daily  bisoprolol, 5 mg, Oral, Daily  budesonide-formoterol, 2 puff, Inhalation, BID - RT  cefTRIAXone, 2 g, Intravenous, Q24H  enoxaparin, 40 mg, Subcutaneous, Daily  gabapentin, 300 mg, Oral, TID  guaiFENesin, 1,200 mg, Oral, Q12H  insulin glargine, 15 Units, Subcutaneous, Q12H  insulin lispro, 4-24 Units, Subcutaneous, TID With Meals  lisinopril, 40 mg, Oral, Nightly  metroNIDAZOLE, 500 mg, Intravenous, Q8H  pioglitazone, 30 mg, Oral, Nightly  predniSONE, 40 mg, Oral, Daily With Breakfast  roflumilast, 250 mcg, Oral, Daily  senna-docusate sodium, 2 tablet, Oral, BID  sertraline, 50 mg, Oral, Nightly  sodium chloride, 10 mL, Intravenous, Q12H      Infusions  Pharmacy Consult - Pharmacy to dose,   Pharmacy Consult - Steroid Insulin Protocol,       PRNs  •  acetaminophen **OR** acetaminophen **OR** acetaminophen  •  aluminum-magnesium hydroxide-simethicone  •  senna-docusate sodium **AND** polyethylene glycol **AND** bisacodyl **AND** bisacodyl  •  calcium carbonate  •  dextrose  •  dextrose  •  glucagon (human recombinant)  •  ipratropium-albuterol  •  ondansetron  •  oxyCODONE  •  Pharmacy Consult - Pharmacy to dose  •  Pharmacy Consult - Steroid Insulin Protocol  •  [COMPLETED] Insert Peripheral IV **AND** sodium  chloride  •  sodium chloride  •  sodium chloride        Juanjo Castañeda MD  4/8/2023  12:21 EDT      Much of this encounter note is an electronic transcription/translation of spoken language to printed text using Dragon Software.

## 2023-04-08 NOTE — PLAN OF CARE
Problem: Adult Inpatient Plan of Care  Goal: Plan of Care Review  Outcome: Ongoing, Progressing  Goal: Patient-Specific Goal (Individualized)  Outcome: Ongoing, Progressing  Goal: Absence of Hospital-Acquired Illness or Injury  Outcome: Ongoing, Progressing  Intervention: Identify and Manage Fall Risk  Recent Flowsheet Documentation  Taken 4/8/2023 0600 by Yue Loera LPN  Safety Promotion/Fall Prevention: safety round/check completed  Taken 4/8/2023 0400 by Yue Loera LPN  Safety Promotion/Fall Prevention: safety round/check completed  Taken 4/8/2023 0200 by Yue Loera LPN  Safety Promotion/Fall Prevention: safety round/check completed  Taken 4/8/2023 0000 by Yue Loera LPN  Safety Promotion/Fall Prevention: safety round/check completed  Taken 4/7/2023 2200 by Yue Loera LPN  Safety Promotion/Fall Prevention: safety round/check completed  Intervention: Prevent Skin Injury  Recent Flowsheet Documentation  Taken 4/8/2023 0600 by Yue Loera LPN  Body Position: position changed independently  Taken 4/8/2023 0400 by Yue Loera LPN  Body Position: position changed independently  Taken 4/8/2023 0200 by Yue Loera LPN  Body Position: position changed independently  Taken 4/8/2023 0000 by Yue Loera LPN  Body Position: position changed independently  Taken 4/7/2023 2200 by Yue Loera LPN  Body Position: position changed independently  Intervention: Prevent Infection  Recent Flowsheet Documentation  Taken 4/8/2023 0600 by Yue Loera LPN  Infection Prevention: environmental surveillance performed  Taken 4/8/2023 0400 by Yue Loera LPN  Infection Prevention: environmental surveillance performed  Taken 4/8/2023 0200 by Yue Loera LPN  Infection Prevention: environmental surveillance performed  Taken 4/8/2023 0000 by Yue Loera LPN  Infection Prevention: environmental surveillance performed  Taken 4/7/2023 2200 by Yue Loera  LPN  Infection Prevention: environmental surveillance performed  Goal: Optimal Comfort and Wellbeing  Outcome: Ongoing, Progressing  Goal: Readiness for Transition of Care  Outcome: Ongoing, Progressing     Problem: Fall Injury Risk  Goal: Absence of Fall and Fall-Related Injury  Outcome: Ongoing, Progressing  Intervention: Promote Injury-Free Environment  Recent Flowsheet Documentation  Taken 4/8/2023 0600 by Yue Loera LPN  Safety Promotion/Fall Prevention: safety round/check completed  Taken 4/8/2023 0400 by Yue Loera LPN  Safety Promotion/Fall Prevention: safety round/check completed  Taken 4/8/2023 0200 by Yue Loera LPN  Safety Promotion/Fall Prevention: safety round/check completed  Taken 4/8/2023 0000 by Yue Loera LPN  Safety Promotion/Fall Prevention: safety round/check completed  Taken 4/7/2023 2200 by Yue Loera LPN  Safety Promotion/Fall Prevention: safety round/check completed     Problem: Fluid Imbalance (Pneumonia)  Goal: Fluid Balance  Outcome: Ongoing, Progressing     Problem: Infection (Pneumonia)  Goal: Resolution of Infection Signs and Symptoms  Outcome: Ongoing, Progressing  Intervention: Prevent Infection Progression  Recent Flowsheet Documentation  Taken 4/8/2023 0600 by Yue Loera LPN  Isolation Precautions: precautions maintained  Taken 4/8/2023 0400 by Yue Loera LPN  Isolation Precautions: precautions maintained  Taken 4/8/2023 0200 by Yue Loera LPN  Isolation Precautions: precautions maintained  Taken 4/8/2023 0000 by Yue Loera LPN  Isolation Precautions: precautions maintained  Taken 4/7/2023 2200 by Yue Loera LPN  Isolation Precautions: precautions maintained     Problem: Respiratory Compromise (Pneumonia)  Goal: Effective Oxygenation and Ventilation  Outcome: Ongoing, Progressing  Intervention: Optimize Oxygenation and Ventilation  Recent Flowsheet Documentation  Taken 4/8/2023 0400 by Yue Loera  LPN  Head of Bed (HOB) Positioning: HOB elevated  Taken 4/8/2023 0200 by Yue Loera LPN  Head of Bed (HOB) Positioning: HOB elevated  Taken 4/8/2023 0000 by Yue Loera, LPN  Head of Bed (HOB) Positioning: HOB elevated     Problem: Breathing Pattern Ineffective  Goal: Effective Breathing Pattern  Outcome: Ongoing, Progressing  Intervention: Promote Improved Breathing Pattern  Recent Flowsheet Documentation  Taken 4/8/2023 0400 by Yue Loera LPN  Head of Bed (HOB) Positioning: HOB elevated  Taken 4/8/2023 0200 by Yue Loera LPN  Head of Bed (HOB) Positioning: HOB elevated  Taken 4/8/2023 0000 by Yue Loera, LPN  Head of Bed (HOB) Positioning: HOB elevated   Goal Outcome Evaluation:

## 2023-04-08 NOTE — PROGRESS NOTES
[facility] Medicine Services Daily Progress Note    Patient Name: Brenden Peter  : 1950  MRN: [id number]  Primary Care Physician:  Bert Rojas MD  Date of admission: 2023      Subjective      Chief Complaint: Shortness of breath.      Patient Reports:      2023.  Patient was seen and examined.  Patient reported slight improvement in his symptoms.  Patient was noted to have elevated blood glucose.  Insulin resistance regimen was changed to high-dose amnio p.o. diabetic medications were discontinued.      Review of Systems   Constitutional: Positive for malaise/fatigue.   HENT: Negative.    Eyes: Negative.    Cardiovascular: Negative.    Respiratory: Negative.    Endocrine: Negative.    Hematologic/Lymphatic: Negative.    Skin: Negative.    Musculoskeletal: Negative.    Gastrointestinal: Negative.    Genitourinary: Negative.    Neurological: Negative.    Psychiatric/Behavioral: Negative.    Allergic/Immunologic: Negative.             Objective      Vitals:   Temp:  [97.6 °F (36.4 °C)-98.7 °F (37.1 °C)] 98.4 °F (36.9 °C)  Heart Rate:  [60-86] 76  Resp:  [16-30] 22  BP: (105-145)/(51-93) 116/51  Flow (L/min):  [4-6] 6    Physical Exam  Vitals reviewed.   Constitutional:       Appearance: Normal appearance.   HENT:      Head: Normocephalic.      Nose: Nose normal.      Mouth/Throat:      Mouth: Mucous membranes are dry.      Pharynx: Oropharynx is clear.   Eyes:      Extraocular Movements: Extraocular movements intact.      Conjunctiva/sclera: Conjunctivae normal.      Pupils: Pupils are equal, round, and reactive to light.   Cardiovascular:      Pulses: Normal pulses.      Heart sounds: No murmur heard.    No friction rub. No gallop.      Comments: S1 and S2 present.  No tachycardia.  Pulmonary:      Breath sounds: No stridor. No wheezing or rales.      Comments: Distant breath sounds due to body habitus.  Decreased air entry bilaterally.  Chest:      Chest wall: No  tenderness.   Abdominal:      General: Bowel sounds are normal. There is no distension.      Palpations: Abdomen is soft.      Tenderness: There is no abdominal tenderness. There is no right CVA tenderness or guarding.   Musculoskeletal:         General: No swelling, tenderness, deformity or signs of injury.      Cervical back: Normal range of motion. No rigidity.      Right lower leg: No edema.      Left lower leg: No edema.   Skin:     General: Skin is warm and dry.      Capillary Refill: Capillary refill takes less than 2 seconds.      Coloration: Skin is not jaundiced.      Findings: No bruising, erythema, lesion or rash.   Neurological:      Comments: No facial asymmetry noted.  Gait and station not tested.   Psychiatric:         Behavior: Behavior is not agitated.               Result Review    Result Review:  I have personally reviewed the results from the time of this admission to 4/8/2023 11:54 EDT and agree with these findings:  []  Laboratory  []  Microbiology  []  Radiology  []  EKG/Telemetry   []  Cardiology/Vascular   []  Pathology  []  Old records  []  Other:  Most notable findings include:           Assessment & Plan    From previous notes and with minor updates.    Brief Patient Summary:    Patient is a 72-year-old male with past medical history of diabetes mellitus type 2, COPD on home oxygen 4 L continuous, diabetic peripheral neuropathy, hypertension, hyperlipidemia, anxiety and congestive heart failure who presented to the emergency room because of  shortness of breath.  Patient reported worsening of of his symptoms and presented to the emergency room for further assessment.  Patient reported that he felt like he could not get enough air with his home oxygen.  Patient was seen in the emergency room and was diagnosed with pneumonia and a COPD and was recommended for admission for further treatment and management.  Patient was started on antibiotics for pneumonia and cultures were  completed.        amLODIPine, 5 mg, Oral, Nightly  aspirin, 81 mg, Oral, Daily  atorvastatin, 40 mg, Oral, Daily  bisoprolol, 5 mg, Oral, Daily  budesonide-formoterol, 2 puff, Inhalation, BID - RT  cefTRIAXone, 2 g, Intravenous, Q24H  enoxaparin, 40 mg, Subcutaneous, Daily  gabapentin, 300 mg, Oral, TID  guaiFENesin, 1,200 mg, Oral, Q12H  insulin glargine, 15 Units, Subcutaneous, Q12H  insulin lispro, 4-24 Units, Subcutaneous, TID With Meals  lisinopril, 40 mg, Oral, Nightly  metroNIDAZOLE, 500 mg, Intravenous, Q8H  pioglitazone, 30 mg, Oral, Nightly  predniSONE, 40 mg, Oral, Daily With Breakfast  roflumilast, 250 mcg, Oral, Daily  senna-docusate sodium, 2 tablet, Oral, BID  sertraline, 50 mg, Oral, Nightly  sodium chloride, 10 mL, Intravenous, Q12H       Pharmacy Consult - Pharmacy to dose,   Pharmacy Consult - Steroid Insulin Protocol,          Active Hospital Problems:  Active Hospital Problems    Diagnosis    • **Pneumonia due to infectious organism, unspecified laterality, unspecified part of lung    • Acute bronchitis due to Rhinovirus    • Hypoxemia    • Chronic coronary artery disease    • COPD (chronic obstructive pulmonary disease)    • Benign prostatic hyperplasia    • Diabetic peripheral neuropathy    • Chronic depression    • Essential hypertension    • Mixed hyperlipidemia    • Type 2 diabetes mellitus without complication, without long-term current use of insulin (HCC)          Plan:      -Continue appropriate patient's home medications for other chronic medical conditions.  -Continue the present level of care.  -Patient and family agreeable to plan of care  -Treat diabetes mellitus with insulin therapy.  -Treat peripheral neuropathy with gabapentin.  -Treated for hypoxemia with oxygen therapy as needed.  -Treating pneumonia with antibiotics.  -Follow pulmonary recommendations.  -Treat acute bronchitis due to rhinovirus with supportive care.        DVT prophylaxis:  Medical and mechanical DVT  prophylaxis orders are present.    CODE STATUS:    Level Of Support Discussed With: Patient  Code Status (Patient has no pulse and is not breathing): CPR (Attempt to Resuscitate)  Medical Interventions (Patient has pulse or is breathing): Full Support      Disposition: This wonderful patient can discharge in the next 24 to 72 hours.    This patient has been examined wearing appropriate Personal Protective Equipment and discussed with hospital infection control department, [facility], infectious disease specialist and pulmonologist. 04/08/23      Electronically signed by Aditya Liang MD, FACP, 04/08/23, 11:54 EDT.      [facility] Hospitalist Team

## 2023-04-09 ENCOUNTER — ANESTHESIA (OUTPATIENT)
Dept: GASTROENTEROLOGY | Facility: HOSPITAL | Age: 73
DRG: 193 | End: 2023-04-09
Payer: MEDICARE

## 2023-04-09 ENCOUNTER — ANESTHESIA EVENT (OUTPATIENT)
Dept: GASTROENTEROLOGY | Facility: HOSPITAL | Age: 73
DRG: 193 | End: 2023-04-09
Payer: MEDICARE

## 2023-04-09 LAB
ANION GAP SERPL CALCULATED.3IONS-SCNC: 11 MMOL/L (ref 5–15)
B PARAPERT DNA SPEC QL NAA+PROBE: NOT DETECTED
B PERT DNA SPEC QL NAA+PROBE: NOT DETECTED
BACTERIA SPEC RESP CULT: ABNORMAL
BACTERIA SPEC RESP CULT: ABNORMAL
BASOPHILS # BLD AUTO: 0 10*3/MM3 (ref 0–0.2)
BASOPHILS NFR BLD AUTO: 0.1 % (ref 0–1.5)
BUN SERPL-MCNC: 20 MG/DL (ref 8–23)
BUN/CREAT SERPL: 36.4 (ref 7–25)
C PNEUM DNA NPH QL NAA+NON-PROBE: NOT DETECTED
CALCIUM SPEC-SCNC: 9.1 MG/DL (ref 8.6–10.5)
CHLORIDE SERPL-SCNC: 102 MMOL/L (ref 98–107)
CO2 SERPL-SCNC: 31 MMOL/L (ref 22–29)
CREAT SERPL-MCNC: 0.55 MG/DL (ref 0.76–1.27)
DEPRECATED RDW RBC AUTO: 46.8 FL (ref 37–54)
EGFRCR SERPLBLD CKD-EPI 2021: 105.3 ML/MIN/1.73
EOSINOPHIL # BLD AUTO: 0 10*3/MM3 (ref 0–0.4)
EOSINOPHIL NFR BLD AUTO: 0.1 % (ref 0.3–6.2)
ERYTHROCYTE [DISTWIDTH] IN BLOOD BY AUTOMATED COUNT: 15.5 % (ref 12.3–15.4)
FLUAV SUBTYP SPEC NAA+PROBE: NOT DETECTED
FLUBV RNA ISLT QL NAA+PROBE: NOT DETECTED
GLUCOSE BLDC GLUCOMTR-MCNC: 180 MG/DL (ref 70–105)
GLUCOSE BLDC GLUCOMTR-MCNC: 183 MG/DL (ref 70–105)
GLUCOSE BLDC GLUCOMTR-MCNC: 198 MG/DL (ref 70–105)
GLUCOSE BLDC GLUCOMTR-MCNC: 203 MG/DL (ref 70–105)
GLUCOSE BLDC GLUCOMTR-MCNC: 303 MG/DL (ref 70–105)
GLUCOSE SERPL-MCNC: 201 MG/DL (ref 65–99)
GRAM STN SPEC: ABNORMAL
HADV DNA SPEC NAA+PROBE: NOT DETECTED
HCOV 229E RNA SPEC QL NAA+PROBE: NOT DETECTED
HCOV HKU1 RNA SPEC QL NAA+PROBE: NOT DETECTED
HCOV NL63 RNA SPEC QL NAA+PROBE: NOT DETECTED
HCOV OC43 RNA SPEC QL NAA+PROBE: NOT DETECTED
HCT VFR BLD AUTO: 34.4 % (ref 37.5–51)
HGB BLD-MCNC: 10.9 G/DL (ref 13–17.7)
HMPV RNA NPH QL NAA+NON-PROBE: NOT DETECTED
HPIV1 RNA ISLT QL NAA+PROBE: NOT DETECTED
HPIV2 RNA SPEC QL NAA+PROBE: NOT DETECTED
HPIV3 RNA NPH QL NAA+PROBE: NOT DETECTED
HPIV4 P GENE NPH QL NAA+PROBE: NOT DETECTED
LYMPHOCYTES # BLD AUTO: 1.1 10*3/MM3 (ref 0.7–3.1)
LYMPHOCYTES NFR BLD AUTO: 15.7 % (ref 19.6–45.3)
M PNEUMO IGG SER IA-ACNC: NOT DETECTED
MAGNESIUM SERPL-MCNC: 1.8 MG/DL (ref 1.6–2.4)
MCH RBC QN AUTO: 27.4 PG (ref 26.6–33)
MCHC RBC AUTO-ENTMCNC: 31.5 G/DL (ref 31.5–35.7)
MCV RBC AUTO: 87 FL (ref 79–97)
MONOCYTES # BLD AUTO: 0.6 10*3/MM3 (ref 0.1–0.9)
MONOCYTES NFR BLD AUTO: 8.2 % (ref 5–12)
NEUTROPHILS NFR BLD AUTO: 5.1 10*3/MM3 (ref 1.7–7)
NEUTROPHILS NFR BLD AUTO: 75.9 % (ref 42.7–76)
NRBC BLD AUTO-RTO: 0.1 /100 WBC (ref 0–0.2)
PLATELET # BLD AUTO: 247 10*3/MM3 (ref 140–450)
PMV BLD AUTO: 9.3 FL (ref 6–12)
POTASSIUM SERPL-SCNC: 3.5 MMOL/L (ref 3.5–5.2)
RBC # BLD AUTO: 3.96 10*6/MM3 (ref 4.14–5.8)
RHINOVIRUS RNA SPEC NAA+PROBE: DETECTED
RSV RNA NPH QL NAA+NON-PROBE: NOT DETECTED
SARS-COV-2 RNA NPH QL NAA+NON-PROBE: NOT DETECTED
SODIUM SERPL-SCNC: 144 MMOL/L (ref 136–145)
WBC NRBC COR # BLD: 6.8 10*3/MM3 (ref 3.4–10.8)

## 2023-04-09 PROCEDURE — 94799 UNLISTED PULMONARY SVC/PX: CPT

## 2023-04-09 PROCEDURE — 25010000002 AMPICILLIN-SULBACTAM PER 1.5 G: Performed by: INTERNAL MEDICINE

## 2023-04-09 PROCEDURE — 88108 CYTOPATH CONCENTRATE TECH: CPT | Performed by: INTERNAL MEDICINE

## 2023-04-09 PROCEDURE — 85025 COMPLETE CBC W/AUTO DIFF WBC: CPT | Performed by: INTERNAL MEDICINE

## 2023-04-09 PROCEDURE — 80048 BASIC METABOLIC PNL TOTAL CA: CPT | Performed by: INTERNAL MEDICINE

## 2023-04-09 PROCEDURE — 36415 COLL VENOUS BLD VENIPUNCTURE: CPT | Performed by: INTERNAL MEDICINE

## 2023-04-09 PROCEDURE — 87116 MYCOBACTERIA CULTURE: CPT | Performed by: INTERNAL MEDICINE

## 2023-04-09 PROCEDURE — 87206 SMEAR FLUORESCENT/ACID STAI: CPT | Performed by: INTERNAL MEDICINE

## 2023-04-09 PROCEDURE — 0202U NFCT DS 22 TRGT SARS-COV-2: CPT | Performed by: INTERNAL MEDICINE

## 2023-04-09 PROCEDURE — 25010000002 PROPOFOL 200 MG/20ML EMULSION: Performed by: NURSE ANESTHETIST, CERTIFIED REGISTERED

## 2023-04-09 PROCEDURE — 87205 SMEAR GRAM STAIN: CPT | Performed by: INTERNAL MEDICINE

## 2023-04-09 PROCEDURE — 0B9D8ZX DRAINAGE OF RIGHT MIDDLE LUNG LOBE, VIA NATURAL OR ARTIFICIAL OPENING ENDOSCOPIC, DIAGNOSTIC: ICD-10-PCS | Performed by: INTERNAL MEDICINE

## 2023-04-09 PROCEDURE — 82962 GLUCOSE BLOOD TEST: CPT

## 2023-04-09 PROCEDURE — 87070 CULTURE OTHR SPECIMN AEROBIC: CPT | Performed by: INTERNAL MEDICINE

## 2023-04-09 PROCEDURE — 63710000001 INSULIN LISPRO (HUMAN) PER 5 UNITS: Performed by: INTERNAL MEDICINE

## 2023-04-09 PROCEDURE — 87102 FUNGUS ISOLATION CULTURE: CPT | Performed by: INTERNAL MEDICINE

## 2023-04-09 PROCEDURE — 87798 DETECT AGENT NOS DNA AMP: CPT | Performed by: INTERNAL MEDICINE

## 2023-04-09 PROCEDURE — 83735 ASSAY OF MAGNESIUM: CPT | Performed by: INTERNAL MEDICINE

## 2023-04-09 PROCEDURE — 25010000002 ENOXAPARIN PER 10 MG: Performed by: INTERNAL MEDICINE

## 2023-04-09 RX ORDER — LIDOCAINE HYDROCHLORIDE 20 MG/ML
INJECTION, SOLUTION EPIDURAL; INFILTRATION; INTRACAUDAL; PERINEURAL
Status: DISPENSED
Start: 2023-04-09 | End: 2023-04-09

## 2023-04-09 RX ORDER — LIDOCAINE 50 MG/G
OINTMENT TOPICAL
Status: DISPENSED
Start: 2023-04-09 | End: 2023-04-09

## 2023-04-09 RX ORDER — LIDOCAINE 50 MG/G
OINTMENT TOPICAL AS NEEDED
Status: DISCONTINUED | OUTPATIENT
Start: 2023-04-09 | End: 2023-04-09 | Stop reason: HOSPADM

## 2023-04-09 RX ORDER — PIOGLITAZONEHYDROCHLORIDE 30 MG/1
30 TABLET ORAL NIGHTLY
Status: DISCONTINUED | OUTPATIENT
Start: 2023-04-10 | End: 2023-04-11 | Stop reason: HOSPADM

## 2023-04-09 RX ORDER — LIDOCAINE HYDROCHLORIDE 20 MG/ML
INJECTION, SOLUTION INFILTRATION; PERINEURAL AS NEEDED
Status: DISCONTINUED | OUTPATIENT
Start: 2023-04-09 | End: 2023-04-09 | Stop reason: HOSPADM

## 2023-04-09 RX ORDER — PROPOFOL 10 MG/ML
INJECTION, EMULSION INTRAVENOUS AS NEEDED
Status: DISCONTINUED | OUTPATIENT
Start: 2023-04-09 | End: 2023-04-09 | Stop reason: SURG

## 2023-04-09 RX ORDER — BUDESONIDE AND FORMOTEROL FUMARATE DIHYDRATE 160; 4.5 UG/1; UG/1
2 AEROSOL RESPIRATORY (INHALATION)
Status: DISCONTINUED | OUTPATIENT
Start: 2023-04-10 | End: 2023-04-11 | Stop reason: HOSPADM

## 2023-04-09 RX ADMIN — SODIUM CHLORIDE 50 ML/HR: 9 INJECTION, SOLUTION INTRAVENOUS at 12:23

## 2023-04-09 RX ADMIN — AMPICILLIN SODIUM AND SULBACTAM SODIUM 3 G: 2; 1 INJECTION, POWDER, FOR SOLUTION INTRAMUSCULAR; INTRAVENOUS at 17:59

## 2023-04-09 RX ADMIN — GABAPENTIN 300 MG: 300 CAPSULE ORAL at 21:51

## 2023-04-09 RX ADMIN — GABAPENTIN 300 MG: 300 CAPSULE ORAL at 15:17

## 2023-04-09 RX ADMIN — ATORVASTATIN CALCIUM 40 MG: 40 TABLET, FILM COATED ORAL at 15:18

## 2023-04-09 RX ADMIN — BUDESONIDE AND FORMOTEROL FUMARATE DIHYDRATE 2 PUFF: 160; 4.5 AEROSOL RESPIRATORY (INHALATION) at 06:33

## 2023-04-09 RX ADMIN — ENOXAPARIN SODIUM 40 MG: 100 INJECTION SUBCUTANEOUS at 18:01

## 2023-04-09 RX ADMIN — PROPOFOL 50 MG: 10 INJECTION, EMULSION INTRAVENOUS at 12:33

## 2023-04-09 RX ADMIN — BISOPROLOL FUMARATE 5 MG: 5 TABLET ORAL at 15:17

## 2023-04-09 RX ADMIN — INSULIN LISPRO 16 UNITS: 100 INJECTION, SOLUTION INTRAVENOUS; SUBCUTANEOUS at 18:01

## 2023-04-09 RX ADMIN — AMPICILLIN SODIUM AND SULBACTAM SODIUM 3 G: 2; 1 INJECTION, POWDER, FOR SOLUTION INTRAMUSCULAR; INTRAVENOUS at 23:28

## 2023-04-09 RX ADMIN — ASPIRIN 81 MG: 81 TABLET, COATED ORAL at 15:17

## 2023-04-09 RX ADMIN — LISINOPRIL 40 MG: 20 TABLET ORAL at 21:51

## 2023-04-09 RX ADMIN — LIDOCAINE HYDROCHLORIDE 10 ML: 20 INJECTION, SOLUTION INFILTRATION; PERINEURAL at 12:30

## 2023-04-09 RX ADMIN — Medication 10 ML: at 15:19

## 2023-04-09 RX ADMIN — PROPOFOL 100 MG: 10 INJECTION, EMULSION INTRAVENOUS at 12:30

## 2023-04-09 RX ADMIN — GUAIFENESIN 1200 MG: 600 TABLET, EXTENDED RELEASE ORAL at 18:00

## 2023-04-09 RX ADMIN — ROFLUMILAST 250 MCG: 500 TABLET ORAL at 15:18

## 2023-04-09 RX ADMIN — AMPICILLIN SODIUM AND SULBACTAM SODIUM 3 G: 2; 1 INJECTION, POWDER, FOR SOLUTION INTRAMUSCULAR; INTRAVENOUS at 11:56

## 2023-04-09 RX ADMIN — AMLODIPINE BESYLATE 5 MG: 5 TABLET ORAL at 21:51

## 2023-04-09 RX ADMIN — AMPICILLIN SODIUM AND SULBACTAM SODIUM 3 G: 2; 1 INJECTION, POWDER, FOR SOLUTION INTRAMUSCULAR; INTRAVENOUS at 05:27

## 2023-04-09 NOTE — OP NOTE
Bronchoscopy Procedure Note    Procedure:  Bronchoscopy, Diagnostic  Bronchoscopy, Therapeutic lavage of multiple mucous plugs  Bronchoalveolar lavage, BAL from right middle lobe    Pre-Operative Diagnosis: Nonresolving pneumonia    Post-Operative Diagnosis: Same and multiple mucous plugs    Indication: Bilateral atypical pneumonia    Anesthesia: Monitored Anesthesia Care (MAC)    Procedure Details: Patient was consented for the procedure with all risk and benefit of the procedure explained in detail.  Patient was given the opportunity to ask questions and all concerns were answered.    Timeout was done in the standard manner   the bronchoscope was inserted into the main airway via the oropharynx. An anatomical survey was done of the main airways and the subsegmental bronchus of the 5 lobes.  The findings are consistent of multiple mucous plugs consistent of thick clear mucus from 5 lobes.  A therapeutic lavage was performed using aliquots of normal saline instilled into the airways then aspirated back until clear.    Estimated Blood Loss: None           Specimens: Bilateral bronchial washing                Complications:  None; patient tolerated the procedure well.           Disposition: PACU - hemodynamically stable.    Post op plan:  Resume p.o. after 2 hours  Follow-up results    Patient tolerated the procedure well.

## 2023-04-09 NOTE — PROGRESS NOTES
Daily Progress Note          Assessment    Recurrent pneumonia  Chronic obstructive pulmonary disease,   Acute on chronic respiratory failure with hypoxemia and hypercapnia: ABGs 7.42/49.7/65.5, patient wears 4 L of home oxygen  Respiratory panel is positive for human rhinovirus  Obstructive sleep apnea, on BiPAP     Coronary artery disease     Hypertension   Hyperlipidemia  Diabetes mellitus     Office note 3/2/2023   x-smoker (55 year history-quit 7-3-22)  dyspnea and hypoxia.  SONJA, residual AHI: 1 , No snoring, no Leak, [id number] % > 4 hours  Severe COPD:  PFT FEV1 1.12L which is 41%, post BD 51%, ratio 57, ERV 17%, %, TLC 82%, DLCO 27%, obstructive sleep apnea features  mild interstitial lung disease  PMH:  HTN, DM, HLD        Plan:  The chest x-ray shows progressive bilateral interstitial pneumonia: Plan on 4/9/2023 bronchoscopy to rule out opportunistic infections  Oxygen supplement and titration to maintain saturation 90 to 95%: Currently requiring 4 L per nasal cannula     Patient may use his home BiPAP device at night  Bronchodilatores  Antibiotics: Unasyn  Inhaled corticosteroids  Oral prednisone  Daliresp  Blood pressure control  Mucinex  Glucose control  Atorvastatin  DVT/GI prophylaxis                   LOS: 0 days     Subjective     Patient reports cough and shortness of breath    Objective     Vital signs for last 24 hours:  Vitals:    04/08/23 2054 04/09/23 0354 04/09/23 0633 04/09/23 0636   BP:  139/68     BP Location:  Right arm     Patient Position:  Lying     Pulse: 67 69 57 57   Resp: 18 16 18 18   Temp:  97.4 °F (36.3 °C)     TempSrc:  Oral     SpO2: 95% 95% 95% 95%   Weight:  104 kg (229 lb 4.5 oz)     Height:           Intake/Output last 3 shifts:  I/O last 3 completed shifts:  In: 340 [P.O.:240; IV Piggyback:100]  Out: 500 [Urine:500]  Intake/Output this shift:  No intake/output data recorded.      Radiology  Imaging Results (Last 24 Hours)     ** No results found for the last 24  hours. **          Labs:  Results from last 7 days   Lab Units 04/09/23  0855   WBC 10*3/mm3 6.80   HEMOGLOBIN g/dL 10.9*   HEMATOCRIT % 34.4*   PLATELETS 10*3/mm3 247     Results from last 7 days   Lab Units 04/09/23  0855   SODIUM mmol/L 144   POTASSIUM mmol/L 3.5   CHLORIDE mmol/L 102   CO2 mmol/L 31.0*   BUN mg/dL 20   CREATININE mg/dL 0.55*   CALCIUM mg/dL 9.1   GLUCOSE mg/dL 201*     Results from last 7 days   Lab Units 04/07/23  1622   PH, ARTERIAL pH units 7.422   PO2 ART mm Hg 65.5*   PCO2, ARTERIAL mm Hg 49.7*   HCO3 ART mmol/L 32.4*                 Results from last 7 days   Lab Units 04/09/23  0855   MAGNESIUM mg/dL 1.8                   Meds:   SCHEDULE  amLODIPine, 5 mg, Oral, Nightly  ampicillin-sulbactam, 3 g, Intravenous, Q6H  aspirin, 81 mg, Oral, Daily  atorvastatin, 40 mg, Oral, Daily  bisoprolol, 5 mg, Oral, Daily  budesonide-formoterol, 2 puff, Inhalation, BID - RT  enoxaparin, 40 mg, Subcutaneous, Daily  gabapentin, 300 mg, Oral, TID  guaiFENesin, 1,200 mg, Oral, Q12H  insulin glargine, 15 Units, Subcutaneous, Q12H  insulin lispro, 4-24 Units, Subcutaneous, TID With Meals  lidocaine, , ,   lidocaine PF 2%, , ,   lisinopril, 40 mg, Oral, Nightly  pioglitazone, 30 mg, Oral, Nightly  predniSONE, 40 mg, Oral, Daily With Breakfast  roflumilast, 250 mcg, Oral, Daily  senna-docusate sodium, 2 tablet, Oral, BID  sertraline, 50 mg, Oral, Nightly  sodium chloride, 10 mL, Intravenous, Q12H      Infusions  Pharmacy Consult - Pharmacy to dose,   Pharmacy Consult - Steroid Insulin Protocol,       PRNs  •  acetaminophen **OR** acetaminophen **OR** acetaminophen  •  aluminum-magnesium hydroxide-simethicone  •  senna-docusate sodium **AND** polyethylene glycol **AND** bisacodyl **AND** bisacodyl  •  calcium carbonate  •  dextrose  •  dextrose  •  glucagon (human recombinant)  •  ipratropium-albuterol  •  ondansetron  •  oxyCODONE  •  Pharmacy Consult - Pharmacy to dose  •  Pharmacy Consult - Steroid Insulin  Protocol  •  [COMPLETED] Insert Peripheral IV **AND** sodium chloride  •  sodium chloride  •  sodium chloride    Physical Exam:  General Appearance:  Alert   HEENT:  Normocephalic, without obvious abnormality, Conjunctiva/corneas clear,.   Nares normal, no drainage     Neck:  Supple, symmetrical, trachea midline.   Lungs /Chest wall:   Bilateral basal rhonchi, respirations unlabored, symmetrical wall movement.     Heart:  Regular rate and rhythm, S1 S2 normal  Abdomen: Soft, non-tender, no masses, no organomegaly.    Extremities: No edema, no clubbing or cyanosis     ROS  Constitutional: Negative for chills, fever and malaise/fatigue.   HENT: Negative.    Eyes: Negative.    Cardiovascular: Negative.    Respiratory: Positive for cough and shortness of breath.    Skin: Negative.    Musculoskeletal: Negative.    Gastrointestinal: Negative.    Genitourinary: Negative.    Neurological: Negative.    Psychiatric/Behavioral: Negative.      I reviewed the recent clinical results    Much of this encounter note is an electronic transcription/translation of spoken language to printed text using Dragon Software which might include inadvertent errors in transcription.

## 2023-04-09 NOTE — PLAN OF CARE
Goal Outcome Evaluation:              Outcome Evaluation: Pt had bronch today with washing. On 6L 02 . Continuing to monitor.

## 2023-04-09 NOTE — PLAN OF CARE
Goal Outcome Evaluation:  Plan of Care Reviewed With: patient           Outcome Evaluation: Pt slept well throughout the night, no c/o pain or discomfort. Pt currently requiring 5L of O2 sats @ 94%. Pt to have Bronch @ 0930, NPO @ midnight for procedure. Will continue to monitor.

## 2023-04-09 NOTE — ANESTHESIA PREPROCEDURE EVALUATION
Anesthesia Evaluation     Patient summary reviewed   NPO Solid Status: > 8 hours  NPO Liquid Status: > 8 hours           Airway   Mallampati: III  TM distance: <3 FB  Dental      Pulmonary    (+) pneumonia , a smoker Former, COPD, decreased breath sounds,   Cardiovascular     (+) hypertension, valvular problems/murmurs, CAD, hyperlipidemia,       Neuro/Psych  GI/Hepatic/Renal/Endo    (+) obesity, morbid obesity, GERD well controlled,  renal disease, diabetes mellitus, thyroid problem hypothyroidism    Musculoskeletal     (+) chronic pain, joint swelling,   Abdominal   (+) obese,    Substance History      OB/GYN          Other                        Anesthesia Plan    ASA 3     MAC     intravenous induction     Anesthetic plan, risks, benefits, and alternatives have been provided, discussed and informed consent has been obtained with: patient.        CODE STATUS:    Level Of Support Discussed With: Patient  Code Status (Patient has no pulse and is not breathing): CPR (Attempt to Resuscitate)  Medical Interventions (Patient has pulse or is breathing): Full Support

## 2023-04-09 NOTE — PROGRESS NOTES
[facility] Medicine Services Daily Progress Note    Patient Name: Brenden Peter  : 1950  MRN: [id number]  Primary Care Physician:  Bert Rojas MD  Date of admission: 2023      Subjective      Chief Complaint: Shortness of breath.      Patient Reports:      2023.  Patient was seen and examined.  Patient reported slight improvement in his symptoms.  Patient was noted to have elevated blood glucose.  Insulin resistance regimen was changed to high-dose amnio p.o. diabetic medications were discontinued.      2023.  Patient was seen and examined.  Patient reported moderate improvement in his symptoms.  Patient is status post bronchoscopy.      Review of Systems   Constitutional: Negative for malaise/fatigue.   HENT: Negative.    Eyes: Negative.    Cardiovascular: Negative.    Respiratory: Negative.    Endocrine: Negative.    Hematologic/Lymphatic: Negative.    Skin: Negative.    Musculoskeletal: Negative.    Gastrointestinal: Negative.    Genitourinary: Negative.    Neurological: Positive for weakness.   Psychiatric/Behavioral: Negative.    Allergic/Immunologic: Negative.             Objective      Vitals:   Temp:  [97.4 °F (36.3 °C)-98 °F (36.7 °C)] 98 °F (36.7 °C)  Heart Rate:  [57-97] 71  Resp:  [15-25] 25  BP: (124-147)/(49-71) 131/71  Flow (L/min):  [4-6] 5    Physical Exam  Vitals reviewed.   Constitutional:       Appearance: He is obese. He is not toxic-appearing.   HENT:      Head: Normocephalic.      Nose: Nose normal.      Mouth/Throat:      Mouth: Mucous membranes are dry.      Pharynx: Oropharynx is clear.   Eyes:      Extraocular Movements: Extraocular movements intact.      Conjunctiva/sclera: Conjunctivae normal.      Pupils: Pupils are equal, round, and reactive to light.   Cardiovascular:      Pulses: Normal pulses.      Heart sounds: No murmur heard.    No friction rub. No gallop.      Comments: S1 and S2 present.  No tachycardia.  Pulmonary:       Breath sounds: No stridor. No wheezing or rales.      Comments: Distant breath sounds due to body habitus.  Decreased air entry bilaterally.  Chest:      Chest wall: No tenderness.   Abdominal:      General: Bowel sounds are normal. There is no distension.      Palpations: Abdomen is soft.      Tenderness: There is no abdominal tenderness. There is no right CVA tenderness or guarding.   Musculoskeletal:         General: No swelling, tenderness, deformity or signs of injury.      Cervical back: Normal range of motion. No rigidity.      Right lower leg: No edema.      Left lower leg: No edema.   Skin:     General: Skin is warm and dry.      Capillary Refill: Capillary refill takes less than 2 seconds.      Coloration: Skin is not jaundiced.      Findings: No bruising, erythema, lesion or rash.   Neurological:      Comments: No facial asymmetry noted.  Gait and station not tested.   Psychiatric:         Behavior: Behavior is not agitated.      Comments: No agitation.               Result Review    Result Review:  I have personally reviewed the results from the time of this admission to 4/9/2023 17:31 EDT and agree with these findings:  []  Laboratory  []  Microbiology  []  Radiology  []  EKG/Telemetry   []  Cardiology/Vascular   []  Pathology  []  Old records  []  Other:  Most notable findings include:           Assessment & Plan    From previous notes and with minor updates.    Brief Patient Summary:    Patient is a 72-year-old male with past medical history of diabetes mellitus type 2, COPD on home oxygen 4 L continuous, diabetic peripheral neuropathy, hypertension, hyperlipidemia, anxiety and congestive heart failure who presented to the emergency room because of  shortness of breath.  Patient reported worsening of of his symptoms and presented to the emergency room for further assessment.  Patient reported that he felt like he could not get enough air with his home oxygen.  Patient was seen in the emergency room  and was diagnosed with pneumonia and a COPD and was recommended for admission for further treatment and management.  Patient was started on antibiotics for pneumonia and cultures were completed.        amLODIPine, 5 mg, Oral, Nightly  ampicillin-sulbactam, 3 g, Intravenous, Q6H  [MAR Hold] aspirin, 81 mg, Oral, Daily  [MAR Hold] atorvastatin, 40 mg, Oral, Daily  bisoprolol, 5 mg, Oral, Daily  [MAR Hold] budesonide-formoterol, 2 puff, Inhalation, BID - RT  [MAR Hold] enoxaparin, 40 mg, Subcutaneous, Daily  gabapentin, 300 mg, Oral, TID  [MAR Hold] guaiFENesin, 1,200 mg, Oral, Q12H  [MAR Hold] insulin glargine, 15 Units, Subcutaneous, Q12H  [MAR Hold] insulin lispro, 4-24 Units, Subcutaneous, TID With Meals  lidocaine, , ,   lidocaine PF 2%, , ,   lisinopril, 40 mg, Oral, Nightly  [MAR Hold] pioglitazone, 30 mg, Oral, Nightly  [MAR Hold] predniSONE, 40 mg, Oral, Daily With Breakfast  [MAR Hold] roflumilast, 250 mcg, Oral, Daily  [MAR Hold] senna-docusate sodium, 2 tablet, Oral, BID  [MAR Hold] sertraline, 50 mg, Oral, Nightly  [MAR Hold] sodium chloride, 10 mL, Intravenous, Q12H       Pharmacy Consult - Pharmacy to dose,   Pharmacy Consult - Steroid Insulin Protocol,          Active Hospital Problems:  Active Hospital Problems    Diagnosis    • **Pneumonia due to infectious organism, unspecified laterality, unspecified part of lung    • Acute bronchitis due to Rhinovirus    • Hypoxemia    • Chronic coronary artery disease    • Interstitial pneumonia    • COPD (chronic obstructive pulmonary disease)    • Benign prostatic hyperplasia    • Diabetic peripheral neuropathy    • Chronic depression    • Essential hypertension    • Mixed hyperlipidemia    • Type 2 diabetes mellitus without complication, without long-term current use of insulin (HCC)          Plan:      -Continue appropriate patient's home medications for other chronic medical conditions.  -Continue the present level of care.  -Patient and family agreeable to  plan of care  -Treat diabetes mellitus with insulin therapy.  -Treat peripheral neuropathy with gabapentin.  -Treated for hypoxemia with oxygen therapy as needed.  -Treating pneumonia with antibiotics.  -Follow pulmonary recommendations.  -Treat acute bronchitis due to rhinovirus with supportive care.        DVT prophylaxis:  Medical and mechanical DVT prophylaxis orders are present.    CODE STATUS:    Level Of Support Discussed With: Patient  Code Status (Patient has no pulse and is not breathing): CPR (Attempt to Resuscitate)  Medical Interventions (Patient has pulse or is breathing): Full Support      Disposition: This wonderful patient can discharge in the next 24 to 48 hours.    This patient has been examined wearing appropriate Personal Protective Equipment and discussed with hospital infection control department, [facility], infectious disease specialist and pulmonologist. 04/09/23      Electronically signed by Aditya Liang MD, FACP, 04/09/23, 17:31 EDT.      [facility] Hospitalist Team

## 2023-04-10 ENCOUNTER — APPOINTMENT (OUTPATIENT)
Dept: GENERAL RADIOLOGY | Facility: HOSPITAL | Age: 73
DRG: 193 | End: 2023-04-10
Payer: MEDICARE

## 2023-04-10 LAB
ANION GAP SERPL CALCULATED.3IONS-SCNC: 8 MMOL/L (ref 5–15)
BASOPHILS # BLD AUTO: 0 10*3/MM3 (ref 0–0.2)
BASOPHILS NFR BLD AUTO: 0.2 % (ref 0–1.5)
BUN SERPL-MCNC: 18 MG/DL (ref 8–23)
BUN/CREAT SERPL: 34.6 (ref 7–25)
CALCIUM SPEC-SCNC: 9.2 MG/DL (ref 8.6–10.5)
CHLORIDE SERPL-SCNC: 104 MMOL/L (ref 98–107)
CO2 SERPL-SCNC: 33 MMOL/L (ref 22–29)
CREAT SERPL-MCNC: 0.52 MG/DL (ref 0.76–1.27)
DEPRECATED RDW RBC AUTO: 49.4 FL (ref 37–54)
EGFRCR SERPLBLD CKD-EPI 2021: 107.1 ML/MIN/1.73
EOSINOPHIL # BLD AUTO: 0 10*3/MM3 (ref 0–0.4)
EOSINOPHIL NFR BLD AUTO: 0.3 % (ref 0.3–6.2)
ERYTHROCYTE [DISTWIDTH] IN BLOOD BY AUTOMATED COUNT: 15.4 % (ref 12.3–15.4)
GLUCOSE BLDC GLUCOMTR-MCNC: 143 MG/DL (ref 70–105)
GLUCOSE BLDC GLUCOMTR-MCNC: 230 MG/DL (ref 70–105)
GLUCOSE BLDC GLUCOMTR-MCNC: 317 MG/DL (ref 70–105)
GLUCOSE BLDC GLUCOMTR-MCNC: 321 MG/DL (ref 70–105)
GLUCOSE SERPL-MCNC: 234 MG/DL (ref 65–99)
HCT VFR BLD AUTO: 34.2 % (ref 37.5–51)
HGB BLD-MCNC: 11.1 G/DL (ref 13–17.7)
LYMPHOCYTES # BLD AUTO: 0.9 10*3/MM3 (ref 0.7–3.1)
LYMPHOCYTES NFR BLD AUTO: 12.3 % (ref 19.6–45.3)
MCH RBC QN AUTO: 27.9 PG (ref 26.6–33)
MCHC RBC AUTO-ENTMCNC: 32.3 G/DL (ref 31.5–35.7)
MCV RBC AUTO: 86.4 FL (ref 79–97)
MONOCYTES # BLD AUTO: 0.5 10*3/MM3 (ref 0.1–0.9)
MONOCYTES NFR BLD AUTO: 6.5 % (ref 5–12)
NEUTROPHILS NFR BLD AUTO: 6 10*3/MM3 (ref 1.7–7)
NEUTROPHILS NFR BLD AUTO: 80.7 % (ref 42.7–76)
NRBC BLD AUTO-RTO: 0 /100 WBC (ref 0–0.2)
PLATELET # BLD AUTO: 242 10*3/MM3 (ref 140–450)
PMV BLD AUTO: 9.5 FL (ref 6–12)
POTASSIUM SERPL-SCNC: 3.7 MMOL/L (ref 3.5–5.2)
RBC # BLD AUTO: 3.96 10*6/MM3 (ref 4.14–5.8)
SODIUM SERPL-SCNC: 145 MMOL/L (ref 136–145)
WBC NRBC COR # BLD: 7.5 10*3/MM3 (ref 3.4–10.8)

## 2023-04-10 PROCEDURE — 86003 ALLG SPEC IGE CRUDE XTRC EA: CPT | Performed by: INTERNAL MEDICINE

## 2023-04-10 PROCEDURE — 94664 DEMO&/EVAL PT USE INHALER: CPT

## 2023-04-10 PROCEDURE — 63710000001 PREDNISONE PER 1 MG: Performed by: INTERNAL MEDICINE

## 2023-04-10 PROCEDURE — 82785 ASSAY OF IGE: CPT | Performed by: INTERNAL MEDICINE

## 2023-04-10 PROCEDURE — 94799 UNLISTED PULMONARY SVC/PX: CPT

## 2023-04-10 PROCEDURE — 63710000001 INSULIN LISPRO (HUMAN) PER 5 UNITS: Performed by: INTERNAL MEDICINE

## 2023-04-10 PROCEDURE — 25010000002 AMPICILLIN-SULBACTAM PER 1.5 G: Performed by: INTERNAL MEDICINE

## 2023-04-10 PROCEDURE — 82962 GLUCOSE BLOOD TEST: CPT

## 2023-04-10 PROCEDURE — 87305 ASPERGILLUS AG IA: CPT | Performed by: INTERNAL MEDICINE

## 2023-04-10 PROCEDURE — 85025 COMPLETE CBC W/AUTO DIFF WBC: CPT | Performed by: INTERNAL MEDICINE

## 2023-04-10 PROCEDURE — 80048 BASIC METABOLIC PNL TOTAL CA: CPT | Performed by: INTERNAL MEDICINE

## 2023-04-10 PROCEDURE — 25010000002 ENOXAPARIN PER 10 MG: Performed by: INTERNAL MEDICINE

## 2023-04-10 PROCEDURE — 87385 HISTOPLASMA CAPSUL AG IA: CPT | Performed by: INTERNAL MEDICINE

## 2023-04-10 PROCEDURE — 94761 N-INVAS EAR/PLS OXIMETRY MLT: CPT

## 2023-04-10 PROCEDURE — 71045 X-RAY EXAM CHEST 1 VIEW: CPT

## 2023-04-10 PROCEDURE — 63710000001 INSULIN GLARGINE PER 5 UNITS: Performed by: NURSE PRACTITIONER

## 2023-04-10 RX ORDER — HYDRALAZINE HYDROCHLORIDE 20 MG/ML
10 INJECTION INTRAMUSCULAR; INTRAVENOUS EVERY 6 HOURS PRN
Status: DISCONTINUED | OUTPATIENT
Start: 2023-04-10 | End: 2023-04-11 | Stop reason: HOSPADM

## 2023-04-10 RX ADMIN — INSULIN GLARGINE 15 UNITS: 100 INJECTION, SOLUTION SUBCUTANEOUS at 00:23

## 2023-04-10 RX ADMIN — Medication 10 ML: at 20:34

## 2023-04-10 RX ADMIN — AMPICILLIN SODIUM AND SULBACTAM SODIUM 3 G: 2; 1 INJECTION, POWDER, FOR SOLUTION INTRAMUSCULAR; INTRAVENOUS at 17:56

## 2023-04-10 RX ADMIN — AMLODIPINE BESYLATE 5 MG: 5 TABLET ORAL at 20:33

## 2023-04-10 RX ADMIN — GUAIFENESIN 600 MG: 600 TABLET, EXTENDED RELEASE ORAL at 05:21

## 2023-04-10 RX ADMIN — BUDESONIDE AND FORMOTEROL FUMARATE DIHYDRATE 2 PUFF: 160; 4.5 AEROSOL RESPIRATORY (INHALATION) at 21:09

## 2023-04-10 RX ADMIN — BISOPROLOL FUMARATE 5 MG: 5 TABLET ORAL at 08:27

## 2023-04-10 RX ADMIN — BUDESONIDE AND FORMOTEROL FUMARATE DIHYDRATE 2 PUFF: 160; 4.5 AEROSOL RESPIRATORY (INHALATION) at 07:56

## 2023-04-10 RX ADMIN — INSULIN GLARGINE 15 UNITS: 100 INJECTION, SOLUTION SUBCUTANEOUS at 08:33

## 2023-04-10 RX ADMIN — PIOGLITAZONE 30 MG: 30 TABLET ORAL at 00:23

## 2023-04-10 RX ADMIN — GUAIFENESIN 1200 MG: 600 TABLET, EXTENDED RELEASE ORAL at 17:57

## 2023-04-10 RX ADMIN — ATORVASTATIN CALCIUM 40 MG: 40 TABLET, FILM COATED ORAL at 08:28

## 2023-04-10 RX ADMIN — INSULIN LISPRO 16 UNITS: 100 INJECTION, SOLUTION INTRAVENOUS; SUBCUTANEOUS at 17:56

## 2023-04-10 RX ADMIN — ROFLUMILAST 250 MCG: 500 TABLET ORAL at 08:28

## 2023-04-10 RX ADMIN — GABAPENTIN 300 MG: 300 CAPSULE ORAL at 20:33

## 2023-04-10 RX ADMIN — PIOGLITAZONE 30 MG: 30 TABLET ORAL at 21:12

## 2023-04-10 RX ADMIN — LISINOPRIL 40 MG: 20 TABLET ORAL at 20:33

## 2023-04-10 RX ADMIN — AMPICILLIN SODIUM AND SULBACTAM SODIUM 3 G: 2; 1 INJECTION, POWDER, FOR SOLUTION INTRAMUSCULAR; INTRAVENOUS at 23:32

## 2023-04-10 RX ADMIN — AMPICILLIN SODIUM AND SULBACTAM SODIUM 3 G: 2; 1 INJECTION, POWDER, FOR SOLUTION INTRAMUSCULAR; INTRAVENOUS at 10:40

## 2023-04-10 RX ADMIN — PREDNISONE 40 MG: 20 TABLET ORAL at 08:29

## 2023-04-10 RX ADMIN — SERTRALINE 50 MG: 50 TABLET, FILM COATED ORAL at 00:23

## 2023-04-10 RX ADMIN — ENOXAPARIN SODIUM 40 MG: 100 INJECTION SUBCUTANEOUS at 15:01

## 2023-04-10 RX ADMIN — SENNOSIDES AND DOCUSATE SODIUM 2 TABLET: 50; 8.6 TABLET ORAL at 08:28

## 2023-04-10 RX ADMIN — GABAPENTIN 300 MG: 300 CAPSULE ORAL at 08:27

## 2023-04-10 RX ADMIN — SERTRALINE 50 MG: 50 TABLET, FILM COATED ORAL at 21:12

## 2023-04-10 RX ADMIN — ASPIRIN 81 MG: 81 TABLET, COATED ORAL at 08:28

## 2023-04-10 RX ADMIN — INSULIN GLARGINE 15 UNITS: 100 INJECTION, SOLUTION SUBCUTANEOUS at 20:32

## 2023-04-10 RX ADMIN — INSULIN LISPRO 8 UNITS: 100 INJECTION, SOLUTION INTRAVENOUS; SUBCUTANEOUS at 12:17

## 2023-04-10 RX ADMIN — AMPICILLIN SODIUM AND SULBACTAM SODIUM 3 G: 2; 1 INJECTION, POWDER, FOR SOLUTION INTRAMUSCULAR; INTRAVENOUS at 05:21

## 2023-04-10 RX ADMIN — GABAPENTIN 300 MG: 300 CAPSULE ORAL at 15:01

## 2023-04-10 NOTE — PROGRESS NOTES
[facility] Medicine Services Daily Progress Note    Patient Name: Brenden Peter  : 1950  MRN: [id number]  Primary Care Physician:  Bert Rojas MD  Date of admission: 2023      Subjective      Chief Complaint: Shortness of breath.      Patient Reports:      2023.  Patient was seen and examined.  Patient reported slight improvement in his symptoms.  Patient was noted to have elevated blood glucose.  Insulin resistance regimen was changed to high-dose amnio p.o. diabetic medications were discontinued.      2023.  Patient was seen and examined.  Patient reported moderate improvement in his symptoms.  Patient is status post bronchoscopy.      4/10/2023.  Patient was seen and examined.  Patient is status post bronchoscopy.  Patient reported having a great night sleep.      Review of Systems   Constitutional: Negative for malaise/fatigue.   HENT: Negative.    Eyes: Negative.    Cardiovascular: Negative.    Respiratory: Negative.    Endocrine: Negative.    Hematologic/Lymphatic: Negative.    Skin: Negative.    Musculoskeletal: Negative.    Gastrointestinal: Negative.    Genitourinary: Negative.    Neurological: Negative for weakness.   Psychiatric/Behavioral: Negative.    Allergic/Immunologic: Negative.             Objective      Vitals:   Temp:  [97.4 °F (36.3 °C)-98 °F (36.7 °C)] 97.9 °F (36.6 °C)  Heart Rate:  [59-97] 65  Resp:  [15-25] 21  BP: ()/(49-73) 160/71  Flow (L/min):  [4-6] 5    Physical Exam  Vitals reviewed.   Constitutional:       General: He is not in acute distress.     Appearance: He is obese. He is not toxic-appearing.   HENT:      Head: Normocephalic.      Nose: Nose normal.      Mouth/Throat:      Mouth: Mucous membranes are dry.      Pharynx: Oropharynx is clear.   Eyes:      Extraocular Movements: Extraocular movements intact.      Conjunctiva/sclera: Conjunctivae normal.      Pupils: Pupils are equal, round, and reactive to light.    Cardiovascular:      Pulses: Normal pulses.      Heart sounds: No murmur heard.    No friction rub. No gallop.      Comments: S1 and S2 present.  No tachycardia.  Pulmonary:      Breath sounds: No stridor. No wheezing or rales.      Comments: Distant breath sounds due to body habitus.  Improved air entry bilaterally.  Chest:      Chest wall: No tenderness.   Abdominal:      General: Bowel sounds are normal. There is no distension.      Palpations: Abdomen is soft.      Tenderness: There is no abdominal tenderness. There is no right CVA tenderness or guarding.   Musculoskeletal:         General: No swelling, tenderness, deformity or signs of injury.      Cervical back: Normal range of motion. No rigidity.      Right lower leg: No edema.      Left lower leg: No edema.   Skin:     General: Skin is warm and dry.      Capillary Refill: Capillary refill takes less than 2 seconds.      Coloration: Skin is not jaundiced.      Findings: No bruising, erythema, lesion or rash.   Neurological:      Comments: No facial asymmetry noted.  Gait and station not tested.   Psychiatric:         Behavior: Behavior is not agitated.      Comments: No agitation.               Result Review    Result Review:  I have personally reviewed the results from the time of this admission to 4/10/2023 10:19 EDT and agree with these findings:  []  Laboratory  []  Microbiology  []  Radiology  []  EKG/Telemetry   []  Cardiology/Vascular   []  Pathology  []  Old records  []  Other:  Most notable findings include:           Assessment & Plan    From previous notes and with minor updates.    Brief Patient Summary:    Patient is a 72-year-old male with past medical history of hyperlipidemia, diabetes mellitus type 2, COPD on home oxygen 4 L continuous, diabetic peripheral neuropathy, hypertension, anxiety and congestive heart failure who presented to the emergency room because of  shortness of breath.  Patient reported worsening of of his symptoms and  presented to the emergency room for further assessment.  Patient reported that he felt like he could not get enough air with his home oxygen.  Patient was seen in the emergency room and was diagnosed with pneumonia and a COPD and was recommended for admission for further treatment and management.  Patient was started on antibiotics for pneumonia and cultures were completed.        amLODIPine, 5 mg, Oral, Nightly  ampicillin-sulbactam, 3 g, Intravenous, Q6H  [MAR Hold] aspirin, 81 mg, Oral, Daily  [MAR Hold] atorvastatin, 40 mg, Oral, Daily  bisoprolol, 5 mg, Oral, Daily  budesonide-formoterol, 2 puff, Inhalation, BID - RT  [MAR Hold] enoxaparin, 40 mg, Subcutaneous, Daily  gabapentin, 300 mg, Oral, TID  [MAR Hold] guaiFENesin, 1,200 mg, Oral, Q12H  insulin glargine, 15 Units, Subcutaneous, Q12H  [MAR Hold] insulin lispro, 4-24 Units, Subcutaneous, TID With Meals  lisinopril, 40 mg, Oral, Nightly  pioglitazone, 30 mg, Oral, Nightly  [MAR Hold] predniSONE, 40 mg, Oral, Daily With Breakfast  [MAR Hold] roflumilast, 250 mcg, Oral, Daily  [MAR Hold] senna-docusate sodium, 2 tablet, Oral, BID  sertraline, 50 mg, Oral, Nightly  [MAR Hold] sodium chloride, 10 mL, Intravenous, Q12H       Pharmacy Consult - Pharmacy to dose,   Pharmacy Consult - Steroid Insulin Protocol,          Active Hospital Problems:  Active Hospital Problems    Diagnosis    • **Pneumonia due to infectious organism, unspecified laterality, unspecified part of lung    • Acute bronchitis due to Rhinovirus    • Hypoxemia    • Chronic coronary artery disease    • Interstitial pneumonia    • COPD (chronic obstructive pulmonary disease)    • Benign prostatic hyperplasia    • Diabetic peripheral neuropathy    • Chronic depression    • Essential hypertension    • Mixed hyperlipidemia    • Type 2 diabetes mellitus without complication, without long-term current use of insulin (HCC)          Plan:      -Continue appropriate patient's home medications for other  chronic medical conditions.  -Continue the present level of care.  -Patient and family agreeable to plan of care  -Treat diabetes mellitus with insulin therapy.  -Treat peripheral neuropathy with gabapentin.  -Treated for hypoxemia with oxygen therapy as needed.  -Treating pneumonia with antibiotics.  -Follow pulmonary recommendations.  -Treat acute bronchitis due to rhinovirus with supportive care.        DVT prophylaxis:  Medical and mechanical DVT prophylaxis orders are present.    CODE STATUS:    Level Of Support Discussed With: Patient  Code Status (Patient has no pulse and is not breathing): CPR (Attempt to Resuscitate)  Medical Interventions (Patient has pulse or is breathing): Full Support      Disposition: This wonderful patient can discharge in the next 24 hours.    This patient has been examined wearing appropriate Personal Protective Equipment and discussed with hospital infection control department, [facility], infectious disease specialist and pulmonologist. 04/10/23      Electronically signed by Aditya Liang MD, FACP, 04/10/23, 10:19 EDT.      [facility] Hospitalist Team

## 2023-04-10 NOTE — CASE MANAGEMENT/SOCIAL WORK
Discharge Planning Assessment   Daniele     Patient Name: Brenden Peter  MRN: [id number]  Today's Date: 4/10/2023    Admit Date: 4/7/2023    Plan: DC Plan: Home with spouse.  Current inogen @ 6L. Home Bipap.   Discharge Needs Assessment     Row Name 04/10/23 1700       Living Environment    People in Home spouse               Discharge Plan     Row Name 04/10/23 1700       Plan    Plan DC Plan: Home with spouse.  Current inogen @ 6L. Home Bipap.    Patient/Family in Agreement with Plan yes    Plan Comments Met with patient at bedside.  Patient lives at home with spouse.  States IADL.  Denies any discharge needs at this time.  Discussed multiple readmissions.  Patient states he has had pneumonia multiple times recently.   [facility] ready to discharge last admission. This time he felt short of breath, unable to increase inogen any as inogen goes to 6L and patient was discharge on 6L last admission, came to ER urgently. Patient also has concentrator at home.   Discussed potential needs at discharge.  HH list given, discussed potential need for HH RN to follow.  Discussed that a referral to [facility] would also be possible.  Discussed that this would not be for Hospice, but would be for additional help in his home with his chronic illness.   Asked patient if he would like a pamphlet to discuss further with his spouse.  Patient verbalized understanding and accepted pamplet and  phone number for follow up questions.   Later spoke with wife and explained Home Health or [facility] if they chose to have referrals placed.  Patient's spouse verbalized understanding, will follow up with her 4/11/23.  Barriers to discharge: 6L NC.  Rhoinovirus +.  CXR today.  IV abx.  4/9 bronch.              Continued Care and Services - Admitted Since 4/7/2023    Coordination has not been started for this encounter.       Expected Discharge Date and Time     Expected Discharge Date Expected Discharge Time    Apr 11, 2023          Demographic  "Summary     Row Name 04/10/23 1700       General Information    Admission Type inpatient    Arrived From emergency department    Required Notices Provided Important Message from Medicare    Referral Source admission list    Reason for Consult discharge planning    Preferred Language English               Functional Status     Row Name 04/10/23 1700       Functional Status    Usual Activity Tolerance moderate    Current Activity Tolerance moderate       Functional Status, IADL    Medications assistive person    Meal Preparation assistive person    Housekeeping assistive person;independent    Laundry independent;assistive person    Shopping assistive person;independent       Mental Status    General Appearance WDL WDL       Mental Status Summary    Recent Changes in Mental Status/Cognitive Functioning no changes            Met with patient in room wearing PPE: mask.    Maintained distance greater than six feet and spent less than 15 minutes in the room.    Stacey Ahmadi RN     Office Phone [phone]  Office Cell [phone]               Case Management Readmission Assessment Note    Case Management Readmission Assessment (all recorded)     Readmission Interview     Row Name 04/10/23 1902             Readmission Indications    Is this hospitalization related to the prior hospital diagnosis? Yes      What was the reason you were admitted? \"I couldn't breathe\"  Diagnosis of pneumonia.      Row Name 04/10/23 1902             Recommendation for rehospitalization    Did you speak with your physician prior to coming to the hospital No      Did you seek care elsewhere prior to coming to the hospital? No      Row Name 04/10/23 1902             Follow up appointment    Do you have a PCP? Yes      Did you have an appointment with PCP/specialist after hospitalization within 7 days? No      Row Name 04/10/23 1902             Medications    Did you have newly prescribed medications at discharge? Yes      " Did you understand the reasons for your medications at discharge and how to take them? Yes      Did you understand the side effects of your medications? Yes      Are you taking all of you prescribed medications? Yes      Row Name 04/10/23 1902             Discharge Instructions    Did you understand your discharge instructions? Yes      Did your family/caregiver hear your instructions? Yes      Were you given a number of someone to call if you had questions or concerns? Yes      Row Name 04/10/23 1902             Index discharge location/services    Where did you go upon discharge? Home      Do you have supportive family or friends in the home? Yes      Row Name 04/10/23 1902             Discharge Readiness    On a scale of 1-5 (5 being well prepared), how ready were you for discharge 5      Recommendation based on interview Education on diagnosis/self management;Additional caregiver support

## 2023-04-10 NOTE — PROGRESS NOTES
Daily Progress Note          Assessment    Recurrent pneumonia, rule out allergic bronchopulmonary aspergillosis     s/p 4/9/2023 bronchoscopy   multiple mucous plugs    CT chest 3/9/2023 with pulm edema/emphysema/possible reactive mediastinal adenopathy with some calcified lymph nodes    Chronic obstructive pulmonary disease,   Acute on chronic respiratory failure with hypoxemia and hypercapnia: ABGs 7.42/49.7/65.5, patient wears 4 L of home oxygen  Respiratory panel is positive for human rhinovirus  Obstructive sleep apnea, on BiPAP     Coronary artery disease     Hypertension   Hyperlipidemia  Diabetes mellitus     Office note 3/2/2023   x-smoker (55 year history-quit 7-3-22)  dyspnea and hypoxia.  SONJA, residual AHI: 1 , No snoring, no Leak, [id number] % > 4 hours  Severe COPD:  PFT FEV1 1.12L which is 41%, post BD 51%, ratio 57, ERV 17%, %, TLC 82%, DLCO 27%, obstructive sleep apnea features  mild interstitial lung disease  PMH:  HTN, DM, HLD        Plan:    Check Aspergillus IgE level  Total IgE with extended allergies zone 35   Urine histo antigen    oxygen supplement and titration to maintain saturation 90 to 95%     Patient may use his home BiPAP device at night  Bronchodilatores  Antibiotics: Unasyn  Inhaled corticosteroids  Oral prednisone  Daliresp  Blood pressure control  Mucinex  Glucose control  Atorvastatin  DVT/GI prophylaxis                   LOS: 1 day     Subjective     Patient reports cough and shortness of breath    Objective     Vital signs for last 24 hours:  Vitals:    04/10/23 0124 04/10/23 0539 04/10/23 0756 04/10/23 0757   BP: 149/57 153/63     BP Location: Left arm Left arm     Patient Position: Lying Sitting     Pulse: 62 65 59 59   Resp: 22 21 16 16   Temp: 97.5 °F (36.4 °C) 97.4 °F (36.3 °C)     TempSrc: Axillary Oral     SpO2: 98% 97% 100% 99%   Weight:       Height:           Intake/Output last 3 shifts:  I/O last 3 completed shifts:  In: 170.8 [P.O.:120; I.V.:50.8]  Out: 500  [Urine:500]  Intake/Output this shift:  No intake/output data recorded.      Radiology  Imaging Results (Last 24 Hours)     ** No results found for the last 24 hours. **          Labs:  Results from last 7 days   Lab Units 04/09/23  0855   WBC 10*3/mm3 6.80   HEMOGLOBIN g/dL 10.9*   HEMATOCRIT % 34.4*   PLATELETS 10*3/mm3 247     Results from last 7 days   Lab Units 04/09/23  0855   SODIUM mmol/L 144   POTASSIUM mmol/L 3.5   CHLORIDE mmol/L 102   CO2 mmol/L 31.0*   BUN mg/dL 20   CREATININE mg/dL 0.55*   CALCIUM mg/dL 9.1   GLUCOSE mg/dL 201*     Results from last 7 days   Lab Units 04/07/23  1622   PH, ARTERIAL pH units 7.422   PO2 ART mm Hg 65.5*   PCO2, ARTERIAL mm Hg 49.7*   HCO3 ART mmol/L 32.4*                 Results from last 7 days   Lab Units 04/09/23  0855   MAGNESIUM mg/dL 1.8                   Meds:   SCHEDULE  amLODIPine, 5 mg, Oral, Nightly  ampicillin-sulbactam, 3 g, Intravenous, Q6H  [MAR Hold] aspirin, 81 mg, Oral, Daily  [MAR Hold] atorvastatin, 40 mg, Oral, Daily  bisoprolol, 5 mg, Oral, Daily  budesonide-formoterol, 2 puff, Inhalation, BID - RT  [MAR Hold] enoxaparin, 40 mg, Subcutaneous, Daily  gabapentin, 300 mg, Oral, TID  [MAR Hold] guaiFENesin, 1,200 mg, Oral, Q12H  insulin glargine, 15 Units, Subcutaneous, Q12H  [MAR Hold] insulin lispro, 4-24 Units, Subcutaneous, TID With Meals  lisinopril, 40 mg, Oral, Nightly  pioglitazone, 30 mg, Oral, Nightly  [MAR Hold] predniSONE, 40 mg, Oral, Daily With Breakfast  [MAR Hold] roflumilast, 250 mcg, Oral, Daily  [MAR Hold] senna-docusate sodium, 2 tablet, Oral, BID  sertraline, 50 mg, Oral, Nightly  [MAR Hold] sodium chloride, 10 mL, Intravenous, Q12H      Infusions  Pharmacy Consult - Pharmacy to dose,   Pharmacy Consult - Steroid Insulin Protocol,       PRNs  •  [MAR Hold] acetaminophen **OR** [MAR Hold] acetaminophen **OR** [MAR Hold] acetaminophen  •  [MAR Hold] aluminum-magnesium hydroxide-simethicone  •  [MAR Hold] senna-docusate sodium **AND**  [MAR Hold] polyethylene glycol **AND** [MAR Hold] bisacodyl **AND** [MAR Hold] bisacodyl  •  [MAR Hold] calcium carbonate  •  [MAR Hold] dextrose  •  [MAR Hold] dextrose  •  [MAR Hold] glucagon (human recombinant)  •  [MAR Hold] ipratropium-albuterol  •  [MAR Hold] ondansetron  •  [MAR Hold] oxyCODONE  •  Pharmacy Consult - Pharmacy to dose  •  Pharmacy Consult - Steroid Insulin Protocol  •  [COMPLETED] Insert Peripheral IV **AND** [MAR Hold] sodium chloride  •  [MAR Hold] sodium chloride  •  [MAR Hold] sodium chloride    Physical Exam:  General Appearance:  Alert   HEENT:  Normocephalic, without obvious abnormality, Conjunctiva/corneas clear,.   Nares normal, no drainage     Neck:  Supple, symmetrical, trachea midline.   Lungs /Chest wall:   Bilateral basal rhonchi, respirations unlabored, symmetrical wall movement.     Heart:  Regular rate and rhythm, S1 S2 normal  Abdomen: Soft, non-tender, no masses, no organomegaly.    Extremities: No edema, no clubbing or cyanosis     ROS  Constitutional: Negative for chills, fever and malaise/fatigue.   HENT: Negative.    Eyes: Negative.    Cardiovascular: Negative.    Respiratory: Positive for cough and shortness of breath.    Skin: Negative.    Musculoskeletal: Negative.    Gastrointestinal: Negative.    Genitourinary: Negative.    Neurological: Negative.    Psychiatric/Behavioral: Negative.      I reviewed the recent clinical results    Much of this encounter note is an electronic transcription/translation of spoken language to printed text using Dragon Software which might include inadvertent errors in transcription.

## 2023-04-10 NOTE — CASE MANAGEMENT/SOCIAL WORK
Social Work Assessment  [facility]     Patient Name: Brenden Peter  MRN: [id number]  Today's Date: 4/10/2023    Admit Date: 4/7/2023     Psychosocial     Row Name 04/10/23 1128       Values/Beliefs    Spiritual, Cultural Beliefs, Sabianism Practices, Values that Affect Care no       Emotion Mood WDL    Emotion/Mood/Affect WDL emotion mood    Emotion/Mood angry       Speech WDL    Speech WDL WDL       Perceptual State WDL    Perceptual State WDL WDL       Thought Process WDL    Thought Process WDL WDL       Intellectual Performance WDL    Intellectual Performance WDL WDL    Level of Consciousness Alert       Coping/Stress    Coping/Stress Comments SW attempted to complete the LACE screen with pt.  Pt answered 2 questions and became upset and refused to answer anymore.              Met with patient in room wearing PPE: mask.  Maintained distance greater than six feet and spent less than 15 minutes in the room.    Dianne Vail LCSW    Office: [phone]  Fax: [phone]  [email or web address]

## 2023-04-10 NOTE — PLAN OF CARE
Problem: Adult Inpatient Plan of Care  Goal: Absence of Hospital-Acquired Illness or Injury  Intervention: Identify and Manage Fall Risk  Recent Flowsheet Documentation  Taken 4/10/2023 0400 by Gabe Bone LPN  Safety Promotion/Fall Prevention:   assistive device/personal items within reach   activity supervised   clutter free environment maintained   lighting adjusted   fall prevention program maintained   gait belt   mobility aid in reach   muscle strengthening facilitated   nonskid shoes/slippers when out of bed   room organization consistent   safety round/check completed  Taken 4/10/2023 0200 by Gabe Bone LPN  Safety Promotion/Fall Prevention:   activity supervised   assistive device/personal items within reach   clutter free environment maintained   fall prevention program maintained   gait belt   lighting adjusted   mobility aid in reach   muscle strengthening facilitated   nonskid shoes/slippers when out of bed   room organization consistent   safety round/check completed  Taken 4/10/2023 0000 by Gabe Bone LPN  Safety Promotion/Fall Prevention:   assistive device/personal items within reach   activity supervised   clutter free environment maintained   gait belt   fall prevention program maintained   lighting adjusted   mobility aid in reach   muscle strengthening facilitated   nonskid shoes/slippers when out of bed   room organization consistent   safety round/check completed  Taken 4/9/2023 2200 by Gabe Bone LPN  Safety Promotion/Fall Prevention:   activity supervised   assistive device/personal items within reach   clutter free environment maintained   fall prevention program maintained   gait belt   lighting adjusted   mobility aid in reach   muscle strengthening facilitated   nonskid shoes/slippers when out of bed   room organization consistent   safety round/check completed  Taken 4/9/2023 2000 by Gabe Bone LPN  Safety Promotion/Fall Prevention:   activity supervised    assistive device/personal items within reach   clutter free environment maintained   lighting adjusted   mobility aid in reach   muscle strengthening facilitated   nonskid shoes/slippers when out of bed   safety round/check completed   room organization consistent  Intervention: Prevent Skin Injury  Recent Flowsheet Documentation  Taken 4/10/2023 0400 by Gabe Bone LPN  Body Position:   position changed independently   weight shifting  Skin Protection:   adhesive use limited   incontinence pads utilized   transparent dressing maintained   tubing/devices free from skin contact  Taken 4/10/2023 0200 by Gabe Bone LPN  Body Position:   position changed independently   weight shifting  Skin Protection:   adhesive use limited   incontinence pads utilized   transparent dressing maintained   tubing/devices free from skin contact  Taken 4/10/2023 0000 by Gabe Bone LPN  Body Position:   position changed independently   weight shifting  Skin Protection:   adhesive use limited   incontinence pads utilized   transparent dressing maintained   tubing/devices free from skin contact  Taken 4/9/2023 2200 by Gabe Bone LPN  Body Position:   position changed independently   weight shifting  Skin Protection:   adhesive use limited   incontinence pads utilized   transparent dressing maintained   tubing/devices free from skin contact  Taken 4/9/2023 2000 by Gabe Bone LPN  Body Position:   position changed independently   weight shifting  Skin Protection:   adhesive use limited   transparent dressing maintained   tubing/devices free from skin contact   incontinence pads utilized  Intervention: Prevent and Manage VTE (Venous Thromboembolism) Risk  Recent Flowsheet Documentation  Taken 4/10/2023 0400 by Gabe Bone LPN  Activity Management: activity encouraged  Taken 4/10/2023 0200 by Gabe Bone LPN  Activity Management: activity encouraged  Taken 4/10/2023 0000 by Gabe Bone LPN  Activity  Management: activity encouraged  Taken 4/9/2023 2200 by Gabe Bone LPN  Activity Management: activity encouraged  Taken 4/9/2023 2000 by Gabe Bone LPN  Activity Management:   activity encouraged   up to bedside commode   ambulated to bathroom  VTE Prevention/Management:   bilateral   compression stockings off   sequential compression devices off  Range of Motion: active ROM (range of motion) encouraged  Intervention: Prevent Infection  Recent Flowsheet Documentation  Taken 4/10/2023 0400 by Gabe Bone LPN  Infection Prevention:   environmental surveillance performed   equipment surfaces disinfected   hand hygiene promoted   personal protective equipment utilized   rest/sleep promoted   single patient room provided  Taken 4/10/2023 0200 by Gabe Bone LPN  Infection Prevention:   environmental surveillance performed   equipment surfaces disinfected   hand hygiene promoted   personal protective equipment utilized   rest/sleep promoted   single patient room provided  Taken 4/10/2023 0000 by Gabe Bone LPN  Infection Prevention:   environmental surveillance performed   equipment surfaces disinfected   hand hygiene promoted   personal protective equipment utilized   rest/sleep promoted   single patient room provided  Taken 4/9/2023 2200 by Gabe Bone LPN  Infection Prevention:   environmental surveillance performed   equipment surfaces disinfected   hand hygiene promoted   personal protective equipment utilized   rest/sleep promoted   single patient room provided  Taken 4/9/2023 2000 by Gabe Bone LPN  Infection Prevention:   environmental surveillance performed   equipment surfaces disinfected   hand hygiene promoted   personal protective equipment utilized   rest/sleep promoted   single patient room provided  Goal: Optimal Comfort and Wellbeing  Intervention: Monitor Pain and Promote Comfort  Recent Flowsheet Documentation  Taken 4/10/2023 0400 by Gabe Bone LPN  Pain Management  Interventions:   care clustered   quiet environment facilitated  Taken 4/10/2023 0000 by Gabe Bone LPN  Pain Management Interventions:   care clustered   quiet environment facilitated  Taken 4/9/2023 2000 by Gabe Bone LPN  Pain Management Interventions:   care clustered   quiet environment facilitated  Intervention: Provide Person-Centered Care  Recent Flowsheet Documentation  Taken 4/9/2023 2000 by Gabe Bone LPN  Trust Relationship/Rapport:   care explained   choices provided   emotional support provided   empathic listening provided   questions answered   questions encouraged   reassurance provided   thoughts/feelings acknowledged     Problem: Fall Injury Risk  Goal: Absence of Fall and Fall-Related Injury  Intervention: Identify and Manage Contributors  Recent Flowsheet Documentation  Taken 4/10/2023 0400 by Gabe Bone LPN  Medication Review/Management: medications reviewed  Taken 4/10/2023 0200 by Gabe Bone LPN  Medication Review/Management: medications reviewed  Taken 4/10/2023 0000 by Gabe Bone LPN  Medication Review/Management: medications reviewed  Taken 4/9/2023 2200 by Gabe Bone LPN  Medication Review/Management: medications reviewed  Taken 4/9/2023 2000 by Gabe Bone LPN  Medication Review/Management:   medications reviewed   provider consulted  Self-Care Promotion: independence encouraged  Intervention: Promote Injury-Free Environment  Recent Flowsheet Documentation  Taken 4/10/2023 0400 by Gabe Bone LPN  Safety Promotion/Fall Prevention:   assistive device/personal items within reach   activity supervised   clutter free environment maintained   lighting adjusted   fall prevention program maintained   gait belt   mobility aid in reach   muscle strengthening facilitated   nonskid shoes/slippers when out of bed   room organization consistent   safety round/check completed  Taken 4/10/2023 0200 by Gabe Bone LPN  Safety Promotion/Fall Prevention:    activity supervised   assistive device/personal items within reach   clutter free environment maintained   fall prevention program maintained   gait belt   lighting adjusted   mobility aid in reach   muscle strengthening facilitated   nonskid shoes/slippers when out of bed   room organization consistent   safety round/check completed  Taken 4/10/2023 0000 by Gabe Bone LPN  Safety Promotion/Fall Prevention:   assistive device/personal items within reach   activity supervised   clutter free environment maintained   gait belt   fall prevention program maintained   lighting adjusted   mobility aid in reach   muscle strengthening facilitated   nonskid shoes/slippers when out of bed   room organization consistent   safety round/check completed  Taken 4/9/2023 2200 by Gabe Bone LPN  Safety Promotion/Fall Prevention:   activity supervised   assistive device/personal items within reach   clutter free environment maintained   fall prevention program maintained   gait belt   lighting adjusted   mobility aid in reach   muscle strengthening facilitated   nonskid shoes/slippers when out of bed   room organization consistent   safety round/check completed  Taken 4/9/2023 2000 by Gabe Bone LPN  Safety Promotion/Fall Prevention:   activity supervised   assistive device/personal items within reach   clutter free environment maintained   lighting adjusted   mobility aid in reach   muscle strengthening facilitated   nonskid shoes/slippers when out of bed   safety round/check completed   room organization consistent     Problem: Infection (Pneumonia)  Goal: Resolution of Infection Signs and Symptoms  Intervention: Prevent Infection Progression  Recent Flowsheet Documentation  Taken 4/10/2023 0400 by Gabe Bone LPN  Isolation Precautions:   precautions maintained   droplet  Taken 4/10/2023 0200 by Gabe Bone LPN  Isolation Precautions:   precautions maintained   droplet  Taken 4/10/2023 0000 by Gabe Bone  LPN  Isolation Precautions:   precautions maintained   droplet  Taken 4/9/2023 2200 by Gabe Bone LPN  Isolation Precautions:   precautions maintained   droplet  Taken 4/9/2023 2000 by Gabe Bone LPN  Isolation Precautions:   precautions maintained   droplet     Problem: Respiratory Compromise (Pneumonia)  Goal: Effective Oxygenation and Ventilation  Intervention: Promote Airway Secretion Clearance  Recent Flowsheet Documentation  Taken 4/10/2023 0400 by Gabe Bone LPN  Cough And Deep Breathing: done independently per patient  Taken 4/10/2023 0000 by Gabe Bone LPN  Cough And Deep Breathing: done independently per patient  Taken 4/9/2023 2000 by Gabe Bone LPN  Breathing Techniques/Airway Clearance: deep/controlled cough encouraged  Cough And Deep Breathing: done independently per patient  Intervention: Optimize Oxygenation and Ventilation  Recent Flowsheet Documentation  Taken 4/10/2023 0400 by Gabe Bone LPN  Head of Bed (HOB) Positioning: HOB at 30-45 degrees  Taken 4/10/2023 0200 by Gabe Bone LPN  Head of Bed (HOB) Positioning: HOB at 30-45 degrees  Taken 4/10/2023 0000 by Gabe Bone LPN  Head of Bed (HOB) Positioning: HOB at 45 degrees  Taken 4/9/2023 2200 by Gabe Bone LPN  Head of Bed (HOB) Positioning: HOB at 45 degrees  Taken 4/9/2023 2000 by Gabe Bone LPN  Head of Bed (HOB) Positioning: HOB at 30-45 degrees     Problem: Breathing Pattern Ineffective  Goal: Effective Breathing Pattern  Intervention: Promote Improved Breathing Pattern  Recent Flowsheet Documentation  Taken 4/10/2023 0400 by Gabe Bone LPN  Head of Bed (HOB) Positioning: HOB at 30-45 degrees  Taken 4/10/2023 0200 by Gabe Bone LPN  Head of Bed (HOB) Positioning: HOB at 30-45 degrees  Taken 4/10/2023 0000 by Gabe Bone LPN  Head of Bed (HOB) Positioning: HOB at 45 degrees  Taken 4/9/2023 2200 by Gabe Bone LPN  Head of Bed (HOB) Positioning: HOB at 45 degrees  Taken  4/9/2023 2000 by Gabe Bone LPN  Supportive Measures:   active listening utilized   positive reinforcement provided   verbalization of feelings encouraged  Head of Bed (HOB) Positioning: HOB at 30-45 degrees  Breathing Techniques/Airway Clearance: deep/controlled cough encouraged   Goal Outcome Evaluation:      Patient has been pleasant during current shift. Patient had bronch lavage and culture performed 4/9/2023. Patient stated to me that the lavage has helped improve symptoms of shortness of breath. Patient is currently on 5L of oxygen per NC. Patient is maintaining SpO2 stats of 96% during current shift. Patient is currently receiving IV antibiotics. Patient is also positive for rhinovirus. Will continue to monitor patient and their well being during current shift.

## 2023-04-11 ENCOUNTER — READMISSION MANAGEMENT (OUTPATIENT)
Dept: CALL CENTER | Facility: HOSPITAL | Age: 73
End: 2023-04-11
Payer: MEDICARE

## 2023-04-11 VITALS
WEIGHT: 213.85 LBS | BODY MASS INDEX: 33.56 KG/M2 | DIASTOLIC BLOOD PRESSURE: 68 MMHG | HEART RATE: 63 BPM | TEMPERATURE: 96.8 F | SYSTOLIC BLOOD PRESSURE: 154 MMHG | HEIGHT: 67 IN | RESPIRATION RATE: 16 BRPM | OXYGEN SATURATION: 98 %

## 2023-04-11 LAB
ANION GAP SERPL CALCULATED.3IONS-SCNC: 6 MMOL/L (ref 5–15)
ANISOCYTOSIS BLD QL: ABNORMAL
BACTERIA SPEC RESP CULT: ABNORMAL
BACTERIA SPEC RESP CULT: ABNORMAL
BUN SERPL-MCNC: 16 MG/DL (ref 8–23)
BUN/CREAT SERPL: 31.4 (ref 7–25)
CALCIUM SPEC-SCNC: 8.9 MG/DL (ref 8.6–10.5)
CHLORIDE SERPL-SCNC: 104 MMOL/L (ref 98–107)
CO2 SERPL-SCNC: 34 MMOL/L (ref 22–29)
CREAT SERPL-MCNC: 0.51 MG/DL (ref 0.76–1.27)
DEPRECATED RDW RBC AUTO: 47.3 FL (ref 37–54)
EGFRCR SERPLBLD CKD-EPI 2021: 107.7 ML/MIN/1.73
ERYTHROCYTE [DISTWIDTH] IN BLOOD BY AUTOMATED COUNT: 15.5 % (ref 12.3–15.4)
GLUCOSE BLDC GLUCOMTR-MCNC: 166 MG/DL (ref 70–105)
GLUCOSE BLDC GLUCOMTR-MCNC: 184 MG/DL (ref 70–105)
GLUCOSE SERPL-MCNC: 195 MG/DL (ref 65–99)
GRAM STN SPEC: ABNORMAL
HCT VFR BLD AUTO: 33.6 % (ref 37.5–51)
HGB BLD-MCNC: 10.6 G/DL (ref 13–17.7)
LAB AP CASE REPORT: NORMAL
LYMPHOCYTES # BLD MANUAL: 0.84 10*3/MM3 (ref 0.7–3.1)
LYMPHOCYTES NFR BLD MANUAL: 2 % (ref 5–12)
MCH RBC QN AUTO: 27.5 PG (ref 26.6–33)
MCHC RBC AUTO-ENTMCNC: 31.5 G/DL (ref 31.5–35.7)
MCV RBC AUTO: 87.1 FL (ref 79–97)
MONOCYTES # BLD: 0.15 10*3/MM3 (ref 0.1–0.9)
MYELOCYTES NFR BLD MANUAL: 3 % (ref 0–0)
NEUTROPHILS # BLD AUTO: 6.38 10*3/MM3 (ref 1.7–7)
NEUTROPHILS NFR BLD MANUAL: 77 % (ref 42.7–76)
NEUTS BAND NFR BLD MANUAL: 7 % (ref 0–5)
PATH REPORT.FINAL DX SPEC: NORMAL
PATH REPORT.GROSS SPEC: NORMAL
PLAT MORPH BLD: NORMAL
PLATELET # BLD AUTO: 238 10*3/MM3 (ref 140–450)
PMV BLD AUTO: 9.5 FL (ref 6–12)
POIKILOCYTOSIS BLD QL SMEAR: ABNORMAL
POTASSIUM SERPL-SCNC: 3.5 MMOL/L (ref 3.5–5.2)
RBC # BLD AUTO: 3.86 10*6/MM3 (ref 4.14–5.8)
SCAN SLIDE: NORMAL
SODIUM SERPL-SCNC: 144 MMOL/L (ref 136–145)
VARIANT LYMPHS NFR BLD MANUAL: 11 % (ref 19.6–45.3)
WBC MORPH BLD: NORMAL
WBC NRBC COR # BLD: 7.6 10*3/MM3 (ref 3.4–10.8)

## 2023-04-11 PROCEDURE — 94761 N-INVAS EAR/PLS OXIMETRY MLT: CPT

## 2023-04-11 PROCEDURE — 63710000001 PREDNISONE PER 1 MG: Performed by: INTERNAL MEDICINE

## 2023-04-11 PROCEDURE — 82962 GLUCOSE BLOOD TEST: CPT

## 2023-04-11 PROCEDURE — 63710000001 INSULIN LISPRO (HUMAN) PER 5 UNITS: Performed by: INTERNAL MEDICINE

## 2023-04-11 PROCEDURE — 85025 COMPLETE CBC W/AUTO DIFF WBC: CPT | Performed by: INTERNAL MEDICINE

## 2023-04-11 PROCEDURE — 80048 BASIC METABOLIC PNL TOTAL CA: CPT | Performed by: INTERNAL MEDICINE

## 2023-04-11 PROCEDURE — 94799 UNLISTED PULMONARY SVC/PX: CPT

## 2023-04-11 PROCEDURE — 85007 BL SMEAR W/DIFF WBC COUNT: CPT | Performed by: INTERNAL MEDICINE

## 2023-04-11 PROCEDURE — 94664 DEMO&/EVAL PT USE INHALER: CPT

## 2023-04-11 PROCEDURE — 63710000001 INSULIN GLARGINE PER 5 UNITS: Performed by: NURSE PRACTITIONER

## 2023-04-11 PROCEDURE — 25010000002 AMPICILLIN-SULBACTAM PER 1.5 G: Performed by: INTERNAL MEDICINE

## 2023-04-11 RX ORDER — BUDESONIDE AND FORMOTEROL FUMARATE DIHYDRATE 160; 4.5 UG/1; UG/1
2 AEROSOL RESPIRATORY (INHALATION)
Qty: 6 G | Refills: 2 | Status: ON HOLD | OUTPATIENT
Start: 2023-04-11 | End: 2023-07-10

## 2023-04-11 RX ORDER — AMOXICILLIN AND CLAVULANATE POTASSIUM 875; 125 MG/1; MG/1
1 TABLET, FILM COATED ORAL EVERY 12 HOURS SCHEDULED
Status: DISCONTINUED | OUTPATIENT
Start: 2023-04-11 | End: 2023-04-11 | Stop reason: HOSPADM

## 2023-04-11 RX ORDER — IPRATROPIUM BROMIDE AND ALBUTEROL SULFATE 2.5; .5 MG/3ML; MG/3ML
3 SOLUTION RESPIRATORY (INHALATION) EVERY 6 HOURS PRN
Qty: 360 ML | Refills: 3 | Status: ON HOLD | OUTPATIENT
Start: 2023-04-11 | End: 2023-08-09

## 2023-04-11 RX ORDER — AMOXICILLIN AND CLAVULANATE POTASSIUM 875; 125 MG/1; MG/1
1 TABLET, FILM COATED ORAL 2 TIMES DAILY
Qty: 14 TABLET | Refills: 0 | Status: SHIPPED | OUTPATIENT
Start: 2023-04-11 | End: 2023-04-18

## 2023-04-11 RX ORDER — ROFLUMILAST 250 UG/1
250 TABLET ORAL DAILY
Qty: 30 TABLET | Refills: 0 | Status: ON HOLD | OUTPATIENT
Start: 2023-04-11

## 2023-04-11 RX ADMIN — GABAPENTIN 300 MG: 300 CAPSULE ORAL at 08:20

## 2023-04-11 RX ADMIN — ATORVASTATIN CALCIUM 40 MG: 40 TABLET, FILM COATED ORAL at 08:22

## 2023-04-11 RX ADMIN — GUAIFENESIN 1200 MG: 600 TABLET, EXTENDED RELEASE ORAL at 05:37

## 2023-04-11 RX ADMIN — AMPICILLIN SODIUM AND SULBACTAM SODIUM 3 G: 2; 1 INJECTION, POWDER, FOR SOLUTION INTRAMUSCULAR; INTRAVENOUS at 05:37

## 2023-04-11 RX ADMIN — ROFLUMILAST 250 MCG: 500 TABLET ORAL at 08:22

## 2023-04-11 RX ADMIN — BUDESONIDE AND FORMOTEROL FUMARATE DIHYDRATE 2 PUFF: 160; 4.5 AEROSOL RESPIRATORY (INHALATION) at 12:00

## 2023-04-11 RX ADMIN — INSULIN GLARGINE 15 UNITS: 100 INJECTION, SOLUTION SUBCUTANEOUS at 08:19

## 2023-04-11 RX ADMIN — ASPIRIN 81 MG: 81 TABLET, COATED ORAL at 08:21

## 2023-04-11 RX ADMIN — INSULIN LISPRO 4 UNITS: 100 INJECTION, SOLUTION INTRAVENOUS; SUBCUTANEOUS at 08:23

## 2023-04-11 RX ADMIN — PREDNISONE 40 MG: 20 TABLET ORAL at 08:22

## 2023-04-11 RX ADMIN — BISOPROLOL FUMARATE 5 MG: 5 TABLET ORAL at 08:20

## 2023-04-11 NOTE — PLAN OF CARE
Problem: Adult Inpatient Plan of Care  Goal: Absence of Hospital-Acquired Illness or Injury  Intervention: Identify and Manage Fall Risk  Recent Flowsheet Documentation  Taken 4/11/2023 0200 by Gabe Bone LPN  Safety Promotion/Fall Prevention:   activity supervised   assistive device/personal items within reach   clutter free environment maintained   lighting adjusted   gait belt   muscle strengthening facilitated   nonskid shoes/slippers when out of bed   room organization consistent   safety round/check completed  Taken 4/11/2023 0000 by Gabe Bone LPN  Safety Promotion/Fall Prevention:   activity supervised   assistive device/personal items within reach   clutter free environment maintained   gait belt   lighting adjusted   muscle strengthening facilitated   nonskid shoes/slippers when out of bed   room organization consistent   safety round/check completed  Taken 4/10/2023 2200 by Gabe Bone LPN  Safety Promotion/Fall Prevention:   activity supervised   assistive device/personal items within reach   clutter free environment maintained   gait belt   lighting adjusted   mobility aid in reach   muscle strengthening facilitated   nonskid shoes/slippers when out of bed   room organization consistent   safety round/check completed  Taken 4/10/2023 2000 by Gabe Bone LPN  Safety Promotion/Fall Prevention:   activity supervised   assistive device/personal items within reach   clutter free environment maintained   gait belt   lighting adjusted   muscle strengthening facilitated   nonskid shoes/slippers when out of bed   room organization consistent   safety round/check completed  Intervention: Prevent Skin Injury  Recent Flowsheet Documentation  Taken 4/11/2023 0200 by Gabe Bone LPN  Body Position:   position changed independently   weight shifting  Skin Protection:   adhesive use limited   incontinence pads utilized   transparent dressing maintained   tubing/devices free from skin  contact  Taken 4/11/2023 0000 by Gabe Bone LPN  Body Position:   position changed independently   weight shifting  Skin Protection:   adhesive use limited   incontinence pads utilized   transparent dressing maintained   tubing/devices free from skin contact  Taken 4/10/2023 2200 by Gabe Bone LPN  Body Position:   position changed independently   weight shifting  Skin Protection:   adhesive use limited   incontinence pads utilized   transparent dressing maintained   tubing/devices free from skin contact  Taken 4/10/2023 2000 by Gabe Bone LPN  Body Position:   position changed independently   weight shifting  Skin Protection:   adhesive use limited   incontinence pads utilized   transparent dressing maintained   tubing/devices free from skin contact  Intervention: Prevent and Manage VTE (Venous Thromboembolism) Risk  Recent Flowsheet Documentation  Taken 4/11/2023 0200 by Gabe Bone LPN  Activity Management: activity encouraged  Taken 4/11/2023 0000 by Gabe Bone LPN  Activity Management: activity encouraged  Taken 4/10/2023 2200 by Gabe Bone LPN  Activity Management: activity encouraged  Taken 4/10/2023 2000 by Gabe Bone LPN  Activity Management: activity encouraged  VTE Prevention/Management:   bilateral   compression stockings off   sequential compression devices off  Range of Motion: active ROM (range of motion) encouraged  Intervention: Prevent Infection  Recent Flowsheet Documentation  Taken 4/11/2023 0200 by Gabe Bone LPN  Infection Prevention:   environmental surveillance performed   equipment surfaces disinfected   hand hygiene promoted   personal protective equipment utilized   rest/sleep promoted   single patient room provided  Taken 4/11/2023 0000 by Gabe Bone LPN  Infection Prevention:   environmental surveillance performed   equipment surfaces disinfected   hand hygiene promoted   personal protective equipment utilized   rest/sleep promoted   single  patient room provided  Taken 4/10/2023 2200 by Gabe Bone LPN  Infection Prevention:   environmental surveillance performed   equipment surfaces disinfected   hand hygiene promoted   personal protective equipment utilized   rest/sleep promoted   single patient room provided  Taken 4/10/2023 2000 by Gabe Bone LPN  Infection Prevention:   equipment surfaces disinfected   environmental surveillance performed   hand hygiene promoted   personal protective equipment utilized   rest/sleep promoted   single patient room provided  Goal: Optimal Comfort and Wellbeing  Intervention: Monitor Pain and Promote Comfort  Recent Flowsheet Documentation  Taken 4/11/2023 0000 by Gabe Bone LPN  Pain Management Interventions:   care clustered   quiet environment facilitated  Taken 4/10/2023 2000 by Gabe Bone LPN  Pain Management Interventions:   care clustered   quiet environment facilitated  Intervention: Provide Person-Centered Care  Recent Flowsheet Documentation  Taken 4/10/2023 2000 by Gabe Bone LPN  Trust Relationship/Rapport:   care explained   choices provided   emotional support provided   empathic listening provided   questions answered   questions encouraged   reassurance provided   thoughts/feelings acknowledged     Problem: Fall Injury Risk  Goal: Absence of Fall and Fall-Related Injury  Intervention: Identify and Manage Contributors  Recent Flowsheet Documentation  Taken 4/11/2023 0200 by Gabe Bone LPN  Medication Review/Management: medications reviewed  Taken 4/11/2023 0000 by Gabe Bone LPN  Medication Review/Management: medications reviewed  Taken 4/10/2023 2200 by Gabe Bone LPN  Medication Review/Management: medications reviewed  Taken 4/10/2023 2000 by Gabe Bone LPN  Medication Review/Management: medications reviewed  Self-Care Promotion: independence encouraged  Intervention: Promote Injury-Free Environment  Recent Flowsheet Documentation  Taken 4/11/2023 0200 by  Stepro, Belana, LPN  Safety Promotion/Fall Prevention:   activity supervised   assistive device/personal items within reach   clutter free environment maintained   lighting adjusted   gait belt   muscle strengthening facilitated   nonskid shoes/slippers when out of bed   room organization consistent   safety round/check completed  Taken 4/11/2023 0000 by Gabe Bone LPN  Safety Promotion/Fall Prevention:   activity supervised   assistive device/personal items within reach   clutter free environment maintained   gait belt   lighting adjusted   muscle strengthening facilitated   nonskid shoes/slippers when out of bed   room organization consistent   safety round/check completed  Taken 4/10/2023 2200 by Gabe Bone LPN  Safety Promotion/Fall Prevention:   activity supervised   assistive device/personal items within reach   clutter free environment maintained   gait belt   lighting adjusted   mobility aid in reach   muscle strengthening facilitated   nonskid shoes/slippers when out of bed   room organization consistent   safety round/check completed  Taken 4/10/2023 2000 by Gabe Bone LPN  Safety Promotion/Fall Prevention:   activity supervised   assistive device/personal items within reach   clutter free environment maintained   gait belt   lighting adjusted   muscle strengthening facilitated   nonskid shoes/slippers when out of bed   room organization consistent   safety round/check completed     Problem: Infection (Pneumonia)  Goal: Resolution of Infection Signs and Symptoms  Intervention: Prevent Infection Progression  Recent Flowsheet Documentation  Taken 4/11/2023 0200 by Gabe Bone LPN  Isolation Precautions:   precautions maintained   droplet  Taken 4/11/2023 0000 by Gabe Bone LPN  Isolation Precautions:   precautions maintained   droplet  Taken 4/10/2023 2200 by Gabe Bone LPN  Isolation Precautions:   precautions maintained   droplet  Taken 4/10/2023 2000 by Gabe Bone  LPN  Isolation Precautions:   precautions maintained   droplet     Problem: Respiratory Compromise (Pneumonia)  Goal: Effective Oxygenation and Ventilation  Intervention: Promote Airway Secretion Clearance  Recent Flowsheet Documentation  Taken 4/10/2023 2000 by Gabe Bone LPN  Breathing Techniques/Airway Clearance: deep/controlled cough encouraged  Cough And Deep Breathing: done independently per patient  Intervention: Optimize Oxygenation and Ventilation  Recent Flowsheet Documentation  Taken 4/11/2023 0200 by Gabe Bone LPN  Head of Bed (HOB) Positioning: HOB at 30-45 degrees  Taken 4/11/2023 0000 by Gabe Bone LPN  Head of Bed (HOB) Positioning: HOB at 30-45 degrees  Taken 4/10/2023 2200 by Gabe Bone LPN  Head of Bed (HOB) Positioning: HOB at 30-45 degrees  Taken 4/10/2023 2000 by Gabe Bone LPN  Head of Bed (HOB) Positioning: HOB at 30-45 degrees     Problem: Breathing Pattern Ineffective  Goal: Effective Breathing Pattern  Intervention: Promote Improved Breathing Pattern  Recent Flowsheet Documentation  Taken 4/11/2023 0200 by Gabe Bone LPN  Head of Bed (HOB) Positioning: HOB at 30-45 degrees  Taken 4/11/2023 0000 by Gabe Bone LPN  Head of Bed (HOB) Positioning: HOB at 30-45 degrees  Taken 4/10/2023 2200 by Gabe Bone LPN  Head of Bed (HOB) Positioning: HOB at 30-45 degrees  Taken 4/10/2023 2000 by Gabe Bone LPN  Supportive Measures:   active listening utilized   positive reinforcement provided   verbalization of feelings encouraged  Head of Bed (HOB) Positioning: HOB at 30-45 degrees  Breathing Techniques/Airway Clearance: deep/controlled cough encouraged   Goal Outcome Evaluation:      Patient has been pleasant during current shift. Patient is currently on 5L NC and maintaining SpO2 stats of 97%. Patient stated to me during current shift that they feel their well being has improved in the last couple days. Upon auscultation of patient's lungs all fields sound  clear without wheezes. RT saw patient during current shift, and agreed patient's lung sounds have improved. Will continue to monitor patient and their well being during current shift.

## 2023-04-11 NOTE — DISCHARGE INSTRUCTIONS
Patient was advised to follow-up with his a primary care physician who will review his current medications.    Patient was advised to follow-up with his a pulmonologist who will reassess his pulmonary function.    Patient was advised to return to the emergency department if he experiences any recurrence of his symptoms

## 2023-04-11 NOTE — PROGRESS NOTES
Daily Progress Note          Assessment    Recurrent pneumonia, rule out allergic bronchopulmonary aspergillosis     s/p 4/9/2023 bronchoscopy   multiple mucous plugs    CT chest 3/9/2023 with pulm edema/emphysema/possible reactive mediastinal adenopathy with some calcified lymph nodes    Chronic obstructive pulmonary disease,   Acute on chronic respiratory failure with hypoxemia and hypercapnia: ABGs 7.42/49.7/65.5, patient wears 4 L of home oxygen  Respiratory panel is positive for human rhinovirus  Obstructive sleep apnea, on BiPAP     Coronary artery disease     Hypertension   Hyperlipidemia  Diabetes mellitus     Office note 3/2/2023   x-smoker (55 year history-quit 7-3-22)  dyspnea and hypoxia.  SONJA, residual AHI: 1 , No snoring, no Leak, [id number] % > 4 hours  Severe COPD:  PFT FEV1 1.12L which is 41%, post BD 51%, ratio 57, ERV 17%, %, TLC 82%, DLCO 27%, obstructive sleep apnea features  mild interstitial lung disease  PMH:  HTN, DM, HLD        Plan:     may discharge from pulmonary view, patient has home oxygen use at 4 L   Aspergillus IgE level, ordered, results pending  Total IgE with extended allergies zone 35   Urine histo antigen    oxygen supplement and titration to maintain saturation 90 to 95%     home BiPAP device at night  Bronchodilatores    Antibiotics: DC IV Unasyn   p.o. Augmentin for 7 days    Inhaled corticosteroids  Oral prednisone  Daliresp  Blood pressure control  Mucinex  Glucose control  Atorvastatin   follow-up with me in the office after 4 weeks                   LOS: 2 days     Subjective     Patient reports cough and shortness of breath    Objective     Vital signs for last 24 hours:  Vitals:    04/10/23 1600 04/1950 04/10/23 2109 04/11/23 0350   BP: 162/69 150/76  146/68   BP Location: Left arm Left arm  Right arm   Patient Position: Sitting Lying  Lying   Pulse: 67 66 62 61   Resp: 14 15 14 13   Temp: 97.6 °F (36.4 °C) 97 °F (36.1 °C)  97.4 °F (36.3 °C)   TempSrc:  Oral Oral  Oral   SpO2: 93% 95% 100% 96%   Weight:    97 kg (213 lb 13.5 oz)   Height:           Intake/Output last 3 shifts:  I/O last 3 completed shifts:  In: 480 [P.O.:480]  Out: 1200 [Urine:1200]  Intake/Output this shift:  I/O this shift:  In: 240 [P.O.:240]  Out: -       Radiology  Imaging Results (Last 24 Hours)     Procedure Component Value Units Date/Time    XR Chest 1 View [[id number]] Collected: 04/10/23 1520     Updated: 04/10/23 1529    Narrative:      XR CHEST 1 VW    Date of Exam: 4/10/2023 3:05 PM EDT    Indication: Pneumonia.  Shortness of air.     Comparison: Chest x-ray 4/7/2023    Findings:  Blunting of the costophrenic angles question small effusions. Septal thickening. The perihilar ground glass opacity is slightly improved. No pneumothorax.      Impression:      Impression:  Findings of pulmonary edema. The perihilar opacities appear slightly improved.    Electronically Signed: Denise Galicia    4/10/2023 3:27 PM EDT    Workstation ID: [id number]          Labs:  Results from last 7 days   Lab Units 04/11/23  0505   WBC 10*3/mm3 7.60   HEMOGLOBIN g/dL 10.6*   HEMATOCRIT % 33.6*   PLATELETS 10*3/mm3 238     Results from last 7 days   Lab Units 04/11/23  0505   SODIUM mmol/L 144   POTASSIUM mmol/L 3.5   CHLORIDE mmol/L 104   CO2 mmol/L 34.0*   BUN mg/dL 16   CREATININE mg/dL 0.51*   CALCIUM mg/dL 8.9   GLUCOSE mg/dL 195*     Results from last 7 days   Lab Units 04/07/23  1622   PH, ARTERIAL pH units 7.422   PO2 ART mm Hg 65.5*   PCO2, ARTERIAL mm Hg 49.7*   HCO3 ART mmol/L 32.4*                 Results from last 7 days   Lab Units 04/09/23  0855   MAGNESIUM mg/dL 1.8                   Meds:   SCHEDULE  amLODIPine, 5 mg, Oral, Nightly  ampicillin-sulbactam, 3 g, Intravenous, Q6H  [MAR Hold] aspirin, 81 mg, Oral, Daily  [MAR Hold] atorvastatin, 40 mg, Oral, Daily  bisoprolol, 5 mg, Oral, Daily  budesonide-formoterol, 2 puff, Inhalation, BID - RT  [MAR Hold] enoxaparin, 40 mg, Subcutaneous,  Daily  gabapentin, 300 mg, Oral, TID  [MAR Hold] guaiFENesin, 1,200 mg, Oral, Q12H  insulin glargine, 15 Units, Subcutaneous, Q12H  [MAR Hold] insulin lispro, 4-24 Units, Subcutaneous, TID With Meals  lisinopril, 40 mg, Oral, Nightly  pioglitazone, 30 mg, Oral, Nightly  [MAR Hold] predniSONE, 40 mg, Oral, Daily With Breakfast  [MAR Hold] roflumilast, 250 mcg, Oral, Daily  [MAR Hold] senna-docusate sodium, 2 tablet, Oral, BID  sertraline, 50 mg, Oral, Nightly  [MAR Hold] sodium chloride, 10 mL, Intravenous, Q12H      Infusions  Pharmacy Consult - Pharmacy to dose,   Pharmacy Consult - Steroid Insulin Protocol,       PRNs  •  [MAR Hold] acetaminophen **OR** [MAR Hold] acetaminophen **OR** [MAR Hold] acetaminophen  •  [MAR Hold] aluminum-magnesium hydroxide-simethicone  •  [MAR Hold] senna-docusate sodium **AND** [MAR Hold] polyethylene glycol **AND** [MAR Hold] bisacodyl **AND** [MAR Hold] bisacodyl  •  [MAR Hold] calcium carbonate  •  [MAR Hold] dextrose  •  [MAR Hold] dextrose  •  [MAR Hold] glucagon (human recombinant)  •  hydrALAZINE  •  [MAR Hold] ipratropium-albuterol  •  [MAR Hold] ondansetron  •  [MAR Hold] oxyCODONE  •  Pharmacy Consult - Pharmacy to dose  •  Pharmacy Consult - Steroid Insulin Protocol  •  [COMPLETED] Insert Peripheral IV **AND** [MAR Hold] sodium chloride  •  [MAR Hold] sodium chloride  •  [MAR Hold] sodium chloride    Physical Exam:  General Appearance:  Alert   HEENT:  Normocephalic, without obvious abnormality, Conjunctiva/corneas clear,.   Nares normal, no drainage     Neck:  Supple, symmetrical, trachea midline.   Lungs /Chest wall:   Bilateral basal rhonchi, respirations unlabored, symmetrical wall movement.     Heart:  Regular rate and rhythm, S1 S2 normal  Abdomen: Soft, non-tender, no masses, no organomegaly.    Extremities: No edema, no clubbing or cyanosis     ROS  Constitutional: Negative for chills, fever and malaise/fatigue.   HENT: Negative.    Eyes: Negative.     Cardiovascular: Negative.    Respiratory: Positive for cough and shortness of breath.    Skin: Negative.    Musculoskeletal: Negative.    Gastrointestinal: Negative.    Genitourinary: Negative.    Neurological: Negative.    Psychiatric/Behavioral: Negative.      I reviewed the recent clinical results    Much of this encounter note is an electronic transcription/translation of spoken language to printed text using Dragon Software which might include inadvertent errors in transcription.

## 2023-04-11 NOTE — DISCHARGE SUMMARY
[facility] Medicine Services  DISCHARGE SUMMARY    Patient Name: Brenden Peter  : 1950  MRN: [id number]    Date of Admission: 2023  Discharge Diagnosis: Pneumonia due to infectious organism./Acute bronchitis due to rhinovirus/hypoxemia.  Date of Discharge: 2023.  Primary Care Physician: Bert Rojas MD      Presenting Problem:   Rhinovirus [B34.8]  Dyspnea, unspecified type [R06.00]  Pneumonia due to infectious organism, unspecified laterality, unspecified part of lung [J18.9]    Active and Resolved Hospital Problems:  Active Hospital Problems    Diagnosis POA   • **Pneumonia due to infectious organism, unspecified laterality, unspecified part of lung [J18.9] Yes     Priority: High   • Acute bronchitis due to Rhinovirus [J20.6] Yes     Priority: High   • Hypoxemia [R09.02] Yes     Priority: Medium   • Chronic coronary artery disease [I25.10] Yes   • Interstitial pneumonia [J84.9] Yes   • COPD (chronic obstructive pulmonary disease) [J44.9] Yes   • Benign prostatic hyperplasia [N40.0] Yes   • Diabetic peripheral neuropathy [E11.42] Yes   • Chronic depression [F32.A] Yes   • Essential hypertension [I10] Yes   • Mixed hyperlipidemia [E78.2] Yes   • Type 2 diabetes mellitus without complication, without long-term current use of insulin (HCC) [E11.9] Yes      Resolved Hospital Problems    Diagnosis POA   • Acute on chronic respiratory failure with hypoxia [J96.21] Yes         Hospital Course   From previous notes and with minor updates.  Hospital Course:      Patient is a 72-year-old male with past medical history of hyperlipidemia, diabetes mellitus type 2, COPD on home oxygen 4 L continuous, diabetic peripheral neuropathy, hypertension, anxiety and congestive heart failure who presented to the emergency room because of  shortness of breath.  Patient reported worsening of of his symptoms and presented to the emergency room for further assessment.  Patient  reported that he felt like he could not get enough air with his home oxygen.  Patient was seen in the emergency room and was diagnosed with pneumonia and a COPD and was recommended for admission for further treatment and management.  Patient was started on antibiotics for pneumonia and cultures were completed.  Acute on chronic respiratory failure with hypoxia was treated with oxygen therapy.  Patient underwent a bronchoscopy.  Diabetes mellitus was treated with insulin therapy.  COPD was treated with nebs.  Acute bronchitis due to rhinovirus was treated with supportive care.  Hypertension was treated with Norvasc.   Peripheral neuropathy was treated with gabapentin.  Appropriate patient's home medications were resumed in the hospital for other chronic medical conditions.  Patient reported significant improvement in his symptoms after few days in the hospital and requested to be discharged home.  Patient reported that he was at his baseline level of oxygen therapy and requested to be discharged home.  Patient was advised to take his medications as prescribed.  Discharge medications are as per medication reconciliation list.  Patient was advised to follow-up with his primary care physician within 3 to 5 days of discharge.  Patient was advised to follow-up with his pulmonologist within 14 days of discharge.  Patient was advised to return to the emergency department if he experiences any recurrence of his symptoms.  Patient and family agreed with the plan and patient was discharged in stable condition.            DISCHARGE Follow Up Recommendations for labs and diagnostics:    Patient was advised to follow-up with his primary care physician who will review his current medications.    Patient was advised to follow-up with his pulmonologist to reassess his pulmonary function.      Reasons For Change In Medications and Indications for New Medications:      Day of Discharge     Vital Signs:  Temp:  [96.8 °F (36 °C)-97.6 °F  (36.4 °C)] 96.8 °F (36 °C)  Heart Rate:  [61-67] 63  Resp:  [13-21] 16  BP: (146-162)/(68-76) 154/68  Flow (L/min):  [5] 5    Physical Exam:  Physical Exam  Vitals reviewed.   Constitutional:       General: He is not in acute distress.  HENT:      Head: Normocephalic.      Nose: Nose normal.      Mouth/Throat:      Mouth: Mucous membranes are dry.      Pharynx: Oropharynx is clear.   Eyes:      Extraocular Movements: Extraocular movements intact.      Conjunctiva/sclera: Conjunctivae normal.      Pupils: Pupils are equal, round, and reactive to light.   Cardiovascular:      Rate and Rhythm: Normal rate and regular rhythm.      Pulses: Normal pulses.      Heart sounds: No murmur heard.    No friction rub. No gallop.   Pulmonary:      Effort: Pulmonary effort is normal.      Breath sounds: No stridor. No wheezing or rales.   Chest:      Chest wall: No tenderness.   Abdominal:      General: Bowel sounds are normal. There is no distension.      Palpations: Abdomen is soft.      Tenderness: There is no abdominal tenderness. There is no right CVA tenderness or guarding.   Musculoskeletal:         General: No swelling, tenderness, deformity or signs of injury.      Cervical back: Normal range of motion. No rigidity.      Right lower leg: No edema.      Left lower leg: No edema.   Skin:     General: Skin is warm and dry.      Capillary Refill: Capillary refill takes less than 2 seconds.      Coloration: Skin is not jaundiced.      Findings: No bruising, erythema, lesion or rash.   Neurological:      General: No focal deficit present.   Psychiatric:         Behavior: Behavior normal.              Pertinent  and/or Most Recent Results     LAB RESULTS:      Lab 04/11/23  0505 04/10/23  1039 04/09/23  0855 04/08/23  0820 04/07/23  1503 04/07/23  1502   WBC 7.60 7.50 6.80 6.50  --  7.80   HEMOGLOBIN 10.6* 11.1* 10.9* 10.2*  --  11.3*   HEMATOCRIT 33.6* 34.2* 34.4* 33.0*  --  35.3*   PLATELETS 238 242 247 209  --  223   NEUTROS  ABS 6.38 6.00 5.10 5.40  --  6.30   LYMPHS ABS  --  0.90 1.10 0.60*  --  0.70   MONOS ABS  --  0.50 0.60 0.40  --  0.70   EOS ABS  --  0.00 0.00 0.00  --  0.00   MCV 87.1 86.4 87.0 87.6  --  87.7   PROCALCITONIN  --   --   --   --  0.11  --          Lab 04/11/23  0505 04/10/23  1039 04/09/23  0855 04/08/23  0820 04/07/23  1502   SODIUM 144 145 144 142 138   POTASSIUM 3.5 3.7 3.5 4.0 3.6   CHLORIDE 104 104 102 101 95*   CO2 34.0* 33.0* 31.0* 32.0* 33.0*   ANION GAP 6.0 8.0 11.0 9.0 10.0   BUN 16 18 20 18 14   CREATININE 0.51* 0.52* 0.55* 0.57* 0.67*   EGFR 107.7 107.1 105.3 104.2 99.2   GLUCOSE 195* 234* 201* 261* 281*   CALCIUM 8.9 9.2 9.1 9.3 9.3   MAGNESIUM  --   --  1.8 2.0  --    HEMOGLOBIN A1C  --   --   --  8.30*  --                          Lab 04/07/23  1622   PH, ARTERIAL 7.422   PCO2, ARTERIAL 49.7*   PO2 ART 65.5*   O2 SATURATION ART 92.6*   FIO2 36   HCO3 ART 32.4*   BASE EXCESS ART 6.8*     Brief Urine Lab Results     None        Microbiology Results (last 10 days)     Procedure Component Value - Date/Time    Respiratory Culture - Lavage, Lung, Right Upper Lobe [[id number]]  (Abnormal) Collected: 04/09/23 1244    Lab Status: Final result Specimen: Lavage from Lung, Right Upper Lobe Updated: 04/11/23 0949     Respiratory Culture Scant growth (1+) Candida albicans      Rare Normal respiratory genny. No S. aureus or Pseudomonas aeruginosa detected. Final report.     Gram Stain Few (2+) WBCs seen      Moderate (3+) Yeast      Rare (1+) Gram positive bacilli    AFB Culture - Lavage, Lung, Right Upper Lobe [[id number]] Collected: 04/09/23 1232    Lab Status: Preliminary result Specimen: Lavage from Lung, Right Upper Lobe Updated: 04/10/23 1006     AFB Stain No acid fast bacilli seen on concentrated smear    Respiratory Panel PCR w/COVID-19(SARS-CoV-2) [facility] In-House, NP Swab in UTM/VTM, 3-4 HR TAT - Lavage, Lung, Right Upper Lobe [[id number]]  (Abnormal) Collected: 04/09/23 1232    Lab  Status: Final result Specimen: Lavage from Lung, Right Upper Lobe Updated: 04/09/23 1338     ADENOVIRUS, PCR Not Detected     Coronavirus 229E Not Detected     Coronavirus HKU1 Not Detected     Coronavirus NL63 Not Detected     Coronavirus OC43 Not Detected     COVID19 Not Detected     Human Metapneumovirus Not Detected     Human Rhinovirus/Enterovirus Detected     Influenza A PCR Not Detected     Influenza B PCR Not Detected     Parainfluenza Virus 1 Not Detected     Parainfluenza Virus 2 Not Detected     Parainfluenza Virus 3 Not Detected     Parainfluenza Virus 4 Not Detected     RSV, PCR Not Detected     Bordetella pertussis pcr Not Detected     Bordetella parapertussis PCR Not Detected     Chlamydophila pneumoniae PCR Not Detected     Mycoplasma pneumo by PCR Not Detected    Narrative:      In the setting of a positive respiratory panel with a viral infection PLUS a negative procalcitonin without other underlying concern for bacterial infection, consider observing off antibiotics or discontinuation of antibiotics and continue supportive care. If the respiratory panel is positive for atypical bacterial infection (Bordetella pertussis, Chlamydophila pneumoniae, or Mycoplasma pneumoniae), consider antibiotic de-escalation to target atypical bacterial infection.    S. Pneumo Ag Urine or CSF - Urine, Urine, Clean Catch [[id number]]  (Normal) Collected: 04/07/23 1733    Lab Status: Final result Specimen: Urine, Clean Catch Updated: 04/07/23 1759     Strep Pneumo Ag Negative    Legionella Antigen, Urine - Urine, Urine, Clean Catch [[id number]]  (Normal) Collected: 04/07/23 1733    Lab Status: Final result Specimen: Urine, Clean Catch Updated: 04/07/23 1759     LEGIONELLA ANTIGEN, URINE Negative    MRSA Screen, PCR (Inpatient) - Swab, Nares [[id number]]  (Normal) Collected: 04/07/23 1646    Lab Status: Final result Specimen: Swab from Nares Updated: 04/07/23 1813     MRSA PCR No MRSA Detected    Narrative:      The  negative predictive value of this diagnostic test is high and should only be used to consider de-escalating anti-MRSA therapy. A positive result may indicate colonization with MRSA and must be correlated clinically.    Respiratory Panel PCR w/COVID-19(SARS-CoV-2) [facility] In-House, NP Swab in UTM/VTM, 3-4 HR TAT - Swab, Nasopharynx [[id number]]  (Abnormal) Collected: 04/07/23 1646    Lab Status: Final result Specimen: Swab from Nasopharynx Updated: 04/07/23 1753     ADENOVIRUS, PCR Not Detected     Coronavirus 229E Not Detected     Coronavirus HKU1 Not Detected     Coronavirus NL63 Not Detected     Coronavirus OC43 Not Detected     COVID19 Not Detected     Human Metapneumovirus Not Detected     Human Rhinovirus/Enterovirus Detected     Influenza A PCR Not Detected     Influenza B PCR Not Detected     Parainfluenza Virus 1 Not Detected     Parainfluenza Virus 2 Not Detected     Parainfluenza Virus 3 Not Detected     Parainfluenza Virus 4 Not Detected     RSV, PCR Not Detected     Bordetella pertussis pcr Not Detected     Bordetella parapertussis PCR Not Detected     Chlamydophila pneumoniae PCR Not Detected     Mycoplasma pneumo by PCR Not Detected    Narrative:      In the setting of a positive respiratory panel with a viral infection PLUS a negative procalcitonin without other underlying concern for bacterial infection, consider observing off antibiotics or discontinuation of antibiotics and continue supportive care. If the respiratory panel is positive for atypical bacterial infection (Bordetella pertussis, Chlamydophila pneumoniae, or Mycoplasma pneumoniae), consider antibiotic de-escalation to target atypical bacterial infection.    Blood Culture - Blood, Arm, Left [[id number]]  (Normal) Collected: 04/07/23 1559    Lab Status: Preliminary result Specimen: Blood from Arm, Left Updated: 04/10/23 1615     Blood Culture No growth at 3 days    Respiratory Culture - Sputum, Cough [[id number]]   (Abnormal)  (Susceptibility) Collected: 04/07/23 1552    Lab Status: Final result Specimen: Sputum from Cough Updated: 04/09/23 0812     Respiratory Culture Heavy growth (4+) Streptococcus pneumoniae      No Normal Respiratory Genny     Gram Stain Many (4+) WBCs per low power field      Few (2+) Epithelial cells per low power field      Moderate (3+) Gram positive cocci in pairs, chains and clusters    Narrative:              Susceptibility      Streptococcus pneumoniae      DAWN      Ceftriaxone (Meningitis) Intermediate      Ceftriaxone (Non-meningitis) Susceptible      Levofloxacin Resistant     Penicillin (Meningitis) Resistant     Penicillin (Non-Meningitis) Susceptible      Trimethoprim + Sulfamethoxazole Resistant                          Blood Culture - Blood, Arm, Right [[id number]]  (Normal) Collected: 04/07/23 1541    Lab Status: Preliminary result Specimen: Blood from Arm, Right Updated: 04/10/23 1545     Blood Culture No growth at 3 days    Respiratory Culture - Sputum, Cough [[id number]] Collected: 04/01/23 1614    Lab Status: Final result Specimen: Sputum from Cough Updated: 04/04/23 1111     Respiratory Culture Light growth (2+) Normal respiratory genny. No S. aureus or Pseudomonas aeruginosa detected. Final report.     Gram Stain Many (4+) WBCs per low power field      Moderate (3+) Epithelial cells per low power field      Many (4+) Mixed gram positive genny          XR Chest 2 View    Result Date: 4/7/2023  Impression: Impression: 1. Diffuse interstitial prominence in the lungs which may represent mild fibrosis. 2. Right upper lobe infiltrate. Electronically Signed: Leo Rosenberg  4/7/2023 3:45 PM EDT  Workstation ID: [id number]    XR Chest 1 View    Result Date: 4/10/2023  Impression: Impression: Findings of pulmonary edema. The perihilar opacities appear slightly improved. Electronically Signed: Denise Galicia  4/10/2023 3:27 PM EDT  Workstation ID: [id number]    XR Chest 1 View    Result Date:  3/31/2023  Impression: Impression: Stable chronic findings without acute process. Electronically Signed: Wang Alba  3/31/2023 7:21 PM EDT  Workstation ID: [id number]              Results for orders placed during the hospital encounter of 03/08/23    Adult Transthoracic Echo Complete W/ Cont if Necessary Per Protocol    Interpretation Summary  •  Left ventricular systolic function is normal. Calculated left ventricular EF = 65.5% Left ventricular ejection fraction appears to be 61 - 65%.  •  Left ventricular diastolic dysfunction is noted.  •  Both atrial cavities are dilated.  •  Estimated right ventricular systolic pressure from tricuspid regurgitation is markedly elevated (>55 mmHg).      Labs Pending at Discharge:  Pending Labs     Order Current Status    Aspergillus Galactomannan Antigen - Blood, Cannula In process    Fungus Culture - Lavage, Lung, Right Upper Lobe In process    Histoplasma AG Urine - Miscellaneous Test In process    IgE + Allergens (35) In process    Non-gynecologic Cytology In process    Pneumocystis PCR - Lavage, Lung, Right Upper Lobe In process    AFB Culture - Lavage, Lung, Right Upper Lobe Preliminary result    Blood Culture - Blood, Arm, Left Preliminary result    Blood Culture - Blood, Arm, Right Preliminary result          Procedures Performed  Procedure(s):  BRONCHOSCOPY, bronchial alveolar lavage right upper lobe         Consults:   Consults     Date and Time Order Name Status Description    4/7/2023  5:15 PM Inpatient Pulmonology Consult      4/7/2023  4:29 PM Hospitalist (on-call MD unless specified)      4/1/2023  9:51 AM Inpatient Pulmonology Consult Completed     3/8/2023  6:25 PM Inpatient Pulmonology Consult Completed             Discharge Details        Discharge Medications      New Medications      Instructions Start Date   amoxicillin-clavulanate 875-125 MG per tablet  Commonly known as: Augmentin   1 tablet, Oral, 2 Times Daily      budesonide-formoterol 160-4.5 MCG/ACT  inhaler  Commonly known as: SYMBICORT   2 puffs, Inhalation, 2 Times Daily - RT         Changes to Medications      Instructions Start Date   ipratropium-albuterol 0.5-2.5 mg/3 ml nebulizer  Commonly known as: DUO-NEB  What changed:   · how to take this  · reasons to take this   3 mL, Nebulization, Every 6 Hours PRN         Continue These Medications      Instructions Start Date   amLODIPine 5 MG tablet  Commonly known as: NORVASC   5 mg, Oral, Nightly      Aspirin Low Dose 81 MG EC tablet  Generic drug: aspirin   81 mg, Oral, Daily      atorvastatin 40 MG tablet  Commonly known as: LIPITOR   40 mg, Oral, Daily      bisoprolol 5 MG tablet  Commonly known as: ZEBeta   5 mg, Oral, Daily      budesonide 0.5 MG/2ML nebulizer solution  Commonly known as: PULMICORT   0.5 mg, Nebulization, 2 Times Daily - RT, J44.9      furosemide 40 MG tablet  Commonly known as: LASIX   40 mg, Oral, Daily      gabapentin 300 MG capsule  Commonly known as: NEURONTIN   300 mg, Oral, 3 Times Daily      glipizide 10 MG tablet  Commonly known as: GLUCOTROL   10 mg, Oral, Nightly      guaiFENesin 600 MG 12 hr tablet  Commonly known as: MUCINEX   1,200 mg, Oral, Every 12 Hours      lisinopril 40 MG tablet  Commonly known as: PRINIVIL,ZESTRIL   40 mg, Oral, Nightly      metFORMIN 1000 MG tablet  Commonly known as: GLUCOPHAGE   1,000 mg, Oral, 2 Times Daily With Meals      pioglitazone 30 MG tablet  Commonly known as: ACTOS   30 mg, Oral, Nightly      roflumilast 250 MCG tablet tablet  Commonly known as: DALIRESP   250 mcg, Oral, Daily      sertraline 50 MG tablet  Commonly known as: ZOLOFT   50 mg, Oral, Nightly      vitamin D 1.25 MG (65514 UT) capsule capsule  Commonly known as: ERGOCALCIFEROL   50,000 Units, Every 7 Days, Wednesday         Stop These Medications    cefdinir 300 MG capsule  Commonly known as: OMNICEF     doxycycline 100 MG tablet  Commonly known as: ADOXA            No Known Allergies      Discharge Disposition: Stable.  Home  or Self Care    Diet:  Hospital:  Diet Order   Procedures   • Diet: Cardiac Diets; Healthy Heart (2-3 Na+); Texture: Regular Texture (IDDSI 7); Fluid Consistency: Thin (IDDSI 0)         Discharge Activity: As tolerated  Activity Instructions    As tolerated.             CODE STATUS:  Code Status and Medical Interventions:   Ordered at: 04/07/23 1740     Level Of Support Discussed With:    Patient     Code Status (Patient has no pulse and is not breathing):    CPR (Attempt to Resuscitate)     Medical Interventions (Patient has pulse or is breathing):    Full Support         No future appointments.        Time spent on Discharge including face to face service: 55 minutes    This patient has been examined wearing appropriate Personal Protective Equipment and discussed with hospital infection control department, [facility] department, infectious disease specialist and pulmonologist. 04/11/23      Signature:Electronically signed by Aditya Liang MD, FACP, 04/11/23, 1:28 PM EDT.

## 2023-04-12 ENCOUNTER — READMISSION MANAGEMENT (OUTPATIENT)
Dept: CALL CENTER | Facility: HOSPITAL | Age: 73
End: 2023-04-12
Payer: MEDICARE

## 2023-04-12 LAB
BACTERIA SPEC AEROBE CULT: NORMAL
BACTERIA SPEC AEROBE CULT: NORMAL
P JIROVECII DNA L RESP QL NAA+NON-PROBE: NEGATIVE
REF LAB TEST METHOD: NORMAL
REF LAB TEST RESULTS: NORMAL

## 2023-04-12 NOTE — OUTREACH NOTE
Prep Survey    Flowsheet Row Responses   [facility] facility patient discharged from? [facility]   Is LACE score < 7 ? No   Eligibility Readm Mgmt   Discharge diagnosis Pneumonia due to infectious organism, unspecified laterality, unspecified part of lung   Does the patient have one of the following disease processes/diagnoses(primary or secondary)? Pneumonia   Does the patient have Home health ordered? No   Is there a DME ordered? No   Prep survey completed? Yes          Mraci HAMMONDS - Registered Nurse

## 2023-04-12 NOTE — OUTREACH NOTE
COPD/PN Week 1 Survey    Flowsheet Row Responses   [facility] patient discharged from? [facility]   Does the patient have one of the following disease processes/diagnoses(primary or secondary)? Pneumonia   Week 1 attempt successful? Yes   Call start time 1521   Call end time 1525   Discharge diagnosis Pneumonia due to infectious organism, unspecified laterality, unspecified part of lung   Is patient permission given to speak with other caregiver? Yes   Person spoke with today (if not patient) and relationship [place]   Meds reviewed with patient/caregiver? Yes   Is the patient having any side effects they believe may be caused by any medication additions or changes? No   Does the patient have all medications ordered at discharge? Yes   Is the patient taking all medications as directed (includes completed medication regime)? No   Does the patient have a primary care provider?  Yes   Does the patient have an appointment with their PCP or specialist within 7 days of discharge? Yes   Has the patient kept scheduled appointments due by today? Yes   Has home health visited the patient within 72 hours of discharge? N/A   Pulse Ox monitoring None   Did the patient receive a copy of their discharge instructions? Yes   Nursing interventions Reviewed instructions with patient   What is the patient's perception of their health status since discharge? Improving   Nursing Interventions Nurse provided patient education   Are the patient's immunizations up to date?  Yes   Nursing interventions Educated on importance of maintaining up to date immunizations as advised by provider   If the patient is a current smoker, are they able to teach back resources for cessation? Not a smoker   Is the patient/caregiver able to teach back the hierarchy of who to call/visit for symptoms/problems? PCP, Specialist, Home health nurse, Urgent Care, ED, 911 Yes   Additional teach back comments Encouraged protein and water. No questions or issues at  this time.   Is the patient/caregiver able to teach back signs and symptoms of worsening condition: Fever/chills, Shortness of breath, Chest pain   Is the patient/caregiver able to teach back importance of completing antibiotic course of treatment? Yes   Week 1 call completed? Yes   Wrap up additional comments States the hospitalist will contact Citlalli HAMMONDS - Registered Nurse

## 2023-04-13 LAB
A ALTERNATA IGE QN: <0.1 KU/L
A FUMIGATUS IGE QN: <0.1 KU/L
A PULLULANS IGE QN: <0.1 KU/L
AMER ROACH IGE QN: <0.1 KU/L
BERMUDA GRASS IGE QN: <0.1 KU/L
BOXELDER IGE QN: <0.1 KU/L
C HERBARUM IGE QN: <0.1 KU/L
CAT DANDER IGE QN: <0.1 KU/L
CHICKEN FEATHER IGE QN: <0.1 KU/L
CMN PIGWEED IGE QN: <0.1 KU/L
COMMON RAGWEED IGE QN: <0.1 KU/L
CONV CLASS DESCRIPTION: ABNORMAL
COTTONWOOD IGE QN: <0.1 KU/L
D FARINAE IGE QN: <0.1 KU/L
D PTERONYSS IGE QN: <0.1 KU/L
DOG DANDER IGE QN: <0.1 KU/L
DUCK FEATHER IGE QN: <0.1 KU/L
ENGL PLANTAIN IGE QN: <0.1 KU/L
F MONILIFORME IGE QN: <0.1 KU/L
FUNGUS WND CULT: ABNORMAL
GOOSE FEATHER IGE QN: <0.1 KU/L
GOOSEFOOT IGE QN: <0.1 KU/L
IGE SERPL-ACNC: 22 IU/ML (ref 6–495)
JOHNSON GRASS IGE QN: <0.1 KU/L
KENT BLUE GRASS IGE QN: <0.1 KU/L
LONDON PLANE IGE QN: <0.1 KU/L
M RACEMOSUS IGE QN: <0.1 KU/L
MARSH ELDER IGE QN: <0.1 KU/L
MT JUNIPER IGE QN: 0.13 KU/L
P NOTATUM IGE QN: <0.1 KU/L
R NIGRICANS IGE QN: <0.1 KU/L
S ROSTRATA IGE QN: <0.1 KU/L
SHEEP SORREL IGE QN: <0.1 KU/L
WHITE ASH IGE QN: <0.1 KU/L
WHITE ELM IGE QN: <0.1 KU/L
WHITE HICKORY IGE QN: <0.1 KU/L
WHITE OAK IGE QN: <0.1 KU/L
WORMWOOD IGE QN: <0.1 KU/L

## 2023-04-14 LAB — GALACTOMANNAN AG SPEC IA-ACNC: 0.12 INDEX (ref 0–0.49)

## 2023-04-23 ENCOUNTER — READMISSION MANAGEMENT (OUTPATIENT)
Dept: CALL CENTER | Facility: HOSPITAL | Age: 73
End: 2023-04-23
Payer: MEDICARE

## 2023-04-23 ENCOUNTER — APPOINTMENT (OUTPATIENT)
Dept: GENERAL RADIOLOGY | Facility: HOSPITAL | Age: 73
DRG: 189 | End: 2023-04-23
Payer: MEDICARE

## 2023-04-23 ENCOUNTER — HOSPITAL ENCOUNTER (INPATIENT)
Facility: HOSPITAL | Age: 73
LOS: 3 days | Discharge: HOME | DRG: 189 | End: 2023-04-26
Attending: EMERGENCY MEDICINE | Admitting: INTERNAL MEDICINE
Payer: MEDICARE

## 2023-04-23 DIAGNOSIS — J44.1 CHRONIC OBSTRUCTIVE PULMONARY DISEASE WITH ACUTE EXACERBATION: ICD-10-CM

## 2023-04-23 DIAGNOSIS — R06.03 ACUTE RESPIRATORY DISTRESS: Primary | ICD-10-CM

## 2023-04-23 LAB
ALBUMIN SERPL-MCNC: 3.3 G/DL (ref 3.5–5.2)
ALBUMIN/GLOB SERPL: 0.8 G/DL
ALP SERPL-CCNC: 60 U/L (ref 39–117)
ALT SERPL W P-5'-P-CCNC: 13 U/L (ref 1–41)
ANION GAP SERPL CALCULATED.3IONS-SCNC: 11 MMOL/L (ref 5–15)
ARTERIAL PATENCY WRIST A: POSITIVE
AST SERPL-CCNC: 18 U/L (ref 1–40)
ATMOSPHERIC PRESS: ABNORMAL MM[HG]
BASE EXCESS BLDA CALC-SCNC: 4.1 MMOL/L (ref 0–3)
BASOPHILS # BLD AUTO: 0.1 10*3/MM3 (ref 0–0.2)
BASOPHILS NFR BLD AUTO: 1.1 % (ref 0–1.5)
BDY SITE: ABNORMAL
BILIRUB SERPL-MCNC: 0.2 MG/DL (ref 0–1.2)
BUN SERPL-MCNC: 7 MG/DL (ref 8–23)
BUN/CREAT SERPL: 14.3 (ref 7–25)
CALCIUM SPEC-SCNC: 8.8 MG/DL (ref 8.6–10.5)
CHLORIDE SERPL-SCNC: 103 MMOL/L (ref 98–107)
CO2 BLDA-SCNC: 30.1 MMOL/L (ref 22–29)
CO2 SERPL-SCNC: 27 MMOL/L (ref 22–29)
CREAT SERPL-MCNC: 0.49 MG/DL (ref 0.76–1.27)
D-LACTATE SERPL-SCNC: 2.4 MMOL/L (ref 0.3–2)
D-LACTATE SERPL-SCNC: 3.6 MMOL/L (ref 0.5–2)
DEPRECATED RDW RBC AUTO: 49.4 FL (ref 37–54)
EGFRCR SERPLBLD CKD-EPI 2021: 109 ML/MIN/1.73
EOSINOPHIL # BLD AUTO: 0.1 10*3/MM3 (ref 0–0.4)
EOSINOPHIL NFR BLD AUTO: 1.2 % (ref 0.3–6.2)
ERYTHROCYTE [DISTWIDTH] IN BLOOD BY AUTOMATED COUNT: 16.1 % (ref 12.3–15.4)
GLOBULIN UR ELPH-MCNC: 4 GM/DL
GLUCOSE BLDC GLUCOMTR-MCNC: 252 MG/DL (ref 70–105)
GLUCOSE SERPL-MCNC: 171 MG/DL (ref 65–99)
HCO3 BLDA-SCNC: 28.8 MMOL/L (ref 21–28)
HCT VFR BLD AUTO: 31.2 % (ref 37.5–51)
HEMODILUTION: NO
HGB BLD-MCNC: 9.8 G/DL (ref 13–17.7)
INHALED O2 CONCENTRATION: 40 %
LYMPHOCYTES # BLD AUTO: 0.9 10*3/MM3 (ref 0.7–3.1)
LYMPHOCYTES NFR BLD AUTO: 15.6 % (ref 19.6–45.3)
MAGNESIUM SERPL-MCNC: 1.8 MG/DL (ref 1.6–2.4)
MCH RBC QN AUTO: 27.5 PG (ref 26.6–33)
MCHC RBC AUTO-ENTMCNC: 31.5 G/DL (ref 31.5–35.7)
MCV RBC AUTO: 87.5 FL (ref 79–97)
MODALITY: ABNORMAL
MONOCYTES # BLD AUTO: 0.3 10*3/MM3 (ref 0.1–0.9)
MONOCYTES NFR BLD AUTO: 4.9 % (ref 5–12)
MYCOBACTERIUM SPEC CULT: NORMAL
NEUTROPHILS NFR BLD AUTO: 4.7 10*3/MM3 (ref 1.7–7)
NEUTROPHILS NFR BLD AUTO: 77.2 % (ref 42.7–76)
NIGHT BLUE STAIN TISS: NORMAL
NRBC BLD AUTO-RTO: 0.1 /100 WBC (ref 0–0.2)
NT-PROBNP SERPL-MCNC: 791.5 PG/ML (ref 0–900)
PCO2 BLDA: 42.7 MM HG (ref 35–48)
PH BLDA: 7.44 PH UNITS (ref 7.35–7.45)
PHOSPHATE SERPL-MCNC: 2.8 MG/DL (ref 2.5–4.5)
PLATELET # BLD AUTO: 242 10*3/MM3 (ref 140–450)
PMV BLD AUTO: 9 FL (ref 6–12)
PO2 BLDA: 83.1 MM HG (ref 83–108)
POTASSIUM SERPL-SCNC: 4.1 MMOL/L (ref 3.5–5.2)
PROT SERPL-MCNC: 7.3 G/DL (ref 6–8.5)
RBC # BLD AUTO: 3.57 10*6/MM3 (ref 4.14–5.8)
SAO2 % BLDCOA: 96.5 % (ref 94–98)
SODIUM SERPL-SCNC: 141 MMOL/L (ref 136–145)
WBC NRBC COR # BLD: 6 10*3/MM3 (ref 3.4–10.8)

## 2023-04-23 PROCEDURE — 25010000002 METHYLPREDNISOLONE PER 40 MG: Performed by: INTERNAL MEDICINE

## 2023-04-23 PROCEDURE — 83735 ASSAY OF MAGNESIUM: CPT | Performed by: INTERNAL MEDICINE

## 2023-04-23 PROCEDURE — 82803 BLOOD GASES ANY COMBINATION: CPT

## 2023-04-23 PROCEDURE — 93005 ELECTROCARDIOGRAM TRACING: CPT

## 2023-04-23 PROCEDURE — 25010000002 METHYLPREDNISOLONE PER 125 MG: Performed by: EMERGENCY MEDICINE

## 2023-04-23 PROCEDURE — 94799 UNLISTED PULMONARY SVC/PX: CPT

## 2023-04-23 PROCEDURE — 94664 DEMO&/EVAL PT USE INHALER: CPT

## 2023-04-23 PROCEDURE — 25010000002 CEFTRIAXONE PER 250 MG: Performed by: INTERNAL MEDICINE

## 2023-04-23 PROCEDURE — 36600 WITHDRAWAL OF ARTERIAL BLOOD: CPT

## 2023-04-23 PROCEDURE — 94761 N-INVAS EAR/PLS OXIMETRY MLT: CPT

## 2023-04-23 PROCEDURE — 87040 BLOOD CULTURE FOR BACTERIA: CPT | Performed by: EMERGENCY MEDICINE

## 2023-04-23 PROCEDURE — 83605 ASSAY OF LACTIC ACID: CPT

## 2023-04-23 PROCEDURE — 80053 COMPREHEN METABOLIC PANEL: CPT | Performed by: EMERGENCY MEDICINE

## 2023-04-23 PROCEDURE — 84100 ASSAY OF PHOSPHORUS: CPT | Performed by: INTERNAL MEDICINE

## 2023-04-23 PROCEDURE — 63710000001 INSULIN LISPRO (HUMAN) PER 5 UNITS: Performed by: INTERNAL MEDICINE

## 2023-04-23 PROCEDURE — 99285 EMERGENCY DEPT VISIT HI MDM: CPT

## 2023-04-23 PROCEDURE — 25010000002 FUROSEMIDE PER 20 MG: Performed by: INTERNAL MEDICINE

## 2023-04-23 PROCEDURE — 25010000002 ENOXAPARIN PER 10 MG: Performed by: INTERNAL MEDICINE

## 2023-04-23 PROCEDURE — 71045 X-RAY EXAM CHEST 1 VIEW: CPT

## 2023-04-23 PROCEDURE — 82962 GLUCOSE BLOOD TEST: CPT

## 2023-04-23 PROCEDURE — 85025 COMPLETE CBC W/AUTO DIFF WBC: CPT | Performed by: EMERGENCY MEDICINE

## 2023-04-23 PROCEDURE — 83880 ASSAY OF NATRIURETIC PEPTIDE: CPT | Performed by: EMERGENCY MEDICINE

## 2023-04-23 PROCEDURE — 94640 AIRWAY INHALATION TREATMENT: CPT

## 2023-04-23 RX ORDER — BUDESONIDE AND FORMOTEROL FUMARATE DIHYDRATE 160; 4.5 UG/1; UG/1
2 AEROSOL RESPIRATORY (INHALATION)
Status: DISCONTINUED | OUTPATIENT
Start: 2023-04-23 | End: 2023-04-24

## 2023-04-23 RX ORDER — METHYLPREDNISOLONE SODIUM SUCCINATE 40 MG/ML
40 INJECTION, POWDER, LYOPHILIZED, FOR SOLUTION INTRAMUSCULAR; INTRAVENOUS EVERY 6 HOURS
Status: DISCONTINUED | OUTPATIENT
Start: 2023-04-23 | End: 2023-04-26 | Stop reason: HOSPADM

## 2023-04-23 RX ORDER — ALBUTEROL SULFATE 2.5 MG/3ML
2.5 SOLUTION RESPIRATORY (INHALATION) ONCE
Status: COMPLETED | OUTPATIENT
Start: 2023-04-23 | End: 2023-04-23

## 2023-04-23 RX ORDER — NICOTINE POLACRILEX 4 MG
15 LOZENGE BUCCAL
Status: DISCONTINUED | OUTPATIENT
Start: 2023-04-23 | End: 2023-04-26 | Stop reason: HOSPADM

## 2023-04-23 RX ORDER — FUROSEMIDE 10 MG/ML
20 INJECTION INTRAMUSCULAR; INTRAVENOUS ONCE
Status: COMPLETED | OUTPATIENT
Start: 2023-04-23 | End: 2023-04-23

## 2023-04-23 RX ORDER — POTASSIUM CHLORIDE 1.5 G/1.77G
40 POWDER, FOR SOLUTION ORAL AS NEEDED
Status: DISCONTINUED | OUTPATIENT
Start: 2023-04-23 | End: 2023-04-26 | Stop reason: HOSPADM

## 2023-04-23 RX ORDER — POTASSIUM CHLORIDE 20 MEQ/1
40 TABLET, EXTENDED RELEASE ORAL AS NEEDED
Status: DISCONTINUED | OUTPATIENT
Start: 2023-04-23 | End: 2023-04-26 | Stop reason: HOSPADM

## 2023-04-23 RX ORDER — POLYETHYLENE GLYCOL 3350 17 G/17G
17 POWDER, FOR SOLUTION ORAL DAILY PRN
Status: DISCONTINUED | OUTPATIENT
Start: 2023-04-23 | End: 2023-04-26 | Stop reason: HOSPADM

## 2023-04-23 RX ORDER — FUROSEMIDE 40 MG/1
40 TABLET ORAL DAILY
Status: DISCONTINUED | OUTPATIENT
Start: 2023-04-24 | End: 2023-04-26 | Stop reason: HOSPADM

## 2023-04-23 RX ORDER — BISOPROLOL FUMARATE 5 MG/1
5 TABLET, FILM COATED ORAL DAILY
Status: DISCONTINUED | OUTPATIENT
Start: 2023-04-24 | End: 2023-04-26 | Stop reason: HOSPADM

## 2023-04-23 RX ORDER — METHYLPREDNISOLONE SODIUM SUCCINATE 125 MG/2ML
125 INJECTION, POWDER, LYOPHILIZED, FOR SOLUTION INTRAMUSCULAR; INTRAVENOUS ONCE
Status: COMPLETED | OUTPATIENT
Start: 2023-04-23 | End: 2023-04-23

## 2023-04-23 RX ORDER — LISINOPRIL 20 MG/1
40 TABLET ORAL NIGHTLY
Status: DISCONTINUED | OUTPATIENT
Start: 2023-04-23 | End: 2023-04-26 | Stop reason: HOSPADM

## 2023-04-23 RX ORDER — DEXTROSE MONOHYDRATE 25 G/50ML
25 INJECTION, SOLUTION INTRAVENOUS
Status: DISCONTINUED | OUTPATIENT
Start: 2023-04-23 | End: 2023-04-26 | Stop reason: HOSPADM

## 2023-04-23 RX ORDER — SODIUM CHLORIDE 0.9 % (FLUSH) 0.9 %
10 SYRINGE (ML) INJECTION AS NEEDED
Status: DISCONTINUED | OUTPATIENT
Start: 2023-04-23 | End: 2023-04-26 | Stop reason: HOSPADM

## 2023-04-23 RX ORDER — ACETAMINOPHEN 325 MG/1
650 TABLET ORAL EVERY 4 HOURS PRN
Status: DISCONTINUED | OUTPATIENT
Start: 2023-04-23 | End: 2023-04-26 | Stop reason: HOSPADM

## 2023-04-23 RX ORDER — IPRATROPIUM BROMIDE AND ALBUTEROL SULFATE 2.5; .5 MG/3ML; MG/3ML
3 SOLUTION RESPIRATORY (INHALATION) ONCE
Status: COMPLETED | OUTPATIENT
Start: 2023-04-23 | End: 2023-04-23

## 2023-04-23 RX ORDER — AMOXICILLIN 250 MG
2 CAPSULE ORAL 2 TIMES DAILY
Status: DISCONTINUED | OUTPATIENT
Start: 2023-04-23 | End: 2023-04-26 | Stop reason: HOSPADM

## 2023-04-23 RX ORDER — MAGNESIUM SULFATE HEPTAHYDRATE 40 MG/ML
4 INJECTION, SOLUTION INTRAVENOUS AS NEEDED
Status: DISCONTINUED | OUTPATIENT
Start: 2023-04-23 | End: 2023-04-26 | Stop reason: HOSPADM

## 2023-04-23 RX ORDER — NITROGLYCERIN 0.4 MG/1
0.4 TABLET SUBLINGUAL
Status: DISCONTINUED | OUTPATIENT
Start: 2023-04-23 | End: 2023-04-23

## 2023-04-23 RX ORDER — GUAIFENESIN 600 MG/1
1200 TABLET, EXTENDED RELEASE ORAL EVERY 12 HOURS
Status: DISCONTINUED | OUTPATIENT
Start: 2023-04-23 | End: 2023-04-26 | Stop reason: HOSPADM

## 2023-04-23 RX ORDER — MAGNESIUM SULFATE HEPTAHYDRATE 40 MG/ML
2 INJECTION, SOLUTION INTRAVENOUS AS NEEDED
Status: DISCONTINUED | OUTPATIENT
Start: 2023-04-23 | End: 2023-04-26 | Stop reason: HOSPADM

## 2023-04-23 RX ORDER — BISACODYL 5 MG/1
5 TABLET, DELAYED RELEASE ORAL DAILY PRN
Status: DISCONTINUED | OUTPATIENT
Start: 2023-04-23 | End: 2023-04-26 | Stop reason: HOSPADM

## 2023-04-23 RX ORDER — AMLODIPINE BESYLATE 5 MG/1
5 TABLET ORAL NIGHTLY
Status: DISCONTINUED | OUTPATIENT
Start: 2023-04-23 | End: 2023-04-26 | Stop reason: HOSPADM

## 2023-04-23 RX ORDER — ENOXAPARIN SODIUM 100 MG/ML
40 INJECTION SUBCUTANEOUS DAILY
Status: DISCONTINUED | OUTPATIENT
Start: 2023-04-23 | End: 2023-04-26 | Stop reason: HOSPADM

## 2023-04-23 RX ORDER — ROFLUMILAST 500 UG/1
500 TABLET ORAL DAILY
Status: DISCONTINUED | OUTPATIENT
Start: 2023-04-24 | End: 2023-04-26 | Stop reason: HOSPADM

## 2023-04-23 RX ORDER — ACETAMINOPHEN 650 MG/1
650 SUPPOSITORY RECTAL EVERY 4 HOURS PRN
Status: DISCONTINUED | OUTPATIENT
Start: 2023-04-23 | End: 2023-04-26 | Stop reason: HOSPADM

## 2023-04-23 RX ORDER — SODIUM CHLORIDE 9 MG/ML
40 INJECTION, SOLUTION INTRAVENOUS AS NEEDED
Status: DISCONTINUED | OUTPATIENT
Start: 2023-04-23 | End: 2023-04-26 | Stop reason: HOSPADM

## 2023-04-23 RX ORDER — IPRATROPIUM BROMIDE AND ALBUTEROL SULFATE 2.5; .5 MG/3ML; MG/3ML
3 SOLUTION RESPIRATORY (INHALATION) EVERY 6 HOURS PRN
Status: DISCONTINUED | OUTPATIENT
Start: 2023-04-23 | End: 2023-04-26 | Stop reason: HOSPADM

## 2023-04-23 RX ORDER — SILDENAFIL CITRATE 20 MG/1
20 TABLET ORAL EVERY 8 HOURS SCHEDULED
Status: DISCONTINUED | OUTPATIENT
Start: 2023-04-23 | End: 2023-04-26 | Stop reason: HOSPADM

## 2023-04-23 RX ORDER — IBUPROFEN 600 MG/1
1 TABLET ORAL
Status: DISCONTINUED | OUTPATIENT
Start: 2023-04-23 | End: 2023-04-26 | Stop reason: HOSPADM

## 2023-04-23 RX ORDER — ONDANSETRON 4 MG/1
4 TABLET, FILM COATED ORAL EVERY 6 HOURS PRN
Status: DISCONTINUED | OUTPATIENT
Start: 2023-04-23 | End: 2023-04-26 | Stop reason: HOSPADM

## 2023-04-23 RX ORDER — GABAPENTIN 300 MG/1
300 CAPSULE ORAL 3 TIMES DAILY
Status: DISCONTINUED | OUTPATIENT
Start: 2023-04-23 | End: 2023-04-26 | Stop reason: HOSPADM

## 2023-04-23 RX ORDER — ACETAMINOPHEN 160 MG/5ML
650 SOLUTION ORAL EVERY 4 HOURS PRN
Status: DISCONTINUED | OUTPATIENT
Start: 2023-04-23 | End: 2023-04-26 | Stop reason: HOSPADM

## 2023-04-23 RX ORDER — ONDANSETRON 2 MG/ML
4 INJECTION INTRAMUSCULAR; INTRAVENOUS EVERY 6 HOURS PRN
Status: DISCONTINUED | OUTPATIENT
Start: 2023-04-23 | End: 2023-04-26 | Stop reason: HOSPADM

## 2023-04-23 RX ORDER — SPIRONOLACTONE 25 MG/1
25 TABLET ORAL DAILY
Status: DISCONTINUED | OUTPATIENT
Start: 2023-04-24 | End: 2023-04-26 | Stop reason: HOSPADM

## 2023-04-23 RX ORDER — INSULIN LISPRO 100 [IU]/ML
2-7 INJECTION, SOLUTION INTRAVENOUS; SUBCUTANEOUS
Status: DISCONTINUED | OUTPATIENT
Start: 2023-04-23 | End: 2023-04-24

## 2023-04-23 RX ORDER — BISACODYL 10 MG
10 SUPPOSITORY, RECTAL RECTAL DAILY PRN
Status: DISCONTINUED | OUTPATIENT
Start: 2023-04-23 | End: 2023-04-26 | Stop reason: HOSPADM

## 2023-04-23 RX ORDER — SODIUM CHLORIDE 0.9 % (FLUSH) 0.9 %
10 SYRINGE (ML) INJECTION EVERY 12 HOURS SCHEDULED
Status: DISCONTINUED | OUTPATIENT
Start: 2023-04-23 | End: 2023-04-26 | Stop reason: HOSPADM

## 2023-04-23 RX ADMIN — CEFTRIAXONE 2 G: 2 INJECTION, POWDER, FOR SOLUTION INTRAMUSCULAR; INTRAVENOUS at 16:19

## 2023-04-23 RX ADMIN — IPRATROPIUM BROMIDE AND ALBUTEROL SULFATE 3 ML: .5; 3 SOLUTION RESPIRATORY (INHALATION) at 18:05

## 2023-04-23 RX ADMIN — Medication 10 ML: at 16:19

## 2023-04-23 RX ADMIN — INSULIN LISPRO 4 UNITS: 100 INJECTION, SOLUTION INTRAVENOUS; SUBCUTANEOUS at 20:27

## 2023-04-23 RX ADMIN — LISINOPRIL 40 MG: 20 TABLET ORAL at 20:27

## 2023-04-23 RX ADMIN — METHYLPREDNISOLONE SODIUM SUCCINATE 40 MG: 40 INJECTION, POWDER, FOR SOLUTION INTRAMUSCULAR; INTRAVENOUS at 20:26

## 2023-04-23 RX ADMIN — METHYLPREDNISOLONE SODIUM SUCCINATE 125 MG: 125 INJECTION, POWDER, FOR SOLUTION INTRAMUSCULAR; INTRAVENOUS at 13:14

## 2023-04-23 RX ADMIN — IPRATROPIUM BROMIDE AND ALBUTEROL SULFATE 3 ML: .5; 3 SOLUTION RESPIRATORY (INHALATION) at 12:33

## 2023-04-23 RX ADMIN — GABAPENTIN 300 MG: 300 CAPSULE ORAL at 16:14

## 2023-04-23 RX ADMIN — AMLODIPINE BESYLATE 5 MG: 5 TABLET ORAL at 20:27

## 2023-04-23 RX ADMIN — ALBUTEROL SULFATE 2.5 MG: 2.5 SOLUTION RESPIRATORY (INHALATION) at 12:37

## 2023-04-23 RX ADMIN — GABAPENTIN 300 MG: 300 CAPSULE ORAL at 20:27

## 2023-04-23 RX ADMIN — SERTRALINE 50 MG: 50 TABLET, FILM COATED ORAL at 20:27

## 2023-04-23 RX ADMIN — FUROSEMIDE 20 MG: 10 INJECTION, SOLUTION INTRAMUSCULAR; INTRAVENOUS at 18:20

## 2023-04-23 RX ADMIN — ENOXAPARIN SODIUM 40 MG: 100 INJECTION SUBCUTANEOUS at 16:14

## 2023-04-23 RX ADMIN — Medication 10 ML: at 20:11

## 2023-04-23 RX ADMIN — DOCUSATE SODIUM 50 MG AND SENNOSIDES 8.6 MG 2 TABLET: 8.6; 5 TABLET, FILM COATED ORAL at 20:27

## 2023-04-23 RX ADMIN — GUAIFENESIN 1200 MG: 600 TABLET, EXTENDED RELEASE ORAL at 20:27

## 2023-04-23 NOTE — ED NOTES
Pt. Moved to CDU and placed on hospital bed for HOLD in CDU and comfort. Pt. Became extremely dyspneic and sats dropped to 87% on 4-5 L 02. RLL rhochi, tightness in upper lobes. Tachypneic. Dr. Prince at bedside. Lasix ordered x 1 dose.   
RT at bedside for treatment.   
n/a

## 2023-04-23 NOTE — CASE MANAGEMENT/SOCIAL WORK
[facility] Planning Assessment  [facility]     Patient Name: Brenden Peter  MRN: [id number]  Today's Date: 4/23/2023    Admit Date: 4/23/2023    Plan: Anticipate return home with spouse. Current home O2 4L through [employer].   Discharge Needs Assessment     Row Name 04/23/23 1614       Living Environment    People in Home spouse    Current Living Arrangements home    Primary Care Provided by self    Provides Primary Care For no one    Family Caregiver if Needed spouse    Quality of Family Relationships supportive    Able to Return to Prior Arrangements yes       Resource/Environmental Concerns    Resource/Environmental Concerns none    Transportation Concerns none       Transition Planning    Patient/Family Anticipates Transition to home with family    Patient/Family Anticipated Services at Transition none    Transportation Anticipated family or friend will provide       Discharge Needs Assessment    Readmission Within the Last 30 Days no previous admission in last 30 days    Equipment Currently Used at Home oxygen;cpap;nebulizer    Concerns to be Addressed discharge planning    Anticipated Changes Related to Illness none    Equipment Needed After Discharge none               Discharge Plan     Row Name 04/23/23 1616       Plan    Plan Anticipate return home with spouse. Current home O2 4L through [employer].    Patient/Family in Agreement with Plan yes    Plan Comments Met with pt in ED. Pt lives with his spouse, is IADLs and drives. PCP and pharmacy verified, denies trouble affording home medications. Pt has home oxygen at 4L through [employer]. Pt also uses CPAP and nebulizer. Pt spouse will provide transportation at discharge.              Continued Care and Services - Admitted Since 4/23/2023        Expected Discharge Date and Time     Expected Discharge Date Expected Discharge Time    Apr 26, 2023          Demographic Summary     Row Name 04/23/23 1614       General Information    Admission Type inpatient    Arrived From  emergency department    Referral Source emergency department    Reason for Consult discharge planning    Preferred Language English               Functional Status     Row Name 04/23/23 1614       Functional Status    Usual Activity Tolerance good    Current Activity Tolerance moderate       Functional Status, IADL    Medications independent    Meal Preparation independent    Housekeeping independent    Laundry independent    Shopping independent       Mental Status    General Appearance WDL WDL                      Vielka Purcell, RN

## 2023-04-23 NOTE — H&P
[facility] Medicine Services      Patient Name: Brenden Peter  : 1950  MRN: [id number]  Primary Care Physician:  Bert Rojas MD  Date of admission: 2023      Subjective      Chief Complaint: Severe shortness of breath    History of Present Illness: Brenden Peter is a 72 y.o. male who presented to [facility] on 2023 complaining of severe shortness of breath.  Patient was recently discharged from the hospital after battling rhinovirus infection and having severe hypoxemia and bronchitis.  Patient was seen by pulmonary and had a bronchoscopy at that time.  He went home and was doing okay for several days but then began to have shortness of breath again.  Fortunately he waited several days before coming in.  Patient presented today in severe respiratory distress and received steroids due to wheezing and labored breathing.  Supplemental oxygen was increased to 6 to 7 L.  Patient denies any fever or chills.  Denies any abdominal pain diarrhea constipation or dysuria.  He states he had his next appointment with Dr. Greenwood of pulmonary on Thursday but did not quite make it.      ROS negative except as above    Personal History     Past Medical History:   Diagnosis Date   • (HFpEF) heart failure with preserved ejection fraction 2023   • Abnormal EKG 2/3/2015   • Anxiety    • COPD (chronic obstructive pulmonary disease)    • Coronary artery disease 3/3/2015   • Diabetes     type 2   • Hyperlipidemia    • Hypertension    • Microalbuminuria due to type 2 diabetes mellitus 2020   • Multinodular goiter 2019   • Murmur 2/3/2015   • Primary osteoarthritis of left knee 10/22/2021       Past Surgical History:   Procedure Laterality Date   • BRONCHOSCOPY N/A 2023    Procedure: BRONCHOSCOPY, bronchial alveolar lavage right upper lobe;  Surgeon: Juajno Castañeda MD;  Location: [facility];  Service: Pulmonary;  Laterality: N/A;  post: pneumonia   •  CARDIAC CATHETERIZATION Left 1/15/2023    Procedure: Left Heart Cath and coronary angiogram;  Surgeon: Shawn Baker MD;  Location: [facility] CATH INVASIVE LOCATION;  Service: Cardiovascular;  Laterality: Left;   • EYE SURGERY      cataract   • TOTAL KNEE ARTHROPLASTY Right 10/21/2020    Procedure: TOTAL KNEE ARTHROPLASTY;  Surgeon: Ravi Fagan MD;  Location: [facility] MAIN OR;  Service: Orthopedics;  Laterality: Right;   • TOTAL KNEE ARTHROPLASTY REVISION Right 5/19/2021    Procedure: KNEE POLY INSERT EXCHANGE with irrigation;  Surgeon: Ravi Fagan MD;  Location: [facility] MAIN OR;  Service: Orthopedics;  Laterality: Right;       Family History: family history includes Cancer in an other family member; Diabetes in an other family member; Heart disease in an other family member; No Known Problems in his brother, father, maternal aunt, maternal grandfather, maternal grandmother, maternal uncle, mother, paternal aunt, paternal grandfather, paternal grandmother, paternal uncle, and sister. Otherwise pertinent FHx was reviewed and not pertinent to current issue.    Social History:  reports that he quit smoking about 9 months ago. His smoking use included cigarettes. He has a 5.00 pack-year smoking history. He has been exposed to tobacco smoke. He quit smokeless tobacco use about 9 months ago. He reports that he does not currently use alcohol. He reports that he does not use drugs.    Home Medications:  Prior to Admission Medications     Prescriptions Last Dose Informant Patient Reported? Taking?    amLODIPine (NORVASC) 5 MG tablet   Yes No    Take 1 tablet by mouth Every Night.    bisoprolol (ZEBeta) 5 MG tablet   Yes No    Take 1 tablet by mouth Daily.    budesonide (PULMICORT) 0.5 MG/2ML nebulizer solution   No No    Take 2 mL by nebulization 2 (Two) Times a Day. J44.9    budesonide-formoterol (SYMBICORT) 160-4.5 MCG/ACT inhaler   No No    Inhale 2 puffs 2 (Two) Times a Day for 90 days.    furosemide (LASIX) 40 MG tablet    No No    Take 1 tablet by mouth Daily for 90 days.    gabapentin (NEURONTIN) 300 MG capsule   Yes No    Take 1 capsule by mouth 3 (Three) Times a Day.    glipizide (GLUCOTROL) 10 MG tablet   Yes No    Take 1 tablet by mouth Every Night.    guaiFENesin (MUCINEX) 600 MG 12 hr tablet   No No    Take 2 tablets by mouth Every 12 (Twelve) Hours.    ipratropium-albuterol (DUO-NEB) 0.5-2.5 mg/3 ml nebulizer   No No    Take 3 mL by nebulization Every 6 (Six) Hours As Needed for Wheezing or Shortness of Air for up to 120 days.    lisinopril (PRINIVIL,ZESTRIL) 40 MG tablet   Yes No    Take 1 tablet by mouth Every Night.    metFORMIN (GLUCOPHAGE) 1000 MG tablet   Yes No    Take 1 tablet by mouth 2 (Two) Times a Day With Meals.    pioglitazone (ACTOS) 30 MG tablet   Yes No    Take 1 tablet by mouth Every Night.    roflumilast (DALIRESP) 250 MCG tablet tablet   No No    Take 1 tablet by mouth Daily.    sertraline (ZOLOFT) 50 MG tablet   Yes No    Take 1 tablet by mouth Every Night.    vitamin D (ERGOCALCIFEROL) 1.25 MG (15643 UT) capsule capsule   Yes No    1 capsule Every 7 (Seven) Days. Wednesday            Allergies:  No Known Allergies    Objective      Vitals:   Temp:  [97.8 °F (36.6 °C)] 97.8 °F (36.6 °C)  Heart Rate:  [67-74] 71  Resp:  [20-22] 20  BP: (145-154)/(62-70) 145/70  Flow (L/min):  [5-6] 5    Physical Exam  Vitals reviewed.   Constitutional:       Comments: Labored breathing  Wearing 6 L nasal cannula  Nurse at bedside   HENT:      Head: Normocephalic.   Cardiovascular:      Rate and Rhythm: Normal rate.   Pulmonary:      Effort: Pulmonary effort is normal.      Breath sounds: Wheezing, rhonchi and rales present.   Abdominal:      General: Abdomen is flat.      Palpations: Abdomen is soft.   Musculoskeletal:         General: Normal range of motion.      Cervical back: Normal range of motion.      Right lower leg: Edema present.      Left lower leg: Edema present.   Skin:     General: Skin is warm.    Neurological:      General: No focal deficit present.      Mental Status: He is alert and oriented to person, place, and time.   Psychiatric:         Mood and Affect: Mood normal.         Behavior: Behavior normal.          Result Review    Result Review:  I have personally reviewed the results from the time of this admission to 4/23/2023 14:39 EDT and agree with these findings:  [x]  Laboratory  []  Microbiology  []  Radiology  []  EKG/Telemetry   []  Cardiology/Vascular   []  Pathology  []  Old records  []  Other:  Most notable findings include: Lactic acid 3.6.  ABG is within normal limits.  Mild anemia hemoglobin 9.8      Assessment & Plan      72-year-old gentleman that presents with acute on chronic respiratory failure    Active Hospital Problems:  There are no active hospital problems to display for this patient.    Plan:   Acute on chronic respiratory failure  Likely due to COPD exacerbation  Solu-Medrol ordered  Pulmonary consulted  1 dose of Lasix IV ordered  Supplemental oxygen as needed  DuoNebs ordered    Recent rhinovirus infection  Seems to have resolved at this point    History of hypertension  Resume amlodipine and bisoprolol as well as lisinopril    History of neuropathy  Resume gabapentin    History of type 2 diabetes  Diabetic diet  Sliding scale ordered    History of anxiety depression  Resume Zoloft      DVT prophylaxis:  No DVT prophylaxis order currently exists.    CODE STATUS:       Admission Status:  I believe this patient meets inpatient status.    I discussed the patient's findings and my recommendations with patient.    This patient has been examined wearing appropriate Personal Protective Equipment and discussed with Patient. 04/23/23      Signature: Sukhdeep Prince MD

## 2023-04-23 NOTE — Clinical Note
Level of Care: Telemetry [5]   Admitting Physician: JAMEL MONTENEGRO [[id number]]   Attending Physician: JAMEL MONTENEGRO [[id number]]   Bed Request Comments: pcu

## 2023-04-23 NOTE — ED PROVIDER NOTES
Subjective   History of Present Illness  Chief complaint shortness of breath    History of present illness 72-year-old male Home O2 dependent COPD 4 L wears CPAP at night recently admitted to the hospital discharged on 11 April after having a COPD and rhinovirus and pneumonia.  Patient dates he is finished some antibiotics still on steroids he states he had increasing shortness of breath the last few days.  Cough nonproductive.  He states he can even take a few steps without struggling.  There is no chest pain neck arm or jaw pain he denies fever or chills.  No one in the home with similar illness no leg pain or swelling.  Denies any other ill exposures.  He states that his oxygen was running low in the 80s and he had to turn it up to 6 L.  The patient states that he has been using his breathing treatments every 4 hours instead of every 6 hours.        Review of Systems   Constitutional: Negative for chills and fever.   Respiratory: Positive for cough and shortness of breath. Negative for chest tightness.    Cardiovascular: Negative for chest pain and leg swelling.   Gastrointestinal: Negative for abdominal pain and vomiting.   Endocrine: Negative for cold intolerance and heat intolerance.   Musculoskeletal: Negative for back pain and neck pain.   Skin: Negative for rash and wound.   Neurological: Negative for dizziness and light-headedness.   Psychiatric/Behavioral: Negative for agitation and confusion.       Past Medical History:   Diagnosis Date   • (HFpEF) heart failure with preserved ejection fraction 4/1/2023   • Abnormal EKG 2/3/2015   • Anxiety    • COPD (chronic obstructive pulmonary disease)    • Coronary artery disease 3/3/2015   • Diabetes     type 2   • Hyperlipidemia    • Hypertension    • Microalbuminuria due to type 2 diabetes mellitus 2/5/2020   • Multinodular goiter 4/18/2019   • Murmur 2/3/2015   • Primary osteoarthritis of left knee 10/22/2021       No Known Allergies    Past Surgical History:    Procedure Laterality Date   • BRONCHOSCOPY N/A 2023    Procedure: BRONCHOSCOPY, bronchial alveolar lavage right upper lobe;  Surgeon: Juanjo Castañeda MD;  Location: [facility] ENDOSCOPY;  Service: Pulmonary;  Laterality: N/A;  post: pneumonia   • CARDIAC CATHETERIZATION Left 1/15/2023    Procedure: Left Heart Cath and coronary angiogram;  Surgeon: Shawn Baker MD;  Location: [facility] CATH INVASIVE LOCATION;  Service: Cardiovascular;  Laterality: Left;   • EYE SURGERY      cataract   • TOTAL KNEE ARTHROPLASTY Right 10/21/2020    Procedure: TOTAL KNEE ARTHROPLASTY;  Surgeon: Ravi Fagan MD;  Location: [facility] MAIN OR;  Service: Orthopedics;  Laterality: Right;   • TOTAL KNEE ARTHROPLASTY REVISION Right 2021    Procedure: KNEE POLY INSERT EXCHANGE with irrigation;  Surgeon: Ravi Fagan MD;  Location: [facility] MAIN OR;  Service: Orthopedics;  Laterality: Right;       Family History   Problem Relation Age of Onset   • Cancer Other    • Diabetes Other    • Heart disease Other    • No Known Problems Mother    • No Known Problems Father    • No Known Problems Sister    • No Known Problems Brother    • No Known Problems Maternal Aunt    • No Known Problems Maternal Uncle    • No Known Problems Paternal Aunt    • No Known Problems Paternal Uncle    • No Known Problems Maternal Grandmother    • No Known Problems Maternal Grandfather    • No Known Problems Paternal Grandmother    • No Known Problems Paternal Grandfather    • Anemia Neg Hx    • Arrhythmia Neg Hx    • Asthma Neg Hx    • Clotting disorder Neg Hx    • Fainting Neg Hx    • Heart attack Neg Hx    • Heart failure Neg Hx    • Hyperlipidemia Neg Hx    • Hypertension Neg Hx        Social History     Socioeconomic History   • Marital status:    Tobacco Use   • Smoking status: Former     Packs/day: 1.00     Years: 5.00     Pack years: 5.00     Types: Cigarettes     Quit date: 7/3/2022     Years since quittin.8     Passive exposure: Past   • Smokeless  tobacco: Former     Quit date: 7/3/2022   Vaping Use   • Vaping Use: Never used   Substance and Sexual Activity   • Alcohol use: Not Currently   • Drug use: Never   • Sexual activity: Defer     Prior to Admission medications    Medication Sig Start Date End Date Taking? Authorizing Provider   amLODIPine (NORVASC) 5 MG tablet Take 1 tablet by mouth Every Night. 4/14/20   Felix Solano MD   bisoprolol (ZEBeta) 5 MG tablet Take 1 tablet by mouth Daily. 10/6/20   Felix Solano MD   budesonide (PULMICORT) 0.5 MG/2ML nebulizer solution Take 2 mL by nebulization 2 (Two) Times a Day. J44.9 4/3/23   Kareem Landa MD   budesonide-formoterol (SYMBICORT) 160-4.5 MCG/ACT inhaler Inhale 2 puffs 2 (Two) Times a Day for 90 days. 4/11/23 7/10/23  Aditya Liang MD   furosemide (LASIX) 40 MG tablet Take 1 tablet by mouth Daily for 90 days. 1/16/23 4/16/23  Darius Porter MD   gabapentin (NEURONTIN) 300 MG capsule Take 1 capsule by mouth 3 (Three) Times a Day.    Felix Solano MD   glipizide (GLUCOTROL) 10 MG tablet Take 1 tablet by mouth Every Night.    Felix Solano MD   guaiFENesin (MUCINEX) 600 MG 12 hr tablet Take 2 tablets by mouth Every 12 (Twelve) Hours. 4/3/23   Kareem Landa MD   ipratropium-albuterol (DUO-NEB) 0.5-2.5 mg/3 ml nebulizer Take 3 mL by nebulization Every 6 (Six) Hours As Needed for Wheezing or Shortness of Air for up to 120 days. 4/11/23 8/9/23  Aditya Liang MD   lisinopril (PRINIVIL,ZESTRIL) 40 MG tablet Take 1 tablet by mouth Every Night.    Felix Solano MD   metFORMIN (GLUCOPHAGE) 1000 MG tablet Take 1 tablet by mouth 2 (Two) Times a Day With Meals. 4/14/20   Felix Solano MD   pioglitazone (ACTOS) 30 MG tablet Take 1 tablet by mouth Every Night.    Felix Solano MD   roflumilast (DALIRESP) 250 MCG tablet tablet Take 1 tablet by mouth Daily. 4/11/23   Aditya Liang MD   sertraline (ZOLOFT) 50 MG tablet Take 1 tablet by  mouth Every Night. 4/14/20   ProviderFelix MD   vitamin D (ERGOCALCIFEROL) 1.25 MG (13587 UT) capsule capsule 1 capsule Every 7 (Seven) Days. Wednesday 4/14/20   ProviderFelix MD           Objective   Physical Exam  Constitutional this is a 72-year-old awake alert moderate distress.  Vital signs have been reviewed triage patient on 6 L with sats 100%.  HEENT extraocular muscles are intact pupils equal reactive sclera clear neck supple no adenopathy no JVD no bruits lungs diffuse scattered wheezes and rhonchi throughout no retractions heart regular without murmur abdomen soft that tenderness good bowel sounds no peritoneal findings or pulsatile masses extremities no edema cords or Homans' sign no evidence of DVT.  Skin warm and dry without rashes neurologic awake alert and follows commands motor strength normal without focal weakness  Procedures           ED Course      Results for orders placed or performed during the hospital encounter of 04/23/23   Comprehensive Metabolic Panel    Specimen: Blood   Result Value Ref Range    Glucose 171 (H) 65 - 99 mg/dL    BUN 7 (L) 8 - 23 mg/dL    Creatinine 0.49 (L) 0.76 - 1.27 mg/dL    Sodium 141 136 - 145 mmol/L    Potassium 4.1 3.5 - 5.2 mmol/L    Chloride 103 98 - 107 mmol/L    CO2 27.0 22.0 - 29.0 mmol/L    Calcium 8.8 8.6 - 10.5 mg/dL    Total Protein 7.3 6.0 - 8.5 g/dL    Albumin 3.3 (L) 3.5 - 5.2 g/dL    ALT (SGPT) 13 1 - 41 U/L    AST (SGOT) 18 1 - 40 U/L    Alkaline Phosphatase 60 39 - 117 U/L    Total Bilirubin 0.2 0.0 - 1.2 mg/dL    Globulin 4.0 gm/dL    A/G Ratio 0.8 g/dL    BUN/Creatinine Ratio 14.3 7.0 - 25.0    Anion Gap 11.0 5.0 - 15.0 mmol/L    eGFR 109.0 >60.0 mL/min/1.73   BNP    Specimen: Blood   Result Value Ref Range    proBNP 791.5 0.0 - 900.0 pg/mL   CBC Auto Differential    Specimen: Blood   Result Value Ref Range    WBC 6.00 3.40 - 10.80 10*3/mm3    RBC 3.57 (L) 4.14 - 5.80 10*6/mm3    Hemoglobin 9.8 (L) 13.0 - 17.7 g/dL    Hematocrit  31.2 (L) 37.5 - 51.0 %    MCV 87.5 79.0 - 97.0 fL    MCH 27.5 26.6 - 33.0 pg    MCHC 31.5 31.5 - 35.7 g/dL    RDW 16.1 (H) 12.3 - 15.4 %    RDW-SD 49.4 37.0 - 54.0 fl    MPV 9.0 6.0 - 12.0 fL    Platelets 242 140 - 450 10*3/mm3    Neutrophil % 77.2 (H) 42.7 - 76.0 %    Lymphocyte % 15.6 (L) 19.6 - 45.3 %    Monocyte % 4.9 (L) 5.0 - 12.0 %    Eosinophil % 1.2 0.3 - 6.2 %    Basophil % 1.1 0.0 - 1.5 %    Neutrophils, Absolute 4.70 1.70 - 7.00 10*3/mm3    Lymphocytes, Absolute 0.90 0.70 - 3.10 10*3/mm3    Monocytes, Absolute 0.30 0.10 - 0.90 10*3/mm3    Eosinophils, Absolute 0.10 0.00 - 0.40 10*3/mm3    Basophils, Absolute 0.10 0.00 - 0.20 10*3/mm3    nRBC 0.1 0.0 - 0.2 /100 WBC   STAT Lactic Acid, Reflex    Specimen: Blood   Result Value Ref Range    Lactate 3.6 (C) 0.5 - 2.0 mmol/L   Blood Gas, Arterial -    Specimen: Arterial Blood   Result Value Ref Range    Site Right Brachial     Scar's Test Positive     pH, Arterial 7.437 7.350 - 7.450 pH units    pCO2, Arterial 42.7 35.0 - 48.0 mm Hg    pO2, Arterial 83.1 83.0 - 108.0 mm Hg    HCO3, Arterial 28.8 (H) 21.0 - 28.0 mmol/L    Base Excess, Arterial 4.1 (H) 0.0 - 3.0 mmol/L    O2 Saturation, Arterial 96.5 94.0 - 98.0 %    CO2 Content 30.1 (H) 22 - 29 mmol/L    Barometric Pressure for Blood Gas      Modality Cannula     FIO2 40 %    Hemodilution No    Magnesium    Specimen: Blood   Result Value Ref Range    Magnesium 1.8 1.6 - 2.4 mg/dL   Phosphorus    Specimen: Blood   Result Value Ref Range    Phosphorus 2.8 2.5 - 4.5 mg/dL   POC Lactate    Specimen: Blood   Result Value Ref Range    Lactate 2.4 (C) 0.3 - 2.0 mmol/L   POC Glucose Once    Specimen: Blood   Result Value Ref Range    Glucose 252 (H) 70 - 105 mg/dL   ECG 12 Lead Dyspnea   Result Value Ref Range    QT Interval 441 ms     XR Chest 1 View    Result Date: 4/23/2023  Impression: 1. Cardiomegaly with pulmonary vascular congestion and bilateral perihilar haziness suggesting pulmonary edema/CHF or less likely  atypical infection pattern. 2. Denser consolidation in the medial right lung base may be due to pneumonia, aspiration or atelectasis. Electronically Signed: Preet Josh  4/23/2023 1:43 PM EDT  Workstation ID: [id number]    Medications   sodium chloride 0.9 % flush 10 mL (has no administration in time range)   amLODIPine (NORVASC) tablet 5 mg (5 mg Oral Given 4/23/23 2027)   bisoprolol (ZEBeta) tablet 5 mg (has no administration in time range)   budesonide-formoterol (SYMBICORT) 160-4.5 MCG/ACT inhaler 2 puff (2 puffs Inhalation Not Given 4/23/23 1516)   furosemide (LASIX) tablet 40 mg (has no administration in time range)   gabapentin (NEURONTIN) capsule 300 mg (300 mg Oral Given 4/23/23 2027)   guaiFENesin (MUCINEX) 12 hr tablet 1,200 mg (1,200 mg Oral Given 4/23/23 2027)   ipratropium-albuterol (DUO-NEB) nebulizer solution 3 mL (3 mL Nebulization Given 4/23/23 1805)   lisinopril (PRINIVIL,ZESTRIL) tablet 40 mg (40 mg Oral Given 4/23/23 2027)   sertraline (ZOLOFT) tablet 50 mg (50 mg Oral Given 4/23/23 2027)   dextrose (GLUTOSE) oral gel 15 g (has no administration in time range)   dextrose (D50W) (25 g/50 mL) IV injection 25 g (has no administration in time range)   glucagon (GLUCAGEN) injection 1 mg (has no administration in time range)   insulin lispro (ADMELOG) injection 2-7 Units (4 Units Subcutaneous Given 4/23/23 2027)   sodium chloride 0.9 % flush 10 mL (10 mL Intravenous Given 4/23/23 2011)   sodium chloride 0.9 % flush 10 mL (has no administration in time range)   sodium chloride 0.9 % infusion 40 mL (has no administration in time range)   Enoxaparin Sodium (LOVENOX) syringe 40 mg (40 mg Subcutaneous Given 4/23/23 1614)   nitroglycerin (NITROSTAT) SL tablet 0.4 mg (has no administration in time range)   acetaminophen (TYLENOL) tablet 650 mg (has no administration in time range)     Or   acetaminophen (TYLENOL) 160 MG/5ML solution 650 mg (has no administration in time range)     Or   acetaminophen (TYLENOL)  suppository 650 mg (has no administration in time range)   ondansetron (ZOFRAN) tablet 4 mg (has no administration in time range)     Or   ondansetron (ZOFRAN) injection 4 mg (has no administration in time range)   sennosides-docusate (PERICOLACE) 8.6-50 MG per tablet 2 tablet (2 tablets Oral Given 4/23/23 2027)     And   polyethylene glycol (MIRALAX) packet 17 g (has no administration in time range)     And   bisacodyl (DULCOLAX) EC tablet 5 mg (has no administration in time range)     And   bisacodyl (DULCOLAX) suppository 10 mg (has no administration in time range)   potassium chloride (K-DUR,KLOR-CON) CR tablet 40 mEq (has no administration in time range)   potassium chloride (KLOR-CON) packet 40 mEq (has no administration in time range)   Magnesium Sulfate - Total Dose 10 grams - Magnesium 1 or Less (has no administration in time range)     Or   Magnesium Sulfate - Total Dose 6 grams - Magnesium 1.1 - 1.5 (has no administration in time range)     Or   Magnesium Sulfate - Total Dose 4 grams - Magnesium 1.6 - 1.9 (has no administration in time range)   potassium & sodium phosphates (PHOS-NAK) 280-160-250 MG packet - for Phosphorus less than 1.25 mg/dL (has no administration in time range)     Or   potassium & sodium phosphates (PHOS-NAK) 280-160-250 MG packet - for Phosphorus 1.25 - 2.5 mg/dL (has no administration in time range)   cefTRIAXone (ROCEPHIN) 2 g in sodium chloride 0.9 % 100 mL IVPB (0 g Intravenous Stopped 4/23/23 1652)   methylPREDNISolone sodium succinate (SOLU-Medrol) injection 40 mg (40 mg Intravenous Given 4/23/23 2026)   methylPREDNISolone sodium succinate (SOLU-Medrol) injection 125 mg (125 mg Intravenous Given 4/23/23 1314)   ipratropium-albuterol (DUO-NEB) nebulizer solution 3 mL (3 mL Nebulization Given 4/23/23 1233)   albuterol (PROVENTIL) nebulizer solution 0.083% 2.5 mg/3mL (2.5 mg Nebulization Given 4/23/23 1237)   furosemide (LASIX) injection 20 mg (20 mg Intravenous Given 4/23/23  1820)          EKG my interpretation normal sinus rhythm rate 67 right bundle branch block interventricular conduction delay QTc of 466 really no significant change from his previous EKG from 4/7/2023 is an abnormal EKG     Record reviewed discharge summary 4/11/2023 he was admitted for COPD the patient at that time had rhinovirus and had a bronc and he ended up with strep pneumonia took some antibiotics and steroids.  Patient had a echocardiogram March 2023 ejection fraction of 61 to 65%.                             Medical Decision Making  Medical decision making IV established monitor placed with a sinus rhythm on my review.  EKG obtained independent reviewed by me showed a sinus rhythm with right bundle branch block interventricular conduction delay.  Unchanged from previous from 4/7/2023 abnormal EKG.  Patient was given a DuoNeb and albuterol 125 mg Solu-Medrol IV.  Labs obtained and pending by me competence metabolic profile remarkable other sugar 171 BNP was 791 CBC unremarkable hemoglobin 9.8 blood gas pH 7.4 PCO2 42 PO2 of 83 initial lactate 2.4 phosphorus 2.8 magnesium 1.8 chest x-ray my independent review I see chronic lung changes COPD no active acute infiltrate or active CHF on my review.  Radiology cardiomegaly with pulmonary vascular ingestion and perihilar haziness suggesting pulmonary edema or CHF less likely atypical infection.  Denser consolidation in the right lower lung base could be aspiration atelectasis or pneumonia.  Patient has had no fever count was unremarkable lactate only 2.4.  Patient record reviewed just discharged on 4/11/2023 he was admitted for COPD had rhinovirus and had a bronc with send it was some strep pneumonia and took some antibiotics and steroids which she has recently completed an echocardiogram done March 2023 with ejection fraction of 61 to 65%.  On repeat exam he was on 6 L of oxygen satting in the mid 90s he is feeling much better had a few scattered wheezes.  He  received treatment prior to arrival he is got steroids and 2 treatments here.  I do not see evidence of an acute new pneumonia or any evidence that she has he CHF pulmonary embolism or dissection acute myocardial infarction or ischemia based on the history physical and clinical exam.  He will be admitted to the hospital I have talked to the hospitalist discussed the case stable otherwise unremarkable ER course.  Improved    Acute respiratory distress: acute illness or injury  Chronic obstructive pulmonary disease with acute exacerbation: acute illness or injury  Amount and/or Complexity of Data Reviewed  External Data Reviewed: ECG and notes.  Labs: ordered. Decision-making details documented in ED Course.  Radiology: ordered and independent interpretation performed. Decision-making details documented in ED Course.  ECG/medicine tests: ordered and independent interpretation performed. Decision-making details documented in ED Course.      Risk  Decision regarding hospitalization.          Final diagnoses:   Acute respiratory distress   Chronic obstructive pulmonary disease with acute exacerbation       ED Disposition  ED Disposition     ED Disposition   Decision to Admit    Condition   --    Comment   Level of Care: Telemetry [5]   Diagnosis: Acute respiratory distress [359630]   Admitting Physician: JAMEL MONTENEGRO [[id number]]   Attending Physician: JAMEL MONTENEGRO [[id number]]   Certification: I Certify That Inpatient Hospital Services Are Medically Necessary For Greater Than 2 Midnights               No follow-up provider specified.       Medication List      No changes were made to your prescriptions during this visit.          Leo Hughes MD  04/23/23 5950

## 2023-04-24 LAB
ALBUMIN SERPL-MCNC: 3.2 G/DL (ref 3.5–5.2)
ALBUMIN/GLOB SERPL: 0.9 G/DL
ALP SERPL-CCNC: 53 U/L (ref 39–117)
ALT SERPL W P-5'-P-CCNC: 12 U/L (ref 1–41)
ANION GAP SERPL CALCULATED.3IONS-SCNC: 11 MMOL/L (ref 5–15)
AST SERPL-CCNC: 11 U/L (ref 1–40)
BASOPHILS # BLD AUTO: 0 10*3/MM3 (ref 0–0.2)
BASOPHILS NFR BLD AUTO: 0.1 % (ref 0–1.5)
BILIRUB SERPL-MCNC: 0.2 MG/DL (ref 0–1.2)
BUN SERPL-MCNC: 14 MG/DL (ref 8–23)
BUN/CREAT SERPL: 28 (ref 7–25)
CALCIUM SPEC-SCNC: 8.8 MG/DL (ref 8.6–10.5)
CHLORIDE SERPL-SCNC: 103 MMOL/L (ref 98–107)
CO2 SERPL-SCNC: 27 MMOL/L (ref 22–29)
CREAT SERPL-MCNC: 0.5 MG/DL (ref 0.76–1.27)
CRP SERPL-MCNC: 2.86 MG/DL (ref 0–0.5)
DEPRECATED RDW RBC AUTO: 50.8 FL (ref 37–54)
EGFRCR SERPLBLD CKD-EPI 2021: 108.4 ML/MIN/1.73
EOSINOPHIL # BLD AUTO: 0 10*3/MM3 (ref 0–0.4)
EOSINOPHIL NFR BLD AUTO: 0.1 % (ref 0.3–6.2)
ERYTHROCYTE [DISTWIDTH] IN BLOOD BY AUTOMATED COUNT: 15.9 % (ref 12.3–15.4)
ERYTHROCYTE [SEDIMENTATION RATE] IN BLOOD: 78 MM/HR (ref 0–20)
GLOBULIN UR ELPH-MCNC: 3.5 GM/DL
GLUCOSE BLDC GLUCOMTR-MCNC: 236 MG/DL (ref 70–105)
GLUCOSE BLDC GLUCOMTR-MCNC: 318 MG/DL (ref 70–105)
GLUCOSE BLDC GLUCOMTR-MCNC: 356 MG/DL (ref 70–105)
GLUCOSE BLDC GLUCOMTR-MCNC: 359 MG/DL (ref 70–105)
GLUCOSE BLDC GLUCOMTR-MCNC: 391 MG/DL (ref 70–105)
GLUCOSE SERPL-MCNC: 313 MG/DL (ref 65–99)
HCT VFR BLD AUTO: 28.5 % (ref 37.5–51)
HGB BLD-MCNC: 9.2 G/DL (ref 13–17.7)
LYMPHOCYTES # BLD AUTO: 0.7 10*3/MM3 (ref 0.7–3.1)
LYMPHOCYTES NFR BLD AUTO: 10.5 % (ref 19.6–45.3)
MAGNESIUM SERPL-MCNC: 1.7 MG/DL (ref 1.6–2.4)
MCH RBC QN AUTO: 27.6 PG (ref 26.6–33)
MCHC RBC AUTO-ENTMCNC: 32.1 G/DL (ref 31.5–35.7)
MCV RBC AUTO: 85.8 FL (ref 79–97)
MONOCYTES # BLD AUTO: 0.1 10*3/MM3 (ref 0.1–0.9)
MONOCYTES NFR BLD AUTO: 1.6 % (ref 5–12)
NEUTROPHILS NFR BLD AUTO: 5.7 10*3/MM3 (ref 1.7–7)
NEUTROPHILS NFR BLD AUTO: 87.7 % (ref 42.7–76)
NRBC BLD AUTO-RTO: 0 /100 WBC (ref 0–0.2)
PHOSPHATE SERPL-MCNC: 3.2 MG/DL (ref 2.5–4.5)
PLATELET # BLD AUTO: 228 10*3/MM3 (ref 140–450)
PMV BLD AUTO: 9.1 FL (ref 6–12)
POTASSIUM SERPL-SCNC: 4 MMOL/L (ref 3.5–5.2)
PROCALCITONIN SERPL-MCNC: 0.07 NG/ML (ref 0–0.25)
PROT SERPL-MCNC: 6.7 G/DL (ref 6–8.5)
QT INTERVAL: 441 MS
RBC # BLD AUTO: 3.32 10*6/MM3 (ref 4.14–5.8)
SODIUM SERPL-SCNC: 141 MMOL/L (ref 136–145)
WBC NRBC COR # BLD: 6.4 10*3/MM3 (ref 3.4–10.8)

## 2023-04-24 PROCEDURE — 94761 N-INVAS EAR/PLS OXIMETRY MLT: CPT

## 2023-04-24 PROCEDURE — 85025 COMPLETE CBC W/AUTO DIFF WBC: CPT | Performed by: INTERNAL MEDICINE

## 2023-04-24 PROCEDURE — 25010000002 ENOXAPARIN PER 10 MG: Performed by: INTERNAL MEDICINE

## 2023-04-24 PROCEDURE — 25010000002 METHYLPREDNISOLONE PER 40 MG: Performed by: INTERNAL MEDICINE

## 2023-04-24 PROCEDURE — 63710000001 INSULIN LISPRO (HUMAN) PER 5 UNITS: Performed by: INTERNAL MEDICINE

## 2023-04-24 PROCEDURE — 94799 UNLISTED PULMONARY SVC/PX: CPT

## 2023-04-24 PROCEDURE — 36415 COLL VENOUS BLD VENIPUNCTURE: CPT | Performed by: INTERNAL MEDICINE

## 2023-04-24 PROCEDURE — 25010000002 CEFTRIAXONE PER 250 MG: Performed by: INTERNAL MEDICINE

## 2023-04-24 PROCEDURE — 86140 C-REACTIVE PROTEIN: CPT | Performed by: INTERNAL MEDICINE

## 2023-04-24 PROCEDURE — 63710000001 INSULIN REGULAR HUMAN PER 5 UNITS: Performed by: INTERNAL MEDICINE

## 2023-04-24 PROCEDURE — 82962 GLUCOSE BLOOD TEST: CPT

## 2023-04-24 PROCEDURE — 85652 RBC SED RATE AUTOMATED: CPT | Performed by: INTERNAL MEDICINE

## 2023-04-24 PROCEDURE — 84145 PROCALCITONIN (PCT): CPT | Performed by: INTERNAL MEDICINE

## 2023-04-24 PROCEDURE — 84100 ASSAY OF PHOSPHORUS: CPT | Performed by: INTERNAL MEDICINE

## 2023-04-24 PROCEDURE — 83735 ASSAY OF MAGNESIUM: CPT | Performed by: INTERNAL MEDICINE

## 2023-04-24 PROCEDURE — 80053 COMPREHEN METABOLIC PANEL: CPT | Performed by: INTERNAL MEDICINE

## 2023-04-24 RX ORDER — DIPHENHYDRAMINE HYDROCHLORIDE AND LIDOCAINE HYDROCHLORIDE AND ALUMINUM HYDROXIDE AND MAGNESIUM HYDRO
10 KIT EVERY 6 HOURS SCHEDULED
Status: DISCONTINUED | OUTPATIENT
Start: 2023-04-24 | End: 2023-04-26 | Stop reason: HOSPADM

## 2023-04-24 RX ORDER — INSULIN LISPRO 100 [IU]/ML
4-24 INJECTION, SOLUTION INTRAVENOUS; SUBCUTANEOUS
Status: DISCONTINUED | OUTPATIENT
Start: 2023-04-24 | End: 2023-04-26 | Stop reason: HOSPADM

## 2023-04-24 RX ORDER — IPRATROPIUM BROMIDE AND ALBUTEROL SULFATE 2.5; .5 MG/3ML; MG/3ML
3 SOLUTION RESPIRATORY (INHALATION)
Status: DISCONTINUED | OUTPATIENT
Start: 2023-04-24 | End: 2023-04-26 | Stop reason: HOSPADM

## 2023-04-24 RX ORDER — BUDESONIDE 0.5 MG/2ML
0.5 INHALANT ORAL
Status: DISCONTINUED | OUTPATIENT
Start: 2023-04-24 | End: 2023-04-26 | Stop reason: HOSPADM

## 2023-04-24 RX ADMIN — GABAPENTIN 300 MG: 300 CAPSULE ORAL at 08:29

## 2023-04-24 RX ADMIN — GUAIFENESIN 1200 MG: 600 TABLET, EXTENDED RELEASE ORAL at 21:17

## 2023-04-24 RX ADMIN — Medication 10 ML: at 21:12

## 2023-04-24 RX ADMIN — DIPHENHYDRAMINE HYDROCHLORIDE AND LIDOCAINE HYDROCHLORIDE AND ALUMINUM HYDROXIDE AND MAGNESIUM HYDRO 10 ML: KIT at 17:23

## 2023-04-24 RX ADMIN — GUAIFENESIN 1200 MG: 600 TABLET, EXTENDED RELEASE ORAL at 08:29

## 2023-04-24 RX ADMIN — CEFTRIAXONE 2 G: 2 INJECTION, POWDER, FOR SOLUTION INTRAMUSCULAR; INTRAVENOUS at 15:25

## 2023-04-24 RX ADMIN — DIPHENHYDRAMINE HYDROCHLORIDE AND LIDOCAINE HYDROCHLORIDE AND ALUMINUM HYDROXIDE AND MAGNESIUM HYDRO 10 ML: KIT at 23:27

## 2023-04-24 RX ADMIN — INSULIN LISPRO 20 UNITS: 100 INJECTION, SOLUTION INTRAVENOUS; SUBCUTANEOUS at 17:34

## 2023-04-24 RX ADMIN — Medication 10 ML: at 08:27

## 2023-04-24 RX ADMIN — SILDENAFIL CITRATE 20 MG: 20 TABLET ORAL at 11:28

## 2023-04-24 RX ADMIN — LISINOPRIL 40 MG: 20 TABLET ORAL at 21:08

## 2023-04-24 RX ADMIN — BISOPROLOL FUMARATE 5 MG: 5 TABLET ORAL at 08:28

## 2023-04-24 RX ADMIN — INSULIN LISPRO 3 UNITS: 100 INJECTION, SOLUTION INTRAVENOUS; SUBCUTANEOUS at 08:24

## 2023-04-24 RX ADMIN — IPRATROPIUM BROMIDE AND ALBUTEROL SULFATE 3 ML: .5; 3 SOLUTION RESPIRATORY (INHALATION) at 00:17

## 2023-04-24 RX ADMIN — ROFLUMILAST 500 MCG: 500 TABLET ORAL at 08:28

## 2023-04-24 RX ADMIN — GABAPENTIN 300 MG: 300 CAPSULE ORAL at 15:25

## 2023-04-24 RX ADMIN — AMLODIPINE BESYLATE 5 MG: 5 TABLET ORAL at 21:08

## 2023-04-24 RX ADMIN — BUDESONIDE AND FORMOTEROL FUMARATE DIHYDRATE 2 PUFF: 160; 4.5 AEROSOL RESPIRATORY (INHALATION) at 06:30

## 2023-04-24 RX ADMIN — THEOPHYLLINE ANHYDROUS 300 MG: 300 CAPSULE, EXTENDED RELEASE ORAL at 08:28

## 2023-04-24 RX ADMIN — SILDENAFIL CITRATE 20 MG: 20 TABLET ORAL at 21:08

## 2023-04-24 RX ADMIN — INSULIN LISPRO 4 UNITS: 100 INJECTION, SOLUTION INTRAVENOUS; SUBCUTANEOUS at 21:12

## 2023-04-24 RX ADMIN — IPRATROPIUM BROMIDE AND ALBUTEROL SULFATE 3 ML: .5; 3 SOLUTION RESPIRATORY (INHALATION) at 19:22

## 2023-04-24 RX ADMIN — METHYLPREDNISOLONE SODIUM SUCCINATE 40 MG: 40 INJECTION, POWDER, FOR SOLUTION INTRAMUSCULAR; INTRAVENOUS at 08:25

## 2023-04-24 RX ADMIN — INSULIN HUMAN 4 UNITS: 100 INJECTION, SOLUTION PARENTERAL at 23:26

## 2023-04-24 RX ADMIN — METHYLPREDNISOLONE SODIUM SUCCINATE 40 MG: 40 INJECTION, POWDER, FOR SOLUTION INTRAMUSCULAR; INTRAVENOUS at 14:12

## 2023-04-24 RX ADMIN — GABAPENTIN 300 MG: 300 CAPSULE ORAL at 21:08

## 2023-04-24 RX ADMIN — METHYLPREDNISOLONE SODIUM SUCCINATE 40 MG: 40 INJECTION, POWDER, FOR SOLUTION INTRAMUSCULAR; INTRAVENOUS at 02:20

## 2023-04-24 RX ADMIN — IPRATROPIUM BROMIDE AND ALBUTEROL SULFATE 3 ML: .5; 3 SOLUTION RESPIRATORY (INHALATION) at 15:30

## 2023-04-24 RX ADMIN — SPIRONOLACTONE 25 MG: 25 TABLET ORAL at 08:29

## 2023-04-24 RX ADMIN — FUROSEMIDE 40 MG: 40 TABLET ORAL at 08:29

## 2023-04-24 RX ADMIN — INSULIN HUMAN 4 UNITS: 100 INJECTION, SOLUTION PARENTERAL at 21:18

## 2023-04-24 RX ADMIN — METHYLPREDNISOLONE SODIUM SUCCINATE 40 MG: 40 INJECTION, POWDER, FOR SOLUTION INTRAMUSCULAR; INTRAVENOUS at 21:07

## 2023-04-24 RX ADMIN — ENOXAPARIN SODIUM 40 MG: 100 INJECTION SUBCUTANEOUS at 15:25

## 2023-04-24 RX ADMIN — IPRATROPIUM BROMIDE AND ALBUTEROL SULFATE 3 ML: .5; 3 SOLUTION RESPIRATORY (INHALATION) at 12:47

## 2023-04-24 RX ADMIN — BUDESONIDE 0.5 MG: 0.5 INHALANT RESPIRATORY (INHALATION) at 19:27

## 2023-04-24 RX ADMIN — DIPHENHYDRAMINE HYDROCHLORIDE AND LIDOCAINE HYDROCHLORIDE AND ALUMINUM HYDROXIDE AND MAGNESIUM HYDRO 10 ML: KIT at 11:25

## 2023-04-24 RX ADMIN — SERTRALINE 50 MG: 50 TABLET, FILM COATED ORAL at 21:08

## 2023-04-24 RX ADMIN — SILDENAFIL CITRATE 20 MG: 20 TABLET ORAL at 02:20

## 2023-04-24 RX ADMIN — INSULIN LISPRO 5 UNITS: 100 INJECTION, SOLUTION INTRAVENOUS; SUBCUTANEOUS at 11:44

## 2023-04-24 NOTE — CASE MANAGEMENT/SOCIAL WORK
"Continued Stay Note  [facility]     Patient Name: Brenden Peter  MRN: [id number]  Today's Date: 4/24/2023    Admit Date: 4/23/2023    Plan: Home with spouse. Possible home health. Current home O2 @4L via [employer].   Discharge Plan     Row Name 04/24/23 1107       Plan    Plan Home with spouse. Possible home health. Current home O2 @4L via [employer].    Plan Comments Pt reports he may be agreeable to home health services at discharge this time. He states he felt like he misunderstood at last [facility],and was under the impression hospice was going to be set up. We discussed services again and he is agreeable to considering palliative care vs home health. Pt also reports that he is having problems getting \"enough oxygen at night through my cpap\". He said \"they told me settings needed to be changed.\" He is unsure who \"they\" are and who his cpap provider is,though he thinks it is [facility]. Roxi with [facility] is checking to see if he is current with them for CPAP services. Pt has transferred to PCU. Voicemail left for NIKKI RIGGINS informing her of need for CPAP follow -up and possible home health/palliative services at [facility]. DC Barriers: IV Steroids; IV via [facility]               Discharge Codes    No documentation.               Expected Discharge Date and Time     Expected Discharge Date Expected Discharge Time    Apr 26, 2023         Tia Blanco RN, [facility]  Office: [phone]  Fax: [phone]  [email or web address]      I met with patient in room wearing PPE: mask and goggles.     Maintained distance greater than six feet and spent </=15 minutes in the room      Tia Blanco RN    "

## 2023-04-24 NOTE — DISCHARGE PLACEMENT REQUEST
"Brenden Lackey (72 y.o. Male)     Date of Birth   1950    Social Security Number       Address   [address] [facility]    Home Phone   [phone]    MRN   [id number]       Sikh   None    Marital Status                               Admission Date   4/23/23    Admission Type   Emergency    Admitting Provider   Sukhdeep Prince MD    Attending Provider   Sukhdeep Prince MD    Department, Room/Bed   [facility] EMERGENCY DEPARTMENT, [facility]       Discharge Date       Discharge Disposition       Discharge Destination                               Attending Provider: Sukhdeep Prince MD    Allergies: No Known Allergies    Isolation: Droplet   Infection: Rhinovirus  (04/01/23)   Code Status: CPR    Ht: 170.2 cm (67\")   Wt: 104 kg (229 lb 4.5 oz)    Admission Cmt: None   Principal Problem: Acute respiratory distress [R06.03]                 Active Insurance as of 4/23/2023     Primary Coverage     Payor Plan Insurance Group Employer/Plan Group    MEDICARE MEDICARE A & B      Payor Plan Address Payor Plan Phone Number Payor Plan Fax Number Effective Dates    [address] [phone]  10/1/2015 - None Entered    [facility] [address]       Subscriber Name Subscriber Birth Date Member ID       BRENDEN LACKEY 1950 [id number]           Secondary Coverage     Payor Plan Insurance Group Employer/Plan Group    [employer] SUP AAR HEALTH CARE OPTIONS N     Payor Plan Address Payor Plan Phone Number Payor Plan Fax Number Effective Dates    [facility] [phone]  1/1/2020 - None Entered    [address]       [facility] [id number]       Subscriber Name Subscriber Birth Date Member ID       BRENDEN LACKEY 1950 [id number]                 Emergency Contacts      (Rel.) Home Phone Work Phone Mobile Phone    RomeDonna CAMPBELL (Spouse) [phone] -- [phone]    Rigoberto Lackey (Son) -- -- [phone]    RomeRenetta (Daughter) [phone] -- --            Tia " NITHYA Blanco, [facility]  Office: [phone]  Fax: [phone]  [email or web address]      I met with patient in room wearing PPE: mask and goggles.     Maintained distance greater than six feet and spent </=15 minutes in the room

## 2023-04-24 NOTE — PROGRESS NOTES
[facility] Medicine Services Daily Progress Note    Patient Name: Brenden Peter  : 1950  MRN: [id number]  Primary Care Physician:  Bert Rojas MD  Date of admission: 2023      Subjective      Chief Complaint: Shortness of breath      Patient Reports he is feeling a lot better.  5 L nasal cannula today.  Baseline is 3-4.  Pulmonary following    ROS negative except as above      Objective      Vitals:   Temp:  [97.5 °F (36.4 °C)-98.8 °F (37.1 °C)] 97.5 °F (36.4 °C)  Heart Rate:  [55-93] 61  Resp:  [13-28] 16  BP: (109-156)/(54-75) 128/65  Flow (L/min):  [5-10] 6    Physical Exam  Vitals reviewed.   Constitutional:       Comments: Wearing 5 L nasal cannula   HENT:      Head: Normocephalic.   Cardiovascular:      Rate and Rhythm: Normal rate.   Pulmonary:      Effort: Pulmonary effort is normal.   Abdominal:      General: Abdomen is flat.      Palpations: Abdomen is soft.   Musculoskeletal:         General: Normal range of motion.      Cervical back: Normal range of motion.   Skin:     General: Skin is warm.   Neurological:      General: No focal deficit present.      Mental Status: He is alert and oriented to person, place, and time.   Psychiatric:         Mood and Affect: Mood normal.         Behavior: Behavior normal.           Result Review    Result Review:  I have personally reviewed the results from the time of this admission to 2023 16:27 EDT and agree with these findings:  [x]  Laboratory  []  Microbiology  []  Radiology  []  EKG/Telemetry   []  Cardiology/Vascular   []  Pathology  []  Old records  []  Other:  Most notable findings include: ESR 78 white count 6.4         Assessment & Plan       72-year-old gentleman that presents with acute on chronic respiratory failure     Active Hospital Problems:  There are no active hospital problems to display for this patient.     Plan:   Acute on chronic respiratory failure  Likely due to COPD  exacerbation  Solu-Medrol ordered  Pulmonary consulted  1 dose of Lasix IV ordered  Supplemental oxygen as needed  DuoNebs ordered  Down to 5 L on April 24     Recent rhinovirus infection  Seems to have resolved at this point     History of hypertension  Resume amlodipine and bisoprolol as well as lisinopril     History of neuropathy  Resume gabapentin     History of type 2 diabetes  Diabetic diet  Sliding scale ordered     History of anxiety depression  Resume Zoloft        DVT prophylaxis:  No DVT prophylaxis order currently exists.     CODE STATUS:    Admission Status:  I believe this patient meets inpatient status.     I discussed the patient's findings and my recommendations with patient.     This patient has been examined wearing appropriate Personal Protective Equipment and discussed with Patient.   04/24/23      Electronically signed by Sukhdeep Prince MD, 04/24/23, 16:27 EDT.  [facility] Hospitalist Team

## 2023-04-24 NOTE — CASE MANAGEMENT/SOCIAL WORK
Case Management Readmission Assessment Note    Case Management Readmission Assessment (all recorded)     Readmission Interview     Row Name 04/24/23 1112             Readmission Indications    Is this hospitalization related to the prior hospital diagnosis? Yes      What was the reason you were admitted? Pneumonia      Row Name 04/24/23 1112             Recommendation for rehospitalization    Did you speak with your physician prior to coming to the hospital No      Who recommended you return to the hospital? Other (comment)  self referral      Did you seek care elsewhere prior to coming to the hospital? No      Row Name 04/24/23 1112             Follow up appointment    Do you have a PCP? Yes      Did you have an appointment with PCP/specialist after hospitalization within 7 days? No      [facility] Name 04/24/23 1112             Medications    Did you have newly prescribed medications at discharge? Yes      Did you understand the reasons for your medications at discharge and how to take them? Yes      Did you understand the side effects of your medications? Yes      Are you taking all of you prescribed medications? No      If not, why? Other (comment)  Pt reports he didn't finish antibiotic because it was making him nauseas      Row Name 04/24/23 1112             Discharge Instructions    Did you understand your discharge instructions? Yes      Did your family/caregiver hear your instructions? Yes      Were you told to eat a special diet? Yes      Did you adhere to the diet? No      Were you given a number of someone to call if you had questions or concerns? Yes      [facility] Name 04/24/23 1112             Index discharge location/services    Where did you go upon discharge? Home      Do you have supportive family or friends in the home? Yes      [facility] Name 04/24/23 1112             Discharge Readiness    Recommendation based on interview Education on diagnosis/self management;Goals of care discussion/advanced care planning                 Tia Blanco RN, [facility]  Office: [phone]  Fax: [phone]  [email or web address]      I met with patient in room wearing PPE: mask and goggles.     Maintained distance greater than six feet and spent </=15 minutes in the room

## 2023-04-24 NOTE — CONSULTS
Group: [facility]         CONSULT NOTE    Patient Identification:  Brenden Peter  72 y.o.  male  1950  [id number]            Requesting physician: Attending physician    Reason for Consultation: COPD        History of Present Illness:  72-year-old male male who [facility] complaining of severe shortness of breath.  Patient was recently discharged from the hospital after battling rhinovirus infection and having severe hypoxemia and bronchitis.  had a bronchoscopy at that time.  He went home and was doing okay for several days but then began to have shortness of breath again.  Fortunately he waited several days before coming in.  Patient presented today in severe respiratory distress and received steroids due to wheezing and labored breathing.  Supplemental oxygen was increased to 6 to 7 L.  Patient denies any fever or chills.  Denies any abdominal pain diarrhea constipation or dysuria.  his next appointment with me coming on Thursday      He had similar presentation a week earlier and again in March 2023 and January 2023.    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 Calculated left ventricular EF = 65.5% Left ventricular ejection fraction appears to be 61 - 65%.  •  Left ventricular diastolic dysfunction is noted.  •  Both atrial cavities are dilated.  •  Estimated right ventricular systolic pressure from tricuspid regurgitation is markedly elevated (>55 mmHg).       s/p 4/9/2023 bronchoscopy   multiple mucous plugs     CT chest 3/9/2023 with pulm edema/emphysema/possible reactive mediastinal adenopathy with some calcified lymph nodes     Chronic obstructive pulmonary disease,   Acute on  chronic respiratory failure with hypoxemia and hypercapnia: ABGs 7.42/49.7/65.5, patient wears 4 L of home oxygen    Obstructive sleep apnea, on BiPAP     Coronary artery disease     Hypertension   Hyperlipidemia  Diabetes mellitus     Office note 3/2/2023   x-smoker (55 year history-quit 7-3-22)  dyspnea and hypoxia.  SONJA, residual AHI: 1 , No snoring, no Leak, Dnayrkxctu75 % > 4 hours  Severe COPD:  PFT FEV1 1.12L which is 41%, post BD 51%, ratio 57, ERV 17%, %, TLC 82%, DLCO 27%, obstructive sleep apnea features  mild interstitial lung disease  PMH:  HTN, DM, HLD        Plan:     Start  Revatio for PAH  Add Daliresp 500 mcg  Add Theophylline  Add lasix and aldacton    oxygen supplement and titration to maintain saturation 90 to 95%     home BiPAP device at night  Bronchodilatores     Antibiotics: rocephine     Inhaled corticosteroids        Review of Sytems:  Review of Systems   Respiratory: Positive for cough and shortness of breath. Negative for wheezing and stridor.    Cardiovascular: Positive for palpitations and leg swelling. Negative for chest pain.       Past Medical History:  Past Medical History:   Diagnosis Date   • (HFpEF) heart failure with preserved ejection fraction 4/1/2023   • Abnormal EKG 2/3/2015   • Anxiety    • COPD (chronic obstructive pulmonary disease)    • Coronary artery disease 3/3/2015   • Diabetes     type 2   • Hyperlipidemia    • Hypertension    • Microalbuminuria due to type 2 diabetes mellitus 2/5/2020   • Multinodular goiter 4/18/2019   • Murmur 2/3/2015   • Primary osteoarthritis of left knee 10/22/2021       Past Surgical History:  Past Surgical History:   Procedure Laterality Date   • BRONCHOSCOPY N/A 4/9/2023    Procedure: BRONCHOSCOPY, bronchial alveolar lavage right upper lobe;  Surgeon: Juanjo Castañeda MD;  Location: [facility] ENDOSCOPY;  Service: Pulmonary;  Laterality: N/A;  post: pneumonia   • CARDIAC CATHETERIZATION Left 1/15/2023    Procedure: Left Heart Cath and  coronary angiogram;  Surgeon: Shawn Baker MD;  Location: [facility] CATH INVASIVE LOCATION;  Service: Cardiovascular;  Laterality: Left;   • EYE SURGERY      cataract   • TOTAL KNEE ARTHROPLASTY Right 10/21/2020    Procedure: TOTAL KNEE ARTHROPLASTY;  Surgeon: Ravi Fagan MD;  Location: [facility] MAIN OR;  Service: Orthopedics;  Laterality: Right;   • TOTAL KNEE ARTHROPLASTY REVISION Right 2021    Procedure: KNEE POLY INSERT EXCHANGE with irrigation;  Surgeon: Ravi Fagan MD;  Location: [facility] MAIN OR;  Service: Orthopedics;  Laterality: Right;        Home Meds:  (Not in a hospital admission)      Allergies:  No Known Allergies    Social History:   Social History     Socioeconomic History   • Marital status:    Tobacco Use   • Smoking status: Former     Packs/day: 1.00     Years: 5.00     Pack years: 5.00     Types: Cigarettes     Quit date: 7/3/2022     Years since quittin.8     Passive exposure: Past   • Smokeless tobacco: Former     Quit date: 7/3/2022   Vaping Use   • Vaping Use: Never used   Substance and Sexual Activity   • Alcohol use: Not Currently   • Drug use: Never   • Sexual activity: Defer       Family History:  Family History   Problem Relation Age of Onset   • Cancer Other    • Diabetes Other    • Heart disease Other    • No Known Problems Mother    • No Known Problems Father    • No Known Problems Sister    • No Known Problems Brother    • No Known Problems Maternal Aunt    • No Known Problems Maternal Uncle    • No Known Problems Paternal Aunt    • No Known Problems Paternal Uncle    • No Known Problems Maternal Grandmother    • No Known Problems Maternal Grandfather    • No Known Problems Paternal Grandmother    • No Known Problems Paternal Grandfather    • Anemia Neg Hx    • Arrhythmia Neg Hx    • Asthma Neg Hx    • Clotting disorder Neg Hx    • Fainting Neg Hx    • Heart attack Neg Hx    • Heart failure Neg Hx    • Hyperlipidemia Neg Hx    • Hypertension Neg Hx        Physical  "Exam:  /69   Pulse 76   Temp 98.2 °F (36.8 °C) (Oral)   Resp 24   Ht 170.2 cm (67\")   Wt 104 kg (229 lb 4.5 oz)   SpO2 93%   BMI 35.91 kg/m²  Body mass index is 35.91 kg/m². 93% 104 kg (229 lb 4.5 oz)  Physical Exam  Cardiovascular:      Heart sounds: Murmur heard.     No gallop.   Pulmonary:      Effort: No respiratory distress.      Breath sounds: No stridor. Rhonchi and rales present. No wheezing.   Chest:      Chest wall: No tenderness.         LABS:  Lab Results   Component Value Date    CALCIUM 8.8 04/23/2023    PHOS 2.8 04/23/2023     Results from last 7 days   Lab Units 04/23/23  1241   MAGNESIUM mg/dL 1.8   SODIUM mmol/L 141   POTASSIUM mmol/L 4.1   CHLORIDE mmol/L 103   CO2 mmol/L 27.0   BUN mg/dL 7*   CREATININE mg/dL 0.49*   GLUCOSE mg/dL 171*   CALCIUM mg/dL 8.8   WBC 10*3/mm3 6.00   HEMOGLOBIN g/dL 9.8*   PLATELETS 10*3/mm3 242   ALT (SGPT) U/L 13   AST (SGOT) U/L 18   PROBNP pg/mL 791.5     Lab Results   Component Value Date    TROPONINT 18 (H) 04/01/2023             Results from last 7 days   Lab Units 04/23/23  1628 04/23/23  1245   LACTATE mmol/L 3.6* 2.4*     Results from last 7 days   Lab Units 04/23/23  1258   PH, ARTERIAL pH units 7.437   PCO2, ARTERIAL mm Hg 42.7   PO2 ART mm Hg 83.1   O2 SATURATION ART % 96.5   MODALITY  Cannula                 Lab Results   Component Value Date    TSH 1.790 03/08/2023     Estimated Creatinine Clearance: 156.7 mL/min (A) (by C-G formula based on SCr of 0.49 mg/dL (L)).         Imaging:  Imaging Results (Last 24 Hours)     Procedure Component Value Units Date/Time    XR Chest 1 View [[id number]] Collected: 04/23/23 1340     Updated: 04/23/23 1345    Narrative:      XR CHEST 1 VW    Date of Exam: 4/23/2023 1:28 PM EDT    Indication: Short of breath COPD    Comparison: 4/10/2023    Findings:  Cardiomegaly is again noted. There is pulmonary vascular congestion and bilateral mixed interstitial and alveolar opacity may indicate pulmonary edema or " atypical infection pattern. There is denser consolidation in the medial right lung base which may be   due to pneumonia or atelectasis. Calcified granulomatous changes are again noted.      Impression:      Impression:    1. Cardiomegaly with pulmonary vascular congestion and bilateral perihilar haziness suggesting pulmonary edema/CHF or less likely atypical infection pattern.  2. Denser consolidation in the medial right lung base may be due to pneumonia, aspiration or atelectasis.      Electronically Signed: Preet Moreno    4/23/2023 1:43 PM EDT    Workstation ID: [id number]            Current Meds:   SCHEDULE  amLODIPine, 5 mg, Oral, Nightly  [START ON 4/24/2023] bisoprolol, 5 mg, Oral, Daily  budesonide-formoterol, 2 puff, Inhalation, BID - RT  cefTRIAXone, 2 g, Intravenous, Q24H  enoxaparin, 40 mg, Subcutaneous, Daily  [START ON 4/24/2023] furosemide, 40 mg, Oral, Daily  gabapentin, 300 mg, Oral, TID  guaiFENesin, 1,200 mg, Oral, Q12H  insulin lispro, 2-7 Units, Subcutaneous, TID With Meals  lisinopril, 40 mg, Oral, Nightly  methylPREDNISolone sodium succinate, 40 mg, Intravenous, Q6H  senna-docusate sodium, 2 tablet, Oral, BID  sertraline, 50 mg, Oral, Nightly  sodium chloride, 10 mL, Intravenous, Q12H      Infusions     PRNs  •  acetaminophen **OR** acetaminophen **OR** acetaminophen  •  senna-docusate sodium **AND** polyethylene glycol **AND** bisacodyl **AND** bisacodyl  •  dextrose  •  dextrose  •  glucagon (human recombinant)  •  ipratropium-albuterol  •  magnesium sulfate **OR** magnesium sulfate **OR** magnesium sulfate  •  nitroglycerin  •  ondansetron **OR** ondansetron  •  potassium & sodium phosphates **OR** potassium & sodium phosphates  •  potassium chloride  •  potassium chloride  •  [COMPLETED] Insert Peripheral IV **AND** sodium chloride  •  sodium chloride  •  sodium chloride        Flip Greenwood MD  4/23/2023  21:50 EDT      Much of this encounter note is an electronic transcription/translation of  spoken language to printed text using Dragon Software.

## 2023-04-24 NOTE — OUTREACH NOTE
COPD/PN Week 2 Survey    Flowsheet Row Responses   [facility] facility patient discharged from? [facility]   Does the patient have one of the following disease processes/diagnoses(primary or secondary)? Pneumonia   Week 2 attempt successful? No   Unsuccessful attempts Attempt 1   Revoke Readmitted          Alyx MCLAUGHLIN - Registered Nurse

## 2023-04-24 NOTE — CONSULTS
"Diabetes Education  Assessment/Teaching    Patient Name:  Brenden Peter  YOB: 1950  MRN: [id number]  Admit Date:  4/23/2023      Assessment Date:  4/24/2023  Flowsheet Row Most Recent Value   General Information     Referral From: MD Qi order  [MD consult for A1c >7.0% and on 4/8/2023 A1c was 8.3%.]   Height 170.2 cm (67\")   Height Method Stated   Weight 104 kg (229 lb 4.5 oz)   Weight Method Stated   Pregnancy Assessment    Diabetes History    What type of diabetes do you have? Type 2   Current DM knowledge fair   Do you test your blood sugar at home? yes   Frequency of checks was checking every day but hasn't been checking for the last month   Meter type unsure of name but about a year old   Who performs the test? patient   Typical readings one month ago 150s-160s   Have you had high blood sugar? (>140mg/dl) yes   How often do you have high blood sugar? unknown   When was your last high blood sugar? Admission blood sugar 171   Education Preferences    What areas of diabetes would you like to learn about? avoiding high blood sugar, diabetes complications, testing my blood sugar at home   Nutrition Information    Assessment Topics    Problem Solving - Assessment Needs education   Reducing Risk - Assessment Needs education   Monitoring - Assessment Needs education   DM Goals    Problem Solving - Goal Today   Reducing Risk - Goal Today   Monitoring - Goal Today          Flowsheet Row Most Recent Value   DM Education Needs    Meter Has own   Frequency of Testing Daily  [Discussed importance of checking blood sugar daily varying the times before meals and at bedtime.]   Medication Oral  [At home patient stated that he takes Metformin 1000 mg BID, Glipizide 10 mg every evening, and Actos 30 mg every evening.]   Problem Solving Hyperglycemia, Signs, Symptoms, Treatment   Reducing Risks A1C testing  [On 4/8/2023 A1c was 8.3%.]   Discharge Plan Home   Motivation Moderate   Teaching Method Discussion, " Handouts   Patient Response Verbalized understanding            Other Comments:  A1c info sheet given with discussion on A1c target and healthy blood sugar range. Patient stated he has had difficulty breathing and has been on steroids for the past month. Patient stated that he drinks coffee with splenda and diet Coke. Asked patient if he could drink more water by alternating with his diet Coke and patient stated he could do that. Patient stated his wife has diabetes and water is all she drinks. Patient has no further questions or concerns related to diabetes at this time.        Electronically signed by:  Katerine Bland RN  04/24/23 11:45 EDT

## 2023-04-24 NOTE — PLAN OF CARE
Goal Outcome Evaluation:      Report called to [facility] in PCU, pt stable and transported by RN on O2, possessions brought with patient

## 2023-04-25 LAB
ALBUMIN SERPL-MCNC: 3 G/DL (ref 3.5–5.2)
ALBUMIN/GLOB SERPL: 0.9 G/DL
ALP SERPL-CCNC: 46 U/L (ref 39–117)
ALT SERPL W P-5'-P-CCNC: 10 U/L (ref 1–41)
ANION GAP SERPL CALCULATED.3IONS-SCNC: 10 MMOL/L (ref 5–15)
AST SERPL-CCNC: 10 U/L (ref 1–40)
BASOPHILS # BLD AUTO: 0 10*3/MM3 (ref 0–0.2)
BASOPHILS NFR BLD AUTO: 0.1 % (ref 0–1.5)
BILIRUB SERPL-MCNC: <0.2 MG/DL (ref 0–1.2)
BUN SERPL-MCNC: 23 MG/DL (ref 8–23)
BUN/CREAT SERPL: 25.8 (ref 7–25)
CALCIUM SPEC-SCNC: 8.7 MG/DL (ref 8.6–10.5)
CHLORIDE SERPL-SCNC: 103 MMOL/L (ref 98–107)
CO2 SERPL-SCNC: 29 MMOL/L (ref 22–29)
CREAT SERPL-MCNC: 0.89 MG/DL (ref 0.76–1.27)
CRP SERPL-MCNC: 1.66 MG/DL (ref 0–0.5)
DEPRECATED RDW RBC AUTO: 48.1 FL (ref 37–54)
EGFRCR SERPLBLD CKD-EPI 2021: 91.1 ML/MIN/1.73
EOSINOPHIL # BLD AUTO: 0 10*3/MM3 (ref 0–0.4)
EOSINOPHIL NFR BLD AUTO: 0 % (ref 0.3–6.2)
ERYTHROCYTE [DISTWIDTH] IN BLOOD BY AUTOMATED COUNT: 16 % (ref 12.3–15.4)
ERYTHROCYTE [SEDIMENTATION RATE] IN BLOOD: 54 MM/HR (ref 0–20)
GLOBULIN UR ELPH-MCNC: 3.3 GM/DL
GLUCOSE BLDC GLUCOMTR-MCNC: 263 MG/DL (ref 70–105)
GLUCOSE BLDC GLUCOMTR-MCNC: 271 MG/DL (ref 70–105)
GLUCOSE BLDC GLUCOMTR-MCNC: 302 MG/DL (ref 70–105)
GLUCOSE BLDC GLUCOMTR-MCNC: 325 MG/DL (ref 70–105)
GLUCOSE BLDC GLUCOMTR-MCNC: 372 MG/DL (ref 70–105)
GLUCOSE BLDC GLUCOMTR-MCNC: 376 MG/DL (ref 70–105)
GLUCOSE SERPL-MCNC: 307 MG/DL (ref 65–99)
HCT VFR BLD AUTO: 28.1 % (ref 37.5–51)
HGB BLD-MCNC: 8.9 G/DL (ref 13–17.7)
LYMPHOCYTES # BLD AUTO: 0.6 10*3/MM3 (ref 0.7–3.1)
LYMPHOCYTES NFR BLD AUTO: 6.8 % (ref 19.6–45.3)
MAGNESIUM SERPL-MCNC: 1.9 MG/DL (ref 1.6–2.4)
MCH RBC QN AUTO: 27.3 PG (ref 26.6–33)
MCHC RBC AUTO-ENTMCNC: 31.6 G/DL (ref 31.5–35.7)
MCV RBC AUTO: 86.5 FL (ref 79–97)
MONOCYTES # BLD AUTO: 0.3 10*3/MM3 (ref 0.1–0.9)
MONOCYTES NFR BLD AUTO: 3.6 % (ref 5–12)
NEUTROPHILS NFR BLD AUTO: 7.5 10*3/MM3 (ref 1.7–7)
NEUTROPHILS NFR BLD AUTO: 89.5 % (ref 42.7–76)
NRBC BLD AUTO-RTO: 0 /100 WBC (ref 0–0.2)
PHOSPHATE SERPL-MCNC: 3.2 MG/DL (ref 2.5–4.5)
PLATELET # BLD AUTO: 251 10*3/MM3 (ref 140–450)
PMV BLD AUTO: 9.2 FL (ref 6–12)
POTASSIUM SERPL-SCNC: 3.8 MMOL/L (ref 3.5–5.2)
PROT SERPL-MCNC: 6.3 G/DL (ref 6–8.5)
RBC # BLD AUTO: 3.24 10*6/MM3 (ref 4.14–5.8)
SODIUM SERPL-SCNC: 142 MMOL/L (ref 136–145)
WBC NRBC COR # BLD: 8.4 10*3/MM3 (ref 3.4–10.8)

## 2023-04-25 PROCEDURE — 63710000001 INSULIN REGULAR HUMAN PER 5 UNITS: Performed by: INTERNAL MEDICINE

## 2023-04-25 PROCEDURE — 83735 ASSAY OF MAGNESIUM: CPT | Performed by: INTERNAL MEDICINE

## 2023-04-25 PROCEDURE — 94799 UNLISTED PULMONARY SVC/PX: CPT

## 2023-04-25 PROCEDURE — 25010000002 METHYLPREDNISOLONE PER 40 MG: Performed by: INTERNAL MEDICINE

## 2023-04-25 PROCEDURE — 84100 ASSAY OF PHOSPHORUS: CPT | Performed by: INTERNAL MEDICINE

## 2023-04-25 PROCEDURE — 82962 GLUCOSE BLOOD TEST: CPT

## 2023-04-25 PROCEDURE — 94761 N-INVAS EAR/PLS OXIMETRY MLT: CPT

## 2023-04-25 PROCEDURE — 85025 COMPLETE CBC W/AUTO DIFF WBC: CPT | Performed by: INTERNAL MEDICINE

## 2023-04-25 PROCEDURE — 80053 COMPREHEN METABOLIC PANEL: CPT | Performed by: INTERNAL MEDICINE

## 2023-04-25 PROCEDURE — 0 MAGNESIUM SULFATE 4 GM/100ML SOLUTION: Performed by: INTERNAL MEDICINE

## 2023-04-25 PROCEDURE — 85652 RBC SED RATE AUTOMATED: CPT | Performed by: INTERNAL MEDICINE

## 2023-04-25 PROCEDURE — 86140 C-REACTIVE PROTEIN: CPT | Performed by: INTERNAL MEDICINE

## 2023-04-25 PROCEDURE — 63710000001 INSULIN LISPRO (HUMAN) PER 5 UNITS: Performed by: INTERNAL MEDICINE

## 2023-04-25 RX ORDER — CEFDINIR 300 MG/1
300 CAPSULE ORAL EVERY 12 HOURS SCHEDULED
Status: DISCONTINUED | OUTPATIENT
Start: 2023-04-25 | End: 2023-04-26 | Stop reason: HOSPADM

## 2023-04-25 RX ADMIN — IPRATROPIUM BROMIDE AND ALBUTEROL SULFATE 3 ML: .5; 3 SOLUTION RESPIRATORY (INHALATION) at 11:10

## 2023-04-25 RX ADMIN — GABAPENTIN 300 MG: 300 CAPSULE ORAL at 15:39

## 2023-04-25 RX ADMIN — METHYLPREDNISOLONE SODIUM SUCCINATE 40 MG: 40 INJECTION, POWDER, FOR SOLUTION INTRAMUSCULAR; INTRAVENOUS at 01:07

## 2023-04-25 RX ADMIN — GUAIFENESIN 1200 MG: 600 TABLET, EXTENDED RELEASE ORAL at 20:09

## 2023-04-25 RX ADMIN — BISOPROLOL FUMARATE 5 MG: 5 TABLET ORAL at 08:34

## 2023-04-25 RX ADMIN — GABAPENTIN 300 MG: 300 CAPSULE ORAL at 20:09

## 2023-04-25 RX ADMIN — GABAPENTIN 300 MG: 300 CAPSULE ORAL at 08:34

## 2023-04-25 RX ADMIN — Medication 10 ML: at 20:10

## 2023-04-25 RX ADMIN — LISINOPRIL 40 MG: 20 TABLET ORAL at 20:09

## 2023-04-25 RX ADMIN — INSULIN HUMAN 8 UNITS: 100 INJECTION, SOLUTION PARENTERAL at 23:15

## 2023-04-25 RX ADMIN — INSULIN LISPRO 16 UNITS: 100 INJECTION, SOLUTION INTRAVENOUS; SUBCUTANEOUS at 17:27

## 2023-04-25 RX ADMIN — BUDESONIDE 0.5 MG: 0.5 INHALANT RESPIRATORY (INHALATION) at 19:11

## 2023-04-25 RX ADMIN — METHYLPREDNISOLONE SODIUM SUCCINATE 40 MG: 40 INJECTION, POWDER, FOR SOLUTION INTRAMUSCULAR; INTRAVENOUS at 08:34

## 2023-04-25 RX ADMIN — INSULIN LISPRO 12 UNITS: 100 INJECTION, SOLUTION INTRAVENOUS; SUBCUTANEOUS at 12:11

## 2023-04-25 RX ADMIN — SILDENAFIL CITRATE 20 MG: 20 TABLET ORAL at 14:14

## 2023-04-25 RX ADMIN — SPIRONOLACTONE 25 MG: 25 TABLET ORAL at 08:34

## 2023-04-25 RX ADMIN — DIPHENHYDRAMINE HYDROCHLORIDE AND LIDOCAINE HYDROCHLORIDE AND ALUMINUM HYDROXIDE AND MAGNESIUM HYDRO 10 ML: KIT at 17:27

## 2023-04-25 RX ADMIN — INSULIN HUMAN 8 UNITS: 100 INJECTION, SOLUTION PARENTERAL at 12:11

## 2023-04-25 RX ADMIN — IPRATROPIUM BROMIDE AND ALBUTEROL SULFATE 3 ML: .5; 3 SOLUTION RESPIRATORY (INHALATION) at 19:06

## 2023-04-25 RX ADMIN — DIPHENHYDRAMINE HYDROCHLORIDE AND LIDOCAINE HYDROCHLORIDE AND ALUMINUM HYDROXIDE AND MAGNESIUM HYDRO 10 ML: KIT at 23:15

## 2023-04-25 RX ADMIN — SILDENAFIL CITRATE 20 MG: 20 TABLET ORAL at 20:08

## 2023-04-25 RX ADMIN — SERTRALINE 50 MG: 50 TABLET, FILM COATED ORAL at 20:09

## 2023-04-25 RX ADMIN — FUROSEMIDE 40 MG: 40 TABLET ORAL at 08:34

## 2023-04-25 RX ADMIN — MAGNESIUM SULFATE HEPTAHYDRATE 4 G: 40 INJECTION, SOLUTION INTRAVENOUS at 08:34

## 2023-04-25 RX ADMIN — INSULIN LISPRO 12 UNITS: 100 INJECTION, SOLUTION INTRAVENOUS; SUBCUTANEOUS at 08:33

## 2023-04-25 RX ADMIN — DIPHENHYDRAMINE HYDROCHLORIDE AND LIDOCAINE HYDROCHLORIDE AND ALUMINUM HYDROXIDE AND MAGNESIUM HYDRO 10 ML: KIT at 12:12

## 2023-04-25 RX ADMIN — AMLODIPINE BESYLATE 5 MG: 5 TABLET ORAL at 20:09

## 2023-04-25 RX ADMIN — CEFDINIR 300 MG: 300 CAPSULE ORAL at 20:08

## 2023-04-25 RX ADMIN — Medication 10 ML: at 08:34

## 2023-04-25 RX ADMIN — IPRATROPIUM BROMIDE AND ALBUTEROL SULFATE 3 ML: .5; 3 SOLUTION RESPIRATORY (INHALATION) at 15:29

## 2023-04-25 RX ADMIN — IPRATROPIUM BROMIDE AND ALBUTEROL SULFATE 3 ML: .5; 3 SOLUTION RESPIRATORY (INHALATION) at 07:30

## 2023-04-25 RX ADMIN — SILDENAFIL CITRATE 20 MG: 20 TABLET ORAL at 05:10

## 2023-04-25 RX ADMIN — THEOPHYLLINE ANHYDROUS 300 MG: 300 CAPSULE, EXTENDED RELEASE ORAL at 08:34

## 2023-04-25 RX ADMIN — INSULIN HUMAN 4 UNITS: 100 INJECTION, SOLUTION PARENTERAL at 05:12

## 2023-04-25 RX ADMIN — DIPHENHYDRAMINE HYDROCHLORIDE AND LIDOCAINE HYDROCHLORIDE AND ALUMINUM HYDROXIDE AND MAGNESIUM HYDRO 10 ML: KIT at 05:10

## 2023-04-25 RX ADMIN — METHYLPREDNISOLONE SODIUM SUCCINATE 40 MG: 40 INJECTION, POWDER, FOR SOLUTION INTRAMUSCULAR; INTRAVENOUS at 20:08

## 2023-04-25 RX ADMIN — ROFLUMILAST 500 MCG: 500 TABLET ORAL at 08:34

## 2023-04-25 RX ADMIN — GUAIFENESIN 1200 MG: 600 TABLET, EXTENDED RELEASE ORAL at 08:34

## 2023-04-25 RX ADMIN — METHYLPREDNISOLONE SODIUM SUCCINATE 40 MG: 40 INJECTION, POWDER, FOR SOLUTION INTRAMUSCULAR; INTRAVENOUS at 14:15

## 2023-04-25 RX ADMIN — BUDESONIDE 0.5 MG: 0.5 INHALANT RESPIRATORY (INHALATION) at 07:30

## 2023-04-25 RX ADMIN — INSULIN HUMAN 8 UNITS: 100 INJECTION, SOLUTION PARENTERAL at 17:27

## 2023-04-25 NOTE — PLAN OF CARE
Goal Outcome Evaluation:      Pt refused bed bath x2 attempts, otherwise pleasant, ID cleared patient from droplet precautions r/t rhinovirus from a previous visit, sats continue to be maintained in the 90's at 5L HFNC, continues with daily rocephin r/t PNA with no ASE noted, currently afebrile, no c/o pain noted at this time, Blood sugars remain elevated due to steroid use and managed with Q6 insulin, 4/23 blood cultures remain pending with no growth showing at 24 hours.

## 2023-04-25 NOTE — PROGRESS NOTES
"PULMONARY CRITICAL CARE PROGRESS  NOTE      PATIENT IDENTIFICATION:  Name: Brenden Peter  MRN: [id number]  :  1950     Age: 72 y.o.  Sex: male    DATE OF Note:  2023   Referring Physician: Sukhdeep Prince MD                  Subjective:   Feeling better, sitting on side of bed, on high flow 5 L O2 with sats in the high 90's, no SOB, no chest abdominal pain, no bowel or bladder issues reported      Objective:  tMax 24 hrs: Temp (24hrs), Av.7 °F (36.5 °C), Min:97.4 °F (36.3 °C), Max:98.2 °F (36.8 °C)      Vitals Ranges:   Temp:  [97.4 °F (36.3 °C)-98.2 °F (36.8 °C)] 98.2 °F (36.8 °C)  Heart Rate:  [55-77] 60  Resp:  [13-20] 19  BP: (114-142)/(47-65) 120/51    Intake and Output Last 3 Shifts:   I/O last 3 completed shifts:  In: 1230 [P.O.:1230]  Out: 2225 [Urine:2225]    Exam:  /51 (BP Location: Left arm, Patient Position: Lying)   Pulse 60   Temp 98.2 °F (36.8 °C) (Oral)   Resp 19   Ht 170.2 cm (67\")   Wt 106 kg (232 lb 12.9 oz)   SpO2 95%   BMI 36.46 kg/m²     General Appearance: AAO  HEENT:  Normocephalic, without obvious abnormality. Conjunctivae/corneas clear.  Normal external ear canals. Nares normal, no drainage     Neck:  Supple, symmetrical, trachea midline. No JVD.  Lungs /Chest wall:   Bilateral basal rhonchi, respirations unlabored, symmetrical wall movement.     Heart:  Regular rate and rhythm, systolic murmur PMI left sternal border  Abdomen: Soft, nontender, no masses, no organomegaly.    Extremities: Trace edema, no clubbing or cyanosis        Medications:  amLODIPine, 5 mg, Oral, Nightly  bisoprolol, 5 mg, Oral, Daily  budesonide, 0.5 mg, Nebulization, BID - RT  cefTRIAXone, 2 g, Intravenous, Q24H  enoxaparin, 40 mg, Subcutaneous, Daily  First Mouthwash (Magic Mouthwash), 10 mL, Swish & Spit, Q6H  furosemide, 40 mg, Oral, Daily  gabapentin, 300 mg, Oral, TID  guaiFENesin, 1,200 mg, Oral, Q12H  insulin lispro, 4-24 Units, Subcutaneous, TID With Meals  insulin regular, 8 " Units, Subcutaneous, Q6H  ipratropium-albuterol, 3 mL, Nebulization, 4x Daily - RT  lisinopril, 40 mg, Oral, Nightly  methylPREDNISolone sodium succinate, 40 mg, Intravenous, Q6H  roflumilast, 500 mcg, Oral, Daily  senna-docusate sodium, 2 tablet, Oral, BID  sertraline, 50 mg, Oral, Nightly  sildenafil, 20 mg, Oral, Q8H  sodium chloride, 10 mL, Intravenous, Q12H  spironolactone, 25 mg, Oral, Daily  theophylline, 300 mg, Oral, Q24H        Infusion:        PRN:  •  acetaminophen **OR** acetaminophen **OR** acetaminophen  •  senna-docusate sodium **AND** polyethylene glycol **AND** bisacodyl **AND** bisacodyl  •  dextrose  •  dextrose  •  glucagon (human recombinant)  •  ipratropium-albuterol  •  magnesium sulfate **OR** magnesium sulfate **OR** magnesium sulfate  •  ondansetron **OR** ondansetron  •  potassium & sodium phosphates **OR** potassium & sodium phosphates  •  potassium chloride  •  potassium chloride  •  [COMPLETED] Insert Peripheral IV **AND** sodium chloride  •  sodium chloride  •  sodium chloride  Data Review:  All labs (24hrs):   Recent Results (from the past 24 hour(s))   POC Glucose Once    Collection Time: 04/24/23  5:26 PM    Specimen: Blood   Result Value Ref Range    Glucose 359 (H) 70 - 105 mg/dL   POC Glucose Once    Collection Time: 04/24/23  8:14 PM    Specimen: Blood   Result Value Ref Range    Glucose 356 (H) 70 - 105 mg/dL   POC Glucose Once    Collection Time: 04/24/23 11:20 PM    Specimen: Blood   Result Value Ref Range    Glucose 391 (H) 70 - 105 mg/dL   Comprehensive Metabolic Panel    Collection Time: 04/25/23  2:43 AM    Specimen: Blood   Result Value Ref Range    Glucose 307 (H) 65 - 99 mg/dL    BUN 23 8 - 23 mg/dL    Creatinine 0.89 0.76 - 1.27 mg/dL    Sodium 142 136 - 145 mmol/L    Potassium 3.8 3.5 - 5.2 mmol/L    Chloride 103 98 - 107 mmol/L    CO2 29.0 22.0 - 29.0 mmol/L    Calcium 8.7 8.6 - 10.5 mg/dL    Total Protein 6.3 6.0 - 8.5 g/dL    Albumin 3.0 (L) 3.5 - 5.2 g/dL    ALT  (SGPT) 10 1 - 41 U/L    AST (SGOT) 10 1 - 40 U/L    Alkaline Phosphatase 46 39 - 117 U/L    Total Bilirubin <0.2 0.0 - 1.2 mg/dL    Globulin 3.3 gm/dL    A/G Ratio 0.9 g/dL    BUN/Creatinine Ratio 25.8 (H) 7.0 - 25.0    Anion Gap 10.0 5.0 - 15.0 mmol/L    eGFR 91.1 >60.0 mL/min/1.73   Magnesium    Collection Time: 04/25/23  2:43 AM    Specimen: Blood   Result Value Ref Range    Magnesium 1.9 1.6 - 2.4 mg/dL   Phosphorus    Collection Time: 04/25/23  2:43 AM    Specimen: Blood   Result Value Ref Range    Phosphorus 3.2 2.5 - 4.5 mg/dL   C-reactive Protein    Collection Time: 04/25/23  2:43 AM    Specimen: Blood   Result Value Ref Range    C-Reactive Protein 1.66 (H) 0.00 - 0.50 mg/dL   Sedimentation Rate    Collection Time: 04/25/23  2:43 AM    Specimen: Blood   Result Value Ref Range    Sed Rate 54 (H) 0 - 20 mm/hr   CBC Auto Differential    Collection Time: 04/25/23  2:43 AM    Specimen: Blood   Result Value Ref Range    WBC 8.40 3.40 - 10.80 10*3/mm3    RBC 3.24 (L) 4.14 - 5.80 10*6/mm3    Hemoglobin 8.9 (L) 13.0 - 17.7 g/dL    Hematocrit 28.1 (L) 37.5 - 51.0 %    MCV 86.5 79.0 - 97.0 fL    MCH 27.3 26.6 - 33.0 pg    MCHC 31.6 31.5 - 35.7 g/dL    RDW 16.0 (H) 12.3 - 15.4 %    RDW-SD 48.1 37.0 - 54.0 fl    MPV 9.2 6.0 - 12.0 fL    Platelets 251 140 - 450 10*3/mm3    Neutrophil % 89.5 (H) 42.7 - 76.0 %    Lymphocyte % 6.8 (L) 19.6 - 45.3 %    Monocyte % 3.6 (L) 5.0 - 12.0 %    Eosinophil % 0.0 (L) 0.3 - 6.2 %    Basophil % 0.1 0.0 - 1.5 %    Neutrophils, Absolute 7.50 (H) 1.70 - 7.00 10*3/mm3    Lymphocytes, Absolute 0.60 (L) 0.70 - 3.10 10*3/mm3    Monocytes, Absolute 0.30 0.10 - 0.90 10*3/mm3    Eosinophils, Absolute 0.00 0.00 - 0.40 10*3/mm3    Basophils, Absolute 0.00 0.00 - 0.20 10*3/mm3    nRBC 0.0 0.0 - 0.2 /100 WBC   POC Glucose Once    Collection Time: 04/25/23  5:04 AM    Specimen: Blood   Result Value Ref Range    Glucose 302 (H) 70 - 105 mg/dL   POC Glucose Once    Collection Time: 04/25/23  7:59 AM     Specimen: Blood   Result Value Ref Range    Glucose 263 (H) 70 - 105 mg/dL   POC Glucose Once    Collection Time: 04/25/23 11:25 AM    Specimen: Blood   Result Value Ref Range    Glucose 271 (H) 70 - 105 mg/dL        Imaging:  XR Chest 1 View  Narrative: XR CHEST 1 VW    Date of Exam: 4/23/2023 1:28 PM EDT    Indication: Short of breath COPD    Comparison: 4/10/2023    Findings:  Cardiomegaly is again noted. There is pulmonary vascular congestion and bilateral mixed interstitial and alveolar opacity may indicate pulmonary edema or atypical infection pattern. There is denser consolidation in the medial right lung base which may be   due to pneumonia or atelectasis. Calcified granulomatous changes are again noted.  Impression: Impression:    1. Cardiomegaly with pulmonary vascular congestion and bilateral perihilar haziness suggesting pulmonary edema/CHF or less likely atypical infection pattern.  2. Denser consolidation in the medial right lung base may be due to pneumonia, aspiration or atelectasis.    Electronically Signed: Preet Moreno    4/23/2023 1:43 PM EDT    Workstation ID: [id number]       ASSESSMENT:  Acute on chronic hypoxic/hypercapnic respiratory failure  AE COPD  Interstitial lung disease  Moderate to severe PAH  History of mucous plug  Pulmonary edema  SONJA on BiPAP  CAD  Hypertension  Hyperlipidemia  Diabetes mellitus       PLAN:  Encourage out of bed daily  Decrease O2 to keep sats greater than 86%, decreased to 4 L his home dose  Antibiotics   Bronchodilators  Inhaled corticosteroids  IV steroids  Diuresis and monitor MIGDALIA's  Sildenafil every 8 hours  Bowel regimen  Electrolytes/ glycemic control  DVT and GI prophylaxis-Lovenox    Discussed with Dr. Liliana Tyler, LENNIE   4/25/2023  12:08 EDT    I personally have examined  and interviewed the patient. I have reviewed the history, data, problems, assessment and plan with our NP.  Total Critical care time in direct medical management (   ) minutes,  This time specifically excludes time spent performing procedures.    Jeannine Ford MD   4/25/2023  21:40 EDT

## 2023-04-25 NOTE — CASE MANAGEMENT/SOCIAL WORK
Continued Stay Note   Daniele     Patient Name: Brenden Peter  MRN: [id number]  Today's Date: 4/25/2023    Admit Date: 4/23/2023    Plan: Anticipate routine home with spouse and [facility] (pending acceptance, order per MD). Has home O2 4L through [employer]. Pulse ox delivered.   Discharge Plan     Row Name 04/25/23 1534       Plan    Plan Anticipate routine home with spouse and [facility] (pending acceptance, order per MD). Has home O2 4L through [employer]. Pulse ox delivered.    Patient/Family in Agreement with Plan yes    Provided Post Acute Provider List? Yes    Post Acute Provider List Home Health    Provided Post Acute Provider Quality & Resource List? Yes    Post Acute Provider Quality and Resource List Home Health    Delivered To Patient    Method of Delivery In person    Plan Comments CM met with patient at bedside to discuss dc planning and IMM letter. Patient expressed interest in a pulse oximetry. CM contacted [facility] who reported cost would be $50. Patient reported that he was on a fixed income and cannot afford to pay $50. Due to patient’s diagnosis of acute respiratory distress, acute on chronic hypoxic/hypercapnic respiratory failure, acute exacerbation COPD, interstitial lung disease, pulmonary edema, and obstructive sleep apnea, pulse ox was provided to patient by Case Management to prevent readmissions to hospital. Pulse ox was signed out and CM provided pulse ox to patient at bedside. Form was signed by patient and faxed to Care Coordination. Patient also expressed interest in HH. HH list provided and patient reported he has had [facility] in the past. CM sent new referral in basket and to [facility].              Latasha Spears RN     Office Phone: [phone]  Office Cell: [phone]

## 2023-04-25 NOTE — PLAN OF CARE
Goal Outcome Evaluation:  Pt states he feels better today and breathing easier.  Pt  was able to lay down and take a small nap.    BS cont to be high.  Insulins increased today  will monitor.  Pt Mg treated per MAR.   Cont on IV abx awaiting bld cx results. Pt also got up to bathroom today and washed up

## 2023-04-25 NOTE — PROGRESS NOTES
[facility] Medicine Services Daily Progress Note    Patient Name: Brenden Peter  : 1950  MRN: [id number]  Primary Care Physician:  Bert Rojas MD  Date of admission: 2023      Subjective      Chief Complaint: Shortness of breath      Patient Reports he is feeling a lot better.  4-5 L nasal cannula today.  Baseline is 3-4.  Pulmonary following. Encouraged to ambulate today. Perhaps home tomorrow if cleared    ROS negative except as above      Objective      Vitals:   Temp:  [97.4 °F (36.3 °C)-98.2 °F (36.8 °C)] 98.2 °F (36.8 °C)  Heart Rate:  [57-77] 70  Resp:  [14-20] 19  BP: (114-142)/(47-61) 120/51  Flow (L/min):  [4-6] 5    Physical Exam  Vitals reviewed.   Constitutional:       Comments: Wearing 5 L nasal cannula   HENT:      Head: Normocephalic.   Cardiovascular:      Rate and Rhythm: Normal rate.   Pulmonary:      Effort: Pulmonary effort is normal.   Abdominal:      General: Abdomen is flat.      Palpations: Abdomen is soft.   Musculoskeletal:         General: Normal range of motion.      Cervical back: Normal range of motion.   Skin:     General: Skin is warm.   Neurological:      General: No focal deficit present.      Mental Status: He is alert and oriented to person, place, and time.   Psychiatric:         Mood and Affect: Mood normal.         Behavior: Behavior normal.           Result Review    Result Review:  I have personally reviewed the results from the time of this admission to 2023 15:24 EDT and agree with these findings:  [x]  Laboratory  []  Microbiology  []  Radiology  []  EKG/Telemetry   []  Cardiology/Vascular   []  Pathology  []  Old records  []  Other:  Most notable findings include: Hyperglycemia         Assessment & Plan       72-year-old gentleman that presents with acute on chronic respiratory failure     Active Hospital Problems:  There are no active hospital problems to display for this patient.     Plan:   Acute on chronic  respiratory failure  Likely due to COPD exacerbation  Solu-Medrol ordered  Pulmonary consulted  1 dose of Lasix IV ordered  Supplemental oxygen as needed  DuoNebs ordered  Down to 4 L on April 25  Encouraged ambulation  Change to po omnicef from rocephin     Recent rhinovirus infection  Seems to have resolved at this point     History of hypertension  Resume amlodipine and bisoprolol as well as lisinopril     History of neuropathy  Resume gabapentin     History of type 2 diabetes  Diabetic diet  Sliding scale ordered     History of anxiety depression  Resume Zoloft        DVT prophylaxis:  No DVT prophylaxis order currently exists.     CODE STATUS:    Admission Status:  I believe this patient meets inpatient status.     I discussed the patient's findings and my recommendations with patient.     This patient has been examined wearing appropriate Personal Protective Equipment and discussed with Patient.   04/25/23      Electronically signed by Sukhdeep Prince MD, 04/25/23, 15:24 EDT.  [facility] Hospitalist Team

## 2023-04-25 NOTE — DISCHARGE PLACEMENT REQUEST
"Brenden Lackey (72 y.o. Male)     Date of Birth   1950    Social Security Number       Address   [address] [place]    Home Phone   [phone]    MRN   [id number]       Rastafari   None    Marital Status                               Admission Date   4/23/23    Admission Type   Emergency    Admitting Provider   Sukhdeep Prince MD    Attending Provider   Sukhdeep Prince MD    Department, Room/Bed   [facility], 2111/1       Discharge Date       Discharge Disposition       Discharge Destination                               Attending Provider: Sukhdeep Prince MD    Allergies: No Known Allergies    Isolation: Droplet   Infection: Rhinovirus  (04/01/23)   Code Status: CPR    Ht: 170.2 cm (67\")   Wt: 106 kg (232 lb 12.9 oz)    Admission Cmt: None   Principal Problem: Acute respiratory distress [R06.03]                 Active Insurance as of 4/23/2023     Primary Coverage     Payor Plan Insurance Group Employer/Plan Group    MEDICARE MEDICARE A & B      Payor Plan Address Payor Plan Phone Number Payor Plan Fax Number Effective Dates    [address] [phone]  10/1/2015 - None Entered    [facility] [id number]       Subscriber Name Subscriber Birth Date Member ID       BRENDEN LACKEY 1950 [id number]           Secondary Coverage     Payor Plan Insurance Group Employer/Plan Group    [facility] [employer] [facility] [facility] N     Payor Plan Address Payor Plan Phone Number Payor Plan Fax Number Effective Dates    [facility] [phone]  1/1/2020 - None Entered    [address]       [facility] [address]       Subscriber Name Subscriber Birth Date Member ID       BRENDEN LACKEY 1950 [id number]                 Emergency Contacts      (Rel.) Home Phone Work Phone Mobile Phone    RomeChasDonna SHANNAN (Spouse) [phone] -- [phone]    Rigoberto Lackey (Son) -- -- [phone]    RomeRenetta (Daughter) [phone] -- --               History & " Physical      Sukhdeep Prince MD at 23 [phone]              [facility] Medicine Services      Patient Name: Brenden Peter  : 1950  MRN: [id number]  Primary Care Physician:  Bert Rojas MD  Date of admission: 2023      Subjective       Chief Complaint: Severe shortness of breath    History of Present Illness: Brenden Peter is a 72 y.o. male who presented to [facility] on 2023 complaining of severe shortness of breath.  Patient was recently discharged from the hospital after battling rhinovirus infection and having severe hypoxemia and bronchitis.  Patient was seen by pulmonary and had a bronchoscopy at that time.  He went home and was doing okay for several days but then began to have shortness of breath again.  Fortunately he waited several days before coming in.  Patient presented today in severe respiratory distress and received steroids due to wheezing and labored breathing.  Supplemental oxygen was increased to 6 to 7 L.  Patient denies any fever or chills.  Denies any abdominal pain diarrhea constipation or dysuria.  He states he had his next appointment with Dr. Greenwood of pulmonary on Thursday but did not quite make it.      ROS negative except as above    Personal History     Past Medical History:   Diagnosis Date   • (HFpEF) heart failure with preserved ejection fraction 2023   • Abnormal EKG 2/3/2015   • Anxiety    • COPD (chronic obstructive pulmonary disease)    • Coronary artery disease 3/3/2015   • Diabetes     type 2   • Hyperlipidemia    • Hypertension    • Microalbuminuria due to type 2 diabetes mellitus 2020   • Multinodular goiter 2019   • Murmur 2/3/2015   • Primary osteoarthritis of left knee 10/22/2021       Past Surgical History:   Procedure Laterality Date   • BRONCHOSCOPY N/A 2023    Procedure: BRONCHOSCOPY, bronchial alveolar lavage right upper lobe;  Surgeon: Juanjo Castañeda MD;  Location: [facility];   Service: Pulmonary;  Laterality: N/A;  post: pneumonia   • CARDIAC CATHETERIZATION Left 1/15/2023    Procedure: Left Heart Cath and coronary angiogram;  Surgeon: Shawn Baker MD;  Location: [facility] CATH INVASIVE LOCATION;  Service: Cardiovascular;  Laterality: Left;   • EYE SURGERY      cataract   • TOTAL KNEE ARTHROPLASTY Right 10/21/2020    Procedure: TOTAL KNEE ARTHROPLASTY;  Surgeon: Ravi Fagan MD;  Location: [facility] MAIN OR;  Service: Orthopedics;  Laterality: Right;   • TOTAL KNEE ARTHROPLASTY REVISION Right 5/19/2021    Procedure: KNEE POLY INSERT EXCHANGE with irrigation;  Surgeon: Ravi Fagan MD;  Location: [facility] MAIN OR;  Service: Orthopedics;  Laterality: Right;       Family History: family history includes Cancer in an other family member; Diabetes in an other family member; Heart disease in an other family member; No Known Problems in his brother, father, maternal aunt, maternal grandfather, maternal grandmother, maternal uncle, mother, paternal aunt, paternal grandfather, paternal grandmother, paternal uncle, and sister. Otherwise pertinent FHx was reviewed and not pertinent to current issue.    Social History:  reports that he quit smoking about 9 months ago. His smoking use included cigarettes. He has a 5.00 pack-year smoking history. He has been exposed to tobacco smoke. He quit smokeless tobacco use about 9 months ago. He reports that he does not currently use alcohol. He reports that he does not use drugs.    Home Medications:  Prior to Admission Medications     Prescriptions Last Dose Informant Patient Reported? Taking?    amLODIPine (NORVASC) 5 MG tablet   Yes No    Take 1 tablet by mouth Every Night.    bisoprolol (ZEBeta) 5 MG tablet   Yes No    Take 1 tablet by mouth Daily.    budesonide (PULMICORT) 0.5 MG/2ML nebulizer solution   No No    Take 2 mL by nebulization 2 (Two) Times a Day. J44.9    budesonide-formoterol (SYMBICORT) 160-4.5 MCG/ACT inhaler   No No    Inhale 2 puffs 2 (Two)  Times a Day for 90 days.    furosemide (LASIX) 40 MG tablet   No No    Take 1 tablet by mouth Daily for 90 days.    gabapentin (NEURONTIN) 300 MG capsule   Yes No    Take 1 capsule by mouth 3 (Three) Times a Day.    glipizide (GLUCOTROL) 10 MG tablet   Yes No    Take 1 tablet by mouth Every Night.    guaiFENesin (MUCINEX) 600 MG 12 hr tablet   No No    Take 2 tablets by mouth Every 12 (Twelve) Hours.    ipratropium-albuterol (DUO-NEB) 0.5-2.5 mg/3 ml nebulizer   No No    Take 3 mL by nebulization Every 6 (Six) Hours As Needed for Wheezing or Shortness of Air for up to 120 days.    lisinopril (PRINIVIL,ZESTRIL) 40 MG tablet   Yes No    Take 1 tablet by mouth Every Night.    metFORMIN (GLUCOPHAGE) 1000 MG tablet   Yes No    Take 1 tablet by mouth 2 (Two) Times a Day With Meals.    pioglitazone (ACTOS) 30 MG tablet   Yes No    Take 1 tablet by mouth Every Night.    roflumilast (DALIRESP) 250 MCG tablet tablet   No No    Take 1 tablet by mouth Daily.    sertraline (ZOLOFT) 50 MG tablet   Yes No    Take 1 tablet by mouth Every Night.    vitamin D (ERGOCALCIFEROL) 1.25 MG (49678 UT) capsule capsule   Yes No    1 capsule Every 7 (Seven) Days. Wednesday            Allergies:  No Known Allergies    Objective       Vitals:   Temp:  [97.8 °F (36.6 °C)] 97.8 °F (36.6 °C)  Heart Rate:  [67-74] 71  Resp:  [20-22] 20  BP: (145-154)/(62-70) 145/70  Flow (L/min):  [5-6] 5    Physical Exam  Vitals reviewed.   Constitutional:       Comments: Labored breathing  Wearing 6 L nasal cannula  Nurse at bedside   HENT:      Head: Normocephalic.   Cardiovascular:      Rate and Rhythm: Normal rate.   Pulmonary:      Effort: Pulmonary effort is normal.      Breath sounds: Wheezing, rhonchi and rales present.   Abdominal:      General: Abdomen is flat.      Palpations: Abdomen is soft.   Musculoskeletal:         General: Normal range of motion.      Cervical back: Normal range of motion.      Right lower leg: Edema present.      Left lower leg:  Edema present.   Skin:     General: Skin is warm.   Neurological:      General: No focal deficit present.      Mental Status: He is alert and oriented to person, place, and time.   Psychiatric:         Mood and Affect: Mood normal.         Behavior: Behavior normal.          Result Review    Result Review:  I have personally reviewed the results from the time of this admission to 4/23/2023 14:39 EDT and agree with these findings:  [x]  Laboratory  []  Microbiology  []  Radiology  []  EKG/Telemetry   []  Cardiology/Vascular   []  Pathology  []  Old records  []  Other:  Most notable findings include: Lactic acid 3.6.  ABG is within normal limits.  Mild anemia hemoglobin 9.8      Assessment & Plan      72-year-old gentleman that presents with acute on chronic respiratory failure    Active Hospital Problems:  There are no active hospital problems to display for this patient.    Plan:   Acute on chronic respiratory failure  Likely due to COPD exacerbation  Solu-Medrol ordered  Pulmonary consulted  1 dose of Lasix IV ordered  Supplemental oxygen as needed  DuoNebs ordered    Recent rhinovirus infection  Seems to have resolved at this point    History of hypertension  Resume amlodipine and bisoprolol as well as lisinopril    History of neuropathy  Resume gabapentin    History of type 2 diabetes  Diabetic diet  Sliding scale ordered    History of anxiety depression  Resume Zoloft      DVT prophylaxis:  No DVT prophylaxis order currently exists.    CODE STATUS:       Admission Status:  I believe this patient meets inpatient status.    I discussed the patient's findings and my recommendations with patient.    This patient has been examined wearing appropriate Personal Protective Equipment and discussed with Patient. 04/23/23      Signature: Sukhdeep Prince MD      Electronically signed by Sukhdeep Prince MD at 04/23/23 [id number]

## 2023-04-26 ENCOUNTER — READMISSION MANAGEMENT (OUTPATIENT)
Dept: CALL CENTER | Facility: HOSPITAL | Age: 73
End: 2023-04-26
Payer: MEDICARE

## 2023-04-26 ENCOUNTER — TRANSCRIBE ORDERS (OUTPATIENT)
Dept: HOME HEALTH SERVICES | Facility: HOME HEALTHCARE | Age: 73
End: 2023-04-26
Payer: MEDICARE

## 2023-04-26 ENCOUNTER — HOME HEALTH ADMISSION (OUTPATIENT)
Dept: HOME HEALTH SERVICES | Facility: HOME HEALTHCARE | Age: 73
End: 2023-04-26
Payer: MEDICARE

## 2023-04-26 VITALS
DIASTOLIC BLOOD PRESSURE: 44 MMHG | SYSTOLIC BLOOD PRESSURE: 104 MMHG | HEIGHT: 67 IN | HEART RATE: 70 BPM | BODY MASS INDEX: 34.64 KG/M2 | TEMPERATURE: 97.5 F | WEIGHT: 220.68 LBS | RESPIRATION RATE: 22 BRPM | OXYGEN SATURATION: 90 %

## 2023-04-26 DIAGNOSIS — R06.03 ACUTE RESPIRATORY DISTRESS: Primary | ICD-10-CM

## 2023-04-26 LAB
ALBUMIN SERPL-MCNC: 3.3 G/DL (ref 3.5–5.2)
ALBUMIN/GLOB SERPL: 1.1 G/DL
ALP SERPL-CCNC: 51 U/L (ref 39–117)
ALT SERPL W P-5'-P-CCNC: 18 U/L (ref 1–41)
ANION GAP SERPL CALCULATED.3IONS-SCNC: 12 MMOL/L (ref 5–15)
AST SERPL-CCNC: 16 U/L (ref 1–40)
BASOPHILS # BLD AUTO: 0 10*3/MM3 (ref 0–0.2)
BASOPHILS NFR BLD AUTO: 0.1 % (ref 0–1.5)
BILIRUB SERPL-MCNC: <0.2 MG/DL (ref 0–1.2)
BUN SERPL-MCNC: 31 MG/DL (ref 8–23)
BUN/CREAT SERPL: 38.8 (ref 7–25)
CALCIUM SPEC-SCNC: 8.7 MG/DL (ref 8.6–10.5)
CHLORIDE SERPL-SCNC: 100 MMOL/L (ref 98–107)
CO2 SERPL-SCNC: 27 MMOL/L (ref 22–29)
CREAT SERPL-MCNC: 0.8 MG/DL (ref 0.76–1.27)
DEPRECATED RDW RBC AUTO: 50.3 FL (ref 37–54)
EGFRCR SERPLBLD CKD-EPI 2021: 94 ML/MIN/1.73
EOSINOPHIL # BLD AUTO: 0 10*3/MM3 (ref 0–0.4)
EOSINOPHIL NFR BLD AUTO: 0 % (ref 0.3–6.2)
ERYTHROCYTE [DISTWIDTH] IN BLOOD BY AUTOMATED COUNT: 15.8 % (ref 12.3–15.4)
GLOBULIN UR ELPH-MCNC: 3.1 GM/DL
GLUCOSE BLDC GLUCOMTR-MCNC: 258 MG/DL (ref 70–105)
GLUCOSE BLDC GLUCOMTR-MCNC: 263 MG/DL (ref 70–105)
GLUCOSE BLDC GLUCOMTR-MCNC: 302 MG/DL (ref 70–105)
GLUCOSE SERPL-MCNC: 373 MG/DL (ref 65–99)
HCT VFR BLD AUTO: 27.6 % (ref 37.5–51)
HGB BLD-MCNC: 9.2 G/DL (ref 13–17.7)
LYMPHOCYTES # BLD AUTO: 0.5 10*3/MM3 (ref 0.7–3.1)
LYMPHOCYTES NFR BLD AUTO: 5.3 % (ref 19.6–45.3)
MAGNESIUM SERPL-MCNC: 2.5 MG/DL (ref 1.6–2.4)
MCH RBC QN AUTO: 28.3 PG (ref 26.6–33)
MCHC RBC AUTO-ENTMCNC: 33.4 G/DL (ref 31.5–35.7)
MCV RBC AUTO: 84.8 FL (ref 79–97)
MONOCYTES # BLD AUTO: 0.3 10*3/MM3 (ref 0.1–0.9)
MONOCYTES NFR BLD AUTO: 3.6 % (ref 5–12)
NEUTROPHILS NFR BLD AUTO: 8.6 10*3/MM3 (ref 1.7–7)
NEUTROPHILS NFR BLD AUTO: 91 % (ref 42.7–76)
NRBC BLD AUTO-RTO: 0.1 /100 WBC (ref 0–0.2)
PHOSPHATE SERPL-MCNC: 2.7 MG/DL (ref 2.5–4.5)
PLATELET # BLD AUTO: 231 10*3/MM3 (ref 140–450)
PMV BLD AUTO: 9.4 FL (ref 6–12)
POTASSIUM SERPL-SCNC: 3.6 MMOL/L (ref 3.5–5.2)
PROT SERPL-MCNC: 6.4 G/DL (ref 6–8.5)
RBC # BLD AUTO: 3.26 10*6/MM3 (ref 4.14–5.8)
SODIUM SERPL-SCNC: 139 MMOL/L (ref 136–145)
WBC NRBC COR # BLD: 9.5 10*3/MM3 (ref 3.4–10.8)

## 2023-04-26 PROCEDURE — 82962 GLUCOSE BLOOD TEST: CPT

## 2023-04-26 PROCEDURE — 94761 N-INVAS EAR/PLS OXIMETRY MLT: CPT

## 2023-04-26 PROCEDURE — 94799 UNLISTED PULMONARY SVC/PX: CPT

## 2023-04-26 PROCEDURE — 25010000002 METHYLPREDNISOLONE PER 40 MG: Performed by: INTERNAL MEDICINE

## 2023-04-26 PROCEDURE — 94664 DEMO&/EVAL PT USE INHALER: CPT

## 2023-04-26 PROCEDURE — 63710000001 INSULIN LISPRO (HUMAN) PER 5 UNITS: Performed by: INTERNAL MEDICINE

## 2023-04-26 PROCEDURE — 63710000001 INSULIN REGULAR HUMAN PER 5 UNITS: Performed by: INTERNAL MEDICINE

## 2023-04-26 RX ORDER — SPIRONOLACTONE 25 MG/1
25 TABLET ORAL DAILY
Qty: 30 TABLET | Refills: 2 | Status: SHIPPED | OUTPATIENT
Start: 2023-04-27

## 2023-04-26 RX ORDER — ROFLUMILAST 500 UG/1
500 TABLET ORAL DAILY
Qty: 30 TABLET | Refills: 0 | Status: SHIPPED | OUTPATIENT
Start: 2023-04-27

## 2023-04-26 RX ORDER — METHYLPREDNISOLONE 4 MG/1
TABLET ORAL
Qty: 21 TABLET | Refills: 0 | Status: SHIPPED | OUTPATIENT
Start: 2023-04-26

## 2023-04-26 RX ORDER — CEFDINIR 300 MG/1
300 CAPSULE ORAL EVERY 12 HOURS SCHEDULED
Qty: 8 CAPSULE | Refills: 0 | Status: SHIPPED | OUTPATIENT
Start: 2023-04-26 | End: 2023-04-30

## 2023-04-26 RX ORDER — DIPHENHYDRAMINE HYDROCHLORIDE AND LIDOCAINE HYDROCHLORIDE AND ALUMINUM HYDROXIDE AND MAGNESIUM HYDRO
10 KIT EVERY 6 HOURS SCHEDULED
Qty: 100 ML | Refills: 0 | Status: SHIPPED | OUTPATIENT
Start: 2023-04-26

## 2023-04-26 RX ORDER — SILDENAFIL CITRATE 20 MG/1
20 TABLET ORAL EVERY 8 HOURS SCHEDULED
Qty: 60 TABLET | Refills: 0 | Status: SHIPPED | OUTPATIENT
Start: 2023-04-26

## 2023-04-26 RX ADMIN — INSULIN LISPRO 12 UNITS: 100 INJECTION, SOLUTION INTRAVENOUS; SUBCUTANEOUS at 12:51

## 2023-04-26 RX ADMIN — CEFDINIR 300 MG: 300 CAPSULE ORAL at 08:03

## 2023-04-26 RX ADMIN — SPIRONOLACTONE 25 MG: 25 TABLET ORAL at 08:03

## 2023-04-26 RX ADMIN — SILDENAFIL CITRATE 20 MG: 20 TABLET ORAL at 05:17

## 2023-04-26 RX ADMIN — SILDENAFIL CITRATE 20 MG: 20 TABLET ORAL at 12:51

## 2023-04-26 RX ADMIN — METHYLPREDNISOLONE SODIUM SUCCINATE 40 MG: 40 INJECTION, POWDER, FOR SOLUTION INTRAMUSCULAR; INTRAVENOUS at 08:03

## 2023-04-26 RX ADMIN — INSULIN HUMAN 8 UNITS: 100 INJECTION, SOLUTION PARENTERAL at 05:18

## 2023-04-26 RX ADMIN — DIPHENHYDRAMINE HYDROCHLORIDE AND LIDOCAINE HYDROCHLORIDE AND ALUMINUM HYDROXIDE AND MAGNESIUM HYDRO 10 ML: KIT at 12:51

## 2023-04-26 RX ADMIN — ROFLUMILAST 500 MCG: 500 TABLET ORAL at 08:03

## 2023-04-26 RX ADMIN — DIPHENHYDRAMINE HYDROCHLORIDE AND LIDOCAINE HYDROCHLORIDE AND ALUMINUM HYDROXIDE AND MAGNESIUM HYDRO 10 ML: KIT at 05:16

## 2023-04-26 RX ADMIN — FUROSEMIDE 40 MG: 40 TABLET ORAL at 08:03

## 2023-04-26 RX ADMIN — IPRATROPIUM BROMIDE AND ALBUTEROL SULFATE 3 ML: .5; 3 SOLUTION RESPIRATORY (INHALATION) at 07:02

## 2023-04-26 RX ADMIN — INSULIN HUMAN 8 UNITS: 100 INJECTION, SOLUTION PARENTERAL at 12:51

## 2023-04-26 RX ADMIN — BUDESONIDE 0.5 MG: 0.5 INHALANT RESPIRATORY (INHALATION) at 07:06

## 2023-04-26 RX ADMIN — GUAIFENESIN 1200 MG: 600 TABLET, EXTENDED RELEASE ORAL at 08:03

## 2023-04-26 RX ADMIN — INSULIN LISPRO 12 UNITS: 100 INJECTION, SOLUTION INTRAVENOUS; SUBCUTANEOUS at 08:02

## 2023-04-26 RX ADMIN — GABAPENTIN 300 MG: 300 CAPSULE ORAL at 08:03

## 2023-04-26 RX ADMIN — BISOPROLOL FUMARATE 5 MG: 5 TABLET ORAL at 08:03

## 2023-04-26 RX ADMIN — THEOPHYLLINE ANHYDROUS 300 MG: 300 CAPSULE, EXTENDED RELEASE ORAL at 08:03

## 2023-04-26 RX ADMIN — IPRATROPIUM BROMIDE AND ALBUTEROL SULFATE 3 ML: .5; 3 SOLUTION RESPIRATORY (INHALATION) at 10:39

## 2023-04-26 RX ADMIN — METHYLPREDNISOLONE SODIUM SUCCINATE 40 MG: 40 INJECTION, POWDER, FOR SOLUTION INTRAMUSCULAR; INTRAVENOUS at 01:10

## 2023-04-26 RX ADMIN — Medication 10 ML: at 08:04

## 2023-04-26 NOTE — PLAN OF CARE
Goal Outcome Evaluation:      Patient continues on 5 liters nasal cannula to maintain oxygenation above 86% per provider. Patient alert and oriented during shift with no complaints. Currently resting with call light in reach. Anticipate discharge to home.

## 2023-04-26 NOTE — DISCHARGE SUMMARY
[facility] Medicine Services  DISCHARGE SUMMARY    Patient Name: Brenden Peter  : 1950  MRN: [id number]    Discharge condition: Stabilized  Date of Admission: 2023  Discharge Diagnosis: Acute on chronic respiratory failure due to COPD exacerbation  Date of Discharge: 2023  Primary Care Physician: Bert Rojas MD      Presenting Problem:   Acute respiratory distress [R06.03]  Chronic obstructive pulmonary disease with acute exacerbation [J44.1]    Active and Resolved Hospital Problems:  Active Hospital Problems    Diagnosis POA   • **Acute respiratory distress [R06.03] Yes      Resolved Hospital Problems   No resolved problems to display.       Hospital Course     Hospital Course:  Brenden Peter is a 72 y.o. male who presented to the hospital with severe respiratory distress.  The patient was evaluated and found to be in COPD exacerbation and mild fluid overload.  He was given Lasix and steroids.  Patient required pulmonary evaluation and aggressive therapy.  His oxygen requirements eventually came down to 5 L and he was cleared by pulmonary for discharge home.  He was prescribed a steroid pack.              Reasons For Change In Medications and Indications for New Medications:      Day of Discharge     Vital Signs:  Temp:  [97.5 °F (36.4 °C)-98.2 °F (36.8 °C)] 97.5 °F (36.4 °C)  Heart Rate:  [66-76] 70  Resp:  [14-22] 22  BP: (100-136)/(44-64) 104/44  Flow (L/min):  [4-5] 4    Physical Exam:  Physical Exam  Vitals reviewed.   Constitutional:       Comments: Sitting up in bed wearing nasal cannula   HENT:      Head: Normocephalic.   Cardiovascular:      Rate and Rhythm: Normal rate.   Pulmonary:      Effort: Pulmonary effort is normal.   Abdominal:      General: Abdomen is flat.      Palpations: Abdomen is soft.   Musculoskeletal:         General: Normal range of motion.      Cervical back: Normal range of motion.   Skin:     General: Skin is warm.    Neurological:      General: No focal deficit present.      Mental Status: He is alert and oriented to person, place, and time.   Psychiatric:         Mood and Affect: Mood normal.         Behavior: Behavior normal.            Pertinent  and/or Most Recent Results     LAB RESULTS:      Lab 04/25/23 2352 04/25/23 0243 04/24/23  1011 04/23/23  1628 04/23/23  1245 04/23/23  1241   WBC 9.50 8.40 6.40  --   --  6.00   HEMOGLOBIN 9.2* 8.9* 9.2*  --   --  9.8*   HEMATOCRIT 27.6* 28.1* 28.5*  --   --  31.2*   PLATELETS 231 251 228  --   --  242   NEUTROS ABS 8.60* 7.50* 5.70  --   --  4.70   LYMPHS ABS 0.50* 0.60* 0.70  --   --  0.90   MONOS ABS 0.30 0.30 0.10  --   --  0.30   EOS ABS 0.00 0.00 0.00  --   --  0.10   MCV 84.8 86.5 85.8  --   --  87.5   SED RATE  --  54* 78*  --   --   --    CRP  --  1.66* 2.86*  --   --   --    PROCALCITONIN  --   --  0.07  --   --   --    LACTATE  --   --   --  3.6* 2.4*  --          Lab 04/25/23 2352 04/25/23 0243 04/24/23  1011 04/23/23  1241   SODIUM 139 142 141 141   POTASSIUM 3.6 3.8 4.0 4.1   CHLORIDE 100 103 103 103   CO2 27.0 29.0 27.0 27.0   ANION GAP 12.0 10.0 11.0 11.0   BUN 31* 23 14 7*   CREATININE 0.80 0.89 0.50* 0.49*   EGFR 94.0 91.1 108.4 109.0   GLUCOSE 373* 307* 313* 171*   CALCIUM 8.7 8.7 8.8 8.8   MAGNESIUM 2.5* 1.9 1.7 1.8   PHOSPHORUS 2.7 3.2 3.2 2.8         Lab 04/25/23 2352 04/25/23 0243 04/24/23  1011 04/23/23  1241   TOTAL PROTEIN 6.4 6.3 6.7 7.3   ALBUMIN 3.3* 3.0* 3.2* 3.3*   GLOBULIN 3.1 3.3 3.5 4.0   ALT (SGPT) 18 10 12 13   AST (SGOT) 16 10 11 18   BILIRUBIN <0.2 <0.2 0.2 0.2   ALK PHOS 51 46 53 60         Lab 04/23/23  1241   PROBNP 791.5                 Lab 04/23/23  1258   PH, ARTERIAL 7.437   PCO2, ARTERIAL 42.7   PO2 ART 83.1   O2 SATURATION ART 96.5   FIO2 40   HCO3 ART 28.8*   BASE EXCESS ART 4.1*     Brief Urine Lab Results     None        Microbiology Results (last 10 days)     Procedure Component Value - Date/Time    Blood Culture - Blood,  Arm, Right [[id number]]  (Normal) Collected: 04/23/23 1253    Lab Status: Preliminary result Specimen: Blood from Arm, Right Updated: 04/25/23 1315     Blood Culture No growth at 2 days    Blood Culture - Blood, Arm, Left [[id number]]  (Normal) Collected: 04/23/23 1241    Lab Status: Preliminary result Specimen: Blood from Arm, Left Updated: 04/25/23 1315     Blood Culture No growth at 2 days          XR Chest 2 View    Result Date: 4/7/2023  Impression: Impression: 1. Diffuse interstitial prominence in the lungs which may represent mild fibrosis. 2. Right upper lobe infiltrate. Electronically Signed: Leo Rosenberg  4/7/2023 3:45 PM EDT  Workstation ID: [id number]    XR Chest 1 View    Result Date: 4/23/2023  Impression: Impression: 1. Cardiomegaly with pulmonary vascular congestion and bilateral perihilar haziness suggesting pulmonary edema/CHF or less likely atypical infection pattern. 2. Denser consolidation in the medial right lung base may be due to pneumonia, aspiration or atelectasis. Electronically Signed: Preet Moreno  4/23/2023 1:43 PM EDT  Workstation ID: [id number]    XR Chest 1 View    Result Date: 4/10/2023  Impression: Impression: Findings of pulmonary edema. The perihilar opacities appear slightly improved. Electronically Signed: Denise Galicia  4/10/2023 3:27 PM EDT  Workstation ID: [id number]    XR Chest 1 View    Result Date: 3/31/2023  Impression: Impression: Stable chronic findings without acute process. Electronically Signed: Wang Alba  3/31/2023 7:21 PM EDT  Workstation ID: [id number]              Results for orders placed during the hospital encounter of 03/08/23    Adult Transthoracic Echo Complete W/ Cont if Necessary Per Protocol    Interpretation Summary  •  Left ventricular systolic function is normal. Calculated left ventricular EF = 65.5% Left ventricular ejection fraction appears to be 61 - 65%.  •  Left ventricular diastolic dysfunction is noted.  •  Both atrial cavities are dilated.  •  Estimated  right ventricular systolic pressure from tricuspid regurgitation is markedly elevated (>55 mmHg).      Labs Pending at Discharge:  Pending Labs     Order Current Status    Blood Culture - Blood, Arm, Left Preliminary result    Blood Culture - Blood, Arm, Right Preliminary result          Procedures Performed           Consults:   Consults     Date and Time Order Name Status Description    4/23/2023  2:40 PM Inpatient Pulmonology Consult Completed     4/23/2023  2:02 PM Hospitalist (on-call MD unless specified)      4/1/2023  9:51 AM Inpatient Pulmonology Consult Completed             Discharge Details        Discharge Medications      New Medications      Instructions Start Date   cefdinir 300 MG capsule  Commonly known as: OMNICEF   300 mg, Oral, Every 12 Hours Scheduled      First Mouthwash (Magic Mouthwash) suspension   10 mL, Swish & Spit, Every 6 Hours Scheduled      methylPREDNISolone 4 MG dose pack  Commonly known as: MEDROL   Take as directed on package instructions.      sildenafil 20 MG tablet  Commonly known as: REVATIO   20 mg, Oral, Every 8 Hours Scheduled      spironolactone 25 MG tablet  Commonly known as: ALDACTONE   25 mg, Oral, Daily   Start Date: April 27, 2023     theophylline 300 MG 24 hr capsule  Commonly known as: PRISCILLA-24   300 mg, Oral, Every 24 Hours Scheduled   Start Date: April 27, 2023        Changes to Medications      Instructions Start Date   roflumilast 500 MCG tablet tablet  Commonly known as: DALIRESP  What changed:   · medication strength  · how much to take   500 mcg, Oral, Daily   Start Date: April 27, 2023        Continue These Medications      Instructions Start Date   amLODIPine 5 MG tablet  Commonly known as: NORVASC   5 mg, Oral, Nightly      bisoprolol 5 MG tablet  Commonly known as: ZEBeta   5 mg, Oral, Daily      budesonide 0.5 MG/2ML nebulizer solution  Commonly known as: PULMICORT   0.5 mg, Nebulization, 2 Times Daily - RT, J44.9      budesonide-formoterol 160-4.5  MCG/ACT inhaler  Commonly known as: SYMBICORT   2 puffs, Inhalation, 2 Times Daily - RT      furosemide 40 MG tablet  Commonly known as: LASIX   40 mg, Oral, Daily      gabapentin 300 MG capsule  Commonly known as: NEURONTIN   300 mg, Oral, 3 Times Daily      glipizide 10 MG tablet  Commonly known as: GLUCOTROL   10 mg, Oral, Nightly      guaiFENesin 600 MG 12 hr tablet  Commonly known as: MUCINEX   1,200 mg, Oral, Every 12 Hours      ipratropium-albuterol 0.5-2.5 mg/3 ml nebulizer  Commonly known as: DUO-NEB   3 mL, Nebulization, Every 6 Hours PRN      lisinopril 40 MG tablet  Commonly known as: PRINIVIL,ZESTRIL   40 mg, Oral, Nightly      metFORMIN 1000 MG tablet  Commonly known as: GLUCOPHAGE   1,000 mg, Oral, 2 Times Daily With Meals      pioglitazone 30 MG tablet  Commonly known as: ACTOS   30 mg, Oral, Nightly      sertraline 50 MG tablet  Commonly known as: ZOLOFT   50 mg, Oral, Nightly      vitamin D 1.25 MG (34687 UT) capsule capsule  Commonly known as: ERGOCALCIFEROL   50,000 Units, Every 7 Days, Wednesday             No Known Allergies      Discharge Disposition:   Home or Self Care    Diet:  Hospital:  Diet Order   Procedures   • Diet: Cardiac Diets, Diabetic Diets; Healthy Heart (2-3 Na+); Consistent Carbohydrate; Texture: Regular Texture (IDDSI 7); Fluid Consistency: Thin (IDDSI 0)         Discharge Activity:         CODE STATUS:  Code Status and Medical Interventions:   Ordered at: 04/23/23 3495     Level Of Support Discussed With:    Patient     Code Status (Patient has no pulse and is not breathing):    CPR (Attempt to Resuscitate)     Medical Interventions (Patient has pulse or is breathing):    Full Support         No future appointments.    Additional Instructions for the Follow-ups that You Need to Schedule     Ambulatory Referral to Home Health (Hospital)   As directed      Face to Face Visit Date: 4/26/2023    Follow-up provider for Plan of Care?: I treated the patient in an acute care  facility and will not continue treatment after discharge.    Follow-up provider: BULMARO CLEMENTS [[id number]]    Reason/Clinical Findings: home health on d/c pt ot    Describe mobility limitations that make leaving home difficult: copd on oxygen    Nursing/Therapeutic Services Requested: Physical Therapy Occupational Therapy    Occupational orders: Strengthening Activities of daily living Energy conservation    Frequency: 1 Week 1               Time spent on Discharge including face to face service:  45 minutes    This patient has been examined wearing appropriate Personal Protective Equipment and discussed with Patient and pulmonary. 04/26/23      Signature: Sukhdeep Prince MD

## 2023-04-26 NOTE — PROGRESS NOTES
Spoke with patient to verify demographics and explain services. Pt is agreeable and has no other agency. Pt asks that we use his home phone as primary contact for visits as his mobile doesn't always have good service due to a metal roof.  [phone]    Nursing, PT, and OT    Spoke with Olya. Dr. Rojas is agreeable to sign the plan of care and follow for home health orders    Pharmacy:  [facility] in [place], [place]  DME: [facility] for home oxygen

## 2023-04-26 NOTE — CASE MANAGEMENT/SOCIAL WORK
Case Management Discharge Note      Final Note: [facility]    Selected Continued Care - Discharged on 4/26/2023 Admission date: 4/23/2023 - Discharge disposition: Home or Self Care        Home Medical Care Coordination complete.    Service Provider Selected Services Address Phone Fax Patient Preferred    [facility] [address] [facility] [phone]                   Transportation Services  Private: Car    Final Discharge Disposition Code: 06 - home with home health care

## 2023-04-26 NOTE — CASE MANAGEMENT/SOCIAL WORK
Continued Stay Note  [facility]     Patient Name: Brenden Peter  MRN: [id number]  Today's Date: 4/26/2023    Admit Date: 4/23/2023    Plan: Anticipate routine home with spouse and [facility] (accepted, order per MD). Has home O2 4L through [employer]. Pulse ox delivered.   Discharge Plan     Row Name 04/26/23 1144       Plan    Plan Anticipate routine home with spouse and [facility] (accepted, order per MD). Has home O2 4L through [employer]. Pulse ox delivered.    Plan Comments [facility] liaison Alyx confirmed acceptance. CM requested HH order from MD.              Latasha Spears RN     Office Phone: [phone]  Office Cell: [phone]     Yes

## 2023-04-26 NOTE — PROGRESS NOTES
"PULMONARY CRITICAL CARE PROGRESS  NOTE      PATIENT IDENTIFICATION:  Name: Brenden Peter  MRN: [id number]  :  1950     Age: 72 y.o.  Sex: male    DATE OF Note:  2023   Referring Physician: Sukhdeep Prince MD                  Subjective:   Feeling better, sitting on side of bed, on 4 L O2,  no SOB, no chest abdominal pain, no bowel or bladder issues reported      Objective:  tMax 24 hrs: Temp (24hrs), Av.7 °F (36.5 °C), Min:97.5 °F (36.4 °C), Max:97.9 °F (36.6 °C)      Vitals Ranges:   Temp:  [97.5 °F (36.4 °C)-97.9 °F (36.6 °C)] 97.5 °F (36.4 °C)  Heart Rate:  [66-76] 70  Resp:  [14-22] 22  BP: (104-136)/(44-64) 104/44    Intake and Output Last 3 Shifts:   I/O last 3 completed shifts:  In: 1680 [P.O.:1680]  Out: 425 [Urine:425]    Exam:  /44 (BP Location: Left arm, Patient Position: Lying)   Pulse 70   Temp 97.5 °F (36.4 °C) (Oral)   Resp 22   Ht 170.2 cm (67\")   Wt 100 kg (220 lb 10.9 oz)   SpO2 90%   BMI 34.56 kg/m²     General Appearance: AAO  HEENT:  Normocephalic, without obvious abnormality. Conjunctivae/corneas clear.  Normal external ear canals. Nares normal, no drainage     Neck:  Supple, symmetrical, trachea midline. No JVD.  Lungs /Chest wall:   Bilateral basal rhonchi, respirations unlabored, symmetrical wall movement.     Heart:  Regular rate and rhythm, systolic murmur PMI left sternal border  Abdomen: Soft, nontender, no masses, no organomegaly.    Extremities: Trace edema, no clubbing or cyanosis        Medications:      Infusion:  No current facility-administered medications for this encounter.       PRN:    Data Review:  All labs (24hrs):   Recent Results (from the past 24 hour(s))   POC Glucose Once    Collection Time: 23  8:21 PM    Specimen: Blood   Result Value Ref Range    Glucose 372 (H) 70 - 105 mg/dL   POC Glucose Once    Collection Time: 23 11:08 PM    Specimen: Blood   Result Value Ref Range    Glucose 376 (H) 70 - 105 mg/dL   Magnesium    " Collection Time: 04/25/23 11:52 PM    Specimen: Blood   Result Value Ref Range    Magnesium 2.5 (H) 1.6 - 2.4 mg/dL   Comprehensive Metabolic Panel    Collection Time: 04/25/23 11:52 PM    Specimen: Blood   Result Value Ref Range    Glucose 373 (H) 65 - 99 mg/dL    BUN 31 (H) 8 - 23 mg/dL    Creatinine 0.80 0.76 - 1.27 mg/dL    Sodium 139 136 - 145 mmol/L    Potassium 3.6 3.5 - 5.2 mmol/L    Chloride 100 98 - 107 mmol/L    CO2 27.0 22.0 - 29.0 mmol/L    Calcium 8.7 8.6 - 10.5 mg/dL    Total Protein 6.4 6.0 - 8.5 g/dL    Albumin 3.3 (L) 3.5 - 5.2 g/dL    ALT (SGPT) 18 1 - 41 U/L    AST (SGOT) 16 1 - 40 U/L    Alkaline Phosphatase 51 39 - 117 U/L    Total Bilirubin <0.2 0.0 - 1.2 mg/dL    Globulin 3.1 gm/dL    A/G Ratio 1.1 g/dL    BUN/Creatinine Ratio 38.8 (H) 7.0 - 25.0    Anion Gap 12.0 5.0 - 15.0 mmol/L    eGFR 94.0 >60.0 mL/min/1.73   Phosphorus    Collection Time: 04/25/23 11:52 PM    Specimen: Blood   Result Value Ref Range    Phosphorus 2.7 2.5 - 4.5 mg/dL   CBC Auto Differential    Collection Time: 04/25/23 11:52 PM    Specimen: Blood   Result Value Ref Range    WBC 9.50 3.40 - 10.80 10*3/mm3    RBC 3.26 (L) 4.14 - 5.80 10*6/mm3    Hemoglobin 9.2 (L) 13.0 - 17.7 g/dL    Hematocrit 27.6 (L) 37.5 - 51.0 %    MCV 84.8 79.0 - 97.0 fL    MCH 28.3 26.6 - 33.0 pg    MCHC 33.4 31.5 - 35.7 g/dL    RDW 15.8 (H) 12.3 - 15.4 %    RDW-SD 50.3 37.0 - 54.0 fl    MPV 9.4 6.0 - 12.0 fL    Platelets 231 140 - 450 10*3/mm3    Neutrophil % 91.0 (H) 42.7 - 76.0 %    Lymphocyte % 5.3 (L) 19.6 - 45.3 %    Monocyte % 3.6 (L) 5.0 - 12.0 %    Eosinophil % 0.0 (L) 0.3 - 6.2 %    Basophil % 0.1 0.0 - 1.5 %    Neutrophils, Absolute 8.60 (H) 1.70 - 7.00 10*3/mm3    Lymphocytes, Absolute 0.50 (L) 0.70 - 3.10 10*3/mm3    Monocytes, Absolute 0.30 0.10 - 0.90 10*3/mm3    Eosinophils, Absolute 0.00 0.00 - 0.40 10*3/mm3    Basophils, Absolute 0.00 0.00 - 0.20 10*3/mm3    nRBC 0.1 0.0 - 0.2 /100 WBC   POC Glucose Once    Collection Time:  04/26/23  5:22 AM    Specimen: Blood   Result Value Ref Range    Glucose 302 (H) 70 - 105 mg/dL   POC Glucose Once    Collection Time: 04/26/23  7:38 AM    Specimen: Blood   Result Value Ref Range    Glucose 263 (H) 70 - 105 mg/dL   POC Glucose Once    Collection Time: 04/26/23 12:04 PM    Specimen: Blood   Result Value Ref Range    Glucose 258 (H) 70 - 105 mg/dL        Imaging:  XR Chest 1 View  Narrative: XR CHEST 1 VW    Date of Exam: 4/23/2023 1:28 PM EDT    Indication: Short of breath COPD    Comparison: 4/10/2023    Findings:  Cardiomegaly is again noted. There is pulmonary vascular congestion and bilateral mixed interstitial and alveolar opacity may indicate pulmonary edema or atypical infection pattern. There is denser consolidation in the medial right lung base which may be   due to pneumonia or atelectasis. Calcified granulomatous changes are again noted.  Impression: Impression:    1. Cardiomegaly with pulmonary vascular congestion and bilateral perihilar haziness suggesting pulmonary edema/CHF or less likely atypical infection pattern.  2. Denser consolidation in the medial right lung base may be due to pneumonia, aspiration or atelectasis.    Electronically Signed: Preetmandie Moreno    4/23/2023 1:43 PM EDT    Workstation ID: [id number]       ASSESSMENT:  Acute on chronic hypoxic/hypercapnic respiratory failure  AE COPD  Interstitial lung disease  Moderate to severe PAH  History of mucous plug  Pulmonary edema  SONJA on BiPAP  CAD  Hypertension  Hyperlipidemia  Diabetes mellitus       PLAN:  Discharge home and follow up with our service in 2-3 weeks  Antibiotics   Bronchodilators  Inhaled corticosteroids  IV steroids  Diuresis and monitor MIGDALIA's  Sildenafil every 8 hours  Bowel regimen  Electrolytes/ glycemic control  DVT and GI prophylaxis-Lovenox    Discussed with Dr. Liliana Tyler, APRN   4/26/2023  19:12 EDT    I personally have examined  and interviewed the patient. I have reviewed the history, data,  problems, assessment and plan with our NP.  Total Critical care time in direct medical management (   ) minutes, This time specifically excludes time spent performing procedures.    Jeannine Ford MD   4/26/2023  22:45 EDT

## 2023-04-27 ENCOUNTER — HOME CARE VISIT (OUTPATIENT)
Dept: HOME HEALTH SERVICES | Facility: HOME HEALTHCARE | Age: 73
End: 2023-04-27
Payer: MEDICARE

## 2023-04-27 VITALS
OXYGEN SATURATION: 90 % | DIASTOLIC BLOOD PRESSURE: 60 MMHG | WEIGHT: 231 LBS | HEIGHT: 66 IN | TEMPERATURE: 97 F | HEART RATE: 84 BPM | RESPIRATION RATE: 20 BRPM | SYSTOLIC BLOOD PRESSURE: 120 MMHG | BODY MASS INDEX: 37.12 KG/M2

## 2023-04-27 PROCEDURE — G0299 HHS/HOSPICE OF RN EA 15 MIN: HCPCS

## 2023-04-27 NOTE — Clinical Note
Drug-Drug  <jscript:void(0)>  Drug-Drug: spironolactone and lisinopril   Hyperkalemia, possibly with cardiac arrhythmias or arrest, may occur with the combination of potassium-sparing diuretics and ACE inhibitors. Serum potassium concentrations and renal function should be monitored. Patients with renal impairment, diabetes, older age, severe heart failure, and risk for dehydration may be at greater risk.   Details Major   spironolactone (ALDACTONE) 25 MG tablet   Long-term.     lisinopril (PRINIVIL,ZESTRIL) 40 MG tablet

## 2023-04-27 NOTE — HOME HEALTH
SOC Note:  pt. with recent hospital discharge from [facility] on 4/27/23, for SOB.  Pt. lives in 1 story home with wife, who is primary caregiver.  Pt. states since January he has been in and out of hospital for breathing issues.  Pt. is on 4 LPNC continuous. Pt.'s pulse ox. remains at 88 usually he states Pt. in agreeance with [facility], with PT to increase strength, education on co-morbidities.     Home Health ordered for: disciplines SN and PT    Reason for Hosp/Primary Dx/Co-morbidities: Acute and chronic respiratory failure with hypoxia    Focus of Care: Acute and chronic respiratory failure with hypoxia    Skilled Need: Medication education, education on DM, respiratory failuer, oxygen safety    Current Functional status/mobility/DME: cane    HB status/Living Arrangements: homebound, wife primary caregiver    Skin Integrity/wound status: WNL, no wounds    Code Status: full code    Fall Risk: high    POC confirmed with patient/caregiver on date 4/27/23.    Plan for next visit: CP assess, pain assess, falls assess, medication education, follow up Dr. Greenwood

## 2023-04-27 NOTE — OUTREACH NOTE
Prep Survey    Flowsheet Row Responses   [facility] facility patient discharged from? [facility]   Is LACE score < 7 ? No   Eligibility Readm Mgmt   Discharge diagnosis Acute respiratory distress   Does the patient have one of the following disease processes/diagnoses(primary or secondary)? Other   Does the patient have Home health ordered? Yes   What is the Home health agency?  [facility]   Is there a DME ordered? No   Medication alerts for this patient see AVS   Prep survey completed? Yes          Marci HAMMONDS - Registered Nurse

## 2023-04-28 ENCOUNTER — HOME CARE VISIT (OUTPATIENT)
Dept: HOME HEALTH SERVICES | Facility: HOME HEALTHCARE | Age: 73
End: 2023-04-28
Payer: MEDICARE

## 2023-04-28 VITALS
HEART RATE: 78 BPM | DIASTOLIC BLOOD PRESSURE: 60 MMHG | OXYGEN SATURATION: 94 % | TEMPERATURE: 98.1 F | SYSTOLIC BLOOD PRESSURE: 132 MMHG

## 2023-04-28 LAB
BACTERIA SPEC AEROBE CULT: NORMAL
BACTERIA SPEC AEROBE CULT: NORMAL

## 2023-04-28 PROCEDURE — G0151 HHCP-SERV OF PT,EA 15 MIN: HCPCS

## 2023-04-28 NOTE — PROGRESS NOTES
Enter Query Response Below      Query Response: yes based on ABG findings             If applicable, please update the problem list.     Patient: Brenden Peter        : 1950  Account: [id number]           Admit Date: 2023        How to Respond to this query:       a. Click New Note     b. Answer query within the yellow box.                c. Update the Problem List, if applicable.      If you have any questions about this query contact me at: [email or web address]    Dr. Greenwood,    Patient with history of COPD and chronic respiratory failure admitted  in respiratory distress. Notes include acute on chronic hypoxic and hypercapnic respiratory failure. Pulmonology consult includes “Acute on chronic respiratory failure with hypoxemia and hypercapnia: ABGs 7.42/49.7/65.5, patient wears 4 L of home oxygen.” Patient was treated with monitoring and supplemental oxygen.  ABG on  @ 1258 includes:  CO2 content: 30.1  HCO3 arterial: 28.8  PCO2 arterial: 42.7  pH blood gas: 7.437  PaO2 arterial: 83.1    After study, was acute hypercapnic respiratory failure clinically supported during this admission?     - Yes, please include additional clinical indicators: ____________  - No  - Other __________________  - Unable to determine     By submitting this query, we are merely seeking further clarification of documentation to accurately reflect all conditions that you are monitoring, evaluating, treating or that extend the hospitalization or utilize additional resources of care. Please utilize your independent clinical judgment when addressing the question(s) above.     This query and your response, once completed, will be entered into the legal medical record.    Sincerely,  Luis Yang RN, CDS  Clinical Documentation Integrity Program

## 2023-04-28 NOTE — Clinical Note
Pt referred for home health PT sp hospitalizations for respiratory failure with hypoxia.     PLOF Residential ambulator without AD. O2 dependent.     Environment Lives with spouse in a single story house with one step to get in and out of house. House is clutter free. Pt able to ambulate in residence without AD.     Pt complaining of poor endurance and limiting his ability to move around in house. Pt is I with bed mobility and transfers. Pt requires supervision to ambulate in house without AD x 80 ft with 02 sats dropping to 80%. Requires 1 minute of rest for o2 sat to return above 90%. Pt wants to improve his ednurance to he does not get so winded ambulating in house.     Plan for next visit: strengthening, balance and gait training, energy conservation.

## 2023-04-29 ENCOUNTER — HOME CARE VISIT (OUTPATIENT)
Dept: HOME HEALTH SERVICES | Facility: HOME HEALTHCARE | Age: 73
End: 2023-04-29
Payer: MEDICARE

## 2023-04-29 VITALS
DIASTOLIC BLOOD PRESSURE: 64 MMHG | RESPIRATION RATE: 17 BRPM | HEART RATE: 70 BPM | TEMPERATURE: 97.3 F | SYSTOLIC BLOOD PRESSURE: 126 MMHG | OXYGEN SATURATION: 93 %

## 2023-04-29 PROCEDURE — G0299 HHS/HOSPICE OF RN EA 15 MIN: HCPCS

## 2023-04-29 NOTE — HOME HEALTH
Pt reports overall feeling much better than he has in a very long time. Pt reports complaince with taking steroid and anitbiotics at this time. Pt encouraged to let someone know if he starts to notice himself declining again after completing both courses. Pt verbalizes understanding. Pt reports complaince with daily weights and has actually lost about 3lbs. Pt knows he has to take it easy still and not overdo himself per PT recommendations. Pts oxygen sats do drop with ambulation. Pt denies any increase in SOA or cough at this time and no edema noted. Medciations reviewed and pt educated on inhaler use and water pill he is taking. Pt verbalizes understanding at this time.       CP assess  COPD/CHF monitoring with education  assess cough and SOA  assess pain and falls  medication review

## 2023-04-30 LAB
MYCOBACTERIUM SPEC CULT: NORMAL
NIGHT BLUE STAIN TISS: NORMAL

## 2023-05-01 ENCOUNTER — HOME CARE VISIT (OUTPATIENT)
Dept: HOME HEALTH SERVICES | Facility: HOME HEALTHCARE | Age: 73
End: 2023-05-01
Payer: MEDICARE

## 2023-05-01 VITALS — DIASTOLIC BLOOD PRESSURE: 70 MMHG | SYSTOLIC BLOOD PRESSURE: 138 MMHG | HEART RATE: 62 BPM | OXYGEN SATURATION: 94 %

## 2023-05-01 PROCEDURE — G0152 HHCP-SERV OF OT,EA 15 MIN: HCPCS

## 2023-05-01 NOTE — HOME HEALTH
Pt is a 73 yo male who lives in a 1 story home with spouse.   Pt recently hospitalized secondary to       PLOF pt independent with all self care tasks.  Spouse maintains the home      Currently pt independent with feeding grooming and toileting   Pt requires MIN/MOD assist with bathing and dressing.   Pts spouse maintains the home.      Skilled OT for ther ex/HEP/ endurance transfers adl training and energy conservation.   Pt and therapist in agreement with goals and poc.      Pt denies any falls or med changes    Next visit ther ray REESE

## 2023-05-02 ENCOUNTER — HOME CARE VISIT (OUTPATIENT)
Dept: HOME HEALTH SERVICES | Facility: HOME HEALTHCARE | Age: 73
End: 2023-05-02
Payer: MEDICARE

## 2023-05-02 VITALS
SYSTOLIC BLOOD PRESSURE: 138 MMHG | HEART RATE: 80 BPM | DIASTOLIC BLOOD PRESSURE: 62 MMHG | OXYGEN SATURATION: 90 % | TEMPERATURE: 97.7 F

## 2023-05-02 PROCEDURE — G0162 HHC RN E&M PLAN SVS, 15 MIN: HCPCS

## 2023-05-02 NOTE — HOME HEALTH
Routine Visit Note:  Awake and alert.  Ambulatory around home without aid.  Pt reports that he does use a cane for ambulation away from home.  Denies pain at this time, but admits to occasional back pain and takes med for relief. Pt reports that he does require 4LNC oxygen at all times and monitors his SpO2 to maintain above 90%.  Pt reports that he feels SOB with minimal exertion.  Pt's weight has been consistently decreasing.  He is down 6# since SOC.  Several areas of ecchymosis noted on arms.  Pt reports that he bruises easily and these particular bruises happened when helping his wife with the laundry basket.      Skill/education provided: CP assess, Med education and management, Skin assess    Patient/caregiver response: Tolerated without difficulty    Plan for next visit:   CP assess  Skin assess  Med education and management

## 2023-05-03 ENCOUNTER — HOME CARE VISIT (OUTPATIENT)
Dept: HOME HEALTH SERVICES | Facility: HOME HEALTHCARE | Age: 73
End: 2023-05-03
Payer: MEDICARE

## 2023-05-03 VITALS
TEMPERATURE: 96.9 F | SYSTOLIC BLOOD PRESSURE: 138 MMHG | HEART RATE: 72 BPM | RESPIRATION RATE: 18 BRPM | OXYGEN SATURATION: 98 % | DIASTOLIC BLOOD PRESSURE: 68 MMHG

## 2023-05-03 PROCEDURE — G0151 HHCP-SERV OF PT,EA 15 MIN: HCPCS

## 2023-05-04 NOTE — HOME HEALTH
Routine Visit Note    Skilled Interventions: Therapeutic exercise, instruction on prevention of falls, gait training.     Patient Response: Patient participates well in PT session. Noted deoxygenation with exercise and gait (but no less than 92%).    Session Notes: Patient present in kitchen, sitting at table when PT arrives. Patient on 4 lpm supplemental oxygen via NC.    Plan for next visit: Continue exercise and gait with advancement per patient ability and tolerance.

## 2023-05-05 ENCOUNTER — HOME CARE VISIT (OUTPATIENT)
Dept: HOME HEALTH SERVICES | Facility: HOME HEALTHCARE | Age: 73
End: 2023-05-05
Payer: MEDICARE

## 2023-05-05 VITALS
DIASTOLIC BLOOD PRESSURE: 82 MMHG | HEART RATE: 77 BPM | SYSTOLIC BLOOD PRESSURE: 142 MMHG | TEMPERATURE: 97.7 F | OXYGEN SATURATION: 90 % | RESPIRATION RATE: 18 BRPM

## 2023-05-05 PROCEDURE — G0162 HHC RN E&M PLAN SVS, 15 MIN: HCPCS

## 2023-05-05 PROCEDURE — G0157 HHC PT ASSISTANT EA 15: HCPCS

## 2023-05-05 NOTE — HOME HEALTH
Routine Visit Note:  Pt reports that he is feeling better today.  Reports that breathing is better.  Denies pain.  Positive affect.      Skill/education provided: CP assess, O2 education, Skin assess, Med education and management    Patient/caregiver response: Tolerted without difficulty    Plan for next visit:   CP assess  Med education and management  O2 education and management

## 2023-05-06 VITALS
DIASTOLIC BLOOD PRESSURE: 80 MMHG | HEART RATE: 72 BPM | OXYGEN SATURATION: 99 % | SYSTOLIC BLOOD PRESSURE: 124 MMHG | TEMPERATURE: 97.7 F

## 2023-05-06 NOTE — HOME HEALTH
Routine Visit Note:   Patient was in the living room and said come in.   Patient rated his pain as 0/10.    Skill/education provided: Patient was instructed in therapeutic exercises, safe transfers and ambulation with an appropriate assist device.    Patient/caregiver response: Patient/caregiver understood instructions and performed exercises well.    Plan for next visit:  Advance gait and strengthening exercise as patient pain and tolerance allows.

## 2023-05-07 LAB
MYCOBACTERIUM SPEC CULT: NORMAL
NIGHT BLUE STAIN TISS: NORMAL

## 2023-05-08 ENCOUNTER — READMISSION MANAGEMENT (OUTPATIENT)
Dept: CALL CENTER | Facility: HOSPITAL | Age: 73
End: 2023-05-08
Payer: MEDICARE

## 2023-05-08 ENCOUNTER — HOME CARE VISIT (OUTPATIENT)
Dept: HOME HEALTH SERVICES | Facility: HOME HEALTHCARE | Age: 73
End: 2023-05-08
Payer: MEDICARE

## 2023-05-08 VITALS
TEMPERATURE: 97.8 F | RESPIRATION RATE: 18 BRPM | OXYGEN SATURATION: 93 % | SYSTOLIC BLOOD PRESSURE: 152 MMHG | DIASTOLIC BLOOD PRESSURE: 78 MMHG | HEART RATE: 72 BPM

## 2023-05-08 VITALS — OXYGEN SATURATION: 93 % | SYSTOLIC BLOOD PRESSURE: 138 MMHG | DIASTOLIC BLOOD PRESSURE: 70 MMHG | HEART RATE: 64 BPM

## 2023-05-08 PROCEDURE — G0152 HHCP-SERV OF OT,EA 15 MIN: HCPCS

## 2023-05-08 PROCEDURE — G0162 HHC RN E&M PLAN SVS, 15 MIN: HCPCS

## 2023-05-08 NOTE — OUTREACH NOTE
Medical Week 2 Survey    Flowsheet Row Responses   [facility] patient discharged from? [facility]   Does the patient have one of the following disease processes/diagnoses(primary or secondary)? Other   Week 2 attempt successful? Yes   Call start time 1158   Call end time 1210   Person spoke with today (if not patient) and relationship herminia Thompson reviewed with patient/caregiver? Yes   Is the patient having any side effects they believe may be caused by any medication additions or changes? Yes  [wife feels like Daliresp has caused a rash/edema to genital area, education provided and instructed to discuss with PCP and HH team]   Does the patient have all medications ordered at discharge? Yes   Is the patient taking all medications as directed (includes completed medication regime)? Yes   Medication comments pt has completed Cefdnir   Does the patient have a primary care provider?  Yes   Does the patient have an appointment with their PCP within 7 days of discharge? Greater than 7 days   Comments regarding PCP PCP f/u on 5/9/23   Nursing Interventions Verified appointment date/time/provider   Has the patient kept scheduled appointments due by today? Yes   What is the Home health agency?   [facility]   Has home health visited the patient within 72 hours of discharge? Yes   Did the patient receive a copy of their discharge instructions? Yes   Nursing interventions Reviewed instructions with patient   What is the patient's perception of their health status since discharge? Improving   Is the patient/caregiver able to teach back the hierarchy of who to call/visit for symptoms/problems? PCP, Specialist, Home health nurse, Urgent Care, ED, 911 Yes   Week 2 Call Completed? Yes   Wrap up additional comments Wife communicates breathing has improved but has c/o to genitals that are itching, swollen and red--she believes r/t Daliresp after talking to pharmacist but has not discussed with MD or HH has not been made  aware of issues.  This RN inquired about use of briefs--pt does have frequent incontinent episdodes and uses adult diapers at times, boxers at others.  Encouraged her to allow HH nurse to assess to determine next steps and to inform MD for changes in POC.  Education provided to wife          CLAYTON REESE - Registered Nurse

## 2023-05-09 NOTE — HOME HEALTH
Routine Visit Note:  Awake and alert.  O2@4LNC.  Pt reports that he is feeling better, less SOB.  Pt reports that he thinks he had a side effect to Daliresp that caused a groin rash-pt reports he spoke with pharmacist.  Pt reports that he is going to talk to his PCP.      Skill/education provided: CP assess, O2 safety education, Med education and management, Pain assess    Patient/caregiver response: Tolerated without difficulty    Plan for next visit:   CP assess  O2 education  Med education and management

## 2023-05-10 NOTE — HOME HEALTH
Routine Visit Note:    Skill/education provided: OT instr and educated pt on B UE HEP    Patient/caregiver response: pt performed with MOD assist    Plan for next visit:  ther ex/HEP    Other pertinent info: pt denies any falls or med changes

## 2023-05-11 ENCOUNTER — HOME CARE VISIT (OUTPATIENT)
Dept: HOME HEALTH SERVICES | Facility: HOME HEALTHCARE | Age: 73
End: 2023-05-11
Payer: MEDICARE

## 2023-05-11 VITALS
SYSTOLIC BLOOD PRESSURE: 138 MMHG | DIASTOLIC BLOOD PRESSURE: 78 MMHG | TEMPERATURE: 97.2 F | OXYGEN SATURATION: 94 % | RESPIRATION RATE: 18 BRPM | HEART RATE: 59 BPM

## 2023-05-11 VITALS
OXYGEN SATURATION: 93 % | DIASTOLIC BLOOD PRESSURE: 78 MMHG | HEART RATE: 73 BPM | RESPIRATION RATE: 20 BRPM | TEMPERATURE: 99.2 F | SYSTOLIC BLOOD PRESSURE: 143 MMHG

## 2023-05-11 LAB — FUNGUS WND CULT: ABNORMAL

## 2023-05-11 PROCEDURE — G0151 HHCP-SERV OF PT,EA 15 MIN: HCPCS

## 2023-05-11 PROCEDURE — G0162 HHC RN E&M PLAN SVS, 15 MIN: HCPCS

## 2023-05-11 NOTE — HOME HEALTH
Routine Visit Note    Skilled Interventions:  Therapeutic exercise, gait training, patient education (energy conservation, pacing techniques, home exercise instruction).    Patient Response: Patient with some reported shortness of breath with exercise but denies exhaustion.    Session Notes: Patient asserts that he feels he is getting better. Patient reports improved ability to perform ADL.    Plan for next visit: Continue exercises per patient tolerance.

## 2023-05-12 ENCOUNTER — HOME CARE VISIT (OUTPATIENT)
Dept: HOME HEALTH SERVICES | Facility: HOME HEALTHCARE | Age: 73
End: 2023-05-12
Payer: MEDICARE

## 2023-05-12 NOTE — HOME HEALTH
Routine Visit Note:  Awake and alert.  Reports that he is feeling better each day.  Reports that groin still has rash but it is improving and he continues to use cream.  Pt reports that MD increased spiralactone for LE edema.      Skill/education provided: CP assess, O2 safety, Med education and management    Patient/caregiver response: Tolerated without difficulty    Plan for next visit:   CP assess  O2 assess  Med education and management

## 2023-05-14 LAB
MYCOBACTERIUM SPEC CULT: NORMAL
NIGHT BLUE STAIN TISS: NORMAL

## 2023-05-15 ENCOUNTER — HOME CARE VISIT (OUTPATIENT)
Dept: HOME HEALTH SERVICES | Facility: HOME HEALTHCARE | Age: 73
End: 2023-05-15
Payer: MEDICARE

## 2023-05-15 VITALS
RESPIRATION RATE: 20 BRPM | DIASTOLIC BLOOD PRESSURE: 78 MMHG | HEART RATE: 62 BPM | TEMPERATURE: 97.4 F | OXYGEN SATURATION: 95 % | SYSTOLIC BLOOD PRESSURE: 130 MMHG

## 2023-05-15 VITALS — OXYGEN SATURATION: 97 % | HEART RATE: 66 BPM | DIASTOLIC BLOOD PRESSURE: 68 MMHG | SYSTOLIC BLOOD PRESSURE: 140 MMHG

## 2023-05-15 PROCEDURE — G0152 HHCP-SERV OF OT,EA 15 MIN: HCPCS

## 2023-05-15 PROCEDURE — G0162 HHC RN E&M PLAN SVS, 15 MIN: HCPCS

## 2023-05-16 ENCOUNTER — HOME CARE VISIT (OUTPATIENT)
Dept: HOME HEALTH SERVICES | Facility: HOME HEALTHCARE | Age: 73
End: 2023-05-16
Payer: MEDICARE

## 2023-05-16 VITALS
OXYGEN SATURATION: 96 % | RESPIRATION RATE: 18 BRPM | HEART RATE: 76 BPM | DIASTOLIC BLOOD PRESSURE: 68 MMHG | SYSTOLIC BLOOD PRESSURE: 122 MMHG | TEMPERATURE: 97.4 F

## 2023-05-16 PROCEDURE — G0151 HHCP-SERV OF PT,EA 15 MIN: HCPCS

## 2023-05-16 NOTE — HOME HEALTH
Routine Visit Note    Skilled Interventions: Therapeutic exercise, gait training, patient education (home safety instruction, home exercise program review)     Patient Response: Patient requires sitting or standing rest to complete exercises but denies distress. Recovers well with sitting or standing rest.    Session Notes: Patient asserts that he feels he is getting better every day.    Plan for next visit: Perform exercises as performed this day, advancing as indicated.

## 2023-05-16 NOTE — HOME HEALTH
Routine Visit Note:  Awake and alert.  Positive affect.  Reports that SOB is improving but is still on 4LNC--wife requests no weaning because pt is comfortable at this rate.  Denies pain at this time.  Reports that lasix was increased to 80mg.  Pt reports that groin rash is better and he has restarted Daliresp.    Skill/education provided: CP assess, Skin assess, Edema assess, Med education and management, Weight assess    Patient/caregiver response: Tolerated without difficulty    Plan for next visit:   CP assess  Skin assess  Edema assess  Weight assess  Med education and management

## 2023-05-17 ENCOUNTER — READMISSION MANAGEMENT (OUTPATIENT)
Dept: CALL CENTER | Facility: HOSPITAL | Age: 73
End: 2023-05-17
Payer: MEDICARE

## 2023-05-17 NOTE — OUTREACH NOTE
Medical Week 3 Survey    Flowsheet Row Responses   [facility] patient discharged from? [facility]   Does the patient have one of the following disease processes/diagnoses(primary or secondary)? Other   Week 3 attempt successful? Yes   Call start time 1718   Call end time 1719   Discharge diagnosis Acute respiratory distress   Is the patient taking all medications as directed (includes completed medication regime)? Yes   Does the patient have a primary care provider?  Yes   Has the patient kept scheduled appointments due by today? Yes   What is the Home health agency?  [facility]   Home health comments HH is still coming   Psychosocial issues? No   What is the patient's perception of their health status since discharge? Improving   Is the patient/caregiver able to teach back signs and symptoms related to disease process for when to call PCP? Yes   Is the patient/caregiver able to teach back signs and symptoms related to disease process for when to call 911? Yes   Is the patient/caregiver able to teach back the hierarchy of who to call/visit for symptoms/problems? PCP, Specialist, Home health nurse, Urgent Care, ED, 911 Yes   Week 3 Call Completed? Yes   Graduated Yes   Is the patient interested in additional calls from an ambulatory ?  NOTE:  applies to high risk patients requiring additional follow-up. No   Graduated/Revoked comments Doing well, no questions.          Mercy GOMEZ - Registered Nurse

## 2023-05-17 NOTE — HOME HEALTH
Routine Visit Note:    Skill/education provided: OT further instr pt on HEP/ther ex with pt requiring MIN assist    Patient/caregiver response: pt performed B UE HEP with MIN assist    Plan for next visit: ther ex    Other pertinent info: pt denies any falls or med changes      Pt requesting 1 more visit as pt has not met goals

## 2023-05-18 ENCOUNTER — HOME CARE VISIT (OUTPATIENT)
Dept: HOME HEALTH SERVICES | Facility: HOME HEALTHCARE | Age: 73
End: 2023-05-18
Payer: MEDICARE

## 2023-05-18 VITALS
OXYGEN SATURATION: 95 % | DIASTOLIC BLOOD PRESSURE: 78 MMHG | SYSTOLIC BLOOD PRESSURE: 124 MMHG | HEART RATE: 72 BPM | RESPIRATION RATE: 24 BRPM | TEMPERATURE: 98.8 F

## 2023-05-18 PROCEDURE — G0157 HHC PT ASSISTANT EA 15: HCPCS

## 2023-05-18 PROCEDURE — G0162 HHC RN E&M PLAN SVS, 15 MIN: HCPCS

## 2023-05-18 NOTE — HOME HEALTH
Routine Visit Note:  Awake and alert.  No distress noted.  Mobile throughout home without DME assistance.  Pt was not wearing O2 when SN arrived and SpoO2 was 85%, when pt put O2 back on SpO2 quickly kasandra to 95%.  Pt denies SOB, but SOB noted with conversation.  Pt denies pain.  Reports that he has only 2 Daliresp pills left--message left for PCP about whether to refill or DC--no refills on bottle.  Pt reports groin rash is much improved.      Skill/education provided: CP assess, O2 education, Skin assess, Pain assess, Med education and management    Patient/caregiver response: Tolerated without difficulty    Plan for next visit:   CP assess  Skin assess  O2 education  Med education and management  Pain assess  FU about Daliresp refill or DC

## 2023-05-19 VITALS
DIASTOLIC BLOOD PRESSURE: 80 MMHG | SYSTOLIC BLOOD PRESSURE: 126 MMHG | OXYGEN SATURATION: 99 % | TEMPERATURE: 96.9 F | HEART RATE: 76 BPM

## 2023-05-19 NOTE — HOME HEALTH
Routine Visit Note:   Patient was in the living room and said come in.   Patient rated her pain as 0/10.    Skill/education provided: Patient was instructed in therapeutic exercises, safe transfers and ambulation with an appropriate assist device.    Patient/caregiver response: Patient/caregiver understood instructions and performed exercises well.    Plan for next visit:  Advance gait and strengthening exercise as patient pain and tolerance allows.

## 2023-05-21 LAB
MYCOBACTERIUM SPEC CULT: NORMAL
NIGHT BLUE STAIN TISS: NORMAL

## 2023-05-22 ENCOUNTER — HOME CARE VISIT (OUTPATIENT)
Dept: HOME HEALTH SERVICES | Facility: HOME HEALTHCARE | Age: 73
End: 2023-05-22
Payer: MEDICARE

## 2023-05-22 VITALS
DIASTOLIC BLOOD PRESSURE: 80 MMHG | OXYGEN SATURATION: 96 % | TEMPERATURE: 97.7 F | SYSTOLIC BLOOD PRESSURE: 128 MMHG | HEART RATE: 76 BPM

## 2023-05-22 PROCEDURE — G0152 HHCP-SERV OF OT,EA 15 MIN: HCPCS

## 2023-05-23 ENCOUNTER — HOME CARE VISIT (OUTPATIENT)
Dept: HOME HEALTH SERVICES | Facility: HOME HEALTHCARE | Age: 73
End: 2023-05-23
Payer: MEDICARE

## 2023-05-23 PROCEDURE — G0157 HHC PT ASSISTANT EA 15: HCPCS

## 2023-05-24 ENCOUNTER — HOME CARE VISIT (OUTPATIENT)
Dept: HOME HEALTH SERVICES | Facility: HOME HEALTHCARE | Age: 73
End: 2023-05-24
Payer: MEDICARE

## 2023-05-24 VITALS
DIASTOLIC BLOOD PRESSURE: 80 MMHG | TEMPERATURE: 96.9 F | SYSTOLIC BLOOD PRESSURE: 130 MMHG | HEART RATE: 77 BPM | OXYGEN SATURATION: 97 %

## 2023-05-24 VITALS
OXYGEN SATURATION: 93 % | RESPIRATION RATE: 20 BRPM | DIASTOLIC BLOOD PRESSURE: 70 MMHG | HEART RATE: 77 BPM | TEMPERATURE: 98.2 F | SYSTOLIC BLOOD PRESSURE: 116 MMHG

## 2023-05-24 PROCEDURE — G0162 HHC RN E&M PLAN SVS, 15 MIN: HCPCS

## 2023-05-25 ENCOUNTER — HOME CARE VISIT (OUTPATIENT)
Dept: HOME HEALTH SERVICES | Facility: HOME HEALTHCARE | Age: 73
End: 2023-05-25
Payer: MEDICARE

## 2023-05-25 VITALS
DIASTOLIC BLOOD PRESSURE: 62 MMHG | RESPIRATION RATE: 18 BRPM | SYSTOLIC BLOOD PRESSURE: 122 MMHG | TEMPERATURE: 96.7 F | HEART RATE: 74 BPM | OXYGEN SATURATION: 99 %

## 2023-05-25 PROCEDURE — G0151 HHCP-SERV OF PT,EA 15 MIN: HCPCS

## 2023-05-25 NOTE — HOME HEALTH
PT Discipline Discharge Summary: The patient is a 72 year old male admitted to home health services by SN on 4/27/2023 after history of hospitalizations for respiratory failure with hypoxia.    Initial PT assessment on 4/28/2023 revealed the primary problem of impaired ambulation (required supervision, limited to 80 feet) with oxygen saturation decreasing to 80%.    The patient accepted the PT interventions of therapeutic exercise, gait training and patient education (including home safety and home exercise program (HEP) instruction).    The patient made very good progress meeting all established PT goals.    Patient discharged from PT after session on 5/25/2023 due to all goals met. At time of PT discharge the patient remained under the care of home health skilled nursing.    Session Notes: Patient consents to discharge from PT services after session today.

## 2023-05-25 NOTE — HOME HEALTH
Routine Visit Note:  Awake and alert.  Wearing O2 at all times.  Reports that he is feeling better and breathing easier.  No distress noted.  Pt does not have refills on daliresp, no answer from Dr. Zavala's office-will send CC.      Skill/education provided: CP assess, O2 safety, Med education and management, Skin assess    Patient/caregiver response: Tolerated without difficulty    Plan for next visit:   CP assess  Med education and management  Skin assess    Other pertinent info: Daliresp-Dr. Zavala hasn't answered whether to continue or not

## 2023-05-25 NOTE — CASE COMMUNICATION
Attention:  Dr. Bert Rojas MD   Patient: Brenden Peter  : 1950  Informational Purposes Only:    PT Discipline Discharge Summary: The patient is a 72 year old male admitted to home health services by SN on 2023 after history of hospitalizations for respiratory failure with hypoxia.    Initial PT assessment on 2023 revealed the primary problem of impaired ambulation (required supervision, limited to 80 fe et) with oxygen saturation decreasing to 80%.    The patient accepted the PT interventions of therapeutic exercise, gait training and patient education (including home safety and home exercise program (HEP) instruction).    The patient made very good progress meeting all established PT goals.    Patient discharged from PT after session on 2023 due to all goals met. At time of PT discharge the patient remained under the care of home  health skilled nursing.    Thank you,   Ravi Farfan, PT  [facility] [place]   phone [phone]  fax [phone]

## 2023-05-31 ENCOUNTER — HOME CARE VISIT (OUTPATIENT)
Dept: HOME HEALTH SERVICES | Facility: HOME HEALTHCARE | Age: 73
End: 2023-05-31
Payer: MEDICARE

## 2023-05-31 VITALS
RESPIRATION RATE: 20 BRPM | HEART RATE: 72 BPM | DIASTOLIC BLOOD PRESSURE: 64 MMHG | TEMPERATURE: 97.8 F | SYSTOLIC BLOOD PRESSURE: 122 MMHG | OXYGEN SATURATION: 93 %

## 2023-05-31 PROCEDURE — G0162 HHC RN E&M PLAN SVS, 15 MIN: HCPCS

## 2023-05-31 NOTE — HOME HEALTH
Routine Visit Note:  Awake and alert.  Pt reports that he is wearing his oxygen almost all of the time.  Pt had 02 off when SN arrived and put it back on when SN arrived.  No distress noted.  Pt reports that he is feeling better every day.    Skill/education provided: CP assess, O2 safety awareness, Med education and management, CHF education    Patient/caregiver response: Tolerated without difficulty    Plan for next visit:   CP assess  Med education and management  CHF education

## 2023-06-07 ENCOUNTER — HOME CARE VISIT (OUTPATIENT)
Dept: HOME HEALTH SERVICES | Facility: HOME HEALTHCARE | Age: 73
End: 2023-06-07
Payer: MEDICARE

## 2023-06-07 VITALS
RESPIRATION RATE: 24 BRPM | OXYGEN SATURATION: 90 % | DIASTOLIC BLOOD PRESSURE: 78 MMHG | SYSTOLIC BLOOD PRESSURE: 128 MMHG | HEART RATE: 74 BPM | TEMPERATURE: 98.2 F

## 2023-06-07 PROCEDURE — G0162 HHC RN E&M PLAN SVS, 15 MIN: HCPCS

## 2023-06-08 ENCOUNTER — TELEPHONE (OUTPATIENT)
Dept: ORTHOPEDIC SURGERY | Facility: CLINIC | Age: 73
End: 2023-06-08
Payer: MEDICARE

## 2023-06-08 NOTE — TELEPHONE ENCOUNTER
Caller: JAYANT LACKEY     Relationship to patient: PATIENT     Best call back number: [phone] OR  [phone]    Chief complaint: LEFT KNEE INJECTION     Type of visit: GEL INJECTION     Requested date: ASAP      If rescheduling, when is the original appointment: N/A     Additional notes:PATIENT UNDERSTAND TAMEKA WOO IN NO LONGER WITH PRACTICE  LAST GEL INJECTION 08/12/22  LAST PATIENT CORRESPONDENCE  REGARDING INJECTION 12/07/22      PATIENT HAS HIGH LEVEL OF PAIN AND READY TO BE SCHEDULED FOR KNEE INJECTION PLEASE

## 2023-06-08 NOTE — HOME HEALTH
Routine Visit Note:  Awake and alert.  Reports that he is feeling better.  Reports that he walked to mailbox and back without difficulty.  No distress noted.  Remains on O2 4LNC.  Pt is agreeable that ths is the predischarge visit and if no decline, next visit will be the discharge visit.    Skill/education provided: CP assess, Med education and management, O2 Safety education    Patient/caregiver response: Tolerated without difficulty    Plan for next visit:   CP assess  Med reconcillation and education  O2 Safety  Discharge visit

## 2023-06-08 NOTE — TELEPHONE ENCOUNTER
PATIENT RETURNED A CALL FROM [facility]. HUB UNABLE TO WARM TRANSFER.

## 2023-06-14 ENCOUNTER — HOME CARE VISIT (OUTPATIENT)
Dept: HOME HEALTH SERVICES | Facility: HOME HEALTHCARE | Age: 73
End: 2023-06-14
Payer: MEDICARE

## 2023-06-14 ENCOUNTER — TELEPHONE (OUTPATIENT)
Dept: ORTHOPEDIC SURGERY | Facility: CLINIC | Age: 73
End: 2023-06-14

## 2023-06-14 VITALS
HEART RATE: 67 BPM | RESPIRATION RATE: 10 BRPM | DIASTOLIC BLOOD PRESSURE: 72 MMHG | TEMPERATURE: 98.8 F | SYSTOLIC BLOOD PRESSURE: 122 MMHG | OXYGEN SATURATION: 91 %

## 2023-06-14 PROCEDURE — G0162 HHC RN E&M PLAN SVS, 15 MIN: HCPCS

## 2023-06-14 NOTE — TELEPHONE ENCOUNTER
Caller: Brenden Peter    Relationship to patient: Self    Best call back number: [phone]    Chief complaint: LEFT KNEE PAIN    Type of visit: INJECTION    Additional notes: PATIENT WAS SCHEDULED FOR AN APPT ON 06/15/23 & STATES HE NEEDS TO CANCEL FOR NOW UNTIL HE GETS SOME OF HIS MEDICAL BILLS PAID DOWN. HE WILL CALL BACK WHEN HE IS READY TO RESCHEDULE.  CANCELLED APPT - APPT LISTS GEL INJECTION, BUT PATIENT STATES THE INJECTION HADN'T BEEN APPROVED YET BY INS. SENDING TE JUST TO INFORM

## 2024-04-10 ENCOUNTER — APPOINTMENT (OUTPATIENT)
Dept: CT IMAGING | Facility: HOSPITAL | Age: 74
End: 2024-04-10
Payer: MEDICARE

## 2024-04-10 ENCOUNTER — HOSPITAL ENCOUNTER (INPATIENT)
Facility: HOSPITAL | Age: 74
LOS: 4 days | Discharge: HOME | End: 2024-04-16
Attending: EMERGENCY MEDICINE | Admitting: STUDENT IN AN ORGANIZED HEALTH CARE EDUCATION/TRAINING PROGRAM
Payer: MEDICARE

## 2024-04-10 ENCOUNTER — APPOINTMENT (OUTPATIENT)
Dept: GENERAL RADIOLOGY | Facility: HOSPITAL | Age: 74
End: 2024-04-10
Payer: MEDICARE

## 2024-04-10 DIAGNOSIS — I50.9 ACUTE ON CHRONIC CONGESTIVE HEART FAILURE, UNSPECIFIED HEART FAILURE TYPE: Primary | ICD-10-CM

## 2024-04-10 DIAGNOSIS — J81.0 ACUTE PULMONARY EDEMA: ICD-10-CM

## 2024-04-10 DIAGNOSIS — J96.01 ACUTE RESPIRATORY FAILURE WITH HYPOXIA: ICD-10-CM

## 2024-04-10 DIAGNOSIS — R06.02 SHORTNESS OF BREATH: ICD-10-CM

## 2024-04-10 LAB
ALBUMIN SERPL-MCNC: 4 G/DL (ref 3.5–5.2)
ALBUMIN/GLOB SERPL: 1.1 G/DL
ALP SERPL-CCNC: 78 U/L (ref 39–117)
ALT SERPL W P-5'-P-CCNC: 18 U/L (ref 1–41)
ANION GAP SERPL CALCULATED.3IONS-SCNC: 12 MMOL/L (ref 5–15)
ARTERIAL PATENCY WRIST A: POSITIVE
AST SERPL-CCNC: 18 U/L (ref 1–40)
ATMOSPHERIC PRESS: ABNORMAL MM[HG]
B PARAPERT DNA SPEC QL NAA+PROBE: NOT DETECTED
B PERT DNA SPEC QL NAA+PROBE: NOT DETECTED
BASE EXCESS BLDA CALC-SCNC: 6.3 MMOL/L (ref 0–3)
BASOPHILS # BLD AUTO: 0.02 10*3/MM3 (ref 0–0.2)
BASOPHILS NFR BLD AUTO: 0.3 % (ref 0–1.5)
BDY SITE: ABNORMAL
BILIRUB SERPL-MCNC: 0.5 MG/DL (ref 0–1.2)
BUN SERPL-MCNC: 16 MG/DL (ref 8–23)
BUN/CREAT SERPL: 19 (ref 7–25)
C PNEUM DNA NPH QL NAA+NON-PROBE: NOT DETECTED
CA-I BLDA-SCNC: 1.16 MMOL/L (ref 1.15–1.33)
CALCIUM SPEC-SCNC: 9.6 MG/DL (ref 8.6–10.5)
CHLORIDE SERPL-SCNC: 103 MMOL/L (ref 98–107)
CO2 SERPL-SCNC: 30 MMOL/L (ref 22–29)
CREAT BLDA-MCNC: 0.86 MG/DL (ref 0.6–1.3)
CREAT SERPL-MCNC: 0.84 MG/DL (ref 0.76–1.27)
D DIMER PPP FEU-MCNC: 1.7 MG/L (FEU) (ref 0–0.73)
D-LACTATE SERPL-SCNC: 0.5 MMOL/L (ref 0.3–2)
D-LACTATE SERPL-SCNC: 1.7 MMOL/L (ref 0.2–2)
DEPRECATED RDW RBC AUTO: 54.4 FL (ref 37–54)
EGFRCR SERPLBLD CKD-EPI 2021: 91.4 ML/MIN/1.73
EGFRCR SERPLBLD CKD-EPI 2021: 92.1 ML/MIN/1.73
EOSINOPHIL # BLD AUTO: 0.06 10*3/MM3 (ref 0–0.4)
EOSINOPHIL NFR BLD AUTO: 0.9 % (ref 0.3–6.2)
ERYTHROCYTE [DISTWIDTH] IN BLOOD BY AUTOMATED COUNT: 16.5 % (ref 12.3–15.4)
FLUAV SUBTYP SPEC NAA+PROBE: NOT DETECTED
FLUBV RNA ISLT QL NAA+PROBE: NOT DETECTED
GLOBULIN UR ELPH-MCNC: 3.5 GM/DL
GLUCOSE BLDC GLUCOMTR-MCNC: 107 MG/DL (ref 74–100)
GLUCOSE BLDC GLUCOMTR-MCNC: 107 MG/DL (ref 74–100)
GLUCOSE BLDC GLUCOMTR-MCNC: 93 MG/DL (ref 70–105)
GLUCOSE SERPL-MCNC: 108 MG/DL (ref 65–99)
HADV DNA SPEC NAA+PROBE: NOT DETECTED
HCO3 BLDA-SCNC: 31.6 MMOL/L (ref 21–28)
HCOV 229E RNA SPEC QL NAA+PROBE: NOT DETECTED
HCOV HKU1 RNA SPEC QL NAA+PROBE: NOT DETECTED
HCOV NL63 RNA SPEC QL NAA+PROBE: NOT DETECTED
HCOV OC43 RNA SPEC QL NAA+PROBE: NOT DETECTED
HCT VFR BLD AUTO: 37.7 % (ref 37.5–51)
HCT VFR BLDA CALC: 37 % (ref 38–51)
HEMODILUTION: NO
HGB BLD-MCNC: 11.2 G/DL (ref 13–17.7)
HGB BLDA-MCNC: 12.5 G/DL (ref 12–17)
HMPV RNA NPH QL NAA+NON-PROBE: NOT DETECTED
HOLD SPECIMEN: NORMAL
HOLD SPECIMEN: NORMAL
HPIV1 RNA ISLT QL NAA+PROBE: NOT DETECTED
HPIV2 RNA SPEC QL NAA+PROBE: NOT DETECTED
HPIV3 RNA NPH QL NAA+PROBE: NOT DETECTED
HPIV4 P GENE NPH QL NAA+PROBE: NOT DETECTED
IMM GRANULOCYTES # BLD AUTO: 0.03 10*3/MM3 (ref 0–0.05)
IMM GRANULOCYTES NFR BLD AUTO: 0.4 % (ref 0–0.5)
INHALED O2 CONCENTRATION: 40 %
LYMPHOCYTES # BLD AUTO: 0.97 10*3/MM3 (ref 0.7–3.1)
LYMPHOCYTES NFR BLD AUTO: 14.4 % (ref 19.6–45.3)
M PNEUMO IGG SER IA-ACNC: NOT DETECTED
MCH RBC QN AUTO: 26.9 PG (ref 26.6–33)
MCHC RBC AUTO-ENTMCNC: 29.7 G/DL (ref 31.5–35.7)
MCV RBC AUTO: 90.6 FL (ref 79–97)
MODALITY: ABNORMAL
MONOCYTES # BLD AUTO: 0.58 10*3/MM3 (ref 0.1–0.9)
MONOCYTES NFR BLD AUTO: 8.6 % (ref 5–12)
NEUTROPHILS NFR BLD AUTO: 5.06 10*3/MM3 (ref 1.7–7)
NEUTROPHILS NFR BLD AUTO: 75.4 % (ref 42.7–76)
NRBC BLD AUTO-RTO: 0 /100 WBC (ref 0–0.2)
NT-PROBNP SERPL-MCNC: 2883 PG/ML (ref 0–900)
PCO2 BLDA: 47.5 MM HG (ref 35–48)
PH BLDA: 7.43 PH UNITS (ref 7.35–7.45)
PLATELET # BLD AUTO: 193 10*3/MM3 (ref 140–450)
PMV BLD AUTO: 11.8 FL (ref 6–12)
PO2 BLDA: 59.9 MM HG (ref 83–108)
POTASSIUM BLDA-SCNC: 3.5 MMOL/L (ref 3.5–4.5)
POTASSIUM SERPL-SCNC: 3.7 MMOL/L (ref 3.5–5.2)
PROT SERPL-MCNC: 7.5 G/DL (ref 6–8.5)
RBC # BLD AUTO: 4.16 10*6/MM3 (ref 4.14–5.8)
RHINOVIRUS RNA SPEC NAA+PROBE: NOT DETECTED
RSV RNA NPH QL NAA+NON-PROBE: NOT DETECTED
SAO2 % BLDCOA: 90.9 % (ref 94–98)
SARS-COV-2 RNA NPH QL NAA+NON-PROBE: NOT DETECTED
SODIUM BLD-SCNC: 145 MMOL/L (ref 138–146)
SODIUM SERPL-SCNC: 145 MMOL/L (ref 136–145)
TROPONIN T SERPL HS-MCNC: 36 NG/L
WBC NRBC COR # BLD AUTO: 6.72 10*3/MM3 (ref 3.4–10.8)
WHOLE BLOOD HOLD COAG: NORMAL
WHOLE BLOOD HOLD SPECIMEN: NORMAL

## 2024-04-10 PROCEDURE — 83605 ASSAY OF LACTIC ACID: CPT | Performed by: PHYSICIAN ASSISTANT

## 2024-04-10 PROCEDURE — 0202U NFCT DS 22 TRGT SARS-COV-2: CPT | Performed by: PHYSICIAN ASSISTANT

## 2024-04-10 PROCEDURE — 84484 ASSAY OF TROPONIN QUANT: CPT | Performed by: PHYSICIAN ASSISTANT

## 2024-04-10 PROCEDURE — 99285 EMERGENCY DEPT VISIT HI MDM: CPT

## 2024-04-10 PROCEDURE — 82565 ASSAY OF CREATININE: CPT

## 2024-04-10 PROCEDURE — 83880 ASSAY OF NATRIURETIC PEPTIDE: CPT | Performed by: PHYSICIAN ASSISTANT

## 2024-04-10 PROCEDURE — 80053 COMPREHEN METABOLIC PANEL: CPT | Performed by: PHYSICIAN ASSISTANT

## 2024-04-10 PROCEDURE — 71045 X-RAY EXAM CHEST 1 VIEW: CPT

## 2024-04-10 PROCEDURE — 93005 ELECTROCARDIOGRAM TRACING: CPT | Performed by: PHYSICIAN ASSISTANT

## 2024-04-10 PROCEDURE — 80051 ELECTROLYTE PANEL: CPT

## 2024-04-10 PROCEDURE — 85018 HEMOGLOBIN: CPT

## 2024-04-10 PROCEDURE — 71275 CT ANGIOGRAPHY CHEST: CPT

## 2024-04-10 PROCEDURE — 85025 COMPLETE CBC W/AUTO DIFF WBC: CPT | Performed by: PHYSICIAN ASSISTANT

## 2024-04-10 PROCEDURE — 25510000001 IOPAMIDOL PER 1 ML: Performed by: EMERGENCY MEDICINE

## 2024-04-10 PROCEDURE — 25010000002 FUROSEMIDE PER 20 MG: Performed by: PHYSICIAN ASSISTANT

## 2024-04-10 PROCEDURE — 94640 AIRWAY INHALATION TREATMENT: CPT

## 2024-04-10 PROCEDURE — 82803 BLOOD GASES ANY COMBINATION: CPT

## 2024-04-10 PROCEDURE — 82948 REAGENT STRIP/BLOOD GLUCOSE: CPT

## 2024-04-10 PROCEDURE — 83605 ASSAY OF LACTIC ACID: CPT

## 2024-04-10 PROCEDURE — 82330 ASSAY OF CALCIUM: CPT

## 2024-04-10 PROCEDURE — 85379 FIBRIN DEGRADATION QUANT: CPT | Performed by: PHYSICIAN ASSISTANT

## 2024-04-10 PROCEDURE — 36415 COLL VENOUS BLD VENIPUNCTURE: CPT

## 2024-04-10 PROCEDURE — 94799 UNLISTED PULMONARY SVC/PX: CPT

## 2024-04-10 PROCEDURE — 36600 WITHDRAWAL OF ARTERIAL BLOOD: CPT

## 2024-04-10 PROCEDURE — 82948 REAGENT STRIP/BLOOD GLUCOSE: CPT | Performed by: PHYSICIAN ASSISTANT

## 2024-04-10 RX ORDER — ALBUTEROL SULFATE 90 UG/1
2 AEROSOL, METERED RESPIRATORY (INHALATION) ONCE
Status: COMPLETED | OUTPATIENT
Start: 2024-04-10 | End: 2024-04-10

## 2024-04-10 RX ORDER — FUROSEMIDE 10 MG/ML
80 INJECTION INTRAMUSCULAR; INTRAVENOUS ONCE
Status: COMPLETED | OUTPATIENT
Start: 2024-04-10 | End: 2024-04-10

## 2024-04-10 RX ORDER — ASPIRIN 81 MG/1
81 TABLET ORAL 3 TIMES WEEKLY
COMMUNITY

## 2024-04-10 RX ORDER — SODIUM CHLORIDE 0.9 % (FLUSH) 0.9 %
10 SYRINGE (ML) INJECTION AS NEEDED
Status: DISCONTINUED | OUTPATIENT
Start: 2024-04-10 | End: 2024-04-16 | Stop reason: HOSPADM

## 2024-04-10 RX ORDER — PRAVASTATIN SODIUM 10 MG
10 TABLET ORAL DAILY
COMMUNITY

## 2024-04-10 RX ADMIN — ALBUTEROL SULFATE 2 PUFF: 108 AEROSOL, METERED RESPIRATORY (INHALATION) at 20:04

## 2024-04-10 RX ADMIN — IOPAMIDOL 100 ML: 755 INJECTION, SOLUTION INTRAVENOUS at 21:51

## 2024-04-10 RX ADMIN — FUROSEMIDE 80 MG: 10 INJECTION, SOLUTION INTRAMUSCULAR; INTRAVENOUS at 22:29

## 2024-04-10 NOTE — ED PROVIDER NOTES
Subjective   History of Present Illness      Patient is a 73-year-old male comes in complaining of shortness of breath for the past week.  Patient states he has history of CHF and COPD and is on 4 L nasal cannula at baseline.  Patient has had worsening exertional shortness of breath.  Patient denies any cough, fever or chills.  Patient denies any leg swelling.  Patient denies any chest pain.  Earlier today patient was found to have low O2 saturations on his baseline 4 L nasal cannula in the upper 70s low 80s.      Review of Systems   Constitutional:  Negative for chills, fatigue and fever.   HENT:  Negative for congestion.    Eyes:  Negative for photophobia, discharge and visual disturbance.   Respiratory:  Positive for shortness of breath. Negative for cough.    Cardiovascular:  Negative for chest pain and leg swelling.   Gastrointestinal:  Negative for abdominal pain, blood in stool, diarrhea, nausea and vomiting.   Genitourinary:  Negative for dysuria, flank pain and urgency.   Musculoskeletal:  Negative for arthralgias and myalgias.   Psychiatric/Behavioral:  Negative for confusion.        Past Medical History:   Diagnosis Date    (HFpEF) heart failure with preserved ejection fraction 4/1/2023    Abnormal EKG 2/3/2015    Anxiety     COPD (chronic obstructive pulmonary disease)     Coronary artery disease 3/3/2015    Diabetes     type 2    Hyperlipidemia     Hypertension     Microalbuminuria due to type 2 diabetes mellitus 2/5/2020    Multinodular goiter 4/18/2019    Murmur 2/3/2015    Primary osteoarthritis of left knee 10/22/2021       No Known Allergies    Past Surgical History:   Procedure Laterality Date    BRONCHOSCOPY N/A 4/9/2023    Procedure: BRONCHOSCOPY, bronchial alveolar lavage right upper lobe;  Surgeon: Juanjo Castañeda MD;  Location: [facility] ENDOSCOPY;  Service: Pulmonary;  Laterality: N/A;  post: pneumonia    CARDIAC CATHETERIZATION Left 1/15/2023    Procedure: Left Heart Cath and coronary  angiogram;  Surgeon: Shawn Baker MD;  Location: [facility] CATH INVASIVE LOCATION;  Service: Cardiovascular;  Laterality: Left;    EYE SURGERY      cataract    TOTAL KNEE ARTHROPLASTY Right 10/21/2020    Procedure: TOTAL KNEE ARTHROPLASTY;  Surgeon: Ravi Fagan MD;  Location: [facility] MAIN OR;  Service: Orthopedics;  Laterality: Right;    TOTAL KNEE ARTHROPLASTY REVISION Right 2021    Procedure: KNEE POLY INSERT EXCHANGE with irrigation;  Surgeon: Ravi Fagan MD;  Location: [facility] MAIN OR;  Service: Orthopedics;  Laterality: Right;       Family History   Problem Relation Age of Onset    Cancer Other     Diabetes Other     Heart disease Other     No Known Problems Mother     No Known Problems Father     No Known Problems Sister     No Known Problems Brother     No Known Problems Maternal Aunt     No Known Problems Maternal Uncle     No Known Problems Paternal Aunt     No Known Problems Paternal Uncle     No Known Problems Maternal Grandmother     No Known Problems Maternal Grandfather     No Known Problems Paternal Grandmother     No Known Problems Paternal Grandfather     Anemia Neg Hx     Arrhythmia Neg Hx     Asthma Neg Hx     Clotting disorder Neg Hx     Fainting Neg Hx     Heart attack Neg Hx     Heart failure Neg Hx     Hyperlipidemia Neg Hx     Hypertension Neg Hx        Social History     Socioeconomic History    Marital status:    Tobacco Use    Smoking status: Former     Current packs/day: 0.00     Average packs/day: 1 pack/day for 5.0 years (5.0 ttl pk-yrs)     Types: Cigarettes     Start date: 7/3/2017     Quit date: 7/3/2022     Years since quittin.7     Passive exposure: Past    Smokeless tobacco: Former     Quit date: 7/3/2022   Vaping Use    Vaping status: Never Used   Substance and Sexual Activity    Alcohol use: Not Currently    Drug use: Never    Sexual activity: Defer           Objective   Physical Exam  Vitals and nursing note reviewed.   Constitutional:       General: He is not  in acute distress.     Appearance: He is well-developed. He is not diaphoretic.   HENT:      Head: Normocephalic and atraumatic.      Nose: Nose normal.      Mouth/Throat:      Pharynx: No oropharyngeal exudate.   Eyes:      Extraocular Movements: Extraocular movements intact.      Conjunctiva/sclera: Conjunctivae normal.      Pupils: Pupils are equal, round, and reactive to light.   Cardiovascular:      Rate and Rhythm: Normal rate and regular rhythm.      Heart sounds: Normal heart sounds.      Comments: S1, S2 audible.  Pulmonary:      Effort: Pulmonary effort is normal. No respiratory distress.      Breath sounds: Examination of the right-middle field reveals wheezing. Examination of the left-middle field reveals wheezing. Examination of the right-lower field reveals decreased breath sounds. Examination of the left-lower field reveals decreased breath sounds. Decreased breath sounds and wheezing (faint) present.      Comments: On room air.  Abdominal:      General: Bowel sounds are normal. There is no distension.      Palpations: Abdomen is soft.      Tenderness: There is no abdominal tenderness.   Musculoskeletal:         General: No tenderness or deformity. Normal range of motion.      Cervical back: Normal range of motion.   Skin:     General: Skin is warm.      Capillary Refill: Capillary refill takes less than 2 seconds.      Findings: No erythema or rash.   Neurological:      Mental Status: He is alert and oriented to person, place, and time.      Cranial Nerves: No cranial nerve deficit.   Psychiatric:         Mood and Affect: Mood normal.         Behavior: Behavior normal.         Procedures           ED Course  ED Course as of 04/11/24 0436   Wed Apr 10, 2024   2044 Chest x-ray independently interpreted by myself shows no obvious pulmonary infiltrates.  Cardiomegaly present. [RL]   2045 EKG independently interpreted by myself shows sinus rhythm 73 bpm, old RBBB.  IVCD present otherwise no ST elevation  apparent.  Similar to previous study done on 4/23/2023 showed sinus rhythm 67 bpm, RBBB, IVCD, no ST elevation apparent. [RL]   2112 Awaiting labs and imaging [RL]      ED Course User Index  [RL] Haja Alcantar PA                                 Labs Reviewed   COMPREHENSIVE METABOLIC PANEL - Abnormal; Notable for the following components:       Result Value    Glucose 108 (*)     CO2 30.0 (*)     All other components within normal limits    Narrative:     GFR Normal >60  Chronic Kidney Disease <60  Kidney Failure <15    The GFR formula is only valid for adults with stable renal function between ages 18 and 70.   BNP (IN-HOUSE) - Abnormal; Notable for the following components:    proBNP 2,883.0 (*)     All other components within normal limits    Narrative:     This assay is used as an aid in the diagnosis of individuals suspected of having heart failure. It can be used as an aid in the diagnosis of acute decompensated heart failure (ADHF) in patients presenting with signs and symptoms of ADHF to the emergency department (ED). In addition, NT-proBNP of <300 pg/mL indicates ADHF is not likely.    Age Range Result Interpretation  NT-proBNP Concentration (pg/mL:      <50             Positive            >450                   Gray                 300-450                    Negative             <300    50-75           Positive            >900                  Gray                300-900                  Negative            <300      >75             Positive            >1800                  Gray                300-1800                  Negative            <300   SINGLE HS TROPONIN T - Abnormal; Notable for the following components:    HS Troponin T 36 (*)     All other components within normal limits    Narrative:     High Sensitive Troponin T Reference Range:  <14.0 ng/L- Negative Female for AMI  <22.0 ng/L- Negative Male for AMI  >=14 - Abnormal Female indicating possible myocardial injury.  >=22 - Abnormal Male  "indicating possible myocardial injury.   Clinicians would have to utilize clinical acumen, EKG, Troponin, and serial changes to determine if it is an Acute Myocardial Infarction or myocardial injury due to an underlying chronic condition.        CBC WITH AUTO DIFFERENTIAL - Abnormal; Notable for the following components:    Hemoglobin 11.2 (*)     MCHC 29.7 (*)     RDW 16.5 (*)     RDW-SD 54.4 (*)     Lymphocyte % 14.4 (*)     All other components within normal limits   D-DIMER, QUANTITATIVE - Abnormal; Notable for the following components:    D-Dimer, Quantitative 1.70 (*)     All other components within normal limits    Narrative:     According to the assay 's published package insert, a normal (<0.50 mg/L (FEU)) D-dimer result in conjunction with a non-high clinical probability assessment, excludes deep vein thrombosis (DVT) and pulmonary embolism (PE) with high sensitivity.    D-dimer values increase with age and this can make VTE exclusion of an older population difficult. To address this, the [employer], based on best available evidence and recent guidelines, recommends that clinicians use age-adjusted D-dimer thresholds in patients greater than 50 years of age with: a) a low probability of PE who do not meet all Pulmonary Embolism Rule Out Criteria, or b) in those with intermediate probability of PE.   The formula for an age-adjusted D-dimer cut-off is \"age/100\".  For example, a 60 year old patient would have an age-adjusted cut-off of 0.60 mg/L (FEU) and an 80 year old 0.80 mg/L (FEU).   BLOOD GAS, ARTERIAL - Abnormal; Notable for the following components:    pO2, Arterial 59.9 (*)     HCO3, Arterial 31.6 (*)     Base Excess, Arterial 6.3 (*)     O2 Saturation, Arterial 90.9 (*)     All other components within normal limits   CBC WITH AUTO DIFFERENTIAL - Abnormal; Notable for the following components:    RBC 3.96 (*)     Hemoglobin 10.6 (*)     Hematocrit 35.9 (*)     MCHC " 29.5 (*)     RDW 16.5 (*)     RDW-SD 55.0 (*)     Lymphocyte % 18.7 (*)     All other components within normal limits   ELECTROLYTES + H&H - Abnormal; Notable for the following components:    Glucose 107 (*)     Hematocrit 37 (*)     All other components within normal limits   POCT GLUCOSE FINGERSTICK - Abnormal; Notable for the following components:    Glucose 107 (*)     All other components within normal limits   RESPIRATORY PANEL PCR W/ COVID-19 (SARS-COV-2), NP SWAB IN UTM/VTP, 2 HR TAT - Normal    Narrative:     In the setting of a positive respiratory panel with a viral infection PLUS a negative procalcitonin without other underlying concern for bacterial infection, consider observing off antibiotics or discontinuation of antibiotics and continue supportive care. If the respiratory panel is positive for atypical bacterial infection (Bordetella pertussis, Chlamydophila pneumoniae, or Mycoplasma pneumoniae), consider antibiotic de-escalation to target atypical bacterial infection.   BASIC METABOLIC PANEL - Normal    Narrative:     GFR Normal >60  Chronic Kidney Disease <60  Kidney Failure <15    The GFR formula is only valid for adults with stable renal function between ages 18 and 70.   POC LACTATE - Normal   POCT GLUCOSE FINGERSTICK - Normal   POCT CREATININE - Normal   POC LACTATE - Normal   RAINBOW DRAW    Narrative:     The following orders were created for panel order [place] Draw.  Procedure                               Abnormality         Status                     ---------                               -----------         ------                     Green Top (Gel)[[id number]]                                  Final result               Lavender Top[[id number]]                                     Final result               Gold Top - SST[[id number]]                                   Final result               Light Blue Top[[id number]]                                   Final result                 Please  view results for these tests on the individual orders.   BLOOD GAS, ARTERIAL   POCT GLUCOSE FINGERSTICK   POCT GLUCOSE FINGERSTICK   POCT GLUCOSE FINGERSTICK   POCT GLUCOSE FINGERSTICK   CBC AND DIFFERENTIAL    Narrative:     The following orders were created for panel order CBC & Differential.  Procedure                               Abnormality         Status                     ---------                               -----------         ------                     CBC Auto Differential[[id number]]        Abnormal            Final result                 Please view results for these tests on the individual orders.   GREEN TOP   LAVENDER TOP   GOLD TOP - SST   LIGHT BLUE TOP   CBC AND DIFFERENTIAL    Narrative:     The following orders were created for panel order CBC & Differential.  Procedure                               Abnormality         Status                     ---------                               -----------         ------                     CBC Auto Differential[[id number]]        Abnormal            Final result                 Please view results for these tests on the individual orders.                 Medical Decision Making  Problems Addressed:  Acute on chronic congestive heart failure, unspecified heart failure type: complicated acute illness or injury  Acute pulmonary edema: complicated acute illness or injury  Acute respiratory failure with hypoxia: complicated acute illness or injury  Shortness of breath: complicated acute illness or injury    Amount and/or Complexity of Data Reviewed  Labs: ordered.  Radiology: ordered.  ECG/medicine tests: ordered.    Risk  Prescription drug management.  Decision regarding hospitalization.      Differential diagnosis: COPD exacerbation, ACS, hyperglycemia, not meant to be an all inclusive list.  Chart review: Upon chart review, last discharge summary on 4/26/2023, patient was seen by Dr. Avila for acute on chronic respiratory failure with COPD  "exacerbation.    EKG:  see above     Imaging: If applicable see my interpretation above.  CT Angiogram Chest Pulmonary Embolism   Final Result   Impression:      No evidence of pulmonary embolism.      Findings compatible with mild interstitial pulm edema.      Mild cardiomegaly.      Stable mild to moderate diffuse mediastinal lymphadenopathy. This may be congestive or reactive.            Electronically Signed: Leonides Trivedi MD     4/10/2024 10:01 PM EDT     Workstation ID: [id number]      XR Chest 1 View   Final Result   Impression:   No focal consolidation or effusion. Changes of COPD.         Electronically Signed: Leonides Trivedi MD     4/10/2024 8:31 PM EDT     Workstation ID: [id number]        Labs:  Lab work independently interpreted by myself shows initial lactic normal.  Initial ABG shows normal pH, pCO2 normal, pO2 low at 59 and patient's nasal cannula was increased to 6 L.  Viral panel negative.  D-dimer elevated 1.7.  Initial troponin of 36.  BNP elevated at 2800, previously normal.  CMP largely unremarkable for acute findings and CBC largely unremarkable for acute findings.    Vitals:  /77 (BP Location: Right arm, Patient Position: Sitting)   Pulse 63   Temp 98.5 °F (36.9 °C) (Oral)   Resp 24   Ht 170.2 cm (67\")   Wt 95.3 kg (210 lb)   SpO2 91%   BMI 32.89 kg/m²    Medications given:    Medications   sodium chloride 0.9 % flush 10 mL (has no administration in time range)   sodium chloride 0.9 % flush 10 mL (10 mL Intravenous Given 4/11/24 0207)   sodium chloride 0.9 % flush 10 mL (has no administration in time range)   sodium chloride 0.9 % infusion 40 mL (has no administration in time range)   acetaminophen (TYLENOL) tablet 650 mg (has no administration in time range)     Or   acetaminophen (TYLENOL) 160 MG/5ML oral solution 650 mg (has no administration in time range)     Or   acetaminophen (TYLENOL) suppository 650 mg (has no administration in time range)   sennosides-docusate (PERICOLACE) " 8.6-50 MG per tablet 2 tablet (has no administration in time range)     And   polyethylene glycol (MIRALAX) packet 17 g (has no administration in time range)     And   bisacodyl (DULCOLAX) EC tablet 5 mg (has no administration in time range)     And   bisacodyl (DULCOLAX) suppository 10 mg (has no administration in time range)   ondansetron (ZOFRAN) injection 4 mg (has no administration in time range)   melatonin tablet 5 mg (has no administration in time range)   dextrose (GLUTOSE) oral gel 15 g (has no administration in time range)   dextrose (D50W) (25 g/50 mL) IV injection 25 g (has no administration in time range)   glucagon (GLUCAGEN) injection 1 mg (has no administration in time range)   insulin lispro (HUMALOG/ADMELOG) injection 2-9 Units (has no administration in time range)   amLODIPine (NORVASC) tablet 5 mg (5 mg Oral Not Given 4/11/24 0205)   aspirin EC tablet 81 mg (has no administration in time range)   gabapentin (NEURONTIN) capsule 300 mg (has no administration in time range)   atorvastatin (LIPITOR) tablet 10 mg (has no administration in time range)   sertraline (ZOLOFT) tablet 50 mg (50 mg Oral Not Given 4/11/24 0206)   furosemide (LASIX) injection 20 mg (has no administration in time range)   albuterol sulfate HFA (PROVENTIL HFA;VENTOLIN HFA;PROAIR HFA) inhaler 2 puff (2 puffs Inhalation Given 4/10/24 2004)   iopamidol (ISOVUE-370) 76 % injection 100 mL (100 mL Intravenous Given 4/10/24 2151)   furosemide (LASIX) injection 80 mg (80 mg Intravenous Given 4/10/24 2229)       Procedures:  not indicated    MDM: Patient is a 73-year-old male comes in complaining of shortness of breath. Lab work independently interpreted by myself shows initial lactic normal.  Initial ABG shows normal pH, pCO2 normal, pO2 low at 59 and patient's nasal cannula was increased to 6 L.  Viral panel negative.  D-dimer elevated 1.7.  Initial troponin of 36.  BNP elevated at 2800, previously normal.  CMP largely unremarkable  for acute findings and CBC largely unremarkable for acute findings.  Chest x-ray shows no acute findings.  CT PE protocol obtained given elevated D-dimer and no evidence of pulmonary embolism.  Findings compatible with mild interstitial pulmonary edema.  Patient given IV Lasix here in ED.  Patient was initially given albuterol as patient was hypoxic in the 60s SpO2 on 2 L nasal cannula.  Upon admission patient requiring 6 L nasal cannula at this time with O2 saturations around 90%.  I believe patient to be suffering mainly from fluid overload and CHF exacerbation.  Patient states he does not regularly follow with a cardiologist.  Spoke with LNENIE Burden, excepted patient on behalf of Dr. Juarez for admission hospital.  Based on the clinical findings at this time I anticipate the patient will require a 2 midnight stay.  Plan discussed with patient and is agreeable with plan.      Final diagnoses:   Acute on chronic congestive heart failure, unspecified heart failure type   Shortness of breath   Acute respiratory failure with hypoxia   Acute pulmonary edema       ED Disposition  ED Disposition       ED Disposition   Decision to Admit    Condition   --    Comment   Level of Care: Med/Surg [1]   Diagnosis: Acute on chronic respiratory failure [[id number]]   Admitting Physician: JAQUELIN JUAREZ [[id number]]   Attending Physician: JAQUELIN JUAREZ [[id number]]                 No follow-up provider specified.       Medication List      No changes were made to your prescriptions during this visit.            Haja Alcantar PA  04/11/24 0436       Haja Alcantar PA  04/11/24 0436

## 2024-04-10 NOTE — Clinical Note
Level of Care: Telemetry [5]   Admitting Physician: JAQUELIN JUAREZ [[id number]]   Attending Physician: JAQUELIN JUAREZ [[id number]]

## 2024-04-11 ENCOUNTER — APPOINTMENT (OUTPATIENT)
Dept: CARDIOLOGY | Facility: HOSPITAL | Age: 74
End: 2024-04-11
Payer: MEDICARE

## 2024-04-11 PROBLEM — J96.20 ACUTE ON CHRONIC RESPIRATORY FAILURE: Status: ACTIVE | Noted: 2024-04-11

## 2024-04-11 LAB
ANION GAP SERPL CALCULATED.3IONS-SCNC: 12 MMOL/L (ref 5–15)
ARTERIAL PATENCY WRIST A: POSITIVE
ATMOSPHERIC PRESS: ABNORMAL MM[HG]
BASE EXCESS BLDA CALC-SCNC: 6.8 MMOL/L (ref 0–3)
BASOPHILS # BLD AUTO: 0.01 10*3/MM3 (ref 0–0.2)
BASOPHILS NFR BLD AUTO: 0.2 % (ref 0–1.5)
BDY SITE: ABNORMAL
BH CV ECHO MEAS - ACS: 2.1 CM
BH CV ECHO MEAS - AO MAX PG: 10.1 MMHG
BH CV ECHO MEAS - AO MEAN PG: 5 MMHG
BH CV ECHO MEAS - AO ROOT DIAM: 3.6 CM
BH CV ECHO MEAS - AO V2 MAX: 159 CM/SEC
BH CV ECHO MEAS - AO V2 VTI: 35.8 CM
BH CV ECHO MEAS - AVA(I,D): 2.7 CM2
BH CV ECHO MEAS - EDV(CUBED): 97.3 ML
BH CV ECHO MEAS - EDV(MOD-SP2): 129 ML
BH CV ECHO MEAS - EDV(MOD-SP4): 85.4 ML
BH CV ECHO MEAS - EF(MOD-BP): 67.9 %
BH CV ECHO MEAS - EF(MOD-SP2): 66.3 %
BH CV ECHO MEAS - EF(MOD-SP4): 68.1 %
BH CV ECHO MEAS - ESV(CUBED): 24.4 ML
BH CV ECHO MEAS - ESV(MOD-SP2): 43.5 ML
BH CV ECHO MEAS - ESV(MOD-SP4): 27.2 ML
BH CV ECHO MEAS - FS: 37 %
BH CV ECHO MEAS - IVS/LVPW: 0.92 CM
BH CV ECHO MEAS - IVSD: 1.1 CM
BH CV ECHO MEAS - LA DIMENSION: 3.9 CM
BH CV ECHO MEAS - LAT PEAK E' VEL: 13.6 CM/SEC
BH CV ECHO MEAS - LV DIASTOLIC VOL/BSA (35-75): 47.7 CM2
BH CV ECHO MEAS - LV MASS(C)D: 192.9 GRAMS
BH CV ECHO MEAS - LV MAX PG: 6.3 MMHG
BH CV ECHO MEAS - LV MEAN PG: 3 MMHG
BH CV ECHO MEAS - LV SYSTOLIC VOL/BSA (12-30): 15.2 CM2
BH CV ECHO MEAS - LV V1 MAX: 125 CM/SEC
BH CV ECHO MEAS - LV V1 VTI: 27.5 CM
BH CV ECHO MEAS - LVIDD: 4.6 CM
BH CV ECHO MEAS - LVIDS: 2.9 CM
BH CV ECHO MEAS - LVOT AREA: 3.5 CM2
BH CV ECHO MEAS - LVOT DIAM: 2.1 CM
BH CV ECHO MEAS - LVPWD: 1.2 CM
BH CV ECHO MEAS - MED PEAK E' VEL: 9 CM/SEC
BH CV ECHO MEAS - MV A MAX VEL: 115 CM/SEC
BH CV ECHO MEAS - MV DEC SLOPE: 368 CM/SEC2
BH CV ECHO MEAS - MV DEC TIME: 0.25 SEC
BH CV ECHO MEAS - MV E MAX VEL: 82.3 CM/SEC
BH CV ECHO MEAS - MV E/A: 0.72
BH CV ECHO MEAS - MV MAX PG: 5.5 MMHG
BH CV ECHO MEAS - MV MEAN PG: 2 MMHG
BH CV ECHO MEAS - MV P1/2T: 83.6 MSEC
BH CV ECHO MEAS - MV V2 VTI: 34.7 CM
BH CV ECHO MEAS - MVA(P1/2T): 2.6 CM2
BH CV ECHO MEAS - MVA(VTI): 2.7 CM2
BH CV ECHO MEAS - PA ACC TIME: 0.14 SEC
BH CV ECHO MEAS - PA V2 MAX: 97 CM/SEC
BH CV ECHO MEAS - PI END-D VEL: 123 CM/SEC
BH CV ECHO MEAS - QP/QS: 0.76
BH CV ECHO MEAS - RAP SYSTOLE: 8 MMHG
BH CV ECHO MEAS - RV MAX PG: 1.98 MMHG
BH CV ECHO MEAS - RV V1 MAX: 70.3 CM/SEC
BH CV ECHO MEAS - RV V1 VTI: 16.1 CM
BH CV ECHO MEAS - RVDD: 3.3 CM
BH CV ECHO MEAS - RVOT DIAM: 2.4 CM
BH CV ECHO MEAS - RVSP: 81 MMHG
BH CV ECHO MEAS - SV(LVOT): 95.2 ML
BH CV ECHO MEAS - SV(MOD-SP2): 85.5 ML
BH CV ECHO MEAS - SV(MOD-SP4): 58.2 ML
BH CV ECHO MEAS - SV(RVOT): 72.8 ML
BH CV ECHO MEAS - SVI(MOD-SP2): 47.8 ML/M2
BH CV ECHO MEAS - SVI(MOD-SP4): 32.5 ML/M2
BH CV ECHO MEAS - TR MAX PG: 73.3 MMHG
BH CV ECHO MEAS - TR MAX VEL: 428 CM/SEC
BH CV ECHO MEAS RV FREE WALL STRAIN: -12.3 %
BH CV ECHO MEASUREMENTS AVERAGE E/E' RATIO: 7.28
BH CV XLRA - RV BASE: 5.5 CM
BH CV XLRA - RV LENGTH: 7.3 CM
BH CV XLRA - RV MID: 4 CM
BH CV XLRA - TDI S': 11.5 CM/SEC
BUN SERPL-MCNC: 17 MG/DL (ref 8–23)
BUN/CREAT SERPL: 20 (ref 7–25)
CALCIUM SPEC-SCNC: 9.1 MG/DL (ref 8.6–10.5)
CHLORIDE SERPL-SCNC: 102 MMOL/L (ref 98–107)
CO2 BLDA-SCNC: 34.7 MMOL/L (ref 22–29)
CO2 SERPL-SCNC: 29 MMOL/L (ref 22–29)
CREAT SERPL-MCNC: 0.85 MG/DL (ref 0.76–1.27)
DEPRECATED RDW RBC AUTO: 55 FL (ref 37–54)
EGFRCR SERPLBLD CKD-EPI 2021: 91.8 ML/MIN/1.73
EOSINOPHIL # BLD AUTO: 0.06 10*3/MM3 (ref 0–0.4)
EOSINOPHIL NFR BLD AUTO: 0.9 % (ref 0.3–6.2)
ERYTHROCYTE [DISTWIDTH] IN BLOOD BY AUTOMATED COUNT: 16.5 % (ref 12.3–15.4)
GLUCOSE BLDC GLUCOMTR-MCNC: 118 MG/DL (ref 70–105)
GLUCOSE BLDC GLUCOMTR-MCNC: 151 MG/DL (ref 70–105)
GLUCOSE BLDC GLUCOMTR-MCNC: 210 MG/DL (ref 70–105)
GLUCOSE BLDC GLUCOMTR-MCNC: 88 MG/DL (ref 70–105)
GLUCOSE SERPL-MCNC: 76 MG/DL (ref 65–99)
HCO3 BLDA-SCNC: 33.1 MMOL/L (ref 21–28)
HCT VFR BLD AUTO: 35.9 % (ref 37.5–51)
HEMODILUTION: NO
HGB BLD-MCNC: 10.6 G/DL (ref 13–17.7)
IMM GRANULOCYTES # BLD AUTO: 0.02 10*3/MM3 (ref 0–0.05)
IMM GRANULOCYTES NFR BLD AUTO: 0.3 % (ref 0–0.5)
INHALED O2 CONCENTRATION: 80 %
LEFT ATRIUM VOLUME INDEX: 38.4 ML/M2
LYMPHOCYTES # BLD AUTO: 1.19 10*3/MM3 (ref 0.7–3.1)
LYMPHOCYTES NFR BLD AUTO: 18.7 % (ref 19.6–45.3)
MCH RBC QN AUTO: 26.8 PG (ref 26.6–33)
MCHC RBC AUTO-ENTMCNC: 29.5 G/DL (ref 31.5–35.7)
MCV RBC AUTO: 90.7 FL (ref 79–97)
MODALITY: ABNORMAL
MONOCYTES # BLD AUTO: 0.6 10*3/MM3 (ref 0.1–0.9)
MONOCYTES NFR BLD AUTO: 9.4 % (ref 5–12)
NEUTROPHILS NFR BLD AUTO: 4.48 10*3/MM3 (ref 1.7–7)
NEUTROPHILS NFR BLD AUTO: 70.5 % (ref 42.7–76)
NRBC BLD AUTO-RTO: 0 /100 WBC (ref 0–0.2)
PCO2 BLDA: 53.2 MM HG (ref 35–48)
PH BLDA: 7.4 PH UNITS (ref 7.35–7.45)
PLATELET # BLD AUTO: 181 10*3/MM3 (ref 140–450)
PMV BLD AUTO: 10.8 FL (ref 6–12)
PO2 BLDA: 66.6 MM HG (ref 83–108)
POTASSIUM SERPL-SCNC: 4 MMOL/L (ref 3.5–5.2)
QT INTERVAL: 424 MS
QTC INTERVAL: 469 MS
RBC # BLD AUTO: 3.96 10*6/MM3 (ref 4.14–5.8)
SAO2 % BLDCOA: 92.4 % (ref 94–98)
SINUS: 3.2 CM
SODIUM SERPL-SCNC: 143 MMOL/L (ref 136–145)
STJ: 2.3 CM
WBC NRBC COR # BLD AUTO: 6.36 10*3/MM3 (ref 3.4–10.8)

## 2024-04-11 PROCEDURE — 93306 TTE W/DOPPLER COMPLETE: CPT | Performed by: INTERNAL MEDICINE

## 2024-04-11 PROCEDURE — 85025 COMPLETE CBC W/AUTO DIFF WBC: CPT

## 2024-04-11 PROCEDURE — 94799 UNLISTED PULMONARY SVC/PX: CPT

## 2024-04-11 PROCEDURE — 94761 N-INVAS EAR/PLS OXIMETRY MLT: CPT

## 2024-04-11 PROCEDURE — 63710000001 INSULIN LISPRO (HUMAN) PER 5 UNITS

## 2024-04-11 PROCEDURE — G0378 HOSPITAL OBSERVATION PER HR: HCPCS

## 2024-04-11 PROCEDURE — 80048 BASIC METABOLIC PNL TOTAL CA: CPT

## 2024-04-11 PROCEDURE — 82948 REAGENT STRIP/BLOOD GLUCOSE: CPT

## 2024-04-11 PROCEDURE — 82803 BLOOD GASES ANY COMBINATION: CPT

## 2024-04-11 PROCEDURE — 99214 OFFICE O/P EST MOD 30 MIN: CPT | Performed by: INTERNAL MEDICINE

## 2024-04-11 PROCEDURE — 93306 TTE W/DOPPLER COMPLETE: CPT

## 2024-04-11 PROCEDURE — 25010000002 FUROSEMIDE PER 20 MG

## 2024-04-11 PROCEDURE — 36600 WITHDRAWAL OF ARTERIAL BLOOD: CPT

## 2024-04-11 PROCEDURE — 94660 CPAP INITIATION&MGMT: CPT

## 2024-04-11 RX ORDER — AMOXICILLIN 250 MG
2 CAPSULE ORAL 2 TIMES DAILY PRN
Status: DISCONTINUED | OUTPATIENT
Start: 2024-04-11 | End: 2024-04-16 | Stop reason: HOSPADM

## 2024-04-11 RX ORDER — BISACODYL 10 MG
10 SUPPOSITORY, RECTAL RECTAL DAILY PRN
Status: DISCONTINUED | OUTPATIENT
Start: 2024-04-11 | End: 2024-04-16 | Stop reason: HOSPADM

## 2024-04-11 RX ORDER — PIOGLITAZONEHYDROCHLORIDE 30 MG/1
30 TABLET ORAL NIGHTLY
Status: DISCONTINUED | OUTPATIENT
Start: 2024-04-11 | End: 2024-04-16 | Stop reason: HOSPADM

## 2024-04-11 RX ORDER — GLIPIZIDE 5 MG/1
5 TABLET ORAL
Status: DISCONTINUED | OUTPATIENT
Start: 2024-04-11 | End: 2024-04-16 | Stop reason: HOSPADM

## 2024-04-11 RX ORDER — GABAPENTIN 300 MG/1
300 CAPSULE ORAL 3 TIMES DAILY
Status: DISCONTINUED | OUTPATIENT
Start: 2024-04-11 | End: 2024-04-16 | Stop reason: HOSPADM

## 2024-04-11 RX ORDER — AMLODIPINE BESYLATE 5 MG/1
5 TABLET ORAL NIGHTLY
Status: DISCONTINUED | OUTPATIENT
Start: 2024-04-11 | End: 2024-04-16 | Stop reason: HOSPADM

## 2024-04-11 RX ORDER — DEXTROSE MONOHYDRATE 25 G/50ML
25 INJECTION, SOLUTION INTRAVENOUS
Status: DISCONTINUED | OUTPATIENT
Start: 2024-04-11 | End: 2024-04-16 | Stop reason: HOSPADM

## 2024-04-11 RX ORDER — ACETAMINOPHEN 650 MG/1
650 SUPPOSITORY RECTAL EVERY 4 HOURS PRN
Status: DISCONTINUED | OUTPATIENT
Start: 2024-04-11 | End: 2024-04-16 | Stop reason: HOSPADM

## 2024-04-11 RX ORDER — CHOLECALCIFEROL (VITAMIN D3) 125 MCG
5 CAPSULE ORAL NIGHTLY PRN
Status: DISCONTINUED | OUTPATIENT
Start: 2024-04-11 | End: 2024-04-16 | Stop reason: HOSPADM

## 2024-04-11 RX ORDER — NICOTINE POLACRILEX 4 MG
15 LOZENGE BUCCAL
Status: DISCONTINUED | OUTPATIENT
Start: 2024-04-11 | End: 2024-04-16 | Stop reason: HOSPADM

## 2024-04-11 RX ORDER — ATORVASTATIN CALCIUM 10 MG/1
10 TABLET, FILM COATED ORAL DAILY
Status: DISCONTINUED | OUTPATIENT
Start: 2024-04-11 | End: 2024-04-16 | Stop reason: HOSPADM

## 2024-04-11 RX ORDER — ASPIRIN 81 MG/1
81 TABLET ORAL 3 TIMES WEEKLY
Status: DISCONTINUED | OUTPATIENT
Start: 2024-04-12 | End: 2024-04-16 | Stop reason: HOSPADM

## 2024-04-11 RX ORDER — ONDANSETRON 2 MG/ML
4 INJECTION INTRAMUSCULAR; INTRAVENOUS EVERY 6 HOURS PRN
Status: DISCONTINUED | OUTPATIENT
Start: 2024-04-11 | End: 2024-04-16 | Stop reason: HOSPADM

## 2024-04-11 RX ORDER — ACETAMINOPHEN 160 MG/5ML
650 SOLUTION ORAL EVERY 4 HOURS PRN
Status: DISCONTINUED | OUTPATIENT
Start: 2024-04-11 | End: 2024-04-16 | Stop reason: HOSPADM

## 2024-04-11 RX ORDER — BISACODYL 5 MG/1
5 TABLET, DELAYED RELEASE ORAL DAILY PRN
Status: DISCONTINUED | OUTPATIENT
Start: 2024-04-11 | End: 2024-04-16 | Stop reason: HOSPADM

## 2024-04-11 RX ORDER — POLYETHYLENE GLYCOL 3350 17 G/17G
17 POWDER, FOR SOLUTION ORAL DAILY PRN
Status: DISCONTINUED | OUTPATIENT
Start: 2024-04-11 | End: 2024-04-16 | Stop reason: HOSPADM

## 2024-04-11 RX ORDER — INSULIN LISPRO 100 [IU]/ML
2-9 INJECTION, SOLUTION INTRAVENOUS; SUBCUTANEOUS
Status: DISCONTINUED | OUTPATIENT
Start: 2024-04-11 | End: 2024-04-15 | Stop reason: DRUGHIGH

## 2024-04-11 RX ORDER — IPRATROPIUM BROMIDE AND ALBUTEROL SULFATE 2.5; .5 MG/3ML; MG/3ML
3 SOLUTION RESPIRATORY (INHALATION) EVERY 4 HOURS PRN
Status: DISCONTINUED | OUTPATIENT
Start: 2024-04-11 | End: 2024-04-16 | Stop reason: HOSPADM

## 2024-04-11 RX ORDER — FUROSEMIDE 40 MG/1
40 TABLET ORAL
Status: DISCONTINUED | OUTPATIENT
Start: 2024-04-11 | End: 2024-04-14

## 2024-04-11 RX ORDER — BUDESONIDE 0.5 MG/2ML
1 INHALANT ORAL
Status: DISCONTINUED | OUTPATIENT
Start: 2024-04-11 | End: 2024-04-16 | Stop reason: HOSPADM

## 2024-04-11 RX ORDER — IBUPROFEN 600 MG/1
1 TABLET ORAL
Status: DISCONTINUED | OUTPATIENT
Start: 2024-04-11 | End: 2024-04-16 | Stop reason: HOSPADM

## 2024-04-11 RX ORDER — SODIUM CHLORIDE 0.9 % (FLUSH) 0.9 %
10 SYRINGE (ML) INJECTION AS NEEDED
Status: DISCONTINUED | OUTPATIENT
Start: 2024-04-11 | End: 2024-04-16 | Stop reason: HOSPADM

## 2024-04-11 RX ORDER — FUROSEMIDE 10 MG/ML
20 INJECTION INTRAMUSCULAR; INTRAVENOUS EVERY 12 HOURS
Status: DISCONTINUED | OUTPATIENT
Start: 2024-04-11 | End: 2024-04-11

## 2024-04-11 RX ORDER — SODIUM CHLORIDE 9 MG/ML
40 INJECTION, SOLUTION INTRAVENOUS AS NEEDED
Status: DISCONTINUED | OUTPATIENT
Start: 2024-04-11 | End: 2024-04-16 | Stop reason: HOSPADM

## 2024-04-11 RX ORDER — ACETAMINOPHEN 325 MG/1
650 TABLET ORAL EVERY 4 HOURS PRN
Status: DISCONTINUED | OUTPATIENT
Start: 2024-04-11 | End: 2024-04-16 | Stop reason: HOSPADM

## 2024-04-11 RX ORDER — IPRATROPIUM BROMIDE AND ALBUTEROL SULFATE 2.5; .5 MG/3ML; MG/3ML
3 SOLUTION RESPIRATORY (INHALATION)
Status: DISCONTINUED | OUTPATIENT
Start: 2024-04-11 | End: 2024-04-16 | Stop reason: HOSPADM

## 2024-04-11 RX ORDER — SODIUM CHLORIDE 0.9 % (FLUSH) 0.9 %
10 SYRINGE (ML) INJECTION EVERY 12 HOURS SCHEDULED
Status: DISCONTINUED | OUTPATIENT
Start: 2024-04-11 | End: 2024-04-16 | Stop reason: HOSPADM

## 2024-04-11 RX ADMIN — AMLODIPINE BESYLATE 5 MG: 5 TABLET ORAL at 20:04

## 2024-04-11 RX ADMIN — ATORVASTATIN CALCIUM 10 MG: 10 TABLET, FILM COATED ORAL at 07:55

## 2024-04-11 RX ADMIN — GABAPENTIN 300 MG: 300 CAPSULE ORAL at 07:55

## 2024-04-11 RX ADMIN — FUROSEMIDE 20 MG: 10 INJECTION, SOLUTION INTRAMUSCULAR; INTRAVENOUS at 05:58

## 2024-04-11 RX ADMIN — SERTRALINE HYDROCHLORIDE 50 MG: 50 TABLET ORAL at 20:04

## 2024-04-11 RX ADMIN — Medication 10 ML: at 20:04

## 2024-04-11 RX ADMIN — FUROSEMIDE 40 MG: 40 TABLET ORAL at 17:30

## 2024-04-11 RX ADMIN — INSULIN LISPRO 4 UNITS: 100 INJECTION, SOLUTION INTRAVENOUS; SUBCUTANEOUS at 17:30

## 2024-04-11 RX ADMIN — IPRATROPIUM BROMIDE AND ALBUTEROL SULFATE 3 ML: .5; 3 SOLUTION RESPIRATORY (INHALATION) at 06:32

## 2024-04-11 RX ADMIN — GLIPIZIDE 5 MG: 5 TABLET ORAL at 17:30

## 2024-04-11 RX ADMIN — GABAPENTIN 300 MG: 300 CAPSULE ORAL at 20:04

## 2024-04-11 RX ADMIN — IPRATROPIUM BROMIDE AND ALBUTEROL SULFATE 3 ML: .5; 3 SOLUTION RESPIRATORY (INHALATION) at 20:05

## 2024-04-11 RX ADMIN — PIOGLITAZONE 30 MG: 30 TABLET ORAL at 20:04

## 2024-04-11 RX ADMIN — Medication 10 ML: at 07:55

## 2024-04-11 RX ADMIN — IPRATROPIUM BROMIDE AND ALBUTEROL SULFATE 3 ML: .5; 3 SOLUTION RESPIRATORY (INHALATION) at 15:33

## 2024-04-11 RX ADMIN — Medication 10 ML: at 02:07

## 2024-04-11 RX ADMIN — IPRATROPIUM BROMIDE AND ALBUTEROL SULFATE 3 ML: .5; 3 SOLUTION RESPIRATORY (INHALATION) at 12:18

## 2024-04-11 RX ADMIN — BUDESONIDE 1 MG: 0.5 INHALANT RESPIRATORY (INHALATION) at 20:10

## 2024-04-11 RX ADMIN — GABAPENTIN 300 MG: 300 CAPSULE ORAL at 17:30

## 2024-04-11 NOTE — CONSULTS
PULMONARY CRITICAL CARE CONSULT NOTE      PATIENT IDENTIFICATION:  Name: Brenden Peter  MRN: [id number]  :  1950     Age: 73 y.o.  Sex: male        DATE OF CONSULTATION:  2024  PRIMARY CARE PHYSICIAN    Bert Rojas MD                  CHIEF COMPLAINT:  respiratory failure    History of Present Illness:   Brenden Peter is a 73 y.o. male ex-smoker, has history of chronic obstructive airway disease, diastolic heart failure was on home oxygen presented to the hospital because of worsening shortness of coughing for the last week, and decreased tiredness and fatigue increased swelling in his legs, but denies any chest pain no palpitation no dizziness no lightheadedness no fever no chills      Review of Systems:   Constitutional:  As above   Eyes: negative   ENT/oropharynx: negative   Cardiovascular: negative   Respiratory:  As above   Gastrointestinal: negative   Genitourinary: negative   Neurological: negative   Musculoskeletal: negative   Integument/breast: negative   Endocrine: negative   Allergic/Immunologic: negative     Past Medical History:  Past Medical History:   Diagnosis Date    (HFpEF) heart failure with preserved ejection fraction 2023    Abnormal EKG 2/3/2015    Anxiety     COPD (chronic obstructive pulmonary disease)     Coronary artery disease 3/3/2015    Diabetes     type 2    Hyperlipidemia     Hypertension     Microalbuminuria due to type 2 diabetes mellitus 2020    Multinodular goiter 2019    Murmur 2/3/2015    Primary osteoarthritis of left knee 10/22/2021       Past Surgical History:  Past Surgical History:   Procedure Laterality Date    BRONCHOSCOPY N/A 2023    Procedure: BRONCHOSCOPY, bronchial alveolar lavage right upper lobe;  Surgeon: Juanjo Castañeda MD;  Location: [facility] ENDOSCOPY;  Service: Pulmonary;  Laterality: N/A;  post: pneumonia    CARDIAC CATHETERIZATION Left 1/15/2023    Procedure: Left Heart Cath and coronary angiogram;  Surgeon:  Shawn Baker MD;  Location: [facility] CATH INVASIVE LOCATION;  Service: Cardiovascular;  Laterality: Left;    EYE SURGERY      cataract    TOTAL KNEE ARTHROPLASTY Right 10/21/2020    Procedure: TOTAL KNEE ARTHROPLASTY;  Surgeon: Ravi Fagan MD;  Location: [facility] MAIN OR;  Service: Orthopedics;  Laterality: Right;    TOTAL KNEE ARTHROPLASTY REVISION Right 2021    Procedure: KNEE POLY INSERT EXCHANGE with irrigation;  Surgeon: Ravi Fagan MD;  Location: [facility] MAIN OR;  Service: Orthopedics;  Laterality: Right;        Family History:  Family History   Problem Relation Age of Onset    Cancer Other     Diabetes Other     Heart disease Other     No Known Problems Mother     No Known Problems Father     No Known Problems Sister     No Known Problems Brother     No Known Problems Maternal Aunt     No Known Problems Maternal Uncle     No Known Problems Paternal Aunt     No Known Problems Paternal Uncle     No Known Problems Maternal Grandmother     No Known Problems Maternal Grandfather     No Known Problems Paternal Grandmother     No Known Problems Paternal Grandfather     Anemia Neg Hx     Arrhythmia Neg Hx     Asthma Neg Hx     Clotting disorder Neg Hx     Fainting Neg Hx     Heart attack Neg Hx     Heart failure Neg Hx     Hyperlipidemia Neg Hx     Hypertension Neg Hx         Social History:   Social History     Tobacco Use    Smoking status: Former     Current packs/day: 0.00     Average packs/day: 1 pack/day for 5.0 years (5.0 ttl pk-yrs)     Types: Cigarettes     Start date: 7/3/2017     Quit date: 7/3/2022     Years since quittin.7     Passive exposure: Past    Smokeless tobacco: Former     Quit date: 7/3/2022   Substance Use Topics    Alcohol use: Not Currently        Allergies:  No Known Allergies    Home Meds:  Medications Prior to Admission   Medication Sig Dispense Refill Last Dose    amLODIPine (NORVASC) 5 MG tablet Take 1 tablet by mouth Every Night. Indications: High Blood Pressure Disorder     "   aspirin 81 MG EC tablet Take 1 tablet by mouth 3 (Three) Times a Week.       gabapentin (NEURONTIN) 300 MG capsule Take 1 capsule by mouth 3 (Three) Times a Day. Indications: Neuropathic Pain       glipizide (GLUCOTROL) 10 MG tablet Take 0.5 tablets by mouth 2 (Two) Times a Day Before Meals. Indications: Type 2 Diabetes       metFORMIN (GLUCOPHAGE) 1000 MG tablet Take 1 tablet by mouth 2 (Two) Times a Day With Meals. Indications: Type 2 Diabetes       pioglitazone (ACTOS) 30 MG tablet Take 1 tablet by mouth Every Night. Indications: Type 2 Diabetes       pravastatin (PRAVACHOL) 10 MG tablet Take 1 tablet by mouth Daily.       sertraline (ZOLOFT) 50 MG tablet Take 1 tablet by mouth Every Night. Indications: Major Depressive Disorder       vitamin D (ERGOCALCIFEROL) 1.25 MG (24226 UT) capsule capsule 1 capsule 2 (Two) Times a Week. Indications: Vitamin D Deficiency       O2 (OXYGEN) Inhale 4 L/min Continuous. Indications: COPD exacerbation, complicated          Objective:  tMax 24 hrs: Temp (24hrs), Av.1 °F (36.7 °C), Min:97.4 °F (36.3 °C), Max:98.5 °F (36.9 °C)      Vitals Ranges:   Temp:  [97.4 °F (36.3 °C)-98.5 °F (36.9 °C)] 97.4 °F (36.3 °C)  Heart Rate:  [58-78] 58  Resp:  [18-28] 20  BP: (108-142)/(54-81) 123/72    Intake and Output Last 3 Shifts:   No intake/output data recorded.    Exam:  /72 (BP Location: Right arm, Patient Position: Lying)   Pulse 58   Temp 97.4 °F (36.3 °C) (Axillary)   Resp 20   Ht 170.2 cm (67\")   Wt 95.3 kg (210 lb)   SpO2 90%   BMI 32.89 kg/m²       04/10/24  2011   Weight: 95.3 kg (210 lb)     General Appearance:      HEENT:  Normocephalic, without obvious abnormality, atraumatic. Conjunctivae/corneas clear.  Normal external ear canals. Nares normal, no drainage.  Neck:  Supple, symmetrical, trachea midline. No JVD.  Lungs /Chest wall:   Bilateral basal rhonchi, respirations unlabored, symmetrical wall movement.     Heart:  Regular rate and rhythm, systolic murmur " PMI left sternal border  Abdomen: Soft, nontender, no masses, no organomegaly.    Extremities: Trace edema, no clubbing or cyanosis        Data Review:  All labs (24hrs):   Recent Results (from the past 24 hour(s))   Comprehensive Metabolic Panel    Collection Time: 04/10/24  7:59 PM    Specimen: Blood   Result Value Ref Range    Glucose 108 (H) 65 - 99 mg/dL    BUN 16 8 - 23 mg/dL    Creatinine 0.84 0.76 - 1.27 mg/dL    Sodium 145 136 - 145 mmol/L    Potassium 3.7 3.5 - 5.2 mmol/L    Chloride 103 98 - 107 mmol/L    CO2 30.0 (H) 22.0 - 29.0 mmol/L    Calcium 9.6 8.6 - 10.5 mg/dL    Total Protein 7.5 6.0 - 8.5 g/dL    Albumin 4.0 3.5 - 5.2 g/dL    ALT (SGPT) 18 1 - 41 U/L    AST (SGOT) 18 1 - 40 U/L    Alkaline Phosphatase 78 39 - 117 U/L    Total Bilirubin 0.5 0.0 - 1.2 mg/dL    Globulin 3.5 gm/dL    A/G Ratio 1.1 g/dL    BUN/Creatinine Ratio 19.0 7.0 - 25.0    Anion Gap 12.0 5.0 - 15.0 mmol/L    eGFR 92.1 >60.0 mL/min/1.73   BNP    Collection Time: 04/10/24  7:59 PM    Specimen: Blood   Result Value Ref Range    proBNP 2,883.0 (H) 0.0 - 900.0 pg/mL   Single High Sensitivity Troponin T    Collection Time: 04/10/24  7:59 PM    Specimen: Blood   Result Value Ref Range    HS Troponin T 36 (H) <22 ng/L   Green Top (Gel)    Collection Time: 04/10/24  7:59 PM   Result Value Ref Range    Extra Tube Hold for add-ons.    Lavender Top    Collection Time: 04/10/24  7:59 PM   Result Value Ref Range    Extra Tube hold for add-on    Gold Top - SST    Collection Time: 04/10/24  7:59 PM   Result Value Ref Range    Extra Tube Hold for add-ons.    Light Blue Top    Collection Time: 04/10/24  7:59 PM   Result Value Ref Range    Extra Tube Hold for add-ons.    CBC Auto Differential    Collection Time: 04/10/24  7:59 PM    Specimen: Blood   Result Value Ref Range    WBC 6.72 3.40 - 10.80 10*3/mm3    RBC 4.16 4.14 - 5.80 10*6/mm3    Hemoglobin 11.2 (L) 13.0 - 17.7 g/dL    Hematocrit 37.7 37.5 - 51.0 %    MCV 90.6 79.0 - 97.0 fL    MCH  26.9 26.6 - 33.0 pg    MCHC 29.7 (L) 31.5 - 35.7 g/dL    RDW 16.5 (H) 12.3 - 15.4 %    RDW-SD 54.4 (H) 37.0 - 54.0 fl    MPV 11.8 6.0 - 12.0 fL    Platelets 193 140 - 450 10*3/mm3    Neutrophil % 75.4 42.7 - 76.0 %    Lymphocyte % 14.4 (L) 19.6 - 45.3 %    Monocyte % 8.6 5.0 - 12.0 %    Eosinophil % 0.9 0.3 - 6.2 %    Basophil % 0.3 0.0 - 1.5 %    Immature Grans % 0.4 0.0 - 0.5 %    Neutrophils, Absolute 5.06 1.70 - 7.00 10*3/mm3    Lymphocytes, Absolute 0.97 0.70 - 3.10 10*3/mm3    Monocytes, Absolute 0.58 0.10 - 0.90 10*3/mm3    Eosinophils, Absolute 0.06 0.00 - 0.40 10*3/mm3    Basophils, Absolute 0.02 0.00 - 0.20 10*3/mm3    Immature Grans, Absolute 0.03 0.00 - 0.05 10*3/mm3    nRBC 0.0 0.0 - 0.2 /100 WBC   D-dimer, Quantitative    Collection Time: 04/10/24  7:59 PM    Specimen: Blood   Result Value Ref Range    D-Dimer, Quantitative 1.70 (H) 0.00 - 0.73 mg/L (FEU)   Respiratory Panel PCR w/COVID-19(SARS-CoV-2) IAN/NAYELY/KATERIN/PAD/COR/KRISH In-House, NP Swab in [facility], 2 HR TAT - Swab, Nasopharynx    Collection Time: 04/10/24  8:00 PM    Specimen: Nasopharynx; Swab   Result Value Ref Range    ADENOVIRUS, PCR Not Detected Not Detected    Coronavirus 229E Not Detected Not Detected    Coronavirus HKU1 Not Detected Not Detected    Coronavirus NL63 Not Detected Not Detected    Coronavirus OC43 Not Detected Not Detected    COVID19 Not Detected Not Detected - Ref. Range    Human Metapneumovirus Not Detected Not Detected    Human Rhinovirus/Enterovirus Not Detected Not Detected    Influenza A PCR Not Detected Not Detected    Influenza B PCR Not Detected Not Detected    Parainfluenza Virus 1 Not Detected Not Detected    Parainfluenza Virus 2 Not Detected Not Detected    Parainfluenza Virus 3 Not Detected Not Detected    Parainfluenza Virus 4 Not Detected Not Detected    RSV, PCR Not Detected Not Detected    Bordetella pertussis pcr Not Detected Not Detected    Bordetella parapertussis PCR Not Detected Not Detected     Chlamydophila pneumoniae PCR Not Detected Not Detected    Mycoplasma pneumo by PCR Not Detected Not Detected   POC Lactate    Collection Time: 04/10/24  8:01 PM    Specimen: Blood   Result Value Ref Range    Lactate 0.5 0.3 - 2.0 mmol/L   ECG 12 Lead Dyspnea    Collection Time: 04/10/24  8:06 PM   Result Value Ref Range    QT Interval 424 ms    QTC Interval 469 ms   POC Glucose STAT    Collection Time: 04/10/24  8:11 PM    Specimen: Blood   Result Value Ref Range    Glucose 93 70 - 105 mg/dL   POC Creatinine    Collection Time: 04/10/24  8:18 PM    Specimen: Arterial Blood   Result Value Ref Range    Creatinine 0.86 0.60 - 1.30 mg/dL    eGFR 91.4 >60.0 mL/min/1.73   Blood Gas, Arterial -    Collection Time: 04/10/24  8:18 PM    Specimen: Arterial Blood   Result Value Ref Range    Site Left Radial     Scar's Test Positive     pH, Arterial 7.431 7.350 - 7.450 pH units    pCO2, Arterial 47.5 35.0 - 48.0 mm Hg    pO2, Arterial 59.9 (L) 83.0 - 108.0 mm Hg    HCO3, Arterial 31.6 (H) 21.0 - 28.0 mmol/L    Base Excess, Arterial 6.3 (H) 0.0 - 3.0 mmol/L    O2 Saturation, Arterial 90.9 (L) 94.0 - 98.0 %    Barometric Pressure for Blood Gas      Modality Cannula     FIO2 40 %    Hemodilution No    POCT Electrolytes +HGB +HCT    Collection Time: 04/10/24  8:18 PM    Specimen: Arterial Blood   Result Value Ref Range    Sodium 145 138 - 146 mmol/L    POC Potassium 3.5 3.5 - 4.5 mmol/L    Ionized Calcium 1.16 1.15 - 1.33 mmol/L    Glucose 107 (H) 74 - 100 mg/dL    Hematocrit 37 (L) 38 - 51 %    Hemoglobin 12.5 12.0 - 17.0 g/dL   POC Lactate    Collection Time: 04/10/24  8:18 PM    Specimen: Arterial Blood   Result Value Ref Range    Lactate 1.7 0.2 - 2.0 mmol/L   POC Glucose Once    Collection Time: 04/10/24  8:18 PM    Specimen: Arterial Blood   Result Value Ref Range    Glucose 107 (H) 74 - 100 mg/dL   Basic Metabolic Panel    Collection Time: 04/11/24  2:09 AM    Specimen: Blood   Result Value Ref Range    Glucose 76 65 - 99  mg/dL    BUN 17 8 - 23 mg/dL    Creatinine 0.85 0.76 - 1.27 mg/dL    Sodium 143 136 - 145 mmol/L    Potassium 4.0 3.5 - 5.2 mmol/L    Chloride 102 98 - 107 mmol/L    CO2 29.0 22.0 - 29.0 mmol/L    Calcium 9.1 8.6 - 10.5 mg/dL    BUN/Creatinine Ratio 20.0 7.0 - 25.0    Anion Gap 12.0 5.0 - 15.0 mmol/L    eGFR 91.8 >60.0 mL/min/1.73   CBC Auto Differential    Collection Time: 04/11/24  2:09 AM    Specimen: Blood   Result Value Ref Range    WBC 6.36 3.40 - 10.80 10*3/mm3    RBC 3.96 (L) 4.14 - 5.80 10*6/mm3    Hemoglobin 10.6 (L) 13.0 - 17.7 g/dL    Hematocrit 35.9 (L) 37.5 - 51.0 %    MCV 90.7 79.0 - 97.0 fL    MCH 26.8 26.6 - 33.0 pg    MCHC 29.5 (L) 31.5 - 35.7 g/dL    RDW 16.5 (H) 12.3 - 15.4 %    RDW-SD 55.0 (H) 37.0 - 54.0 fl    MPV 10.8 6.0 - 12.0 fL    Platelets 181 140 - 450 10*3/mm3    Neutrophil % 70.5 42.7 - 76.0 %    Lymphocyte % 18.7 (L) 19.6 - 45.3 %    Monocyte % 9.4 5.0 - 12.0 %    Eosinophil % 0.9 0.3 - 6.2 %    Basophil % 0.2 0.0 - 1.5 %    Immature Grans % 0.3 0.0 - 0.5 %    Neutrophils, Absolute 4.48 1.70 - 7.00 10*3/mm3    Lymphocytes, Absolute 1.19 0.70 - 3.10 10*3/mm3    Monocytes, Absolute 0.60 0.10 - 0.90 10*3/mm3    Eosinophils, Absolute 0.06 0.00 - 0.40 10*3/mm3    Basophils, Absolute 0.01 0.00 - 0.20 10*3/mm3    Immature Grans, Absolute 0.02 0.00 - 0.05 10*3/mm3    nRBC 0.0 0.0 - 0.2 /100 WBC   POC Glucose 4x Daily Before Meals & at Bedtime    Collection Time: 04/11/24  7:23 AM    Specimen: Blood   Result Value Ref Range    Glucose 88 70 - 105 mg/dL   POC Glucose 4x Daily Before Meals & at Bedtime    Collection Time: 04/11/24 11:54 AM    Specimen: Blood   Result Value Ref Range    Glucose 151 (H) 70 - 105 mg/dL        Imaging:  CT Angiogram Chest Pulmonary Embolism    Result Date: 4/10/2024  CT ANGIOGRAM CHEST PULMONARY EMBOLISM Date of Exam: 4/10/2024 9:45 PM EDT Indication: Pulmonary embolism (PE) suspected, positive D-dimer. Comparison: Chest CTA performed on January 12, 2023 Technique:  Axial CT images were obtained of the chest after the uneventful intravenous administration of iodinated contrast utilizing pulmonary embolism protocol.  Sagittal and coronal reconstructions were performed.  Automated exposure control and iterative reconstruction methods were used. Findings: Pulmonary arteries: Adequate opacification of the pulmonary arteries. No evidence of acute pulmonary embolism. Thoracic aorta: The thoracic aorta is normal in caliber and contour. Small scattered lateral calcifications are visualized. Thyroid: The visualized portion of the thyroid is unremarkable. Lungs and Pleura: No focal consolidation. Mild emphysematous changes are visualized. Trace right pleural effusion is visualized. There is mild interlobular septal thickening which is most prominent in the lung bases. No suspicious pulmonary nodules. No pneumothorax. Mediastinum/Tiff: There is mild to moderate diffuse mediastinal lymphadenopathy. This appears stable when compared to prior study. Lymph nodes: No axillary or supraclavicular lymphadenopathy. Cardiovascular: The heart is mildly large. No evidence of pericardial effusion. Upper Abdomen: No acute process in the upper abdomen. Bones and Soft Tissue: No acute fracture, aggressive osseous lesions, or soft tissue process. Moderate degenerative changes of the midthoracic spine are visualized.     Impression: No evidence of pulmonary embolism. Findings compatible with mild interstitial pulm edema. Mild cardiomegaly. Stable mild to moderate diffuse mediastinal lymphadenopathy. This may be congestive or reactive. Electronically Signed: Leonides Trivedi MD  4/10/2024 10:01 PM EDT  Workstation ID: [id number]    XR Chest 1 View    Result Date: 4/10/2024  XR CHEST 1 VW Date of Exam: 4/10/2024 8:28 PM EDT Indication: CHF/COPD Protocol CHF/COPD Protocol Comparison: Chest radiograph performed on April 23, 2023 Findings: No focal consolidation or effusion. Lungs are hyper aerated. There is no  evidence of pneumothorax. The pulmonary vasculature appears within normal limits. Cardiac silhouette is enlarged. No acute osseous abnormality identified.     Impression: No focal consolidation or effusion. Changes of COPD. Electronically Signed: Leonides Trivedi MD  4/10/2024 8:31 PM EDT  Workstation ID: [id number]       Current:  amLODIPine, 5 mg, Oral, Nightly  [START ON 4/12/2024] aspirin, 81 mg, Oral, Once per day on Monday Wednesday Friday  atorvastatin, 10 mg, Oral, Daily  furosemide, 40 mg, Oral, BID  gabapentin, 300 mg, Oral, TID  glipizide, 5 mg, Oral, BID AC  insulin lispro, 2-9 Units, Subcutaneous, 4x Daily AC & at Bedtime  pioglitazone, 30 mg, Oral, Nightly  sertraline, 50 mg, Oral, Nightly  sodium chloride, 10 mL, Intravenous, Q12H        Infusion:        PRN:    acetaminophen **OR** acetaminophen **OR** acetaminophen    senna-docusate sodium **AND** polyethylene glycol **AND** bisacodyl **AND** bisacodyl    dextrose    dextrose    glucagon (human recombinant)    ipratropium-albuterol    melatonin    ondansetron    sodium chloride    sodium chloride    sodium chloride    ASSESSMENT:    Acute on chronic respiratory failure hypoxic patient was on 6 L and her  PO2 was 59   COPD exasperation    Obstructive sleep apnea   Congestive heart failure   diastolic per cardiac echo was done a year ago  Cor pulmonale   Diabetes mellitus   Hypertension       PLAN:  Antibiotics  Oxygen supplement   Diuretics  Need to be evaluated for sleep apnea as outpatient  Bronchodilator  Inhaled corticosteroids  Electrolytes/ glycemic control  DVT and GI prophylaxis.  Total Critical care time in direct medical management (   ) minutes, This time specifically excludes time spent performing procedures.       Jeannine Ford MD. D, ABSM.  4/11/2024  12:53 EDT

## 2024-04-11 NOTE — PLAN OF CARE
Goal Outcome Evaluation:         Patient remains on 6L NC. Patient stable at this time.

## 2024-04-11 NOTE — CONSULTS
Referring Provider: Brammell, Timothy Duane,*    Reason for Consultation:      Shortness of Breath      Patient Care Team:  Bert Rojas MD as PCP - General (Family Medicine)      SUBJECTIVE     Chief Complaint: Shortness of breath    History of present illness:  Brenden Peter is a 73 y.o. male with a history of COPD who presented to [facility] with complaint of shortness of breath.     Patient presented to the hospital with complaints of shortness of breath going on over the last few days.  Evaluation in the emergency room included troponin 36, proBNP 2883.  D-dimer elevated at 1.7 CT of the chest showed no evidence of PE there is mild interstitial pulmonary edema and cardiomegaly.  Blood gases were obtained pH was 7.43, pCO2 47.5, pO2 59.9.    Patient has previously been evaluated by Dr. Baker.  He is known to have hypertension, dyslipidemia, diabetes.  The patient underwent cardiac catheterization in January 2023 which showed no significant coronary artery disease.  There was 25 to 30% plaquing in the proximal LAD, left circumflex had 10 to 20% plaquing in the midsegment and RCA had 25 to 30% plaquing in the proximal and mid segment.  Last echocardiogram demonstrated ejection fraction of 60 to 65% there was mild aortic dilatation.    Review of Systems   Constitutional: Negative for chills and fever.   HENT:  Negative for ear discharge and nosebleeds.    Eyes:  Negative for discharge and redness.   Cardiovascular:  Negative for chest pain, orthopnea, palpitations, paroxysmal nocturnal dyspnea and syncope.   Respiratory:  Positive for shortness of breath. Negative for cough and wheezing.    Endocrine: Negative for heat intolerance.   Skin:  Negative for rash.   Musculoskeletal:  Negative for arthritis and myalgias.   Gastrointestinal:  Negative for abdominal pain, melena, nausea and vomiting.   Genitourinary:  Negative for dysuria and hematuria.   Neurological:  Negative for dizziness,  light-headedness, numbness and tremors.   Psychiatric/Behavioral:  Negative for depression. The patient is not nervous/anxious.            Review of systems:    Constitutional: No weakness, fatigue, fever, rigors, chills   Eyes: No vision changes, eye pain   ENT/oropharynx: No difficulty swallowing, sore throat, epistaxis, changes in hearing   Cardiovascular: No chest pain, chest tightness, palpitations, paroxysmal nocturnal dyspnea, orthopnea, diaphoresis, dizziness / syncopal episode   Respiratory: Complains of shortness of breath, dyspnea on exertion, cough, wheezing, hemoptysis   Gastrointestinal: No abdominal pain, nausea, vomiting, diarrhea, bloody stools   Genitourinary: No hematuria, dysuria   Neurological: No headache, tremors, numbness, one-sided weakness    Musculoskeletal: No cramps, myalgias, joint pain, joint swelling   Integument: No rash, edema        Personal History:      Past Medical History:   Diagnosis Date    (HFpEF) heart failure with preserved ejection fraction 4/1/2023    Abnormal EKG 2/3/2015    Anxiety     COPD (chronic obstructive pulmonary disease)     Coronary artery disease 3/3/2015    Diabetes     type 2    Hyperlipidemia     Hypertension     Microalbuminuria due to type 2 diabetes mellitus 2/5/2020    Multinodular goiter 4/18/2019    Murmur 2/3/2015    Primary osteoarthritis of left knee 10/22/2021       Past Surgical History:   Procedure Laterality Date    BRONCHOSCOPY N/A 4/9/2023    Procedure: BRONCHOSCOPY, bronchial alveolar lavage right upper lobe;  Surgeon: Juanjo Castañeda MD;  Location: [facility] ENDOSCOPY;  Service: Pulmonary;  Laterality: N/A;  post: pneumonia    CARDIAC CATHETERIZATION Left 1/15/2023    Procedure: Left Heart Cath and coronary angiogram;  Surgeon: Shawn Baker MD;  Location: [facility] CATH INVASIVE LOCATION;  Service: Cardiovascular;  Laterality: Left;    EYE SURGERY      cataract    TOTAL KNEE ARTHROPLASTY Right 10/21/2020    Procedure: TOTAL KNEE ARTHROPLASTY;   Surgeon: Ravi Fagan MD;  Location: [facility] MAIN OR;  Service: Orthopedics;  Laterality: Right;    TOTAL KNEE ARTHROPLASTY REVISION Right 2021    Procedure: KNEE POLY INSERT EXCHANGE with irrigation;  Surgeon: Ravi Fagan MD;  Location: [facility] MAIN OR;  Service: Orthopedics;  Laterality: Right;       Family History   Problem Relation Age of Onset    Cancer Other     Diabetes Other     Heart disease Other     No Known Problems Mother     No Known Problems Father     No Known Problems Sister     No Known Problems Brother     No Known Problems Maternal Aunt     No Known Problems Maternal Uncle     No Known Problems Paternal Aunt     No Known Problems Paternal Uncle     No Known Problems Maternal Grandmother     No Known Problems Maternal Grandfather     No Known Problems Paternal Grandmother     No Known Problems Paternal Grandfather     Anemia Neg Hx     Arrhythmia Neg Hx     Asthma Neg Hx     Clotting disorder Neg Hx     Fainting Neg Hx     Heart attack Neg Hx     Heart failure Neg Hx     Hyperlipidemia Neg Hx     Hypertension Neg Hx        Social History     Tobacco Use    Smoking status: Former     Current packs/day: 0.00     Average packs/day: 1 pack/day for 5.0 years (5.0 ttl pk-yrs)     Types: Cigarettes     Start date: 7/3/2017     Quit date: 7/3/2022     Years since quittin.7     Passive exposure: Past    Smokeless tobacco: Former     Quit date: 7/3/2022   Vaping Use    Vaping status: Never Used   Substance Use Topics    Alcohol use: Not Currently    Drug use: Never        Home meds:  Prior to Admission medications    Medication Sig Start Date End Date Taking? Authorizing Provider   amLODIPine (NORVASC) 5 MG tablet Take 1 tablet by mouth Every Night. Indications: High Blood Pressure Disorder 20  Yes ProviderFelix MD   aspirin 81 MG EC tablet Take 1 tablet by mouth 3 (Three) Times a Week.   Yes ProviderFelix MD   gabapentin (NEURONTIN) 300 MG capsule Take 1 capsule by mouth 3  (Three) Times a Day. Indications: Neuropathic Pain   Yes Felix Solano MD   glipizide (GLUCOTROL) 10 MG tablet Take 0.5 tablets by mouth 2 (Two) Times a Day Before Meals. Indications: Type 2 Diabetes   Yes Felix Solano MD   metFORMIN (GLUCOPHAGE) 1000 MG tablet Take 1 tablet by mouth 2 (Two) Times a Day With Meals. Indications: Type 2 Diabetes 4/14/20  Yes Felix Solano MD   pioglitazone (ACTOS) 30 MG tablet Take 1 tablet by mouth Every Night. Indications: Type 2 Diabetes   Yes Felix Solano MD   pravastatin (PRAVACHOL) 10 MG tablet Take 1 tablet by mouth Daily.   Yes Felix Solano MD   sertraline (ZOLOFT) 50 MG tablet Take 1 tablet by mouth Every Night. Indications: Major Depressive Disorder 4/14/20  Yes Felix Solano MD   vitamin D (ERGOCALCIFEROL) 1.25 MG (75536 UT) capsule capsule 1 capsule 2 (Two) Times a Week. Indications: Vitamin D Deficiency 4/14/20  Yes Felix Solano MD   O2 (OXYGEN) Inhale 4 L/min Continuous. Indications: COPD exacerbation, complicated 4/27/23   Felix Solano MD       Allergies:     Patient has no known allergies.    Scheduled Meds:amLODIPine, 5 mg, Oral, Nightly  [START ON 4/12/2024] aspirin, 81 mg, Oral, Once per day on Monday Wednesday Friday  atorvastatin, 10 mg, Oral, Daily  furosemide, 20 mg, Intravenous, Q12H  gabapentin, 300 mg, Oral, TID  insulin lispro, 2-9 Units, Subcutaneous, 4x Daily AC & at Bedtime  sertraline, 50 mg, Oral, Nightly  sodium chloride, 10 mL, Intravenous, Q12H      Continuous Infusions:   PRN Meds:  acetaminophen **OR** acetaminophen **OR** acetaminophen    senna-docusate sodium **AND** polyethylene glycol **AND** bisacodyl **AND** bisacodyl    dextrose    dextrose    glucagon (human recombinant)    ipratropium-albuterol    melatonin    ondansetron    sodium chloride    sodium chloride    sodium chloride      OBJECTIVE    Vital Signs  Vitals:    04/11/24 0221 04/11/24 0634 04/11/24 0637 04/11/24  "0700   BP: 117/77   123/72   BP Location: Right arm   Right arm   Patient Position: Sitting   Lying   Pulse: 63 61 64 70   Resp: 24 18 18 18   Temp: 98.5 °F (36.9 °C)   97.4 °F (36.3 °C)   TempSrc: Oral   Axillary   SpO2: 91% 90% 97% (!) 86%   Weight:       Height:           Flowsheet Rows      Flowsheet Row First Filed Value   Admission Height 170.2 cm (67\") Documented at 04/10/2024 2011   Admission Weight 95.3 kg (210 lb) Documented at 04/10/2024 2011              Intake/Output Summary (Last 24 hours) at 4/11/2024 1003  Last data filed at 4/11/2024 0900  Gross per 24 hour   Intake 240 ml   Output --   Net 240 ml        Telemetry: Sinus rhythm    Physical Exam:  The patient is alert, oriented and in no distress.  Vital signs as noted above.  Head and neck revealed no carotid bruits or jugular venous distention.    Lungs clear.  No wheezing.  Diminished breath sounds bilaterally  Heart: Normal first and second heart sounds. No murmur.  No precordial rub is present.  No gallop is present.  Abdomen: Soft and nontender.  No organomegaly is present.  Extremities with good peripheral pulses without any pedal edema.  Skin: Warm and dry.  Musculoskeletal system is grossly normal.  CNS grossly normal.     Constitutional:       Appearance: Well-developed.   Eyes:      General: No scleral icterus.        Right eye: No discharge.   HENT:      Head: Normocephalic and atraumatic.   Neck:      Thyroid: No thyromegaly.      Lymphadenopathy: No cervical adenopathy.   Pulmonary:      Effort: Pulmonary effort is normal. No respiratory distress.      Breath sounds: Wheezing present. No rales.   Cardiovascular:      Normal rate. Regular rhythm.      No gallop.    Edema:     Peripheral edema absent.   Abdominal:      Tenderness: There is no abdominal tenderness.   Skin:     Findings: No erythema or rash.   Neurological:      Mental Status: Alert and oriented to person, place, and time.         Results Review:  I have personally reviewed " the results from the time of this admission to 4/11/2024 10:03 EDT and agree with these findings:  [x]  Laboratory  []  Microbiology  []  Radiology  [x]  EKG/Telemetry   [x]  Cardiology/Vascular   []  Pathology  []  Old records  []  Other:    Most notable findings include:     Lab Results (last 24 hours)       Procedure Component Value Units Date/Time    POC Glucose 4x Daily Before Meals & at Bedtime [[id number]]  (Normal) Collected: 04/11/24 0723    Specimen: Blood Updated: 04/11/24 0725     Glucose 88 mg/dL      Comment: Serial Number: [id number]:  [id number]       Basic Metabolic Panel [[id number]]  (Normal) Collected: 04/11/24 0209    Specimen: Blood Updated: 04/11/24 0253     Glucose 76 mg/dL      BUN 17 mg/dL      Creatinine 0.85 mg/dL      Sodium 143 mmol/L      Potassium 4.0 mmol/L      Comment: Slight hemolysis detected by analyzer. Result may be falsely elevated.        Chloride 102 mmol/L      CO2 29.0 mmol/L      Calcium 9.1 mg/dL      BUN/Creatinine Ratio 20.0     Anion Gap 12.0 mmol/L      eGFR 91.8 mL/min/1.73     Narrative:      GFR Normal >60  Chronic Kidney Disease <60  Kidney Failure <15    The GFR formula is only valid for adults with stable renal function between ages 18 and 70.    CBC & Differential [[id number]]  (Abnormal) Collected: 04/11/24 0209    Specimen: Blood Updated: 04/11/24 0228    Narrative:      The following orders were created for panel order CBC & Differential.  Procedure                               Abnormality         Status                     ---------                               -----------         ------                     CBC Auto Differential[[id number]]        Abnormal            Final result                 Please view results for these tests on the individual orders.    CBC Auto Differential [[id number]]  (Abnormal) Collected: 04/11/24 0209    Specimen: Blood Updated: 04/11/24 0228     WBC 6.36 10*3/mm3      RBC 3.96 10*6/mm3      Hemoglobin 10.6 g/dL       Hematocrit 35.9 %      MCV 90.7 fL      MCH 26.8 pg      MCHC 29.5 g/dL      RDW 16.5 %      RDW-SD 55.0 fl      MPV 10.8 fL      Platelets 181 10*3/mm3      Neutrophil % 70.5 %      Lymphocyte % 18.7 %      Monocyte % 9.4 %      Eosinophil % 0.9 %      Basophil % 0.2 %      Immature Grans % 0.3 %      Neutrophils, Absolute 4.48 10*3/mm3      Lymphocytes, Absolute 1.19 10*3/mm3      Monocytes, Absolute 0.60 10*3/mm3      Eosinophils, Absolute 0.06 10*3/mm3      Basophils, Absolute 0.01 10*3/mm3      Immature Grans, Absolute 0.02 10*3/mm3      nRBC 0.0 /100 WBC     [place] Draw [[id number]] Collected: 04/10/24 1959    Specimen: Blood Updated: 04/10/24 2101    Narrative:      The following orders were created for panel order [place] Draw.  Procedure                               Abnormality         Status                     ---------                               -----------         ------                     Green Top (Gel)[[id number]]                                  Final result               Lavender Top[[id number]]                                     Final result               Gold Top - SST[[id number]]                                   Final result               Light Blue Top[[id number]]                                   Final result                 Please view results for these tests on the individual orders.    Green Top (Gel) [[id number]] Collected: 04/10/24 1959    Specimen: Blood Updated: 04/10/24 2101     Extra Tube Hold for add-ons.     Comment: Auto resulted.       Lavender Top [[id number]] Collected: 04/10/24 1959    Specimen: Blood Updated: 04/10/24 2101     Extra Tube hold for add-on     Comment: Auto resulted       Gold Top - SST [[id number]] Collected: 04/10/24 1959    Specimen: Blood Updated: 04/10/24 2101     Extra Tube Hold for add-ons.     Comment: Auto resulted.       Light Blue Top [[id number]] Collected: 04/10/24 1959    Specimen: Blood Updated: 04/10/24 2101     Extra Tube Hold for add-ons.      "Comment: Auto resulted       D-dimer, Quantitative [[id number]]  (Abnormal) Collected: 04/10/24 1959    Specimen: Blood Updated: 04/10/24 2100     D-Dimer, Quantitative 1.70 mg/L (FEU)     Narrative:      According to the assay 's published package insert, a normal (<0.50 mg/L (FEU)) D-dimer result in conjunction with a non-high clinical probability assessment, excludes deep vein thrombosis (DVT) and pulmonary embolism (PE) with high sensitivity.    D-dimer values increase with age and this can make VTE exclusion of an older population difficult. To address this, the [employer], based on best available evidence and recent guidelines, recommends that clinicians use age-adjusted D-dimer thresholds in patients greater than 50 years of age with: a) a low probability of PE who do not meet all Pulmonary Embolism Rule Out Criteria, or b) in those with intermediate probability of PE.   The formula for an age-adjusted D-dimer cut-off is \"age/100\".  For example, a 60 year old patient would have an age-adjusted cut-off of 0.60 mg/L (FEU) and an 80 year old 0.80 mg/L (FEU).    Respiratory Panel PCR w/COVID-19(SARS-CoV-2) IAN/NAYELY/KATERIN/PAD/COR/KRISH In-House, NP Swab in [place], 2 HR TAT - Swab, Nasopharynx [[id number]]  (Normal) Collected: 04/10/24 2000    Specimen: Swab from Nasopharynx Updated: 04/10/24 2054     ADENOVIRUS, PCR Not Detected     Coronavirus 229E Not Detected     Coronavirus HKU1 Not Detected     Coronavirus NL63 Not Detected     Coronavirus OC43 Not Detected     COVID19 Not Detected     Human Metapneumovirus Not Detected     Human Rhinovirus/Enterovirus Not Detected     Influenza A PCR Not Detected     Influenza B PCR Not Detected     Parainfluenza Virus 1 Not Detected     Parainfluenza Virus 2 Not Detected     Parainfluenza Virus 3 Not Detected     Parainfluenza Virus 4 Not Detected     RSV, PCR Not Detected     Bordetella pertussis pcr Not Detected     Bordetella parapertussis PCR " Not Detected     Chlamydophila pneumoniae PCR Not Detected     Mycoplasma pneumo by PCR Not Detected    Narrative:      In the setting of a positive respiratory panel with a viral infection PLUS a negative procalcitonin without other underlying concern for bacterial infection, consider observing off antibiotics or discontinuation of antibiotics and continue supportive care. If the respiratory panel is positive for atypical bacterial infection (Bordetella pertussis, Chlamydophila pneumoniae, or Mycoplasma pneumoniae), consider antibiotic de-escalation to target atypical bacterial infection.    Comprehensive Metabolic Panel [[id number]]  (Abnormal) Collected: 04/10/24 1959    Specimen: Blood Updated: 04/10/24 2031     Glucose 108 mg/dL      BUN 16 mg/dL      Creatinine 0.84 mg/dL      Sodium 145 mmol/L      Potassium 3.7 mmol/L      Chloride 103 mmol/L      CO2 30.0 mmol/L      Calcium 9.6 mg/dL      Total Protein 7.5 g/dL      Albumin 4.0 g/dL      ALT (SGPT) 18 U/L      AST (SGOT) 18 U/L      Alkaline Phosphatase 78 U/L      Total Bilirubin 0.5 mg/dL      Globulin 3.5 gm/dL      A/G Ratio 1.1 g/dL      BUN/Creatinine Ratio 19.0     Anion Gap 12.0 mmol/L      eGFR 92.1 mL/min/1.73     Narrative:      GFR Normal >60  Chronic Kidney Disease <60  Kidney Failure <15    The GFR formula is only valid for adults with stable renal function between ages 18 and 70.    BNP [[id number]]  (Abnormal) Collected: 04/10/24 1959    Specimen: Blood Updated: 04/10/24 2031     proBNP 2,883.0 pg/mL     Narrative:      This assay is used as an aid in the diagnosis of individuals suspected of having heart failure. It can be used as an aid in the diagnosis of acute decompensated heart failure (ADHF) in patients presenting with signs and symptoms of ADHF to the emergency department (ED). In addition, NT-proBNP of <300 pg/mL indicates ADHF is not likely.    Age Range Result Interpretation  NT-proBNP Concentration (pg/mL:      <50              Positive            >450                   Gray                 300-450                    Negative             <300    50-75           Positive            >900                  Gray                300-900                  Negative            <300      >75             Positive            >1800                  Gray                300-1800                  Negative            <300    Single High Sensitivity Troponin T [[id number]]  (Abnormal) Collected: 04/10/24 1959    Specimen: Blood Updated: 04/10/24 2031     HS Troponin T 36 ng/L     Narrative:      High Sensitive Troponin T Reference Range:  <14.0 ng/L- Negative Female for AMI  <22.0 ng/L- Negative Male for AMI  >=14 - Abnormal Female indicating possible myocardial injury.  >=22 - Abnormal Male indicating possible myocardial injury.   Clinicians would have to utilize clinical acumen, EKG, Troponin, and serial changes to determine if it is an Acute Myocardial Infarction or myocardial injury due to an underlying chronic condition.         POC Creatinine [[id number]]  (Normal) Collected: 04/10/24 2018    Specimen: Arterial Blood Updated: 04/10/24 2021     Creatinine 0.86 mg/dL      Comment: Serial Number: [id number]:  [id number]        eGFR 91.4 mL/min/1.73     Blood Gas, Arterial - [[id number]]  (Abnormal) Collected: 04/10/24 2018    Specimen: Arterial Blood Updated: 04/10/24 2021     Site Left Radial     Scar's Test Positive     pH, Arterial 7.431 pH units      pCO2, Arterial 47.5 mm Hg      pO2, Arterial 59.9 mm Hg      HCO3, Arterial 31.6 mmol/L      Base Excess, Arterial 6.3 mmol/L      Comment: Serial Number: [id number]:  [id number]        O2 Saturation, Arterial 90.9 %      Barometric Pressure for Blood Gas --     Comment: N/A        Modality Cannula     FIO2 40 %      Hemodilution No    POCT Electrolytes +HGB +HCT [[id number]]  (Abnormal) Collected: 04/10/24 2018    Specimen: Arterial Blood Updated: 04/10/24 2021     Sodium 145 mmol/L      POC Potassium 3.5  mmol/L      Ionized Calcium 1.16 mmol/L      Comment: Serial Number: [id number]:  [id number]        Glucose 107 mg/dL      Hematocrit 37 %      Hemoglobin 12.5 g/dL     POC Lactate [[id number]]  (Normal) Collected: 04/10/24 2018    Specimen: Arterial Blood Updated: 04/10/24 2021     Lactate 1.7 mmol/L      Comment: Serial Number: [id number]:  [id number]       POC Glucose Once [[id number]]  (Abnormal) Collected: 04/10/24 2018    Specimen: Arterial Blood Updated: 04/10/24 2021     Glucose 107 mg/dL      Comment: Serial Number: [id number]:  [id number]       POC Glucose STAT [[id number]]  (Normal) Collected: 04/10/24 2011    Specimen: Blood Updated: 04/10/24 2012     Glucose 93 mg/dL      Comment: Serial Number: [id number]:  [id number]       CBC & Differential [[id number]]  (Abnormal) Collected: 04/10/24 1959    Specimen: Blood Updated: 04/10/24 2008    Narrative:      The following orders were created for panel order CBC & Differential.  Procedure                               Abnormality         Status                     ---------                               -----------         ------                     CBC Auto Differential[[id number]]        Abnormal            Final result                 Please view results for these tests on the individual orders.    CBC Auto Differential [[id number]]  (Abnormal) Collected: 04/10/24 1959    Specimen: Blood Updated: 04/10/24 2008     WBC 6.72 10*3/mm3      RBC 4.16 10*6/mm3      Hemoglobin 11.2 g/dL      Hematocrit 37.7 %      MCV 90.6 fL      MCH 26.9 pg      MCHC 29.7 g/dL      RDW 16.5 %      RDW-SD 54.4 fl      MPV 11.8 fL      Platelets 193 10*3/mm3      Neutrophil % 75.4 %      Lymphocyte % 14.4 %      Monocyte % 8.6 %      Eosinophil % 0.9 %      Basophil % 0.3 %      Immature Grans % 0.4 %      Neutrophils, Absolute 5.06 10*3/mm3      Lymphocytes, Absolute 0.97 10*3/mm3      Monocytes, Absolute 0.58 10*3/mm3      Eosinophils, Absolute 0.06 10*3/mm3      Basophils,  Absolute 0.02 10*3/mm3      Immature Grans, Absolute 0.03 10*3/mm3      nRBC 0.0 /100 WBC     POC Lactate [[id number]]  (Normal) Collected: 04/10/24 2001    Specimen: Blood Updated: 04/10/24 2003     Lactate 0.5 mmol/L      Comment: Serial Number: [id number]:  [id number]               Imaging Results (Last 24 Hours)       Procedure Component Value Units Date/Time    CT Angiogram Chest Pulmonary Embolism [[id number]] Collected: 04/10/24 2155     Updated: 04/10/24 2203    Narrative:      CT ANGIOGRAM CHEST PULMONARY EMBOLISM    Date of Exam: 4/10/2024 9:45 PM EDT    Indication: Pulmonary embolism (PE) suspected, positive D-dimer.    Comparison: Chest CTA performed on January 12, 2023    Technique: Axial CT images were obtained of the chest after the uneventful intravenous administration of iodinated contrast utilizing pulmonary embolism protocol.  Sagittal and coronal reconstructions were performed.  Automated exposure control and   iterative reconstruction methods were used.      Findings:    Pulmonary arteries: Adequate opacification of the pulmonary arteries. No evidence of acute pulmonary embolism.    Thoracic aorta: The thoracic aorta is normal in caliber and contour. Small scattered lateral calcifications are visualized.    Thyroid: The visualized portion of the thyroid is unremarkable.    Lungs and Pleura: No focal consolidation. Mild emphysematous changes are visualized. Trace right pleural effusion is visualized. There is mild interlobular septal thickening which is most prominent in the lung bases. No suspicious pulmonary nodules. No   pneumothorax.    Mediastinum/Tiff: There is mild to moderate diffuse mediastinal lymphadenopathy. This appears stable when compared to prior study.    Lymph nodes: No axillary or supraclavicular lymphadenopathy.    Cardiovascular: The heart is mildly large. No evidence of pericardial effusion.    Upper Abdomen: No acute process in the upper abdomen.    Bones and Soft  Tissue: No acute fracture, aggressive osseous lesions, or soft tissue process. Moderate degenerative changes of the midthoracic spine are visualized.        Impression:      Impression:    No evidence of pulmonary embolism.    Findings compatible with mild interstitial pulm edema.    Mild cardiomegaly.    Stable mild to moderate diffuse mediastinal lymphadenopathy. This may be congestive or reactive.        Electronically Signed: Leonides Trivedi MD    4/10/2024 10:01 PM EDT    Workstation ID: [id number]    XR Chest 1 View [[id number]] Collected: 04/10/24 2030     Updated: 04/10/24 2033    Narrative:      XR CHEST 1 VW    Date of Exam: 4/10/2024 8:28 PM EDT    Indication: CHF/COPD Protocol  CHF/COPD Protocol    Comparison: Chest radiograph performed on April 23, 2023    Findings:  No focal consolidation or effusion. Lungs are hyper aerated. There is no evidence of pneumothorax. The pulmonary vasculature appears within normal limits. Cardiac silhouette is enlarged. No acute osseous abnormality identified.      Impression:      Impression:  No focal consolidation or effusion. Changes of COPD.      Electronically Signed: Leonides Trivedi MD    4/10/2024 8:31 PM EDT    Workstation ID: [id number]            LAB RESULTS (LAST 7 DAYS)    CBC  Results from last 7 days   Lab Units 04/11/24  0209 04/10/24  2018 04/10/24  1959   WBC 10*3/mm3 6.36  --  6.72   RBC 10*6/mm3 3.96*  --  4.16   HEMOGLOBIN g/dL 10.6*  --  11.2*   HEMOGLOBIN, POC g/dL  --  12.5  --    HEMATOCRIT % 35.9*  --  37.7   HEMATOCRIT POC %  --  37*  --    MCV fL 90.7  --  90.6   PLATELETS 10*3/mm3 181  --  193       BMP  Results from last 7 days   Lab Units 04/11/24  0209 04/10/24  2018 04/10/24  1959   SODIUM mmol/L 143  --  145   POTASSIUM mmol/L 4.0  --  3.7   CHLORIDE mmol/L 102  --  103   CO2 mmol/L 29.0  --  30.0*   BUN mg/dL 17  --  16   CREATININE mg/dL 0.85 0.86 0.84   GLUCOSE mg/dL 76  --  108*       CMP   Results from last 7 days   Lab Units 04/11/24 0209  04/10/24  2018 04/10/24  1959   SODIUM mmol/L 143  --  145   POTASSIUM mmol/L 4.0  --  3.7   CHLORIDE mmol/L 102  --  103   CO2 mmol/L 29.0  --  30.0*   BUN mg/dL 17  --  16   CREATININE mg/dL 0.85 0.86 0.84   GLUCOSE mg/dL 76  --  108*   ALBUMIN g/dL  --   --  4.0   BILIRUBIN mg/dL  --   --  0.5   ALK PHOS U/L  --   --  78   AST (SGOT) U/L  --   --  18   ALT (SGPT) U/L  --   --  18       BNP        TROPONIN  Results from last 7 days   Lab Units 04/10/24  1959   HSTROP T ng/L 36*       CoAg        Creatinine Clearance  Estimated Creatinine Clearance: 85.2 mL/min (by C-G formula based on SCr of 0.85 mg/dL).    ABG  Results from last 7 days   Lab Units 04/10/24  2018   PH, ARTERIAL pH units 7.431   PCO2, ARTERIAL mm Hg 47.5   PO2 ART mm Hg 59.9*   O2 SATURATION ART % 90.9*   BASE EXCESS ART mmol/L 6.3*         Radiology  CT Angiogram Chest Pulmonary Embolism    Result Date: 4/10/2024  Impression: No evidence of pulmonary embolism. Findings compatible with mild interstitial pulm edema. Mild cardiomegaly. Stable mild to moderate diffuse mediastinal lymphadenopathy. This may be congestive or reactive. Electronically Signed: Leonides Trivedi MD  4/10/2024 10:01 PM EDT  Workstation ID: [id number]    XR Chest 1 View    Result Date: 4/10/2024  Impression: No focal consolidation or effusion. Changes of COPD. Electronically Signed: Leonides Trivedi MD  4/10/2024 8:31 PM EDT  Workstation ID: [id number]       EKG  I personally viewed and interpreted the patient's EKG/Telemetry data:  ECG 12 Lead Dyspnea   Final Result   HEART RATE= 73  bpm   RR Interval= 816  ms   MN Interval= 196  ms   P Horizontal Axis= 4  deg   P Front Axis= 70  deg   QRSD Interval= 160  ms   QT Interval= 424  ms   QTcB= 469  ms   QRS Axis= 137  deg   T Wave Axis= 30  deg   - ABNORMAL ECG -   Sinus rhythm   Right bundle branch block   ST elevation secondary to IVCD   When compared with ECG of 23-Apr-2023 12:31:25,   No significant change   Electronically Signed By:  Preet Friedman ([facility]) 11-Apr-2024 07:51:14   Date and Time of Study: 2024-04-10 20:06:18                  Echocardiogram:    Results for orders placed during the hospital encounter of 03/08/23    Adult Transthoracic Echo Complete W/ Cont if Necessary Per Protocol    Interpretation Summary    Left ventricular systolic function is normal. Calculated left ventricular EF = 65.5% Left ventricular ejection fraction appears to be 61 - 65%.    Left ventricular diastolic dysfunction is noted.    Both atrial cavities are dilated.    Estimated right ventricular systolic pressure from tricuspid regurgitation is markedly elevated (>55 mmHg).        Stress Test:  Results for orders placed during the hospital encounter of 01/12/23    Stress Test With Myocardial Perfusion One Day    Interpretation Summary    Left ventricular ejection fraction is normal (Calculated EF = 55%).    Myocardial perfusion imaging indicates a moderate-to-severe area of ischemia .    Impressions are consistent with a high risk study.    Diaphragmatic attenuation artifact is present.    Findings consistent with a normal ECG stress test.    Patient underwent pharmacological stress test with Lexiscan followed by myocardial perfusion scintigraphy.  Baseline EKG shows sinus rhythm with right bundle branch block without significant EKG changes during Lexiscan infusion patient reported dyspnea.No significant arrhythmias during the study.  [facility]ocardial perfusion [facility] shows moderate to large inferior and inferoseptal ischemia with inferior artifact with SDS score of 17 with EF of 55%.    Abnormal study with high risk features on the study suggestive of moderate to large area of ischemia      Electronically signed by Luis Antonio White MD, 01/14/23, 12:27 PM EST.        Cardiac Catheterization:  Results for orders placed during the hospital encounter of 01/12/23    Cardiac Catheterization/Vascular Study    Conclusion  CARDIAC CATHETERIZATION REPORT      DATE OF  PROCEDURE:  1/15/2023    INDICATION FOR PROCEDURE:    Shortness of breath  Chest heaviness  Abnormal stress test    PROCEDURE PERFORMED:    Left heart catheterization  coronary angiography  left ventriculography    PROCEDURE COMMENTS:    After informed consent was obtained, the patient was prepped and draped in the usual sterile manner.  Mild to moderate sedation was administered.  Right femoral artery was accessed without difficulty and 6 Scottish arterial sheath was inserted.  Sheath was flushed with heparinized saline.    Using 6 Scottish Dara catheters, first left coronary artery and the right coronary was electively engaged and appropriate views were taken.  A 6 Scottish JR4 catheter was used to cross aortic valve and left heart catheterization was performed.  Left ventriculography was done in a ENRIQUE view.    Femoral arteriotomy was closed with Perclose    Patient tolerated the procedure well.    FINDINGS:    1. HEMODYNAMICS:    Aortic pressure: 145/72 mmHg    LVEDP: 0 to 5 mmHg    Gradient across aortic valve on pullback: No gradient across aortic valve      2. LEFT VENTRICULOGRAPHY: 55%      3. CORONARY ANGIOGRAPHY:    A: Left main coronary artery: Short and normal    B: Left anterior descending artery: 25 to 30% plaque in the proximal segment of LAD.  No high-grade stenosis.  It wraps around apex.  It tapers into a small caliber vessel distally.    C: Left circumflex coronary artery: 10 to 20% plaque in the midsegment of LCx no high-grade stenosis    D: Right coronary artery: RCA is large and dominant and free of significant disease.  25 to 30% plaque in the proximal and mid segment of RCA noted    SUMMARY:    Mild coronary artery disease  Preserved left ventricle function    RECOMMENDATIONS:    Medical treatment recommended        Other:      ASSESSMENT & PLAN:    Principal Problem:    Acute on chronic respiratory failure    Shortness of breath  Likely multifactorial  Patient has underlying COPD  Elevated  proBNP  CT with suggestion of mild pulmonary edema  Echocardiogram will be repeated to reassess LV function  Recent cardiac catheterization with nonobstructive coronary artery disease preserved LV function  Pulmonary hypertension with RVSP of greater than 55 mmHg by last echo  Continue gentle diuresis    Nonobstructive coronary artery disease  No chest pain  EKG with no acute ST or T wave segment abnormalities  High-sensitivity troponin 36    Type 2 diabetes  Sliding scale insulin ordered and continue Accu-Cheks    Hypertension  Stable on current medical therapy    Dyslipidemia  Continue statin        Echocardiogram to be repeated  Additional recommendations per Dr. Baker    Patient is seen and examined and findings are verified.  All data is reviewed by me personally.  Assessment and plan formulated by APC was done after discussion with attending.  I spent more than 50% of time in taking care of the patient.    Patient is presented with shortness of breath.  Patient denies any chest pain    Hemodynamics are stable.    Normal S1 and S2.  Expiratory rhonchi noted.  No significant leg edema noted.    MDM:    1.  Shortness of breath:    Etiology of shortness of breath is probably due to underlying COPD.  Patient does have elevated  BNP patient is on Lasix.  Continue baseline diuresis.    2.  CAD:    Patient is without any chest pain.  Patient has been on obstructive CAD.    3.  Elevated troponin:    High-sensitivity troponin is 36.  I would continue current treatment.  Patient had recent cardiac catheterization and it was unremarkable    4.  Hypertension:    Patient is on amlodipine.  Blood pressure is controlled I will continue current therapy    5.  Hyperlipidemia    Patient is on Lipitor.    Electronically signed by Shawn Baker MD, 04/11/24, 6:08 PM EDT.          LENNIE Cummings  04/11/24  10:03 EDT

## 2024-04-11 NOTE — CASE MANAGEMENT/SOCIAL WORK
Discharge Planning Assessment   Daniele     Patient Name: Brenden Peter  MRN: [id number]  Today's Date: 4/11/2024    Admit Date: 4/10/2024    Plan: Return home w/ wife. Home O2 4L & CPAP HS w/ [place].   Discharge Needs Assessment       Row Name 04/11/24 1332       Living Environment    People in Home spouse    Name(s) of People in Home Wife-Donna    Current Living Arrangements home    Potentially Unsafe Housing Conditions none    In the past 12 months has the electric, gas, oil, or water company threatened to shut off services in your home? No    Primary Care Provided by self    Provides Primary Care For no one    Family Caregiver if Needed spouse    Family Caregiver Names Donna    Quality of Family Relationships helpful;involved;supportive    Able to Return to Prior Arrangements yes       Resource/Environmental Concerns    Resource/Environmental Concerns none    Transportation Concerns none       Transportation Needs    In the past 12 months, has lack of transportation kept you from medical appointments or from getting medications? no    In the past 12 months, has lack of transportation kept you from meetings, work, or from getting things needed for daily living? No       Food Insecurity    Within the past 12 months, you worried that your food would run out before you got the money to buy more. Never true    Within the past 12 months, the food you bought just didn't last and you didn't have money to get more. Never true       Transition Planning    Patient/Family Anticipates Transition to home;home with family    Patient/Family Anticipated Services at Transition none    Transportation Anticipated car, drives self;family or friend will provide       Discharge Needs Assessment    Readmission Within the Last 30 Days no previous admission in last 30 days    Equipment Currently Used at Home oxygen;cpap;cane, straight;shower chair;commode;bp cuff;pulse ox;glucometer;nebulizer    Concerns to be Addressed denies  needs/concerns at this time;no discharge needs identified    Anticipated Changes Related to Illness none    Equipment Needed After Discharge none    Provided Post Acute Provider List? N/A    Provided Post Acute Provider Quality & Resource List? N/A                   Discharge Plan       Row Name 04/11/24 1337       Plan    Plan Return home w/ wife. Home O2 4L & CPAP HS w/ [facility].    Patient/Family in Agreement with Plan yes    Plan Comments CM met with patient at bedside. Pt lives at home with wife Donna, drives, and is independent with ADL's. PCP and pharmacy confirmed. DME at home includes cane, shower chair, BSC, nebulizer, CPAP HS and 4L continuous O2 supplied by [facility]. Pt is not current with HHC/PT services; pt may benefit from pulmonary rehab. Son Rigoberto will provide transportation at discharge. DC barriers: Cardio and Pulm following, pt currently requiring 6L O2.              Demographic Summary       Row Name 04/11/24 1331       General Information    Admission Type observation    Arrived From emergency department    Referral Source admission list    Reason for Consult care coordination/care conference;discharge planning    Preferred Language English       Contact Information    Permission Granted to Share Info With                    Functional Status       Row Name 04/11/24 1332       Functional Status    Usual Activity Tolerance good    Current Activity Tolerance good       Functional Status, IADL    Medications independent    Meal Preparation independent    Housekeeping independent    Laundry independent    Shopping independent             Megan Naegele, RN     Office Phone: [phone]  Office Cell: [phone]

## 2024-04-11 NOTE — ED NOTES
Nursing report ED to floor  Brenden Peter  73 y.o.  male    HPI:   Chief Complaint   Patient presents with    Shortness of Breath       Admitting doctor:   Dara Rodarte DO    Admitting diagnosis:   The primary encounter diagnosis was Acute on chronic congestive heart failure, unspecified heart failure type. Diagnoses of Shortness of breath, Acute respiratory failure with hypoxia, and Acute pulmonary edema were also pertinent to this visit.    Code status:   Current Code Status       Date Active Code Status Order ID Comments User Context       Prior            Allergies:   Patient has no known allergies.    Isolation:  No active isolations     Fall Risk:  Fall Risk Assessment was completed, and patient is at moderate risk for falls.   Predictive Model Details         13 (Low) Factor Value    Calculated 4/11/2024 00:07 Age 73    Risk of Fall Model Musculoskeletal Assessment WDL     Active Peripheral IV Present     Imaging order in this encounter Present     Respiratory Rate 25     Skin Assessment WDL     Magnesium not on file     Financial Class Medicare     Diastolic BP 64     Number of Distinct Medication Classes administered 3     Drug Use No     Tobacco Use Quit     Enzo Scale not on file     Peripheral Vascular Assessment WDL     Clinically Relevant Sex Not Female     Gastrointestinal Assessment WDL     Albumin 4 g/dL     Cardiac Assessment WDL     Total Bilirubin 0.5 mg/dL     Days after Admission 0.183     Chloride 103 mmol/L     Calcium 9.6 mg/dL     ALT 18 U/L     Creatinine 0.86 mg/dL     Potassium 3.5 mmol/L         Weight:       04/10/24  2011   Weight: 95.3 kg (210 lb)       Intake and Output  No intake or output data in the 24 hours ending 04/11/24 0007    Diet:        Most recent vitals:   Vitals:    04/10/24 2300 04/10/24 2315 04/10/24 2334 04/10/24 2359   BP: 131/76 129/81 123/64 121/64   Pulse: 66 64 70 69   Resp:       Temp:       TempSrc:       SpO2: (!) 88% 92% 93% 91%   Weight:        Height:           Active LDAs/IV Access:   Lines, Drains & Airways       Active LDAs       Name Placement date Placement time Site Days    Peripheral IV 04/10/24 2124 Right Antecubital 04/10/24  2124  Antecubital  less than 1                    Skin Condition:   Skin Assessments (last day)       None             Labs (abnormal labs have a star):   Labs Reviewed   COMPREHENSIVE METABOLIC PANEL - Abnormal; Notable for the following components:       Result Value    Glucose 108 (*)     CO2 30.0 (*)     All other components within normal limits    Narrative:     GFR Normal >60  Chronic Kidney Disease <60  Kidney Failure <15    The GFR formula is only valid for adults with stable renal function between ages 18 and 70.   BNP (IN-HOUSE) - Abnormal; Notable for the following components:    proBNP 2,883.0 (*)     All other components within normal limits    Narrative:     This assay is used as an aid in the diagnosis of individuals suspected of having heart failure. It can be used as an aid in the diagnosis of acute decompensated heart failure (ADHF) in patients presenting with signs and symptoms of ADHF to the emergency department (ED). In addition, NT-proBNP of <300 pg/mL indicates ADHF is not likely.    Age Range Result Interpretation  NT-proBNP Concentration (pg/mL:      <50             Positive            >450                   Gray                 300-450                    Negative             <300    50-75           Positive            >900                  Gray                300-900                  Negative            <300      >75             Positive            >1800                  Gray                300-1800                  Negative            <300   SINGLE HS TROPONIN T - Abnormal; Notable for the following components:    HS Troponin T 36 (*)     All other components within normal limits    Narrative:     High Sensitive Troponin T Reference Range:  <14.0 ng/L- Negative Female for AMI  <22.0 ng/L-  "Negative Male for AMI  >=14 - Abnormal Female indicating possible myocardial injury.  >=22 - Abnormal Male indicating possible myocardial injury.   Clinicians would have to utilize clinical acumen, EKG, Troponin, and serial changes to determine if it is an Acute Myocardial Infarction or myocardial injury due to an underlying chronic condition.        CBC WITH AUTO DIFFERENTIAL - Abnormal; Notable for the following components:    Hemoglobin 11.2 (*)     MCHC 29.7 (*)     RDW 16.5 (*)     RDW-SD 54.4 (*)     Lymphocyte % 14.4 (*)     All other components within normal limits   D-DIMER, QUANTITATIVE - Abnormal; Notable for the following components:    D-Dimer, Quantitative 1.70 (*)     All other components within normal limits    Narrative:     According to the assay 's published package insert, a normal (<0.50 mg/L (FEU)) D-dimer result in conjunction with a non-high clinical probability assessment, excludes deep vein thrombosis (DVT) and pulmonary embolism (PE) with high sensitivity.    D-dimer values increase with age and this can make VTE exclusion of an older population difficult. To address this, the [employer], based on best available evidence and recent guidelines, recommends that clinicians use age-adjusted D-dimer thresholds in patients greater than 50 years of age with: a) a low probability of PE who do not meet all Pulmonary Embolism Rule Out Criteria, or b) in those with intermediate probability of PE.   The formula for an age-adjusted D-dimer cut-off is \"age/100\".  For example, a 60 year old patient would have an age-adjusted cut-off of 0.60 mg/L (FEU) and an 80 year old 0.80 mg/L (FEU).   BLOOD GAS, ARTERIAL - Abnormal; Notable for the following components:    pO2, Arterial 59.9 (*)     HCO3, Arterial 31.6 (*)     Base Excess, Arterial 6.3 (*)     O2 Saturation, Arterial 90.9 (*)     All other components within normal limits   ELECTROLYTES + H&H - Abnormal; Notable for the " following components:    Glucose 107 (*)     Hematocrit 37 (*)     All other components within normal limits   POCT GLUCOSE FINGERSTICK - Abnormal; Notable for the following components:    Glucose 107 (*)     All other components within normal limits   RESPIRATORY PANEL PCR W/ COVID-19 (SARS-COV-2), NP SWAB IN UTM/VTP, 2 HR TAT - Normal    Narrative:     In the setting of a positive respiratory panel with a viral infection PLUS a negative procalcitonin without other underlying concern for bacterial infection, consider observing off antibiotics or discontinuation of antibiotics and continue supportive care. If the respiratory panel is positive for atypical bacterial infection (Bordetella pertussis, Chlamydophila pneumoniae, or Mycoplasma pneumoniae), consider antibiotic de-escalation to target atypical bacterial infection.   POC LACTATE - Normal   POCT GLUCOSE FINGERSTICK - Normal   POCT CREATININE - Normal   POC LACTATE - Normal   RAINBOW DRAW    Narrative:     The following orders were created for panel order [facility] Draw.  Procedure                               Abnormality         Status                     ---------                               -----------         ------                     Green Top (Gel)[[id number]]                                  Final result               Lavender Top[[id number]]                                     Final result               Gold Top - SST[[id number]]                                   Final result               Light Blue Top[[id number]]                                   Final result                 Please view results for these tests on the individual orders.   BLOOD GAS, ARTERIAL   CBC AND DIFFERENTIAL    Narrative:     The following orders were created for panel order CBC & Differential.  Procedure                               Abnormality         Status                     ---------                               -----------         ------                     CBC Auto  Differential[[id number]]        Abnormal            Final result                 Please view results for these tests on the individual orders.   GREEN TOP   LAVENDER TOP   GOLD TOP - SST   LIGHT BLUE TOP       LOC: Person, Place, Time, and Situation    Telemetry:  Telemetry    Cardiac Monitoring Ordered: yes    EKG:   ECG 12 Lead Dyspnea   Preliminary Result   HEART RATE= 73  bpm   RR Interval= 816  ms   SC Interval= 196  ms   P Horizontal Axis= 4  deg   P Front Axis= 70  deg   QRSD Interval= 160  ms   QT Interval= 424  ms   QTcB= 469  ms   QRS Axis= 137  deg   T Wave Axis= 30  deg   - ABNORMAL ECG -   Sinus rhythm   Right bundle branch block   ST elevation secondary to IVCD   When compared with ECG of 23-Apr-2023 12:31:25,   No significant change   Electronically Signed By:    Date and Time of Study: 2024-04-10 20:06:18          Medications Given in the ED:   Medications   sodium chloride 0.9 % flush 10 mL (has no administration in time range)   albuterol sulfate HFA (PROVENTIL HFA;VENTOLIN HFA;PROAIR HFA) inhaler 2 puff (2 puffs Inhalation Given 4/10/24 2004)   iopamidol (ISOVUE-370) 76 % injection 100 mL (100 mL Intravenous Given 4/10/24 2151)   furosemide (LASIX) injection 80 mg (80 mg Intravenous Given 4/10/24 2229)       Imaging results:  CT Angiogram Chest Pulmonary Embolism    Result Date: 4/10/2024  Impression: No evidence of pulmonary embolism. Findings compatible with mild interstitial pulm edema. Mild cardiomegaly. Stable mild to moderate diffuse mediastinal lymphadenopathy. This may be congestive or reactive. Electronically Signed: Leonides Trivedi MD  4/10/2024 10:01 PM EDT  Workstation ID: [id number]    XR Chest 1 View    Result Date: 4/10/2024  Impression: No focal consolidation or effusion. Changes of COPD. Electronically Signed: Leonides Trivedi MD  4/10/2024 8:31 PM EDT  Workstation ID: [id number]     Social issues:   Social History     Socioeconomic History    Marital status:    Tobacco Use     Smoking status: Former     Current packs/day: 0.00     Average packs/day: 1 pack/day for 5.0 years (5.0 ttl pk-yrs)     Types: Cigarettes     Start date: 7/3/2017     Quit date: 7/3/2022     Years since quittin.7     Passive exposure: Past    Smokeless tobacco: Former     Quit date: 7/3/2022   Vaping Use    Vaping status: Never Used   Substance and Sexual Activity    Alcohol use: Not Currently    Drug use: Never    Sexual activity: Defer       NIH Stroke Scale:  Interval: (not recorded)  1a. Level of Consciousness: (not recorded)  1b. LOC Questions: (not recorded)  1c. LOC Commands: (not recorded)  2. Best Gaze: (not recorded)  3. Visual: (not recorded)  4. Facial Palsy: (not recorded)  5a. Motor Arm, Left: (not recorded)  5b. Motor Arm, Right: (not recorded)  6a. Motor Leg, Left: (not recorded)  6b. Motor Leg, Right: (not recorded)  7. Limb Ataxia: (not recorded)  8. Sensory: (not recorded)  9. Best Language: (not recorded)  10. Dysarthria: (not recorded)  11. Extinction and Inattention (formerly Neglect): (not recorded)    Total (NIH Stroke Scale): (not recorded)     Additional notable assessment information:     Nursing report ED to floor:  NITHYA Eisenberg RN   24 00:07 EDT

## 2024-04-11 NOTE — H&P
[facility] Medicine Services  History & Physical    Patient Name: Brenden Peter  : 1950  MRN: [id number]  Primary Care Physician:  Bert Rojas MD  Date of admission: 4/10/2024  Date and Time of Service: 4/10/2024 at 0040      Assessment & Plan      Chief Complaint: shortness of breath    Plan:    Acute on Chronic Respiratory Failure with Hypoxia  CHF exacerbation  COPD  -ABG reviewed, pH 7.43, CO2 47.5, pO2 59.9  -On 6L, wean to baseline of 4L as tolerated  -CTA of chest reviewed, no PE, mild cardiomegaly and pulmonary edema noted  -BNP 2883  -Lasix given in ED, continue  -ECHO ordered  -Cardiology consult  -Pulmonology consult    Diabetes Mellitus Type 2  -Accu-Cheks AC at bedtime  -SSI ordered  -Hold home Metformin, Glipizide  -Continue Neurontin    Essential Hypertension  -Controlled  -Monitor BP  -Continue home Norvasc    Hyperlipidemia  -Continue Pravastatin    Depression  -Chronic, stable  -Continue home Zoloft        History of Present Illness     History of Present Illness: Brenden Peter is a 73 y.o. male with a previous medical history of COPD, CHF, on home O2 of 4L per NC who presented to [facility] on 4/10/2024 with worsening shortness of breath over the past week.  He denies chest pain, cough, fever.    In the ED, ABG showed pH 7.43, CO2 47.5, pO2 59.9, Troponin 36, BNP 2883, DDimer 1.7. Otherwise, labs are unremarkable. CT of the chest shows no PE, mild interstitial pulmonary edema and cardiomegaly.  He is afebrile, 94% on O2 at 6L per NC.  Otherwise, vitals are stable.  He received Lasix in the ED.  Hospitalist was consulted for further management.    12 point ROS reviewed and negative except as mentioned above    Objective      Vitals:   Temp:  [98.5 °F (36.9 °C)] 98.5 °F (36.9 °C)  Heart Rate:  [64-78] 69  Resp:  [25-28] 25  BP: (108-142)/(54-81) 121/64  Body mass index is 32.89 kg/m².    Physical Exam  Vitals and nursing note reviewed.   Constitutional:        Appearance: Normal appearance.   HENT:      Mouth/Throat:      Mouth: Mucous membranes are moist.   Cardiovascular:      Rate and Rhythm: Normal rate and regular rhythm.   Pulmonary:      Effort: Pulmonary effort is normal.      Breath sounds: Examination of the right-lower field reveals decreased breath sounds. Examination of the left-lower field reveals decreased breath sounds. Decreased breath sounds present.   Abdominal:      General: Bowel sounds are normal.      Palpations: Abdomen is soft.   Musculoskeletal:         General: Normal range of motion.   Skin:     General: Skin is warm and dry.   Neurological:      General: No focal deficit present.      Mental Status: He is alert and oriented to person, place, and time. Mental status is at baseline.   Psychiatric:         Mood and Affect: Mood normal.         Behavior: Behavior normal.        Personal History     This is a 73 y.o. male with:    Past Medical History:   Diagnosis Date    (HFpEF) heart failure with preserved ejection fraction 4/1/2023    Abnormal EKG 2/3/2015    Anxiety     COPD (chronic obstructive pulmonary disease)     Coronary artery disease 3/3/2015    Diabetes     type 2    Hyperlipidemia     Hypertension     Microalbuminuria due to type 2 diabetes mellitus 2/5/2020    Multinodular goiter 4/18/2019    Murmur 2/3/2015    Primary osteoarthritis of left knee 10/22/2021       Past Surgical History:   Procedure Laterality Date    BRONCHOSCOPY N/A 4/9/2023    Procedure: BRONCHOSCOPY, bronchial alveolar lavage right upper lobe;  Surgeon: Juanjo Castañeda MD;  Location: [facility] ENDOSCOPY;  Service: Pulmonary;  Laterality: N/A;  post: pneumonia    CARDIAC CATHETERIZATION Left 1/15/2023    Procedure: Left Heart Cath and coronary angiogram;  Surgeon: Shawn Baker MD;  Location: [facility] CATH INVASIVE LOCATION;  Service: Cardiovascular;  Laterality: Left;    EYE SURGERY      cataract    TOTAL KNEE ARTHROPLASTY Right 10/21/2020    Procedure: TOTAL KNEE  ARTHROPLASTY;  Surgeon: Ravi Fagan MD;  Location: [facility] MAIN OR;  Service: Orthopedics;  Laterality: Right;    TOTAL KNEE ARTHROPLASTY REVISION Right 5/19/2021    Procedure: KNEE POLY INSERT EXCHANGE with irrigation;  Surgeon: Ravi Fagan MD;  Location: [facility] MAIN OR;  Service: Orthopedics;  Laterality: Right;       Active and Resolved Problems  Active Hospital Problems    Diagnosis  POA    **Acute on chronic respiratory failure [J96.20]  Yes      Resolved Hospital Problems   No resolved problems to display.       Family History: family history includes Cancer in an other family member; Diabetes in an other family member; Heart disease in an other family member; No Known Problems in his brother, father, maternal aunt, maternal grandfather, maternal grandmother, maternal uncle, mother, paternal aunt, paternal grandfather, paternal grandmother, paternal uncle, and sister. Otherwise pertinent FHx was reviewed and not pertinent to current issue.    Social History:  reports that he quit smoking about 21 months ago. His smoking use included cigarettes. He started smoking about 6 years ago. He has a 5 pack-year smoking history. He has been exposed to tobacco smoke. He quit smokeless tobacco use about 21 months ago. He reports that he does not currently use alcohol. He reports that he does not use drugs.    Home Medications:  Prior to Admission Medications       Prescriptions Last Dose Informant Patient Reported? Taking?    amLODIPine (NORVASC) 5 MG tablet   Yes Yes    Take 1 tablet by mouth Every Night. Indications: High Blood Pressure Disorder    aspirin 81 MG EC tablet   Yes Yes    Take 1 tablet by mouth 3 (Three) Times a Week.    gabapentin (NEURONTIN) 300 MG capsule   Yes Yes    Take 1 capsule by mouth 3 (Three) Times a Day. Indications: Neuropathic Pain    glipizide (GLUCOTROL) 10 MG tablet   Yes Yes    Take 0.5 tablets by mouth 2 (Two) Times a Day Before Meals. Indications: Type 2 Diabetes    metFORMIN  (GLUCOPHAGE) 1000 MG tablet   Yes Yes    Take 1 tablet by mouth 2 (Two) Times a Day With Meals. Indications: Type 2 Diabetes    pioglitazone (ACTOS) 30 MG tablet   Yes Yes    Take 1 tablet by mouth Every Night. Indications: Type 2 Diabetes    pravastatin (PRAVACHOL) 10 MG tablet   Yes Yes    Take 1 tablet by mouth Daily.    sertraline (ZOLOFT) 50 MG tablet   Yes Yes    Take 1 tablet by mouth Every Night. Indications: Major Depressive Disorder    vitamin D (ERGOCALCIFEROL) 1.25 MG (97764 UT) capsule capsule   Yes Yes    1 capsule 2 (Two) Times a Week. Indications: Vitamin D Deficiency    O2 (OXYGEN)   Yes No    Inhale 4 L/min Continuous. Indications: COPD exacerbation, complicated              Allergies:  No Known Allergies        DVT prophylaxis:  Mechanical DVT prophylaxis orders are present.        CODE STATUS:    Code Status (Patient has no pulse and is not breathing): CPR (Attempt to Resuscitate)  Medical Interventions (Patient has pulse or is breathing): Full Support        Admission Status:  I believe this patient meets observation status.    I discussed the patient's findings and my recommendations with patient and nursing staff.    Signature:     This document has been electronically signed by Jenise Hernandez DNP, APRN on April 11, 2024 00:43 EDT   [facility]

## 2024-04-11 NOTE — ED NOTES
This tech arrived in the pt's room to answer the call button. The Pt had moved to the end of the stretcher with both rails up to use the urinal. The pt needed assistance to move back to the top of the bed. This tech let the rail down and the pt began scooting himself backward toward the head of the bed. The pt became short of breath and so a nearby nurse was called for assistance. The nurse and this tech repositioned the pt in bed so that he was sitting upright. His pulse ox read 84%, so the pt was instructed to take deep breaths through his nose and exhale through his mouth. The pt o2 saturation recovered after a few minutes back to 91%. Nurse informed. Call light in reach.

## 2024-04-11 NOTE — PLAN OF CARE
Goal Outcome Evaluation:  Plan of Care Reviewed With: patient        Progress: no change  Outcome Evaluation: Patient bumped up to 8 L, if not doing well o2 wise then will switch to airflo. patient is stable at this time.

## 2024-04-11 NOTE — CONSULTS
"Diabetes Education  Assessment/Teaching    Patient Name:  Brenden Peter  YOB: 1950  MRN: [id number]  Admit Date:  4/10/2024      Assessment Date:  4/11/2024  Flowsheet Row Most Recent Value   General Information     Referral From: --  [Pt seen due to diabetes on problem list. Last A1c in [facility] was from 4/8/2023 and result was 8.3%. Adm bs 108.]   Height 170.2 cm (67\")   Height Method Actual   Weight 95.3 kg (210 lb)   Pregnancy Assessment    Diabetes History    What type of diabetes do you have? Type 2   Length of Diabetes Diagnosis --  [Pt states has had diabetes a long time. He is unsure when first diagnosed.]   Have you had diabetes education/teaching in the past? yes   When and where was your diabetes education? Was last seen by inpatient diabetes educator at [facility] on 4/24/2023   Do you test your blood sugar at home? yes   Frequency of checks does not routinely check bs   Meter type unsure of name, about 2 years old   Who performs the test? self/wife   Have you had low blood sugar? (<70mg/dl) no   Education Preferences    Nutrition Information    Assessment Topics    DM Goals             Flowsheet Row Most Recent Value   DM Education Needs    Meter Has own   Frequency of Testing Daily  [Discussed checking bs daily and rotating the times when he checks. Discussed importance of sharing readings with PCP if bs running outside healthy range.]   Blood Glucose Target Range Discussed healthy bs range and healthy A1c target.   Medication Oral  [Pt taking Glipizide 10 mg tab-taking 1/2 tab bid, Metformin 1000 mg bid and Actos 30 mg daily. Pt states he takes meds as prescribed.]   Problem Solving Hypoglycemia, Hyperglycemia, Signs, Symptoms, Treatment   Reducing Risks A1C testing   Healthy Eating --  [Pt states may eat 1-2 meals/day.]   Motivation Engaged   Teaching Method Explanation, Discussion, Handouts   Patient Response Verbalized understanding              Other Comments:  Met with pt at bedside. Pt " lives at home with wife. Pt has PCP and sees routinely. Pt agreeable to checking bs more often. Discussed importance of making sure he is using test strips that have not . Pt states has necessary supplies at home and not needing additional info at this time.       Electronically signed by:  Caitlyn Castellano RN  24 18:08 EDT

## 2024-04-11 NOTE — PROGRESS NOTES
[facility] MEDICINE SERVICE  DAILY PROGRESS NOTE    NAME: Brenden Peter  : 1950  MRN: [id number]      LOS: 0 days     PROVIDER OF SERVICE: Timothy Duane Brammell, MD    Chief Complaint: Acute on chronic respiratory failure    Subjective:     Interval History:  History taken from: patient  Patient Complaints: Patient with approximately 1 week history of upper respiratory symptoms.  Denies any fever.  Has chronic oxygen at 4 L/min that he is had to turn up to 6 due to worsening shortness of breath.  There is no sputum production.  He denies any severe lower extremity edema.  He denies any chest pain.  States that he has been using his nebulization therapy every 4 hours.  Has not had any recent steroid therapy.  Does have a history of sleep apnea and has been noncompliant with his mask because of his dyspnea.  He is actively followed as an outpatient with pulmonary physician.  He otherwise denies any other additional acute issues.    Review of Systems:   Review of Systems   All other systems reviewed and are negative.      Objective:     Vital Signs  Temp:  [97.4 °F (36.3 °C)-98.5 °F (36.9 °C)] 97.4 °F (36.3 °C)  Heart Rate:  [61-78] 70  Resp:  [18-28] 18  BP: (108-142)/(54-81) 123/72  Flow (L/min):  [2-15] 6   Body mass index is 32.89 kg/m².    Physical Exam  Physical Exam  Constitutional:       Appearance: Normal appearance. He is not ill-appearing.   HENT:      Head: Normocephalic and atraumatic.   Cardiovascular:      Rate and Rhythm: Normal rate.   Pulmonary:      Breath sounds: Normal breath sounds.   Abdominal:      General: Bowel sounds are normal.      Palpations: Abdomen is soft. There is no mass.      Tenderness: There is no abdominal tenderness.   Musculoskeletal:         General: No swelling.   Neurological:      General: No focal deficit present.      Mental Status: He is alert and oriented to person, place, and time.         Scheduled Meds   amLODIPine, 5 mg, Oral, Nightly  [START  ON 4/12/2024] aspirin, 81 mg, Oral, Once per day on Monday Wednesday Friday  atorvastatin, 10 mg, Oral, Daily  furosemide, 20 mg, Intravenous, Q12H  gabapentin, 300 mg, Oral, TID  insulin lispro, 2-9 Units, Subcutaneous, 4x Daily AC & at Bedtime  sertraline, 50 mg, Oral, Nightly  sodium chloride, 10 mL, Intravenous, Q12H       PRN Meds     acetaminophen **OR** acetaminophen **OR** acetaminophen    senna-docusate sodium **AND** polyethylene glycol **AND** bisacodyl **AND** bisacodyl    dextrose    dextrose    glucagon (human recombinant)    ipratropium-albuterol    melatonin    ondansetron    sodium chloride    sodium chloride    sodium chloride   Infusions         Diagnostic Data    Results from last 7 days   Lab Units 04/11/24  0209 04/10/24  2018 04/10/24  1959   WBC 10*3/mm3 6.36  --  6.72   HEMOGLOBIN g/dL 10.6*  --  11.2*   HEMOGLOBIN, POC   --    < >  --    HEMATOCRIT % 35.9*  --  37.7   HEMATOCRIT POC   --    < >  --    PLATELETS 10*3/mm3 181  --  193   GLUCOSE mg/dL 76  --  108*   CREATININE mg/dL 0.85   < > 0.84   BUN mg/dL 17  --  16   SODIUM mmol/L 143  --  145   POTASSIUM mmol/L 4.0  --  3.7   AST (SGOT) U/L  --   --  18   ALT (SGPT) U/L  --   --  18   ALK PHOS U/L  --   --  78   BILIRUBIN mg/dL  --   --  0.5   ANION GAP mmol/L 12.0  --  12.0    < > = values in this interval not displayed.       CT Angiogram Chest Pulmonary Embolism    Result Date: 4/10/2024  Impression: No evidence of pulmonary embolism. Findings compatible with mild interstitial pulm edema. Mild cardiomegaly. Stable mild to moderate diffuse mediastinal lymphadenopathy. This may be congestive or reactive. Electronically Signed: Leonides Trivedi MD  4/10/2024 10:01 PM EDT  Workstation ID: [id number]    XR Chest 1 View    Result Date: 4/10/2024  Impression: No focal consolidation or effusion. Changes of COPD. Electronically Signed: Leonides Trivedi MD  4/10/2024 8:31 PM EDT  Workstation ID: [id number]       I reviewed the patient's new clinical  results.    Assessment/Plan:     Active and Resolved Problems  Active Hospital Problems    Diagnosis  POA    **Acute on chronic respiratory failure [J96.20]  Yes      Resolved Hospital Problems   No resolved problems to display.   Assessment    1.  Acute on chronic hypoxemic hypercapnic respiratory failure  2.  Acute on chronic diastolic congestive heart failure.  3.  Type 2 diabetes.  4.  Obstructive sleep apnea  5.  Anemia    Plan:    Oral diuretic twice daily.  Will resume some of his oral diabetic medication.  Echocardiogram pending.  Pulmonary and cardiology to review.  Ongoing oxygen supplementation.      DVT prophylaxis:  Mechanical DVT prophylaxis orders are present.         Code status is   Code Status and Medical Interventions:   Ordered at: 04/11/24 0041     Code Status (Patient has no pulse and is not breathing):    CPR (Attempt to Resuscitate)     Medical Interventions (Patient has pulse or is breathing):    Full Support       Plan for disposition:home in 1-2 days    Time: 30 minutes    Signature: Electronically signed by Timothy Duane Brammell, MD, 04/11/24, 12:04 EDT.  [facility] Hospitalist Team

## 2024-04-12 ENCOUNTER — APPOINTMENT (OUTPATIENT)
Dept: GENERAL RADIOLOGY | Facility: HOSPITAL | Age: 74
End: 2024-04-12
Payer: MEDICARE

## 2024-04-12 LAB
ANION GAP SERPL CALCULATED.3IONS-SCNC: 5 MMOL/L (ref 5–15)
BASOPHILS # BLD AUTO: 0.02 10*3/MM3 (ref 0–0.2)
BASOPHILS NFR BLD AUTO: 0.4 % (ref 0–1.5)
BUN SERPL-MCNC: 22 MG/DL (ref 8–23)
BUN/CREAT SERPL: 25 (ref 7–25)
CALCIUM SPEC-SCNC: 9.3 MG/DL (ref 8.6–10.5)
CHLORIDE SERPL-SCNC: 102 MMOL/L (ref 98–107)
CO2 SERPL-SCNC: 37 MMOL/L (ref 22–29)
CREAT SERPL-MCNC: 0.88 MG/DL (ref 0.76–1.27)
DEPRECATED RDW RBC AUTO: 55.7 FL (ref 37–54)
EGFRCR SERPLBLD CKD-EPI 2021: 90.8 ML/MIN/1.73
EOSINOPHIL # BLD AUTO: 0.1 10*3/MM3 (ref 0–0.4)
EOSINOPHIL NFR BLD AUTO: 1.8 % (ref 0.3–6.2)
ERYTHROCYTE [DISTWIDTH] IN BLOOD BY AUTOMATED COUNT: 16.5 % (ref 12.3–15.4)
GLUCOSE BLDC GLUCOMTR-MCNC: 110 MG/DL (ref 70–105)
GLUCOSE BLDC GLUCOMTR-MCNC: 171 MG/DL (ref 70–105)
GLUCOSE BLDC GLUCOMTR-MCNC: 185 MG/DL (ref 70–105)
GLUCOSE BLDC GLUCOMTR-MCNC: 225 MG/DL (ref 70–105)
GLUCOSE BLDC GLUCOMTR-MCNC: 67 MG/DL (ref 70–105)
GLUCOSE SERPL-MCNC: 75 MG/DL (ref 65–99)
HCT VFR BLD AUTO: 38.9 % (ref 37.5–51)
HGB BLD-MCNC: 11.1 G/DL (ref 13–17.7)
IMM GRANULOCYTES # BLD AUTO: 0.02 10*3/MM3 (ref 0–0.05)
IMM GRANULOCYTES NFR BLD AUTO: 0.4 % (ref 0–0.5)
LYMPHOCYTES # BLD AUTO: 0.96 10*3/MM3 (ref 0.7–3.1)
LYMPHOCYTES NFR BLD AUTO: 17.4 % (ref 19.6–45.3)
MCH RBC QN AUTO: 26.6 PG (ref 26.6–33)
MCHC RBC AUTO-ENTMCNC: 28.5 G/DL (ref 31.5–35.7)
MCV RBC AUTO: 93.3 FL (ref 79–97)
MONOCYTES # BLD AUTO: 0.38 10*3/MM3 (ref 0.1–0.9)
MONOCYTES NFR BLD AUTO: 6.9 % (ref 5–12)
NEUTROPHILS NFR BLD AUTO: 4.05 10*3/MM3 (ref 1.7–7)
NEUTROPHILS NFR BLD AUTO: 73.1 % (ref 42.7–76)
NRBC BLD AUTO-RTO: 0 /100 WBC (ref 0–0.2)
PLATELET # BLD AUTO: 191 10*3/MM3 (ref 140–450)
PMV BLD AUTO: 11.4 FL (ref 6–12)
POTASSIUM SERPL-SCNC: 4.6 MMOL/L (ref 3.5–5.2)
RBC # BLD AUTO: 4.17 10*6/MM3 (ref 4.14–5.8)
SODIUM SERPL-SCNC: 144 MMOL/L (ref 136–145)
WBC NRBC COR # BLD AUTO: 5.53 10*3/MM3 (ref 3.4–10.8)

## 2024-04-12 PROCEDURE — 80048 BASIC METABOLIC PNL TOTAL CA: CPT

## 2024-04-12 PROCEDURE — 63710000001 INSULIN LISPRO (HUMAN) PER 5 UNITS

## 2024-04-12 PROCEDURE — 71045 X-RAY EXAM CHEST 1 VIEW: CPT

## 2024-04-12 PROCEDURE — 94799 UNLISTED PULMONARY SVC/PX: CPT

## 2024-04-12 PROCEDURE — 82948 REAGENT STRIP/BLOOD GLUCOSE: CPT

## 2024-04-12 PROCEDURE — 85025 COMPLETE CBC W/AUTO DIFF WBC: CPT

## 2024-04-12 PROCEDURE — 94761 N-INVAS EAR/PLS OXIMETRY MLT: CPT

## 2024-04-12 PROCEDURE — 94664 DEMO&/EVAL PT USE INHALER: CPT

## 2024-04-12 PROCEDURE — 97161 PT EVAL LOW COMPLEX 20 MIN: CPT

## 2024-04-12 PROCEDURE — 99232 SBSQ HOSP IP/OBS MODERATE 35: CPT | Performed by: INTERNAL MEDICINE

## 2024-04-12 RX ORDER — NITROGLYCERIN 0.4 MG/1
0.4 TABLET SUBLINGUAL
Status: DISCONTINUED | OUTPATIENT
Start: 2024-04-12 | End: 2024-04-15

## 2024-04-12 RX ADMIN — Medication 10 ML: at 20:40

## 2024-04-12 RX ADMIN — GABAPENTIN 300 MG: 300 CAPSULE ORAL at 17:15

## 2024-04-12 RX ADMIN — BUDESONIDE 1 MG: 0.5 INHALANT RESPIRATORY (INHALATION) at 20:02

## 2024-04-12 RX ADMIN — ASPIRIN 81 MG: 81 TABLET, COATED ORAL at 08:24

## 2024-04-12 RX ADMIN — BUDESONIDE 1 MG: 0.5 INHALANT RESPIRATORY (INHALATION) at 07:18

## 2024-04-12 RX ADMIN — AMLODIPINE BESYLATE 5 MG: 5 TABLET ORAL at 20:41

## 2024-04-12 RX ADMIN — FUROSEMIDE 40 MG: 40 TABLET ORAL at 08:24

## 2024-04-12 RX ADMIN — IPRATROPIUM BROMIDE AND ALBUTEROL SULFATE 3 ML: .5; 3 SOLUTION RESPIRATORY (INHALATION) at 00:00

## 2024-04-12 RX ADMIN — IPRATROPIUM BROMIDE AND ALBUTEROL SULFATE 3 ML: .5; 3 SOLUTION RESPIRATORY (INHALATION) at 15:48

## 2024-04-12 RX ADMIN — GABAPENTIN 300 MG: 300 CAPSULE ORAL at 20:40

## 2024-04-12 RX ADMIN — INSULIN LISPRO 4 UNITS: 100 INJECTION, SOLUTION INTRAVENOUS; SUBCUTANEOUS at 17:15

## 2024-04-12 RX ADMIN — GABAPENTIN 300 MG: 300 CAPSULE ORAL at 08:24

## 2024-04-12 RX ADMIN — SERTRALINE HYDROCHLORIDE 50 MG: 50 TABLET ORAL at 20:41

## 2024-04-12 RX ADMIN — INSULIN LISPRO 2 UNITS: 100 INJECTION, SOLUTION INTRAVENOUS; SUBCUTANEOUS at 20:40

## 2024-04-12 RX ADMIN — FUROSEMIDE 40 MG: 40 TABLET ORAL at 17:15

## 2024-04-12 RX ADMIN — DEXTROSE MONOHYDRATE 25 G: 25 INJECTION, SOLUTION INTRAVENOUS at 07:31

## 2024-04-12 RX ADMIN — ATORVASTATIN CALCIUM 10 MG: 10 TABLET, FILM COATED ORAL at 08:24

## 2024-04-12 RX ADMIN — GLIPIZIDE 5 MG: 5 TABLET ORAL at 17:15

## 2024-04-12 RX ADMIN — IPRATROPIUM BROMIDE AND ALBUTEROL SULFATE 3 ML: .5; 3 SOLUTION RESPIRATORY (INHALATION) at 11:28

## 2024-04-12 RX ADMIN — IPRATROPIUM BROMIDE AND ALBUTEROL SULFATE 3 ML: .5; 3 SOLUTION RESPIRATORY (INHALATION) at 07:14

## 2024-04-12 RX ADMIN — Medication 10 ML: at 08:27

## 2024-04-12 RX ADMIN — PIOGLITAZONE 30 MG: 30 TABLET ORAL at 20:40

## 2024-04-12 RX ADMIN — IPRATROPIUM BROMIDE AND ALBUTEROL SULFATE 3 ML: .5; 3 SOLUTION RESPIRATORY (INHALATION) at 20:08

## 2024-04-12 NOTE — PROGRESS NOTES
LOS: 0 days   Admitting Physician- Brammell, Timothy Duane,*    Reason For Followup:    Shortness of breath  Pulmonary hypertension  Hyperlipidemia    Subjective     Shortness of breath is better.    Objective     Blood pressure is stable    Review of Systems:   Review of Systems   Constitutional: Negative for chills and fever.   HENT:  Negative for ear discharge and nosebleeds.    Eyes:  Negative for discharge and redness.   Cardiovascular:  Negative for chest pain, orthopnea, palpitations, paroxysmal nocturnal dyspnea and syncope.   Respiratory:  Positive for shortness of breath. Negative for cough and wheezing.    Endocrine: Negative for heat intolerance.   Skin:  Negative for rash.   Musculoskeletal:  Negative for arthritis and myalgias.   Gastrointestinal:  Negative for abdominal pain, melena, nausea and vomiting.   Genitourinary:  Negative for dysuria and hematuria.   Neurological:  Negative for dizziness, light-headedness, numbness and tremors.   Psychiatric/Behavioral:  Negative for depression. The patient is not nervous/anxious.          Vital Signs  Vitals:    04/12/24 1548 04/12/24 1551 04/12/24 1553 04/12/24 1715   BP:    133/66   BP Location:       Patient Position:       Pulse: 72 69 67 75   Resp: 18 18     Temp:       TempSrc:       SpO2: 91% 93% 93%    Weight:       Height:         Wt Readings from Last 1 Encounters:   04/11/24 95.3 kg (210 lb)       Intake/Output Summary (Last 24 hours) at 4/12/2024 1724  Last data filed at 4/12/2024 1543  Gross per 24 hour   Intake 600 ml   Output 625 ml   Net -25 ml     Physical Exam:  Constitutional:       Appearance: Well-developed.   Eyes:      General: No scleral icterus.        Right eye: No discharge.   HENT:      Head: Normocephalic and atraumatic.   Neck:      Thyroid: No thyromegaly.      Lymphadenopathy: No cervical adenopathy.   Pulmonary:      Effort: Pulmonary effort is normal. No respiratory distress.      Breath sounds: Normal breath sounds. No  wheezing. No rales.   Cardiovascular:      Normal rate. Regular rhythm.      No gallop.    Edema:     Peripheral edema absent.   Abdominal:      Tenderness: There is no abdominal tenderness.   Skin:     Findings: No erythema or rash.   Neurological:      Mental Status: Alert and oriented to person, place, and time.         Results Review:   Lab Results (last 24 hours)       Procedure Component Value Units Date/Time    POC Glucose Once [[id number]]  (Abnormal) Collected: 04/12/24 1535    Specimen: Blood Updated: 04/12/24 1537     Glucose 225 mg/dL      Comment: Serial Number: [id number]:  [id number]       POC Glucose Once [[id number]]  (Abnormal) Collected: 04/12/24 1138    Specimen: Blood Updated: 04/12/24 1140     Glucose 185 mg/dL      Comment: Serial Number: [id number]:  [id number]       POC Glucose Once [[id number]]  (Abnormal) Collected: 04/12/24 0746    Specimen: Blood Updated: 04/12/24 0805     Glucose 110 mg/dL      Comment: Serial Number: [id number]:  [id number]       POC Glucose 4x Daily Before Meals & at Bedtime [[id number]]  (Abnormal) Collected: 04/12/24 0725    Specimen: Blood Updated: 04/12/24 0726     Glucose 67 mg/dL      Comment: Serial Number: [id number]:  [id number]       Basic Metabolic Panel [[id number]]  (Abnormal) Collected: 04/12/24 0321    Specimen: Blood from Arm, Left Updated: 04/12/24 0355     Glucose 75 mg/dL      BUN 22 mg/dL      Creatinine 0.88 mg/dL      Sodium 144 mmol/L      Potassium 4.6 mmol/L      Chloride 102 mmol/L      CO2 37.0 mmol/L      Calcium 9.3 mg/dL      BUN/Creatinine Ratio 25.0     Anion Gap 5.0 mmol/L      eGFR 90.8 mL/min/1.73     Narrative:      GFR Normal >60  Chronic Kidney Disease <60  Kidney Failure <15    The GFR formula is only valid for adults with stable renal function between ages 18 and 70.    CBC & Differential [[id number]]  (Abnormal) Collected: 04/12/24 0321    Specimen: Blood from Arm, Left Updated: 04/12/24 0335    Narrative:       The following orders were created for panel order CBC & Differential.  Procedure                               Abnormality         Status                     ---------                               -----------         ------                     CBC Auto Differential[[id number]]        Abnormal            Final result                 Please view results for these tests on the individual orders.    CBC Auto Differential [[id number]]  (Abnormal) Collected: 04/12/24 0321    Specimen: Blood from Arm, Left Updated: 04/12/24 0335     WBC 5.53 10*3/mm3      RBC 4.17 10*6/mm3      Hemoglobin 11.1 g/dL      Hematocrit 38.9 %      MCV 93.3 fL      MCH 26.6 pg      MCHC 28.5 g/dL      RDW 16.5 %      RDW-SD 55.7 fl      MPV 11.4 fL      Platelets 191 10*3/mm3      Neutrophil % 73.1 %      Lymphocyte % 17.4 %      Monocyte % 6.9 %      Eosinophil % 1.8 %      Basophil % 0.4 %      Immature Grans % 0.4 %      Neutrophils, Absolute 4.05 10*3/mm3      Lymphocytes, Absolute 0.96 10*3/mm3      Monocytes, Absolute 0.38 10*3/mm3      Eosinophils, Absolute 0.10 10*3/mm3      Basophils, Absolute 0.02 10*3/mm3      Immature Grans, Absolute 0.02 10*3/mm3      nRBC 0.0 /100 WBC     Blood Gas, Arterial - [[id number]]  (Abnormal) Collected: 04/11/24 2134    Specimen: Arterial Blood Updated: 04/11/24 2138     Site Left Radial     Sacr's Test Positive     pH, Arterial 7.401 pH units      pCO2, Arterial 53.2 mm Hg      pO2, Arterial 66.6 mm Hg      HCO3, Arterial 33.1 mmol/L      Base Excess, Arterial 6.8 mmol/L      Comment: Serial Number: [id number]:  [id number]        O2 Saturation, Arterial 92.4 %      CO2 Content 34.7 mmol/L      Barometric Pressure for Blood Gas --     Comment: N/A        Modality HFNC     FIO2 80 %      Hemodilution No    POC Glucose Once [[id number]]  (Abnormal) Collected: 04/11/24 2027    Specimen: Blood Updated: 04/11/24 2029     Glucose 118 mg/dL      Comment: Serial Number: [id number]:  [id number]              Imaging Results (Last 72 Hours)       Procedure Component Value Units Date/Time    XR Chest 1 View [[id number]] Collected: 04/12/24 0727     Updated: 04/12/24 0731    Narrative:      XR CHEST 1 VW    Date of Exam: 4/12/2024 5:15 AM EDT    Indication: Shortness of breath    Comparison: CT chest of 4/10/2024. AP portable chest 4/10/2024.    Findings:  No acute lung consolidation. Fine interstitial thickening in both lungs is unchanged. Benign calcified nodule is present in the right lower lobe. Heart size is mildly enlarged but stable. No pleural effusion, pneumothorax or acute osseous abnormalities   are identified      Impression:      Impression:  Stable cardiomegaly. Features of mild interstitial edema are not thought to be significantly changed.  No pleural effusion is identified.      Electronically Signed: Sheryl Torrez MD    4/12/2024 7:28 AM EDT    Workstation ID: [id number]    CT Angiogram Chest Pulmonary Embolism [[id number]] Collected: 04/10/24 2155     Updated: 04/10/24 2203    Narrative:      CT ANGIOGRAM CHEST PULMONARY EMBOLISM    Date of Exam: 4/10/2024 9:45 PM EDT    Indication: Pulmonary embolism (PE) suspected, positive D-dimer.    Comparison: Chest CTA performed on January 12, 2023    Technique: Axial CT images were obtained of the chest after the uneventful intravenous administration of iodinated contrast utilizing pulmonary embolism protocol.  Sagittal and coronal reconstructions were performed.  Automated exposure control and   iterative reconstruction methods were used.      Findings:    Pulmonary arteries: Adequate opacification of the pulmonary arteries. No evidence of acute pulmonary embolism.    Thoracic aorta: The thoracic aorta is normal in caliber and contour. Small scattered lateral calcifications are visualized.    Thyroid: The visualized portion of the thyroid is unremarkable.    Lungs and Pleura: No focal consolidation. Mild emphysematous changes are visualized. Trace right pleural  effusion is visualized. There is mild interlobular septal thickening which is most prominent in the lung bases. No suspicious pulmonary nodules. No   pneumothorax.    Mediastinum/Tiff: There is mild to moderate diffuse mediastinal lymphadenopathy. This appears stable when compared to prior study.    Lymph nodes: No axillary or supraclavicular lymphadenopathy.    Cardiovascular: The heart is mildly large. No evidence of pericardial effusion.    Upper Abdomen: No acute process in the upper abdomen.    Bones and Soft Tissue: No acute fracture, aggressive osseous lesions, or soft tissue process. Moderate degenerative changes of the midthoracic spine are visualized.        Impression:      Impression:    No evidence of pulmonary embolism.    Findings compatible with mild interstitial pulm edema.    Mild cardiomegaly.    Stable mild to moderate diffuse mediastinal lymphadenopathy. This may be congestive or reactive.        Electronically Signed: Leonides Trivedi MD    4/10/2024 10:01 PM EDT    Workstation ID: [id number]    XR Chest 1 View [[id number]] Collected: 04/10/24 2030     Updated: 04/10/24 2033    Narrative:      XR CHEST 1 VW    Date of Exam: 4/10/2024 8:28 PM EDT    Indication: CHF/COPD Protocol  CHF/COPD Protocol    Comparison: Chest radiograph performed on April 23, 2023    Findings:  No focal consolidation or effusion. Lungs are hyper aerated. There is no evidence of pneumothorax. The pulmonary vasculature appears within normal limits. Cardiac silhouette is enlarged. No acute osseous abnormality identified.      Impression:      Impression:  No focal consolidation or effusion. Changes of COPD.      Electronically Signed: Leonides Trivedi MD    4/10/2024 8:31 PM EDT    Workstation ID: [id number]          ECG/EMG Results (most recent)       Procedure Component Value Units Date/Time    ECG 12 Lead Dyspnea [[id number]] Collected: 04/10/24 2006     Updated: 04/11/24 0751     QT Interval 424 ms      QTC Interval 469 ms      Narrative:      HEART RATE= 73  bpm  RR Interval= 816  ms  IN Interval= 196  ms  P Horizontal Axis= 4  deg  P Front Axis= 70  deg  QRSD Interval= 160  ms  QT Interval= 424  ms  QTcB= 469  ms  QRS Axis= 137  deg  T Wave Axis= 30  deg  - ABNORMAL ECG -  Sinus rhythm  Right bundle branch block  ST elevation secondary to IVCD  When compared with ECG of 23-Apr-2023 12:31:25,  No significant change  Electronically Signed By: Preet Friedman ([facility]) 11-Apr-2024 07:51:14  Date and Time of Study: 2024-04-10 20:06:18    Adult Transthoracic Echo Complete W/ Cont if Necessary Per Protocol [[id number]] Resulted: 04/11/24 1912     Updated: 04/11/24 1915     EF(MOD-bp) 67.9 %      LVIDd 4.6 cm      LVIDs 2.9 cm      IVSd 1.10 cm      LVPWd 1.20 cm      FS 37.0 %      IVS/LVPW 0.92 cm      ESV(cubed) 24.4 ml      LV Sys Vol (BSA corrected) 15.2 cm2      EDV(cubed) 97.3 ml      LV Webster Vol (BSA corrected) 47.7 cm2      LV mass(C)d 192.9 grams      LVOT area 3.5 cm2      LVOT diam 2.10 cm      EDV(MOD-sp2) 129.0 ml      EDV(MOD-sp4) 85.4 ml      ESV(MOD-sp2) 43.5 ml      ESV(MOD-sp4) 27.2 ml      SV(MOD-sp2) 85.5 ml      SV(MOD-sp4) 58.2 ml      SI(MOD-sp2) 47.8 ml/m2      SI(MOD-sp4) 32.5 ml/m2      EF(MOD-sp2) 66.3 %      EF(MOD-sp4) 68.1 %      MV E max franco 82.3 cm/sec      MV A max franco 115.0 cm/sec      MV dec time 0.25 sec      MV E/A 0.72     LA ESV Index (BP) 38.4 ml/m2      Med Peak E' Franco 9.0 cm/sec      Lat Peak E' Franco 13.6 cm/sec      TR max franco 428.0 cm/sec      Avg E/e' ratio 7.28     SV(LVOT) 95.2 ml      SV(RVOT) 72.8 ml      Qp/Qs 0.76     BH CV ECHO RV FREE WALL STRAIN -12.3 %      RVIDd 3.3 cm      RV Base 5.5 cm      RV Mid 4.0 cm      RV Length 7.3 cm      RV S' 11.5 cm/sec      LA dimension (2D)  3.9 cm      LV V1 max 125.0 cm/sec      LV V1 max PG 6.3 mmHg      LV V1 mean PG 3.0 mmHg      LV V1 VTI 27.5 cm      Ao pk franco 159.0 cm/sec      Ao max PG 10.1 mmHg      Ao mean PG 5.0 mmHg      Ao V2 VTI 35.8 cm       BERNARD(I,D) 2.7 cm2      MV max PG 5.5 mmHg      MV mean PG 2.00 mmHg      MV V2 VTI 34.7 cm      MV P1/2t 83.6 msec      MVA(P1/2t) 2.6 cm2      MVA(VTI) 2.7 cm2      MV dec slope 368.0 cm/sec2      TR max PG 73.3 mmHg      RVOT diam 2.40 cm      RV V1 max PG 1.98 mmHg      RV V1 max 70.3 cm/sec      RV V1 VTI 16.1 cm      PA V2 max 97.0 cm/sec      PA acc time 0.14 sec      PI end-d monique 123.0 cm/sec      Ao root diam 3.6 cm      ACS 2.10 cm      Sinus 3.2 cm      STJ 2.30 cm      RVSP(TR) 81 mmHg      RAP systole 8 mmHg     Narrative:        Left ventricular systolic function is normal. Left ventricular ejection   fraction appears to be 61 - 65%.    Left ventricular wall thickness is consistent with mild concentric   hypertrophy.    Left ventricular diastolic function was normal.    The right ventricular cavity is mild to moderately dilated.    Estimated right ventricular systolic pressure from tricuspid   regurgitation is markedly elevated (>55 mmHg). Calculated right   ventricular systolic pressure from tricuspid regurgitation is 81 mmHg.            CBC    Results from last 7 days   Lab Units 04/12/24  0321 04/11/24  0209 04/10/24  2018 04/10/24  1959   WBC 10*3/mm3 5.53 6.36  --  6.72   HEMOGLOBIN g/dL 11.1* 10.6*  --  11.2*   HEMOGLOBIN, POC g/dL  --   --  12.5  --    PLATELETS 10*3/mm3 191 181  --  193     BMP   Results from last 7 days   Lab Units 04/12/24  0321 04/11/24  0209 04/10/24  2018 04/10/24  1959   SODIUM mmol/L 144 143  --  145   POTASSIUM mmol/L 4.6 4.0  --  3.7   CHLORIDE mmol/L 102 102  --  103   CO2 mmol/L 37.0* 29.0  --  30.0*   BUN mg/dL 22 17  --  16   CREATININE mg/dL 0.88 0.85 0.86 0.84   GLUCOSE mg/dL 75 76  --  108*     CMP   Results from last 7 days   Lab Units 04/12/24  0321 04/11/24  0209 04/10/24  2018 04/10/24  1959   SODIUM mmol/L 144 143  --  145   POTASSIUM mmol/L 4.6 4.0  --  3.7   CHLORIDE mmol/L 102 102  --  103   CO2 mmol/L 37.0* 29.0  --  30.0*   BUN mg/dL 22 17  --  16    CREATININE mg/dL 0.88 0.85 0.86 0.84   GLUCOSE mg/dL 75 76  --  108*   ALBUMIN g/dL  --   --   --  4.0   BILIRUBIN mg/dL  --   --   --  0.5   ALK PHOS U/L  --   --   --  78   AST (SGOT) U/L  --   --   --  18   ALT (SGPT) U/L  --   --   --  18     Cardiac Studies:  Echo- Results for orders placed during the hospital encounter of 04/10/24    Adult Transthoracic Echo Complete W/ Cont if Necessary Per Protocol    Interpretation Summary    Left ventricular systolic function is normal. Left ventricular ejection fraction appears to be 61 - 65%.    Left ventricular wall thickness is consistent with mild concentric hypertrophy.    Left ventricular diastolic function was normal.    The right ventricular cavity is mild to moderately dilated.    Estimated right ventricular systolic pressure from tricuspid regurgitation is markedly elevated (>55 mmHg). Calculated right ventricular systolic pressure from tricuspid regurgitation is 81 mmHg.    Stress Myoview-  Cath-      Medication Review:   Scheduled Meds:amLODIPine, 5 mg, Oral, Nightly  aspirin, 81 mg, Oral, Once per day on Monday Wednesday Friday  atorvastatin, 10 mg, Oral, Daily  budesonide, 1 mg, Nebulization, BID - RT  furosemide, 40 mg, Oral, BID  gabapentin, 300 mg, Oral, TID  glipizide, 5 mg, Oral, BID AC  insulin lispro, 2-9 Units, Subcutaneous, 4x Daily AC & at Bedtime  ipratropium-albuterol, 3 mL, Nebulization, 4x Daily - RT  pioglitazone, 30 mg, Oral, Nightly  sertraline, 50 mg, Oral, Nightly  sodium chloride, 10 mL, Intravenous, Q12H      Continuous Infusions:   PRN Meds:.  acetaminophen **OR** acetaminophen **OR** acetaminophen    senna-docusate sodium **AND** polyethylene glycol **AND** bisacodyl **AND** bisacodyl    dextrose    dextrose    glucagon (human recombinant)    ipratropium-albuterol    melatonin    nitroglycerin    ondansetron    sodium chloride    sodium chloride    sodium chloride      Assessment & Plan     Acute on chronic respiratory  failure    MDM:    1.  Shortness of breath:    Most likely cause of shortness of breath is underlying COPD extubation.  Patient does not seem to be in volume overload.  Gentle diuresis would be continued.    2.  Right heart failure:    Patient has leg edema.  JVD is difficult to assess.  I would continue gentle diuresis with Lasix.    3.  Pulmonary hypertension:    I would leave the decision to pulmonologist to start pulmonary vasodilators.      Shawn Baker MD  04/12/24  17:24 EDT

## 2024-04-12 NOTE — PROGRESS NOTES
"PULMONARY CRITICAL CARE PROGRESS  NOTE      PATIENT IDENTIFICATION:  Name: Brenden Peter  MRN: [id number]  :  1950     Age: 73 y.o.  Sex: male    DATE OF Note:  2024   Referring Physician: Dara Rodarte DO                  Subjective:   Feeling better, no SOB, no chest or abd pain, no bowel or bladder issues      Objective:  tMax 24 hrs: Temp (24hrs), Av.1 °F (36.7 °C), Min:97.9 °F (36.6 °C), Max:98.2 °F (36.8 °C)      Vitals Ranges:   Temp:  [97.9 °F (36.6 °C)-98.2 °F (36.8 °C)] 97.9 °F (36.6 °C)  Heart Rate:  [57-75] 75  Resp:  [17-28] 18  BP: ()/(46-82) 133/66    Intake and Output Last 3 Shifts:   I/O last 3 completed shifts:  In: 960 [P.O.:960]  Out: 200 [Urine:200]    Exam:  /66   Pulse 75   Temp 97.9 °F (36.6 °C) (Oral)   Resp 18   Ht 170.2 cm (67\")   Wt 95.3 kg (210 lb)   SpO2 93%   BMI 32.89 kg/m²     General Appearance:     HEENT:  Normocephalic, without obvious abnormality. Conjunctivae/corneas clear.  Normal external ear canals. Nares normal, no drainage     Neck:  Supple, symmetrical, trachea midline. No JVD.  Lungs /Chest wall:   Bilateral basal rhonchi, respirations unlabored, symmetrical wall movement.     Heart:  Regular rate and rhythm, systolic murmur PMI left sternal border  Abdomen: Soft, nontender, no masses, no organomegaly.    Extremities: Trace edema, no clubbing or cyanosis        Medications:  amLODIPine, 5 mg, Oral, Nightly  aspirin, 81 mg, Oral, Once per day on   atorvastatin, 10 mg, Oral, Daily  budesonide, 1 mg, Nebulization, BID - RT  furosemide, 40 mg, Oral, BID  gabapentin, 300 mg, Oral, TID  glipizide, 5 mg, Oral, BID AC  insulin lispro, 2-9 Units, Subcutaneous, 4x Daily AC & at Bedtime  ipratropium-albuterol, 3 mL, Nebulization, 4x Daily - RT  pioglitazone, 30 mg, Oral, Nightly  sertraline, 50 mg, Oral, Nightly  sodium chloride, 10 mL, Intravenous, Q12H        Infusion:        PRN:    acetaminophen **OR** " acetaminophen **OR** acetaminophen    senna-docusate sodium **AND** polyethylene glycol **AND** bisacodyl **AND** bisacodyl    dextrose    dextrose    glucagon (human recombinant)    ipratropium-albuterol    melatonin    nitroglycerin    ondansetron    sodium chloride    sodium chloride    sodium chloride  Data Review:  All labs (24hrs):   Recent Results (from the past 24 hour(s))   POC Glucose Once    Collection Time: 04/11/24  8:27 PM    Specimen: Blood   Result Value Ref Range    Glucose 118 (H) 70 - 105 mg/dL   Blood Gas, Arterial -    Collection Time: 04/11/24  9:34 PM    Specimen: Arterial Blood   Result Value Ref Range    Site Left Radial     Scar's Test Positive     pH, Arterial 7.401 7.350 - 7.450 pH units    pCO2, Arterial 53.2 (H) 35.0 - 48.0 mm Hg    pO2, Arterial 66.6 (L) 83.0 - 108.0 mm Hg    HCO3, Arterial 33.1 (H) 21.0 - 28.0 mmol/L    Base Excess, Arterial 6.8 (H) 0.0 - 3.0 mmol/L    O2 Saturation, Arterial 92.4 (L) 94.0 - 98.0 %    CO2 Content 34.7 (H) 22 - 29 mmol/L    Barometric Pressure for Blood Gas      Modality HFNC     FIO2 80 %    Hemodilution No    Basic Metabolic Panel    Collection Time: 04/12/24  3:21 AM    Specimen: Arm, Left; Blood   Result Value Ref Range    Glucose 75 65 - 99 mg/dL    BUN 22 8 - 23 mg/dL    Creatinine 0.88 0.76 - 1.27 mg/dL    Sodium 144 136 - 145 mmol/L    Potassium 4.6 3.5 - 5.2 mmol/L    Chloride 102 98 - 107 mmol/L    CO2 37.0 (H) 22.0 - 29.0 mmol/L    Calcium 9.3 8.6 - 10.5 mg/dL    BUN/Creatinine Ratio 25.0 7.0 - 25.0    Anion Gap 5.0 5.0 - 15.0 mmol/L    eGFR 90.8 >60.0 mL/min/1.73   CBC Auto Differential    Collection Time: 04/12/24  3:21 AM    Specimen: Arm, Left; Blood   Result Value Ref Range    WBC 5.53 3.40 - 10.80 10*3/mm3    RBC 4.17 4.14 - 5.80 10*6/mm3    Hemoglobin 11.1 (L) 13.0 - 17.7 g/dL    Hematocrit 38.9 37.5 - 51.0 %    MCV 93.3 79.0 - 97.0 fL    MCH 26.6 26.6 - 33.0 pg    MCHC 28.5 (L) 31.5 - 35.7 g/dL    RDW 16.5 (H) 12.3 - 15.4 %     RDW-SD 55.7 (H) 37.0 - 54.0 fl    MPV 11.4 6.0 - 12.0 fL    Platelets 191 140 - 450 10*3/mm3    Neutrophil % 73.1 42.7 - 76.0 %    Lymphocyte % 17.4 (L) 19.6 - 45.3 %    Monocyte % 6.9 5.0 - 12.0 %    Eosinophil % 1.8 0.3 - 6.2 %    Basophil % 0.4 0.0 - 1.5 %    Immature Grans % 0.4 0.0 - 0.5 %    Neutrophils, Absolute 4.05 1.70 - 7.00 10*3/mm3    Lymphocytes, Absolute 0.96 0.70 - 3.10 10*3/mm3    Monocytes, Absolute 0.38 0.10 - 0.90 10*3/mm3    Eosinophils, Absolute 0.10 0.00 - 0.40 10*3/mm3    Basophils, Absolute 0.02 0.00 - 0.20 10*3/mm3    Immature Grans, Absolute 0.02 0.00 - 0.05 10*3/mm3    nRBC 0.0 0.0 - 0.2 /100 WBC   POC Glucose 4x Daily Before Meals & at Bedtime    Collection Time: 04/12/24  7:25 AM    Specimen: Blood   Result Value Ref Range    Glucose 67 (L) 70 - 105 mg/dL   POC Glucose Once    Collection Time: 04/12/24  7:46 AM    Specimen: Blood   Result Value Ref Range    Glucose 110 (H) 70 - 105 mg/dL   POC Glucose Once    Collection Time: 04/12/24 11:38 AM    Specimen: Blood   Result Value Ref Range    Glucose 185 (H) 70 - 105 mg/dL   POC Glucose Once    Collection Time: 04/12/24  3:35 PM    Specimen: Blood   Result Value Ref Range    Glucose 225 (H) 70 - 105 mg/dL        Imaging:  XR Chest 1 View  Narrative: XR CHEST 1 VW    Date of Exam: 4/12/2024 5:15 AM EDT    Indication: Shortness of breath    Comparison: CT chest of 4/10/2024. AP portable chest 4/10/2024.    Findings:  No acute lung consolidation. Fine interstitial thickening in both lungs is unchanged. Benign calcified nodule is present in the right lower lobe. Heart size is mildly enlarged but stable. No pleural effusion, pneumothorax or acute osseous abnormalities   are identified  Impression: Impression:  Stable cardiomegaly. Features of mild interstitial edema are not thought to be significantly changed.  No pleural effusion is identified.    Electronically Signed: Sheryl Torrez MD    4/12/2024 7:28 AM EDT    Workstation ID: [id number]        ASSESSMENT:  Acute on chronic respiratory failure hypoxic patient was on 6 L and her  PO2 was 59   COPD exasperation    Obstructive sleep apnea   Congestive heart failure   diastolic per cardiac echo was done a year ago  Cor pulmonale   Diabetes mellitus   Hypertension        PLAN:  OOB daily   Oxygen supplement   Diuretics  Need to be evaluated for sleep apnea as outpatient  Bronchodilators  Inhaled corticosteroids  Incentive spirometer  Diuresis and monitor MIGDALIA's  Electrolytes/ glycemic control  DVT and GI prophylaxis    Discussed with Dr Liliana Tyler, LENNIE    4/12/2024  17:28 EDT    I personally have examined  and interviewed the patient. I have reviewed the history, data, problems, assessment and plan with our NP.  Total Critical care time in direct medical management (   ) minutes, This time specifically excludes time spent performing procedures.    Jeannine Ford MD   4/12/2024  23:12 EDT

## 2024-04-12 NOTE — PLAN OF CARE
Goal Outcome Evaluation:           Progress: no change  Outcome Evaluation: Patient denies pain so far this shift. Had episode of SOA at beginning of shift- RT called and MD Ford notified. AVAPS and ABGs ordered. CXR ordered for AM. Pt been resting since AVAPs started. Will monitor.

## 2024-04-12 NOTE — CASE MANAGEMENT/SOCIAL WORK
Continued Stay Note  MARIA GUADALUPE Sanabria     Patient Name: Brenden Peter  MRN: [id number]  Today's Date: 4/12/2024    Admit Date: 4/10/2024    Plan: Return home w/ wife. Home O2 4L & CPAP HS w/ [place].   Discharge Plan       Row Name 04/12/24 1643       Plan    Plan Comments DC barriers: Cardio and Pulm following. Pt requiring 8L O2. Starting sildenafil.                Megan Naegele, RN     Office Phone: [phone]  Office Cell: [phone]

## 2024-04-12 NOTE — NURSING NOTE
Spoke with Dr. Ford face-to-face. Does not want Airvo. Okay with O2 sats of 86% and higher. Patient currently on 8L HFNC with O2 sats 85%. Told MD that daughter had said patient's cardiologists prescribed Viagra for the Pulm HTN. MD stated he would start him on that. Daughter, son, and patient notified.

## 2024-04-12 NOTE — PLAN OF CARE
Goal Outcome Evaluation:  Plan of Care Reviewed With: patient           Outcome Evaluation: 72 y/o male admitted on 4/10 due to worsenting SOA x 1 wk, with sats in 70s/80s. Found with elevated D dimer but no PE. + Pulmo edema, acute on chronic resp failre. PMH includes CHF, COPD on home O2 4l/nc, Cpap at night, anxiety, OA. Patient lives with spouse and normally ambulatory in home without a.d. At time of eval,patient on 8LHF and sats 86% while sidelying in bed. Patient able to perform supine <>sit indpendently however upon sitting, spo2 dropped to 77% and needed almost 8 minutes to recover to 86%. RN aware. Patient currently limited by pulmo status, anticipate patient will be safe to return home once medically stable. Will continue to follow and progress as tolerated.      Anticipated Discharge Disposition (PT): home with assist, home with home health

## 2024-04-12 NOTE — PLAN OF CARE
Goal Outcome Evaluation:  Plan of Care Reviewed With: patient        Progress: no change  Outcome Evaluation: Patient has been resting well throughout the shift without any complaints. Remains on 8L of oxygen. Pulm okay with O2 sats of 86% and higher. BIPAP at night. On PO lasix. Blood sugar did drop this morning to 67 but came back up after D50 given. No other issues at this time. Continuing to monitor.

## 2024-04-12 NOTE — THERAPY EVALUATION
Patient Name: Brenden Peter  : 1950    MRN: [id number]                              Today's Date: 2024       Admit Date: 4/10/2024    Visit Dx:     ICD-10-CM ICD-9-CM   1. Acute on chronic congestive heart failure, unspecified heart failure type  I50.9 428.0   2. Shortness of breath  R06.02 786.05   3. Acute respiratory failure with hypoxia  J96.01 518.81   4. Acute pulmonary edema  J81.0 518.4     Patient Active Problem List   Diagnosis    Primary localized osteoarthrosis of right lower leg    Type 2 diabetes mellitus without complication, without long-term current use of insulin    Mixed hyperlipidemia    Essential hypertension    Primary osteoarthritis of knees, bilateral    Hx of total knee replacement, right    Morbid obesity    Acquired trigger finger    Benign prostatic hyperplasia    Coronary artery disease involving native coronary artery of native heart without angina pectoris    Chronic depression    Diabetic peripheral neuropathy    Microalbuminuria due to type 2 diabetes mellitus    Multinodular goiter    Murmur    Tobacco dependence syndrome    Vitamin D deficiency    Swelling of joint of right knee    Chronic pain of left knee    COPD (chronic obstructive pulmonary disease)    Obesity (BMI 30-39.9)    COPD exacerbation    Sepsis    Abnormal nuclear stress test    Acute respiratory distress    (HFpEF) heart failure with preserved ejection fraction    Interstitial pneumonia    Pneumonia due to infectious organism, unspecified laterality, unspecified part of lung    Hypoxemia    Acute bronchitis due to Rhinovirus    Acute on chronic respiratory failure     Past Medical History:   Diagnosis Date    (HFpEF) heart failure with preserved ejection fraction 2023    Abnormal EKG 2/3/2015    Anxiety     COPD (chronic obstructive pulmonary disease)     Coronary artery disease 3/3/2015    Diabetes     type 2    Hyperlipidemia     Hypertension     Microalbuminuria due to type 2 diabetes  mellitus 2/5/2020    Multinodular goiter 4/18/2019    Murmur 2/3/2015    Primary osteoarthritis of left knee 10/22/2021     Past Surgical History:   Procedure Laterality Date    BRONCHOSCOPY N/A 4/9/2023    Procedure: BRONCHOSCOPY, bronchial alveolar lavage right upper lobe;  Surgeon: Juanjo Castañeda MD;  Location: [facility] ENDOSCOPY;  Service: Pulmonary;  Laterality: N/A;  post: pneumonia    CARDIAC CATHETERIZATION Left 1/15/2023    Procedure: Left Heart Cath and coronary angiogram;  Surgeon: Shawn Baker MD;  Location: [facility] CATH INVASIVE LOCATION;  Service: Cardiovascular;  Laterality: Left;    EYE SURGERY      cataract    TOTAL KNEE ARTHROPLASTY Right 10/21/2020    Procedure: TOTAL KNEE ARTHROPLASTY;  Surgeon: Ravi Fagan MD;  Location: [facility] MAIN OR;  Service: Orthopedics;  Laterality: Right;    TOTAL KNEE ARTHROPLASTY REVISION Right 5/19/2021    Procedure: KNEE POLY INSERT EXCHANGE with irrigation;  Surgeon: Ravi Fagan MD;  Location: [facility] MAIN OR;  Service: Orthopedics;  Laterality: Right;      General Information       Row Name 04/12/24 1317          Physical Therapy Time and Intention    Document Type evaluation  -CR     Mode of Treatment physical therapy  -CR       Row Name 04/12/24 1317          General Information    Patient Profile Reviewed yes  -CR     Prior Level of Function independent:;all household mobility  -CR     Existing Precautions/Restrictions oxygen therapy device and L/min  on 4l/nc  -CR     Barriers to Rehab previous functional deficit  -CR       Row Name 04/12/24 1317          Living Environment    People in Home spouse  -CR       Row Name 04/12/24 1317          Home Main Entrance    Number of Stairs, Main Entrance one  -CR       Row Name 04/12/24 1317          Cognition    Orientation Status (Cognition) oriented x 4  -CR       Row Name 04/12/24 1317          Safety Issues, Functional Mobility    Impairments Affecting Function (Mobility) endurance/activity tolerance;shortness of  breath  -CR               User Key  (r) = Recorded By, (t) = Taken By, (c) = Cosigned By      Initials Name Provider Type    CR Reyes, Carmela, PT Physical Therapist                   Mobility       Row Name 04/12/24 1318          Bed Mobility    Bed Mobility bed mobility (all) activities  -CR     All Activities, [place] (Bed Mobility) independent  -CR       Row Name 04/12/24 1318          Sit-Stand Transfer    Sit-Stand [place] (Transfers) independent  -CR       Row Name 04/12/24 1318          Gait/Stairs (Locomotion)    Comment, (Gait/Stairs) too SOA upon sitting with desat to 77% thus gait deferred  -CR               User Key  (r) = Recorded By, (t) = Taken By, (c) = Cosigned By      Initials Name Provider Type    CR Reyes, Carmela, PT Physical Therapist                   Obj/Interventions       Row Name 04/12/24 1318          Range of Motion Comprehensive    General Range of Motion no range of motion deficits identified  -CR       Row Name 04/12/24 1318          Strength Comprehensive (MMT)    General Manual Muscle Testing (MMT) Assessment no strength deficits identified  -CR       Row Name 04/12/24 1318          Balance    Balance Assessment sitting static balance;standing static balance  -CR     Static Sitting Balance independent  -CR     Position, Sitting Balance sitting edge of bed  -CR     Static Standing Balance standby assist  -CR     Position/Device Used, Standing Balance --  bed rail  -CR       Row Name 04/12/24 1318          Sensory Assessment (Somatosensory)    Sensory Assessment (Somatosensory) sensation intact  -CR               User Key  (r) = Recorded By, (t) = Taken By, (c) = Cosigned By      Initials Name Provider Type    CR Reyes, Carmela, PT Physical Therapist                   Goals/Plan       Row Name 04/12/24 1331          Gait Training Goal 1 (PT)    Activity/Assistive Device (Gait Training Goal 1, PT) gait (walking locomotion);increase endurance/gait distance  -CR      [place] Level (Gait Training Goal 1, PT) standby assist  -CR     Distance (Gait Training Goal 1, PT) 50  -CR     Time Frame (Gait Training Goal 1, PT) long term goal (LTG);1 week  -CR       Row Name 04/12/24 1331          Patient Education Goal (PT)    Activity (Patient Education Goal, PT) HEP with RPE 2/10  -CR     [place]/Cues/Accuracy (Memory Goal 2, PT) demonstrates adequately  -CR     Time Frame (Patient Education Goal, PT) long term goal (LTG);2 weeks  -CR       Row Name 04/12/24 1331          Therapy Assessment/Plan (PT)    Planned Therapy Interventions (PT) gait training;home exercise program;patient/family education  -CR               User Key  (r) = Recorded By, (t) = Taken By, (c) = Cosigned By      Initials Name Provider Type    CR Reyes, Carmela, PT Physical Therapist                   Clinical Impression       Row Name 04/12/24 1314          Pain    Pretreatment Pain Rating 0/10 - no pain  -CR     Posttreatment Pain Rating 0/10 - no pain  -CR       Row Name 04/12/24 1314          Plan of Care Review    Plan of Care Reviewed With patient  -CR     Outcome Evaluation 74 y/o male admitted on 4/10 due to worsenting SOA x 1 wk, with sats in 70s/80s. Found with elevated D dimer but no PE. + Pulmo edema, acute on chronic resp failre. PMH includes CHF, COPD on home O2 4l/nc, Cpap at night, anxiety, OA. Patient lives with spouse and normally ambulatory in home without a.d. At time of eval,patient on 8LHF and sats 86% while sidelying in bed. Patient able to perform supine <>sit indpendently however upon sitting, spo2 dropped to 77% and needed almost 8 minutes to recover to 86%. RN aware. Patient currently limited by pulmo status, anticipate patient will be safe to return home once medically stable. Will continue to follow and progress as tolerated.  -CR       Row Name 04/12/24 6337          Therapy Assessment/Plan (PT)    Patient/Family Therapy Goals Statement (PT) feel better  -CR     Criteria for  Skilled Interventions Met (PT) yes;skilled treatment is necessary  -CR     Therapy Frequency (PT) 3 times/wk  -CR     Predicted Duration of Therapy Intervention (PT) dc  -CR               User Key  (r) = Recorded By, (t) = Taken By, (c) = Cosigned By      Initials Name Provider Type    CR Reyes, Carmela, PT Physical Therapist                   Outcome Measures       Row Name 04/12/24 1331 04/12/24 0804       How much help from another person do you currently need...    Turning from your back to your side while in flat bed without using bedrails? 4  -CR 4  -FILI    Moving from lying on back to sitting on the side of a flat bed without bedrails? 4  -CR 4  -FILI    Moving to and from a bed to a chair (including a wheelchair)? 4  -CR 4  -FILI    Standing up from a chair using your arms (e.g., wheelchair, bedside chair)? 4  -CR 4  -FILI    Climbing 3-5 steps with a railing? 3  -CR 3  -FILI    To walk in hospital room? 3  -CR 3  -FILI    AM-PAC 6 Clicks Score (PT) 22  -CR 22  -FILI    Highest Level of Mobility Goal 7 --> Walk 25 feet or more  -CR 7 --> Walk 25 feet or more  -FILI              User Key  (r) = Recorded By, (t) = Taken By, (c) = Cosigned By      Initials Name Provider Type    CR Reyes, Carmela, PT Physical Therapist    Brianna Christie, RN Registered Nurse                                 Physical Therapy Education       Title: PT OT SLP Therapies (In Progress)       Topic: Physical Therapy (In Progress)       Point: Mobility training (Done)       Learning Progress Summary             Patient Acceptance, E, VU by CR at 4/12/2024 1332                         Point: Home exercise program (Not Started)       Learner Progress:  Not documented in this visit.                              User Key       Initials Effective Dates Name Provider Type Discipline    CR 06/16/21 -  Reyes, Carmela, VANESSA Physical Therapist PT                  PT Recommendation and Plan  Planned Therapy Interventions (PT): gait training, home exercise  program, patient/family education  Plan of Care Reviewed With: patient  Outcome Evaluation: 72 y/o male admitted on 4/10 due to worsenting SOA x 1 wk, with sats in 70s/80s. Found with elevated D dimer but no PE. + Pulmo edema, acute on chronic resp failre. PMH includes CHF, COPD on home O2 4l/nc, Cpap at night, anxiety, OA. Patient lives with spouse and normally ambulatory in home without a.d. At time of eval,patient on 8LHF and sats 86% while sidelying in bed. Patient able to perform supine <>sit indpendently however upon sitting, spo2 dropped to 77% and needed almost 8 minutes to recover to 86%. RN aware. Patient currently limited by pulmo status, anticipate patient will be safe to return home once medically stable. Will continue to follow and progress as tolerated.     Time Calculation:         PT Charges       Row Name 04/12/24 1332             Time Calculation    Start Time 1130  -CR      Stop Time 1155  -CR      Time Calculation (min) 25 min  -CR      PT Received On 04/12/24  -CR      PT - Next Appointment 04/15/24  -CR      PT Goal Re-Cert Due Date 04/26/24  -CR         Time Calculation- PT    Total Timed Code Minutes- PT 0 minute(s)  -CR                User Key  (r) = Recorded By, (t) = Taken By, (c) = Cosigned By      Initials Name Provider Type    CR Reyes, Carmela, PT Physical Therapist                  Therapy Charges for Today       Code Description Service Date Service Provider Modifiers Qty    41578105805 HC PT EVAL LOW COMPLEXITY 4 4/12/2024 Reyes, Carmela, PT GP 1            PT G-Codes  AM-PAC 6 Clicks Score (PT): 22  PT Discharge Summary  Anticipated Discharge Disposition (PT): home with assist, home with home health    Carmela Reyes, PT  4/12/2024

## 2024-04-12 NOTE — PROGRESS NOTES
[facility] MEDICINE SERVICE  DAILY PROGRESS NOTE    NAME: Brenden Peter  : 1950  MRN: [id number]      LOS: 0 days     PROVIDER OF SERVICE: Timothy Duane Brammell, MD    Chief Complaint: Acute on chronic respiratory failure    Subjective:     Interval History:  History taken from: patient  Patient Complaints: Patient requiring high flow last evening.  Now weaned to nasal cannula.  Denies any severe shortness of breath.  Has some scant sputum production.  Denies any gastrointestinal or urinary symptoms.  Comfortable on his nasal cannula.  Denies any other additional acute issues.  History of pulmonary input.    Review of Systems:   Review of Systems   All other systems reviewed and are negative.      Objective:     Vital Signs  Temp:  [97.9 °F (36.6 °C)-98.2 °F (36.8 °C)] 97.9 °F (36.6 °C)  Heart Rate:  [57-72] 67  Resp:  [17-28] 18  BP: ()/(46-82) 96/46  Flow (L/min):  [7-60] 7   Body mass index is 32.89 kg/m².    Physical Exam  Physical Exam  Constitutional:       Appearance: Normal appearance.   HENT:      Head: Normocephalic.   Cardiovascular:      Rate and Rhythm: Normal rate and regular rhythm.      Heart sounds: Normal heart sounds.   Pulmonary:      Effort: Pulmonary effort is normal.      Breath sounds: Normal breath sounds.   Musculoskeletal:         General: No swelling.   Neurological:      General: No focal deficit present.      Mental Status: He is alert and oriented to person, place, and time. Mental status is at baseline.   Psychiatric:         Mood and Affect: Mood normal.         Behavior: Behavior normal.         Scheduled Meds   amLODIPine, 5 mg, Oral, Nightly  aspirin, 81 mg, Oral, Once per day on   atorvastatin, 10 mg, Oral, Daily  budesonide, 1 mg, Nebulization, BID - RT  furosemide, 40 mg, Oral, BID  gabapentin, 300 mg, Oral, TID  glipizide, 5 mg, Oral, BID AC  insulin lispro, 2-9 Units, Subcutaneous, 4x Daily AC & at  Bedtime  ipratropium-albuterol, 3 mL, Nebulization, 4x Daily - RT  pioglitazone, 30 mg, Oral, Nightly  sertraline, 50 mg, Oral, Nightly  sodium chloride, 10 mL, Intravenous, Q12H       PRN Meds     acetaminophen **OR** acetaminophen **OR** acetaminophen    senna-docusate sodium **AND** polyethylene glycol **AND** bisacodyl **AND** bisacodyl    dextrose    dextrose    glucagon (human recombinant)    ipratropium-albuterol    melatonin    nitroglycerin    ondansetron    sodium chloride    sodium chloride    sodium chloride   Infusions         Diagnostic Data    Results from last 7 days   Lab Units 04/12/24  0321 04/10/24  2018 04/10/24  1959   WBC 10*3/mm3 5.53   < > 6.72   HEMOGLOBIN g/dL 11.1*   < > 11.2*   HEMOGLOBIN, POC   --    < >  --    HEMATOCRIT % 38.9   < > 37.7   HEMATOCRIT POC   --    < >  --    PLATELETS 10*3/mm3 191   < > 193   GLUCOSE mg/dL 75   < > 108*   CREATININE mg/dL 0.88   < > 0.84   BUN mg/dL 22   < > 16   SODIUM mmol/L 144   < > 145   POTASSIUM mmol/L 4.6   < > 3.7   AST (SGOT) U/L  --   --  18   ALT (SGPT) U/L  --   --  18   ALK PHOS U/L  --   --  78   BILIRUBIN mg/dL  --   --  0.5   ANION GAP mmol/L 5.0   < > 12.0    < > = values in this interval not displayed.       XR Chest 1 View    Result Date: 4/12/2024  Impression: Stable cardiomegaly. Features of mild interstitial edema are not thought to be significantly changed. No pleural effusion is identified. Electronically Signed: Sheryl Torrez MD  4/12/2024 7:28 AM EDT  Workstation ID: [id number]    CT Angiogram Chest Pulmonary Embolism    Result Date: 4/10/2024  Impression: No evidence of pulmonary embolism. Findings compatible with mild interstitial pulm edema. Mild cardiomegaly. Stable mild to moderate diffuse mediastinal lymphadenopathy. This may be congestive or reactive. Electronically Signed: Leonides Trivedi MD  4/10/2024 10:01 PM EDT  Workstation ID: [id number]    XR Chest 1 View    Result Date: 4/10/2024  Impression: No focal consolidation  or effusion. Changes of COPD. Electronically Signed: Leonides Trivedi MD  4/10/2024 8:31 PM EDT  Workstation ID: [id number]       I reviewed the patient's new clinical results.    Assessment:    1.  Acute on chronic hypoxemic hypercapnic respiratory failure  2.  Acute on chronic diastolic congestive heart failure.  3.  Type 2 diabetes.  4.  Obstructive sleep apnea  5.  Anemia      Plan: Ongoing diuresis.  Nebulization therapy.  Appreciate pulmonary input.  Follow-up labs.     Active and Resolved Problems  Active Hospital Problems    Diagnosis  POA    **Acute on chronic respiratory failure [J96.20]  Yes      Resolved Hospital Problems   No resolved problems to display.           DVT prophylaxis:  Mechanical DVT prophylaxis orders are present.         Code status is   Code Status and Medical Interventions:   Ordered at: 04/11/24 0041     Code Status (Patient has no pulse and is not breathing):    CPR (Attempt to Resuscitate)     Medical Interventions (Patient has pulse or is breathing):    Full Support       Plan for disposition:Home in 2-4 days    Time: 30 minutes    Signature: Electronically signed by Timothy Duane Brammell, MD, 04/12/24, 16:03 EDT.  [facility]ist Team

## 2024-04-13 ENCOUNTER — APPOINTMENT (OUTPATIENT)
Dept: GENERAL RADIOLOGY | Facility: HOSPITAL | Age: 74
End: 2024-04-13
Payer: MEDICARE

## 2024-04-13 LAB
ANION GAP SERPL CALCULATED.3IONS-SCNC: 5 MMOL/L (ref 5–15)
BASOPHILS # BLD AUTO: 0.02 10*3/MM3 (ref 0–0.2)
BASOPHILS NFR BLD AUTO: 0.4 % (ref 0–1.5)
BUN SERPL-MCNC: 25 MG/DL (ref 8–23)
BUN/CREAT SERPL: 29.8 (ref 7–25)
CALCIUM SPEC-SCNC: 9.1 MG/DL (ref 8.6–10.5)
CHLORIDE SERPL-SCNC: 103 MMOL/L (ref 98–107)
CO2 SERPL-SCNC: 36 MMOL/L (ref 22–29)
CREAT SERPL-MCNC: 0.84 MG/DL (ref 0.76–1.27)
DEPRECATED RDW RBC AUTO: 54.4 FL (ref 37–54)
EGFRCR SERPLBLD CKD-EPI 2021: 92.1 ML/MIN/1.73
EOSINOPHIL # BLD AUTO: 0.07 10*3/MM3 (ref 0–0.4)
EOSINOPHIL NFR BLD AUTO: 1.4 % (ref 0.3–6.2)
ERYTHROCYTE [DISTWIDTH] IN BLOOD BY AUTOMATED COUNT: 16.2 % (ref 12.3–15.4)
GLUCOSE BLDC GLUCOMTR-MCNC: 355 MG/DL (ref 70–105)
GLUCOSE BLDC GLUCOMTR-MCNC: 90 MG/DL (ref 70–105)
GLUCOSE SERPL-MCNC: 80 MG/DL (ref 65–99)
HCT VFR BLD AUTO: 37.6 % (ref 37.5–51)
HGB BLD-MCNC: 11 G/DL (ref 13–17.7)
IMM GRANULOCYTES # BLD AUTO: 0.02 10*3/MM3 (ref 0–0.05)
IMM GRANULOCYTES NFR BLD AUTO: 0.4 % (ref 0–0.5)
LYMPHOCYTES # BLD AUTO: 0.87 10*3/MM3 (ref 0.7–3.1)
LYMPHOCYTES NFR BLD AUTO: 16.9 % (ref 19.6–45.3)
MCH RBC QN AUTO: 26.9 PG (ref 26.6–33)
MCHC RBC AUTO-ENTMCNC: 29.3 G/DL (ref 31.5–35.7)
MCV RBC AUTO: 91.9 FL (ref 79–97)
MONOCYTES # BLD AUTO: 0.4 10*3/MM3 (ref 0.1–0.9)
MONOCYTES NFR BLD AUTO: 7.8 % (ref 5–12)
NEUTROPHILS NFR BLD AUTO: 3.76 10*3/MM3 (ref 1.7–7)
NEUTROPHILS NFR BLD AUTO: 73.1 % (ref 42.7–76)
NRBC BLD AUTO-RTO: 0 /100 WBC (ref 0–0.2)
PLATELET # BLD AUTO: 188 10*3/MM3 (ref 140–450)
PMV BLD AUTO: 11.9 FL (ref 6–12)
POTASSIUM SERPL-SCNC: 4.3 MMOL/L (ref 3.5–5.2)
RBC # BLD AUTO: 4.09 10*6/MM3 (ref 4.14–5.8)
SODIUM SERPL-SCNC: 144 MMOL/L (ref 136–145)
WBC NRBC COR # BLD AUTO: 5.14 10*3/MM3 (ref 3.4–10.8)

## 2024-04-13 PROCEDURE — 82948 REAGENT STRIP/BLOOD GLUCOSE: CPT

## 2024-04-13 PROCEDURE — 80048 BASIC METABOLIC PNL TOTAL CA: CPT

## 2024-04-13 PROCEDURE — 94799 UNLISTED PULMONARY SVC/PX: CPT

## 2024-04-13 PROCEDURE — 63710000001 INSULIN LISPRO (HUMAN) PER 5 UNITS

## 2024-04-13 PROCEDURE — 25010000002 METHYLPREDNISOLONE PER 40 MG: Performed by: HOSPITALIST

## 2024-04-13 PROCEDURE — 71045 X-RAY EXAM CHEST 1 VIEW: CPT

## 2024-04-13 PROCEDURE — 99232 SBSQ HOSP IP/OBS MODERATE 35: CPT | Performed by: INTERNAL MEDICINE

## 2024-04-13 PROCEDURE — 94664 DEMO&/EVAL PT USE INHALER: CPT

## 2024-04-13 PROCEDURE — 94660 CPAP INITIATION&MGMT: CPT

## 2024-04-13 PROCEDURE — 85025 COMPLETE CBC W/AUTO DIFF WBC: CPT

## 2024-04-13 PROCEDURE — 94761 N-INVAS EAR/PLS OXIMETRY MLT: CPT

## 2024-04-13 RX ORDER — METHYLPREDNISOLONE SODIUM SUCCINATE 40 MG/ML
40 INJECTION, POWDER, LYOPHILIZED, FOR SOLUTION INTRAMUSCULAR; INTRAVENOUS EVERY 8 HOURS
Status: DISCONTINUED | OUTPATIENT
Start: 2024-04-13 | End: 2024-04-14

## 2024-04-13 RX ADMIN — BUDESONIDE 1 MG: 0.5 INHALANT RESPIRATORY (INHALATION) at 20:34

## 2024-04-13 RX ADMIN — GABAPENTIN 300 MG: 300 CAPSULE ORAL at 08:28

## 2024-04-13 RX ADMIN — METHYLPREDNISOLONE SODIUM SUCCINATE 40 MG: 40 INJECTION, POWDER, FOR SOLUTION INTRAMUSCULAR; INTRAVENOUS at 12:44

## 2024-04-13 RX ADMIN — Medication 10 ML: at 20:34

## 2024-04-13 RX ADMIN — IPRATROPIUM BROMIDE AND ALBUTEROL SULFATE 3 ML: .5; 3 SOLUTION RESPIRATORY (INHALATION) at 11:11

## 2024-04-13 RX ADMIN — GLIPIZIDE 5 MG: 5 TABLET ORAL at 08:28

## 2024-04-13 RX ADMIN — GLIPIZIDE 5 MG: 5 TABLET ORAL at 17:25

## 2024-04-13 RX ADMIN — IPRATROPIUM BROMIDE AND ALBUTEROL SULFATE 3 ML: .5; 3 SOLUTION RESPIRATORY (INHALATION) at 07:40

## 2024-04-13 RX ADMIN — GABAPENTIN 300 MG: 300 CAPSULE ORAL at 17:25

## 2024-04-13 RX ADMIN — ATORVASTATIN CALCIUM 10 MG: 10 TABLET, FILM COATED ORAL at 08:28

## 2024-04-13 RX ADMIN — AMLODIPINE BESYLATE 5 MG: 5 TABLET ORAL at 20:34

## 2024-04-13 RX ADMIN — INSULIN LISPRO 8 UNITS: 100 INJECTION, SOLUTION INTRAVENOUS; SUBCUTANEOUS at 21:57

## 2024-04-13 RX ADMIN — GABAPENTIN 300 MG: 300 CAPSULE ORAL at 20:34

## 2024-04-13 RX ADMIN — FUROSEMIDE 40 MG: 40 TABLET ORAL at 08:28

## 2024-04-13 RX ADMIN — METHYLPREDNISOLONE SODIUM SUCCINATE 40 MG: 40 INJECTION, POWDER, FOR SOLUTION INTRAMUSCULAR; INTRAVENOUS at 18:12

## 2024-04-13 RX ADMIN — SERTRALINE HYDROCHLORIDE 50 MG: 50 TABLET ORAL at 20:34

## 2024-04-13 RX ADMIN — BUDESONIDE 0.5 MG: 0.5 INHALANT RESPIRATORY (INHALATION) at 07:44

## 2024-04-13 RX ADMIN — Medication 10 ML: at 09:32

## 2024-04-13 RX ADMIN — PIOGLITAZONE 30 MG: 30 TABLET ORAL at 20:34

## 2024-04-13 RX ADMIN — IPRATROPIUM BROMIDE AND ALBUTEROL SULFATE 3 ML: .5; 3 SOLUTION RESPIRATORY (INHALATION) at 20:40

## 2024-04-13 NOTE — PLAN OF CARE
Goal Outcome Evaluation:  Plan of Care Reviewed With: patient           Outcome Evaluation: pt voiced no concerns this shift; wore cpap for part of shift, remains on O2 at 10L per high flow n/c; continue to monitor

## 2024-04-13 NOTE — PROGRESS NOTES
"PULMONARY CRITICAL CARE PROGRESS  NOTE      PATIENT IDENTIFICATION:  Name: Brenden Peter  MRN: [id number]  :  1950     Age: 73 y.o.  Sex: male    DATE OF Note:  2024   Referring Physician: Dara Rodarte DO                  Subjective:   Feeling better, no SOB, no chest or abd pain, no bowel or bladder issues      Objective:  tMax 24 hrs: Temp (24hrs), Av.7 °F (36.5 °C), Min:97.3 °F (36.3 °C), Max:98 °F (36.7 °C)      Vitals Ranges:   Temp:  [97.3 °F (36.3 °C)-98 °F (36.7 °C)] 98 °F (36.7 °C)  Heart Rate:  [59-75] 68  Resp:  [15-25] 18  BP: (111-139)/(48-71) 111/48    Intake and Output Last 3 Shifts:   I/O last 3 completed shifts:  In: 740 [P.O.:740]  Out: 1250 [Urine:1250]    Exam:  /48 (BP Location: Left arm, Patient Position: Lying)   Pulse 68   Temp 98 °F (36.7 °C) (Oral)   Resp 18   Ht 170.2 cm (67\")   Wt 95.3 kg (210 lb)   SpO2 94%   BMI 32.89 kg/m²     General Appearance:     HEENT:  Normocephalic, without obvious abnormality. Conjunctivae/corneas clear.  Normal external ear canals. Nares normal, no drainage     Neck:  Supple, symmetrical, trachea midline. No JVD.  Lungs /Chest wall:   Bilateral basal rhonchi, respirations unlabored, symmetrical wall movement.     Heart:  Regular rate and rhythm, systolic murmur PMI left sternal border  Abdomen: Soft, nontender, no masses, no organomegaly.    Extremities: Trace edema, no clubbing or cyanosis        Medications:  amLODIPine, 5 mg, Oral, Nightly  aspirin, 81 mg, Oral, Once per day on   atorvastatin, 10 mg, Oral, Daily  budesonide, 1 mg, Nebulization, BID - RT  furosemide, 40 mg, Oral, BID  gabapentin, 300 mg, Oral, TID  glipizide, 5 mg, Oral, BID AC  insulin lispro, 2-9 Units, Subcutaneous, 4x Daily AC & at Bedtime  ipratropium-albuterol, 3 mL, Nebulization, 4x Daily - RT  methylPREDNISolone sodium succinate, 40 mg, Intravenous, Q8H  pioglitazone, 30 mg, Oral, Nightly  sertraline, 50 mg, Oral, " Nightly  sodium chloride, 10 mL, Intravenous, Q12H        Infusion:        PRN:    acetaminophen **OR** acetaminophen **OR** acetaminophen    senna-docusate sodium **AND** polyethylene glycol **AND** bisacodyl **AND** bisacodyl    dextrose    dextrose    glucagon (human recombinant)    ipratropium-albuterol    melatonin    nitroglycerin    ondansetron    sodium chloride    sodium chloride    sodium chloride  Data Review:  All labs (24hrs):   Recent Results (from the past 24 hour(s))   POC Glucose Once    Collection Time: 04/12/24  3:35 PM    Specimen: Blood   Result Value Ref Range    Glucose 225 (H) 70 - 105 mg/dL   POC Glucose Once    Collection Time: 04/12/24  7:34 PM    Specimen: Blood   Result Value Ref Range    Glucose 171 (H) 70 - 105 mg/dL   Basic Metabolic Panel    Collection Time: 04/13/24  5:19 AM    Specimen: Blood   Result Value Ref Range    Glucose 80 65 - 99 mg/dL    BUN 25 (H) 8 - 23 mg/dL    Creatinine 0.84 0.76 - 1.27 mg/dL    Sodium 144 136 - 145 mmol/L    Potassium 4.3 3.5 - 5.2 mmol/L    Chloride 103 98 - 107 mmol/L    CO2 36.0 (H) 22.0 - 29.0 mmol/L    Calcium 9.1 8.6 - 10.5 mg/dL    BUN/Creatinine Ratio 29.8 (H) 7.0 - 25.0    Anion Gap 5.0 5.0 - 15.0 mmol/L    eGFR 92.1 >60.0 mL/min/1.73   CBC Auto Differential    Collection Time: 04/13/24  5:19 AM    Specimen: Blood   Result Value Ref Range    WBC 5.14 3.40 - 10.80 10*3/mm3    RBC 4.09 (L) 4.14 - 5.80 10*6/mm3    Hemoglobin 11.0 (L) 13.0 - 17.7 g/dL    Hematocrit 37.6 37.5 - 51.0 %    MCV 91.9 79.0 - 97.0 fL    MCH 26.9 26.6 - 33.0 pg    MCHC 29.3 (L) 31.5 - 35.7 g/dL    RDW 16.2 (H) 12.3 - 15.4 %    RDW-SD 54.4 (H) 37.0 - 54.0 fl    MPV 11.9 6.0 - 12.0 fL    Platelets 188 140 - 450 10*3/mm3    Neutrophil % 73.1 42.7 - 76.0 %    Lymphocyte % 16.9 (L) 19.6 - 45.3 %    Monocyte % 7.8 5.0 - 12.0 %    Eosinophil % 1.4 0.3 - 6.2 %    Basophil % 0.4 0.0 - 1.5 %    Immature Grans % 0.4 0.0 - 0.5 %    Neutrophils, Absolute 3.76 1.70 - 7.00 10*3/mm3     Lymphocytes, Absolute 0.87 0.70 - 3.10 10*3/mm3    Monocytes, Absolute 0.40 0.10 - 0.90 10*3/mm3    Eosinophils, Absolute 0.07 0.00 - 0.40 10*3/mm3    Basophils, Absolute 0.02 0.00 - 0.20 10*3/mm3    Immature Grans, Absolute 0.02 0.00 - 0.05 10*3/mm3    nRBC 0.0 0.0 - 0.2 /100 WBC   POC Glucose 4x Daily Before Meals & at Bedtime    Collection Time: 04/13/24  7:07 AM    Specimen: Blood   Result Value Ref Range    Glucose 90 70 - 105 mg/dL        Imaging:  XR Chest 1 View  Narrative: XR CHEST 1 VW    Date of Exam: 4/13/2024 2:31 AM EDT    Indication: Shortness of breath    Comparison: 1 day prior.    Findings:  Bilateral interstitial opacities appear similar, likely background fibrotic change with some superimposed interstitial edema. There is no effusion or pneumothorax. No new focal consolidation. Unchanged heart and mediastinal contours.  Impression: Impression:  Bilateral interstitial opacities appear similar, likely background fibrotic change with some superimposed interstitial edema. There is no effusion or pneumothorax. No new focal consolidation. Unchanged heart and mediastinal contours.    Electronically Signed: Ortiz Beltran MD    4/13/2024 8:14 AM EDT    Workstation ID: [id number]       ASSESSMENT:  Acute on chronic respiratory failure hypoxic patient was on 6 L and her  PO2 was 59   COPD exasperation    Obstructive sleep apnea   Congestive heart failure   diastolic per cardiac echo was done a year ago  Cor pulmonale   Diabetes mellitus   Hypertension        PLAN:  Try Airvo as needed   OOB daily   Oxygen supplement   Diuretics  Need to be evaluated for sleep apnea as outpatient  Bronchodilators  Inhaled corticosteroids  Incentive spirometer  Diuresis and monitor MIGDALIA's  Electrolytes/ glycemic control  DVT and GI prophylaxis    Discussed with Dr Liliana Tyler, APRN    4/13/2024  13:32 EDT    I personally have examined  and interviewed the patient. I have reviewed the history, data, problems,  assessment and plan with our NP.  Total Critical care time in direct medical management (   ) minutes, This time specifically excludes time spent performing procedures.    Jeannine Ford MD   4/13/2024  22:12 EDT

## 2024-04-13 NOTE — PLAN OF CARE
Goal Outcome Evaluation:      Pt alert and orient x 4. Pt makes needs known verbally. Pt compliant with plan of care at this time.

## 2024-04-13 NOTE — PROGRESS NOTES
[facility] MEDICINE SERVICE  DAILY PROGRESS NOTE    NAME: Brenden Peter  : 1950  MRN: [id number]      LOS: 1 day     PROVIDER OF SERVICE: Timothy Duane Brammell, MD    Chief Complaint: Acute on chronic respiratory failure    Subjective:     Interval History:  History taken from: patient  Patient Complaints: Patient back on his high flow.  Little sputum production.  He feels his secretions may be or loosening.  Denies any gastrointestinal issues.  Denies any chest pain.  He had some transient right sided scalp numbness a few days prior that resolved spontaneously.  Denies any headache.  Denies any other additional acute issues.    Review of Systems:   Review of Systems   All other systems reviewed and are negative.      Objective:     Vital Signs  Temp:  [97.3 °F (36.3 °C)-98 °F (36.7 °C)] 98 °F (36.7 °C)  Heart Rate:  [59-75] 61  Resp:  [15-25] 25  BP: ()/(46-71) 135/64  Flow (L/min):  [7-40] 40   Body mass index is 32.89 kg/m².    Physical Exam  Physical Exam  Constitutional:       General: He is not in acute distress.     Appearance: Normal appearance. He is obese.   HENT:      Head: Normocephalic and atraumatic.   Cardiovascular:      Rate and Rhythm: Normal rate.      Heart sounds: Normal heart sounds.   Pulmonary:      Effort: No respiratory distress.      Breath sounds: No wheezing or rales.   Abdominal:      General: Bowel sounds are normal. There is no distension.      Palpations: Abdomen is soft.      Tenderness: There is no abdominal tenderness.   Musculoskeletal:         General: No swelling.   Neurological:      General: No focal deficit present.      Mental Status: He is alert and oriented to person, place, and time. Mental status is at baseline.   Psychiatric:         Mood and Affect: Mood normal.         Behavior: Behavior normal.         Scheduled Meds   amLODIPine, 5 mg, Oral, Nightly  aspirin, 81 mg, Oral, Once per day on   atorvastatin, 10 mg,  Oral, Daily  budesonide, 1 mg, Nebulization, BID - RT  furosemide, 40 mg, Oral, BID  gabapentin, 300 mg, Oral, TID  glipizide, 5 mg, Oral, BID AC  insulin lispro, 2-9 Units, Subcutaneous, 4x Daily AC & at Bedtime  ipratropium-albuterol, 3 mL, Nebulization, 4x Daily - RT  pioglitazone, 30 mg, Oral, Nightly  sertraline, 50 mg, Oral, Nightly  sodium chloride, 10 mL, Intravenous, Q12H       PRN Meds     acetaminophen **OR** acetaminophen **OR** acetaminophen    senna-docusate sodium **AND** polyethylene glycol **AND** bisacodyl **AND** bisacodyl    dextrose    dextrose    glucagon (human recombinant)    ipratropium-albuterol    melatonin    nitroglycerin    ondansetron    sodium chloride    sodium chloride    sodium chloride   Infusions         Diagnostic Data    Results from last 7 days   Lab Units 04/13/24  0519 04/10/24  2018 04/10/24  1959   WBC 10*3/mm3 5.14   < > 6.72   HEMOGLOBIN g/dL 11.0*   < > 11.2*   HEMOGLOBIN, POC   --    < >  --    HEMATOCRIT % 37.6   < > 37.7   HEMATOCRIT POC   --    < >  --    PLATELETS 10*3/mm3 188   < > 193   GLUCOSE mg/dL 80   < > 108*   CREATININE mg/dL 0.84   < > 0.84   BUN mg/dL 25*   < > 16   SODIUM mmol/L 144   < > 145   POTASSIUM mmol/L 4.3   < > 3.7   AST (SGOT) U/L  --   --  18   ALT (SGPT) U/L  --   --  18   ALK PHOS U/L  --   --  78   BILIRUBIN mg/dL  --   --  0.5   ANION GAP mmol/L 5.0   < > 12.0    < > = values in this interval not displayed.       XR Chest 1 View    Result Date: 4/13/2024  Impression: Bilateral interstitial opacities appear similar, likely background fibrotic change with some superimposed interstitial edema. There is no effusion or pneumothorax. No new focal consolidation. Unchanged heart and mediastinal contours. Electronically Signed: Ortiz Beltran MD  4/13/2024 8:14 AM EDT  Workstation ID: [id number]    XR Chest 1 View    Result Date: 4/12/2024  Impression: Stable cardiomegaly. Features of mild interstitial edema are not thought to be significantly  changed. No pleural effusion is identified. Electronically Signed: Sheryl Torrez MD  4/12/2024 7:28 AM EDT  Workstation ID: [id number]       I reviewed the patient's new clinical results.    Assessment:    1.  Acute on chronic hypercapnic hypoxemic respiratory failure.  2.  COPD exacerbation  3.  Obstructive sleep apnea  4.  Acute on chronic diastolic congestive heart failure  5.  Pulmonary hypertension  6.  Type 2 diabetes   7.  Hypertension  8.  Obesity  9.  Anemia    Plan: Chart reviewed.  Will begin IV methylprednisolone.  Ongoing attempts at diuresis.  Appreciate pulmonary and cardiology input.  Monitor blood sugars.  Follow-up labs.  Difficult situation in light of his significant hypoxemia and fairly benign physical exam.     Active and Resolved Problems  Active Hospital Problems    Diagnosis  POA    **Acute on chronic respiratory failure [J96.20]  Yes      Resolved Hospital Problems   No resolved problems to display.           DVT prophylaxis:  Mechanical DVT prophylaxis orders are present.         Code status is   Code Status and Medical Interventions:   Ordered at: 04/11/24 0041     Code Status (Patient has no pulse and is not breathing):    CPR (Attempt to Resuscitate)     Medical Interventions (Patient has pulse or is breathing):    Full Support       Plan for disposition:home in 3 days.  May require discharge to rehab setting with the pulmonary abilities to maintain his high flow and attempt weaning.    Time: 30 minutes    Signature: Electronically signed by Timothy Duane Brammell, MD, 04/13/24, 10:03 EDT.  [facility] Hospitalist Team

## 2024-04-13 NOTE — PROGRESS NOTES
LOS: 1 day   Admitting Physician- Brammell, Timothy Duane,*      Cardiology assessment and plan    Shortness of breath  Respiratory failure  COPD exacerbation  Right heart failure  Pulmonary hypertension  CT chest with no pulmonary embolism  Mild cardiomegaly  Echocardiogram with normal LV LV systolic function  Severe pulmonary hypertension with estimated RV systolic pressure of 80 mmHg  Acute on chronic respiratory failure with hypoxia and hypercapnia  Patient is currently on oxygen by nasal cannula at 6 L/min  Tmax is 98 pulse is 68 respirations are 18 blood pressure is 110/48 sats are 98%  Sodium is 144 potassium is 4.3 creatinine is 0.8 hemoglobin is 11  Chest x-ray with bilateral pulmonary infiltrates  Current medications include aspirin 81 mg p.o. once a day Norvasc 5 mg p.o. once a day patient is on Lipitor 10 mg p.o. once a day Lasix 40 mg p.o. every 12 hours  Consider DVT prophylaxis  Check proBNP  Continue current medical therapy and close monitoring and follow-up  Further recommendation based on patient course          Reason For Followup:    Shortness of breath  Pulmonary hypertension  Hyperlipidemia    Subjective     Shortness of breath is better.    Objective     Blood pressure is stable    Review of Systems:   Review of Systems   Constitutional: Negative for chills and fever.   HENT:  Negative for ear discharge and nosebleeds.    Eyes:  Negative for discharge and redness.   Cardiovascular:  Negative for chest pain, orthopnea, palpitations, paroxysmal nocturnal dyspnea and syncope.   Respiratory:  Positive for shortness of breath. Negative for cough and wheezing.    Endocrine: Negative for heat intolerance.   Skin:  Negative for rash.   Musculoskeletal:  Negative for arthritis and myalgias.   Gastrointestinal:  Negative for abdominal pain, melena, nausea and vomiting.   Genitourinary:  Negative for dysuria and hematuria.   Neurological:  Negative for dizziness, light-headedness, numbness and  tremors.   Psychiatric/Behavioral:  Negative for depression. The patient is not nervous/anxious.          Vital Signs  Vitals:    04/13/24 1111 04/13/24 1115 04/13/24 1124 04/13/24 1517   BP:   111/48    BP Location:   Left arm    Patient Position:   Lying    Pulse: 63 68     Resp: 22 22 18    Temp:       TempSrc:       SpO2: 93% 90% 94% 99%   Weight:       Height:         Wt Readings from Last 1 Encounters:   04/11/24 95.3 kg (210 lb)       Intake/Output Summary (Last 24 hours) at 4/13/2024 1522  Last data filed at 4/13/2024 1400  Gross per 24 hour   Intake 1220 ml   Output 975 ml   Net 245 ml     Physical Exam:  Constitutional:       Appearance: Well-developed.   Eyes:      General: No scleral icterus.        Right eye: No discharge.   HENT:      Head: Normocephalic and atraumatic.   Neck:      Thyroid: No thyromegaly.      Lymphadenopathy: No cervical adenopathy.   Pulmonary:      Effort: Pulmonary effort is normal. No respiratory distress.      Breath sounds: Normal breath sounds. No wheezing. No rales.   Cardiovascular:      Normal rate. Regular rhythm.      No gallop.    Edema:     Peripheral edema absent.   Abdominal:      Tenderness: There is no abdominal tenderness.   Skin:     Findings: No erythema or rash.   Neurological:      Mental Status: Alert and oriented to person, place, and time.         Results Review:   Lab Results (last 24 hours)       Procedure Component Value Units Date/Time    POC Glucose 4x Daily Before Meals & at Bedtime [[id number]]  (Normal) Collected: 04/13/24 0707    Specimen: Blood Updated: 04/13/24 0708     Glucose 90 mg/dL      Comment: Serial Number: [id number]:  [id number]       Basic Metabolic Panel [[id number]]  (Abnormal) Collected: 04/13/24 0519    Specimen: Blood Updated: 04/13/24 0659     Glucose 80 mg/dL      BUN 25 mg/dL      Creatinine 0.84 mg/dL      Sodium 144 mmol/L      Potassium 4.3 mmol/L      Comment: Slight hemolysis detected by analyzer. Result may be falsely  elevated.        Chloride 103 mmol/L      CO2 36.0 mmol/L      Calcium 9.1 mg/dL      BUN/Creatinine Ratio 29.8     Anion Gap 5.0 mmol/L      eGFR 92.1 mL/min/1.73     Narrative:      GFR Normal >60  Chronic Kidney Disease <60  Kidney Failure <15    The GFR formula is only valid for adults with stable renal function between ages 18 and 70.    CBC & Differential [[id number]]  (Abnormal) Collected: 04/13/24 0519    Specimen: Blood Updated: 04/13/24 0641    Narrative:      The following orders were created for panel order CBC & Differential.  Procedure                               Abnormality         Status                     ---------                               -----------         ------                     CBC Auto Differential[[id number]]        Abnormal            Final result                 Please view results for these tests on the individual orders.    CBC Auto Differential [[id number]]  (Abnormal) Collected: 04/13/24 0519    Specimen: Blood Updated: 04/13/24 0641     WBC 5.14 10*3/mm3      RBC 4.09 10*6/mm3      Hemoglobin 11.0 g/dL      Hematocrit 37.6 %      MCV 91.9 fL      MCH 26.9 pg      MCHC 29.3 g/dL      RDW 16.2 %      RDW-SD 54.4 fl      MPV 11.9 fL      Platelets 188 10*3/mm3      Neutrophil % 73.1 %      Lymphocyte % 16.9 %      Monocyte % 7.8 %      Eosinophil % 1.4 %      Basophil % 0.4 %      Immature Grans % 0.4 %      Neutrophils, Absolute 3.76 10*3/mm3      Lymphocytes, Absolute 0.87 10*3/mm3      Monocytes, Absolute 0.40 10*3/mm3      Eosinophils, Absolute 0.07 10*3/mm3      Basophils, Absolute 0.02 10*3/mm3      Immature Grans, Absolute 0.02 10*3/mm3      nRBC 0.0 /100 WBC     POC Glucose Once [[id number]]  (Abnormal) Collected: 04/12/24 1934    Specimen: Blood Updated: 04/12/24 1936     Glucose 171 mg/dL      Comment: Serial Number: [id number]:  [id number]       POC Glucose Once [[id number]]  (Abnormal) Collected: 04/12/24 1535    Specimen: Blood Updated: 04/12/24 1537     Glucose  225 mg/dL      Comment: Serial Number: [id number]:  [id number]             Imaging Results (Last 72 Hours)       Procedure Component Value Units Date/Time    XR Chest 1 View [[id number]] Collected: 04/13/24 0812     Updated: 04/13/24 0816    Narrative:      XR CHEST 1 VW    Date of Exam: 4/13/2024 2:31 AM EDT    Indication: Shortness of breath    Comparison: 1 day prior.    Findings:  Bilateral interstitial opacities appear similar, likely background fibrotic change with some superimposed interstitial edema. There is no effusion or pneumothorax. No new focal consolidation. Unchanged heart and mediastinal contours.      Impression:      Impression:  Bilateral interstitial opacities appear similar, likely background fibrotic change with some superimposed interstitial edema. There is no effusion or pneumothorax. No new focal consolidation. Unchanged heart and mediastinal contours.        Electronically Signed: Ortiz Beltran MD    4/13/2024 8:14 AM EDT    Workstation ID: [id number]    XR Chest 1 View [[id number]] Collected: 04/12/24 0727     Updated: 04/12/24 0731    Narrative:      XR CHEST 1 VW    Date of Exam: 4/12/2024 5:15 AM EDT    Indication: Shortness of breath    Comparison: CT chest of 4/10/2024. AP portable chest 4/10/2024.    Findings:  No acute lung consolidation. Fine interstitial thickening in both lungs is unchanged. Benign calcified nodule is present in the right lower lobe. Heart size is mildly enlarged but stable. No pleural effusion, pneumothorax or acute osseous abnormalities   are identified      Impression:      Impression:  Stable cardiomegaly. Features of mild interstitial edema are not thought to be significantly changed.  No pleural effusion is identified.      Electronically Signed: Sheryl Torrez MD    4/12/2024 7:28 AM EDT    Workstation ID: [id number]    CT Angiogram Chest Pulmonary Embolism [[id number]] Collected: 04/10/24 2155     Updated: 04/10/24 2203    Narrative:      CT ANGIOGRAM  CHEST PULMONARY EMBOLISM    Date of Exam: 4/10/2024 9:45 PM EDT    Indication: Pulmonary embolism (PE) suspected, positive D-dimer.    Comparison: Chest CTA performed on January 12, 2023    Technique: Axial CT images were obtained of the chest after the uneventful intravenous administration of iodinated contrast utilizing pulmonary embolism protocol.  Sagittal and coronal reconstructions were performed.  Automated exposure control and   iterative reconstruction methods were used.      Findings:    Pulmonary arteries: Adequate opacification of the pulmonary arteries. No evidence of acute pulmonary embolism.    Thoracic aorta: The thoracic aorta is normal in caliber and contour. Small scattered lateral calcifications are visualized.    Thyroid: The visualized portion of the thyroid is unremarkable.    Lungs and Pleura: No focal consolidation. Mild emphysematous changes are visualized. Trace right pleural effusion is visualized. There is mild interlobular septal thickening which is most prominent in the lung bases. No suspicious pulmonary nodules. No   pneumothorax.    Mediastinum/Tiff: There is mild to moderate diffuse mediastinal lymphadenopathy. This appears stable when compared to prior study.    Lymph nodes: No axillary or supraclavicular lymphadenopathy.    Cardiovascular: The heart is mildly large. No evidence of pericardial effusion.    Upper Abdomen: No acute process in the upper abdomen.    Bones and Soft Tissue: No acute fracture, aggressive osseous lesions, or soft tissue process. Moderate degenerative changes of the midthoracic spine are visualized.        Impression:      Impression:    No evidence of pulmonary embolism.    Findings compatible with mild interstitial pulm edema.    Mild cardiomegaly.    Stable mild to moderate diffuse mediastinal lymphadenopathy. This may be congestive or reactive.        Electronically Signed: Leonides Trivedi MD    4/10/2024 10:01 PM EDT    Workstation ID: [id number]    XR  Chest 1 View [[id number]] Collected: 04/10/24 2030     Updated: 04/10/24 2033    Narrative:      XR CHEST 1 VW    Date of Exam: 4/10/2024 8:28 PM EDT    Indication: CHF/COPD Protocol  CHF/COPD Protocol    Comparison: Chest radiograph performed on April 23, 2023    Findings:  No focal consolidation or effusion. Lungs are hyper aerated. There is no evidence of pneumothorax. The pulmonary vasculature appears within normal limits. Cardiac silhouette is enlarged. No acute osseous abnormality identified.      Impression:      Impression:  No focal consolidation or effusion. Changes of COPD.      Electronically Signed: Leonides Trivedi MD    4/10/2024 8:31 PM EDT    Workstation ID: [id number]          ECG/EMG Results (most recent)       Procedure Component Value Units Date/Time    ECG 12 Lead Dyspnea [[id number]] Collected: 04/10/24 2006     Updated: 04/11/24 0751     QT Interval 424 ms      QTC Interval 469 ms     Narrative:      HEART RATE= 73  bpm  RR Interval= 816  ms  IA Interval= 196  ms  P Horizontal Axis= 4  deg  P Front Axis= 70  deg  QRSD Interval= 160  ms  QT Interval= 424  ms  QTcB= 469  ms  QRS Axis= 137  deg  T Wave Axis= 30  deg  - ABNORMAL ECG -  Sinus rhythm  Right bundle branch block  ST elevation secondary to IVCD  When compared with ECG of 23-Apr-2023 12:31:25,  No significant change  Electronically Signed By: Preet Friedman ([facility]) 11-Apr-2024 07:51:14  Date and Time of Study: 2024-04-10 20:06:18    Adult Transthoracic Echo Complete W/ Cont if Necessary Per Protocol [[id number]] Resulted: 04/11/24 1912     Updated: 04/11/24 1915     EF(MOD-bp) 67.9 %      LVIDd 4.6 cm      LVIDs 2.9 cm      IVSd 1.10 cm      LVPWd 1.20 cm      FS 37.0 %      IVS/LVPW 0.92 cm      ESV(cubed) 24.4 ml      LV Sys Vol (BSA corrected) 15.2 cm2      EDV(cubed) 97.3 ml      LV Webster Vol (BSA corrected) 47.7 cm2      LV mass(C)d 192.9 grams      LVOT area 3.5 cm2      LVOT diam 2.10 cm      EDV(MOD-sp2) 129.0 ml      EDV(MOD-sp4) 85.4 ml       ESV(MOD-sp2) 43.5 ml      ESV(MOD-sp4) 27.2 ml      SV(MOD-sp2) 85.5 ml      SV(MOD-sp4) 58.2 ml      SI(MOD-sp2) 47.8 ml/m2      SI(MOD-sp4) 32.5 ml/m2      EF(MOD-sp2) 66.3 %      EF(MOD-sp4) 68.1 %      MV E max franco 82.3 cm/sec      MV A max franco 115.0 cm/sec      MV dec time 0.25 sec      MV E/A 0.72     LA ESV Index (BP) 38.4 ml/m2      Med Peak E' Franco 9.0 cm/sec      Lat Peak E' Franco 13.6 cm/sec      TR max franco 428.0 cm/sec      Avg E/e' ratio 7.28     SV(LVOT) 95.2 ml      SV(RVOT) 72.8 ml      Qp/Qs 0.76     BH CV ECHO RV FREE WALL STRAIN -12.3 %      RVIDd 3.3 cm      RV Base 5.5 cm      RV Mid 4.0 cm      RV Length 7.3 cm      RV S' 11.5 cm/sec      LA dimension (2D)  3.9 cm      LV V1 max 125.0 cm/sec      LV V1 max PG 6.3 mmHg      LV V1 mean PG 3.0 mmHg      LV V1 VTI 27.5 cm      Ao pk franco 159.0 cm/sec      Ao max PG 10.1 mmHg      Ao mean PG 5.0 mmHg      Ao V2 VTI 35.8 cm      BERNARD(I,D) 2.7 cm2      MV max PG 5.5 mmHg      MV mean PG 2.00 mmHg      MV V2 VTI 34.7 cm      MV P1/2t 83.6 msec      MVA(P1/2t) 2.6 cm2      MVA(VTI) 2.7 cm2      MV dec slope 368.0 cm/sec2      TR max PG 73.3 mmHg      RVOT diam 2.40 cm      RV V1 max PG 1.98 mmHg      RV V1 max 70.3 cm/sec      RV V1 VTI 16.1 cm      PA V2 max 97.0 cm/sec      PA acc time 0.14 sec      PI end-d franco 123.0 cm/sec      Ao root diam 3.6 cm      ACS 2.10 cm      Sinus 3.2 cm      STJ 2.30 cm      RVSP(TR) 81 mmHg      RAP systole 8 mmHg     Narrative:        Left ventricular systolic function is normal. Left ventricular ejection   fraction appears to be 61 - 65%.    Left ventricular wall thickness is consistent with mild concentric   hypertrophy.    Left ventricular diastolic function was normal.    The right ventricular cavity is mild to moderately dilated.    Estimated right ventricular systolic pressure from tricuspid   regurgitation is markedly elevated (>55 mmHg). Calculated right   ventricular systolic pressure from tricuspid  regurgitation is 81 mmHg.            CBC    Results from last 7 days   Lab Units 04/13/24  0519 04/12/24  0321 04/11/24  0209 04/10/24  2018 04/10/24  1959   WBC 10*3/mm3 5.14 5.53 6.36  --  6.72   HEMOGLOBIN g/dL 11.0* 11.1* 10.6*  --  11.2*   HEMOGLOBIN, POC g/dL  --   --   --  12.5  --    PLATELETS 10*3/mm3 188 191 181  --  193     BMP   Results from last 7 days   Lab Units 04/13/24  0519 04/12/24  0321 04/11/24  0209 04/10/24  2018 04/10/24  1959   SODIUM mmol/L 144 144 143  --  145   POTASSIUM mmol/L 4.3 4.6 4.0  --  3.7   CHLORIDE mmol/L 103 102 102  --  103   CO2 mmol/L 36.0* 37.0* 29.0  --  30.0*   BUN mg/dL 25* 22 17  --  16   CREATININE mg/dL 0.84 0.88 0.85 0.86 0.84   GLUCOSE mg/dL 80 75 76  --  108*     CMP   Results from last 7 days   Lab Units 04/13/24  0519 04/12/24  0321 04/11/24  0209 04/10/24  2018 04/10/24  1959   SODIUM mmol/L 144 144 143  --  145   POTASSIUM mmol/L 4.3 4.6 4.0  --  3.7   CHLORIDE mmol/L 103 102 102  --  103   CO2 mmol/L 36.0* 37.0* 29.0  --  30.0*   BUN mg/dL 25* 22 17  --  16   CREATININE mg/dL 0.84 0.88 0.85 0.86 0.84   GLUCOSE mg/dL 80 75 76  --  108*   ALBUMIN g/dL  --   --   --   --  4.0   BILIRUBIN mg/dL  --   --   --   --  0.5   ALK PHOS U/L  --   --   --   --  78   AST (SGOT) U/L  --   --   --   --  18   ALT (SGPT) U/L  --   --   --   --  18     Cardiac Studies:  Echo- Results for orders placed during the hospital encounter of 04/10/24    Adult Transthoracic Echo Complete W/ Cont if Necessary Per Protocol    Interpretation Summary    Left ventricular systolic function is normal. Left ventricular ejection fraction appears to be 61 - 65%.    Left ventricular wall thickness is consistent with mild concentric hypertrophy.    Left ventricular diastolic function was normal.    The right ventricular cavity is mild to moderately dilated.    Estimated right ventricular systolic pressure from tricuspid regurgitation is markedly elevated (>55 mmHg). Calculated right ventricular  systolic pressure from tricuspid regurgitation is 81 mmHg.    Stress Myoview-  Cath-      Medication Review:   Scheduled Meds:amLODIPine, 5 mg, Oral, Nightly  aspirin, 81 mg, Oral, Once per day on Monday Wednesday Friday  atorvastatin, 10 mg, Oral, Daily  budesonide, 1 mg, Nebulization, BID - RT  furosemide, 40 mg, Oral, BID  gabapentin, 300 mg, Oral, TID  glipizide, 5 mg, Oral, BID AC  insulin lispro, 2-9 Units, Subcutaneous, 4x Daily AC & at Bedtime  ipratropium-albuterol, 3 mL, Nebulization, 4x Daily - RT  methylPREDNISolone sodium succinate, 40 mg, Intravenous, Q8H  pioglitazone, 30 mg, Oral, Nightly  sertraline, 50 mg, Oral, Nightly  sodium chloride, 10 mL, Intravenous, Q12H      Continuous Infusions:   PRN Meds:.  acetaminophen **OR** acetaminophen **OR** acetaminophen    senna-docusate sodium **AND** polyethylene glycol **AND** bisacodyl **AND** bisacodyl    dextrose    dextrose    glucagon (human recombinant)    ipratropium-albuterol    melatonin    nitroglycerin    ondansetron    sodium chloride    sodium chloride    sodium chloride      Assessment & Plan     Acute on chronic respiratory failure    MDM:    1.  Shortness of breath:    Most likely cause of shortness of breath is underlying COPD extubation.  Patient does not seem to be in volume overload.  Gentle diuresis would be continued.    2.  Right heart failure:    Patient has leg edema.  JVD is difficult to assess.  I would continue gentle diuresis with Lasix.    3.  Pulmonary hypertension:    I would leave the decision to pulmonologist to start pulmonary vasodilators.      Judi Washington MD  04/13/24  15:22 EDT

## 2024-04-14 LAB
ANION GAP SERPL CALCULATED.3IONS-SCNC: 7 MMOL/L (ref 5–15)
BASOPHILS # BLD AUTO: 0 10*3/MM3 (ref 0–0.2)
BASOPHILS NFR BLD AUTO: 0 % (ref 0–1.5)
BUN SERPL-MCNC: 25 MG/DL (ref 8–23)
BUN/CREAT SERPL: 26.3 (ref 7–25)
CA-I SERPL ISE-MCNC: 1.22 MMOL/L (ref 1.2–1.3)
CALCIUM SPEC-SCNC: 9.3 MG/DL (ref 8.6–10.5)
CHLORIDE SERPL-SCNC: 102 MMOL/L (ref 98–107)
CO2 SERPL-SCNC: 34 MMOL/L (ref 22–29)
CREAT SERPL-MCNC: 0.95 MG/DL (ref 0.76–1.27)
DEPRECATED RDW RBC AUTO: 52.9 FL (ref 37–54)
EGFRCR SERPLBLD CKD-EPI 2021: 84.5 ML/MIN/1.73
EOSINOPHIL # BLD AUTO: 0 10*3/MM3 (ref 0–0.4)
EOSINOPHIL NFR BLD AUTO: 0 % (ref 0.3–6.2)
ERYTHROCYTE [DISTWIDTH] IN BLOOD BY AUTOMATED COUNT: 15.9 % (ref 12.3–15.4)
GLUCOSE BLDC GLUCOMTR-MCNC: 158 MG/DL (ref 70–105)
GLUCOSE BLDC GLUCOMTR-MCNC: 188 MG/DL (ref 70–105)
GLUCOSE BLDC GLUCOMTR-MCNC: 220 MG/DL (ref 70–105)
GLUCOSE BLDC GLUCOMTR-MCNC: 256 MG/DL (ref 70–105)
GLUCOSE BLDC GLUCOMTR-MCNC: 278 MG/DL (ref 70–105)
GLUCOSE BLDC GLUCOMTR-MCNC: 335 MG/DL (ref 70–105)
GLUCOSE BLDC GLUCOMTR-MCNC: 357 MG/DL (ref 70–105)
GLUCOSE SERPL-MCNC: 262 MG/DL (ref 65–99)
HCT VFR BLD AUTO: 35.5 % (ref 37.5–51)
HGB BLD-MCNC: 10.4 G/DL (ref 13–17.7)
IMM GRANULOCYTES # BLD AUTO: 0.03 10*3/MM3 (ref 0–0.05)
IMM GRANULOCYTES NFR BLD AUTO: 0.5 % (ref 0–0.5)
IRON 24H UR-MRATE: 44 MCG/DL (ref 59–158)
IRON SATN MFR SERPL: 9 % (ref 20–50)
LYMPHOCYTES # BLD AUTO: 0.38 10*3/MM3 (ref 0.7–3.1)
LYMPHOCYTES NFR BLD AUTO: 6.8 % (ref 19.6–45.3)
MCH RBC QN AUTO: 26.8 PG (ref 26.6–33)
MCHC RBC AUTO-ENTMCNC: 29.3 G/DL (ref 31.5–35.7)
MCV RBC AUTO: 91.5 FL (ref 79–97)
MONOCYTES # BLD AUTO: 0.18 10*3/MM3 (ref 0.1–0.9)
MONOCYTES NFR BLD AUTO: 3.2 % (ref 5–12)
NEUTROPHILS NFR BLD AUTO: 5.03 10*3/MM3 (ref 1.7–7)
NEUTROPHILS NFR BLD AUTO: 89.5 % (ref 42.7–76)
NRBC BLD AUTO-RTO: 0 /100 WBC (ref 0–0.2)
NT-PROBNP SERPL-MCNC: 2450 PG/ML (ref 0–900)
PLATELET # BLD AUTO: 194 10*3/MM3 (ref 140–450)
PMV BLD AUTO: 11.5 FL (ref 6–12)
POTASSIUM SERPL-SCNC: 4.6 MMOL/L (ref 3.5–5.2)
RBC # BLD AUTO: 3.88 10*6/MM3 (ref 4.14–5.8)
SODIUM SERPL-SCNC: 143 MMOL/L (ref 136–145)
TIBC SERPL-MCNC: 507 MCG/DL (ref 298–536)
TRANSFERRIN SERPL-MCNC: 340 MG/DL (ref 200–360)
VIT B12 BLD-MCNC: 329 PG/ML (ref 211–946)
WBC NRBC COR # BLD AUTO: 5.62 10*3/MM3 (ref 3.4–10.8)

## 2024-04-14 PROCEDURE — 94799 UNLISTED PULMONARY SVC/PX: CPT

## 2024-04-14 PROCEDURE — 94660 CPAP INITIATION&MGMT: CPT

## 2024-04-14 PROCEDURE — 63710000001 INSULIN LISPRO (HUMAN) PER 5 UNITS

## 2024-04-14 PROCEDURE — 99232 SBSQ HOSP IP/OBS MODERATE 35: CPT | Performed by: INTERNAL MEDICINE

## 2024-04-14 PROCEDURE — 25010000002 FUROSEMIDE PER 20 MG: Performed by: INTERNAL MEDICINE

## 2024-04-14 PROCEDURE — 83880 ASSAY OF NATRIURETIC PEPTIDE: CPT | Performed by: INTERNAL MEDICINE

## 2024-04-14 PROCEDURE — 94761 N-INVAS EAR/PLS OXIMETRY MLT: CPT

## 2024-04-14 PROCEDURE — 82948 REAGENT STRIP/BLOOD GLUCOSE: CPT

## 2024-04-14 PROCEDURE — 80048 BASIC METABOLIC PNL TOTAL CA: CPT | Performed by: HOSPITALIST

## 2024-04-14 PROCEDURE — 84466 ASSAY OF TRANSFERRIN: CPT | Performed by: HOSPITALIST

## 2024-04-14 PROCEDURE — 82330 ASSAY OF CALCIUM: CPT | Performed by: HOSPITALIST

## 2024-04-14 PROCEDURE — 94664 DEMO&/EVAL PT USE INHALER: CPT

## 2024-04-14 PROCEDURE — 25010000002 ENOXAPARIN PER 10 MG: Performed by: INTERNAL MEDICINE

## 2024-04-14 PROCEDURE — 85025 COMPLETE CBC W/AUTO DIFF WBC: CPT | Performed by: HOSPITALIST

## 2024-04-14 PROCEDURE — 25010000002 METHYLPREDNISOLONE PER 40 MG: Performed by: HOSPITALIST

## 2024-04-14 PROCEDURE — 82607 VITAMIN B-12: CPT | Performed by: HOSPITALIST

## 2024-04-14 PROCEDURE — 83540 ASSAY OF IRON: CPT | Performed by: HOSPITALIST

## 2024-04-14 RX ORDER — FUROSEMIDE 10 MG/ML
40 INJECTION INTRAMUSCULAR; INTRAVENOUS EVERY 12 HOURS
Status: DISCONTINUED | OUTPATIENT
Start: 2024-04-14 | End: 2024-04-16 | Stop reason: HOSPADM

## 2024-04-14 RX ORDER — METHYLPREDNISOLONE SODIUM SUCCINATE 40 MG/ML
40 INJECTION, POWDER, LYOPHILIZED, FOR SOLUTION INTRAMUSCULAR; INTRAVENOUS EVERY 8 HOURS
Status: DISCONTINUED | OUTPATIENT
Start: 2024-04-14 | End: 2024-04-15

## 2024-04-14 RX ORDER — DIPHENOXYLATE HYDROCHLORIDE AND ATROPINE SULFATE 2.5; .025 MG/1; MG/1
1 TABLET ORAL DAILY
Status: DISCONTINUED | OUTPATIENT
Start: 2024-04-14 | End: 2024-04-16 | Stop reason: HOSPADM

## 2024-04-14 RX ORDER — ENOXAPARIN SODIUM 100 MG/ML
40 INJECTION SUBCUTANEOUS EVERY 24 HOURS
Status: DISCONTINUED | OUTPATIENT
Start: 2024-04-14 | End: 2024-04-16 | Stop reason: HOSPADM

## 2024-04-14 RX ORDER — FUROSEMIDE 10 MG/ML
20 INJECTION INTRAMUSCULAR; INTRAVENOUS EVERY 12 HOURS
Status: DISCONTINUED | OUTPATIENT
Start: 2024-04-14 | End: 2024-04-14

## 2024-04-14 RX ORDER — FERROUS SULFATE 324(65)MG
324 TABLET, DELAYED RELEASE (ENTERIC COATED) ORAL EVERY OTHER DAY
Status: DISCONTINUED | OUTPATIENT
Start: 2024-04-14 | End: 2024-04-16 | Stop reason: HOSPADM

## 2024-04-14 RX ADMIN — BUDESONIDE 1 MG: 0.5 INHALANT RESPIRATORY (INHALATION) at 19:20

## 2024-04-14 RX ADMIN — GABAPENTIN 300 MG: 300 CAPSULE ORAL at 16:35

## 2024-04-14 RX ADMIN — PIOGLITAZONE 30 MG: 30 TABLET ORAL at 21:37

## 2024-04-14 RX ADMIN — Medication 10 ML: at 21:38

## 2024-04-14 RX ADMIN — FERROUS SULFATE TAB EC 324 MG (65 MG FE EQUIVALENT) 324 MG: 324 (65 FE) TABLET DELAYED RESPONSE at 12:22

## 2024-04-14 RX ADMIN — METHYLPREDNISOLONE SODIUM SUCCINATE 40 MG: 40 INJECTION, POWDER, FOR SOLUTION INTRAMUSCULAR; INTRAVENOUS at 12:22

## 2024-04-14 RX ADMIN — BUDESONIDE 1 MG: 0.5 INHALANT RESPIRATORY (INHALATION) at 07:18

## 2024-04-14 RX ADMIN — ENOXAPARIN SODIUM 40 MG: 100 INJECTION SUBCUTANEOUS at 16:19

## 2024-04-14 RX ADMIN — FUROSEMIDE 40 MG: 40 TABLET ORAL at 08:11

## 2024-04-14 RX ADMIN — GABAPENTIN 300 MG: 300 CAPSULE ORAL at 08:11

## 2024-04-14 RX ADMIN — SERTRALINE HYDROCHLORIDE 50 MG: 50 TABLET ORAL at 21:37

## 2024-04-14 RX ADMIN — IPRATROPIUM BROMIDE AND ALBUTEROL SULFATE 3 ML: .5; 3 SOLUTION RESPIRATORY (INHALATION) at 10:48

## 2024-04-14 RX ADMIN — METHYLPREDNISOLONE SODIUM SUCCINATE 40 MG: 40 INJECTION, POWDER, FOR SOLUTION INTRAMUSCULAR; INTRAVENOUS at 02:58

## 2024-04-14 RX ADMIN — THERA TABS 1 TABLET: TAB at 12:22

## 2024-04-14 RX ADMIN — IPRATROPIUM BROMIDE AND ALBUTEROL SULFATE 3 ML: .5; 3 SOLUTION RESPIRATORY (INHALATION) at 19:15

## 2024-04-14 RX ADMIN — INSULIN LISPRO 8 UNITS: 100 INJECTION, SOLUTION INTRAVENOUS; SUBCUTANEOUS at 21:46

## 2024-04-14 RX ADMIN — INSULIN LISPRO 6 UNITS: 100 INJECTION, SOLUTION INTRAVENOUS; SUBCUTANEOUS at 12:23

## 2024-04-14 RX ADMIN — GLIPIZIDE 5 MG: 5 TABLET ORAL at 08:11

## 2024-04-14 RX ADMIN — FUROSEMIDE 40 MG: 10 INJECTION, SOLUTION INTRAMUSCULAR; INTRAVENOUS at 13:51

## 2024-04-14 RX ADMIN — GLIPIZIDE 5 MG: 5 TABLET ORAL at 16:19

## 2024-04-14 RX ADMIN — Medication 10 ML: at 08:13

## 2024-04-14 RX ADMIN — INSULIN LISPRO 7 UNITS: 100 INJECTION, SOLUTION INTRAVENOUS; SUBCUTANEOUS at 16:20

## 2024-04-14 RX ADMIN — INSULIN LISPRO 4 UNITS: 100 INJECTION, SOLUTION INTRAVENOUS; SUBCUTANEOUS at 08:11

## 2024-04-14 RX ADMIN — IPRATROPIUM BROMIDE AND ALBUTEROL SULFATE 3 ML: .5; 3 SOLUTION RESPIRATORY (INHALATION) at 15:24

## 2024-04-14 RX ADMIN — Medication 5 MG: at 21:37

## 2024-04-14 RX ADMIN — METHYLPREDNISOLONE SODIUM SUCCINATE 40 MG: 40 INJECTION, POWDER, FOR SOLUTION INTRAMUSCULAR; INTRAVENOUS at 17:40

## 2024-04-14 RX ADMIN — AMLODIPINE BESYLATE 5 MG: 5 TABLET ORAL at 21:37

## 2024-04-14 RX ADMIN — ATORVASTATIN CALCIUM 10 MG: 10 TABLET, FILM COATED ORAL at 08:11

## 2024-04-14 RX ADMIN — GABAPENTIN 300 MG: 300 CAPSULE ORAL at 21:37

## 2024-04-14 RX ADMIN — IPRATROPIUM BROMIDE AND ALBUTEROL SULFATE 3 ML: .5; 3 SOLUTION RESPIRATORY (INHALATION) at 07:14

## 2024-04-14 NOTE — PROGRESS NOTES
"PULMONARY CRITICAL CARE PROGRESS  NOTE      PATIENT IDENTIFICATION:  Name: Brenden Peter  MRN: [id number]  :  1950     Age: 73 y.o.  Sex: male    DATE OF Note:  2024   Referring Physician: Dara Rodarte DO                  Subjective:   Feeling better, on HF O2 at 60%, no SOB, no chest or abd pain, no bowel or bladder issues      Objective:  tMax 24 hrs: Temp (24hrs), Av.6 °F (36.4 °C), Min:97.2 °F (36.2 °C), Max:98.2 °F (36.8 °C)      Vitals Ranges:   Temp:  [97.2 °F (36.2 °C)-98.2 °F (36.8 °C)] 97.5 °F (36.4 °C)  Heart Rate:  [63-92] 66  Resp:  [12-25] 17  BP: (111-143)/(48-71) 130/62    Intake and Output Last 3 Shifts:   I/O last 3 completed shifts:  In: 1220 [P.O.:1220]  Out: 2125 [Urine:2125]    Exam:  /62 (BP Location: Left arm, Patient Position: Lying)   Pulse 66   Temp 97.5 °F (36.4 °C) (Oral)   Resp 17   Ht 170.2 cm (67\")   Wt 95.3 kg (210 lb)   SpO2 96%   BMI 32.89 kg/m²     General Appearance:     HEENT:  Normocephalic, without obvious abnormality. Conjunctivae/corneas clear.  Normal external ear canals. Nares normal, no drainage     Neck:  Supple, symmetrical, trachea midline. No JVD.  Lungs /Chest wall:   Bilateral basal rhonchi, respirations unlabored, symmetrical wall movement.     Heart:  Regular rate and rhythm, systolic murmur PMI left sternal border  Abdomen: Soft, nontender, no masses, no organomegaly.    Extremities: Trace edema, no clubbing or cyanosis        Medications:  amLODIPine, 5 mg, Oral, Nightly  aspirin, 81 mg, Oral, Once per day on   atorvastatin, 10 mg, Oral, Daily  budesonide, 1 mg, Nebulization, BID - RT  ferrous sulfate, 324 mg, Oral, Every Other Day  furosemide, 40 mg, Oral, BID  gabapentin, 300 mg, Oral, TID  glipizide, 5 mg, Oral, BID AC  insulin lispro, 2-9 Units, Subcutaneous, 4x Daily AC & at Bedtime  ipratropium-albuterol, 3 mL, Nebulization, 4x Daily - RT  methylPREDNISolone sodium succinate, 40 mg, Intravenous, " Q8H  multivitamin, 1 tablet, Oral, Daily  pioglitazone, 30 mg, Oral, Nightly  sertraline, 50 mg, Oral, Nightly  sodium chloride, 10 mL, Intravenous, Q12H        Infusion:        PRN:    acetaminophen **OR** acetaminophen **OR** acetaminophen    senna-docusate sodium **AND** polyethylene glycol **AND** bisacodyl **AND** bisacodyl    dextrose    dextrose    glucagon (human recombinant)    ipratropium-albuterol    melatonin    nitroglycerin    ondansetron    sodium chloride    sodium chloride    sodium chloride  Data Review:  All labs (24hrs):   Recent Results (from the past 24 hour(s))   POC Glucose Once    Collection Time: 04/13/24 11:20 AM    Specimen: Blood   Result Value Ref Range    Glucose 158 (H) 70 - 105 mg/dL   POC Glucose Once    Collection Time: 04/13/24  4:35 PM    Specimen: Blood   Result Value Ref Range    Glucose 188 (H) 70 - 105 mg/dL   POC Glucose Once    Collection Time: 04/13/24  8:12 PM    Specimen: Blood   Result Value Ref Range    Glucose 256 (H) 70 - 105 mg/dL   POC Glucose Once    Collection Time: 04/13/24  9:53 PM    Specimen: Blood   Result Value Ref Range    Glucose 355 (H) 70 - 105 mg/dL   Basic Metabolic Panel    Collection Time: 04/14/24  3:19 AM    Specimen: Blood   Result Value Ref Range    Glucose 262 (H) 65 - 99 mg/dL    BUN 25 (H) 8 - 23 mg/dL    Creatinine 0.95 0.76 - 1.27 mg/dL    Sodium 143 136 - 145 mmol/L    Potassium 4.6 3.5 - 5.2 mmol/L    Chloride 102 98 - 107 mmol/L    CO2 34.0 (H) 22.0 - 29.0 mmol/L    Calcium 9.3 8.6 - 10.5 mg/dL    BUN/Creatinine Ratio 26.3 (H) 7.0 - 25.0    Anion Gap 7.0 5.0 - 15.0 mmol/L    eGFR 84.5 >60.0 mL/min/1.73   Calcium, Ionized    Collection Time: 04/14/24  3:19 AM    Specimen: Blood   Result Value Ref Range    Ionized Calcium 1.22 1.20 - 1.30 mmol/L   CBC Auto Differential    Collection Time: 04/14/24  3:19 AM    Specimen: Blood   Result Value Ref Range    WBC 5.62 3.40 - 10.80 10*3/mm3    RBC 3.88 (L) 4.14 - 5.80 10*6/mm3    Hemoglobin 10.4  (L) 13.0 - 17.7 g/dL    Hematocrit 35.5 (L) 37.5 - 51.0 %    MCV 91.5 79.0 - 97.0 fL    MCH 26.8 26.6 - 33.0 pg    MCHC 29.3 (L) 31.5 - 35.7 g/dL    RDW 15.9 (H) 12.3 - 15.4 %    RDW-SD 52.9 37.0 - 54.0 fl    MPV 11.5 6.0 - 12.0 fL    Platelets 194 140 - 450 10*3/mm3    Neutrophil % 89.5 (H) 42.7 - 76.0 %    Lymphocyte % 6.8 (L) 19.6 - 45.3 %    Monocyte % 3.2 (L) 5.0 - 12.0 %    Eosinophil % 0.0 (L) 0.3 - 6.2 %    Basophil % 0.0 0.0 - 1.5 %    Immature Grans % 0.5 0.0 - 0.5 %    Neutrophils, Absolute 5.03 1.70 - 7.00 10*3/mm3    Lymphocytes, Absolute 0.38 (L) 0.70 - 3.10 10*3/mm3    Monocytes, Absolute 0.18 0.10 - 0.90 10*3/mm3    Eosinophils, Absolute 0.00 0.00 - 0.40 10*3/mm3    Basophils, Absolute 0.00 0.00 - 0.20 10*3/mm3    Immature Grans, Absolute 0.03 0.00 - 0.05 10*3/mm3    nRBC 0.0 0.0 - 0.2 /100 WBC   Iron Profile    Collection Time: 04/14/24  3:19 AM    Specimen: Blood   Result Value Ref Range    Iron 44 (L) 59 - 158 mcg/dL    Iron Saturation (TSAT) 9 (L) 20 - 50 %    Transferrin 340 200 - 360 mg/dL    TIBC 507 298 - 536 mcg/dL   POC Glucose 4x Daily Before Meals & at Bedtime    Collection Time: 04/14/24  7:16 AM    Specimen: Blood   Result Value Ref Range    Glucose 220 (H) 70 - 105 mg/dL        Imaging:  XR Chest 1 View  Narrative: XR CHEST 1 VW    Date of Exam: 4/13/2024 2:31 AM EDT    Indication: Shortness of breath    Comparison: 1 day prior.    Findings:  Bilateral interstitial opacities appear similar, likely background fibrotic change with some superimposed interstitial edema. There is no effusion or pneumothorax. No new focal consolidation. Unchanged heart and mediastinal contours.  Impression: Impression:  Bilateral interstitial opacities appear similar, likely background fibrotic change with some superimposed interstitial edema. There is no effusion or pneumothorax. No new focal consolidation. Unchanged heart and mediastinal contours.    Electronically Signed: Ortiz Beltran MD    4/13/2024  8:14 AM EDT    Workstation ID: [id number]       ASSESSMENT:  Acute on chronic respiratory failure hypoxic patient was on 6 L and her  PO2 was 59   COPD exasperation    Obstructive sleep apnea   Congestive heart failure   diastolic per cardiac echo was done a year ago  Cor pulmonale   Diabetes mellitus   Hypertension        PLAN:  Wean O2 slowly to keep sats > 88%  OOB daily   Need to be evaluated for SONJA as outpatient  Bronchodilators  Inhaled corticosteroids  IV steroids   Incentive spirometer  Diuresis and monitor MIGDALIA's  Electrolytes/ glycemic control  No DVT and GI prophylaxis    Discussed with LENNIE Fleming    4/14/2024  10:40 EDT    I personally have examined  and interviewed the patient. I have reviewed the history, data, problems, assessment and plan with our NP.  Total Critical care time in direct medical management (   ) minutes, This time specifically excludes time spent performing procedures.    Jeannine Ford MD   4/14/2024  22:02 EDT

## 2024-04-14 NOTE — PROGRESS NOTES
[facility] MEDICINE SERVICE  DAILY PROGRESS NOTE    NAME: Brenden Peter  : 1950  MRN: [id number]      LOS: 2 days     PROVIDER OF SERVICE: Timothy Duane Brammell, MD    Chief Complaint: Acute on chronic respiratory failure    Subjective:     Interval History:  History taken from: patient  Patient Complaints: Patient without any acute issues.  He wore his positive pressure ventilation last evening and was able to sleep some.  He denies any severe shortness of breath but he desats very readily with minimal exertion on his high flow.  Respiratory attempted to turn down from 60 L flow.  There is no sputum production.  He is going to attempt to have a bowel movement later.  He is eating without issue.  Denies any pain complaints.  Nurses unaware of any other additional acute concerns.    Review of Systems:   Review of Systems   All other systems reviewed and are negative.      Objective:     Vital Signs  Temp:  [97.2 °F (36.2 °C)-98.2 °F (36.8 °C)] 97.5 °F (36.4 °C)  Heart Rate:  [63-92] 66  Resp:  [12-25] 17  BP: (111-143)/(48-71) 130/62  Flow (L/min):  [50-60] 50   Body mass index is 32.89 kg/m².    Physical Exam  Physical Exam  Constitutional:       Appearance: Normal appearance. He is obese.   HENT:      Head: Normocephalic.   Cardiovascular:      Rate and Rhythm: Normal rate.   Pulmonary:      Effort: Pulmonary effort is normal.      Breath sounds: Normal breath sounds.   Abdominal:      Palpations: Abdomen is soft.      Tenderness: There is no abdominal tenderness.   Musculoskeletal:         General: No swelling.   Neurological:      General: No focal deficit present.      Mental Status: He is alert and oriented to person, place, and time. Mental status is at baseline.   Psychiatric:         Mood and Affect: Mood normal.         Behavior: Behavior normal.         Scheduled Meds   amLODIPine, 5 mg, Oral, Nightly  aspirin, 81 mg, Oral, Once per day on   atorvastatin, 10 mg,  Oral, Daily  budesonide, 1 mg, Nebulization, BID - RT  furosemide, 40 mg, Oral, BID  gabapentin, 300 mg, Oral, TID  glipizide, 5 mg, Oral, BID AC  insulin lispro, 2-9 Units, Subcutaneous, 4x Daily AC & at Bedtime  ipratropium-albuterol, 3 mL, Nebulization, 4x Daily - RT  methylPREDNISolone sodium succinate, 40 mg, Intravenous, Q8H  pioglitazone, 30 mg, Oral, Nightly  sertraline, 50 mg, Oral, Nightly  sodium chloride, 10 mL, Intravenous, Q12H       PRN Meds     acetaminophen **OR** acetaminophen **OR** acetaminophen    senna-docusate sodium **AND** polyethylene glycol **AND** bisacodyl **AND** bisacodyl    dextrose    dextrose    glucagon (human recombinant)    ipratropium-albuterol    melatonin    nitroglycerin    ondansetron    sodium chloride    sodium chloride    sodium chloride   Infusions         Diagnostic Data    Results from last 7 days   Lab Units 04/14/24  0319 04/10/24  2018 04/10/24  1959   WBC 10*3/mm3 5.62   < > 6.72   HEMOGLOBIN g/dL 10.4*   < > 11.2*   HEMOGLOBIN, POC   --    < >  --    HEMATOCRIT % 35.5*   < > 37.7   HEMATOCRIT POC   --    < >  --    PLATELETS 10*3/mm3 194   < > 193   GLUCOSE mg/dL 262*   < > 108*   CREATININE mg/dL 0.95   < > 0.84   BUN mg/dL 25*   < > 16   SODIUM mmol/L 143   < > 145   POTASSIUM mmol/L 4.6   < > 3.7   AST (SGOT) U/L  --   --  18   ALT (SGPT) U/L  --   --  18   ALK PHOS U/L  --   --  78   BILIRUBIN mg/dL  --   --  0.5   ANION GAP mmol/L 7.0   < > 12.0    < > = values in this interval not displayed.       XR Chest 1 View    Result Date: 4/13/2024  Impression: Bilateral interstitial opacities appear similar, likely background fibrotic change with some superimposed interstitial edema. There is no effusion or pneumothorax. No new focal consolidation. Unchanged heart and mediastinal contours. Electronically Signed: Otriz Beltran MD  4/13/2024 8:14 AM EDT  Workstation ID: [id number]       None    Assessment:     1.  Acute on chronic hypercapnic hypoxemic respiratory  failure.  2.  COPD exacerbation  3.  Obstructive sleep apnea  4.  Acute on chronic diastolic congestive heart failure  5.  Pulmonary hypertension  6.  Type 2 diabetes   7.  Hypertension  8.  Obesity  9.  Anemia, iron deficiency     Plan: Chart reviewed.  Oral iron and multiple vitamin.  Ongoing attempts at diuresis.  Appreciate pulmonary and cardiology input.  IV steroids.  Nebulization therapy.  Not weaning from high flow well.  Ongoing attempts at nocturnal PPV.     Active and Resolved Problems  Active Hospital Problems    Diagnosis  POA    **Acute on chronic respiratory failure [J96.20]  Yes      Resolved Hospital Problems   No resolved problems to display.           DVT prophylaxis:  Mechanical DVT prophylaxis orders are present.         Code status is   Code Status and Medical Interventions:   Ordered at: 04/11/24 0041     Code Status (Patient has no pulse and is not breathing):    CPR (Attempt to Resuscitate)     Medical Interventions (Patient has pulse or is breathing):    Full Support       Plan for disposition:Will possible need pulmonary rehab for weaning in 3 to5  days    Time: 30 minutes    Signature: Electronically signed by Timothy Duane Brammell, MD, 04/14/24, 10:15 EDT.  [facility] Hospitalist Team

## 2024-04-14 NOTE — PLAN OF CARE
Goal Outcome Evaluation:   Pt alert and orient x 4. Pt able to make needs known verbally. Pt Complaint with plan of care at this time.

## 2024-04-14 NOTE — CASE MANAGEMENT/SOCIAL WORK
Continued Stay Note  MARIA GUADALUPE Sanabria     Patient Name: Brenden Peter  MRN: [id number]  Today's Date: 4/14/2024    Admit Date: 4/10/2024    Plan: Return home w/ wife. Home O2 4L & CPAP HS w/ [place].   Discharge Plan       Row Name 04/14/24 1156       Plan    Plan Comments DC barrier: 60L airvo                      Expected Discharge Date and Time       Expected Discharge Date Expected Discharge Time    Apr 15, 2024               Barbara Tovar RN

## 2024-04-15 LAB
ANION GAP SERPL CALCULATED.3IONS-SCNC: 8 MMOL/L (ref 5–15)
BASOPHILS # BLD AUTO: 0 10*3/MM3 (ref 0–0.2)
BASOPHILS NFR BLD AUTO: 0 % (ref 0–1.5)
BUN SERPL-MCNC: 33 MG/DL (ref 8–23)
BUN/CREAT SERPL: 41.8 (ref 7–25)
CALCIUM SPEC-SCNC: 9.2 MG/DL (ref 8.6–10.5)
CHLORIDE SERPL-SCNC: 104 MMOL/L (ref 98–107)
CO2 SERPL-SCNC: 30 MMOL/L (ref 22–29)
CREAT SERPL-MCNC: 0.79 MG/DL (ref 0.76–1.27)
DEPRECATED RDW RBC AUTO: 52.5 FL (ref 37–54)
EGFRCR SERPLBLD CKD-EPI 2021: 93.8 ML/MIN/1.73
EOSINOPHIL # BLD AUTO: 0 10*3/MM3 (ref 0–0.4)
EOSINOPHIL NFR BLD AUTO: 0 % (ref 0.3–6.2)
ERYTHROCYTE [DISTWIDTH] IN BLOOD BY AUTOMATED COUNT: 15.9 % (ref 12.3–15.4)
GLUCOSE BLDC GLUCOMTR-MCNC: 226 MG/DL (ref 70–105)
GLUCOSE BLDC GLUCOMTR-MCNC: 277 MG/DL (ref 70–105)
GLUCOSE BLDC GLUCOMTR-MCNC: 292 MG/DL (ref 70–105)
GLUCOSE BLDC GLUCOMTR-MCNC: 313 MG/DL (ref 70–105)
GLUCOSE BLDC GLUCOMTR-MCNC: 341 MG/DL (ref 70–105)
GLUCOSE SERPL-MCNC: 201 MG/DL (ref 65–99)
HCT VFR BLD AUTO: 33.6 % (ref 37.5–51)
HGB BLD-MCNC: 10 G/DL (ref 13–17.7)
IMM GRANULOCYTES # BLD AUTO: 0.07 10*3/MM3 (ref 0–0.05)
IMM GRANULOCYTES NFR BLD AUTO: 0.9 % (ref 0–0.5)
LYMPHOCYTES # BLD AUTO: 0.58 10*3/MM3 (ref 0.7–3.1)
LYMPHOCYTES NFR BLD AUTO: 7.1 % (ref 19.6–45.3)
MCH RBC QN AUTO: 26.7 PG (ref 26.6–33)
MCHC RBC AUTO-ENTMCNC: 29.8 G/DL (ref 31.5–35.7)
MCV RBC AUTO: 89.6 FL (ref 79–97)
MONOCYTES # BLD AUTO: 0.38 10*3/MM3 (ref 0.1–0.9)
MONOCYTES NFR BLD AUTO: 4.7 % (ref 5–12)
NEUTROPHILS NFR BLD AUTO: 7.11 10*3/MM3 (ref 1.7–7)
NEUTROPHILS NFR BLD AUTO: 87.3 % (ref 42.7–76)
NRBC BLD AUTO-RTO: 0 /100 WBC (ref 0–0.2)
PLATELET # BLD AUTO: 173 10*3/MM3 (ref 140–450)
PMV BLD AUTO: 11.5 FL (ref 6–12)
POTASSIUM SERPL-SCNC: 4.6 MMOL/L (ref 3.5–5.2)
RBC # BLD AUTO: 3.75 10*6/MM3 (ref 4.14–5.8)
SODIUM SERPL-SCNC: 142 MMOL/L (ref 136–145)
WBC NRBC COR # BLD AUTO: 8.14 10*3/MM3 (ref 3.4–10.8)

## 2024-04-15 PROCEDURE — 25010000002 ENOXAPARIN PER 10 MG: Performed by: INTERNAL MEDICINE

## 2024-04-15 PROCEDURE — 94660 CPAP INITIATION&MGMT: CPT

## 2024-04-15 PROCEDURE — 80048 BASIC METABOLIC PNL TOTAL CA: CPT | Performed by: HOSPITALIST

## 2024-04-15 PROCEDURE — 25010000002 FUROSEMIDE PER 20 MG: Performed by: INTERNAL MEDICINE

## 2024-04-15 PROCEDURE — 63710000001 INSULIN LISPRO (HUMAN) PER 5 UNITS: Performed by: INTERNAL MEDICINE

## 2024-04-15 PROCEDURE — 82948 REAGENT STRIP/BLOOD GLUCOSE: CPT

## 2024-04-15 PROCEDURE — 94664 DEMO&/EVAL PT USE INHALER: CPT

## 2024-04-15 PROCEDURE — 63710000001 INSULIN REGULAR HUMAN PER 5 UNITS: Performed by: HOSPITALIST

## 2024-04-15 PROCEDURE — 99232 SBSQ HOSP IP/OBS MODERATE 35: CPT | Performed by: NURSE PRACTITIONER

## 2024-04-15 PROCEDURE — 94799 UNLISTED PULMONARY SVC/PX: CPT

## 2024-04-15 PROCEDURE — 94761 N-INVAS EAR/PLS OXIMETRY MLT: CPT

## 2024-04-15 PROCEDURE — 25010000002 METHYLPREDNISOLONE PER 40 MG: Performed by: INTERNAL MEDICINE

## 2024-04-15 PROCEDURE — 97116 GAIT TRAINING THERAPY: CPT

## 2024-04-15 PROCEDURE — 85025 COMPLETE CBC W/AUTO DIFF WBC: CPT | Performed by: HOSPITALIST

## 2024-04-15 PROCEDURE — 63710000001 INSULIN LISPRO (HUMAN) PER 5 UNITS

## 2024-04-15 PROCEDURE — 25010000002 METHYLPREDNISOLONE PER 40 MG: Performed by: HOSPITALIST

## 2024-04-15 PROCEDURE — 97530 THERAPEUTIC ACTIVITIES: CPT

## 2024-04-15 PROCEDURE — 63710000001 INSULIN REGULAR HUMAN PER 5 UNITS: Performed by: INTERNAL MEDICINE

## 2024-04-15 PROCEDURE — 97110 THERAPEUTIC EXERCISES: CPT

## 2024-04-15 RX ORDER — SILDENAFIL CITRATE 20 MG/1
20 TABLET ORAL 3 TIMES DAILY
Status: DISCONTINUED | OUTPATIENT
Start: 2024-04-15 | End: 2024-04-16 | Stop reason: HOSPADM

## 2024-04-15 RX ORDER — METHYLPREDNISOLONE SODIUM SUCCINATE 40 MG/ML
40 INJECTION, POWDER, LYOPHILIZED, FOR SOLUTION INTRAMUSCULAR; INTRAVENOUS EVERY 8 HOURS
Status: DISCONTINUED | OUTPATIENT
Start: 2024-04-15 | End: 2024-04-16

## 2024-04-15 RX ORDER — DOXYCYCLINE 100 MG/1
100 CAPSULE ORAL EVERY 12 HOURS SCHEDULED
Qty: 10 CAPSULE | Refills: 0 | Status: DISCONTINUED | OUTPATIENT
Start: 2024-04-15 | End: 2024-04-16 | Stop reason: HOSPADM

## 2024-04-15 RX ORDER — INSULIN LISPRO 100 [IU]/ML
3-14 INJECTION, SOLUTION INTRAVENOUS; SUBCUTANEOUS
Status: DISCONTINUED | OUTPATIENT
Start: 2024-04-15 | End: 2024-04-16 | Stop reason: DRUGHIGH

## 2024-04-15 RX ADMIN — DOXYCYCLINE 100 MG: 100 CAPSULE ORAL at 17:15

## 2024-04-15 RX ADMIN — INSULIN HUMAN 4 UNITS: 100 INJECTION, SOLUTION PARENTERAL at 02:24

## 2024-04-15 RX ADMIN — GLIPIZIDE 5 MG: 5 TABLET ORAL at 17:00

## 2024-04-15 RX ADMIN — INSULIN LISPRO 4 UNITS: 100 INJECTION, SOLUTION INTRAVENOUS; SUBCUTANEOUS at 08:21

## 2024-04-15 RX ADMIN — ASPIRIN 81 MG: 81 TABLET, COATED ORAL at 08:22

## 2024-04-15 RX ADMIN — GABAPENTIN 300 MG: 300 CAPSULE ORAL at 17:00

## 2024-04-15 RX ADMIN — Medication 5 MG: at 21:23

## 2024-04-15 RX ADMIN — GABAPENTIN 300 MG: 300 CAPSULE ORAL at 21:23

## 2024-04-15 RX ADMIN — FUROSEMIDE 40 MG: 10 INJECTION, SOLUTION INTRAMUSCULAR; INTRAVENOUS at 02:24

## 2024-04-15 RX ADMIN — INSULIN LISPRO 10 UNITS: 100 INJECTION, SOLUTION INTRAVENOUS; SUBCUTANEOUS at 21:23

## 2024-04-15 RX ADMIN — ENOXAPARIN SODIUM 40 MG: 100 INJECTION SUBCUTANEOUS at 17:00

## 2024-04-15 RX ADMIN — INSULIN LISPRO 8 UNITS: 100 INJECTION, SOLUTION INTRAVENOUS; SUBCUTANEOUS at 11:45

## 2024-04-15 RX ADMIN — FUROSEMIDE 40 MG: 10 INJECTION, SOLUTION INTRAMUSCULAR; INTRAVENOUS at 17:16

## 2024-04-15 RX ADMIN — IPRATROPIUM BROMIDE AND ALBUTEROL SULFATE 3 ML: .5; 3 SOLUTION RESPIRATORY (INHALATION) at 10:19

## 2024-04-15 RX ADMIN — SILDENAFIL CITRATE 20 MG: 20 TABLET ORAL at 21:24

## 2024-04-15 RX ADMIN — IPRATROPIUM BROMIDE AND ALBUTEROL SULFATE 3 ML: .5; 3 SOLUTION RESPIRATORY (INHALATION) at 19:59

## 2024-04-15 RX ADMIN — ATORVASTATIN CALCIUM 10 MG: 10 TABLET, FILM COATED ORAL at 08:22

## 2024-04-15 RX ADMIN — GABAPENTIN 300 MG: 300 CAPSULE ORAL at 08:22

## 2024-04-15 RX ADMIN — INSULIN LISPRO 10 UNITS: 100 INJECTION, SOLUTION INTRAVENOUS; SUBCUTANEOUS at 17:14

## 2024-04-15 RX ADMIN — SERTRALINE HYDROCHLORIDE 50 MG: 50 TABLET ORAL at 21:23

## 2024-04-15 RX ADMIN — METHYLPREDNISOLONE SODIUM SUCCINATE 40 MG: 40 INJECTION, POWDER, FOR SOLUTION INTRAMUSCULAR; INTRAVENOUS at 17:40

## 2024-04-15 RX ADMIN — THERA TABS 1 TABLET: TAB at 08:22

## 2024-04-15 RX ADMIN — Medication 10 ML: at 21:24

## 2024-04-15 RX ADMIN — Medication 10 ML: at 08:20

## 2024-04-15 RX ADMIN — METHYLPREDNISOLONE SODIUM SUCCINATE 40 MG: 40 INJECTION, POWDER, FOR SOLUTION INTRAMUSCULAR; INTRAVENOUS at 02:24

## 2024-04-15 RX ADMIN — INSULIN HUMAN 6 UNITS: 100 INJECTION, SOLUTION PARENTERAL at 17:41

## 2024-04-15 RX ADMIN — IPRATROPIUM BROMIDE AND ALBUTEROL SULFATE 3 ML: .5; 3 SOLUTION RESPIRATORY (INHALATION) at 14:30

## 2024-04-15 RX ADMIN — PIOGLITAZONE 30 MG: 30 TABLET ORAL at 21:23

## 2024-04-15 RX ADMIN — IPRATROPIUM BROMIDE AND ALBUTEROL SULFATE 3 ML: .5; 3 SOLUTION RESPIRATORY (INHALATION) at 06:50

## 2024-04-15 RX ADMIN — GLIPIZIDE 5 MG: 5 TABLET ORAL at 08:22

## 2024-04-15 RX ADMIN — BUDESONIDE 0.5 MG: 0.5 INHALANT RESPIRATORY (INHALATION) at 06:54

## 2024-04-15 RX ADMIN — METHYLPREDNISOLONE SODIUM SUCCINATE 40 MG: 40 INJECTION, POWDER, FOR SOLUTION INTRAMUSCULAR; INTRAVENOUS at 11:46

## 2024-04-15 RX ADMIN — BUDESONIDE 1 MG: 0.5 INHALANT RESPIRATORY (INHALATION) at 19:53

## 2024-04-15 RX ADMIN — INSULIN HUMAN 6 UNITS: 100 INJECTION, SOLUTION PARENTERAL at 11:45

## 2024-04-15 NOTE — PROGRESS NOTES
Daily Progress Note          Assessment    Acute on chronic respiratory failure hypoxic patient was on 6 L and her  PO2 was 59   COPD exasperation    Obstructive sleep apnea   Congestive heart failure   diastolic per cardiac echo was done a year ago  Cor pulmonale   Pulmonary HTN RVSP 81 mmHg  Diabetes mellitus   Hypertension    Results for orders placed during the hospital encounter of 04/10/24    Adult Transthoracic Echo Complete W/ Cont if Necessary Per Protocol    Interpretation Summary    Left ventricular systolic function is normal. Left ventricular ejection fraction appears to be 61 - 65%.    Left ventricular wall thickness is consistent with mild concentric hypertrophy.    Left ventricular diastolic function was normal.    The right ventricular cavity is mild to moderately dilated.    Estimated right ventricular systolic pressure from tricuspid regurgitation is markedly elevated (>55 mmHg). Calculated right ventricular systolic pressure from tricuspid regurgitation is 81 mmHg.            PLAN:  Wean O2 slowly to keep sats > 88%: currently on 12 L  OOB daily   Add Sildenafil   Need to be evaluated for SONJA as outpatient  Bronchodilators  Inhaled corticosteroids  IV steroids   Incentive spirometer  Diuresis and monitor MIGDALIA's  Electrolytes/ glycemic control           LOS: 3 days     Subjective     Gradual improvement in SOA    Objective     Vital signs for last 24 hours:  Vitals:    04/15/24 1100 04/15/24 1430 04/15/24 1433 04/15/24 1509   BP: 123/55   128/60   BP Location: Right arm   Right arm   Patient Position: Lying   Lying   Pulse:  86 86 90   Resp: 20 20 20 21   Temp:    97.8 °F (36.6 °C)   TempSrc:    Oral   SpO2:  98% 90% 90%   Weight:       Height:           Intake/Output last 3 shifts:  I/O last 3 completed shifts:  In: 1080 [P.O.:1080]  Out: 1150 [Urine:1150]  Intake/Output this shift:  I/O this shift:  In: 600 [P.O.:600]  Out: 300 [Urine:300]      Radiology  Imaging Results (Last 24 Hours)       **  No results found for the last 24 hours. **            Labs:  Results from last 7 days   Lab Units 04/15/24  0526   WBC 10*3/mm3 8.14   HEMOGLOBIN g/dL 10.0*   HEMATOCRIT % 33.6*   PLATELETS 10*3/mm3 173     Results from last 7 days   Lab Units 04/15/24  0526 04/10/24  2018 04/10/24  1959   SODIUM mmol/L 142   < > 145   POTASSIUM mmol/L 4.6   < > 3.7   CHLORIDE mmol/L 104   < > 103   CO2 mmol/L 30.0*   < > 30.0*   BUN mg/dL 33*   < > 16   CREATININE mg/dL 0.79   < > 0.84   CALCIUM mg/dL 9.2   < > 9.6   BILIRUBIN mg/dL  --   --  0.5   ALK PHOS U/L  --   --  78   ALT (SGPT) U/L  --   --  18   AST (SGOT) U/L  --   --  18   GLUCOSE mg/dL 201*   < > 108*    < > = values in this interval not displayed.     Results from last 7 days   Lab Units 04/11/24  2134   PH, ARTERIAL pH units 7.401   PO2 ART mm Hg 66.6*   PCO2, ARTERIAL mm Hg 53.2*   HCO3 ART mmol/L 33.1*     Results from last 7 days   Lab Units 04/10/24  1959   ALBUMIN g/dL 4.0     Results from last 7 days   Lab Units 04/10/24  1959   HSTROP T ng/L 36*                           Meds:   SCHEDULE  amLODIPine, 5 mg, Oral, Nightly  aspirin, 81 mg, Oral, Once per day on Monday Wednesday Friday  atorvastatin, 10 mg, Oral, Daily  budesonide, 1 mg, Nebulization, BID - RT  doxycycline, 100 mg, Oral, Q12H  enoxaparin, 40 mg, Subcutaneous, Q24H  ferrous sulfate, 324 mg, Oral, Every Other Day  furosemide, 40 mg, Intravenous, Q12H  gabapentin, 300 mg, Oral, TID  glipizide, 5 mg, Oral, BID AC  insulin lispro, 3-14 Units, Subcutaneous, 4x Daily AC & at Bedtime  methylPREDNISolone sodium succinate, 40 mg, Intravenous, Q8H   And  insulin regular, 6 Units, Subcutaneous, Q8H  ipratropium-albuterol, 3 mL, Nebulization, 4x Daily - RT  multivitamin, 1 tablet, Oral, Daily  pioglitazone, 30 mg, Oral, Nightly  sertraline, 50 mg, Oral, Nightly  sodium chloride, 10 mL, Intravenous, Q12H      Infusions  Pharmacy Consult - Steroid Insulin Protocol,       PRNs    acetaminophen **OR**  acetaminophen **OR** acetaminophen    senna-docusate sodium **AND** polyethylene glycol **AND** bisacodyl **AND** bisacodyl    dextrose    dextrose    glucagon (human recombinant)    ipratropium-albuterol    melatonin    nitroglycerin    ondansetron    Pharmacy Consult - Steroid Insulin Protocol    sodium chloride    sodium chloride    sodium chloride    Physical Exam:  General Appearance:  Alert   HEENT:  Normocephalic, without obvious abnormality, Conjunctiva/corneas clear,.   Nares normal, no drainage     Neck:  Supple, symmetrical, trachea midline.   Lungs /Chest wall:   Bilateral basal rhonchi, respirations unlabored, symmetrical wall movement.     Heart:  Regular rate and rhythm, S1 S2 normal  Abdomen: Soft, non-tender, no masses, no organomegaly.    Extremities: No edema, no clubbing or cyanosis     ROS  Constitutional: Negative for chills, fever and malaise/fatigue.   HENT: Negative.    Eyes: Negative.    Cardiovascular: Negative.    Respiratory: Positive for cough and shortness of breath.    Skin: Negative.    Musculoskeletal: Negative.    Gastrointestinal: Negative.    Genitourinary: Negative.    Neurological: Negative.    Psychiatric/Behavioral: Negative.      I reviewed the recent clinical results  I personally reviewed the latest radiological studies    Part of this note may be an electronic transcription/translation of spoken language to printed text using the Dragon Dictation System.

## 2024-04-15 NOTE — PROGRESS NOTES
[facility] MEDICINE SERVICE  DAILY PROGRESS NOTE    NAME: Brenden Peter  : 1950  MRN: [id number]      LOS: 3 days     PROVIDER OF SERVICE: Joey Zimmerman MD    Chief Complaint: Acute on chronic respiratory failure    History of Present Illness: Brenden Peter is a 73 y.o. male with a previous medical history of COPD, CHF, on home O2 of 4L per NC who presented to [facility] on 4/10/2024 with worsening shortness of breath over the past week.  He denies chest pain, cough, fever.     In the ED, ABG showed pH 7.43, CO2 47.5, pO2 59.9, Troponin 36, BNP 2883, DDimer 1.7. Otherwise, labs are unremarkable. CT of the chest shows no PE, mild interstitial pulmonary edema and cardiomegaly.  He is afebrile, 94% on O2 at 6L per NC.  Otherwise, vitals are stable.  He received Lasix in the ED.  Hospitalist was consulted for further management.     Subjective:     Interval History:      4/15-I am seeing this patient for the 1st time whole chart was reviewed.  Patient was admitted with shortness of breath and respiratory failure due to COPD exacerbation.  Patient is feeling better but still requiring oxygen    Review of Systems:   Negative except what is listed above     Objective:     Vital Signs  Temp:  [97.8 °F (36.6 °C)-98.6 °F (37 °C)] 97.8 °F (36.6 °C)  Heart Rate:  [70-90] 90  Resp:  [14-21] 21  BP: (114-129)/(52-66) 128/60  Flow (L/min):  [10-50] 12   Body mass index is 32.89 kg/m².    Physical Exam    General Appearance:    Alert, cooperative, in no acute distress   Head:    Normocephalic, without obvious abnormality, atraumatic   Eyes:            conjunctivae and sclerae normal, no   icterus, no pallor, corneas  clear, PERRLA   Neck:   No adenopathy, supple, trachea midline, no thyromegaly, no   carotid bruit, no JVD   Lungs:     Clear to auscultation,respirations regular, even and                  unlabored    Heart:    Regular rhythm and normal rate, normal S1 and S2, no             murmur, no gallop, no rub, no click   Abdomen:     Normal bowel sounds, no masses, no organomegaly, soft        non-tender, non-distended, no guarding, no rebound                No tenderness   Extremities:   Moves all extremities well, no edema, no cyanosis, no             redness   Lymph nodes:   No palpable adenopathy   Neurologic:   Cranial nerves 2 - 12 grossly intact, sensation intact, DTR       present and equal bilaterally       Scheduled Meds   amLODIPine, 5 mg, Oral, Nightly  aspirin, 81 mg, Oral, Once per day on Monday Wednesday Friday  atorvastatin, 10 mg, Oral, Daily  budesonide, 1 mg, Nebulization, BID - RT  enoxaparin, 40 mg, Subcutaneous, Q24H  ferrous sulfate, 324 mg, Oral, Every Other Day  furosemide, 40 mg, Intravenous, Q12H  gabapentin, 300 mg, Oral, TID  glipizide, 5 mg, Oral, BID AC  insulin lispro, 3-14 Units, Subcutaneous, 4x Daily AC & at Bedtime  methylPREDNISolone sodium succinate, 40 mg, Intravenous, Q8H   And  insulin regular, 6 Units, Subcutaneous, Q8H  ipratropium-albuterol, 3 mL, Nebulization, 4x Daily - RT  multivitamin, 1 tablet, Oral, Daily  pioglitazone, 30 mg, Oral, Nightly  sertraline, 50 mg, Oral, Nightly  sodium chloride, 10 mL, Intravenous, Q12H       PRN Meds     acetaminophen **OR** acetaminophen **OR** acetaminophen    senna-docusate sodium **AND** polyethylene glycol **AND** bisacodyl **AND** bisacodyl    dextrose    dextrose    glucagon (human recombinant)    ipratropium-albuterol    melatonin    nitroglycerin    ondansetron    Pharmacy Consult - Steroid Insulin Protocol    sodium chloride    sodium chloride    sodium chloride   Infusions  Pharmacy Consult - Steroid Insulin Protocol,           Diagnostic Data    Results from last 7 days   Lab Units 04/15/24  0526 04/10/24  2018 04/10/24  1959   WBC 10*3/mm3 8.14   < > 6.72   HEMOGLOBIN g/dL 10.0*   < > 11.2*   HEMOGLOBIN, POC   --    < >  --    HEMATOCRIT % 33.6*   < > 37.7   HEMATOCRIT POC   --    < >  --    PLATELETS  10*3/mm3 173   < > 193   GLUCOSE mg/dL 201*   < > 108*   CREATININE mg/dL 0.79   < > 0.84   BUN mg/dL 33*   < > 16   SODIUM mmol/L 142   < > 145   POTASSIUM mmol/L 4.6   < > 3.7   AST (SGOT) U/L  --   --  18   ALT (SGPT) U/L  --   --  18   ALK PHOS U/L  --   --  78   BILIRUBIN mg/dL  --   --  0.5   ANION GAP mmol/L 8.0   < > 12.0    < > = values in this interval not displayed.       No radiology results for the last day      Assessment/Plan:     Active and Resolved Problems    Shortness of breath seems multifactorial  COPD exacerbation  Acute on chronic diastolic CHF  Core pulmonary in  Pulmonary hypertension  Morbid obesity-- with possible SONJA  Trach aspirate regurgitation  Diabetes mellitus  Chronic anemia    Suggestion:    4/15.  At this time I will continue current supportive care.  Start empiric antibiotic in the form of doxycycline.  Patient will need sleep study as an outpatient to rule out SONJA.  Decision-making high severity    DVT prophylaxis:  Medical and mechanical DVT prophylaxis orders are present.         Code status is   Code Status and Medical Interventions:   Ordered at: 04/11/24 0041     Code Status (Patient has no pulse and is not breathing):    CPR (Attempt to Resuscitate)     Medical Interventions (Patient has pulse or is breathing):    Full Support       Plan for disposition: in next 2-3 days    Time: 30 minutes    Signature: Electronically signed by Joey Zimmerman MD, 04/15/24, 16:22 EDT.  [facility] Hospitalist Team

## 2024-04-15 NOTE — PLAN OF CARE
Problem: Adult Inpatient Plan of Care  Goal: Plan of Care Review  Outcome: Ongoing, Progressing  Flowsheets (Taken 4/14/2024 2300)  Progress: improving  Plan of Care Reviewed With: patient  Goal: Patient-Specific Goal (Individualized)  Outcome: Ongoing, Progressing  Goal: Absence of Hospital-Acquired Illness or Injury  Outcome: Ongoing, Progressing  Intervention: Identify and Manage Fall Risk  Recent Flowsheet Documentation  Taken 4/14/2024 2223 by Lilia Santizo RN  Safety Promotion/Fall Prevention:   assistive device/personal items within reach   clutter free environment maintained   nonskid shoes/slippers when out of bed   room organization consistent   safety round/check completed  Taken 4/14/2024 2040 by Lilia Santizo RN  Safety Promotion/Fall Prevention:   assistive device/personal items within reach   clutter free environment maintained   nonskid shoes/slippers when out of bed   room organization consistent   safety round/check completed  Intervention: Prevent Skin Injury  Recent Flowsheet Documentation  Taken 4/14/2024 2040 by Lilia Santizo RN  Body Position:   position changed independently   head facing, right   side-lying  Intervention: Prevent and Manage VTE (Venous Thromboembolism) Risk  Recent Flowsheet Documentation  Taken 4/14/2024 2040 by Lilia Santizo RN  Activity Management: activity encouraged  VTE Prevention/Management: sequential compression devices off  Range of Motion: active ROM (range of motion) encouraged  Intervention: Prevent Infection  Recent Flowsheet Documentation  Taken 4/14/2024 2223 by Lilia Santizo RN  Infection Prevention:   environmental surveillance performed   hand hygiene promoted   single patient room provided  Taken 4/14/2024 2040 by Lilia Santizo RN  Infection Prevention:   environmental surveillance performed   hand hygiene promoted   single patient room provided  Goal: Optimal Comfort and Wellbeing  Outcome: Ongoing, Progressing  Intervention: Provide  Person-Centered Care  Recent Flowsheet Documentation  Taken 4/14/2024 2040 by Lilia Santizo RN  Trust Relationship/Rapport:   care explained   choices provided  Goal: Readiness for Transition of Care  Outcome: Ongoing, Progressing     Problem: COPD (Chronic Obstructive Pulmonary Disease) Comorbidity  Goal: Maintenance of COPD Symptom Control  Outcome: Ongoing, Progressing  Intervention: Maintain COPD-Symptom Control  Recent Flowsheet Documentation  Taken 4/14/2024 2223 by Lilia Santizo RN  Medication Review/Management:   medications reviewed   high-risk medications identified  Taken 4/14/2024 2040 by Lilia Santizo RN  Supportive Measures: active listening utilized  Medication Review/Management:   medications reviewed   high-risk medications identified     Problem: Hypertension Comorbidity  Goal: Blood Pressure in Desired Range  Outcome: Ongoing, Progressing  Intervention: Maintain Blood Pressure Management  Recent Flowsheet Documentation  Taken 4/14/2024 2223 by Lilia Santizo RN  Medication Review/Management:   medications reviewed   high-risk medications identified  Taken 4/14/2024 2040 by Lilia Santizo RN  Medication Review/Management:   medications reviewed   high-risk medications identified     Problem: Noninvasive Ventilation Acute  Goal: Effective Unassisted Ventilation and Oxygenation  Outcome: Ongoing, Progressing  Intervention: Monitor and Manage Noninvasive Ventilation  Recent Flowsheet Documentation  Taken 4/14/2024 2040 by Lilia Santizo RN  Airway/Ventilation Management:   airway patency maintained   calming measures promoted     Problem: Fall Injury Risk  Goal: Absence of Fall and Fall-Related Injury  Outcome: Ongoing, Progressing  Intervention: Identify and Manage Contributors  Recent Flowsheet Documentation  Taken 4/14/2024 2223 by Lilia Santizo RN  Medication Review/Management:   medications reviewed   high-risk medications identified  Taken 4/14/2024 2040 by Lilia Santizo  RN  Medication Review/Management:   medications reviewed   high-risk medications identified  Self-Care Promotion: independence encouraged  Intervention: Promote Injury-Free Environment  Recent Flowsheet Documentation  Taken 4/14/2024 2223 by Lilia Santizo RN  Safety Promotion/Fall Prevention:   assistive device/personal items within reach   clutter free environment maintained   nonskid shoes/slippers when out of bed   room organization consistent   safety round/check completed  Taken 4/14/2024 2040 by Lilia Santizo RN  Safety Promotion/Fall Prevention:   assistive device/personal items within reach   clutter free environment maintained   nonskid shoes/slippers when out of bed   room organization consistent   safety round/check completed   Goal Outcome Evaluation:  Plan of Care Reviewed With: patient        Progress: improving     Alert and oriented x 4. Able to verbalize needs and wants. Takes medication whole and tolerates well. Continent of bowel and bladder. No s/s of hyper/hypoglycemia noted. No c/o pain/discomfort/SOB noted. Continues to require O2 therapy via [facility], was titrated from 50/65 to 50/64 and tolerating well. Per pulmonology patient needs to maintain O2 sats of 88% or better. Continues to be followed by pulmonology and cardiology. Per case management, patient will discharge home with wife and [place] will handle O2 therapy needs. Currently in bed, eyes closed. Rise and fall of chest observed. Call bell in reach.

## 2024-04-15 NOTE — TREATMENT PLAN
Objective:     Bed mobility - Independent  Transfers - Independent  Ambulation - 40 feet Independent    Therapeutic Exercise - 10 Reps Bilaterally AROM supported sitting / chair      Assessment: Brenden Peter presents with functional mobility impairments which indicate the need for skilled intervention. Pt demonstrates independence with functional mobility this date. Improving 02 with mobility on 12L/min high flow dropping to 88 after gait and recovering to 95 within 1 minute. Pt appropriate for discharge home with outpatient pulmonary rehab once medically appropriate. Tolerating session today without incident. Will continue to follow and progress as tolerated.

## 2024-04-15 NOTE — THERAPY TREATMENT NOTE
"Subjective: Pt agreeable to therapeutic plan of care.    Objective:     Bed mobility - Independent  Transfers - Independent  Ambulation - 40 feet Independent    Therapeutic Exercise - 10 Reps Bilaterally AROM supported sitting / chair    Vitals: Desaturates While on 12L/min HF Pt 02 desaturates to 88 post gait and recovers to 95 within 1 minute with cues for box breathing    Pain: 0 VAS   Location: n/a  Intervention for pain: N/A    Education: Provided education on the importance of mobility in the acute care setting and Energy conservation strategies    Assessment: Brenden Peter presents with functional mobility impairments which indicate the need for skilled intervention. Pt demonstrates independence with functional mobility this date. Improving 02 with mobility on 12L/min high flow dropping to 88 after gait and recovering to 95 within 1 minute. Pt appropriate for discharge home with outpatient pulmonary rehab once medically appropriate. Tolerating session today without incident. Will continue to follow and progress as tolerated.     Plan/Recommendations:   If medically appropriate, Low Intensity Therapy recommended post-acute care - This is recommended as therapy feels this patient would require 2-3 visits per week. OP or HH would be the best option depending on patient's home bound status. Consider, if the patient has other  \"skilled\" needs such as wounds, IV antibiotics, etc. Combined with \"low intensity\" could also equate to a SNF. If patient is medically complex, consider LTAC. Pt requires no DME at discharge.     Pt desires Home and Outpatient therapy at discharge. Pt cooperative; agreeable to therapeutic recommendations and plan of care.         Basic Mobility 6-click:  Rollin = Total, A lot = 2, A little = 3; 4 = None  Supine>Sit:   1 = Total, A lot = 2, A little = 3; 4 = None   Sit>Stand with arms:  1 = Total, A lot = 2, A little = 3; 4 = None  Bed>Chair:   1 = Total, A lot = 2, A little = " 3; 4 = None  Ambulate in room:  1 = Total, A lot = 2, A little = 3; 4 = None  3-5 Steps with railin = Total, A lot = 2, A little = 3; 4 = None  Score: 24    Modified [place]: N/A = No pre-op stroke/TIA    Post-Tx Position: Up in Chair and Call light and personal items within reach  PPE: gloves

## 2024-04-15 NOTE — CASE MANAGEMENT/SOCIAL WORK
Continued Stay Note  MARIA GUADALUPE Sanabria     Patient Name: Brenden Peter  MRN: [id number]  Today's Date: 4/15/2024    Admit Date: 4/10/2024    Plan: Return home w/ wife. Home O2 4L & CPAP HS w/ [facility].   Discharge Plan       Row Name 04/15/24 1617       Plan    Plan Comments DC barriers: Cardio and Pulm following. O2 titrated to 12L O2 high flow NC.             Megan Naegele, RN     Office Phone: [phone]  Office Cell: [phone]

## 2024-04-15 NOTE — PROGRESS NOTES
LOS: 3 days   Admitting Physician- Joey Zimmerman MD    Reason For Followup:    Shortness of breath  Pulmonary hypertension  Hyperlipidemia    Subjective     Shortness of breath is better.    Objective     Blood pressure is stable    Review of Systems:   Review of Systems   Constitutional: Negative for chills and fever.   HENT:  Negative for ear discharge and nosebleeds.    Eyes:  Negative for discharge and redness.   Cardiovascular:  Negative for chest pain, orthopnea, palpitations, paroxysmal nocturnal dyspnea and syncope.   Respiratory:  Positive for shortness of breath. Negative for cough and wheezing.    Endocrine: Negative for heat intolerance.   Skin:  Negative for rash.   Musculoskeletal:  Negative for arthritis and myalgias.   Gastrointestinal:  Negative for abdominal pain, melena, nausea and vomiting.   Genitourinary:  Negative for dysuria and hematuria.   Neurological:  Negative for dizziness, light-headedness, numbness and tremors.   Psychiatric/Behavioral:  Negative for depression. The patient is not nervous/anxious.          Vital Signs  Vitals:    04/15/24 0654 04/15/24 0656 04/15/24 0733 04/15/24 0824   BP:   114/52    BP Location:   Right arm    Patient Position:   Lying    Pulse: 74 73 70    Resp: 18 18 16 17   Temp:   97.8 °F (36.6 °C)    TempSrc:   Oral    SpO2: 94% 92% 100%    Weight:       Height:         Wt Readings from Last 1 Encounters:   04/11/24 95.3 kg (210 lb)       Intake/Output Summary (Last 24 hours) at 4/15/2024 0932  Last data filed at 4/15/2024 0733  Gross per 24 hour   Intake 480 ml   Output 900 ml   Net -420 ml     Physical Exam:  Constitutional:       Appearance: Well-developed.   Eyes:      General: No scleral icterus.        Right eye: No discharge.   HENT:      Head: Normocephalic and atraumatic.   Neck:      Thyroid: No thyromegaly.      Lymphadenopathy: No cervical adenopathy.   Pulmonary:      Effort: Pulmonary effort is normal. No respiratory distress.       Breath sounds: Normal breath sounds. No wheezing. No rales.   Cardiovascular:      Normal rate. Regular rhythm.      No gallop.    Edema:     Peripheral edema absent.   Abdominal:      Tenderness: There is no abdominal tenderness.   Skin:     Findings: No erythema or rash.   Neurological:      Mental Status: Alert and oriented to person, place, and time.         Results Review:   Lab Results (last 24 hours)       Procedure Component Value Units Date/Time    POC Glucose 4x Daily Before Meals & at Bedtime [[id number]]  (Abnormal) Collected: 04/15/24 0730    Specimen: Blood Updated: 04/15/24 0733     Glucose 226 mg/dL      Comment: Serial Number: [id number]:  [id number]       Basic Metabolic Panel [[id number]]  (Abnormal) Collected: 04/15/24 0526    Specimen: Blood from Hand, Right Updated: 04/15/24 0621     Glucose 201 mg/dL      BUN 33 mg/dL      Creatinine 0.79 mg/dL      Sodium 142 mmol/L      Potassium 4.6 mmol/L      Chloride 104 mmol/L      CO2 30.0 mmol/L      Calcium 9.2 mg/dL      BUN/Creatinine Ratio 41.8     Anion Gap 8.0 mmol/L      eGFR 93.8 mL/min/1.73     Narrative:      GFR Normal >60  Chronic Kidney Disease <60  Kidney Failure <15    The GFR formula is only valid for adults with stable renal function between ages 18 and 70.    CBC & Differential [[id number]]  (Abnormal) Collected: 04/15/24 0526    Specimen: Blood from Hand, Right Updated: 04/15/24 0553    Narrative:      The following orders were created for panel order CBC & Differential.  Procedure                               Abnormality         Status                     ---------                               -----------         ------                     CBC Auto Differential[[id number]]        Abnormal            Final result                 Please view results for these tests on the individual orders.    CBC Auto Differential [[id number]]  (Abnormal) Collected: 04/15/24 0526    Specimen: Blood from Hand, Right Updated: 04/15/24 0553     WBC  8.14 10*3/mm3      RBC 3.75 10*6/mm3      Hemoglobin 10.0 g/dL      Hematocrit 33.6 %      MCV 89.6 fL      MCH 26.7 pg      MCHC 29.8 g/dL      RDW 15.9 %      RDW-SD 52.5 fl      MPV 11.5 fL      Platelets 173 10*3/mm3      Neutrophil % 87.3 %      Lymphocyte % 7.1 %      Monocyte % 4.7 %      Eosinophil % 0.0 %      Basophil % 0.0 %      Immature Grans % 0.9 %      Neutrophils, Absolute 7.11 10*3/mm3      Lymphocytes, Absolute 0.58 10*3/mm3      Monocytes, Absolute 0.38 10*3/mm3      Eosinophils, Absolute 0.00 10*3/mm3      Basophils, Absolute 0.00 10*3/mm3      Immature Grans, Absolute 0.07 10*3/mm3      nRBC 0.0 /100 WBC     POC Glucose Once [[id number]]  (Abnormal) Collected: 04/14/24 2135    Specimen: Blood Updated: 04/14/24 2137     Glucose 357 mg/dL      Comment: Serial Number: [id number]:  [id number]       Vitamin B12 [[id number]]  (Normal) Collected: 04/14/24 1127    Specimen: Blood from Arm, Left Updated: 04/14/24 1802     Vitamin B-12 329 pg/mL     Narrative:      Results may be falsely increased if patient taking Biotin.      POC Glucose Once [[id number]]  (Abnormal) Collected: 04/14/24 1608    Specimen: Blood Updated: 04/14/24 1610     Glucose 335 mg/dL      Comment: Serial Number: [id number]:  [id number]       BNP [[id number]]  (Abnormal) Collected: 04/14/24 1127    Specimen: Blood from Arm, Left Updated: 04/14/24 1210     proBNP 2,450.0 pg/mL     Narrative:      This assay is used as an aid in the diagnosis of individuals suspected of having heart failure. It can be used as an aid in the diagnosis of acute decompensated heart failure (ADHF) in patients presenting with signs and symptoms of ADHF to the emergency department (ED). In addition, NT-proBNP of <300 pg/mL indicates ADHF is not likely.    Age Range Result Interpretation  NT-proBNP Concentration (pg/mL:      <50             Positive            >450                   Gray                 300-450                    Negative              <300    50-75           Positive            >900                  Gray                300-900                  Negative            <300      >75             Positive            >1800                  Gray                300-1800                  Negative            <300    POC Glucose 4x Daily Before Meals & at Bedtime [[id number]]  (Abnormal) Collected: 04/14/24 1159    Specimen: Blood Updated: 04/14/24 1200     Glucose 278 mg/dL      Comment: Serial Number: [id number]:  [id number]             Imaging Results (Last 72 Hours)       Procedure Component Value Units Date/Time    XR Chest 1 View [[id number]] Collected: 04/13/24 0812     Updated: 04/13/24 0816    Narrative:      XR CHEST 1 VW    Date of Exam: 4/13/2024 2:31 AM EDT    Indication: Shortness of breath    Comparison: 1 day prior.    Findings:  Bilateral interstitial opacities appear similar, likely background fibrotic change with some superimposed interstitial edema. There is no effusion or pneumothorax. No new focal consolidation. Unchanged heart and mediastinal contours.      Impression:      Impression:  Bilateral interstitial opacities appear similar, likely background fibrotic change with some superimposed interstitial edema. There is no effusion or pneumothorax. No new focal consolidation. Unchanged heart and mediastinal contours.        Electronically Signed: Ortiz Beltran MD    4/13/2024 8:14 AM EDT    Workstation ID: [id number]          ECG/EMG Results (most recent)       Procedure Component Value Units Date/Time    ECG 12 Lead Dyspnea [[id number]] Collected: 04/10/24 2006     Updated: 04/11/24 0751     QT Interval 424 ms      QTC Interval 469 ms     Narrative:      HEART RATE= 73  bpm  RR Interval= 816  ms  ME Interval= 196  ms  P Horizontal Axis= 4  deg  P Front Axis= 70  deg  QRSD Interval= 160  ms  QT Interval= 424  ms  QTcB= 469  ms  QRS Axis= 137  deg  T Wave Axis= 30  deg  - ABNORMAL ECG -  Sinus rhythm  Right bundle branch block  ST  elevation secondary to IVCD  When compared with ECG of 23-Apr-2023 12:31:25,  No significant change  Electronically Signed By: Preet Friedman (KATERIN) 11-Apr-2024 07:51:14  Date and Time of Study: 2024-04-10 20:06:18    Adult Transthoracic Echo Complete W/ Cont if Necessary Per Protocol [[id number]] Resulted: 04/11/24 1912     Updated: 04/11/24 1915     EF(MOD-bp) 67.9 %      LVIDd 4.6 cm      LVIDs 2.9 cm      IVSd 1.10 cm      LVPWd 1.20 cm      FS 37.0 %      IVS/LVPW 0.92 cm      ESV(cubed) 24.4 ml      LV Sys Vol (BSA corrected) 15.2 cm2      EDV(cubed) 97.3 ml      LV Webster Vol (BSA corrected) 47.7 cm2      LV mass(C)d 192.9 grams      LVOT area 3.5 cm2      LVOT diam 2.10 cm      EDV(MOD-sp2) 129.0 ml      EDV(MOD-sp4) 85.4 ml      ESV(MOD-sp2) 43.5 ml      ESV(MOD-sp4) 27.2 ml      SV(MOD-sp2) 85.5 ml      SV(MOD-sp4) 58.2 ml      SI(MOD-sp2) 47.8 ml/m2      SI(MOD-sp4) 32.5 ml/m2      EF(MOD-sp2) 66.3 %      EF(MOD-sp4) 68.1 %      MV E max franco 82.3 cm/sec      MV A max franco 115.0 cm/sec      MV dec time 0.25 sec      MV E/A 0.72     LA ESV Index (BP) 38.4 ml/m2      Med Peak E' Franco 9.0 cm/sec      Lat Peak E' Franco 13.6 cm/sec      TR max franco 428.0 cm/sec      Avg E/e' ratio 7.28     SV(LVOT) 95.2 ml      SV(RVOT) 72.8 ml      Qp/Qs 0.76     BH CV ECHO RV FREE WALL STRAIN -12.3 %      RVIDd 3.3 cm      RV Base 5.5 cm      RV Mid 4.0 cm      RV Length 7.3 cm      RV S' 11.5 cm/sec      LA dimension (2D)  3.9 cm      LV V1 max 125.0 cm/sec      LV V1 max PG 6.3 mmHg      LV V1 mean PG 3.0 mmHg      LV V1 VTI 27.5 cm      Ao pk franco 159.0 cm/sec      Ao max PG 10.1 mmHg      Ao mean PG 5.0 mmHg      Ao V2 VTI 35.8 cm      BERNARD(I,D) 2.7 cm2      MV max PG 5.5 mmHg      MV mean PG 2.00 mmHg      MV V2 VTI 34.7 cm      MV P1/2t 83.6 msec      MVA(P1/2t) 2.6 cm2      MVA(VTI) 2.7 cm2      MV dec slope 368.0 cm/sec2      TR max PG 73.3 mmHg      RVOT diam 2.40 cm      RV V1 max PG 1.98 mmHg      RV V1 max 70.3 cm/sec      RV  V1 VTI 16.1 cm      PA V2 max 97.0 cm/sec      PA acc time 0.14 sec      PI end-d monique 123.0 cm/sec      Ao root diam 3.6 cm      ACS 2.10 cm      Sinus 3.2 cm      STJ 2.30 cm      RVSP(TR) 81 mmHg      RAP systole 8 mmHg     Narrative:        Left ventricular systolic function is normal. Left ventricular ejection   fraction appears to be 61 - 65%.    Left ventricular wall thickness is consistent with mild concentric   hypertrophy.    Left ventricular diastolic function was normal.    The right ventricular cavity is mild to moderately dilated.    Estimated right ventricular systolic pressure from tricuspid   regurgitation is markedly elevated (>55 mmHg). Calculated right   ventricular systolic pressure from tricuspid regurgitation is 81 mmHg.            CBC    Results from last 7 days   Lab Units 04/15/24  0526 04/14/24  0319 04/13/24  0519 04/12/24  0321 04/11/24  0209 04/10/24  2018 04/10/24  1959   WBC 10*3/mm3 8.14 5.62 5.14 5.53 6.36  --  6.72   HEMOGLOBIN g/dL 10.0* 10.4* 11.0* 11.1* 10.6*  --  11.2*   HEMOGLOBIN, POC g/dL  --   --   --   --   --  12.5  --    PLATELETS 10*3/mm3 173 194 188 191 181  --  193     BMP   Results from last 7 days   Lab Units 04/15/24  0526 04/14/24  0319 04/13/24  0519 04/12/24  0321 04/11/24  0209 04/10/24  2018 04/10/24  1959   SODIUM mmol/L 142 143 144 144 143  --  145   POTASSIUM mmol/L 4.6 4.6 4.3 4.6 4.0  --  3.7   CHLORIDE mmol/L 104 102 103 102 102  --  103   CO2 mmol/L 30.0* 34.0* 36.0* 37.0* 29.0  --  30.0*   BUN mg/dL 33* 25* 25* 22 17  --  16   CREATININE mg/dL 0.79 0.95 0.84 0.88 0.85 0.86 0.84   GLUCOSE mg/dL 201* 262* 80 75 76  --  108*     CMP   Results from last 7 days   Lab Units 04/15/24  0526 04/14/24  0319 04/13/24  0519 04/12/24  0321 04/11/24  0209 04/10/24  2018 04/10/24  1959   SODIUM mmol/L 142 143 144 144 143  --  145   POTASSIUM mmol/L 4.6 4.6 4.3 4.6 4.0  --  3.7   CHLORIDE mmol/L 104 102 103 102 102  --  103   CO2 mmol/L 30.0* 34.0* 36.0* 37.0* 29.0  --   30.0*   BUN mg/dL 33* 25* 25* 22 17  --  16   CREATININE mg/dL 0.79 0.95 0.84 0.88 0.85 0.86 0.84   GLUCOSE mg/dL 201* 262* 80 75 76  --  108*   ALBUMIN g/dL  --   --   --   --   --   --  4.0   BILIRUBIN mg/dL  --   --   --   --   --   --  0.5   ALK PHOS U/L  --   --   --   --   --   --  78   AST (SGOT) U/L  --   --   --   --   --   --  18   ALT (SGPT) U/L  --   --   --   --   --   --  18     Cardiac Studies:  Echo- Results for orders placed during the hospital encounter of 04/10/24    Adult Transthoracic Echo Complete W/ Cont if Necessary Per Protocol    Interpretation Summary    Left ventricular systolic function is normal. Left ventricular ejection fraction appears to be 61 - 65%.    Left ventricular wall thickness is consistent with mild concentric hypertrophy.    Left ventricular diastolic function was normal.    The right ventricular cavity is mild to moderately dilated.    Estimated right ventricular systolic pressure from tricuspid regurgitation is markedly elevated (>55 mmHg). Calculated right ventricular systolic pressure from tricuspid regurgitation is 81 mmHg.    Stress Myoview-  Cath-      Medication Review:   Scheduled Meds:amLODIPine, 5 mg, Oral, Nightly  aspirin, 81 mg, Oral, Once per day on Monday Wednesday Friday  atorvastatin, 10 mg, Oral, Daily  budesonide, 1 mg, Nebulization, BID - RT  enoxaparin, 40 mg, Subcutaneous, Q24H  ferrous sulfate, 324 mg, Oral, Every Other Day  furosemide, 40 mg, Intravenous, Q12H  gabapentin, 300 mg, Oral, TID  glipizide, 5 mg, Oral, BID AC  insulin lispro, 3-14 Units, Subcutaneous, 4x Daily AC & at Bedtime  methylPREDNISolone sodium succinate, 40 mg, Intravenous, Q8H   And  insulin regular, 6 Units, Subcutaneous, Q8H  ipratropium-albuterol, 3 mL, Nebulization, 4x Daily - RT  multivitamin, 1 tablet, Oral, Daily  pioglitazone, 30 mg, Oral, Nightly  sertraline, 50 mg, Oral, Nightly  sodium chloride, 10 mL, Intravenous, Q12H      Continuous Infusions:Pharmacy Consult -  Steroid Insulin Protocol,       PRN Meds:.  acetaminophen **OR** acetaminophen **OR** acetaminophen    senna-docusate sodium **AND** polyethylene glycol **AND** bisacodyl **AND** bisacodyl    dextrose    dextrose    glucagon (human recombinant)    ipratropium-albuterol    melatonin    nitroglycerin    ondansetron    Pharmacy Consult - Steroid Insulin Protocol    sodium chloride    sodium chloride    sodium chloride      Assessment & Plan     Acute on chronic respiratory failure    MDM:    1.  Shortness of breath:    Most likely cause of shortness of breath is underlying COPD extubation.  Patient does not seem to be in volume overload.  Gentle diuresis would be continued.    2.  Right heart failure:    Patient has leg edema.  JVD is difficult to assess.  I would continue gentle diuresis with Lasix.    3.  Pulmonary hypertension:    I would leave the decision to pulmonologist to start pulmonary vasodilators.    Continue current Rx and supportive care.   Additional recommendations per Dr. Samuel Hinson, APRN  04/15/24  09:32 EDT

## 2024-04-16 ENCOUNTER — READMISSION MANAGEMENT (OUTPATIENT)
Dept: CALL CENTER | Facility: HOSPITAL | Age: 74
End: 2024-04-16
Payer: MEDICARE

## 2024-04-16 VITALS
DIASTOLIC BLOOD PRESSURE: 41 MMHG | HEIGHT: 67 IN | WEIGHT: 288.8 LBS | SYSTOLIC BLOOD PRESSURE: 99 MMHG | RESPIRATION RATE: 20 BRPM | HEART RATE: 80 BPM | TEMPERATURE: 97.5 F | BODY MASS INDEX: 45.33 KG/M2 | OXYGEN SATURATION: 85 %

## 2024-04-16 LAB
ANION GAP SERPL CALCULATED.3IONS-SCNC: 8 MMOL/L (ref 5–15)
BUN SERPL-MCNC: 39 MG/DL (ref 8–23)
BUN/CREAT SERPL: 44.3 (ref 7–25)
CALCIUM SPEC-SCNC: 9.3 MG/DL (ref 8.6–10.5)
CHLORIDE SERPL-SCNC: 99 MMOL/L (ref 98–107)
CO2 SERPL-SCNC: 34 MMOL/L (ref 22–29)
CREAT SERPL-MCNC: 0.88 MG/DL (ref 0.76–1.27)
DEPRECATED RDW RBC AUTO: 53 FL (ref 37–54)
EGFRCR SERPLBLD CKD-EPI 2021: 90.8 ML/MIN/1.73
ERYTHROCYTE [DISTWIDTH] IN BLOOD BY AUTOMATED COUNT: 16.1 % (ref 12.3–15.4)
GLUCOSE BLDC GLUCOMTR-MCNC: 182 MG/DL (ref 70–105)
GLUCOSE BLDC GLUCOMTR-MCNC: 205 MG/DL (ref 70–105)
GLUCOSE BLDC GLUCOMTR-MCNC: 243 MG/DL (ref 70–105)
GLUCOSE SERPL-MCNC: 271 MG/DL (ref 65–99)
HCT VFR BLD AUTO: 34 % (ref 37.5–51)
HGB BLD-MCNC: 10 G/DL (ref 13–17.7)
MCH RBC QN AUTO: 26.7 PG (ref 26.6–33)
MCHC RBC AUTO-ENTMCNC: 29.4 G/DL (ref 31.5–35.7)
MCV RBC AUTO: 90.9 FL (ref 79–97)
PLATELET # BLD AUTO: 194 10*3/MM3 (ref 140–450)
PMV BLD AUTO: 12.2 FL (ref 6–12)
POTASSIUM SERPL-SCNC: 4.5 MMOL/L (ref 3.5–5.2)
RBC # BLD AUTO: 3.74 10*6/MM3 (ref 4.14–5.8)
SODIUM SERPL-SCNC: 141 MMOL/L (ref 136–145)
WBC NRBC COR # BLD AUTO: 8.75 10*3/MM3 (ref 3.4–10.8)

## 2024-04-16 PROCEDURE — 80048 BASIC METABOLIC PNL TOTAL CA: CPT | Performed by: INTERNAL MEDICINE

## 2024-04-16 PROCEDURE — 94799 UNLISTED PULMONARY SVC/PX: CPT

## 2024-04-16 PROCEDURE — 94660 CPAP INITIATION&MGMT: CPT

## 2024-04-16 PROCEDURE — 99232 SBSQ HOSP IP/OBS MODERATE 35: CPT | Performed by: INTERNAL MEDICINE

## 2024-04-16 PROCEDURE — 94664 DEMO&/EVAL PT USE INHALER: CPT

## 2024-04-16 PROCEDURE — 63710000001 PREDNISONE PER 1 MG: Performed by: INTERNAL MEDICINE

## 2024-04-16 PROCEDURE — 63710000001 PREDNISONE PER 5 MG: Performed by: INTERNAL MEDICINE

## 2024-04-16 PROCEDURE — 25010000002 METHYLPREDNISOLONE PER 40 MG: Performed by: INTERNAL MEDICINE

## 2024-04-16 PROCEDURE — 82948 REAGENT STRIP/BLOOD GLUCOSE: CPT

## 2024-04-16 PROCEDURE — 25010000002 FUROSEMIDE PER 20 MG: Performed by: INTERNAL MEDICINE

## 2024-04-16 PROCEDURE — 63710000001 INSULIN REGULAR HUMAN PER 5 UNITS: Performed by: INTERNAL MEDICINE

## 2024-04-16 PROCEDURE — 94761 N-INVAS EAR/PLS OXIMETRY MLT: CPT

## 2024-04-16 PROCEDURE — 63710000001 INSULIN LISPRO (HUMAN) PER 5 UNITS: Performed by: INTERNAL MEDICINE

## 2024-04-16 PROCEDURE — 85027 COMPLETE CBC AUTOMATED: CPT | Performed by: INTERNAL MEDICINE

## 2024-04-16 RX ORDER — FUROSEMIDE 40 MG/1
40 TABLET ORAL DAILY
Qty: 90 TABLET | Refills: 2 | Status: SHIPPED | OUTPATIENT
Start: 2024-04-16

## 2024-04-16 RX ORDER — FLUTICASONE PROPIONATE AND SALMETEROL 250; 50 UG/1; UG/1
1 POWDER RESPIRATORY (INHALATION)
Qty: 1 EACH | Refills: 5 | Status: SHIPPED | OUTPATIENT
Start: 2024-04-16

## 2024-04-16 RX ORDER — PREDNISONE 10 MG/1
10 TABLET ORAL DAILY
Status: DISCONTINUED | OUTPATIENT
Start: 2024-04-20 | End: 2024-04-16 | Stop reason: HOSPADM

## 2024-04-16 RX ORDER — METHYLPREDNISOLONE SODIUM SUCCINATE 40 MG/ML
40 INJECTION, POWDER, LYOPHILIZED, FOR SOLUTION INTRAMUSCULAR; INTRAVENOUS EVERY 8 HOURS
Status: DISCONTINUED | OUTPATIENT
Start: 2024-04-16 | End: 2024-04-16

## 2024-04-16 RX ORDER — POTASSIUM CHLORIDE 750 MG/1
10 TABLET, EXTENDED RELEASE ORAL 2 TIMES DAILY
Qty: 90 TABLET | Refills: 3 | Status: SHIPPED | OUTPATIENT
Start: 2024-04-16

## 2024-04-16 RX ORDER — IPRATROPIUM BROMIDE AND ALBUTEROL 20; 100 UG/1; UG/1
1 SPRAY, METERED RESPIRATORY (INHALATION)
Qty: 1 EACH | Refills: 11 | Status: SHIPPED | OUTPATIENT
Start: 2024-04-16

## 2024-04-16 RX ORDER — CEFUROXIME AXETIL 250 MG/1
250 TABLET ORAL 2 TIMES DAILY
Qty: 10 TABLET | Refills: 0 | Status: SHIPPED | OUTPATIENT
Start: 2024-04-16

## 2024-04-16 RX ORDER — INSULIN LISPRO 100 [IU]/ML
4-24 INJECTION, SOLUTION INTRAVENOUS; SUBCUTANEOUS
Status: DISCONTINUED | OUTPATIENT
Start: 2024-04-16 | End: 2024-04-16 | Stop reason: HOSPADM

## 2024-04-16 RX ORDER — DOXYCYCLINE HYCLATE 100 MG/1
100 CAPSULE ORAL 2 TIMES DAILY
Qty: 10 CAPSULE | Refills: 0 | Status: SHIPPED | OUTPATIENT
Start: 2024-04-16

## 2024-04-16 RX ORDER — PREDNISONE 20 MG/1
20 TABLET ORAL DAILY
Status: DISCONTINUED | OUTPATIENT
Start: 2024-04-18 | End: 2024-04-16 | Stop reason: HOSPADM

## 2024-04-16 RX ORDER — PREDNISONE 20 MG/1
20 TABLET ORAL DAILY
Qty: 5 TABLET | Refills: 0 | Status: SHIPPED | OUTPATIENT
Start: 2024-04-16

## 2024-04-16 RX ADMIN — INSULIN HUMAN 6 UNITS: 100 INJECTION, SOLUTION PARENTERAL at 02:17

## 2024-04-16 RX ADMIN — GABAPENTIN 300 MG: 300 CAPSULE ORAL at 07:40

## 2024-04-16 RX ADMIN — DOXYCYCLINE 100 MG: 100 CAPSULE ORAL at 07:40

## 2024-04-16 RX ADMIN — INSULIN LISPRO 3 UNITS: 100 INJECTION, SOLUTION INTRAVENOUS; SUBCUTANEOUS at 07:39

## 2024-04-16 RX ADMIN — THERA TABS 1 TABLET: TAB at 07:40

## 2024-04-16 RX ADMIN — IPRATROPIUM BROMIDE AND ALBUTEROL SULFATE 3 ML: .5; 3 SOLUTION RESPIRATORY (INHALATION) at 06:50

## 2024-04-16 RX ADMIN — FERROUS SULFATE TAB EC 324 MG (65 MG FE EQUIVALENT) 324 MG: 324 (65 FE) TABLET DELAYED RESPONSE at 07:40

## 2024-04-16 RX ADMIN — PREDNISONE 30 MG: 10 TABLET ORAL at 11:27

## 2024-04-16 RX ADMIN — ATORVASTATIN CALCIUM 10 MG: 10 TABLET, FILM COATED ORAL at 07:40

## 2024-04-16 RX ADMIN — FUROSEMIDE 40 MG: 10 INJECTION, SOLUTION INTRAMUSCULAR; INTRAVENOUS at 05:09

## 2024-04-16 RX ADMIN — METHYLPREDNISOLONE SODIUM SUCCINATE 40 MG: 40 INJECTION, POWDER, FOR SOLUTION INTRAMUSCULAR; INTRAVENOUS at 02:17

## 2024-04-16 RX ADMIN — INSULIN HUMAN 6 UNITS: 100 INJECTION, SOLUTION PARENTERAL at 11:28

## 2024-04-16 RX ADMIN — SILDENAFIL CITRATE 20 MG: 20 TABLET ORAL at 07:40

## 2024-04-16 RX ADMIN — Medication 10 ML: at 07:40

## 2024-04-16 RX ADMIN — INSULIN LISPRO 8 UNITS: 100 INJECTION, SOLUTION INTRAVENOUS; SUBCUTANEOUS at 11:28

## 2024-04-16 RX ADMIN — BUDESONIDE 0.5 MG: 0.5 INHALANT RESPIRATORY (INHALATION) at 06:54

## 2024-04-16 RX ADMIN — GLIPIZIDE 5 MG: 5 TABLET ORAL at 07:40

## 2024-04-16 NOTE — PLAN OF CARE
Goal Outcome Evaluation:  Plan of Care Reviewed With: patient        Progress: improving  Outcome Evaluation: to be discharged.

## 2024-04-16 NOTE — CASE MANAGEMENT/SOCIAL WORK
Continued Stay Note  MARIA GUADALUPE Sanabria     Patient Name: Brenden Peter  MRN: [id number]  Today's Date: 4/16/2024    Admit Date: 4/10/2024    Plan: Return home w/ wife. Home O2 4L & CPAP HS w/ [facility].   Discharge Plan       Row Name 04/16/24 1430       Plan    Plan Return home w/ wife. Home O2 4L & CPAP HS w/ [facility].    Patient/Family in Agreement with Plan yes    Plan Comments CM met with pt at bedside and provided copy of IMM. Inquired about C services but pt does not feel he needs. Pt reports his nebulizer makes a lot of noise. Added to [facility] basket and rep Roxi notified. She reports pt just got nebulizer in March of last year and would not be eligible for a new one. Pt would have to private pay $50 for a new one which pt declined. Wife will be picking pt up.             Megan Naegele, RN     Office Phone: [phone]  Office Cell: [phone]

## 2024-04-16 NOTE — PROGRESS NOTES
Daily Progress Note          Assessment    Acute on chronic respiratory failure hypoxic patient was on 6 L and her  PO2 was 59   COPD exasperation    Obstructive sleep apnea   Congestive heart failure   diastolic per cardiac echo was done a year ago  Cor pulmonale   Pulmonary HTN RVSP 81 mmHg  Diabetes mellitus   Hypertension    Results for orders placed during the hospital encounter of 04/10/24    Adult Transthoracic Echo Complete W/ Cont if Necessary Per Protocol    Interpretation Summary    Left ventricular systolic function is normal. Left ventricular ejection fraction appears to be 61 - 65%.    Left ventricular wall thickness is consistent with mild concentric hypertrophy.    Left ventricular diastolic function was normal.    The right ventricular cavity is mild to moderately dilated.    Estimated right ventricular systolic pressure from tricuspid regurgitation is markedly elevated (>55 mmHg). Calculated right ventricular systolic pressure from tricuspid regurgitation is 81 mmHg.            PLAN:  Oxygenation has improved significantly after starting sildenafil 4/15/2024, patient stable to be discharged from pulm standpoint and follow-up in our office in 3 weeks     wean O2 slowly to keep sats > 88%: currently on 5 L  OOB daily     Need to be evaluated for SONJA as outpatient  Bronchodilators  Inhaled corticosteroids  Tapering dose prednisone  Incentive spirometer  Diuresis and monitor MIGDALIA's  Electrolytes/ glycemic control           LOS: 4 days     Subjective     Gradual improvement in SOA    Objective     Vital signs for last 24 hours:  Vitals:    04/16/24 0653 04/16/24 0654 04/16/24 0657 04/16/24 0731   BP:    106/54   BP Location:    Left arm   Patient Position:    Lying   Pulse: 60 60 57 68   Resp: 16 16 16 21   Temp:    97.2 °F (36.2 °C)   TempSrc:    Oral   SpO2: 97% 97% 97% 92%   Weight:       Height:           Intake/Output last 3 shifts:  I/O last 3 completed shifts:  In: 1320 [P.O.:1320]  Out: 1600  [Urine:1600]  Intake/Output this shift:  I/O this shift:  In: 480 [P.O.:480]  Out: -       Radiology  Imaging Results (Last 24 Hours)       ** No results found for the last 24 hours. **            Labs:  Results from last 7 days   Lab Units 04/16/24 0847   WBC 10*3/mm3 8.75   HEMOGLOBIN g/dL 10.0*   HEMATOCRIT % 34.0*   PLATELETS 10*3/mm3 194     Results from last 7 days   Lab Units 04/16/24  0847 04/10/24  2018 04/10/24  1959   SODIUM mmol/L 141   < > 145   POTASSIUM mmol/L 4.5   < > 3.7   CHLORIDE mmol/L 99   < > 103   CO2 mmol/L 34.0*   < > 30.0*   BUN mg/dL 39*   < > 16   CREATININE mg/dL 0.88   < > 0.84   CALCIUM mg/dL 9.3   < > 9.6   BILIRUBIN mg/dL  --   --  0.5   ALK PHOS U/L  --   --  78   ALT (SGPT) U/L  --   --  18   AST (SGOT) U/L  --   --  18   GLUCOSE mg/dL 271*   < > 108*    < > = values in this interval not displayed.     Results from last 7 days   Lab Units 04/11/24  2134   PH, ARTERIAL pH units 7.401   PO2 ART mm Hg 66.6*   PCO2, ARTERIAL mm Hg 53.2*   HCO3 ART mmol/L 33.1*     Results from last 7 days   Lab Units 04/10/24  1959   ALBUMIN g/dL 4.0     Results from last 7 days   Lab Units 04/10/24  1959   HSTROP T ng/L 36*                           Meds:   SCHEDULE  amLODIPine, 5 mg, Oral, Nightly  aspirin, 81 mg, Oral, Once per day on Monday Wednesday Friday  atorvastatin, 10 mg, Oral, Daily  budesonide, 1 mg, Nebulization, BID - RT  doxycycline, 100 mg, Oral, Q12H  enoxaparin, 40 mg, Subcutaneous, Q24H  ferrous sulfate, 324 mg, Oral, Every Other Day  furosemide, 40 mg, Intravenous, Q12H  gabapentin, 300 mg, Oral, TID  glipizide, 5 mg, Oral, BID AC  insulin lispro, 4-24 Units, Subcutaneous, 4x Daily AC & at Bedtime  methylPREDNISolone sodium succinate, 40 mg, Intravenous, Q8H   And  insulin regular, 8 Units, Subcutaneous, Q8H  ipratropium-albuterol, 3 mL, Nebulization, 4x Daily - RT  multivitamin, 1 tablet, Oral, Daily  pioglitazone, 30 mg, Oral, Nightly  sertraline, 50 mg, Oral,  Nightly  sildenafil, 20 mg, Oral, TID  sodium chloride, 10 mL, Intravenous, Q12H      Infusions  Pharmacy Consult - Steroid Insulin Protocol,       PRNs    acetaminophen **OR** acetaminophen **OR** acetaminophen    senna-docusate sodium **AND** polyethylene glycol **AND** bisacodyl **AND** bisacodyl    dextrose    dextrose    glucagon (human recombinant)    ipratropium-albuterol    melatonin    ondansetron    Pharmacy Consult - Steroid Insulin Protocol    sodium chloride    sodium chloride    sodium chloride    Physical Exam:  General Appearance:  Alert   HEENT:  Normocephalic, without obvious abnormality, Conjunctiva/corneas clear,.   Nares normal, no drainage     Neck:  Supple, symmetrical, trachea midline.   Lungs /Chest wall: Good air entry bilaterally with mild bilateral basal rhonchi, respirations unlabored, symmetrical wall movement.     Heart:  Regular rate and rhythm, S1 S2 normal  Abdomen: Soft, non-tender, no masses, no organomegaly.    Extremities: No edema, no clubbing or cyanosis     ROS  Constitutional: Negative for chills, fever and malaise/fatigue.   HENT: Negative.    Eyes: Negative.    Cardiovascular: Negative.    Respiratory: Positive for cough and shortness of breath.    Skin: Negative.    Musculoskeletal: Negative.    Gastrointestinal: Negative.    Genitourinary: Negative.    Neurological: Negative.    Psychiatric/Behavioral: Negative.      I reviewed the recent clinical results  I personally reviewed the latest radiological studies    Part of this note may be an electronic transcription/translation of spoken language to printed text using the Dragon Dictation System.

## 2024-04-16 NOTE — DISCHARGE SUMMARY
[facility] Medicine Services  Discharge Summary    Date of Service:   Patient Name: Brenden Peter  : 1950  MRN: [id number]    Date of Admission: 4/10/2024  Discharge Diagnosis:   Date of Discharge: 2024  Primary Care Physician: Bert Rojas MD      Presenting Problem:   Shortness of breath [R06.02]  Acute pulmonary edema [J81.0]  Acute respiratory failure with hypoxia [J96.01]  Acute on chronic respiratory failure [J96.20]  Acute on chronic congestive heart failure, unspecified heart failure type [I50.9]    Active and Resolved Hospital Problems:  Active Hospital Problems    Diagnosis POA    **Acute on chronic respiratory failure [J96.20] Yes      Resolved Hospital Problems   No resolved problems to display.         Hospital Course     HPI: Brenden Peter is a 73 y.o. male with a previous medical history of COPD, CHF, on home O2 of 4L per NC who presented to [facility] on 4/10/2024 with worsening shortness of breath over the past week.  He denies chest pain, cough, fever.     In the ED, ABG showed pH 7.43, CO2 47.5, pO2 59.9, Troponin 36, BNP 2883, DDimer 1.7. Otherwise, labs are unremarkable. CT of the chest shows no PE, mild interstitial pulmonary edema and cardiomegaly.  He is afebrile, 94% on O2 at 6L per NC.  Otherwise, vitals are stable.  He received Lasix in the ED.  Hospitalist was consulted for further management.       Hospital Course:  Patient was treated in hospital for CHF and COPD exacerbation.  Patient is feeling much better pulmonary and cardiology is okay for discharge.  Patient will be discharged home on p.o. Lasix and also p.o. antibiotic along with Combivent and Advair.  Patient is to follow with pulmonary and cardiology as an outpatient.          Reasons For Change In Medications and Indications for New Medications:      Day of Discharge     Vital Signs:  Temp:  [97.2 °F (36.2 °C)-98 °F (36.7 °C)] 97.2 °F (36.2 °C)  Heart Rate:  [57-92]  68  Resp:  [16-22] 21  BP: (106-128)/(54-60) 106/54  Flow (L/min):  [5-12] 5    Physical Exam:  General Appearance:    Alert, cooperative, in no acute distress   Head:    Normocephalic, without obvious abnormality, atraumatic   Eyes:            conjunctivae and sclerae normal, no   icterus, no pallor, corneas  clear, PERRLA   Neck:   No adenopathy, supple, trachea midline, no thyromegaly, no   carotid bruit, no JVD   Lungs:     Clear to auscultation,respirations regular, even and                  unlabored    Heart:    Regular rhythm and normal rate, normal S1 and S2, no            murmur, no gallop, no rub, no click   Abdomen:     Normal bowel sounds, no masses, no organomegaly, soft        non-tender, non-distended, no guarding, no rebound                No tenderness   Extremities:   Moves all extremities well, no edema, no cyanosis, no             redness   Lymph nodes:   No palpable adenopathy   Neurologic:   Cranial nerves 2 - 12 grossly intact, sensation intact, DTR       present and equal bilaterally          Pertinent  and/or Most Recent Results     LAB RESULTS:      Lab 04/16/24  0847 04/15/24  0526 04/14/24  0319 04/13/24  0519 04/12/24  0321 04/11/24  0209 04/10/24  2018 04/10/24  2001   WBC 8.75 8.14 5.62 5.14 5.53 6.36  --   --    HEMOGLOBIN 10.0* 10.0* 10.4* 11.0* 11.1* 10.6*  --   --    HEMOGLOBIN, POC  --   --   --   --   --   --  12.5  --    HEMATOCRIT 34.0* 33.6* 35.5* 37.6 38.9 35.9*  --   --    HEMATOCRIT POC  --   --   --   --   --   --  37*  --    PLATELETS 194 173 194 188 191 181  --   --    NEUTROS ABS  --  7.11* 5.03 3.76 4.05 4.48  --   --    IMMATURE GRANS (ABS)  --  0.07* 0.03 0.02 0.02 0.02  --   --    LYMPHS ABS  --  0.58* 0.38* 0.87 0.96 1.19  --   --    MONOS ABS  --  0.38 0.18 0.40 0.38 0.60  --   --    EOS ABS  --  0.00 0.00 0.07 0.10 0.06  --   --    MCV 90.9 89.6 91.5 91.9 93.3 90.7  --   --    LACTATE  --   --   --   --   --   --  1.7 0.5         Lab 04/16/24  0847  04/15/24  0526 04/14/24  0319 04/13/24  0519 04/12/24  0321   SODIUM 141 142 143 144 144   POTASSIUM 4.5 4.6 4.6 4.3 4.6   CHLORIDE 99 104 102 103 102   CO2 34.0* 30.0* 34.0* 36.0* 37.0*   ANION GAP 8.0 8.0 7.0 5.0 5.0   BUN 39* 33* 25* 25* 22   CREATININE 0.88 0.79 0.95 0.84 0.88   EGFR 90.8 93.8 84.5 92.1 90.8   GLUCOSE 271* 201* 262* 80 75   CALCIUM 9.3 9.2 9.3 9.1 9.3   IONIZED CALCIUM  --   --  1.22  --   --          Lab 04/10/24  1959   TOTAL PROTEIN 7.5   ALBUMIN 4.0   GLOBULIN 3.5   ALT (SGPT) 18   AST (SGOT) 18   BILIRUBIN 0.5   ALK PHOS 78         Lab 04/14/24  1127 04/10/24  1959   PROBNP 2,450.0* 2,883.0*   HSTROP T  --  36*             Lab 04/14/24  1127 04/14/24 0319   IRON  --  44*   IRON SATURATION (TSAT)  --  9*   TIBC  --  507   TRANSFERRIN  --  340   VITAMIN B 12 329  --          Lab 04/11/24  2134 04/10/24  2018   PH, ARTERIAL 7.401 7.431   PCO2, ARTERIAL 53.2* 47.5   PO2 ART 66.6* 59.9*   O2 SATURATION ART 92.4* 90.9*   FIO2 80 40   HCO3 ART 33.1* 31.6*   BASE EXCESS ART 6.8* 6.3*     Brief Urine Lab Results       None          Microbiology Results (last 10 days)       Procedure Component Value - Date/Time    Respiratory Panel PCR w/COVID-19(SARS-CoV-2) IAN/NAYELY/KATERIN/PAD/COR/KRISH In-House, NP Swab in [facility]/VTM, 2 HR TAT - Swab, Nasopharynx [[id number]]  (Normal) Collected: 04/10/24 2000    Lab Status: Final result Specimen: Swab from Nasopharynx Updated: 04/10/24 2054     ADENOVIRUS, PCR Not Detected     Coronavirus 229E Not Detected     Coronavirus HKU1 Not Detected     Coronavirus NL63 Not Detected     Coronavirus OC43 Not Detected     COVID19 Not Detected     Human Metapneumovirus Not Detected     Human Rhinovirus/Enterovirus Not Detected     Influenza A PCR Not Detected     Influenza B PCR Not Detected     Parainfluenza Virus 1 Not Detected     Parainfluenza Virus 2 Not Detected     Parainfluenza Virus 3 Not Detected     Parainfluenza Virus 4 Not Detected     RSV, PCR Not Detected     Bordetella  pertussis pcr Not Detected     Bordetella parapertussis PCR Not Detected     Chlamydophila pneumoniae PCR Not Detected     Mycoplasma pneumo by PCR Not Detected    Narrative:      In the setting of a positive respiratory panel with a viral infection PLUS a negative procalcitonin without other underlying concern for bacterial infection, consider observing off antibiotics or discontinuation of antibiotics and continue supportive care. If the respiratory panel is positive for atypical bacterial infection (Bordetella pertussis, Chlamydophila pneumoniae, or Mycoplasma pneumoniae), consider antibiotic de-escalation to target atypical bacterial infection.            XR Chest 1 View    Result Date: 4/13/2024  Impression: Impression: Bilateral interstitial opacities appear similar, likely background fibrotic change with some superimposed interstitial edema. There is no effusion or pneumothorax. No new focal consolidation. Unchanged heart and mediastinal contours. Electronically Signed: Ortiz Beltran MD  4/13/2024 8:14 AM EDT  Workstation ID: [id number]    XR Chest 1 View    Result Date: 4/12/2024  Impression: Impression: Stable cardiomegaly. Features of mild interstitial edema are not thought to be significantly changed. No pleural effusion is identified. Electronically Signed: Sheryl Torrez MD  4/12/2024 7:28 AM EDT  Workstation ID: [id number]    CT Angiogram Chest Pulmonary Embolism    Result Date: 4/10/2024  Impression: Impression: No evidence of pulmonary embolism. Findings compatible with mild interstitial pulm edema. Mild cardiomegaly. Stable mild to moderate diffuse mediastinal lymphadenopathy. This may be congestive or reactive. Electronically Signed: Leonides Trivedi MD  4/10/2024 10:01 PM EDT  Workstation ID: [id number]    XR Chest 1 View    Result Date: 4/10/2024  Impression: Impression: No focal consolidation or effusion. Changes of COPD. Electronically Signed: Leonides Trivedi MD  4/10/2024 8:31 PM EDT  Workstation ID:  [id number]             Results for orders placed during the hospital encounter of 04/10/24    Adult Transthoracic Echo Complete W/ Cont if Necessary Per Protocol    Interpretation Summary    Left ventricular systolic function is normal. Left ventricular ejection fraction appears to be 61 - 65%.    Left ventricular wall thickness is consistent with mild concentric hypertrophy.    Left ventricular diastolic function was normal.    The right ventricular cavity is mild to moderately dilated.    Estimated right ventricular systolic pressure from tricuspid regurgitation is markedly elevated (>55 mmHg). Calculated right ventricular systolic pressure from tricuspid regurgitation is 81 mmHg.      Labs Pending at Discharge:      Procedures Performed           Consults:   Consults       Date and Time Order Name Status Description    4/11/2024 12:42 AM Inpatient Pulmonology Consult Completed     4/11/2024 12:42 AM Inpatient Cardiology Consult Completed               Discharge Details        Discharge Medications        New Medications        Instructions Start Date   cefuroxime 250 MG tablet  Commonly known as: CEFTIN   250 mg, Oral, 2 Times Daily      Combivent Respimat  MCG/ACT inhaler  Generic drug: ipratropium-albuterol   1 puff, Inhalation, 4 Times Daily - RT      doxycycline 100 MG capsule  Commonly known as: VIBRAMYCIN   100 mg, Oral, 2 Times Daily      Fluticasone-Salmeterol 250-50 MCG/ACT DISKUS  Commonly known as: ADVAIR/WIXELA   1 puff, Inhalation, 2 Times Daily - RT      furosemide 40 MG tablet  Commonly known as: Lasix   40 mg, Oral, Daily      potassium chloride 10 MEQ CR tablet  Commonly known as: KLOR-CON M10   10 mEq, Oral, 2 Times Daily      predniSONE 20 MG tablet  Commonly known as: DELTASONE   20 mg, Oral, Daily             Continue These Medications        Instructions Start Date   amLODIPine 5 MG tablet  Commonly known as: NORVASC   1 tablet, Oral, Nightly      gabapentin 300 MG capsule  Commonly  known as: NEURONTIN   1 capsule, Oral, 3 Times Daily      glipizide 10 MG tablet  Commonly known as: GLUCOTROL   5 mg, Oral, 2 Times Daily Before Meals      metFORMIN 1000 MG tablet  Commonly known as: GLUCOPHAGE   1 tablet, Oral, 2 Times Daily With Meals      O2  Commonly known as: OXYGEN   4 L/min, Inhalation, Continuous      pioglitazone 30 MG tablet  Commonly known as: ACTOS   1 tablet, Oral, Nightly      sertraline 50 MG tablet  Commonly known as: ZOLOFT   1 tablet, Oral, Nightly      vitamin D 1.25 MG (34505 UT) capsule capsule  Commonly known as: ERGOCALCIFEROL   1 capsule, 2 Times Weekly             ASK your doctor about these medications        Instructions Start Date   aspirin 81 MG EC tablet   81 mg, Oral, 3 Times Weekly      pravastatin 10 MG tablet  Commonly known as: PRAVACHOL   10 mg, Oral, Daily               No Known Allergies      Discharge Disposition:   Home or Self Care    Diet:  Hospital:  Diet Order   Procedures    Diet: Cardiac, Diabetic; Healthy Heart (2-3 Na+); Consistent Carbohydrate; Fluid Consistency: Thin (IDDSI 0)         Discharge Activity:         CODE STATUS:  Code Status and Medical Interventions:   Ordered at: 04/11/24 0041     Code Status (Patient has no pulse and is not breathing):    CPR (Attempt to Resuscitate)     Medical Interventions (Patient has pulse or is breathing):    Full Support         No future appointments.    Additional Instructions for the Follow-ups that You Need to Schedule       Discharge Follow-up with PCP   As directed       Currently Documented PCP:    Bert Rojas MD    PCP Phone Number:    [phone]     Follow Up Details: PCP in the next 2 weeks        Discharge Follow-up with Specialty: Cardiology in the next 3 weeks; 3 Weeks   As directed      Specialty: Cardiology in the next 3 weeks   Follow Up: 3 Weeks        Discharge Follow-up with Specified Provider: Pulmonary in next 2 to 3-week   As directed      To: Pulmonary in next 2 to 3-week                 Time spent on Discharge including face to face service:  >30 minutes    Signature: Electronically signed by Joey Zimmerman MD, 04/16/24, 11:00 EDT.  [facility] Hospitalist Team

## 2024-04-16 NOTE — DISCHARGE PLACEMENT REQUEST
"Brenden Lackey (73 y.o. Male)       Date of Birth   1950    Social Security Number       Address   [address] [place]    Home Phone   [phone]    MRN   [id number]       Jehovah's witness   None    Marital Status                               Admission Date   4/10/24    Admission Type   Emergency    Admitting Provider   Dara Rodarte DO    Attending Provider   Joey Zimmerman MD    Department, Room/Bed   [facility] 3C MEDICAL INPATIENT, 372/1       Discharge Date       Discharge Disposition   Home or Self Care    Discharge Destination                                 Attending Provider: Joey Zimmerman MD    Allergies: No Known Allergies    Isolation: None   Infection: None   Code Status: CPR    Ht: 170.2 cm (67\")   Wt: 131 kg (288 lb 12.8 oz)    Admission Cmt: None   Principal Problem: Acute on chronic respiratory failure [J96.20]                   Active Insurance as of 4/10/2024       Primary Coverage       Payor Plan Insurance Group Employer/Plan Group    MEDICARE MEDICARE A & B        Payor Plan Address Payor Plan Phone Number Payor Plan Fax Number Effective Dates    [address] [phone]  10/1/2015 - None Entered    [facility] [id number]         Subscriber Name Subscriber Birth Date Member ID       BRENDEN LACKEY 1950 [id number]               Secondary Coverage       Payor Plan Insurance Group Employer/Plan Group    [facility] [employer] N       Payor Plan Address Payor Plan Phone Number Payor Plan Fax Number Effective Dates    [facility] [phone]  1/1/2020 - None Entered    [address]       [facility] [address]         Subscriber Name Subscriber Birth Date Member ID       BRENDEN LACKEY 1950 [id number]                     Emergency Contacts        (Rel.) Home Phone Work Phone Mobile Phone    Donna Lackey (Spouse) [phone] -- [phone]    RomeRigoberto doty (Son) -- -- [phone]    " Renetta Peter (Daughter) [phone] -- --

## 2024-04-16 NOTE — PLAN OF CARE
Goal Outcome Evaluation:              Outcome Evaluation: Pt remains on 6l.  No issues this shift.  Will continue to monitor.

## 2024-04-16 NOTE — PLAN OF CARE
Goal Outcome Evaluation:  Plan of Care Reviewed With: patient        Progress: improving  Outcome Evaluation: patient remains of 6L o2, is getting titrataed down to 4L if managable.

## 2024-04-17 NOTE — OUTREACH NOTE
Prep Survey      Flowsheet Row Responses   [facility] facility patient discharged from? [facility]   Is LACE score < 7 ? No   Eligibility Readm Mgmt   Discharge diagnosis Acute on chronic respiratory failure   Does the patient have one of the following disease processes/diagnoses(primary or secondary)? Other   Does the patient have Home health ordered? No   Is there a DME ordered? No   Prep survey completed? Yes            Jyoti ZAVALETA - Registered Nurse

## 2024-04-17 NOTE — CASE MANAGEMENT/SOCIAL WORK
Case Management Discharge Note      Final Note: Routine home.    Provided Post Acute Provider List?: N/A  Provided Post Acute Provider Quality & Resource List?: N/A    Selected Continued Care - Discharged on 4/16/2024 Admission date: 4/10/2024 - Discharge disposition: Home or Self Care      Durable Medical Equipment Coordination complete.      Service Provider Selected Services Address Phone Fax Patient Preferred    [employer] [address], [place] [id number] [phone] --       Internal Comment last updated by Hattie Frankel RN 4/17/2024 0821    Current home 02 and CPAP.                           Transportation Services  Private: Car    Final Discharge Disposition Code: 01 - home or self-care

## 2024-04-29 ENCOUNTER — READMISSION MANAGEMENT (OUTPATIENT)
Dept: CALL CENTER | Facility: HOSPITAL | Age: 74
End: 2024-04-29
Payer: MEDICARE

## 2024-04-29 NOTE — OUTREACH NOTE
"Medical Week 2 Survey      Flowsheet Row Responses   [facility] patient discharged from? [facility]   Does the patient have one of the following disease processes/diagnoses(primary or secondary)? Other   Week 2 attempt successful? Yes   Call start time 1559   Discharge diagnosis CHF and COPD exacerbation   Call end time 1604   Person spoke with today (if not patient) and relationship Patient   Meds reviewed with patient/caregiver? Yes   Does the patient have all medications ordered at discharge? Yes   Is the patient taking all medications as directed (includes completed medication regime)? Yes   Medication comments Patient reports that he is on 80mg of lasix a day and that his PCP ordered him an \"extra\" water pill for just when he needed it. Patient has bumex 2mg to take on PRN basis.   Does the patient have a primary care provider?  Yes   Comments regarding PCP Patient has seen PCP since discharge.   Has the patient kept scheduled appointments due by today? Yes  [Patient has also had follow up with pulmonology]   Has home health visited the patient within 72 hours of discharge? N/A   DME comments Wearing home O2 4L. Wears at night and most of the time during the day.   Psychosocial issues? No   Did the patient receive a copy of their discharge instructions? Yes   Nursing interventions Reviewed instructions with patient   What is the patient's perception of their health status since discharge? Improving   Is the patient/caregiver able to teach back signs and symptoms related to disease process for when to call PCP? Yes   Is the patient/caregiver able to teach back signs and symptoms related to disease process for when to call 911? Yes   Is the patient/caregiver able to teach back the hierarchy of who to call/visit for symptoms/problems? PCP, Specialist, Home health nurse, Urgent Care, ED, 911 Yes   If the patient is a current smoker, are they able to teach back resources for cessation? Not a smoker   Week 2 Call " Completed? Yes   Is the patient interested in additional calls from an ambulatory ? No   Would this patient benefit from a Referral to [facility] Social Work? No   Call end time 1305            ERASMO SUBRAMANIAN - Registered Nurse

## 2024-05-09 ENCOUNTER — READMISSION MANAGEMENT (OUTPATIENT)
Dept: CALL CENTER | Facility: HOSPITAL | Age: 74
End: 2024-05-09
Payer: MEDICARE

## 2025-03-16 ENCOUNTER — HOSPITAL ENCOUNTER (INPATIENT)
Facility: HOSPITAL | Age: 75
LOS: 3 days | Discharge: HOME | End: 2025-03-20
Attending: EMERGENCY MEDICINE | Admitting: FAMILY MEDICINE
Payer: MEDICARE

## 2025-03-16 ENCOUNTER — APPOINTMENT (OUTPATIENT)
Dept: CT IMAGING | Facility: HOSPITAL | Age: 75
End: 2025-03-16
Payer: MEDICARE

## 2025-03-16 ENCOUNTER — APPOINTMENT (OUTPATIENT)
Dept: GENERAL RADIOLOGY | Facility: HOSPITAL | Age: 75
End: 2025-03-16
Payer: MEDICARE

## 2025-03-16 DIAGNOSIS — T17.800A MULTIPLE TRACHEOBRONCHIAL MUCUS PLUGS: ICD-10-CM

## 2025-03-16 DIAGNOSIS — R06.00 DYSPNEA, UNSPECIFIED TYPE: Primary | ICD-10-CM

## 2025-03-16 DIAGNOSIS — R79.89 ELEVATED TROPONIN: ICD-10-CM

## 2025-03-16 DIAGNOSIS — J18.9 PNEUMONIA DUE TO INFECTIOUS ORGANISM, UNSPECIFIED LATERALITY, UNSPECIFIED PART OF LUNG: ICD-10-CM

## 2025-03-16 LAB
ANION GAP SERPL CALCULATED.3IONS-SCNC: 12 MMOL/L (ref 5–15)
B PARAPERT DNA SPEC QL NAA+PROBE: NOT DETECTED
B PERT DNA SPEC QL NAA+PROBE: NOT DETECTED
BASOPHILS # BLD AUTO: 0.01 10*3/MM3 (ref 0–0.2)
BASOPHILS NFR BLD AUTO: 0.2 % (ref 0–1.5)
BUN SERPL-MCNC: 27 MG/DL (ref 8–23)
BUN/CREAT SERPL: 21.1 (ref 7–25)
C PNEUM DNA NPH QL NAA+NON-PROBE: NOT DETECTED
CALCIUM SPEC-SCNC: 8.8 MG/DL (ref 8.6–10.5)
CHLORIDE SERPL-SCNC: 101 MMOL/L (ref 98–107)
CO2 SERPL-SCNC: 23 MMOL/L (ref 22–29)
CREAT SERPL-MCNC: 1.28 MG/DL (ref 0.76–1.27)
D DIMER PPP FEU-MCNC: 1.79 MCGFEU/ML (ref 0–0.74)
DEPRECATED RDW RBC AUTO: 46.8 FL (ref 37–54)
EGFRCR SERPLBLD CKD-EPI 2021: 58.7 ML/MIN/1.73
EOSINOPHIL # BLD AUTO: 0.03 10*3/MM3 (ref 0–0.4)
EOSINOPHIL NFR BLD AUTO: 0.6 % (ref 0.3–6.2)
ERYTHROCYTE [DISTWIDTH] IN BLOOD BY AUTOMATED COUNT: 13.2 % (ref 12.3–15.4)
FLUAV SUBTYP SPEC NAA+PROBE: NOT DETECTED
FLUBV RNA ISLT QL NAA+PROBE: NOT DETECTED
GEN 5 1HR TROPONIN T REFLEX: 96 NG/L
GLUCOSE BLDC GLUCOMTR-MCNC: 176 MG/DL (ref 70–105)
GLUCOSE BLDC GLUCOMTR-MCNC: 367 MG/DL (ref 70–105)
GLUCOSE SERPL-MCNC: 177 MG/DL (ref 65–99)
HADV DNA SPEC NAA+PROBE: NOT DETECTED
HBA1C MFR BLD: 6.47 % (ref 4.8–5.6)
HCOV 229E RNA SPEC QL NAA+PROBE: NOT DETECTED
HCOV HKU1 RNA SPEC QL NAA+PROBE: NOT DETECTED
HCOV NL63 RNA SPEC QL NAA+PROBE: NOT DETECTED
HCOV OC43 RNA SPEC QL NAA+PROBE: NOT DETECTED
HCT VFR BLD AUTO: 34 % (ref 37.5–51)
HGB BLD-MCNC: 10.4 G/DL (ref 13–17.7)
HMPV RNA NPH QL NAA+NON-PROBE: NOT DETECTED
HPIV1 RNA ISLT QL NAA+PROBE: NOT DETECTED
HPIV2 RNA SPEC QL NAA+PROBE: NOT DETECTED
HPIV3 RNA NPH QL NAA+PROBE: NOT DETECTED
HPIV4 P GENE NPH QL NAA+PROBE: NOT DETECTED
IMM GRANULOCYTES # BLD AUTO: 0.02 10*3/MM3 (ref 0–0.05)
IMM GRANULOCYTES NFR BLD AUTO: 0.4 % (ref 0–0.5)
LYMPHOCYTES # BLD AUTO: 0.57 10*3/MM3 (ref 0.7–3.1)
LYMPHOCYTES NFR BLD AUTO: 11.5 % (ref 19.6–45.3)
M PNEUMO IGG SER IA-ACNC: NOT DETECTED
MCH RBC QN AUTO: 29.2 PG (ref 26.6–33)
MCHC RBC AUTO-ENTMCNC: 30.6 G/DL (ref 31.5–35.7)
MCV RBC AUTO: 95.5 FL (ref 79–97)
MONOCYTES # BLD AUTO: 0.57 10*3/MM3 (ref 0.1–0.9)
MONOCYTES NFR BLD AUTO: 11.5 % (ref 5–12)
NEUTROPHILS NFR BLD AUTO: 3.75 10*3/MM3 (ref 1.7–7)
NEUTROPHILS NFR BLD AUTO: 75.8 % (ref 42.7–76)
NRBC BLD AUTO-RTO: 0 /100 WBC (ref 0–0.2)
NT-PROBNP SERPL-MCNC: 1701 PG/ML (ref 0–900)
PLATELET # BLD AUTO: 162 10*3/MM3 (ref 140–450)
PMV BLD AUTO: 11.1 FL (ref 6–12)
POTASSIUM SERPL-SCNC: 4.8 MMOL/L (ref 3.5–5.2)
RBC # BLD AUTO: 3.56 10*6/MM3 (ref 4.14–5.8)
RHINOVIRUS RNA SPEC NAA+PROBE: NOT DETECTED
RSV RNA NPH QL NAA+NON-PROBE: NOT DETECTED
SARS-COV-2 RNA RESP QL NAA+PROBE: NOT DETECTED
SODIUM SERPL-SCNC: 136 MMOL/L (ref 136–145)
TROPONIN T % DELTA: 2
TROPONIN T NUMERIC DELTA: 2 NG/L
TROPONIN T SERPL HS-MCNC: 83 NG/L
TROPONIN T SERPL HS-MCNC: 94 NG/L
WBC NRBC COR # BLD AUTO: 4.95 10*3/MM3 (ref 3.4–10.8)
WHOLE BLOOD HOLD COAG: NORMAL

## 2025-03-16 PROCEDURE — 80048 BASIC METABOLIC PNL TOTAL CA: CPT | Performed by: EMERGENCY MEDICINE

## 2025-03-16 PROCEDURE — 25510000001 IOPAMIDOL PER 1 ML: Performed by: STUDENT IN AN ORGANIZED HEALTH CARE EDUCATION/TRAINING PROGRAM

## 2025-03-16 PROCEDURE — 83880 ASSAY OF NATRIURETIC PEPTIDE: CPT | Performed by: EMERGENCY MEDICINE

## 2025-03-16 PROCEDURE — 81003 URINALYSIS AUTO W/O SCOPE: CPT | Performed by: STUDENT IN AN ORGANIZED HEALTH CARE EDUCATION/TRAINING PROGRAM

## 2025-03-16 PROCEDURE — 71045 X-RAY EXAM CHEST 1 VIEW: CPT

## 2025-03-16 PROCEDURE — 94799 UNLISTED PULMONARY SVC/PX: CPT

## 2025-03-16 PROCEDURE — 85025 COMPLETE CBC W/AUTO DIFF WBC: CPT | Performed by: EMERGENCY MEDICINE

## 2025-03-16 PROCEDURE — 85379 FIBRIN DEGRADATION QUANT: CPT | Performed by: EMERGENCY MEDICINE

## 2025-03-16 PROCEDURE — 71275 CT ANGIOGRAPHY CHEST: CPT

## 2025-03-16 PROCEDURE — 36415 COLL VENOUS BLD VENIPUNCTURE: CPT

## 2025-03-16 PROCEDURE — 82948 REAGENT STRIP/BLOOD GLUCOSE: CPT

## 2025-03-16 PROCEDURE — G0378 HOSPITAL OBSERVATION PER HR: HCPCS

## 2025-03-16 PROCEDURE — 82570 ASSAY OF URINE CREATININE: CPT | Performed by: STUDENT IN AN ORGANIZED HEALTH CARE EDUCATION/TRAINING PROGRAM

## 2025-03-16 PROCEDURE — 93005 ELECTROCARDIOGRAM TRACING: CPT | Performed by: EMERGENCY MEDICINE

## 2025-03-16 PROCEDURE — 25010000002 HEPARIN (PORCINE) PER 1000 UNITS: Performed by: STUDENT IN AN ORGANIZED HEALTH CARE EDUCATION/TRAINING PROGRAM

## 2025-03-16 PROCEDURE — 63710000001 INSULIN LISPRO (HUMAN) PER 5 UNITS: Performed by: STUDENT IN AN ORGANIZED HEALTH CARE EDUCATION/TRAINING PROGRAM

## 2025-03-16 PROCEDURE — 0202U NFCT DS 22 TRGT SARS-COV-2: CPT | Performed by: EMERGENCY MEDICINE

## 2025-03-16 PROCEDURE — 83036 HEMOGLOBIN GLYCOSYLATED A1C: CPT | Performed by: STUDENT IN AN ORGANIZED HEALTH CARE EDUCATION/TRAINING PROGRAM

## 2025-03-16 PROCEDURE — 84540 ASSAY OF URINE/UREA-N: CPT | Performed by: STUDENT IN AN ORGANIZED HEALTH CARE EDUCATION/TRAINING PROGRAM

## 2025-03-16 PROCEDURE — 99285 EMERGENCY DEPT VISIT HI MDM: CPT

## 2025-03-16 PROCEDURE — 25010000002 FUROSEMIDE PER 20 MG: Performed by: EMERGENCY MEDICINE

## 2025-03-16 PROCEDURE — 94640 AIRWAY INHALATION TREATMENT: CPT

## 2025-03-16 PROCEDURE — 25010000002 METHYLPREDNISOLONE PER 40 MG: Performed by: STUDENT IN AN ORGANIZED HEALTH CARE EDUCATION/TRAINING PROGRAM

## 2025-03-16 PROCEDURE — 82948 REAGENT STRIP/BLOOD GLUCOSE: CPT | Performed by: STUDENT IN AN ORGANIZED HEALTH CARE EDUCATION/TRAINING PROGRAM

## 2025-03-16 PROCEDURE — 94761 N-INVAS EAR/PLS OXIMETRY MLT: CPT

## 2025-03-16 PROCEDURE — 84484 ASSAY OF TROPONIN QUANT: CPT | Performed by: STUDENT IN AN ORGANIZED HEALTH CARE EDUCATION/TRAINING PROGRAM

## 2025-03-16 PROCEDURE — 25010000002 METHYLPREDNISOLONE PER 125 MG: Performed by: EMERGENCY MEDICINE

## 2025-03-16 PROCEDURE — 84484 ASSAY OF TROPONIN QUANT: CPT | Performed by: EMERGENCY MEDICINE

## 2025-03-16 RX ORDER — METHYLPREDNISOLONE SODIUM SUCCINATE 125 MG/2ML
125 INJECTION, POWDER, LYOPHILIZED, FOR SOLUTION INTRAMUSCULAR; INTRAVENOUS ONCE
Status: COMPLETED | OUTPATIENT
Start: 2025-03-16 | End: 2025-03-16

## 2025-03-16 RX ORDER — SODIUM CHLORIDE 0.9 % (FLUSH) 0.9 %
10 SYRINGE (ML) INJECTION AS NEEDED
Status: DISCONTINUED | OUTPATIENT
Start: 2025-03-16 | End: 2025-03-20 | Stop reason: HOSPADM

## 2025-03-16 RX ORDER — ARFORMOTEROL TARTRATE 15 UG/2ML
15 SOLUTION RESPIRATORY (INHALATION)
Status: DISCONTINUED | OUTPATIENT
Start: 2025-03-16 | End: 2025-03-17

## 2025-03-16 RX ORDER — NITROGLYCERIN 0.4 MG/1
0.4 TABLET SUBLINGUAL
Status: DISCONTINUED | OUTPATIENT
Start: 2025-03-16 | End: 2025-03-18

## 2025-03-16 RX ORDER — HEPARIN SODIUM 5000 [USP'U]/ML
5000 INJECTION, SOLUTION INTRAVENOUS; SUBCUTANEOUS EVERY 8 HOURS SCHEDULED
Status: DISCONTINUED | OUTPATIENT
Start: 2025-03-16 | End: 2025-03-20 | Stop reason: HOSPADM

## 2025-03-16 RX ORDER — BISACODYL 10 MG
10 SUPPOSITORY, RECTAL RECTAL DAILY PRN
Status: DISCONTINUED | OUTPATIENT
Start: 2025-03-16 | End: 2025-03-20 | Stop reason: HOSPADM

## 2025-03-16 RX ORDER — METHYLPREDNISOLONE SODIUM SUCCINATE 40 MG/ML
40 INJECTION, POWDER, LYOPHILIZED, FOR SOLUTION INTRAMUSCULAR; INTRAVENOUS EVERY 12 HOURS
Status: DISCONTINUED | OUTPATIENT
Start: 2025-03-16 | End: 2025-03-17

## 2025-03-16 RX ORDER — FUROSEMIDE 10 MG/ML
40 INJECTION INTRAMUSCULAR; INTRAVENOUS EVERY 12 HOURS
Status: DISCONTINUED | OUTPATIENT
Start: 2025-03-17 | End: 2025-03-18

## 2025-03-16 RX ORDER — IPRATROPIUM BROMIDE AND ALBUTEROL SULFATE 2.5; .5 MG/3ML; MG/3ML
3 SOLUTION RESPIRATORY (INHALATION) ONCE
Status: COMPLETED | OUTPATIENT
Start: 2025-03-16 | End: 2025-03-16

## 2025-03-16 RX ORDER — ACETAMINOPHEN 325 MG/1
650 TABLET ORAL EVERY 4 HOURS PRN
Status: DISCONTINUED | OUTPATIENT
Start: 2025-03-16 | End: 2025-03-20 | Stop reason: HOSPADM

## 2025-03-16 RX ORDER — FUROSEMIDE 10 MG/ML
40 INJECTION INTRAMUSCULAR; INTRAVENOUS ONCE
Status: COMPLETED | OUTPATIENT
Start: 2025-03-16 | End: 2025-03-16

## 2025-03-16 RX ORDER — GABAPENTIN 300 MG/1
300 CAPSULE ORAL 3 TIMES DAILY
Status: DISCONTINUED | OUTPATIENT
Start: 2025-03-16 | End: 2025-03-20 | Stop reason: HOSPADM

## 2025-03-16 RX ORDER — ATORVASTATIN CALCIUM 10 MG/1
10 TABLET, FILM COATED ORAL DAILY
Status: DISCONTINUED | OUTPATIENT
Start: 2025-03-17 | End: 2025-03-17

## 2025-03-16 RX ORDER — AMOXICILLIN 250 MG
2 CAPSULE ORAL 2 TIMES DAILY PRN
Status: DISCONTINUED | OUTPATIENT
Start: 2025-03-16 | End: 2025-03-20 | Stop reason: HOSPADM

## 2025-03-16 RX ORDER — ACETAMINOPHEN 650 MG/1
650 SUPPOSITORY RECTAL EVERY 4 HOURS PRN
Status: DISCONTINUED | OUTPATIENT
Start: 2025-03-16 | End: 2025-03-20 | Stop reason: HOSPADM

## 2025-03-16 RX ORDER — IPRATROPIUM BROMIDE AND ALBUTEROL SULFATE 2.5; .5 MG/3ML; MG/3ML
3 SOLUTION RESPIRATORY (INHALATION)
Status: DISCONTINUED | OUTPATIENT
Start: 2025-03-16 | End: 2025-03-20 | Stop reason: HOSPADM

## 2025-03-16 RX ORDER — IBUPROFEN 600 MG/1
1 TABLET ORAL
Status: DISCONTINUED | OUTPATIENT
Start: 2025-03-16 | End: 2025-03-20 | Stop reason: HOSPADM

## 2025-03-16 RX ORDER — DEXTROSE MONOHYDRATE 25 G/50ML
25 INJECTION, SOLUTION INTRAVENOUS
Status: DISCONTINUED | OUTPATIENT
Start: 2025-03-16 | End: 2025-03-20 | Stop reason: HOSPADM

## 2025-03-16 RX ORDER — NICOTINE POLACRILEX 4 MG
15 LOZENGE BUCCAL
Status: DISCONTINUED | OUTPATIENT
Start: 2025-03-16 | End: 2025-03-20 | Stop reason: HOSPADM

## 2025-03-16 RX ORDER — INSULIN LISPRO 100 [IU]/ML
2-7 INJECTION, SOLUTION INTRAVENOUS; SUBCUTANEOUS
Status: DISCONTINUED | OUTPATIENT
Start: 2025-03-16 | End: 2025-03-18

## 2025-03-16 RX ORDER — BUDESONIDE 0.5 MG/2ML
0.5 INHALANT ORAL
Status: DISCONTINUED | OUTPATIENT
Start: 2025-03-16 | End: 2025-03-17

## 2025-03-16 RX ORDER — AMLODIPINE BESYLATE 5 MG/1
2.5 TABLET ORAL NIGHTLY
Status: DISCONTINUED | OUTPATIENT
Start: 2025-03-17 | End: 2025-03-17

## 2025-03-16 RX ORDER — POLYETHYLENE GLYCOL 3350 17 G/17G
17 POWDER, FOR SOLUTION ORAL DAILY PRN
Status: DISCONTINUED | OUTPATIENT
Start: 2025-03-16 | End: 2025-03-20 | Stop reason: HOSPADM

## 2025-03-16 RX ORDER — IOPAMIDOL 755 MG/ML
100 INJECTION, SOLUTION INTRAVASCULAR
Status: COMPLETED | OUTPATIENT
Start: 2025-03-17 | End: 2025-03-16

## 2025-03-16 RX ORDER — ACETAMINOPHEN 160 MG/5ML
650 SOLUTION ORAL EVERY 4 HOURS PRN
Status: DISCONTINUED | OUTPATIENT
Start: 2025-03-16 | End: 2025-03-20 | Stop reason: HOSPADM

## 2025-03-16 RX ORDER — BISACODYL 5 MG/1
5 TABLET, DELAYED RELEASE ORAL DAILY PRN
Status: DISCONTINUED | OUTPATIENT
Start: 2025-03-16 | End: 2025-03-20 | Stop reason: HOSPADM

## 2025-03-16 RX ADMIN — IPRATROPIUM BROMIDE AND ALBUTEROL SULFATE 3 ML: .5; 3 SOLUTION RESPIRATORY (INHALATION) at 20:01

## 2025-03-16 RX ADMIN — GABAPENTIN 300 MG: 300 CAPSULE ORAL at 23:23

## 2025-03-16 RX ADMIN — FUROSEMIDE 40 MG: 10 INJECTION, SOLUTION INTRAMUSCULAR; INTRAVENOUS at 18:54

## 2025-03-16 RX ADMIN — METHYLPREDNISOLONE SODIUM SUCCINATE 40 MG: 40 INJECTION, POWDER, FOR SOLUTION INTRAMUSCULAR; INTRAVENOUS at 21:29

## 2025-03-16 RX ADMIN — IOPAMIDOL 100 ML: 755 INJECTION, SOLUTION INTRAVENOUS at 23:17

## 2025-03-16 RX ADMIN — HEPARIN SODIUM 5000 UNITS: 5000 INJECTION INTRAVENOUS; SUBCUTANEOUS at 21:29

## 2025-03-16 RX ADMIN — INSULIN LISPRO 2 UNITS: 100 INJECTION, SOLUTION INTRAVENOUS; SUBCUTANEOUS at 18:54

## 2025-03-16 RX ADMIN — METHYLPREDNISOLONE SODIUM SUCCINATE 125 MG: 125 INJECTION, POWDER, FOR SOLUTION INTRAMUSCULAR; INTRAVENOUS at 11:51

## 2025-03-16 RX ADMIN — SERTRALINE HYDROCHLORIDE 50 MG: 50 TABLET, FILM COATED ORAL at 23:23

## 2025-03-16 RX ADMIN — INSULIN LISPRO 6 UNITS: 100 INJECTION, SOLUTION INTRAVENOUS; SUBCUTANEOUS at 21:29

## 2025-03-16 RX ADMIN — IPRATROPIUM BROMIDE AND ALBUTEROL SULFATE 3 ML: .5; 3 SOLUTION RESPIRATORY (INHALATION) at 13:32

## 2025-03-16 NOTE — ED PROVIDER NOTES
Subjective   History of Present Illness  Chief complaint: Shortness of breath    74-year-old male presents with shortness of breath.  Patient states symptoms have been getting progressively worse over the past 5 days.  He has had a productive cough.  He denies fever.  He denies any chest pain.  He has been using his nebulizer with some relief.    History provided by:  Patient      Review of Systems   Constitutional:  Negative for fever.   HENT:  Negative for congestion.    Respiratory:  Positive for cough and shortness of breath.    Cardiovascular:  Negative for chest pain.   Gastrointestinal:  Negative for abdominal pain, diarrhea and vomiting.   Musculoskeletal:  Negative for back pain.   Neurological:  Negative for headaches.   Psychiatric/Behavioral:  Negative for confusion.        Past Medical History:   Diagnosis Date    (HFpEF) heart failure with preserved ejection fraction 4/1/2023    Abnormal EKG 2/3/2015    Anxiety     COPD (chronic obstructive pulmonary disease)     Coronary artery disease 3/3/2015    Diabetes     type 2    Hyperlipidemia     Hypertension     Microalbuminuria due to type 2 diabetes mellitus 2/5/2020    Multinodular goiter 4/18/2019    Murmur 2/3/2015    Primary osteoarthritis of left knee 10/22/2021       No Known Allergies    Past Surgical History:   Procedure Laterality Date    BRONCHOSCOPY N/A 4/9/2023    Procedure: BRONCHOSCOPY, bronchial alveolar lavage right upper lobe;  Surgeon: Juanjo Castañeda MD;  Location: [facility] ENDOSCOPY;  Service: Pulmonary;  Laterality: N/A;  post: pneumonia    CARDIAC CATHETERIZATION Left 1/15/2023    Procedure: Left Heart Cath and coronary angiogram;  Surgeon: Shawn Baker MD;  Location: [facility] CATH INVASIVE LOCATION;  Service: Cardiovascular;  Laterality: Left;    EYE SURGERY      cataract    TOTAL KNEE ARTHROPLASTY Right 10/21/2020    Procedure: TOTAL KNEE ARTHROPLASTY;  Surgeon: Ravi Fagan MD;  Location: [facility] MAIN OR;  Service: Orthopedics;   "Laterality: Right;    TOTAL KNEE ARTHROPLASTY REVISION Right 2021    Procedure: KNEE POLY INSERT EXCHANGE with irrigation;  Surgeon: Ravi Fagan MD;  Location: [facility] OR;  Service: Orthopedics;  Laterality: Right;       Family History   Problem Relation Age of Onset    Cancer Other     Diabetes Other     Heart disease Other     No Known Problems Mother     No Known Problems Father     No Known Problems Sister     No Known Problems Brother     No Known Problems Maternal Aunt     No Known Problems Maternal Uncle     No Known Problems Paternal Aunt     No Known Problems Paternal Uncle     No Known Problems Maternal Grandmother     No Known Problems Maternal Grandfather     No Known Problems Paternal Grandmother     No Known Problems Paternal Grandfather     Anemia Neg Hx     Arrhythmia Neg Hx     Asthma Neg Hx     Clotting disorder Neg Hx     Fainting Neg Hx     Heart attack Neg Hx     Heart failure Neg Hx     Hyperlipidemia Neg Hx     Hypertension Neg Hx        Social History     Socioeconomic History    Marital status:    Tobacco Use    Smoking status: Former     Current packs/day: 0.00     Average packs/day: 1 pack/day for 5.0 years (5.0 ttl pk-yrs)     Types: Cigarettes     Start date: 7/3/2017     Quit date: 7/3/2022     Years since quittin.7     Passive exposure: Past    Smokeless tobacco: Former     Quit date: 7/3/2022   Vaping Use    Vaping status: Never Used   Substance and Sexual Activity    Alcohol use: Not Currently    Drug use: Never    Sexual activity: Defer       /64   Pulse 61   Temp 98.9 °F (37.2 °C) (Oral)   Resp 20   Ht 170.2 cm (67\")   Wt 98 kg (216 lb)   SpO2 92%   BMI 33.83 kg/m²       Objective   Physical Exam  Vitals and nursing note reviewed.   Constitutional:       Appearance: He is well-developed.   HENT:      Head: Normocephalic and atraumatic.      Mouth/Throat:      Mouth: Mucous membranes are moist.   Cardiovascular:      Rate and Rhythm: Normal rate " and regular rhythm.      Heart sounds: Normal heart sounds.   Pulmonary:      Effort: Pulmonary effort is normal. No respiratory distress.      Breath sounds: Decreased breath sounds and wheezing present.      Comments: Frequent coughing  Abdominal:      General: Bowel sounds are normal.      Palpations: Abdomen is soft.      Tenderness: There is no abdominal tenderness.   Musculoskeletal:      Right lower leg: No tenderness. No edema.      Left lower leg: No tenderness. No edema.   Skin:     General: Skin is warm and dry.   Neurological:      Mental Status: He is alert and oriented to person, place, and time.         Procedures           ED Course      My interpretation of EKG shows sinus rhythm, rate of 71, right bundle branch block, no ST elevations                                     Results for orders placed or performed during the hospital encounter of 03/16/25   ECG 12 Lead Dyspnea    Collection Time: 03/16/25 11:20 AM   Result Value Ref Range    QT Interval 398 ms    QTC Interval 432 ms   Respiratory Panel PCR w/COVID-19(SARS-CoV-2) IAN/NAYELY/KATERIN/PAD/COR/KRISH In-House, NP Swab in UTM/VTM, 2 HR TAT - Swab, Nasopharynx    Collection Time: 03/16/25 11:37 AM    Specimen: Nasopharynx; Swab   Result Value Ref Range    ADENOVIRUS, PCR Not Detected Not Detected    Coronavirus 229E Not Detected Not Detected    Coronavirus HKU1 Not Detected Not Detected    Coronavirus NL63 Not Detected Not Detected    Coronavirus OC43 Not Detected Not Detected    COVID19 Not Detected Not Detected - Ref. Range    Human Metapneumovirus Not Detected Not Detected    Human Rhinovirus/Enterovirus Not Detected Not Detected    Influenza A PCR Not Detected Not Detected    Influenza B PCR Not Detected Not Detected    Parainfluenza Virus 1 Not Detected Not Detected    Parainfluenza Virus 2 Not Detected Not Detected    Parainfluenza Virus 3 Not Detected Not Detected    Parainfluenza Virus 4 Not Detected Not Detected    RSV, PCR Not Detected Not  Detected    Bordetella pertussis pcr Not Detected Not Detected    Bordetella parapertussis PCR Not Detected Not Detected    Chlamydophila pneumoniae PCR Not Detected Not Detected    Mycoplasma pneumo by PCR Not Detected Not Detected   Basic Metabolic Panel    Collection Time: 03/16/25 11:37 AM    Specimen: Arm, Left; Blood   Result Value Ref Range    Glucose 177 (H) 65 - 99 mg/dL    BUN 27 (H) 8 - 23 mg/dL    Creatinine 1.28 (H) 0.76 - 1.27 mg/dL    Sodium 136 136 - 145 mmol/L    Potassium 4.8 3.5 - 5.2 mmol/L    Chloride 101 98 - 107 mmol/L    CO2 23.0 22.0 - 29.0 mmol/L    Calcium 8.8 8.6 - 10.5 mg/dL    BUN/Creatinine Ratio 21.1 7.0 - 25.0    Anion Gap 12.0 5.0 - 15.0 mmol/L    eGFR 58.7 (L) >60.0 mL/min/1.73   High Sensitivity Troponin T    Collection Time: 03/16/25 11:37 AM    Specimen: Arm, Left; Blood   Result Value Ref Range    HS Troponin T 94 (C) <22 ng/L   BNP    Collection Time: 03/16/25 11:37 AM    Specimen: Arm, Left; Blood   Result Value Ref Range    proBNP 1,701.0 (H) 0.0 - 900.0 pg/mL   CBC Auto Differential    Collection Time: 03/16/25 11:37 AM    Specimen: Arm, Left; Blood   Result Value Ref Range    WBC 4.95 3.40 - 10.80 10*3/mm3    RBC 3.56 (L) 4.14 - 5.80 10*6/mm3    Hemoglobin 10.4 (L) 13.0 - 17.7 g/dL    Hematocrit 34.0 (L) 37.5 - 51.0 %    MCV 95.5 79.0 - 97.0 fL    MCH 29.2 26.6 - 33.0 pg    MCHC 30.6 (L) 31.5 - 35.7 g/dL    RDW 13.2 12.3 - 15.4 %    RDW-SD 46.8 37.0 - 54.0 fl    MPV 11.1 6.0 - 12.0 fL    Platelets 162 140 - 450 10*3/mm3    Neutrophil % 75.8 42.7 - 76.0 %    Lymphocyte % 11.5 (L) 19.6 - 45.3 %    Monocyte % 11.5 5.0 - 12.0 %    Eosinophil % 0.6 0.3 - 6.2 %    Basophil % 0.2 0.0 - 1.5 %    Immature Grans % 0.4 0.0 - 0.5 %    Neutrophils, Absolute 3.75 1.70 - 7.00 10*3/mm3    Lymphocytes, Absolute 0.57 (L) 0.70 - 3.10 10*3/mm3    Monocytes, Absolute 0.57 0.10 - 0.90 10*3/mm3    Eosinophils, Absolute 0.03 0.00 - 0.40 10*3/mm3    Basophils, Absolute 0.01 0.00 - 0.20 10*3/mm3     Immature Grans, Absolute 0.02 0.00 - 0.05 10*3/mm3    nRBC 0.0 0.0 - 0.2 /100 WBC   Light Blue Top    Collection Time: 03/16/25 11:37 AM   Result Value Ref Range    Extra Tube Hold for add-ons.    High Sensitivity Troponin T 1Hr    Collection Time: 03/16/25 12:44 PM    Specimen: Arm, Left; Blood   Result Value Ref Range    HS Troponin T 96 (C) <22 ng/L    Troponin T Numeric Delta 2 ng/L    Troponin T % Delta 2 Abnormal if >/= 20%     XR Chest 1 View  Result Date: 3/16/2025  Impression: Interstitial thickening similar to prior study which may relate to a component of underlying mild pulmonary edema. Electronically Signed: Wang Alba MD  3/16/2025 12:12 PM EDT  Workstation ID: [id number]                Medical Decision Making  Amount and/or Complexity of Data Reviewed  Labs: ordered.  Radiology: ordered.  ECG/medicine tests: ordered.    Risk  Prescription drug management.      Patient had the above evaluation.  Results were discussed with the patient.  My interpretation of chest x-ray shows possible mild pulmonary edema.  BNP was elevated at 1700.  Initial troponin was 94 and repeat was 96.  EKG shows no obvious acute ischemia.  Respiratory panel is negative.  White blood cell count is normal.  BMP is unremarkable.  Patient was given Solu-Medrol and DuoNeb treatment.  He was also given a dose of Lasix.  D-dimer was not initially ordered and has been added.  This is pending.  I discussed with the hospitalist and the patient will be admitted for further evaluation and management.      Final diagnoses:   Dyspnea, unspecified type   Elevated troponin       ED Disposition  ED Disposition       ED Disposition   Decision to Admit    Condition   --    Comment   Level of Care: Telemetry [5]   Admitting Physician: CAROLINE DUMAS [[id number]]   Attending Physician: CAROLINE DUMAS [[id number]]                 No follow-up provider specified.       Medication List      No changes were made to your prescriptions during this visit.             Felice Stevens MD  03/16/25 0022

## 2025-03-16 NOTE — Clinical Note
Level of Care: Telemetry [5]   Admitting Physician: CAROLINE DUMAS [[id number]]   Attending Physician: CAROLINE DUMAS [[id number]]

## 2025-03-16 NOTE — H&P
[facility] Services  History & Physical    Patient Name: Brenden Peter  : 1950  MRN: [id number]  Primary Care Physician:  Bert Rojas MD  Date of admission: 3/16/2025  Date and Time of Service: 3/16/2025 at 1500    Subjective      Chief Complaint: SOA    History of Present Illness: Brenden Peter is a 74 y.o. male with a CMH of COPD, CHF, pulmonary hypertension, chronic respiratory failure on 4L NC, T2DM, Htn, Hld  who presented to [facility] on 3/16/2025 with  shortness of air. Pt reports starting 5 days ago he has had increased productive cough and wheeze with associated shortness of air and dyspnea on exertion. Symptoms have cont to worsen, prompting him to present to the ED today. He reports some sensation of fever/chills overnight. He denies known sick contacts though notes he has had multiple doctors office visits recently, thinks he could have been exposed to someone there. Denies any chest pain/pressure/palpitations, has not had any lower extremity swelling or pain, no recent travel or procedures. Has been using his home nebulizer with some limited relief. Of note pt has CPAP at home but reports has not been using recently d/t issues with mask fit and his home O2.   In ED pt found to be hypoxic to 81% on his baseline 4L. Workup also notable for significant wheezing, BNP 1700, troponin 94 ->96, ecg without ischemic findings, mild BHASKAR. Given solumedrol, duoneb, and IV lasix with significant improvement, at present is on baseline 4L. Hospitalist consulted for admission for further evaluation/management.       Review of Systems   Constitutional:  Positive for activity change, appetite change, chills and fever.   HENT:  Negative for congestion, rhinorrhea and sore throat.    Eyes: Negative.    Respiratory:  Positive for cough, shortness of breath and wheezing. Negative for chest tightness.    Cardiovascular:  Negative for chest pain, palpitations and leg  swelling.   Gastrointestinal:  Negative for abdominal distention, constipation, diarrhea and nausea.   Genitourinary:  Negative for dysuria, frequency and urgency.   Musculoskeletal: Negative.    Neurological:  Negative for dizziness, syncope, weakness and light-headedness.       Personal History     Past Medical History:   Diagnosis Date    (HFpEF) heart failure with preserved ejection fraction 4/1/2023    Abnormal EKG 2/3/2015    Anxiety     COPD (chronic obstructive pulmonary disease)     Coronary artery disease 3/3/2015    Diabetes     type 2    Hyperlipidemia     Hypertension     Microalbuminuria due to type 2 diabetes mellitus 2/5/2020    Multinodular goiter 4/18/2019    Murmur 2/3/2015    Primary osteoarthritis of left knee 10/22/2021       Past Surgical History:   Procedure Laterality Date    BRONCHOSCOPY N/A 4/9/2023    Procedure: BRONCHOSCOPY, bronchial alveolar lavage right upper lobe;  Surgeon: Juanjo Castañeda MD;  Location: [facility] ENDOSCOPY;  Service: Pulmonary;  Laterality: N/A;  post: pneumonia    CARDIAC CATHETERIZATION Left 1/15/2023    Procedure: Left Heart Cath and coronary angiogram;  Surgeon: Shawn Baker MD;  Location: [facility] CATH INVASIVE LOCATION;  Service: Cardiovascular;  Laterality: Left;    EYE SURGERY      cataract    TOTAL KNEE ARTHROPLASTY Right 10/21/2020    Procedure: TOTAL KNEE ARTHROPLASTY;  Surgeon: Ravi Fagan MD;  Location: [facility] MAIN OR;  Service: Orthopedics;  Laterality: Right;    TOTAL KNEE ARTHROPLASTY REVISION Right 5/19/2021    Procedure: KNEE POLY INSERT EXCHANGE with irrigation;  Surgeon: Ravi Fagan MD;  Location: [facility] MAIN OR;  Service: Orthopedics;  Laterality: Right;       Family History: family history includes Cancer in an other family member; Diabetes in an other family member; Heart disease in an other family member; No Known Problems in his brother, father, maternal aunt, maternal grandfather, maternal grandmother, maternal uncle, mother, paternal  aunt, paternal grandfather, paternal grandmother, paternal uncle, and sister. Otherwise pertinent FHx was reviewed and not pertinent to current issue.    Social History:  reports that he quit smoking about 2 years ago. His smoking use included cigarettes. He started smoking about 7 years ago. He has a 5 pack-year smoking history. He has been exposed to tobacco smoke. He quit smokeless tobacco use about 2 years ago. He reports that he does not currently use alcohol. He reports that he does not use drugs.    Home Medications:  Prior to Admission Medications       Prescriptions Last Dose Informant Patient Reported? Taking?    amLODIPine (NORVASC) 5 MG tablet   Yes No    Take 1 tablet by mouth Every Night. Indications: High Blood Pressure Disorder    aspirin 81 MG EC tablet   Yes No    Take 1 tablet by mouth 3 (Three) Times a Week.    cefuroxime (CEFTIN) 250 MG tablet   No No    Take 1 tablet by mouth 2 (Two) Times a Day.    doxycycline (VIBRAMYCIN) 100 MG capsule   No No    Take 1 capsule by mouth 2 (Two) Times a Day.    Fluticasone-Salmeterol (ADVAIR/WIXELA) 250-50 MCG/ACT DISKUS   No No    Inhale 1 puff 2 (Two) Times a Day.    furosemide (Lasix) 40 MG tablet   No No    Take 1 tablet by mouth Daily.    gabapentin (NEURONTIN) 300 MG capsule   Yes No    Take 1 capsule by mouth 3 (Three) Times a Day. Indications: Neuropathic Pain    glipizide (GLUCOTROL) 10 MG tablet   Yes No    Take 0.5 tablets by mouth 2 (Two) Times a Day Before Meals. Indications: Type 2 Diabetes    ipratropium-albuterol (Combivent Respimat)  MCG/ACT inhaler   No No    Inhale 1 puff 4 (Four) Times a Day.    metFORMIN (GLUCOPHAGE) 1000 MG tablet   Yes No    Take 1 tablet by mouth 2 (Two) Times a Day With Meals. Indications: Type 2 Diabetes    O2 (OXYGEN)   Yes No    Inhale 4 L/min Continuous. Indications: COPD exacerbation, complicated    pioglitazone (ACTOS) 30 MG tablet   Yes No    Take 1 tablet by mouth Every Night. Indications: Type 2  Diabetes    potassium chloride (KLOR-CON M10) 10 MEQ CR tablet   No No    Take 1 tablet by mouth 2 (Two) Times a Day.    pravastatin (PRAVACHOL) 10 MG tablet   Yes No    Take 1 tablet by mouth Daily.    predniSONE (DELTASONE) 20 MG tablet   No No    Take 1 tablet by mouth Daily.    sertraline (ZOLOFT) 50 MG tablet   Yes No    Take 1 tablet by mouth Every Night. Indications: Major Depressive Disorder    vitamin D (ERGOCALCIFEROL) 1.25 MG (07111 UT) capsule capsule   Yes No    1 capsule 2 (Two) Times a Week. Indications: Vitamin D Deficiency              Allergies:  No Known Allergies    Objective      Vitals:   Temp:  [98.9 °F (37.2 °C)] 98.9 °F (37.2 °C)  Heart Rate:  [60-72] 61  Resp:  [16-26] 20  BP: (100-137)/(44-64) 123/64  Body mass index is 33.83 kg/m².  Physical Exam  Constitutional:       General: He is not in acute distress.  HENT:      Head: Normocephalic and atraumatic.      Mouth/Throat:      Mouth: Mucous membranes are moist.      Pharynx: Oropharynx is clear.   Eyes:      Extraocular Movements: Extraocular movements intact.      Conjunctiva/sclera: Conjunctivae normal.   Cardiovascular:      Rate and Rhythm: Normal rate and regular rhythm.      Pulses: Normal pulses.      Heart sounds: Normal heart sounds.   Pulmonary:      Comments: No incr wob on 4L  Decr air movement chidi  Mild expiratory wheeze chidi  Chest:      Chest wall: No tenderness.   Abdominal:      General: Abdomen is flat. Bowel sounds are normal.      Tenderness: There is no abdominal tenderness. There is no guarding or rebound.   Musculoskeletal:         General: No swelling or tenderness. Normal range of motion.      Cervical back: Normal range of motion and neck supple.      Right lower leg: No edema.      Left lower leg: No edema.   Skin:     General: Skin is warm and dry.   Neurological:      General: No focal deficit present.      Mental Status: He is alert and oriented to person, place, and time. Mental status is at baseline.          Diagnostic Data:  Lab Results (last 24 hours)       Procedure Component Value Units Date/Time    High Sensitivity Troponin T 1Hr [[id number]]  (Abnormal) Collected: 03/16/25 1244    Specimen: Blood from Arm, Left Updated: 03/16/25 1327     HS Troponin T 96 ng/L      Troponin T Numeric Delta 2 ng/L      Troponin T % Delta 2    Narrative:      High Sensitive Troponin T Reference Range:  <14.0 ng/L- Negative Female for AMI  <22.0 ng/L- Negative Male for AMI  >=14 - Abnormal Female indicating possible myocardial injury.  >=22 - Abnormal Male indicating possible myocardial injury.   Clinicians would have to utilize clinical acumen, EKG, Troponin, and serial changes to determine if it is an Acute Myocardial Infarction or myocardial injury due to an underlying chronic condition.         Respiratory Panel PCR w/COVID-19(SARS-CoV-2) IAN/NAYELY/KATERIN/PAD/COR/KRISH In-House, NP Swab in UTM/VTM, 2 HR TAT - Swab, Nasopharynx [[id number]]  (Normal) Collected: 03/16/25 1137    Specimen: Swab from Nasopharynx Updated: 03/16/25 1315     ADENOVIRUS, PCR Not Detected     Coronavirus 229E Not Detected     Coronavirus HKU1 Not Detected     Coronavirus NL63 Not Detected     Coronavirus OC43 Not Detected     COVID19 Not Detected     Human Metapneumovirus Not Detected     Human Rhinovirus/Enterovirus Not Detected     Influenza A PCR Not Detected     Influenza B PCR Not Detected     Parainfluenza Virus 1 Not Detected     Parainfluenza Virus 2 Not Detected     Parainfluenza Virus 3 Not Detected     Parainfluenza Virus 4 Not Detected     RSV, PCR Not Detected     Bordetella pertussis pcr Not Detected     Bordetella parapertussis PCR Not Detected     Chlamydophila pneumoniae PCR Not Detected     Mycoplasma pneumo by PCR Not Detected    Narrative:      In the setting of a positive respiratory panel with a viral infection PLUS a negative procalcitonin without other underlying concern for bacterial infection, consider observing off antibiotics or  discontinuation of antibiotics and continue supportive care. If the respiratory panel is positive for atypical bacterial infection (Bordetella pertussis, Chlamydophila pneumoniae, or Mycoplasma pneumoniae), consider antibiotic de-escalation to target atypical bacterial infection.    High Sensitivity Troponin T [[id number]]  (Abnormal) Collected: 03/16/25 1137    Specimen: Blood from Arm, Left Updated: 03/16/25 1208     HS Troponin T 94 ng/L     Narrative:      High Sensitive Troponin T Reference Range:  <14.0 ng/L- Negative Female for AMI  <22.0 ng/L- Negative Male for AMI  >=14 - Abnormal Female indicating possible myocardial injury.  >=22 - Abnormal Male indicating possible myocardial injury.   Clinicians would have to utilize clinical acumen, EKG, Troponin, and serial changes to determine if it is an Acute Myocardial Infarction or myocardial injury due to an underlying chronic condition.         Basic Metabolic Panel [[id number]]  (Abnormal) Collected: 03/16/25 1137    Specimen: Blood from Arm, Left Updated: 03/16/25 1202     Glucose 177 mg/dL      BUN 27 mg/dL      Creatinine 1.28 mg/dL      Sodium 136 mmol/L      Potassium 4.8 mmol/L      Chloride 101 mmol/L      CO2 23.0 mmol/L      Calcium 8.8 mg/dL      BUN/Creatinine Ratio 21.1     Anion Gap 12.0 mmol/L      eGFR 58.7 mL/min/1.73     Narrative:      GFR Categories in Chronic Kidney Disease (CKD)      GFR Category          GFR (mL/min/1.73)    Interpretation  G1                     90 or greater         Normal or high (1)  G2                      60-89                Mild decrease (1)  G3a                   45-59                Mild to moderate decrease  G3b                   30-44                Moderate to severe decrease  G4                    15-29                Severe decrease  G5                    14 or less           Kidney failure          (1)In the absence of evidence of kidney disease, neither GFR category G1 or G2 fulfill the criteria for  CKD.    eGFR calculation 2021 CKD-EPI creatinine equation, which does not include race as a factor    BNP [[id number]]  (Abnormal) Collected: 03/16/25 1137    Specimen: Blood from Arm, Left Updated: 03/16/25 1202     proBNP 1,701.0 pg/mL     Narrative:      This assay is used as an aid in the diagnosis of individuals suspected of having heart failure. It can be used as an aid in the diagnosis of acute decompensated heart failure (ADHF) in patients presenting with signs and symptoms of ADHF to the emergency department (ED). In addition, NT-proBNP of <300 pg/mL indicates ADHF is not likely.    Age Range Result Interpretation  NT-proBNP Concentration (pg/mL:      <50             Positive            >450                   Gray                 300-450                    Negative             <300    50-75           Positive            >900                  Gray                300-900                  Negative            <300      >75             Positive            >1800                  Gray                300-1800                  Negative            <300    Extra Tubes [[id number]] Collected: 03/16/25 1137    Specimen: Blood from Arm, Left Updated: 03/16/25 1146    Narrative:      The following orders were created for panel order Extra Tubes.  Procedure                               Abnormality         Status                     ---------                               -----------         ------                     Light Blue Top[[id number]]                                   Final result                 Please view results for these tests on the individual orders.    Light Blue Top [[id number]] Collected: 03/16/25 1137    Specimen: Blood from Arm, Left Updated: 03/16/25 1146     Extra Tube Hold for add-ons.     Comment: Auto resulted       CBC & Differential [[id number]]  (Abnormal) Collected: 03/16/25 1137    Specimen: Blood from Arm, Left Updated: 03/16/25 1142    Narrative:      The following orders were created for panel  order CBC & Differential.  Procedure                               Abnormality         Status                     ---------                               -----------         ------                     CBC Auto Differential[[id number]]        Abnormal            Final result                 Please view results for these tests on the individual orders.    CBC Auto Differential [[id number]]  (Abnormal) Collected: 03/16/25 1137    Specimen: Blood from Arm, Left Updated: 03/16/25 1142     WBC 4.95 10*3/mm3      RBC 3.56 10*6/mm3      Hemoglobin 10.4 g/dL      Hematocrit 34.0 %      MCV 95.5 fL      MCH 29.2 pg      MCHC 30.6 g/dL      RDW 13.2 %      RDW-SD 46.8 fl      MPV 11.1 fL      Platelets 162 10*3/mm3      Neutrophil % 75.8 %      Lymphocyte % 11.5 %      Monocyte % 11.5 %      Eosinophil % 0.6 %      Basophil % 0.2 %      Immature Grans % 0.4 %      Neutrophils, Absolute 3.75 10*3/mm3      Lymphocytes, Absolute 0.57 10*3/mm3      Monocytes, Absolute 0.57 10*3/mm3      Eosinophils, Absolute 0.03 10*3/mm3      Basophils, Absolute 0.01 10*3/mm3      Immature Grans, Absolute 0.02 10*3/mm3      nRBC 0.0 /100 WBC              Imaging Results (Last 24 Hours)       Procedure Component Value Units Date/Time    XR Chest 1 View [[id number]] Collected: 03/16/25 1210     Updated: 03/16/25 1214    Narrative:      XR CHEST 1 VW    Date of Exam: 3/16/2025 11:46 AM EDT    Indication: shortness of breath    Comparison: 4/13/2024    Findings:  Heart size top normal. Negative for pneumothorax. No focal consolidation or pleural effusion. Mild interstitial thickening which may relate to a component of underlying pulmonary edema. There are few scattered calcified pulmonary nodules. Negative for   pneumothorax or effusion.      Impression:      Impression:  Interstitial thickening similar to prior study which may relate to a component of underlying mild pulmonary edema.      Electronically Signed: Wang Alba MD    3/16/2025 12:12 PM EDT     Workstation ID: [id number]              Assessment & Plan        This is a 74 y.o. male with:    Active and Resolved Problems  There are no hospital problems to display for this patient.    Acute on chronic hypoxic respiratory failure  COPD on home 4L with exacerbation  Pulmonary hypertension  SONJA  - SIRS 1/4, CXR with findings c/w pulmonary edema, no consolidation/opacity appreciated. RVP negative.  - TTE 4/2024 w/ EF 61-65%, estimated RVSP 81mmHg. Repeat pending  - Pt w/ significant improvement in wob following steroids/duonebs  - D-dimer pending  - Diuresing as below  - CPAP ordered  - cont home inhalers, substitute per formulary  - duonebs, solumedrol  - IS, flutter valve  - Consider pulm consult pending course, will need to f/u outpt and get better control of his SONJA    HFpEF  Elevated troponin  Hld  - Trop 94 -> 96, BNP 1700, ecg sinus w/ known rbbb, no ST changes appreciated  - CXR w/ pulmonary edema, pt w/o significant edema on exam  - Cardiology consulted, appreciate assistance  - Pending TTE, A1C, lipids  - Lasix 40mg IV BID, monitor weight and intake/output  - telemetry  - cont home medications    BHASKAR  - Cr 1.28 OA, baseline 0.8. BUN mildly elevated.   - urine studies pending  - holding home lisinopril  - Avoid nephrotoxic medications  - diuresing as above, trend labs    T2DM  - A1C pending  - holding home orals  - SSI  - CCD    Htn  - BP controlled  - holding home lisinopril, resume as appropriate    Home medications pending verification        VTE Prophylaxis:  No VTE prophylaxis order currently exists.        The patient desires to be as follows:    CODE STATUS:           Donna Peter, who can be contacted at [phone] , is the designated person to make medical decisions on the patient's behalf if He is incapable of doing so. This was clarified with patient and/or next of kin on 3/16/2025 during the course of this H&P.    Admission Status:  I believe this patient meets observation  status.    Expected Length of Stay: <2 midnights    PDMP and Medication Dispenses via Sidebar reviewed and consistent with patient reported medications.    I discussed the patient's findings and my recommendations with patient and family.      Signature:     This document has been electronically signed by Rey Pat MD on March 16, 2025 15:11 EDT   [facility] Hospitalist Team

## 2025-03-17 ENCOUNTER — APPOINTMENT (OUTPATIENT)
Dept: CARDIOLOGY | Facility: HOSPITAL | Age: 75
End: 2025-03-17
Payer: MEDICARE

## 2025-03-17 LAB
ANION GAP SERPL CALCULATED.3IONS-SCNC: 11.5 MMOL/L (ref 5–15)
AORTIC DIMENSIONLESS INDEX: 0.86 (DI)
AV MEAN PRESS GRAD SYS DOP V1V2: 3 MMHG
AV VMAX SYS DOP: 123 CM/SEC
BASOPHILS # BLD AUTO: 0 10*3/MM3 (ref 0–0.2)
BASOPHILS NFR BLD AUTO: 0 % (ref 0–1.5)
BH CV ECHO MEAS - ACS: 2.1 CM
BH CV ECHO MEAS - AO MAX PG: 6.1 MMHG
BH CV ECHO MEAS - AO V2 VTI: 27.5 CM
BH CV ECHO MEAS - AVA(I,D): 3.3 CM2
BH CV ECHO MEAS - EDV(CUBED): 140.6 ML
BH CV ECHO MEAS - EDV(MOD-SP4): 134 ML
BH CV ECHO MEAS - EF(MOD-SP4): 80.4 %
BH CV ECHO MEAS - ESV(CUBED): 46.7 ML
BH CV ECHO MEAS - ESV(MOD-SP4): 26.3 ML
BH CV ECHO MEAS - FS: 30.8 %
BH CV ECHO MEAS - IVS/LVPW: 1.1 CM
BH CV ECHO MEAS - IVSD: 1.1 CM
BH CV ECHO MEAS - LA DIMENSION: 3.5 CM
BH CV ECHO MEAS - LAT PEAK E' VEL: 7.7 CM/SEC
BH CV ECHO MEAS - LV DIASTOLIC VOL/BSA (35-75): 64.1 CM2
BH CV ECHO MEAS - LV MASS(C)D: 207.3 GRAMS
BH CV ECHO MEAS - LV MAX PG: 4.7 MMHG
BH CV ECHO MEAS - LV MEAN PG: 2 MMHG
BH CV ECHO MEAS - LV SYSTOLIC VOL/BSA (12-30): 12.6 CM2
BH CV ECHO MEAS - LV V1 MAX: 108 CM/SEC
BH CV ECHO MEAS - LV V1 VTI: 23.7 CM
BH CV ECHO MEAS - LVIDD: 5.2 CM
BH CV ECHO MEAS - LVIDS: 3.6 CM
BH CV ECHO MEAS - LVOT AREA: 3.8 CM2
BH CV ECHO MEAS - LVOT DIAM: 2.2 CM
BH CV ECHO MEAS - LVPWD: 1 CM
BH CV ECHO MEAS - MED PEAK E' VEL: 6.9 CM/SEC
BH CV ECHO MEAS - MV A MAX VEL: 116 CM/SEC
BH CV ECHO MEAS - MV DEC SLOPE: 444 CM/SEC2
BH CV ECHO MEAS - MV DEC TIME: 0.22 SEC
BH CV ECHO MEAS - MV E MAX VEL: 78.9 CM/SEC
BH CV ECHO MEAS - MV E/A: 0.68
BH CV ECHO MEAS - MV MAX PG: 5.6 MMHG
BH CV ECHO MEAS - MV MEAN PG: 2 MMHG
BH CV ECHO MEAS - MV P1/2T: 64.3 MSEC
BH CV ECHO MEAS - MV V2 VTI: 37.4 CM
BH CV ECHO MEAS - MVA(P1/2T): 3.4 CM2
BH CV ECHO MEAS - MVA(VTI): 2.41 CM2
BH CV ECHO MEAS - PA ACC TIME: 0.09 SEC
BH CV ECHO MEAS - PA V2 MAX: 77 CM/SEC
BH CV ECHO MEAS - PULM A REVS DUR: 0.12 SEC
BH CV ECHO MEAS - PULM A REVS VEL: 28.7 CM/SEC
BH CV ECHO MEAS - PULM DIAS VEL: 47 CM/SEC
BH CV ECHO MEAS - PULM S/D: 1.23
BH CV ECHO MEAS - PULM SYS VEL: 57.9 CM/SEC
BH CV ECHO MEAS - RAP SYSTOLE: 8 MMHG
BH CV ECHO MEAS - RV MAX PG: 2.16 MMHG
BH CV ECHO MEAS - RV V1 MAX: 73.4 CM/SEC
BH CV ECHO MEAS - RV V1 VTI: 14.4 CM
BH CV ECHO MEAS - RVDD: 5.6 CM
BH CV ECHO MEAS - RVSP: 48.2 MMHG
BH CV ECHO MEAS - SV(LVOT): 90.1 ML
BH CV ECHO MEAS - SV(MOD-SP4): 107.7 ML
BH CV ECHO MEAS - SVI(LVOT): 43.1 ML/M2
BH CV ECHO MEAS - SVI(MOD-SP4): 51.5 ML/M2
BH CV ECHO MEAS - TAPSE (>1.6): 2.19 CM
BH CV ECHO MEAS - TR MAX PG: 40.2 MMHG
BH CV ECHO MEAS - TR MAX VEL: 317 CM/SEC
BH CV ECHO MEASUREMENTS AVERAGE E/E' RATIO: 10.81
BH CV XLRA - TDI S': 11.5 CM/SEC
BILIRUB UR QL STRIP: NEGATIVE
BUN SERPL-MCNC: 38 MG/DL (ref 8–23)
BUN/CREAT SERPL: 31.1 (ref 7–25)
CALCIUM SPEC-SCNC: 8.8 MG/DL (ref 8.6–10.5)
CHLORIDE SERPL-SCNC: 102 MMOL/L (ref 98–107)
CHOLEST SERPL-MCNC: 198 MG/DL (ref 0–200)
CLARITY UR: CLEAR
CO2 SERPL-SCNC: 25.5 MMOL/L (ref 22–29)
COLOR UR: YELLOW
CREAT SERPL-MCNC: 1.22 MG/DL (ref 0.76–1.27)
CREAT UR-MCNC: 64.6 MG/DL
DEPRECATED RDW RBC AUTO: 46.2 FL (ref 37–54)
EGFRCR SERPLBLD CKD-EPI 2021: 62.2 ML/MIN/1.73
EOSINOPHIL # BLD AUTO: 0 10*3/MM3 (ref 0–0.4)
EOSINOPHIL NFR BLD AUTO: 0 % (ref 0.3–6.2)
ERYTHROCYTE [DISTWIDTH] IN BLOOD BY AUTOMATED COUNT: 13.2 % (ref 12.3–15.4)
GLUCOSE BLDC GLUCOMTR-MCNC: 177 MG/DL (ref 70–105)
GLUCOSE BLDC GLUCOMTR-MCNC: 228 MG/DL (ref 70–105)
GLUCOSE BLDC GLUCOMTR-MCNC: 275 MG/DL (ref 70–105)
GLUCOSE BLDC GLUCOMTR-MCNC: 285 MG/DL (ref 70–105)
GLUCOSE SERPL-MCNC: 179 MG/DL (ref 65–99)
GLUCOSE UR STRIP-MCNC: ABNORMAL MG/DL
HCT VFR BLD AUTO: 32.3 % (ref 37.5–51)
HDLC SERPL-MCNC: 25 MG/DL (ref 40–60)
HGB BLD-MCNC: 9.8 G/DL (ref 13–17.7)
HGB UR QL STRIP.AUTO: NEGATIVE
IMM GRANULOCYTES # BLD AUTO: 0.04 10*3/MM3 (ref 0–0.05)
IMM GRANULOCYTES NFR BLD AUTO: 1 % (ref 0–0.5)
KETONES UR QL STRIP: NEGATIVE
LDLC SERPL CALC-MCNC: 143 MG/DL (ref 0–100)
LDLC/HDLC SERPL: 5.62 {RATIO}
LEFT ATRIUM VOLUME INDEX: 24.7 ML/M2
LEUKOCYTE ESTERASE UR QL STRIP.AUTO: NEGATIVE
LV EF BIPLANE MOD: 80 %
LYMPHOCYTES # BLD AUTO: 0.51 10*3/MM3 (ref 0.7–3.1)
LYMPHOCYTES NFR BLD AUTO: 12.3 % (ref 19.6–45.3)
MAGNESIUM SERPL-MCNC: 2.3 MG/DL (ref 1.6–2.4)
MCH RBC QN AUTO: 29 PG (ref 26.6–33)
MCHC RBC AUTO-ENTMCNC: 30.3 G/DL (ref 31.5–35.7)
MCV RBC AUTO: 95.6 FL (ref 79–97)
MONOCYTES # BLD AUTO: 0.17 10*3/MM3 (ref 0.1–0.9)
MONOCYTES NFR BLD AUTO: 4.1 % (ref 5–12)
NEUTROPHILS NFR BLD AUTO: 3.44 10*3/MM3 (ref 1.7–7)
NEUTROPHILS NFR BLD AUTO: 82.6 % (ref 42.7–76)
NITRITE UR QL STRIP: NEGATIVE
NRBC BLD AUTO-RTO: 0 /100 WBC (ref 0–0.2)
NT-PROBNP SERPL-MCNC: 2758 PG/ML (ref 0–900)
PH UR STRIP.AUTO: <=5 [PH] (ref 5–8)
PLATELET # BLD AUTO: 143 10*3/MM3 (ref 140–450)
PMV BLD AUTO: 11.2 FL (ref 6–12)
POTASSIUM SERPL-SCNC: 5.1 MMOL/L (ref 3.5–5.2)
PROT UR QL STRIP: NEGATIVE
QT INTERVAL: 398 MS
QTC INTERVAL: 432 MS
RBC # BLD AUTO: 3.38 10*6/MM3 (ref 4.14–5.8)
SINUS: 3.3 CM
SODIUM SERPL-SCNC: 139 MMOL/L (ref 136–145)
SP GR UR STRIP: 1.01 (ref 1–1.03)
STJ: 2.6 CM
TRIGL SERPL-MCNC: 163 MG/DL (ref 0–150)
UROBILINOGEN UR QL STRIP: ABNORMAL
UUN 24H UR-MCNC: 338 MG/DL
VLDLC SERPL-MCNC: 30 MG/DL (ref 5–40)
WBC NRBC COR # BLD AUTO: 4.16 10*3/MM3 (ref 3.4–10.8)

## 2025-03-17 PROCEDURE — 82948 REAGENT STRIP/BLOOD GLUCOSE: CPT | Performed by: STUDENT IN AN ORGANIZED HEALTH CARE EDUCATION/TRAINING PROGRAM

## 2025-03-17 PROCEDURE — 25010000002 HEPARIN (PORCINE) PER 1000 UNITS: Performed by: STUDENT IN AN ORGANIZED HEALTH CARE EDUCATION/TRAINING PROGRAM

## 2025-03-17 PROCEDURE — 25010000002 METHYLPREDNISOLONE PER 40 MG: Performed by: FAMILY MEDICINE

## 2025-03-17 PROCEDURE — 99222 1ST HOSP IP/OBS MODERATE 55: CPT | Performed by: INTERNAL MEDICINE

## 2025-03-17 PROCEDURE — 94761 N-INVAS EAR/PLS OXIMETRY MLT: CPT

## 2025-03-17 PROCEDURE — 80061 LIPID PANEL: CPT | Performed by: STUDENT IN AN ORGANIZED HEALTH CARE EDUCATION/TRAINING PROGRAM

## 2025-03-17 PROCEDURE — 85025 COMPLETE CBC W/AUTO DIFF WBC: CPT | Performed by: STUDENT IN AN ORGANIZED HEALTH CARE EDUCATION/TRAINING PROGRAM

## 2025-03-17 PROCEDURE — 25010000002 SULFUR HEXAFLUORIDE MICROSPH 60.7-25 MG RECONSTITUTED SUSPENSION: Performed by: FAMILY MEDICINE

## 2025-03-17 PROCEDURE — 82948 REAGENT STRIP/BLOOD GLUCOSE: CPT

## 2025-03-17 PROCEDURE — 25010000002 FUROSEMIDE PER 20 MG: Performed by: STUDENT IN AN ORGANIZED HEALTH CARE EDUCATION/TRAINING PROGRAM

## 2025-03-17 PROCEDURE — 63710000001 INSULIN LISPRO (HUMAN) PER 5 UNITS: Performed by: STUDENT IN AN ORGANIZED HEALTH CARE EDUCATION/TRAINING PROGRAM

## 2025-03-17 PROCEDURE — 25010000002 METHYLPREDNISOLONE PER 40 MG: Performed by: STUDENT IN AN ORGANIZED HEALTH CARE EDUCATION/TRAINING PROGRAM

## 2025-03-17 PROCEDURE — 83735 ASSAY OF MAGNESIUM: CPT | Performed by: STUDENT IN AN ORGANIZED HEALTH CARE EDUCATION/TRAINING PROGRAM

## 2025-03-17 PROCEDURE — 94799 UNLISTED PULMONARY SVC/PX: CPT

## 2025-03-17 PROCEDURE — 25010000002 CEFTRIAXONE PER 250 MG: Performed by: INTERNAL MEDICINE

## 2025-03-17 PROCEDURE — 80048 BASIC METABOLIC PNL TOTAL CA: CPT | Performed by: STUDENT IN AN ORGANIZED HEALTH CARE EDUCATION/TRAINING PROGRAM

## 2025-03-17 PROCEDURE — 93306 TTE W/DOPPLER COMPLETE: CPT | Performed by: INTERNAL MEDICINE

## 2025-03-17 PROCEDURE — 83880 ASSAY OF NATRIURETIC PEPTIDE: CPT | Performed by: NURSE PRACTITIONER

## 2025-03-17 PROCEDURE — 93306 TTE W/DOPPLER COMPLETE: CPT

## 2025-03-17 PROCEDURE — 94664 DEMO&/EVAL PT USE INHALER: CPT

## 2025-03-17 PROCEDURE — 97162 PT EVAL MOD COMPLEX 30 MIN: CPT

## 2025-03-17 RX ORDER — METHYLPREDNISOLONE SODIUM SUCCINATE 40 MG/ML
20 INJECTION, POWDER, LYOPHILIZED, FOR SOLUTION INTRAMUSCULAR; INTRAVENOUS EVERY 8 HOURS
Status: DISCONTINUED | OUTPATIENT
Start: 2025-03-17 | End: 2025-03-19

## 2025-03-17 RX ORDER — AMLODIPINE BESYLATE 5 MG/1
5 TABLET ORAL NIGHTLY
Status: DISCONTINUED | OUTPATIENT
Start: 2025-03-17 | End: 2025-03-20 | Stop reason: HOSPADM

## 2025-03-17 RX ADMIN — FUROSEMIDE 40 MG: 10 INJECTION, SOLUTION INTRAMUSCULAR; INTRAVENOUS at 05:13

## 2025-03-17 RX ADMIN — INSULIN LISPRO 4 UNITS: 100 INJECTION, SOLUTION INTRAVENOUS; SUBCUTANEOUS at 21:11

## 2025-03-17 RX ADMIN — SULFUR HEXAFLUORIDE 2 ML: KIT at 15:37

## 2025-03-17 RX ADMIN — INSULIN LISPRO 2 UNITS: 100 INJECTION, SOLUTION INTRAVENOUS; SUBCUTANEOUS at 08:43

## 2025-03-17 RX ADMIN — GABAPENTIN 300 MG: 300 CAPSULE ORAL at 08:43

## 2025-03-17 RX ADMIN — FUROSEMIDE 40 MG: 10 INJECTION, SOLUTION INTRAMUSCULAR; INTRAVENOUS at 18:43

## 2025-03-17 RX ADMIN — HEPARIN SODIUM 5000 UNITS: 5000 INJECTION INTRAVENOUS; SUBCUTANEOUS at 21:12

## 2025-03-17 RX ADMIN — METHYLPREDNISOLONE SODIUM SUCCINATE 40 MG: 40 INJECTION, POWDER, FOR SOLUTION INTRAMUSCULAR; INTRAVENOUS at 08:43

## 2025-03-17 RX ADMIN — HEPARIN SODIUM 5000 UNITS: 5000 INJECTION INTRAVENOUS; SUBCUTANEOUS at 05:13

## 2025-03-17 RX ADMIN — INSULIN LISPRO 3 UNITS: 100 INJECTION, SOLUTION INTRAVENOUS; SUBCUTANEOUS at 12:30

## 2025-03-17 RX ADMIN — INSULIN LISPRO 4 UNITS: 100 INJECTION, SOLUTION INTRAVENOUS; SUBCUTANEOUS at 18:43

## 2025-03-17 RX ADMIN — HEPARIN SODIUM 5000 UNITS: 5000 INJECTION INTRAVENOUS; SUBCUTANEOUS at 16:17

## 2025-03-17 RX ADMIN — AMLODIPINE BESYLATE 5 MG: 5 TABLET ORAL at 21:10

## 2025-03-17 RX ADMIN — GABAPENTIN 300 MG: 300 CAPSULE ORAL at 16:17

## 2025-03-17 RX ADMIN — IPRATROPIUM BROMIDE AND ALBUTEROL SULFATE 3 ML: .5; 3 SOLUTION RESPIRATORY (INHALATION) at 20:12

## 2025-03-17 RX ADMIN — SERTRALINE HYDROCHLORIDE 50 MG: 50 TABLET, FILM COATED ORAL at 21:10

## 2025-03-17 RX ADMIN — CEFTRIAXONE 2000 MG: 2 INJECTION, POWDER, FOR SOLUTION INTRAMUSCULAR; INTRAVENOUS at 00:47

## 2025-03-17 RX ADMIN — IPRATROPIUM BROMIDE AND ALBUTEROL SULFATE 3 ML: .5; 3 SOLUTION RESPIRATORY (INHALATION) at 07:15

## 2025-03-17 RX ADMIN — METHYLPREDNISOLONE SODIUM SUCCINATE 20 MG: 40 INJECTION, POWDER, FOR SOLUTION INTRAMUSCULAR; INTRAVENOUS at 16:17

## 2025-03-17 RX ADMIN — GABAPENTIN 300 MG: 300 CAPSULE ORAL at 21:10

## 2025-03-17 RX ADMIN — Medication 10 ML: at 08:44

## 2025-03-17 NOTE — CONSULTS
Cardiology [place] [facility]    Subjective:     Encounter Date:03/16/2025      Patient ID: Brenden Peter is a 74 y.o. male     Referring Physician: Bi    Chief Complaint: shortness of breath    Reason for Consult: elevated troponin/HF    HPI:  Brenden Peter is a 74 y.o. male with a history of CAD, HTN, Pulm HTN (RVSP calculated 81mmHg 4/2024), HLD, COPD, SONJA, DM2 who presents to ED with c/o progressive SOB with productive cough x past 5 days.     In ED, HS troponins drawn and were elevated but downtrended: 94-96-83. He has no chest pain. EKG SR with RBBB (old). D-dimer elevated, CT chest no PE, but did show LLL PNA. CXR was done which showed pulmonary edema, proBNP was 1700. Pt states he was on Lasix with K+ supplement at home, but his K+ was high, so both were discontinued, and he was scheduled to see nephrology as outpt, which hasn't happened yet. K+ on admit 5.1. Patient had echo April 2024 with normal LV systolic function, EF 61-65%, and normal LV diastolic function, elevated RVSP calculated at 81mmHg. Repeat echo was ordered. He has had some issue with CPAP at home and has not been using it. Mr Peter had cardiac cath Jan 2023 which showed mild CAD. Medical treatment recommended.     Pt seen as he is sitting on EOB eating breakfast, son at bedside. He states he has been having SOB, WOOD, orthopnea, PND, appetite loss, fever/chills, and productive cough. He denies chest pain, palpitations, lightheadedness, or near syncope/syncope. VSS, sinus rhythm. No edema noted. Await repeat echo. Monitor I&O on Lasix. Await Dr Greenwood's input.    Past Medical History:   Diagnosis Date    (HFpEF) heart failure with preserved ejection fraction 04/01/2023    Abnormal EKG 02/03/2015    Anxiety     COPD (chronic obstructive pulmonary disease)     Coronary artery disease 03/03/2015    Diabetes     type 2    Elevated cholesterol     Hyperlipidemia     Hypertension     Microalbuminuria due to type 2 diabetes mellitus 02/05/2020     Multinodular goiter 04/18/2019    Murmur 02/03/2015    Primary osteoarthritis of left knee 10/22/2021       Past Surgical History:   Procedure Laterality Date    BRONCHOSCOPY N/A 4/9/2023    Procedure: BRONCHOSCOPY, bronchial alveolar lavage right upper lobe;  Surgeon: Juanjo Castañeda MD;  [facility];  Service: Pulmonary;  Laterality: N/A;  post: pneumonia    CARDIAC CATHETERIZATION Left 1/15/2023    Procedure: Left Heart Cath and coronary angiogram;  Surgeon: Shawn Baker MD;  [facility];  Service: Cardiovascular;  Laterality: Left;    EYE SURGERY      cataract    TOTAL KNEE ARTHROPLASTY Right 10/21/2020    Procedure: TOTAL KNEE ARTHROPLASTY;  Surgeon: Ravi Fagan MD;  Location[facility];  Service: Orthopedics;  Laterality: Right;    TOTAL KNEE ARTHROPLASTY REVISION Right 5/19/2021    Procedure: KNEE POLY INSERT EXCHANGE with irrigation;  Surgeon: Ravi Fagan MD;  Location[facility];  Service: Orthopedics;  Laterality: Right;       Family History   Problem Relation Age of Onset    Cancer Other     Diabetes Other     Heart disease Other     No Known Problems Mother     No Known Problems Father     No Known Problems Sister     No Known Problems Brother     No Known Problems Maternal Aunt     No Known Problems Maternal Uncle     No Known Problems Paternal Aunt     No Known Problems Paternal Uncle     No Known Problems Maternal Grandmother     No Known Problems Maternal Grandfather     No Known Problems Paternal Grandmother     No Known Problems Paternal Grandfather     Anemia Neg Hx     Arrhythmia Neg Hx     Asthma Neg Hx     Clotting disorder Neg Hx     Fainting Neg Hx     Heart attack Neg Hx     Heart failure Neg Hx     Hyperlipidemia Neg Hx     Hypertension Neg Hx        Social History     Socioeconomic History    Marital status:    Tobacco Use    Smoking status: Former     Current packs/day: 0.00     Average packs/day: 1 pack/day for 5.0 years  "(5.0 ttl pk-yrs)     Types: Cigarettes     Start date: 7/3/2017     Quit date: 7/3/2022     Years since quittin.7     Passive exposure: Past    Smokeless tobacco: Former     Quit date: 7/3/2022   Vaping Use    Vaping status: Never Used   Substance and Sexual Activity    Alcohol use: Not Currently    Drug use: Never    Sexual activity: Defer         Review of Systems   Constitutional: Positive for chills, decreased appetite and fever. Negative for malaise/fatigue, weight gain and weight loss.   Eyes:  Negative for visual disturbance.   Cardiovascular:  Positive for dyspnea on exertion, orthopnea and paroxysmal nocturnal dyspnea. Negative for chest pain, cyanosis, irregular heartbeat, leg swelling, near-syncope, palpitations and syncope.   Respiratory:  Positive for cough, shortness of breath, sleep disturbances due to breathing and sputum production.    Hematologic/Lymphatic: Negative for bleeding problem.   Gastrointestinal:  Negative for abdominal pain, change in bowel habit, nausea and vomiting.   Genitourinary:  Negative for dysuria.   Neurological:  Negative for dizziness, focal weakness, headaches, light-headedness, sensory change and vertigo.   Psychiatric/Behavioral:  Negative for altered mental status.    All other systems reviewed and are negative.         Objective:         /44 (BP Location: Left arm, Patient Position: Lying)   Pulse 55   Temp 97.1 °F (36.2 °C) (Axillary)   Resp 18   Ht 170.2 cm (67\")   Wt 98 kg (216 lb)   SpO2 93%   BMI 33.83 kg/m²     Physical Exam:  Physical Exam    General Appearance:    Alert, cooperative, in no acute distress   Head:    Normocephalic, without obvious abnormality, atraumatic   Eyes:            PERRLA, EOM intact, conjunctivae and sclerae normal, no  icterus       Throat:   Oral mucosa moist, No oral lesions, no thrush   Neck:   No carotid bruit, no JVD, supple, trachea midline, no thyromegaly    Lungs:     Wheezes thru, diminished LLL, respirations " regular, even and   unlabored, supplemental O2 6 LPM    Heart:    Regular rhythm and normal rate, normal S1 and S2, no gallop, no rub, no click   Chest Wall:    No abnormalities observed   Abdomen:     Soft,  non-tender, non-distended, no guarding, no rebound  tenderness   Extremities:   No edema, no cyanosis or redness   Pulses:   Pulses palpable and equal bilaterally in all extremities   Skin:   No bleeding, bruising or rash       Neurologic:   Normal mood, thought content and behavior           ASCVD Risk Score::  The 10-year ASCVD risk score (Zia MCLEOD, et al., 2019) is: 39.8%    Values used to calculate the score:      Age: 74 years      Sex: Male      Is Non- : No      Diabetic: Yes      Tobacco smoker: No      Systolic Blood Pressure: 101 mmHg      Is BP treated: Yes      HDL Cholesterol: 25 mg/dL      Total Cholesterol: 198 mg/dL      Lab Review:     Results from last 7 days   Lab Units 03/17/25  0512 03/16/25  1137   SODIUM mmol/L 139 136   POTASSIUM mmol/L 5.1 4.8   CHLORIDE mmol/L 102 101   CO2 mmol/L 25.5 23.0   BUN mg/dL 38* 27*   CREATININE mg/dL 1.22 1.28*   GLUCOSE mg/dL 179* 177*   CALCIUM mg/dL 8.8 8.8     Results from last 7 days   Lab Units 03/16/25  1542 03/16/25  1244 03/16/25  1137   HSTROP T ng/L 83* 96* 94*     Results from last 7 days   Lab Units 03/17/25  0512 03/16/25  1137   WBC 10*3/mm3 4.16 4.95   HEMOGLOBIN g/dL 9.8* 10.4*   HEMATOCRIT % 32.3* 34.0*   PLATELETS 10*3/mm3 143 162         Results from last 7 days   Lab Units 03/17/25  0512   MAGNESIUM mg/dL 2.3     Results from last 7 days   Lab Units 03/17/25  0512   CHOLESTEROL mg/dL 198   TRIGLYCERIDES mg/dL 163*   HDL CHOL mg/dL 25*     Results from last 7 days   Lab Units 03/16/25  1137   PROBNP pg/mL 1,701.0*           Recent Radiology:  Imaging Results (Most Recent)       Procedure Component Value Units Date/Time    CT Angiogram Chest Pulmonary Embolism [[id number]] Collected: 03/16/25 2331     Updated:  03/16/25 2336    Narrative:      CT ANGIOGRAM CHEST PULMONARY EMBOLISM    Date of Exam: 3/16/2025 11:11 PM EDT    Indication: SOA, elevated d-dimer.    Comparison: Chest radiograph 3/16/2025 and chest CT 4/10/2024.    Technique: Axial CT images were obtained of the chest after the uneventful intravenous administration of iodinated contrast utilizing pulmonary embolism protocol.  In addition, a 3-D volume rendered image was created for interpretation.  Sagittal and   coronal reconstructions were performed.  Automated exposure control and iterative reconstruction methods were used.      Findings:  The thyroid is heterogeneous and enlarged compatible with goiter. The trachea and the esophagus appear within normal limits. There are numerous calcified mediastinal granulomas. There is minimal aortic arch calcification. No aneurysm. No dissection. Main   pulmonary artery is normal diameter. No evidence of pulmonary embolism. There are moderate coronary artery calcifications.    No pneumothorax, pleural effusion or focal airspace consolidation. There is dependent bibasilar atelectasis. There is mild emphysema. Mild diffuse peribronchial thickening is present. There is clustered micronodules in the left lower lobe dependently   that may reflect a small focus of pneumonia or infectious bronchiolitis.    No acute findings in the limited images of the upper abdomen. No acute findings in the superficial soft tissues. No acute osseous abnormality or destructive bone lesion. There is moderate lower cervical and thoracic spondylosis.      Impression:      Impression:  1.No evidence of pulmonary embolism.  2.Mild emphysema and mild diffuse peribronchial thickening.  3.Clustered micronodules in the left lower lobe may reflect a small focus of pneumonia or infectious bronchiolitis.  4.Coronary artery disease.  5.Thyroid goiter.            Electronically Signed: Darius Dixon MD    3/16/2025 11:34 PM EDT    Workstation ID: [id number]     XR Chest 1 View [[id number]] Collected: 03/16/25 1210     Updated: 03/16/25 1214    Narrative:      XR CHEST 1 VW    Date of Exam: 3/16/2025 11:46 AM EDT    Indication: shortness of breath    Comparison: 4/13/2024    Findings:  Heart size top normal. Negative for pneumothorax. No focal consolidation or pleural effusion. Mild interstitial thickening which may relate to a component of underlying pulmonary edema. There are few scattered calcified pulmonary nodules. Negative for   pneumothorax or effusion.      Impression:      Impression:  Interstitial thickening similar to prior study which may relate to a component of underlying mild pulmonary edema.      Electronically Signed: Wang Alba MD    3/16/2025 12:12 PM EDT    Workstation ID: [id number]              ECHOCARDIOGRAM:  Results for orders placed during the hospital encounter of 04/10/24    Adult Transthoracic Echo Complete W/ Cont if Necessary Per Protocol    Interpretation Summary    Left ventricular systolic function is normal. Left ventricular ejection fraction appears to be 61 - 65%.    Left ventricular wall thickness is consistent with mild concentric hypertrophy.    Left ventricular diastolic function was normal.    The right ventricular cavity is mild to moderately dilated.    Estimated right ventricular systolic pressure from tricuspid regurgitation is markedly elevated (>55 mmHg). Calculated right ventricular systolic pressure from tricuspid regurgitation is 81 mmHg.      Stress Test:  Results for orders placed during the hospital encounter of 01/12/23    Stress Test With Myocardial Perfusion One Day    Interpretation Summary    Left ventricular ejection fraction is normal (Calculated EF = 55%).    Myocardial perfusion imaging indicates a moderate-to-severe area of ischemia .    Impressions are consistent with a high risk study.    Diaphragmatic attenuation artifact is present.    Findings consistent with a normal ECG stress test.    Patient underwent  pharmacological stress test with Lexiscan followed by myocardial perfusion scintigraphy.  Baseline EKG shows sinus rhythm with right bundle branch block without significant EKG changes during Lexiscan infusion patient reported dyspnea.No significant arrhythmias during the study.  Myocardial perfusion Griffey shows moderate to large inferior and inferoseptal ischemia with inferior artifact with SDS score of 17 with EF of 55%.    Abnormal study with high risk features on the study suggestive of moderate to large area of ischemia      Electronically signed by Luis Antonio White MD, 01/14/23, 12:27 PM EST.        Cardiac Catheterization:  Results for orders placed during the hospital encounter of 01/12/23    Cardiac Catheterization/Vascular Study    Conclusion  CARDIAC CATHETERIZATION REPORT      DATE OF PROCEDURE:  1/15/2023    INDICATION FOR PROCEDURE:    Shortness of breath  Chest heaviness  Abnormal stress test    PROCEDURE PERFORMED:    Left heart catheterization  coronary angiography  left ventriculography    PROCEDURE COMMENTS:    After informed consent was obtained, the patient was prepped and draped in the usual sterile manner.  Mild to moderate sedation was administered.  Right femoral artery was accessed without difficulty and 6 [place] arterial sheath was inserted.  Sheath was flushed with heparinized saline.    Using 6 [place] Dara catheters, first left coronary artery and the right coronary was electively engaged and appropriate views were taken.  A 6 [place] JR4 catheter was used to cross aortic valve and left heart catheterization was performed.  Left ventriculography was done in a ENRIQUE view.    Femoral arteriotomy was closed with Perclose    Patient tolerated the procedure well.    FINDINGS:    1. HEMODYNAMICS:    Aortic pressure: 145/72 mmHg    LVEDP: 0 to 5 mmHg    Gradient across aortic valve on pullback: No gradient across aortic valve      2. LEFT VENTRICULOGRAPHY: 55%      3. CORONARY  ANGIOGRAPHY:    A: Left main coronary artery: Short and normal    B: Left anterior descending artery: 25 to 30% plaque in the proximal segment of LAD.  No high-grade stenosis.  It wraps around apex.  It tapers into a small caliber vessel distally.    C: Left circumflex coronary artery: 10 to 20% plaque in the midsegment of LCx no high-grade stenosis    D: Right coronary artery: RCA is large and dominant and free of significant disease.  25 to 30% plaque in the proximal and mid segment of RCA noted    SUMMARY:    Mild coronary artery disease  Preserved left ventricle function    RECOMMENDATIONS:    Medical treatment recommended      Results Review:  I have personally reviewed the results from the time of this admission to 3/17/2025 09:18 EDT and agree with these findings:  []  Laboratory  []  Microbiology  []  Radiology  []  EKG/Telemetry   []  Cardiology/Vascular   []  Pathology  []  Old records  []  Other:    Most notable findings include:     No Known Allergies    Current Medications:   Scheduled Meds:amLODIPine, 2.5 mg, Oral, Nightly  cefTRIAXone, 2,000 mg, Intravenous, Q24H  furosemide, 40 mg, Intravenous, Q12H  gabapentin, 300 mg, Oral, TID  heparin (porcine), 5,000 Units, Subcutaneous, Q8H  insulin lispro, 2-7 Units, Subcutaneous, 4x Daily AC & at Bedtime  ipratropium-albuterol, 3 mL, Nebulization, 4x Daily - RT  methylPREDNISolone sodium succinate, 40 mg, Intravenous, Q12H  sertraline, 50 mg, Oral, Nightly      Continuous Infusions:         Assessment:         Active Hospital Problems    Diagnosis  POA    **Acute on chronic respiratory failure [J96.20]  Yes          Plan:   CAD/HLD  No c/o CP  No acute changes on tele-SR RBBB  Troponins elevated but down-trendin-96-83  cardiac cath 2023 which showed mild CAD  , previous  was 66  Lipitor 10mg    Pulmonary edema  Per CXR on admission  proBNP 1701  NA/CO2 on admit 136/23  today 139/25.5  Pt states home Lasix recently d/c'd-Lasix 40mg IV q 12  ordered    HTN   Lisinopril on hold  Stable, -130  Amlodipine 2,5 mg  Pulm HTN   RVSP calculated 81mmHg 4/2024    RBBB  Noted back at least to 2021    PNA/COPD/SONJA   Dr Greenwood consulted  On home O2 4lpm  Pt reports not able to wear Cpap at home  CT chest showed LLL PNA-Rocephin  RVP negative    BHASKAR  Cr 1.28  Pt states he has upcoming outpt appt with nephrology    PLAN:  O2 up to 6 LPM this am  Dr Greenwood consulted  IV Lasix 40 mg q 12  No I&O or weights to trend  Eval daily I&O/weights/labs  Await repeat echo      Patient is seen and examined and findings are verified.  All data is reviewed by me personally.  Assessment and plan formulated by APC was done after discussion with attending.  I spent more than 50% of time in taking care of the patient.    Patient is admitted with shortness of breath.  Slight leg edema.    Hemodynamics are stable    Normal S1 and S2.  No pericardial rub or murmur abdominal exam is benign no leg edema noted.    I will continue gentle diuresis.  Echocardiogram is reviewed.  LV function is preserved.  EF is 55 to 60%.  RV is dilated.  RVSP is greater than 45 mmHg    Previous cardiac catheterization showed mild coronary artery disease.  We shall follow.           Ester Jimenez, APRN  03/17/25  09:18 EDT

## 2025-03-17 NOTE — PROGRESS NOTES
[facility] MEDICINE SERVICE  DAILY PROGRESS NOTE    NAME: Brenden Peter  : 1950  MRN: [id number]      LOS: 0 days     PROVIDER OF SERVICE: Nikkie Polanco MD    Chief Complaint: Acute on chronic respiratory failure    Subjective:     Interval History:  History taken from: patient    Patient otherwise feels well.  He is sitting up at the edge of bed.  He appears to be in tripod position.  His son is present at bedside.  He states he feels better this morning as compared to yesterday.  He remains short of breath however.        Review of Systems:   Review of Systems   Respiratory:  Positive for shortness of breath and wheezing.    All other systems reviewed and are negative.      Objective:     Vital Signs  Temp:  [97.1 °F (36.2 °C)-98.2 °F (36.8 °C)] 97.3 °F (36.3 °C)  Heart Rate:  [55-73] 60  Resp:  [15-21] 15  BP: (101-142)/(44-71) 142/57  Flow (L/min) (Oxygen Therapy):  [4-5] 5   Body mass index is 33.83 kg/m².    Physical Exam  Physical Exam  Constitutional:       General: He is awake.      Appearance: He is well-developed and well-groomed. He is ill-appearing.   HENT:      Head: Normocephalic and atraumatic.      Nose: Nose normal.      Mouth/Throat:      Mouth: Mucous membranes are moist.      Pharynx: Oropharynx is clear.   Eyes:      Extraocular Movements: Extraocular movements intact.      Conjunctiva/sclera: Conjunctivae normal.      Pupils: Pupils are equal, round, and reactive to light.   Cardiovascular:      Rate and Rhythm: Normal rate and regular rhythm.      Pulses: Normal pulses.      Heart sounds: Normal heart sounds.   Pulmonary:      Effort: Pulmonary effort is normal.      Breath sounds: Wheezing and rhonchi present.   Abdominal:      General: Abdomen is flat. Bowel sounds are normal.      Palpations: Abdomen is soft.   Musculoskeletal:         General: Normal range of motion.      Cervical back: Normal range of motion and neck supple.      Left lower leg: No edema.   Skin:      General: Skin is warm and dry.   Neurological:      General: No focal deficit present.      Mental Status: He is alert and oriented to person, place, and time. Mental status is at baseline.      Cranial Nerves: Cranial nerves 2-12 are intact.      Sensory: Sensation is intact.      Motor: Motor function is intact.   Psychiatric:         Mood and Affect: Mood normal.         Behavior: Behavior normal. Behavior is cooperative.         Thought Content: Thought content normal.         Judgment: Judgment normal.            Diagnostic Data    Results from last 7 days   Lab Units 03/17/25  0512   WBC 10*3/mm3 4.16   HEMOGLOBIN g/dL 9.8*   HEMATOCRIT % 32.3*   PLATELETS 10*3/mm3 143   GLUCOSE mg/dL 179*   CREATININE mg/dL 1.22   BUN mg/dL 38*   SODIUM mmol/L 139   POTASSIUM mmol/L 5.1   ANION GAP mmol/L 11.5       CT Angiogram Chest Pulmonary Embolism  Result Date: 3/16/2025  Impression: 1.No evidence of pulmonary embolism. 2.Mild emphysema and mild diffuse peribronchial thickening. 3.Clustered micronodules in the left lower lobe may reflect a small focus of pneumonia or infectious bronchiolitis. 4.Coronary artery disease. 5.Thyroid goiter. Electronically Signed: Darius Dixon MD  3/16/2025 11:34 PM EDT  Workstation ID: [id number]    XR Chest 1 View  Result Date: 3/16/2025  Impression: Interstitial thickening similar to prior study which may relate to a component of underlying mild pulmonary edema. Electronically Signed: Wang Alba MD  3/16/2025 12:12 PM EDT  Workstation ID: [id number]        I reviewed the patient's new clinical results.    Assessment/Plan:     Active and Resolved Problems  Active Hospital Problems    Diagnosis  POA    **Acute on chronic respiratory failure [J96.20]  Yes      Resolved Hospital Problems   No resolved problems to display.     Dyspnea, multifactorial in nature  Acute on chronic hypoxemic respiratory failure  End-stage COPD  History of heart failure, presumed diastolic  Pulmonary  hypertension  Hypertension  Hyperlipidemia  Type 2 diabetes      During evaluation patient was titrated from baseline O2 requirements at 4 L to approximately 8 L.  Discussed plan of care with patient as well as his son present at bedside.  Noted 2D echocardiogram ordered.  Will await results.  Cardiology consulted.  Will also place consult to pulmonary services for evaluation.  Patient is routinely followed by pulmonary as an outpatient as well.  Noted respiratory panel negative.  CTA chest reviewed.  Continue supportive care including aerosols, IV steroids, as well as IV antibiotics.    VTE Prophylaxis:  Pharmacologic VTE prophylaxis orders are present.             Disposition Planning:     Barriers to Discharge: Respiratory requirements, repeat 2D echo, pulmonary consult, cardiology evaluation  Anticipated Date of Discharge: 3/20/2025  Place of Discharge: Home versus rehab pending course      Time: 35 minutes     Code Status and Medical Interventions: CPR (Attempt to Resuscitate); Full Support   Ordered at: 03/16/25 1518     Code Status (Patient has no pulse and is not breathing):    CPR (Attempt to Resuscitate)     Medical Interventions (Patient has pulse or is breathing):    Full Support     Level Of Support Discussed With:    Patient       Signature: Electronically signed by Nikkie Polanco MD, 03/17/25, 15:03 EDT.  [facility] Hospitalist Team

## 2025-03-17 NOTE — CASE MANAGEMENT/SOCIAL WORK
Discharge Planning Assessment  [facility]     Patient Name: Brenden Peter  MRN: [id number]  Today's Date: 3/17/2025    Admit Date: 3/16/2025    Plan: Routine home with wife. Home O2 4L w/ Inogen.   Discharge Needs Assessment       Row Name 03/17/25 1342       Living Environment    People in Home spouse    Name(s) of People in Home Wife-India    Current Living Arrangements home    Potentially Unsafe Housing Conditions none    In the past 12 months has the electric, gas, oil, or water company threatened to shut off services in your home? No    Primary Care Provided by self    Provides Primary Care For no one    Family Caregiver if Needed spouse;child(kenia), adult;sibling(s)    Family Caregiver Names Wife-India; Children-Renetta, Rigoberto; Sister-Shara    Quality of Family Relationships helpful;involved;supportive    Able to Return to Prior Arrangements yes       Resource/Environmental Concerns    Resource/Environmental Concerns none    Transportation Concerns none       Transportation Needs    In the past 12 months, has lack of transportation kept you from medical appointments or from getting medications? no    In the past 12 months, has lack of transportation kept you from meetings, work, or from getting things needed for daily living? No       Food Insecurity    Within the past 12 months, you worried that your food would run out before you got the money to buy more. Never true    Within the past 12 months, the food you bought just didn't last and you didn't have money to get more. Never true       Transition Planning    Patient/Family Anticipates Transition to home with family    Patient/Family Anticipated Services at Transition none    Transportation Anticipated car, drives self;family or friend will provide       Discharge Needs Assessment    Readmission Within the Last 30 Days no previous admission in last 30 days    Equipment Currently Used at Home oxygen;pulse ox;cane, straight    Concerns to be Addressed  denies needs/concerns at this time;no discharge needs identified    Do you want help finding or keeping work or a job? Patient declined    Do you want help with school or training? For example, starting or completing job training or getting a high school diploma, GED or equivalent No    Anticipated Changes Related to Illness none    Equipment Needed After Discharge none    Provided Post Acute Provider List? N/A    Provided Post Acute Provider Quality & Resource List? N/A                   Discharge Plan       Row Name 03/17/25 1343       Plan    Plan Routine home with wife. Home O2 4L w/ Inogen.    Patient/Family in Agreement with Plan yes    Plan Comments CM met with patient, daughter, and sister at bedside. Pt lives at home with wife, drives, and is independent with ADL's. PCP and pharmacy confirmed- declined [facility]. DME includes 4L O2 continuous (Inogen machine), pulse ox, and cane. Pt denies current HHC/PT services but has used [facility] previously. Son Rigoberto will transport home at time of discharge.              Demographic Summary       Row Name 03/17/25 1341       General Information    Admission Type observation    Arrived From emergency department    Referral Source admission list    Reason for Consult care coordination/care conference;discharge planning    Preferred Language English       Contact Information    Permission Granted to Share Info With                    Functional Status       Row Name 03/17/25 1342       Functional Status    Usual Activity Tolerance good    Current Activity Tolerance good       Functional Status, IADL    Medications independent    Meal Preparation independent    Housekeeping independent    Laundry independent    Shopping independent    If for any reason you need help with day-to-day activities such as bathing, preparing meals, shopping, managing finances, etc., do you get the help you need? I don't need any help             Megan Naegele, RN      Office Phone: [phone]  Office Cell: [phone]

## 2025-03-17 NOTE — PLAN OF CARE
Goal Outcome Evaluation:  Plan of Care Reviewed With: patient, family           Outcome Evaluation: 75 y/o male came to hospital on 3/16 due to shortness of breath despite supplemental O2, sats were 81% on 4l/nc. PMH includes COPD, CHF, pulmonary htn, chronic resp failure. Patient lives with spouse and normally independent without a.d. Family reported patient needed sig assistance on day of presentation to mobilize. At time of this eval, patient able to come to standing with SBA . Ambulated 60 ft x 2 with CG/SBA , no a.d. and no loss of balance. Sats were 83% by end of gait on 4l/nc and patient had to perform deep breathing for 2 minutes with supplemental O2 titrated to 6L in order to recover. Patient is safe to return home, may need 6 MWT to determine supplemental O2 needs at discharge.    Anticipated Discharge Disposition (PT): home with home health

## 2025-03-17 NOTE — THERAPY EVALUATION
Patient Name: Brenden Peter  : 1950    MRN: [id number]                              Today's Date: 3/17/2025       Admit Date: 3/16/2025    Visit Dx:     ICD-10-CM ICD-9-CM   1. Dyspnea, unspecified type  R06.00 786.09   2. Elevated troponin  R79.89 790.6     Patient Active Problem List   Diagnosis    Primary localized osteoarthrosis of right lower leg    Type 2 diabetes mellitus without complication, without long-term current use of insulin    Mixed hyperlipidemia    Essential hypertension    Primary osteoarthritis of knees, bilateral    Hx of total knee replacement, right    Morbid obesity    Acquired trigger finger    Benign prostatic hyperplasia    Coronary artery disease involving native coronary artery of native heart without angina pectoris    Chronic depression    Diabetic peripheral neuropathy    Microalbuminuria due to type 2 diabetes mellitus    Multinodular goiter    Murmur    Tobacco dependence syndrome    Vitamin D deficiency    Swelling of joint of right knee    Chronic pain of left knee    COPD (chronic obstructive pulmonary disease)    Obesity (BMI 30-39.9)    COPD exacerbation    Sepsis    Abnormal nuclear stress test    Acute respiratory distress    (HFpEF) heart failure with preserved ejection fraction    Interstitial pneumonia    Pneumonia due to infectious organism, unspecified laterality, unspecified part of lung    Hypoxemia    Acute bronchitis due to Rhinovirus    Acute on chronic respiratory failure     Past Medical History:   Diagnosis Date    (HFpEF) heart failure with preserved ejection fraction 2023    Abnormal EKG 2015    Anxiety     COPD (chronic obstructive pulmonary disease)     Coronary artery disease 2015    Diabetes     type 2    Elevated cholesterol     Hyperlipidemia     Hypertension     Microalbuminuria due to type 2 diabetes mellitus 2020    Multinodular goiter 2019    Murmur 2015    Primary osteoarthritis of left knee 10/22/2021      Past Surgical History:   Procedure Laterality Date    BRONCHOSCOPY N/A 4/9/2023    Procedure: BRONCHOSCOPY, bronchial alveolar lavage right upper lobe;  Surgeon: Juanjo Castañeda MD;  Location: [facility] ENDOSCOPY;  Service: Pulmonary;  Laterality: N/A;  post: pneumonia    CARDIAC CATHETERIZATION Left 1/15/2023    Procedure: Left Heart Cath and coronary angiogram;  Surgeon: Shawn Baker MD;  Location: [facility] CATH INVASIVE LOCATION;  Service: Cardiovascular;  Laterality: Left;    EYE SURGERY      cataract    TOTAL KNEE ARTHROPLASTY Right 10/21/2020    Procedure: TOTAL KNEE ARTHROPLASTY;  Surgeon: Ravi Fagan MD;  Location: [facility] MAIN OR;  Service: Orthopedics;  Laterality: Right;    TOTAL KNEE ARTHROPLASTY REVISION Right 5/19/2021    Procedure: KNEE POLY INSERT EXCHANGE with irrigation;  Surgeon: Ravi Fagan MD;  Location: [facility] MAIN OR;  Service: Orthopedics;  Laterality: Right;      General Information       Row Name 03/17/25 1331          Physical Therapy Time and Intention    Document Type evaluation  -CR     Mode of Treatment physical therapy  -CR       Row Name 03/17/25 1331          General Information    Patient Profile Reviewed yes  -CR     Prior Level of Function independent:;all household mobility  not using a.d.  -CR     Existing Precautions/Restrictions oxygen therapy device and L/min  -CR       Row Name 03/17/25 1331          Living Environment    Current Living Arrangements home  -CR     People in Home spouse  -CR       Row Name 03/17/25 1331          Home Main Entrance    Number of Stairs, Main Entrance none  -CR       Row Name 03/17/25 1331          Cognition    Orientation Status (Cognition) oriented x 4  -CR       Row Name 03/17/25 1331          Safety Issues/Impairments Affecting Functional Mobility    Impairments Affecting Function (Mobility) endurance/activity tolerance;shortness of breath  -CR               User Key  (r) = Recorded By, (t) = Taken By, (c) = Cosigned By      Initials  Name Provider Type    CR Reyes, Carmela, PT Physical Therapist                   Mobility       Row Name 03/17/25 1333          Bed Mobility    Comment, (Bed Mobility) sitting EOB  -CR       Row Name 03/17/25 1333          Bed-Chair Transfer    Bed-Chair [place]el (Transfers) standby assist  -CR       Row Name 03/17/25 1333          Sit-Stand Transfer    Sit-Stand Carmel (Transfers) standby assist  -CR       Row Name 03/17/25 1333          Gait/Stairs (Locomotion)    Carmel Level (Gait) standby assist  -CR     Distance in Feet (Gait) 60  x 2  -CR               User Key  (r) = Recorded By, (t) = Taken By, (c) = Cosigned By      Initials Name Provider Type    CR Reyes, Carmela, PT Physical Therapist                   Obj/Interventions       Row Name 03/17/25 1334          Range of Motion Comprehensive    General Range of Motion no range of motion deficits identified  -CR       Row Name 03/17/25 1334          Strength Comprehensive (MMT)    General Manual Muscle Testing (MMT) Assessment no strength deficits identified  -CR       Row Name 03/17/25 1334          Balance    Balance Assessment sitting static balance;standing static balance;sitting dynamic balance;standing dynamic balance  -CR     Static Sitting Balance independent  -CR     Dynamic Sitting Balance independent  -CR     Position, Sitting Balance sitting edge of bed  -CR     Static Standing Balance standby assist  -CR     Dynamic Standing Balance contact guard  -CR               User Key  (r) = Recorded By, (t) = Taken By, (c) = Cosigned By      Initials Name Provider Type    CR Reyes, Carmela, PT Physical Therapist                   Goals/Plan       Row Name 03/17/25 1343          Gait Training Goal 1 (PT)    Activity/Assistive Device (Gait Training Goal 1, PT) gait (walking locomotion);improve balance and speed;increase endurance/gait distance  -CR     Carmel Level (Gait Training Goal 1, PT) standby assist  -CR     Distance (Gait  Training Goal 1, PT) 100 ft x 2 , no desat  -CR     Time Frame (Gait Training Goal 1, PT) long term goal (LTG);2 weeks  -CR       Row Name 03/17/25 4667          Patient Education Goal (PT)    Activity (Patient Education Goal, PT) HEP, no desat  -CR     [place]/Cues/Accuracy (Memory Goal 2, PT) demonstrates adequately  -CR     Time Frame (Patient Education Goal, PT) long term goal (LTG);2 weeks  -CR       Row Name 03/17/25 5536          Therapy Assessment/Plan (PT)    Planned Therapy Interventions (PT) gait training;home exercise program;patient/family education  -CR               User Key  (r) = Recorded By, (t) = Taken By, (c) = Cosigned By      Initials Name Provider Type    CR Reyes, Carmela, PT Physical Therapist                   Clinical Impression       Row Name 03/17/25 5927          Pain    Pretreatment Pain Rating 0/10 - no pain  -CR     Posttreatment Pain Rating 0/10 - no pain  -CR       Row Name 03/17/25 9163          Plan of Care Review    Plan of Care Reviewed With patient;family  -CR     Outcome Evaluation 73 y/o male came to hospital on 3/16 due to shortness of breath despite supplemental O2, sats were 81% on 4l/nc. PMH includes COPD, CHF, pulmonary htn, chronic resp failure. Patient lives with spouse and normally independent without a.d. Family reported patient needed sig assistance on day of presentation to mobilize. At time of this eval, patient able to come to standing with SBA . Ambulated 60 ft x 2 with CG/SBA , no a.d. and no loss of balance. Sats were 83% by end of gait on 4l/nc and patient had to perform deep breathing for 2 minutes with supplemental O2 titrated to 6L in order to recover. Patient is safe to return home, may need 6 MWT to determine supplemental O2 needs at discharge.  -CR       Row Name 03/17/25 4800          Therapy Assessment/Plan (PT)    Patient/Family Therapy Goals Statement (PT) home  -CR     Rehab Potential (PT) good  -CR     Criteria for Skilled Interventions Met  (PT) yes;skilled treatment is necessary  -CR     Therapy Frequency (PT) 3 times/wk  -CR     Predicted Duration of Therapy Intervention (PT) dc  -CR       Row Name 03/17/25 1335          Positioning and Restraints    Post Treatment Position bed  -CR     In Bed sitting EOB;call light within reach;notified nsg;with family/caregiver  -CR               User Key  (r) = Recorded By, (t) = Taken By, (c) = Cosigned By      Initials Name Provider Type    CR Reyes, Carmela, PT Physical Therapist                   Outcome Measures       Row Name 03/17/25 1344 03/17/25 0800       How much help from another person do you currently need...    Turning from your back to your side while in flat bed without using bedrails? 4  -CR 4  -MN    Moving from lying on back to sitting on the side of a flat bed without bedrails? 4  -CR 4  -MN    Moving to and from a bed to a chair (including a wheelchair)? 4  -CR 3  -MN    Standing up from a chair using your arms (e.g., wheelchair, bedside chair)? 4  -CR 3  -MN    Climbing 3-5 steps with a railing? 3  -CR 3  -MN    To walk in hospital room? 4  -CR 3  -MN    AM-PAC 6 Clicks Score (PT) 23  -CR 20  -MN              User Key  (r) = Recorded By, (t) = Taken By, (c) = Cosigned By      Initials Name Provider Type    CR Reyes, Carmela, PT Physical Therapist    Amilcar Young LPN Licensed Nurse                                 Physical Therapy Education       Title: PT OT SLP Therapies (In Progress)       Topic: Physical Therapy (In Progress)       Point: Mobility training (Done)       Learning Progress Summary            Patient Acceptance, E, VU by CR at 3/17/2025 1344   Family Acceptance, E, VU by CR at 3/17/2025 1344                      Point: Home exercise program (Not Started)       Learner Progress:  Not documented in this visit.              Point: Body mechanics (Not Started)       Learner Progress:  Not documented in this visit.              Point: Precautions (Not Started)       Learner  Progress:  Not documented in this visit.                              User Key       Initials Effective Dates Name Provider Type Discipline    CR 06/16/21 -  Reyes, Carmela, PT Physical Therapist PT                  PT Recommendation and Plan  Planned Therapy Interventions (PT): gait training, home exercise program, patient/family education  Outcome Evaluation: 75 y/o male came to hospital on 3/16 due to shortness of breath despite supplemental O2, sats were 81% on 4l/nc. PMH includes COPD, CHF, pulmonary htn, chronic resp failure. Patient lives with spouse and normally independent without a.d. Family reported patient needed sig assistance on day of presentation to mobilize. At time of this eval, patient able to come to standing with SBA . Ambulated 60 ft x 2 with CG/SBA , no a.d. and no loss of balance. Sats were 83% by end of gait on 4l/nc and patient had to perform deep breathing for 2 minutes with supplemental O2 titrated to 6L in order to recover. Patient is safe to return home, may need 6 MWT to determine supplemental O2 needs at discharge.     Time Calculation:         PT Charges       Row Name 03/17/25 1345             Time Calculation    Start Time 1300  -CR      Stop Time 1324  -CR      Time Calculation (min) 24 min  -CR      PT Received On 03/17/25  -CR      PT - Next Appointment 03/18/25  -CR      PT Goal Re-Cert Due Date 03/31/25  -CR         Time Calculation- PT    Total Timed Code Minutes- PT 0 minute(s)  -CR                User Key  (r) = Recorded By, (t) = Taken By, (c) = Cosigned By      Initials Name Provider Type    CR Reyes, Carmela, PT Physical Therapist                  Therapy Charges for Today       Code Description Service Date Service Provider Modifiers Qty    18010836067 HC PT EVAL MOD COMPLEXITY 4 3/17/2025 Reyes, Carmela, PT GP 1            PT G-Codes  AM-PAC 6 Clicks Score (PT): 23  PT Discharge Summary  Anticipated Discharge Disposition (PT): home with home health    Carmela Reyes,  PT  3/17/2025

## 2025-03-17 NOTE — PLAN OF CARE
Goal Outcome Evaluation:      Pt alert and orient x 4. Pt makes needs known verbally. Pt SOB with exertion. Pt 02 at 6L nc. Pt compliant with plan of care at this time.

## 2025-03-17 NOTE — SIGNIFICANT NOTE
CTA chest reviewed     Impression:  1.No evidence of pulmonary embolism.  2.Mild emphysema and mild diffuse peribronchial thickening.  3.Clustered micronodules in the left lower lobe may reflect a small focus of pneumonia or infectious bronchiolitis.    Start on IV rocephin.

## 2025-03-17 NOTE — PLAN OF CARE
Goal Outcome Evaluation:                                          Patient doing well this shift. Patient got CT chest this shift, showing some possible PNA, started on IV rocephin. Patient on 4L O2 chronic, currently on 6L. Cardiology consulted. Patient getting IV steroids and lasix. Patient is A&O X4, up with standby assist and from home.

## 2025-03-18 ENCOUNTER — ANESTHESIA EVENT (OUTPATIENT)
Dept: GASTROENTEROLOGY | Facility: HOSPITAL | Age: 75
End: 2025-03-18
Payer: MEDICARE

## 2025-03-18 PROBLEM — T17.800A MULTIPLE TRACHEOBRONCHIAL MUCUS PLUGS: Status: ACTIVE | Noted: 2025-03-16

## 2025-03-18 LAB
ANION GAP SERPL CALCULATED.3IONS-SCNC: 9 MMOL/L (ref 5–15)
BASOPHILS # BLD AUTO: 0.01 10*3/MM3 (ref 0–0.2)
BASOPHILS NFR BLD AUTO: 0.1 % (ref 0–1.5)
BUN SERPL-MCNC: 46 MG/DL (ref 8–23)
BUN/CREAT SERPL: 37.1 (ref 7–25)
CALCIUM SPEC-SCNC: 9 MG/DL (ref 8.6–10.5)
CHLORIDE SERPL-SCNC: 102 MMOL/L (ref 98–107)
CO2 SERPL-SCNC: 26 MMOL/L (ref 22–29)
CREAT SERPL-MCNC: 1.24 MG/DL (ref 0.76–1.27)
DEPRECATED RDW RBC AUTO: 45.1 FL (ref 37–54)
EGFRCR SERPLBLD CKD-EPI 2021: 61 ML/MIN/1.73
EOSINOPHIL # BLD AUTO: 0 10*3/MM3 (ref 0–0.4)
EOSINOPHIL NFR BLD AUTO: 0 % (ref 0.3–6.2)
ERYTHROCYTE [DISTWIDTH] IN BLOOD BY AUTOMATED COUNT: 13.2 % (ref 12.3–15.4)
GLUCOSE BLDC GLUCOMTR-MCNC: 175 MG/DL (ref 70–105)
GLUCOSE BLDC GLUCOMTR-MCNC: 229 MG/DL (ref 70–105)
GLUCOSE BLDC GLUCOMTR-MCNC: 258 MG/DL (ref 70–105)
GLUCOSE BLDC GLUCOMTR-MCNC: 266 MG/DL (ref 70–105)
GLUCOSE SERPL-MCNC: 175 MG/DL (ref 65–99)
HCT VFR BLD AUTO: 32 % (ref 37.5–51)
HGB BLD-MCNC: 10 G/DL (ref 13–17.7)
IMM GRANULOCYTES # BLD AUTO: 0.05 10*3/MM3 (ref 0–0.05)
IMM GRANULOCYTES NFR BLD AUTO: 0.6 % (ref 0–0.5)
LYMPHOCYTES # BLD AUTO: 0.59 10*3/MM3 (ref 0.7–3.1)
LYMPHOCYTES NFR BLD AUTO: 7.4 % (ref 19.6–45.3)
MAGNESIUM SERPL-MCNC: 2.6 MG/DL (ref 1.6–2.4)
MCH RBC QN AUTO: 29.5 PG (ref 26.6–33)
MCHC RBC AUTO-ENTMCNC: 31.3 G/DL (ref 31.5–35.7)
MCV RBC AUTO: 94.4 FL (ref 79–97)
MONOCYTES # BLD AUTO: 0.46 10*3/MM3 (ref 0.1–0.9)
MONOCYTES NFR BLD AUTO: 5.8 % (ref 5–12)
NEUTROPHILS NFR BLD AUTO: 6.83 10*3/MM3 (ref 1.7–7)
NEUTROPHILS NFR BLD AUTO: 86.1 % (ref 42.7–76)
NRBC BLD AUTO-RTO: 0 /100 WBC (ref 0–0.2)
PLATELET # BLD AUTO: 170 10*3/MM3 (ref 140–450)
PMV BLD AUTO: 10.9 FL (ref 6–12)
POTASSIUM SERPL-SCNC: 5.2 MMOL/L (ref 3.5–5.2)
RBC # BLD AUTO: 3.39 10*6/MM3 (ref 4.14–5.8)
SODIUM SERPL-SCNC: 137 MMOL/L (ref 136–145)
WBC NRBC COR # BLD AUTO: 7.94 10*3/MM3 (ref 3.4–10.8)

## 2025-03-18 PROCEDURE — 83735 ASSAY OF MAGNESIUM: CPT | Performed by: STUDENT IN AN ORGANIZED HEALTH CARE EDUCATION/TRAINING PROGRAM

## 2025-03-18 PROCEDURE — 94799 UNLISTED PULMONARY SVC/PX: CPT

## 2025-03-18 PROCEDURE — 63710000001 INSULIN LISPRO (HUMAN) PER 5 UNITS: Performed by: NURSE PRACTITIONER

## 2025-03-18 PROCEDURE — 63710000001 INSULIN LISPRO (HUMAN) PER 5 UNITS: Performed by: STUDENT IN AN ORGANIZED HEALTH CARE EDUCATION/TRAINING PROGRAM

## 2025-03-18 PROCEDURE — 25010000002 CEFTRIAXONE PER 250 MG: Performed by: INTERNAL MEDICINE

## 2025-03-18 PROCEDURE — 80048 BASIC METABOLIC PNL TOTAL CA: CPT | Performed by: STUDENT IN AN ORGANIZED HEALTH CARE EDUCATION/TRAINING PROGRAM

## 2025-03-18 PROCEDURE — 85025 COMPLETE CBC W/AUTO DIFF WBC: CPT | Performed by: STUDENT IN AN ORGANIZED HEALTH CARE EDUCATION/TRAINING PROGRAM

## 2025-03-18 PROCEDURE — 25010000002 METHYLPREDNISOLONE PER 40 MG: Performed by: FAMILY MEDICINE

## 2025-03-18 PROCEDURE — 99232 SBSQ HOSP IP/OBS MODERATE 35: CPT | Performed by: INTERNAL MEDICINE

## 2025-03-18 PROCEDURE — 82948 REAGENT STRIP/BLOOD GLUCOSE: CPT | Performed by: STUDENT IN AN ORGANIZED HEALTH CARE EDUCATION/TRAINING PROGRAM

## 2025-03-18 PROCEDURE — 94761 N-INVAS EAR/PLS OXIMETRY MLT: CPT

## 2025-03-18 PROCEDURE — 25010000002 HEPARIN (PORCINE) PER 1000 UNITS: Performed by: STUDENT IN AN ORGANIZED HEALTH CARE EDUCATION/TRAINING PROGRAM

## 2025-03-18 PROCEDURE — 25010000002 FUROSEMIDE PER 20 MG: Performed by: STUDENT IN AN ORGANIZED HEALTH CARE EDUCATION/TRAINING PROGRAM

## 2025-03-18 PROCEDURE — 82948 REAGENT STRIP/BLOOD GLUCOSE: CPT

## 2025-03-18 PROCEDURE — 94664 DEMO&/EVAL PT USE INHALER: CPT

## 2025-03-18 RX ORDER — GUAIFENESIN 600 MG/1
1200 TABLET, EXTENDED RELEASE ORAL EVERY 12 HOURS SCHEDULED
Status: DISCONTINUED | OUTPATIENT
Start: 2025-03-18 | End: 2025-03-20 | Stop reason: HOSPADM

## 2025-03-18 RX ORDER — SILDENAFIL CITRATE 20 MG/1
20 TABLET ORAL 3 TIMES DAILY
Status: DISCONTINUED | OUTPATIENT
Start: 2025-03-18 | End: 2025-03-20 | Stop reason: HOSPADM

## 2025-03-18 RX ORDER — IPRATROPIUM BROMIDE AND ALBUTEROL SULFATE 2.5; .5 MG/3ML; MG/3ML
3 SOLUTION RESPIRATORY (INHALATION)
Status: DISCONTINUED | OUTPATIENT
Start: 2025-03-18 | End: 2025-03-18 | Stop reason: SDUPTHER

## 2025-03-18 RX ORDER — THEOPHYLLINE 300 MG/1
300 TABLET, EXTENDED RELEASE ORAL DAILY
Status: DISCONTINUED | OUTPATIENT
Start: 2025-03-18 | End: 2025-03-20 | Stop reason: HOSPADM

## 2025-03-18 RX ORDER — BUDESONIDE AND FORMOTEROL FUMARATE DIHYDRATE 160; 4.5 UG/1; UG/1
2 AEROSOL RESPIRATORY (INHALATION)
Status: DISCONTINUED | OUTPATIENT
Start: 2025-03-18 | End: 2025-03-20 | Stop reason: HOSPADM

## 2025-03-18 RX ORDER — FUROSEMIDE 40 MG/1
40 TABLET ORAL DAILY
Status: DISCONTINUED | OUTPATIENT
Start: 2025-03-19 | End: 2025-03-20 | Stop reason: HOSPADM

## 2025-03-18 RX ORDER — INSULIN LISPRO 100 [IU]/ML
3-14 INJECTION, SOLUTION INTRAVENOUS; SUBCUTANEOUS
Status: DISCONTINUED | OUTPATIENT
Start: 2025-03-18 | End: 2025-03-20 | Stop reason: HOSPADM

## 2025-03-18 RX ADMIN — INSULIN LISPRO 2 UNITS: 100 INJECTION, SOLUTION INTRAVENOUS; SUBCUTANEOUS at 08:25

## 2025-03-18 RX ADMIN — CEFTRIAXONE 2000 MG: 2 INJECTION, POWDER, FOR SOLUTION INTRAMUSCULAR; INTRAVENOUS at 01:15

## 2025-03-18 RX ADMIN — SILDENAFIL 20 MG: 20 TABLET ORAL at 21:36

## 2025-03-18 RX ADMIN — IPRATROPIUM BROMIDE AND ALBUTEROL SULFATE 3 ML: .5; 3 SOLUTION RESPIRATORY (INHALATION) at 11:18

## 2025-03-18 RX ADMIN — IPRATROPIUM BROMIDE AND ALBUTEROL SULFATE 3 ML: .5; 3 SOLUTION RESPIRATORY (INHALATION) at 15:15

## 2025-03-18 RX ADMIN — IPRATROPIUM BROMIDE AND ALBUTEROL SULFATE 3 ML: .5; 3 SOLUTION RESPIRATORY (INHALATION) at 18:45

## 2025-03-18 RX ADMIN — SILDENAFIL 20 MG: 20 TABLET ORAL at 12:10

## 2025-03-18 RX ADMIN — INSULIN LISPRO 8 UNITS: 100 INJECTION, SOLUTION INTRAVENOUS; SUBCUTANEOUS at 12:10

## 2025-03-18 RX ADMIN — SILDENAFIL 20 MG: 20 TABLET ORAL at 17:20

## 2025-03-18 RX ADMIN — Medication 10 ML: at 08:25

## 2025-03-18 RX ADMIN — HEPARIN SODIUM 5000 UNITS: 5000 INJECTION INTRAVENOUS; SUBCUTANEOUS at 14:22

## 2025-03-18 RX ADMIN — FUROSEMIDE 40 MG: 10 INJECTION, SOLUTION INTRAMUSCULAR; INTRAVENOUS at 06:19

## 2025-03-18 RX ADMIN — METHYLPREDNISOLONE SODIUM SUCCINATE 20 MG: 40 INJECTION, POWDER, FOR SOLUTION INTRAMUSCULAR; INTRAVENOUS at 01:15

## 2025-03-18 RX ADMIN — AMLODIPINE BESYLATE 5 MG: 5 TABLET ORAL at 21:35

## 2025-03-18 RX ADMIN — GABAPENTIN 300 MG: 300 CAPSULE ORAL at 17:20

## 2025-03-18 RX ADMIN — BUDESONIDE AND FORMOTEROL FUMARATE DIHYDRATE 2 PUFF: 160; 4.5 AEROSOL RESPIRATORY (INHALATION) at 18:42

## 2025-03-18 RX ADMIN — GABAPENTIN 300 MG: 300 CAPSULE ORAL at 08:25

## 2025-03-18 RX ADMIN — HEPARIN SODIUM 5000 UNITS: 5000 INJECTION INTRAVENOUS; SUBCUTANEOUS at 06:19

## 2025-03-18 RX ADMIN — GABAPENTIN 300 MG: 300 CAPSULE ORAL at 21:34

## 2025-03-18 RX ADMIN — SERTRALINE HYDROCHLORIDE 50 MG: 50 TABLET, FILM COATED ORAL at 21:35

## 2025-03-18 RX ADMIN — GUAIFENESIN 1200 MG: 600 TABLET, MULTILAYER, EXTENDED RELEASE ORAL at 12:10

## 2025-03-18 RX ADMIN — INSULIN LISPRO 8 UNITS: 100 INJECTION, SOLUTION INTRAVENOUS; SUBCUTANEOUS at 21:35

## 2025-03-18 RX ADMIN — METHYLPREDNISOLONE SODIUM SUCCINATE 20 MG: 40 INJECTION, POWDER, FOR SOLUTION INTRAMUSCULAR; INTRAVENOUS at 08:25

## 2025-03-18 RX ADMIN — IPRATROPIUM BROMIDE AND ALBUTEROL SULFATE 3 ML: .5; 3 SOLUTION RESPIRATORY (INHALATION) at 06:30

## 2025-03-18 RX ADMIN — HEPARIN SODIUM 5000 UNITS: 5000 INJECTION INTRAVENOUS; SUBCUTANEOUS at 21:35

## 2025-03-18 RX ADMIN — METHYLPREDNISOLONE SODIUM SUCCINATE 20 MG: 40 INJECTION, POWDER, FOR SOLUTION INTRAMUSCULAR; INTRAVENOUS at 17:20

## 2025-03-18 RX ADMIN — GUAIFENESIN 1200 MG: 600 TABLET, MULTILAYER, EXTENDED RELEASE ORAL at 21:35

## 2025-03-18 RX ADMIN — THEOPHYLLINE 300 MG: 300 TABLET, EXTENDED RELEASE ORAL at 12:10

## 2025-03-18 RX ADMIN — INSULIN LISPRO 5 UNITS: 100 INJECTION, SOLUTION INTRAVENOUS; SUBCUTANEOUS at 17:20

## 2025-03-18 NOTE — CONSULTS
"[facility]: [facility]         CONSULT NOTE    Patient Identification:  Brenden Peter  74 y.o.  male  1950  [id number]            Requesting physician: Attending physician    Reason for Consultation: Hypoxia, COPD exacerbation      History of Present Illness:  74-year-old male with history of COPD, chronic hypoxia on 4 L of oxygen at home, CAD, HTN, HLD and DM who presented 3/16/2025 with complaints of 5 days of productive cough and shortness of breath    CT Angiogram Chest Pulmonary Embolism  Result Date: 3/16/2025  Impression: 1.No evidence of pulmonary embolism. 2.Mild emphysema and mild diffuse peribronchial thickening. 3.Clustered micronodules in the left lower lobe may reflect a small focus of pneumonia or infectious bronchiolitis. 4.Coronary artery disease. 5.Thyroid goiter. Electronically Signed: Darius Dixon MD  3/16/2025 11:34 PM EDT  Workstation ID: [id number]    XR Chest 1 View  Result Date: 3/16/2025  Impression: Interstitial thickening similar to prior study which may relate to a component of underlying mild pulmonary edema. Electronically Signed: Wang Alba MD  3/16/2025 12:12 PM EDT  Workstation ID: [id number]    Results for orders placed during the hospital encounter of 03/16/25    Adult Transthoracic Echo Complete W/ Cont if Necessary Per Protocol    Interpretation Summary    Left ventricular systolic function is normal. Left ventricular ejection fraction appears to be 66 - 70%.    Left ventricular diastolic function was normal.    The right ventricular cavity is mild to moderately dilated.    Estimated right ventricular systolic pressure from tricuspid regurgitation is moderately elevated (45-55 mmHg). Calculated right ventricular systolic pressure from tricuspid regurgitation is 48 mmHg.  Office note 10/24/2024:  \"Assessment:  Moderate to severe PAH,  RVSP >55, IgE 22  x-smoker (55 year history-quit 7-3-22)  dyspnea and hypoxia.  SONJA, residual AHI: 0.7 , No snoring, no Leak, " "[id number] % > 4 hours  Severe COPD:     PFT 05/29/2024 FEV1 1.1 L 42 sent, ratio 54, TLC 88%, %, DLCO 50%   PFT 08/30/2023, FEV1 1.48 L/ 57% post dilator 67%, ratio 61, DLCO 33%  PFT FEV1 1.12L which is 41%, post BD 51%, ratio 57, ERV 17%, %, TLC 82%, DLCO 27%, obstructive sleep apnea features  mild interstitial lung disease CT chest 01/12/2023  PMH:  HTN, DM, HLD  Plan:  outpatient pulmonary rehab  Revatio, was started 04/23/2023, cont. theophylline,   CT chest w/o March 2025  NOT using CPAP 5-15, with O2 blended at 5 L using and benefiting  continue POC-patient O2 was 82% on RA  nebulizer Nebs PRN, Breztri samples  Patient wears oxygen 4.5L-he is compliant benefits from therapy  Cont. smoking cessation   SAFETY DRIVING, ADEQUATE SLEEP HYGIENE  DISCUSSED CARDIOVASCULAR & METABOLIC SIDE EFFECTS OF UNTREATED SONJA, PATIENT IS COMPLIANT USING MACHINE AND BENEFITS FROM THERAPY\"    Assessment:  Acute on chronic hypoxia    Pneumonia due to unspecified pathogen    Chronic obstructive pulmonary disease,   Acute on chronic respiratory failure with hypoxemia and hypercapnia: ABGs 7.42/49.7/65.5, patient wears 4 L of home oxygen  Respiratory panel is positive for human rhinovirus  Obstructive sleep apnea, on BiPAP  Pulmonary hypertension: Echo 3/16/2025 LV EF 66 to 70%, RV pressure 45-55 mmHg     Coronary artery disease     Hypertension   Hyperlipidemia  Diabetes mellitus  .  SONJA, patient is unable to use CPAP  Severe COPD:  PFT FEV1 1.12L which is 41%, post BD 51%, ratio 57, ERV 17%, %, TLC 82%, DLCO 27%, obstructive sleep apnea features  mild interstitial lung disease      Recommendations:  Schedule bronchoscopy 3/19/2025 to remove mucous plugs and obtain cultures    Antibiotics: Rocephin    Mucinex  Encourage use of incentive spirometry  IV Solu-Medrol 20 mg every 8 hours  Oxygen supplement and titration to maintain saturation 90 to 95%: Currently 5 L per nasal cannula  Bronchodilators  Sildenafil for " pulmonary hypertension  Theophylline    Amlodipine  Diuresis: Lasix  Insulin sliding scale  DVT prophylaxis: Heparin subcu        I personally reviewed the radiological studies      Review of Sytems:  Constitutional: Negative for chills, and fever and positive for malaise/fatigue.   HENT: Negative.    Eyes: Negative.    Cardiovascular: Negative.    Respiratory: Positive for cough and shortness of breath.    Skin: Negative.    Musculoskeletal: Negative.    Gastrointestinal: Negative.    Genitourinary: Negative.    Neurological: Negative.    Psychiatric/Behavioral: Negative.    Past Medical History:  Past Medical History:   Diagnosis Date    (HFpEF) heart failure with preserved ejection fraction 04/01/2023    Abnormal EKG 02/03/2015    Anxiety     COPD (chronic obstructive pulmonary disease)     Coronary artery disease 03/03/2015    Diabetes     type 2    Elevated cholesterol     Hyperlipidemia     Hypertension     Microalbuminuria due to type 2 diabetes mellitus 02/05/2020    Multinodular goiter 04/18/2019    Murmur 02/03/2015    Primary osteoarthritis of left knee 10/22/2021       Past Surgical History:  Past Surgical History:   Procedure Laterality Date    BRONCHOSCOPY N/A 4/9/2023    Procedure: BRONCHOSCOPY, bronchial alveolar lavage right upper lobe;  Surgeon: Juanjo Castañeda MD;  Location: [facility] ENDOSCOPY;  Service: Pulmonary;  Laterality: N/A;  post: pneumonia    CARDIAC CATHETERIZATION Left 1/15/2023    Procedure: Left Heart Cath and coronary angiogram;  Surgeon: Shawn Baker MD;  Location: [facility] CATH INVASIVE LOCATION;  Service: Cardiovascular;  Laterality: Left;    EYE SURGERY      cataract    TOTAL KNEE ARTHROPLASTY Right 10/21/2020    Procedure: TOTAL KNEE ARTHROPLASTY;  Surgeon: Ravi Fagan MD;  Location: [facility] MAIN OR;  Service: Orthopedics;  Laterality: Right;    TOTAL KNEE ARTHROPLASTY REVISION Right 5/19/2021    Procedure: KNEE POLY INSERT EXCHANGE with irrigation;  Surgeon: Ravi Fagan MD;   Location: [facility] MAIN OR;  Service: Orthopedics;  Laterality: Right;        Home Meds:  Medications Prior to Admission   Medication Sig Dispense Refill Last Dose/Taking    amLODIPine (NORVASC) 5 MG tablet Take 1 tablet by mouth Every Night. Indications: High Blood Pressure       aspirin 81 MG EC tablet Take 1 tablet by mouth 3 (Three) Times a Week. (Patient not taking: Reported on 3/16/2025)   Not Taking    cefuroxime (CEFTIN) 250 MG tablet Take 1 tablet by mouth 2 (Two) Times a Day. (Patient not taking: Reported on 3/16/2025) 10 tablet 0 Not Taking    doxycycline (VIBRAMYCIN) 100 MG capsule Take 1 capsule by mouth 2 (Two) Times a Day. (Patient not taking: Reported on 3/16/2025) 10 capsule 0 Not Taking    Fluticasone-Salmeterol (ADVAIR/WIXELA) 250-50 MCG/ACT DISKUS Inhale 1 puff 2 (Two) Times a Day. 1 each 5     furosemide (Lasix) 40 MG tablet Take 1 tablet by mouth Daily. 90 tablet 2     gabapentin (NEURONTIN) 300 MG capsule Take 1 capsule by mouth 3 (Three) Times a Day. Indications: Neuropathic Pain       glipizide (GLUCOTROL) 10 MG tablet Take 0.5 tablets by mouth 2 (Two) Times a Day Before Meals. Indications: Type 2 Diabetes       ipratropium-albuterol (Combivent Respimat)  MCG/ACT inhaler Inhale 1 puff 4 (Four) Times a Day. 1 each 11     metFORMIN (GLUCOPHAGE) 1000 MG tablet Take 1 tablet by mouth 2 (Two) Times a Day With Meals. Indications: Type 2 Diabetes       O2 (OXYGEN) Inhale 4 L/min Continuous. Indications: COPD exacerbation, complicated       pioglitazone (ACTOS) 30 MG tablet Take 1 tablet by mouth Every Night. Indications: Type 2 Diabetes       potassium chloride (KLOR-CON M10) 10 MEQ CR tablet Take 1 tablet by mouth 2 (Two) Times a Day. (Patient not taking: Reported on 3/16/2025) 90 tablet 3 Not Taking    pravastatin (PRAVACHOL) 10 MG tablet Take 1 tablet by mouth Daily.       predniSONE (DELTASONE) 20 MG tablet Take 1 tablet by mouth Daily. (Patient not taking: Reported on 3/16/2025) 5 tablet 0  "Not Taking    sertraline (ZOLOFT) 50 MG tablet Take 1 tablet by mouth Every Night. Indications: Major Depressive Disorder       vitamin D (ERGOCALCIFEROL) 1.25 MG (43877 UT) capsule capsule 1 capsule 2 (Two) Times a Week. Indications: Vitamin D Deficiency          Allergies:  No Known Allergies    Social History:   Social History     Socioeconomic History    Marital status:    Tobacco Use    Smoking status: Former     Current packs/day: 0.00     Average packs/day: 1 pack/day for 5.0 years (5.0 ttl pk-yrs)     Types: Cigarettes     Start date: 7/3/2017     Quit date: 7/3/2022     Years since quittin.7     Passive exposure: Past    Smokeless tobacco: Former     Quit date: 7/3/2022   Vaping Use    Vaping status: Never Used   Substance and Sexual Activity    Alcohol use: Not Currently    Drug use: Never    Sexual activity: Defer       Family History:  Family History   Problem Relation Age of Onset    Cancer Other     Diabetes Other     Heart disease Other     No Known Problems Mother     No Known Problems Father     No Known Problems Sister     No Known Problems Brother     No Known Problems Maternal Aunt     No Known Problems Maternal Uncle     No Known Problems Paternal Aunt     No Known Problems Paternal Uncle     No Known Problems Maternal Grandmother     No Known Problems Maternal Grandfather     No Known Problems Paternal Grandmother     No Known Problems Paternal Grandfather     Anemia Neg Hx     Arrhythmia Neg Hx     Asthma Neg Hx     Clotting disorder Neg Hx     Fainting Neg Hx     Heart attack Neg Hx     Heart failure Neg Hx     Hyperlipidemia Neg Hx     Hypertension Neg Hx        Physical Exam:  /60 (BP Location: Right arm, Patient Position: Sitting)   Pulse 52   Temp 97.6 °F (36.4 °C) (Oral)   Resp 16   Ht 170.2 cm (67\")   Wt 105 kg (231 lb 11.3 oz)   SpO2 94%   BMI 36.29 kg/m²  Body mass index is 36.29 kg/m². 94% 105 kg (231 lb 11.3 oz)  General Appearance:  Alert   HEENT:  " Normocephalic, without obvious abnormality, Conjunctiva/corneas clear,.   Nares normal, no drainage     Neck:  Supple, symmetrical, trachea midline. No JVD.  Lungs /Chest wall:   Prolonged expiration, bilateral basal rhonchi, respirations unlabored, symmetrical wall movement.     Heart:  Regular rate and rhythm, S1 S2 normal  Abdomen: Soft, non-tender, no masses, no organomegaly.    Extremities: No edema, no clubbing or cyanosis    LABS:  Lab Results   Component Value Date    CALCIUM 9.0 03/18/2025    PHOS 2.7 04/25/2023     Results from last 7 days   Lab Units 03/18/25  0626 03/17/25  1241 03/17/25  0512 03/16/25  1137   MAGNESIUM mg/dL 2.6*  --  2.3  --    SODIUM mmol/L 137  --  139 136   POTASSIUM mmol/L 5.2  --  5.1 4.8   CHLORIDE mmol/L 102  --  102 101   CO2 mmol/L 26.0  --  25.5 23.0   BUN mg/dL 46*  --  38* 27*   CREATININE mg/dL 1.24  --  1.22 1.28*   GLUCOSE mg/dL 175*  --  179* 177*   CALCIUM mg/dL 9.0  --  8.8 8.8   WBC 10*3/mm3 7.94  --  4.16 4.95   HEMOGLOBIN g/dL 10.0*  --  9.8* 10.4*   PLATELETS 10*3/mm3 170  --  143 162   PROBNP pg/mL  --  2,758.0*  --  1,701.0*     Lab Results   Component Value Date    TROPONINT 83 (C) 03/16/2025     Results from last 7 days   Lab Units 03/16/25  1542 03/16/25  1244 03/16/25  1137   HSTROP T ng/L 83* 96* 94*                 Results from last 7 days   Lab Units 03/16/25  1137   ADENOVIRUS DETECTION BY PCR  Not Detected   CORONAVIRUS 229E  Not Detected   CORONAVIRUS HKU1  Not Detected   CORONAVIRUS NL63  Not Detected   CORONAVIRUS OC43  Not Detected   HUMAN METAPNEUMOVIRUS  Not Detected   HUMAN RHINOVIRUS/ENTEROVIRUS  Not Detected   INFLUENZA B PCR  Not Detected   PARAINFLUENZA 1  Not Detected   PARAINFLUENZA VIRUS 2  Not Detected   PARAINFLUENZA VIRUS 3  Not Detected   PARAINFLUENZA VIRUS 4  Not Detected   BORDETELLA PERTUSSIS PCR  Not Detected   CHLAMYDOPHILA PNEUMONIAE PCR  Not Detected   MYCOPLAMA PNEUMO PCR  Not Detected   INFLUENZA A PCR  Not Detected   RSV,  PCR  Not Detected             Lab Results   Component Value Date    TSH 1.790 03/08/2023     Estimated Creatinine Clearance: 60.4 mL/min (by C-G formula based on SCr of 1.24 mg/dL).  Results from last 7 days   Lab Units 03/16/25  2326   NITRITE UA  Negative        Imaging:  Imaging Results (Last 24 Hours)       ** No results found for the last 24 hours. **              Current Meds:   SCHEDULE  amLODIPine, 5 mg, Oral, Nightly  cefTRIAXone, 2,000 mg, Intravenous, Q24H  furosemide, 40 mg, Intravenous, Q12H  gabapentin, 300 mg, Oral, TID  heparin (porcine), 5,000 Units, Subcutaneous, Q8H  insulin lispro, 2-7 Units, Subcutaneous, 4x Daily AC & at Bedtime  ipratropium-albuterol, 3 mL, Nebulization, 4x Daily - RT  methylPREDNISolone sodium succinate, 20 mg, Intravenous, Q8H  sertraline, 50 mg, Oral, Nightly      Infusions     PRNs    acetaminophen **OR** acetaminophen **OR** acetaminophen    senna-docusate sodium **AND** polyethylene glycol **AND** bisacodyl **AND** bisacodyl    Calcium Replacement - Follow Nurse / BPA Driven Protocol    dextrose    dextrose    glucagon (human recombinant)    Magnesium Cardiology Dose Replacement - Follow Nurse / BPA Driven Protocol    nitroglycerin    Phosphorus Replacement - Follow Nurse / BPA Driven Protocol    Potassium Replacement - Follow Nurse / BPA Driven Protocol    [COMPLETED] Insert Peripheral IV **AND** sodium chloride        Juanjo Castañeda MD  3/18/2025  08:32 EDT      Much of this encounter note is an electronic transcription/translation of spoken language to printed text using Dragon Software.

## 2025-03-18 NOTE — PROGRESS NOTES
[facility] MEDICINE SERVICE  DAILY PROGRESS NOTE    NAME: Brenden Peter  : 1950  MRN: [id number]      LOS: 1 day     PROVIDER OF SERVICE: LENNIE Torres    Chief Complaint: Acute on chronic respiratory failure    Subjective:     Interval History:  History taken from: patient    Patient otherwise feels well.  He is sitting up at the edge of bed.  He is no longer requiring tripod position.  He states he feels better this morning as compared to yesterday.  His dyspnea is improving.        Review of Systems:   Review of Systems   Respiratory:  Positive for shortness of breath and wheezing.    All other systems reviewed and are negative.      Objective:     Vital Signs  Temp:  [97.3 °F (36.3 °C)-98.1 °F (36.7 °C)] 97.6 °F (36.4 °C)  Heart Rate:  [50-61] 52  Resp:  [11-20] 16  BP: (114-142)/(57-63) 114/60  Flow (L/min) (Oxygen Therapy):  [5-6] 5   Body mass index is 36.29 kg/m².    Physical Exam  Physical Exam  Constitutional:       General: He is awake.      Appearance: He is well-developed and well-groomed. He is ill-appearing.   HENT:      Head: Normocephalic and atraumatic.      Nose: Nose normal.      Mouth/Throat:      Mouth: Mucous membranes are moist.      Pharynx: Oropharynx is clear.   Eyes:      Extraocular Movements: Extraocular movements intact.      Conjunctiva/sclera: Conjunctivae normal.      Pupils: Pupils are equal, round, and reactive to light.   Cardiovascular:      Rate and Rhythm: Normal rate and regular rhythm.      Pulses: Normal pulses.      Heart sounds: Normal heart sounds.   Pulmonary:      Effort: Pulmonary effort is normal.      Breath sounds: Wheezing and rhonchi present.   Abdominal:      General: Abdomen is flat. Bowel sounds are normal.      Palpations: Abdomen is soft.   Musculoskeletal:         General: Normal range of motion.      Cervical back: Normal range of motion and neck supple.      Left lower leg: No edema.   Skin:     General: Skin is warm and  dry.   Neurological:      General: No focal deficit present.      Mental Status: He is alert and oriented to person, place, and time. Mental status is at baseline.      Cranial Nerves: Cranial nerves 2-12 are intact.      Sensory: Sensation is intact.      Motor: Motor function is intact.   Psychiatric:         Mood and Affect: Mood normal.         Behavior: Behavior normal. Behavior is cooperative.         Thought Content: Thought content normal.         Judgment: Judgment normal.            Diagnostic Data    Results from last 7 days   Lab Units 03/18/25  0626   WBC 10*3/mm3 7.94   HEMOGLOBIN g/dL 10.0*   HEMATOCRIT % 32.0*   PLATELETS 10*3/mm3 170   GLUCOSE mg/dL 175*   CREATININE mg/dL 1.24   BUN mg/dL 46*   SODIUM mmol/L 137   POTASSIUM mmol/L 5.2   ANION GAP mmol/L 9.0       CT Angiogram Chest Pulmonary Embolism  Result Date: 3/16/2025  Impression: 1.No evidence of pulmonary embolism. 2.Mild emphysema and mild diffuse peribronchial thickening. 3.Clustered micronodules in the left lower lobe may reflect a small focus of pneumonia or infectious bronchiolitis. 4.Coronary artery disease. 5.Thyroid goiter. Electronically Signed: Darius Dixon MD  3/16/2025 11:34 PM EDT  Workstation ID: [id number]    XR Chest 1 View  Result Date: 3/16/2025  Impression: Interstitial thickening similar to prior study which may relate to a component of underlying mild pulmonary edema. Electronically Signed: Wang Alba MD  3/16/2025 12:12 PM EDT  Workstation ID: [id number]        I reviewed the patient's new clinical results.    Assessment/Plan:     Active and Resolved Problems  Active Hospital Problems    Diagnosis  POA    **Acute on chronic respiratory failure [J96.20]  Yes      Resolved Hospital Problems   No resolved problems to display.     Dyspnea, multifactorial in nature  Acute on chronic hypoxemic respiratory failure  End-stage COPD  History of heart failure, presumed diastolic  Pulmonary  hypertension  Hypertension  Hyperlipidemia  Type 2 diabetes      Patient seen and evaluated at bedside.  Echo resulted EF noted to be 68%.  Patient is requiring less oxygen, oxygen is titrated down to 5 L nasal cannula.  Pulmonology evaluated patient and plan is for patient to go for bronchoscopy in the morning.  Will require walk test prior to discharge.  Continue supportive care including aerosols, IV steroids, as well as IV antibiotics.  Continue SSI    VTE Prophylaxis:  Pharmacologic VTE prophylaxis orders are present.             Disposition Planning:     Barriers to Discharge: Respiratory requirements, repeat 2D echo, pulmonary consult, cardiology evaluation  Anticipated Date of Discharge: 3/20/2025  Place of Discharge: Home versus rehab pending course      Time: 35 minutes     Code Status and Medical Interventions: CPR (Attempt to Resuscitate); Full Support   Ordered at: 03/16/25 1518     Code Status (Patient has no pulse and is not breathing):    CPR (Attempt to Resuscitate)     Medical Interventions (Patient has pulse or is breathing):    Full Support     Level Of Support Discussed With:    Patient       Signature: Electronically signed by LENNIE Torres, 03/18/25, 10:57 EDT.  [facility]

## 2025-03-18 NOTE — PROGRESS NOTES
CARDIOLOGY FOLLOW-UP PROGRESS NOTE      Reason for follow-up:    Shortness of breath  Hypertension  Lower extremity edema   Non obstructive CAD     Attending: Nikkie Polanco MD      Subjective .     Denies chest pain  SOA improving  Edema improving     Review of Systems   Constitutional: Negative for chills and fever.   HENT:  Negative for ear discharge and nosebleeds.    Eyes:  Negative for discharge and redness.   Cardiovascular:  Negative for chest pain, orthopnea, palpitations, paroxysmal nocturnal dyspnea and syncope.   Respiratory:  Positive for shortness of breath. Negative for cough and wheezing.    Endocrine: Negative for heat intolerance.   Skin:  Negative for rash.   Musculoskeletal:  Negative for arthritis and myalgias.   Gastrointestinal:  Negative for abdominal pain, melena, nausea and vomiting.   Genitourinary:  Negative for dysuria and hematuria.   Neurological:  Negative for dizziness, light-headedness, numbness and tremors.   Psychiatric/Behavioral:  Negative for depression. The patient is not nervous/anxious.      Pertinent items are noted in HPI, all other systems reviewed and negative  Allergies: Patient has no known allergies.    Scheduled Meds:amLODIPine, 5 mg, Oral, Nightly  cefTRIAXone, 2,000 mg, Intravenous, Q24H  furosemide, 40 mg, Intravenous, Q12H  gabapentin, 300 mg, Oral, TID  heparin (porcine), 5,000 Units, Subcutaneous, Q8H  insulin lispro, 2-7 Units, Subcutaneous, 4x Daily AC & at Bedtime  ipratropium-albuterol, 3 mL, Nebulization, 4x Daily - RT  methylPREDNISolone sodium succinate, 20 mg, Intravenous, Q8H  sertraline, 50 mg, Oral, Nightly        Continuous Infusions:     PRN Meds:.  acetaminophen **OR** acetaminophen **OR** acetaminophen    senna-docusate sodium **AND** polyethylene glycol **AND** bisacodyl **AND** bisacodyl    Calcium Replacement - Follow Nurse / BPA Driven Protocol    dextrose    dextrose    glucagon (human recombinant)    Magnesium Cardiology Dose Replacement -  "Follow Nurse / BPA Driven Protocol    nitroglycerin    Phosphorus Replacement - Follow Nurse / BPA Driven Protocol    Potassium Replacement - Follow Nurse / BPA Driven Protocol    [COMPLETED] Insert Peripheral IV **AND** sodium chloride    Objective     VITAL SIGNS  Patient Vitals for the past 24 hrs:   BP Temp Temp src Pulse Resp SpO2 Weight   03/18/25 0804 114/60 97.6 °F (36.4 °C) Oral 52 16 94 % --   03/18/25 0637 -- -- -- 53 11 98 % --   03/18/25 0630 -- -- -- 58 16 94 % --   03/18/25 0550 122/60 97.3 °F (36.3 °C) Oral 55 20 93 % 105 kg (231 lb 11.3 oz)   03/17/25 2350 126/61 98.1 °F (36.7 °C) Oral 50 18 97 % --   03/17/25 2016 -- -- -- 59 18 98 % --   03/17/25 2013 134/59 97.8 °F (36.6 °C) Oral 60 20 97 % --   03/17/25 2012 -- -- -- 61 18 95 % --   03/17/25 1613 118/63 97.5 °F (36.4 °C) Oral -- 13 -- --   03/17/25 1128 142/57 97.3 °F (36.3 °C) Oral 60 15 -- --        Flowsheet Rows      Flowsheet Row First Filed Value   Admission Height 170.2 cm (67\") Documented at 03/16/2025 1106   Admission Weight 98 kg (216 lb) Documented at 03/16/2025 1106            Body mass index is 36.29 kg/m².      Intake/Output Summary (Last 24 hours) at 3/18/2025 0826  Last data filed at 3/18/2025 0804  Gross per 24 hour   Intake 240 ml   Output 1400 ml   Net -1160 ml        TELEMETRY:     Sinus eben over night into upper 40s  Currently SB 50's    Physical Exam:  Constitutional:       Appearance: Well-developed.   Eyes:      General: No scleral icterus.        Right eye: No discharge.   HENT:      Head: Normocephalic and atraumatic.   Neck:      Thyroid: No thyromegaly.      Lymphadenopathy: No cervical adenopathy.   Pulmonary:      Effort: Pulmonary effort is normal. No respiratory distress.      Breath sounds: Normal breath sounds. No wheezing. No rales.   Cardiovascular:      Normal rate. Regular rhythm.      No gallop.    Edema:     Peripheral edema absent.   Abdominal:      Tenderness: There is no abdominal tenderness. "   Skin:     Findings: No erythema or rash.   Neurological:      Mental Status: Alert and oriented to person, place, and time.            Results Review:   I reviewed the patient's new clinical results.    CBC    Results from last 7 days   Lab Units 03/18/25  0626 03/17/25  0512 03/16/25  1137   WBC 10*3/mm3 7.94 4.16 4.95   HEMOGLOBIN g/dL 10.0* 9.8* 10.4*   PLATELETS 10*3/mm3 170 143 162     BMP   Results from last 7 days   Lab Units 03/18/25  0626 03/17/25  0512 03/16/25  1137   SODIUM mmol/L 137 139 136   POTASSIUM mmol/L 5.2 5.1 4.8   CHLORIDE mmol/L 102 102 101   CO2 mmol/L 26.0 25.5 23.0   BUN mg/dL 46* 38* 27*   CREATININE mg/dL 1.24 1.22 1.28*   GLUCOSE mg/dL 175* 179* 177*   MAGNESIUM mg/dL 2.6* 2.3  --      Cr Clearance Estimated Creatinine Clearance: 60.4 mL/min (by C-G formula based on SCr of 1.24 mg/dL).  Coag     HbA1C   Lab Results   Component Value Date    HGBA1C 6.47 (H) 03/16/2025    HGBA1C 8.30 (H) 04/08/2023    HGBA1C 6.40 (H) 03/08/2023     Blood Glucose   Glucose   Date/Time Value Ref Range Status   03/18/2025 0801 175 (H) 70 - 105 mg/dL Final     Comment:     Serial Number: [id number]:  [id number]   03/17/2025 2016 275 (H) 70 - 105 mg/dL Final     Comment:     Serial Number: [id number]:  [id number]   03/17/2025 1615 285 (H) 70 - 105 mg/dL Final     Comment:     Serial Number: [id number]:  [id number]   03/17/2025 1141 228 (H) 70 - 105 mg/dL Final     Comment:     Serial Number: [id number]:  [id number]   03/17/2025 0735 177 (H) 70 - 105 mg/dL Final     Comment:     Serial Number: [id number]:  [id number]   03/16/2025 2042 367 (H) 70 - 105 mg/dL Final     Comment:     Serial Number: [id number]:  [id number]   03/16/2025 1758 176 (H) 70 - 105 mg/dL Final     Comment:     Serial Number: [id number]:  [id number]     Infection     CMP   Results from last 7 days   Lab Units 03/18/25  0626 03/17/25  0512 03/16/25  1137   SODIUM mmol/L 137 139 136   POTASSIUM  "mmol/L 5.2 5.1 4.8   CHLORIDE mmol/L 102 102 101   CO2 mmol/L 26.0 25.5 23.0   BUN mg/dL 46* 38* 27*   CREATININE mg/dL 1.24 1.22 1.28*   GLUCOSE mg/dL 175* 179* 177*     ABG      UA    Results from last 7 days   Lab Units 03/16/25  2326   NITRITE UA  Negative     MARILIA  No results found for: \"POCMETH\", \"POCAMPHET\", \"POCBARBITUR\", \"POCBENZO\", \"POCCOCAINE\", \"POCOPIATES\", \"POCOXYCODO\", \"POCPHENCYC\", \"POCPROPOXY\", \"POCTHC\", \"POCTRICYC\"  Lysis Labs   Results from last 7 days   Lab Units 03/18/25  0626 03/17/25  0512 03/16/25  1137   HEMOGLOBIN g/dL 10.0* 9.8* 10.4*   PLATELETS 10*3/mm3 170 143 162   CREATININE mg/dL 1.24 1.22 1.28*     Radiology(recent) CT Angiogram Chest Pulmonary Embolism  Result Date: 3/16/2025  Impression: 1.No evidence of pulmonary embolism. 2.Mild emphysema and mild diffuse peribronchial thickening. 3.Clustered micronodules in the left lower lobe may reflect a small focus of pneumonia or infectious bronchiolitis. 4.Coronary artery disease. 5.Thyroid goiter. Electronically Signed: Darius Dixon MD  3/16/2025 11:34 PM EDT  Workstation ID: [id number]    XR Chest 1 View  Result Date: 3/16/2025  Impression: Interstitial thickening similar to prior study which may relate to a component of underlying mild pulmonary edema. Electronically Signed: Wang Alba MD  3/16/2025 12:12 PM EDT  Workstation ID: [id number]      Imaging Results (Last 24 Hours)       ** No results found for the last 24 hours. **            Results from last 7 days   Lab Units 03/16/25  1542   HSTROP T ng/L 83*       EKG                I personally viewed and interpreted the patient's EKG/Telemetry data:        ECHOCARDIOGRAM:     Results for orders placed during the hospital encounter of 03/16/25    Adult Transthoracic Echo Complete W/ Cont if Necessary Per Protocol    Interpretation Summary    Left ventricular systolic function is normal. Left ventricular ejection fraction appears to be 66 - 70%.    Left ventricular diastolic function was " normal.    The right ventricular cavity is mild to moderately dilated.    Estimated right ventricular systolic pressure from tricuspid regurgitation is moderately elevated (45-55 mmHg). Calculated right ventricular systolic pressure from tricuspid regurgitation is 48 mmHg.        STRESS MYOVIEW:      CARDIAC CATHETERIZATION:      OTHER:         Assessment & Plan            Acute on chronic respiratory failure        ASSESSMENT:    SOA/LE edema  Elevated BNP on admission  CXR on admission with mild pulmonary edema  CT chest no PE; possible LLL PNA  Net I/O -1100 overnight  Edema improving  BUN trending up to 40's; will decrease lasix back to po dose 40 mg daily    CAD  No c/o CP  No acute changes on tele-SR RBBB  Troponins elevated but down-trendin-96-83  cardiac cath 2023 which showed mild CAD  , previous  was 66  Increase lipitor to 40 mg daily for management of LDL     Sinus Bradycardia  Hemodynamics stable  No negative chronotropic agents on board  Consider op MCOT  Will monitor HR  Not able to tolerate cpap at home.     HTN   Lisinopril on hold  Stable on current Rx  Amlodipine 2.5 mg    Pulm HTN   RVSP calculated 81mmHg 2024  Now 45-55 mmhg     RBBB  Noted back at least to      PNA/COPD/SONJA   Dr Greenwood consulted  On home O2 4lpm  Pt reports not able to wear Cpap at home  CT chest showed LLL PNA-Rocephin  RVP negative     BHASKAR  Cr 1.28  BUN up to 40s  Pt states he has upcoming outpt appt with nephrology        PLAN:    Reduce Lasix back to po   Increase statin for LDL goal <70  Continue supportive care  Pulmonary consulted  Mild Sinus Bradycardia; 40's overnight  Consider outpatient MCOT        Additional recommendations per Dr. Baker       Patient is seen and examined and findings are verified.  All data is reviewed by me personally.  Assessment and plan formulated by APC was done after discussion with attending.  I spent more than 50% of time in taking care of the patient.    Patient is  standing by the bedside.  He is feeling much better after bronchoscopy.    Normal S1 and S2.  No pericardial rub or murmur abdominal exam is benign no leg edema noted    Patient has been started on sildenafil.  Patient is slowly improving.  Creatinine is slightly elevated.  Diuretics are on hold.  From a cardiac standpoint patient is stable.  Echocardiogram is unremarkable except for pulmonary hypertension    Electronically signed by Shawn Baker MD, 03/19/25, 6:29 PM EDT.      I discussed the patient's findings and my recommendations with patient and RN                  Electronically signed by LENNIE Cummings, 03/18/25, 8:26 AM EDT.          LENNIE Cummings  03/18/25  08:26 EDT

## 2025-03-18 NOTE — CASE MANAGEMENT/SOCIAL WORK
Continued Stay Note  MAR[facility] GUADALUPE Sanabria     Patient Name: Brenden Peter  MRN: [id number]  Today's Date: 3/18/2025    Admit Date: 3/16/2025    Plan: Routine home with wife. Home O2 4L w/ [employer].   Discharge Plan       Row Name 03/18/25 1448       Plan    Plan Comments CM met with pt at bedside to discuss recommendation for home health at discharge. Pt reports he has had services many times and knows the exercises. He is going to think about it more and asked that CM follow up.             Megan Naegele, RN     Office Phone: [phone]  Office Cell: [phone]

## 2025-03-18 NOTE — PLAN OF CARE
Goal Outcome Evaluation:  Plan of Care Reviewed With: patient        Progress: improving  Outcome Evaluation: Patient has been resting well throughout the shift without any complaints. Remains on IV rocephin and IV solu-medrol. IV lasix was switched to PO. Pulm consulted; started on mucinex and sildenafil for pulmonary HTN. NPO at midnight for bronch tomorrow morning. Remains on 5L of oxygen. No other issues at this time. Continuing to monitor.

## 2025-03-19 ENCOUNTER — ANESTHESIA (OUTPATIENT)
Dept: GASTROENTEROLOGY | Facility: HOSPITAL | Age: 75
End: 2025-03-19
Payer: MEDICARE

## 2025-03-19 LAB
ANION GAP SERPL CALCULATED.3IONS-SCNC: 12.2 MMOL/L (ref 5–15)
B PARAPERT DNA SPEC QL NAA+PROBE: NOT DETECTED
B PERT DNA SPEC QL NAA+PROBE: NOT DETECTED
BASOPHILS # BLD AUTO: 0.01 10*3/MM3 (ref 0–0.2)
BASOPHILS NFR BLD AUTO: 0.1 % (ref 0–1.5)
BUN SERPL-MCNC: 57 MG/DL (ref 8–23)
BUN/CREAT SERPL: 39.9 (ref 7–25)
C PNEUM DNA NPH QL NAA+NON-PROBE: NOT DETECTED
CALCIUM SPEC-SCNC: 9.2 MG/DL (ref 8.6–10.5)
CHLORIDE SERPL-SCNC: 99 MMOL/L (ref 98–107)
CO2 SERPL-SCNC: 25.8 MMOL/L (ref 22–29)
CREAT SERPL-MCNC: 1.43 MG/DL (ref 0.76–1.27)
DEPRECATED RDW RBC AUTO: 45.8 FL (ref 37–54)
EGFRCR SERPLBLD CKD-EPI 2021: 51.4 ML/MIN/1.73
EOSINOPHIL # BLD AUTO: 0 10*3/MM3 (ref 0–0.4)
EOSINOPHIL NFR BLD AUTO: 0 % (ref 0.3–6.2)
ERYTHROCYTE [DISTWIDTH] IN BLOOD BY AUTOMATED COUNT: 13.2 % (ref 12.3–15.4)
FLUAV H1 2009 PAND RNA NPH QL NAA+PROBE: DETECTED
FLUBV RNA ISLT QL NAA+PROBE: NOT DETECTED
GLUCOSE BLDC GLUCOMTR-MCNC: 167 MG/DL (ref 70–105)
GLUCOSE BLDC GLUCOMTR-MCNC: 170 MG/DL (ref 70–105)
GLUCOSE BLDC GLUCOMTR-MCNC: 175 MG/DL (ref 70–105)
GLUCOSE BLDC GLUCOMTR-MCNC: 254 MG/DL (ref 70–105)
GLUCOSE SERPL-MCNC: 209 MG/DL (ref 65–99)
HADV DNA SPEC NAA+PROBE: NOT DETECTED
HCOV 229E RNA SPEC QL NAA+PROBE: NOT DETECTED
HCOV HKU1 RNA SPEC QL NAA+PROBE: NOT DETECTED
HCOV NL63 RNA SPEC QL NAA+PROBE: NOT DETECTED
HCOV OC43 RNA SPEC QL NAA+PROBE: NOT DETECTED
HCT VFR BLD AUTO: 33.1 % (ref 37.5–51)
HGB BLD-MCNC: 9.9 G/DL (ref 13–17.7)
HMPV RNA NPH QL NAA+NON-PROBE: NOT DETECTED
HPIV1 RNA ISLT QL NAA+PROBE: NOT DETECTED
HPIV2 RNA SPEC QL NAA+PROBE: NOT DETECTED
HPIV3 RNA NPH QL NAA+PROBE: NOT DETECTED
HPIV4 P GENE NPH QL NAA+PROBE: NOT DETECTED
IMM GRANULOCYTES # BLD AUTO: 0.06 10*3/MM3 (ref 0–0.05)
IMM GRANULOCYTES NFR BLD AUTO: 0.8 % (ref 0–0.5)
LYMPHOCYTES # BLD AUTO: 0.35 10*3/MM3 (ref 0.7–3.1)
LYMPHOCYTES NFR BLD AUTO: 4.7 % (ref 19.6–45.3)
M PNEUMO IGG SER IA-ACNC: NOT DETECTED
MAGNESIUM SERPL-MCNC: 2.6 MG/DL (ref 1.6–2.4)
MCH RBC QN AUTO: 28.5 PG (ref 26.6–33)
MCHC RBC AUTO-ENTMCNC: 29.9 G/DL (ref 31.5–35.7)
MCV RBC AUTO: 95.4 FL (ref 79–97)
MONOCYTES # BLD AUTO: 0.49 10*3/MM3 (ref 0.1–0.9)
MONOCYTES NFR BLD AUTO: 6.6 % (ref 5–12)
NEUTROPHILS NFR BLD AUTO: 6.49 10*3/MM3 (ref 1.7–7)
NEUTROPHILS NFR BLD AUTO: 87.8 % (ref 42.7–76)
NRBC BLD AUTO-RTO: 0 /100 WBC (ref 0–0.2)
PLATELET # BLD AUTO: 174 10*3/MM3 (ref 140–450)
PMV BLD AUTO: 11.5 FL (ref 6–12)
POTASSIUM SERPL-SCNC: 4.8 MMOL/L (ref 3.5–5.2)
RBC # BLD AUTO: 3.47 10*6/MM3 (ref 4.14–5.8)
RHINOVIRUS RNA SPEC NAA+PROBE: NOT DETECTED
RSV RNA NPH QL NAA+NON-PROBE: NOT DETECTED
SARS-COV-2 RNA RESP QL NAA+PROBE: NOT DETECTED
SODIUM SERPL-SCNC: 137 MMOL/L (ref 136–145)
WBC NRBC COR # BLD AUTO: 7.4 10*3/MM3 (ref 3.4–10.8)

## 2025-03-19 PROCEDURE — 0202U NFCT DS 22 TRGT SARS-COV-2: CPT | Performed by: INTERNAL MEDICINE

## 2025-03-19 PROCEDURE — 25010000002 CEFTRIAXONE PER 250 MG: Performed by: INTERNAL MEDICINE

## 2025-03-19 PROCEDURE — 25010000002 PROPOFOL 10 MG/ML EMULSION: Performed by: NURSE ANESTHETIST, CERTIFIED REGISTERED

## 2025-03-19 PROCEDURE — 3E1F88Z IRRIGATION OF RESPIRATORY TRACT USING IRRIGATING SUBSTANCE, VIA NATURAL OR ARTIFICIAL OPENING ENDOSCOPIC: ICD-10-PCS | Performed by: INTERNAL MEDICINE

## 2025-03-19 PROCEDURE — 0B9J8ZX DRAINAGE OF LEFT LOWER LUNG LOBE, VIA NATURAL OR ARTIFICIAL OPENING ENDOSCOPIC, DIAGNOSTIC: ICD-10-PCS | Performed by: INTERNAL MEDICINE

## 2025-03-19 PROCEDURE — 82948 REAGENT STRIP/BLOOD GLUCOSE: CPT

## 2025-03-19 PROCEDURE — 25010000002 METHYLPREDNISOLONE PER 40 MG: Performed by: FAMILY MEDICINE

## 2025-03-19 PROCEDURE — 25010000002 LIDOCAINE 2% SOLUTION: Performed by: NURSE ANESTHETIST, CERTIFIED REGISTERED

## 2025-03-19 PROCEDURE — 87205 SMEAR GRAM STAIN: CPT | Performed by: INTERNAL MEDICINE

## 2025-03-19 PROCEDURE — 99232 SBSQ HOSP IP/OBS MODERATE 35: CPT | Performed by: INTERNAL MEDICINE

## 2025-03-19 PROCEDURE — 87206 SMEAR FLUORESCENT/ACID STAI: CPT | Performed by: INTERNAL MEDICINE

## 2025-03-19 PROCEDURE — 25810000003 SODIUM CHLORIDE 0.9 % SOLUTION: Performed by: NURSE ANESTHETIST, CERTIFIED REGISTERED

## 2025-03-19 PROCEDURE — 87252 VIRUS INOCULATION TISSUE: CPT | Performed by: INTERNAL MEDICINE

## 2025-03-19 PROCEDURE — 94761 N-INVAS EAR/PLS OXIMETRY MLT: CPT

## 2025-03-19 PROCEDURE — 94799 UNLISTED PULMONARY SVC/PX: CPT

## 2025-03-19 PROCEDURE — 25010000002 HEPARIN (PORCINE) PER 1000 UNITS: Performed by: STUDENT IN AN ORGANIZED HEALTH CARE EDUCATION/TRAINING PROGRAM

## 2025-03-19 PROCEDURE — 87116 MYCOBACTERIA CULTURE: CPT | Performed by: INTERNAL MEDICINE

## 2025-03-19 PROCEDURE — 25010000002 LIDOCAINE 2% SOLUTION: Performed by: INTERNAL MEDICINE

## 2025-03-19 PROCEDURE — 87102 FUNGUS ISOLATION CULTURE: CPT | Performed by: INTERNAL MEDICINE

## 2025-03-19 PROCEDURE — 87071 CULTURE AEROBIC QUANT OTHER: CPT | Performed by: INTERNAL MEDICINE

## 2025-03-19 PROCEDURE — 87798 DETECT AGENT NOS DNA AMP: CPT | Performed by: INTERNAL MEDICINE

## 2025-03-19 PROCEDURE — 63710000001 INSULIN LISPRO (HUMAN) PER 5 UNITS: Performed by: NURSE PRACTITIONER

## 2025-03-19 PROCEDURE — 88108 CYTOPATH CONCENTRATE TECH: CPT | Performed by: INTERNAL MEDICINE

## 2025-03-19 PROCEDURE — 97116 GAIT TRAINING THERAPY: CPT

## 2025-03-19 PROCEDURE — 94664 DEMO&/EVAL PT USE INHALER: CPT

## 2025-03-19 RX ORDER — EPHEDRINE SULFATE 5 MG/ML
5 INJECTION INTRAVENOUS ONCE AS NEEDED
Status: CANCELLED | OUTPATIENT
Start: 2025-03-19

## 2025-03-19 RX ORDER — IPRATROPIUM BROMIDE AND ALBUTEROL SULFATE 2.5; .5 MG/3ML; MG/3ML
3 SOLUTION RESPIRATORY (INHALATION) ONCE AS NEEDED
Status: CANCELLED | OUTPATIENT
Start: 2025-03-19

## 2025-03-19 RX ORDER — ACETYLCYSTEINE 200 MG/ML
3 SOLUTION ORAL; RESPIRATORY (INHALATION)
Status: DISCONTINUED | OUTPATIENT
Start: 2025-03-19 | End: 2025-03-20 | Stop reason: HOSPADM

## 2025-03-19 RX ORDER — OSELTAMIVIR PHOSPHATE 30 MG/1
30 CAPSULE ORAL EVERY 12 HOURS SCHEDULED
Status: DISCONTINUED | OUTPATIENT
Start: 2025-03-19 | End: 2025-03-20 | Stop reason: HOSPADM

## 2025-03-19 RX ORDER — LIDOCAINE HYDROCHLORIDE 20 MG/ML
INJECTION, SOLUTION INFILTRATION; PERINEURAL AS NEEDED
Status: DISCONTINUED | OUTPATIENT
Start: 2025-03-19 | End: 2025-03-19 | Stop reason: SURG

## 2025-03-19 RX ORDER — ONDANSETRON 2 MG/ML
4 INJECTION INTRAMUSCULAR; INTRAVENOUS ONCE AS NEEDED
Status: CANCELLED | OUTPATIENT
Start: 2025-03-19

## 2025-03-19 RX ORDER — SODIUM CHLORIDE 9 MG/ML
INJECTION, SOLUTION INTRAVENOUS CONTINUOUS PRN
Status: DISCONTINUED | OUTPATIENT
Start: 2025-03-19 | End: 2025-03-19 | Stop reason: SURG

## 2025-03-19 RX ORDER — OSELTAMIVIR PHOSPHATE 75 MG/1
75 CAPSULE ORAL ONCE
Status: COMPLETED | OUTPATIENT
Start: 2025-03-19 | End: 2025-03-19

## 2025-03-19 RX ORDER — IPRATROPIUM BROMIDE AND ALBUTEROL SULFATE 2.5; .5 MG/3ML; MG/3ML
3 SOLUTION RESPIRATORY (INHALATION)
Status: DISCONTINUED | OUTPATIENT
Start: 2025-03-19 | End: 2025-03-19 | Stop reason: HOSPADM

## 2025-03-19 RX ORDER — LIDOCAINE HYDROCHLORIDE 20 MG/ML
JELLY TOPICAL AS NEEDED
Status: DISCONTINUED | OUTPATIENT
Start: 2025-03-19 | End: 2025-03-19 | Stop reason: HOSPADM

## 2025-03-19 RX ORDER — HYDRALAZINE HYDROCHLORIDE 20 MG/ML
5 INJECTION INTRAMUSCULAR; INTRAVENOUS
Status: CANCELLED | OUTPATIENT
Start: 2025-03-19

## 2025-03-19 RX ORDER — METHYLPREDNISOLONE SODIUM SUCCINATE 40 MG/ML
40 INJECTION, POWDER, LYOPHILIZED, FOR SOLUTION INTRAMUSCULAR; INTRAVENOUS EVERY 24 HOURS
Status: DISCONTINUED | OUTPATIENT
Start: 2025-03-20 | End: 2025-03-20

## 2025-03-19 RX ORDER — LABETALOL HYDROCHLORIDE 5 MG/ML
5 INJECTION, SOLUTION INTRAVENOUS
Status: CANCELLED | OUTPATIENT
Start: 2025-03-19

## 2025-03-19 RX ORDER — PROPOFOL 10 MG/ML
VIAL (ML) INTRAVENOUS AS NEEDED
Status: DISCONTINUED | OUTPATIENT
Start: 2025-03-19 | End: 2025-03-19 | Stop reason: SURG

## 2025-03-19 RX ORDER — DIPHENHYDRAMINE HYDROCHLORIDE 50 MG/ML
12.5 INJECTION, SOLUTION INTRAMUSCULAR; INTRAVENOUS
Status: CANCELLED | OUTPATIENT
Start: 2025-03-19

## 2025-03-19 RX ORDER — LIDOCAINE HYDROCHLORIDE 20 MG/ML
INJECTION, SOLUTION INFILTRATION; PERINEURAL AS NEEDED
Status: DISCONTINUED | OUTPATIENT
Start: 2025-03-19 | End: 2025-03-19 | Stop reason: HOSPADM

## 2025-03-19 RX ADMIN — IPRATROPIUM BROMIDE AND ALBUTEROL SULFATE 3 ML: .5; 3 SOLUTION RESPIRATORY (INHALATION) at 11:01

## 2025-03-19 RX ADMIN — OSELTAMIVIR PHOSPHATE 75 MG: 75 CAPSULE ORAL at 12:00

## 2025-03-19 RX ADMIN — GUAIFENESIN 1200 MG: 600 TABLET, MULTILAYER, EXTENDED RELEASE ORAL at 10:26

## 2025-03-19 RX ADMIN — IPRATROPIUM BROMIDE AND ALBUTEROL SULFATE 3 ML: .5; 3 SOLUTION RESPIRATORY (INHALATION) at 15:38

## 2025-03-19 RX ADMIN — LIDOCAINE HYDROCHLORIDE 50 MG: 20 INJECTION, SOLUTION INFILTRATION; PERINEURAL at 07:48

## 2025-03-19 RX ADMIN — INSULIN LISPRO 3 UNITS: 100 INJECTION, SOLUTION INTRAVENOUS; SUBCUTANEOUS at 17:07

## 2025-03-19 RX ADMIN — BUDESONIDE AND FORMOTEROL FUMARATE DIHYDRATE 2 PUFF: 160; 4.5 AEROSOL RESPIRATORY (INHALATION) at 20:26

## 2025-03-19 RX ADMIN — SILDENAFIL 20 MG: 20 TABLET ORAL at 20:58

## 2025-03-19 RX ADMIN — PROPOFOL INJECTABLE EMULSION 100 MG: 10 INJECTION, EMULSION INTRAVENOUS at 07:48

## 2025-03-19 RX ADMIN — PROPOFOL INJECTABLE EMULSION 50 MG: 10 INJECTION, EMULSION INTRAVENOUS at 07:52

## 2025-03-19 RX ADMIN — THEOPHYLLINE 300 MG: 300 TABLET, EXTENDED RELEASE ORAL at 10:26

## 2025-03-19 RX ADMIN — HEPARIN SODIUM 5000 UNITS: 5000 INJECTION INTRAVENOUS; SUBCUTANEOUS at 21:00

## 2025-03-19 RX ADMIN — PROPOFOL INJECTABLE EMULSION 50 MG: 10 INJECTION, EMULSION INTRAVENOUS at 07:50

## 2025-03-19 RX ADMIN — AMLODIPINE BESYLATE 5 MG: 5 TABLET ORAL at 20:58

## 2025-03-19 RX ADMIN — METHYLPREDNISOLONE SODIUM SUCCINATE 20 MG: 40 INJECTION, POWDER, FOR SOLUTION INTRAMUSCULAR; INTRAVENOUS at 10:26

## 2025-03-19 RX ADMIN — INSULIN LISPRO 3 UNITS: 100 INJECTION, SOLUTION INTRAVENOUS; SUBCUTANEOUS at 10:26

## 2025-03-19 RX ADMIN — IPRATROPIUM BROMIDE AND ALBUTEROL SULFATE 3 ML: .5; 3 SOLUTION RESPIRATORY (INHALATION) at 07:16

## 2025-03-19 RX ADMIN — SODIUM CHLORIDE: 9 INJECTION, SOLUTION INTRAVENOUS at 07:45

## 2025-03-19 RX ADMIN — METHYLPREDNISOLONE SODIUM SUCCINATE 20 MG: 40 INJECTION, POWDER, FOR SOLUTION INTRAMUSCULAR; INTRAVENOUS at 01:31

## 2025-03-19 RX ADMIN — CEFTRIAXONE 2000 MG: 2 INJECTION, POWDER, FOR SOLUTION INTRAMUSCULAR; INTRAVENOUS at 01:31

## 2025-03-19 RX ADMIN — SERTRALINE HYDROCHLORIDE 50 MG: 50 TABLET, FILM COATED ORAL at 20:58

## 2025-03-19 RX ADMIN — GUAIFENESIN 1200 MG: 600 TABLET, MULTILAYER, EXTENDED RELEASE ORAL at 20:58

## 2025-03-19 RX ADMIN — INSULIN LISPRO 8 UNITS: 100 INJECTION, SOLUTION INTRAVENOUS; SUBCUTANEOUS at 20:58

## 2025-03-19 RX ADMIN — OSELTAMIVIR PHOSPHATE 30 MG: 30 CAPSULE ORAL at 20:58

## 2025-03-19 RX ADMIN — HEPARIN SODIUM 5000 UNITS: 5000 INJECTION INTRAVENOUS; SUBCUTANEOUS at 14:17

## 2025-03-19 RX ADMIN — IPRATROPIUM BROMIDE AND ALBUTEROL SULFATE 3 ML: .5; 3 SOLUTION RESPIRATORY (INHALATION) at 20:26

## 2025-03-19 RX ADMIN — SILDENAFIL 20 MG: 20 TABLET ORAL at 17:07

## 2025-03-19 NOTE — PROGRESS NOTES
LOS: 2 days   Admitting Physician- Nikkie Polanco MD    Reason For Followup:    Shortness of breath  COPD exacerbation  Pulmonary hypertension  Hypertension    Subjective     Shortness of breath is much better    Objective     Hemodynamics are stable    Review of Systems:   Review of Systems   Constitutional: Negative for chills and fever.   HENT:  Negative for ear discharge and nosebleeds.    Eyes:  Negative for discharge and redness.   Cardiovascular:  Negative for chest pain, orthopnea, palpitations, paroxysmal nocturnal dyspnea and syncope.   Respiratory:  Positive for shortness of breath. Negative for cough and wheezing.    Endocrine: Negative for heat intolerance.   Skin:  Negative for rash.   Musculoskeletal:  Negative for arthritis and myalgias.   Gastrointestinal:  Negative for abdominal pain, melena, nausea and vomiting.   Genitourinary:  Negative for dysuria and hematuria.   Neurological:  Negative for dizziness, light-headedness, numbness and tremors.   Psychiatric/Behavioral:  Negative for depression. The patient is not nervous/anxious.          Vital Signs  Vitals:    03/19/25 1205 03/19/25 1538 03/19/25 1541 03/19/25 1624   BP: 99/45   125/65   BP Location: Right arm   Right arm   Patient Position: Lying   Sitting   Pulse: 63 64 64 73   Resp: 20 18 18 20   Temp:    97.4 °F (36.3 °C)   TempSrc:    Oral   SpO2:  93% 94% 92%   Weight:       Height:         Wt Readings from Last 1 Encounters:   03/19/25 105 kg (231 lb 7.7 oz)       Intake/Output Summary (Last 24 hours) at 3/19/2025 1829  Last data filed at 3/19/2025 1817  Gross per 24 hour   Intake 1040 ml   Output 175 ml   Net 865 ml     Physical Exam:  Constitutional:       Appearance: Well-developed.   Eyes:      General: No scleral icterus.        Right eye: No discharge.   HENT:      Head: Normocephalic and atraumatic.   Neck:      Thyroid: No thyromegaly.      Lymphadenopathy: No cervical adenopathy.   Pulmonary:      Effort: Pulmonary effort is  normal. No respiratory distress.      Breath sounds: Normal breath sounds. No wheezing. No rales.   Cardiovascular:      Normal rate. Regular rhythm.      No gallop.    Edema:     Peripheral edema absent.   Abdominal:      Tenderness: There is no abdominal tenderness.   Skin:     Findings: No erythema or rash.   Neurological:      Mental Status: Alert and oriented to person, place, and time.         Results Review:   Lab Results (last 24 hours)       Procedure Component Value Units Date/Time    POC Glucose Once [[id number]]  (Abnormal) Collected: 03/19/25 1628    Specimen: Blood Updated: 03/19/25 1629     Glucose 170 mg/dL      Comment: Serial Number: [id number]:  [id number]       Non-gynecologic Cytology [[id number]] Collected: 03/19/25 0753    Specimen: Lavage from Lung, Left Lower Lobe Updated: 03/19/25 1203    BAL Culture, Quantitative - Lavage, Lung, Left Lower Lobe [[id number]] Collected: 03/19/25 0753    Specimen: Lavage from Lung, Left Lower Lobe Updated: 03/19/25 1128     Gram Stain Few (2+) WBCs seen      Rare (1+) Bronchial epithelial cells      Few (2+) Mixed bacterial morphotypes seen on Gram Stain    Respiratory Panel PCR w/COVID-19(SARS-CoV-2) IAN/NAYELY/KATERIN/PAD/COR/KRISH In-House, NP Swab in UTM/VTM, 2 HR TAT - Lavage, Lung, Left Lower Lobe [[id number]]  (Abnormal) Collected: 03/19/25 0753    Specimen: Lavage from Lung, Left Lower Lobe Updated: 03/19/25 1039     ADENOVIRUS, PCR Not Detected     Coronavirus 229E Not Detected     Coronavirus HKU1 Not Detected     Coronavirus NL63 Not Detected     Coronavirus OC43 Not Detected     COVID19 Not Detected     Human Metapneumovirus Not Detected     Human Rhinovirus/Enterovirus Not Detected     Influenza A H1 2009 PCR Detected     Influenza B PCR Not Detected     Parainfluenza Virus 1 Not Detected     Parainfluenza Virus 2 Not Detected     Parainfluenza Virus 3 Not Detected     Parainfluenza Virus 4 Not Detected     RSV, PCR Not Detected     Bordetella pertussis  pcr Not Detected     Bordetella parapertussis PCR Not Detected     Chlamydophila pneumoniae PCR Not Detected     Mycoplasma pneumo by PCR Not Detected    Narrative:      In the setting of a positive respiratory panel with a viral infection PLUS a negative procalcitonin without other underlying concern for bacterial infection, consider observing off antibiotics or discontinuation of antibiotics and continue supportive care. If the respiratory panel is positive for atypical bacterial infection (Bordetella pertussis, Chlamydophila pneumoniae, or Mycoplasma pneumoniae), consider antibiotic de-escalation to target atypical bacterial infection.    POC Glucose Once [[id number]]  (Abnormal) Collected: 03/19/25 1001    Specimen: Blood Updated: 03/19/25 1002     Glucose 167 mg/dL      Comment: Serial Number: [id number]:  [id number]       Pneumocystis PCR - Lavage, Lung, Left Lower Lobe [[id number]] Collected: 03/19/25 0753    Specimen: Lavage from Lung, Left Lower Lobe Updated: 03/19/25 0904    Virus Culture - Lavage, Lung, Left Lower Lobe [[id number]] Collected: 03/19/25 0753    Specimen: Lavage from Lung, Left Lower Lobe Updated: 03/19/25 0904    Fungus Culture - Lavage, Lung, Left Lower Lobe [[id number]] Collected: 03/19/25 0753    Specimen: Lavage from Lung, Left Lower Lobe Updated: 03/19/25 0904    AFB Culture - Lavage, Lung, Left Lower Lobe [[id number]] Collected: 03/19/25 0753    Specimen: Lavage from Lung, Left Lower Lobe Updated: 03/19/25 0904    POC Glucose Once [[id number]]  (Abnormal) Collected: 03/19/25 0650    Specimen: Blood Updated: 03/19/25 0653     Glucose 175 mg/dL      Comment: Serial Number: [id number]:  [id number]       Basic Metabolic Panel [[id number]]  (Abnormal) Collected: 03/18/25 2240    Specimen: Blood Updated: 03/19/25 0037     Glucose 209 mg/dL      BUN 57 mg/dL      Creatinine 1.43 mg/dL      Sodium 137 mmol/L      Potassium 4.8 mmol/L      Chloride 99 mmol/L      CO2 25.8 mmol/L       Calcium 9.2 mg/dL      BUN/Creatinine Ratio 39.9     Anion Gap 12.2 mmol/L      eGFR 51.4 mL/min/1.73     Narrative:      GFR Categories in Chronic Kidney Disease (CKD)      GFR Category          GFR (mL/min/1.73)    Interpretation  G1                     90 or greater         Normal or high (1)  G2                      60-89                Mild decrease (1)  G3a                   45-59                Mild to moderate decrease  G3b                   30-44                Moderate to severe decrease  G4                    15-29                Severe decrease  G5                    14 or less           Kidney failure          (1)In the absence of evidence of kidney disease, neither GFR category G1 or G2 fulfill the criteria for CKD.    eGFR calculation 2021 CKD-EPI creatinine equation, which does not include race as a factor    Magnesium [[id number]]  (Abnormal) Collected: 03/18/25 2240    Specimen: Blood Updated: 03/19/25 0037     Magnesium 2.6 mg/dL     CBC Auto Differential [[id number]]  (Abnormal) Collected: 03/18/25 2240    Specimen: Blood Updated: 03/19/25 0013     WBC 7.40 10*3/mm3      RBC 3.47 10*6/mm3      Hemoglobin 9.9 g/dL      Hematocrit 33.1 %      MCV 95.4 fL      MCH 28.5 pg      MCHC 29.9 g/dL      RDW 13.2 %      RDW-SD 45.8 fl      MPV 11.5 fL      Platelets 174 10*3/mm3      Neutrophil % 87.8 %      Lymphocyte % 4.7 %      Monocyte % 6.6 %      Eosinophil % 0.0 %      Basophil % 0.1 %      Immature Grans % 0.8 %      Neutrophils, Absolute 6.49 10*3/mm3      Lymphocytes, Absolute 0.35 10*3/mm3      Monocytes, Absolute 0.49 10*3/mm3      Eosinophils, Absolute 0.00 10*3/mm3      Basophils, Absolute 0.01 10*3/mm3      Immature Grans, Absolute 0.06 10*3/mm3      nRBC 0.0 /100 WBC     POC Glucose Once [[id number]]  (Abnormal) Collected: 03/18/25 2109    Specimen: Blood Updated: 03/18/25 2111     Glucose 258 mg/dL      Comment: Serial Number: [id number]:  [id number]             Imaging Results (Last 72  Hours)       Procedure Component Value Units Date/Time    CT Angiogram Chest Pulmonary Embolism [[id number]] Collected: 03/16/25 2331     Updated: 03/16/25 2336    Narrative:      CT ANGIOGRAM CHEST PULMONARY EMBOLISM    Date of Exam: 3/16/2025 11:11 PM EDT    Indication: SOA, elevated d-dimer.    Comparison: Chest radiograph 3/16/2025 and chest CT 4/10/2024.    Technique: Axial CT images were obtained of the chest after the uneventful intravenous administration of iodinated contrast utilizing pulmonary embolism protocol.  In addition, a 3-D volume rendered image was created for interpretation.  Sagittal and   coronal reconstructions were performed.  Automated exposure control and iterative reconstruction methods were used.      Findings:  The thyroid is heterogeneous and enlarged compatible with goiter. The trachea and the esophagus appear within normal limits. There are numerous calcified mediastinal granulomas. There is minimal aortic arch calcification. No aneurysm. No dissection. Main   pulmonary artery is normal diameter. No evidence of pulmonary embolism. There are moderate coronary artery calcifications.    No pneumothorax, pleural effusion or focal airspace consolidation. There is dependent bibasilar atelectasis. There is mild emphysema. Mild diffuse peribronchial thickening is present. There is clustered micronodules in the left lower lobe dependently   that may reflect a small focus of pneumonia or infectious bronchiolitis.    No acute findings in the limited images of the upper abdomen. No acute findings in the superficial soft tissues. No acute osseous abnormality or destructive bone lesion. There is moderate lower cervical and thoracic spondylosis.      Impression:      Impression:  1.No evidence of pulmonary embolism.  2.Mild emphysema and mild diffuse peribronchial thickening.  3.Clustered micronodules in the left lower lobe may reflect a small focus of pneumonia or infectious  bronchiolitis.  4.Coronary artery disease.  5.Thyroid goiter.            Electronically Signed: Darius Dixon MD    3/16/2025 11:34 PM EDT    Workstation ID: [id number]          ECG/EMG Results (most recent)       Procedure Component Value Units Date/Time    ECG 12 Lead Dyspnea [[id number]] Collected: 03/16/25 1120     Updated: 03/17/25 0649     QT Interval 398 ms      QTC Interval 432 ms     Narrative:      HEART RATE=71  bpm  RR Snafktzm=471  ms  RI Lternfvq=459  ms  P Horizontal Axis=-31  deg  P Front Axis=64  deg  QRSD Rgvksxzw=859  ms  QT Fxaoyaot=581  ms  OQxW=129  ms  QRS Axis=118  deg  T Wave Axis=30  deg  - ABNORMAL ECG -  Sinus rhythm  Right bundle branch block  Probable  anterolateral infarct, old  When compared with ECG of 10-Apr-2024 20:06:18,  Significant repolarization change  Electronically Signed By: Felice Stevens (Isaías) 2025-03-17 06:49:40  Date and Time of Study:2025-03-16 11:20:55    Adult Transthoracic Echo Complete W/ Cont if Necessary Per Protocol [[id number]] Resulted: 03/17/25 1616     Updated: 03/17/25 1616     LVIDd 5.2 cm      LVIDs 3.6 cm      IVSd 1.10 cm      LVPWd 1.00 cm      FS 30.8 %      IVS/LVPW 1.10 cm      ESV(cubed) 46.7 ml      LV Sys Vol (BSA corrected) 12.6 cm2      EDV(cubed) 140.6 ml      LV Webster Vol (BSA corrected) 64.1 cm2      LV mass(C)d 207.3 grams      LVOT area 3.8 cm2      LVOT diam 2.20 cm      EDV(MOD-sp4) 134.0 ml      ESV(MOD-sp4) 26.3 ml      SV(MOD-sp4) 107.7 ml      SVi(MOD-SP4) 51.5 ml/m2      SVi (LVOT) 43.1 ml/m2      EF(MOD-sp4) 80.4 %      MV E max franco 78.9 cm/sec      MV A max franco 116.0 cm/sec      MV dec time 0.22 sec      MV E/A 0.68     Pulm A Revs Dur 0.12 sec      LA ESV Index (BP) 24.7 ml/m2      Med Peak E' Franco 6.9 cm/sec      Lat Peak E' Franco 7.7 cm/sec      TR max franco 317.0 cm/sec      Avg E/e' ratio 10.81     SV(LVOT) 90.1 ml      RVIDd 5.6 cm      TAPSE (>1.6) 2.19 cm      RV S' 11.5 cm/sec      LA dimension (2D)  3.5 cm      Pulm Sys Franco 57.9  cm/sec      Pulm Webster Franco 47.0 cm/sec      Pulm S/D 1.23     Pulm A Revs Franco 28.7 cm/sec      LV V1 max 108.0 cm/sec      LV V1 max PG 4.7 mmHg      LV V1 mean PG 2.00 mmHg      LV V1 VTI 23.7 cm      Ao pk franco 123.0 cm/sec      Ao max PG 6.1 mmHg      Ao mean PG 3.0 mmHg      Ao V2 VTI 27.5 cm      BERNARD(I,D) 3.3 cm2      Dimensionless Index 0.86 (DI)      MV max PG 5.6 mmHg      MV mean PG 2.00 mmHg      MV V2 VTI 37.4 cm      MV P1/2t 64.3 msec      MVA(P1/2t) 3.4 cm2      MVA(VTI) 2.41 cm2      MV dec slope 444.0 cm/sec2      TR max PG 40.2 mmHg      RVSP(TR) 48.2 mmHg      RAP systole 8.0 mmHg      RV V1 max PG 2.16 mmHg      RV V1 max 73.4 cm/sec      RV V1 VTI 14.4 cm      PA V2 max 77.0 cm/sec      PA acc time 0.09 sec      ACS 2.10 cm      Sinus 3.3 cm      STJ 2.6 cm      EF(MOD-bp) 80.0 %     Narrative:        Left ventricular systolic function is normal. Left ventricular ejection   fraction appears to be 66 - 70%.    Left ventricular diastolic function was normal.    The right ventricular cavity is mild to moderately dilated.    Estimated right ventricular systolic pressure from tricuspid   regurgitation is moderately elevated (45-55 mmHg). Calculated right   ventricular systolic pressure from tricuspid regurgitation is 48 mmHg.      Telemetry Scan [[id number]] Resulted: 03/16/25     Updated: 03/17/25 1740    Telemetry Scan [[id number]] Resulted: 03/16/25     Updated: 03/18/25 1232    Telemetry Scan [[id number]] Resulted: 03/16/25     Updated: 03/18/25 1248    Telemetry Scan [[id number]] Resulted: 03/16/25     Updated: 03/18/25 1300    Telemetry Scan [[id number]] Resulted: 03/16/25     Updated: 03/18/25 1500    Telemetry Scan [[id number]] Resulted: 03/16/25     Updated: 03/19/25 0948    Telemetry Scan [[id number]] Resulted: 03/16/25     Updated: 03/19/25 1103    Telemetry Scan [[id number]] Resulted: 03/16/25     Updated: 03/19/25 1128          CBC    Results from last 7 days   Lab Units 03/18/25  [id number]  03/18/25  0626 03/17/25  0512 03/16/25  1137   WBC 10*3/mm3 7.40 7.94 4.16 4.95   HEMOGLOBIN g/dL 9.9* 10.0* 9.8* 10.4*   PLATELETS 10*3/mm3 174 170 143 162     BMP   Results from last 7 days   Lab Units 03/18/25 2240 03/18/25 0626 03/17/25  0512 03/16/25  1137   SODIUM mmol/L 137 137 139 136   POTASSIUM mmol/L 4.8 5.2 5.1 4.8   CHLORIDE mmol/L 99 102 102 101   CO2 mmol/L 25.8 26.0 25.5 23.0   BUN mg/dL 57* 46* 38* 27*   CREATININE mg/dL 1.43* 1.24 1.22 1.28*   GLUCOSE mg/dL 209* 175* 179* 177*   MAGNESIUM mg/dL 2.6* 2.6* 2.3  --      CMP   Results from last 7 days   Lab Units 03/18/25 2240 03/18/25 0626 03/17/25 0512 03/16/25  1137   SODIUM mmol/L 137 137 139 136   POTASSIUM mmol/L 4.8 5.2 5.1 4.8   CHLORIDE mmol/L 99 102 102 101   CO2 mmol/L 25.8 26.0 25.5 23.0   BUN mg/dL 57* 46* 38* 27*   CREATININE mg/dL 1.43* 1.24 1.22 1.28*   GLUCOSE mg/dL 209* 175* 179* 177*     Cardiac Studies:  Echo- Results for orders placed during the hospital encounter of 03/16/25    Adult Transthoracic Echo Complete W/ Cont if Necessary Per Protocol    Interpretation Summary    Left ventricular systolic function is normal. Left ventricular ejection fraction appears to be 66 - 70%.    Left ventricular diastolic function was normal.    The right ventricular cavity is mild to moderately dilated.    Estimated right ventricular systolic pressure from tricuspid regurgitation is moderately elevated (45-55 mmHg). Calculated right ventricular systolic pressure from tricuspid regurgitation is 48 mmHg.    Stress Myoview-  Cath-      Medication Review:   Scheduled Meds:acetylcysteine, 3 mL, Nebulization, BID - RT  amLODIPine, 5 mg, Oral, Nightly  budesonide-formoterol, 2 puff, Inhalation, BID - RT  cefTRIAXone, 2,000 mg, Intravenous, Q24H  [Held by provider] furosemide, 40 mg, Oral, Daily  [Held by provider] gabapentin, 300 mg, Oral, TID  guaiFENesin, 1,200 mg, Oral, Q12H  heparin (porcine), 5,000 Units, Subcutaneous, Q8H  insulin lispro,  3-14 Units, Subcutaneous, 4x Daily AC & at Bedtime  ipratropium-albuterol, 3 mL, Nebulization, 4x Daily - RT  [START ON 3/20/2025] methylPREDNISolone sodium succinate, 40 mg, Intravenous, Q24H  oseltamivir, 30 mg, Oral, Q12H  sertraline, 50 mg, Oral, Nightly  sildenafil, 20 mg, Oral, TID  theophylline, 300 mg, Oral, Daily      Continuous Infusions:Pharmacy to Dose Oseltamivir (TAMIFLU),       PRN Meds:.  acetaminophen **OR** acetaminophen **OR** acetaminophen    senna-docusate sodium **AND** polyethylene glycol **AND** bisacodyl **AND** bisacodyl    Calcium Replacement - Follow Nurse / BPA Driven Protocol    dextrose    dextrose    glucagon (human recombinant)    Magnesium Cardiology Dose Replacement - Follow Nurse / BPA Driven Protocol    Pharmacy to Dose Oseltamivir (TAMIFLU)    Phosphorus Replacement - Follow Nurse / BPA Driven Protocol    Potassium Replacement - Follow Nurse / BPA Driven Protocol    [COMPLETED] Insert Peripheral IV **AND** sodium chloride      Assessment & Plan     Acute on chronic respiratory failure    Pneumonia due to infectious organism, unspecified laterality, unspecified part of lung    Multiple tracheobronchial mucus plugs    MDM:    1.  Shortness of breath:    Shortness of breath is improving.    2.  Acute on chronic diastolic congestive heart failure    Patient was on diuretics patient is feeling better but creatinine has increased diuretics are on hold I agree with that continue to observe    3.  COPD exacerbation:    Patient is on nebulizer treatment as well as steroids.    4.  Pulmonary hypertension:    Sildenafil has been started.    Electronically signed by Shawn Baker MD, 03/19/25, 6:32 PM EDT.      Shawn Baker MD  03/19/25  18:29 EDT

## 2025-03-19 NOTE — OP NOTE
Bronchoscopy Procedure Note    Procedure:  Bronchoscopy, Diagnostic  Bronchoscopy, Therapeutic lavage of multiple mucous plugs  Bronchoalveolar lavage, BAL from left lower lobe    Pre-Operative Diagnosis: Multifocal pneumonia, multiple mucous plugs    Post-Operative Diagnosis: Same    Indication: Patient with COPD, hypoxia, multifocal pneumonia, atelectasis and multiple mucous plugs and he is unable to clear his secretions    Anesthesia: Monitored Anesthesia Care (MAC)    Procedure Details: Patient was consented for the procedure with all risk and benefit of the procedure explained in detail.  Patient was given the opportunity to ask questions and all concerns were answered.    Timeout was done in the standard manner   the bronchoscope was inserted into the main airway via the oropharynx. An anatomical survey was done of the main airways and the subsegmental bronchus of the 5 lobes.  The findings are consistent of large amount of thick white mucous plugs from 5 lobes.  A therapeutic lavage was performed using aliquots of normal saline instilled into the airways then aspirated back until clear.    Estimated Blood Loss: None           Specimens: BAL from left lower lobe was done by instilling 90 mL of sterile normal saline and return of 45 mL                Complications:  None; patient tolerated the procedure well.           Disposition: PACU - hemodynamically stable.    Post op plan:  Resume p.o. after 2 hours  Follow-up results  Start nebulized Mucomyst    Patient tolerated the procedure well.

## 2025-03-19 NOTE — PROGRESS NOTES
Daily Progress Note          Assessment    Acute on chronic hypoxia    Pneumonia due to unspecified pathogen    Chronic obstructive pulmonary disease,   Acute on chronic respiratory failure with hypoxemia and hypercapnia: ABGs 7.42/49.7/65.5, patient wears 4 L of home oxygen  Respiratory panel is positive for human rhinovirus  Obstructive sleep apnea, on BiPAP  Pulmonary hypertension: Echo 3/16/2025 LV EF 66 to 70%, RV pressure 45-55 mmHg     Coronary artery disease     Hypertension   Hyperlipidemia  Diabetes mellitus  .  SONJA, patient is unable to use CPAP  Severe COPD:  PFT FEV1 1.12L which is 41%, post BD 51%, ratio 57, ERV 17%, %, TLC 82%, DLCO 27%, obstructive sleep apnea features  mild interstitial lung disease      Recommendations:  Status post bronchoscopy 3/19/2025 with removal of large amount of thick white mucous plugs from 5 lobes, monitor cultures    Start nebulized Mucomyst    Antibiotics: Rocephin    Mucinex  Encourage use of incentive spirometry  IV Solu-Medrol 20 mg every 8 hours  Oxygen supplement and titration to maintain saturation 90 to 95%: Currently 5 L per nasal cannula  Bronchodilators  Sildenafil for pulmonary hypertension  Theophylline    Amlodipine  Diuresis: Lasix  Insulin sliding scale  DVT prophylaxis: Heparin subcu        I personally reviewed the radiological studies             LOS: 2 days     Subjective     Cough and shortness of breath    Objective     Vital signs for last 24 hours:  Vitals:    03/19/25 0618 03/19/25 0651 03/19/25 0716 03/19/25 0719   BP:  124/58     BP Location:  Right arm     Patient Position:  Lying     Pulse:  70 63 61   Resp:  15 16 16   Temp:  97.6 °F (36.4 °C)     TempSrc:  Oral     SpO2: 97% 94% 95% 100%   Weight:       Height:           Intake/Output last 3 shifts:  I/O last 3 completed shifts:  In: 1200 [P.O.:1200]  Out: 2300 [Urine:2300]  Intake/Output this shift:  I/O this shift:  In: 200 [I.V.:200]  Out: -       Radiology  Imaging Results (Last  24 Hours)       ** No results found for the last 24 hours. **            Labs:  Results from last 7 days   Lab Units 03/18/25  2240   WBC 10*3/mm3 7.40   HEMOGLOBIN g/dL 9.9*   HEMATOCRIT % 33.1*   PLATELETS 10*3/mm3 174     Results from last 7 days   Lab Units 03/18/25  2240   SODIUM mmol/L 137   POTASSIUM mmol/L 4.8   CHLORIDE mmol/L 99   CO2 mmol/L 25.8   BUN mg/dL 57*   CREATININE mg/dL 1.43*   CALCIUM mg/dL 9.2   GLUCOSE mg/dL 209*             Results from last 7 days   Lab Units 03/16/25  1542 03/16/25  1244 03/16/25  1137   HSTROP T ng/L 83* 96* 94*         Results from last 7 days   Lab Units 03/18/25  2240   MAGNESIUM mg/dL 2.6*                   Meds:   SCHEDULE  amLODIPine, 5 mg, Oral, Nightly  budesonide-formoterol, 2 puff, Inhalation, BID - RT  cefTRIAXone, 2,000 mg, Intravenous, Q24H  furosemide, 40 mg, Oral, Daily  gabapentin, 300 mg, Oral, TID  guaiFENesin, 1,200 mg, Oral, Q12H  heparin (porcine), 5,000 Units, Subcutaneous, Q8H  insulin lispro, 3-14 Units, Subcutaneous, 4x Daily AC & at Bedtime  ipratropium-albuterol, 3 mL, Nebulization, 4x Daily - RT  ipratropium-albuterol, 3 mL, Nebulization, 4x Daily - RT  methylPREDNISolone sodium succinate, 20 mg, Intravenous, Q8H  sertraline, 50 mg, Oral, Nightly  sildenafil, 20 mg, Oral, TID  theophylline, 300 mg, Oral, Daily      Infusions     PRNs    acetaminophen **OR** acetaminophen **OR** acetaminophen    senna-docusate sodium **AND** polyethylene glycol **AND** bisacodyl **AND** bisacodyl    Calcium Replacement - Follow Nurse / BPA Driven Protocol    dextrose    dextrose    glucagon (human recombinant)    lidocaine    Lidocaine HCl gel    Magnesium Cardiology Dose Replacement - Follow Nurse / BPA Driven Protocol    Phosphorus Replacement - Follow Nurse / BPA Driven Protocol    Potassium Replacement - Follow Nurse / BPA Driven Protocol    [COMPLETED] Insert Peripheral IV **AND** sodium chloride    Physical Exam:  General Appearance:  Alert   HEENT:   Normocephalic, without obvious abnormality, Conjunctiva/corneas clear,.   Nares normal, no drainage     Neck:  Supple, symmetrical, trachea midline.   Lungs /Chest wall:   Bilateral basal rhonchi, respirations unlabored, symmetrical wall movement.     Heart:  Regular rate and rhythm, S1 S2 normal  Abdomen: Soft, non-tender, no masses, no organomegaly.    Extremities: No edema, no clubbing or cyanosis     ROS  Constitutional: Negative for chills, fever and malaise/fatigue.   HENT: Negative.    Eyes: Negative.    Cardiovascular: Negative.    Respiratory: Positive for cough and shortness of breath.    Skin: Negative.    Musculoskeletal: Negative.    Gastrointestinal: Negative.    Genitourinary: Negative.    Neurological: Negative.    Psychiatric/Behavioral: Negative.      I reviewed the recent clinical results  I personally reviewed the latest radiological studies    Part of this note may be an electronic transcription/translation of spoken language to printed text using the Dragon Dictation System.

## 2025-03-19 NOTE — PROGRESS NOTES
[facility] MEDICINE SERVICE  DAILY PROGRESS NOTE    NAME: Brenden Peter  : 1950  MRN: [id number]      LOS: 2 days     PROVIDER OF SERVICE: Nikkie Polanco MD    Chief Complaint: Acute on chronic respiratory failure    Subjective:     Interval History:  History taken from: patient    Patient otherwise feels better this morning.  Just returned back from bronchoscopy.  Denies any chest pain.  No family members are present at bedside.        Review of Systems:   Review of Systems   Constitutional:  Positive for fatigue.   Respiratory:  Positive for cough and shortness of breath.    All other systems reviewed and are negative.      Objective:     Vital Signs  Temp:  [97.3 °F (36.3 °C)-98.3 °F (36.8 °C)] 97.8 °F (36.6 °C)  Heart Rate:  [57-91] 63  Resp:  [13-24] 21  BP: (104-141)/(53-86) 104/65  Flow (L/min) (Oxygen Therapy):  [4-10] 7   Body mass index is 36.26 kg/m².    Physical Exam  Physical Exam  Constitutional:       General: He is awake.      Appearance: He is well-developed and well-groomed. He is obese. He is ill-appearing.   HENT:      Head: Normocephalic and atraumatic.      Nose: Nose normal.      Mouth/Throat:      Mouth: Mucous membranes are moist.      Pharynx: Oropharynx is clear.   Eyes:      Extraocular Movements: Extraocular movements intact.      Conjunctiva/sclera: Conjunctivae normal.      Pupils: Pupils are equal, round, and reactive to light.   Cardiovascular:      Rate and Rhythm: Normal rate and regular rhythm.      Pulses: Normal pulses.      Heart sounds: Normal heart sounds.   Pulmonary:      Effort: Pulmonary effort is normal.      Breath sounds: Wheezing and rhonchi present.   Abdominal:      General: Abdomen is flat. Bowel sounds are normal.      Palpations: Abdomen is soft.   Musculoskeletal:         General: Normal range of motion.      Cervical back: Normal range of motion and neck supple.      Right lower leg: Edema present.      Left lower leg: Edema present.    Skin:     General: Skin is warm and dry.   Neurological:      General: No focal deficit present.      Mental Status: He is alert and oriented to person, place, and time. Mental status is at baseline.      Cranial Nerves: Cranial nerves 2-12 are intact.      Sensory: Sensation is intact.      Motor: Motor function is intact.   Psychiatric:         Mood and Affect: Mood normal.         Behavior: Behavior normal. Behavior is cooperative.         Thought Content: Thought content normal.         Judgment: Judgment normal.            Diagnostic Data    Results from last 7 days   Lab Units 03/18/25  2240   WBC 10*3/mm3 7.40   HEMOGLOBIN g/dL 9.9*   HEMATOCRIT % 33.1*   PLATELETS 10*3/mm3 174   GLUCOSE mg/dL 209*   CREATININE mg/dL 1.43*   BUN mg/dL 57*   SODIUM mmol/L 137   POTASSIUM mmol/L 4.8   ANION GAP mmol/L 12.2       No radiology results for the last day      I reviewed the patient's new clinical results.    Assessment/Plan:     Active and Resolved Problems  Active Hospital Problems    Diagnosis  POA    **Acute on chronic respiratory failure [J96.20]  Yes    Multiple tracheobronchial mucus plugs [T17.800A]  Unknown    Pneumonia due to infectious organism, unspecified laterality, unspecified part of lung [J18.9]  Unknown      Resolved Hospital Problems   No resolved problems to display.     Dyspnea, multifactorial in nature  Acute on chronic hypoxemic respiratory failure  End-stage COPD  History of heart failure, presumed diastolic  Pulmonary hypertension  Hypertension  Hyperlipidemia  Type 2 diabetes        Patient overall feels much better this morning.  Noted slight elevation in creatinine today.  Will place Lasix on hold.  Hold gabapentin for now.  Readjust based on creatinine clearance.    Noted noted 2D echocardiogram showing EF 68%.    Patient remains on increased amounts of O2 via nasal cannula.  Patient did undergo bronchoscopy this morning.  Will continue efforts to wean as tolerated.    Patient states  that overall he feels better and if stable on O2 via nasal cannula between 2 to 4 L x 24 hours plan for discharge home tomorrow a.m.    Continue supportive care including IV steroids, aerosols, as well as IV antibiotics.        VTE Prophylaxis:  Pharmacologic VTE prophylaxis orders are present.             Disposition Planning:     Barriers to Discharge: Increased O2 demand, bronchoscopy today.  Anticipated Date of Discharge: 3/20/2025  Place of Discharge: Home      Time: 45 minutes     Code Status and Medical Interventions: CPR (Attempt to Resuscitate); Full Support   Ordered at: 03/16/25 1518     Code Status (Patient has no pulse and is not breathing):    CPR (Attempt to Resuscitate)     Medical Interventions (Patient has pulse or is breathing):    Full Support     Level Of Support Discussed With:    Patient       Signature: Electronically signed by Nikkie Polanco MD, 03/19/25, 10:22 EDT.  [facility] Hospitalist Team

## 2025-03-19 NOTE — ANESTHESIA POSTPROCEDURE EVALUATION
Patient: Brenden Peter    Procedure Summary       Date: 03/19/25 Room / Location: [facility] ENDOSCOPY 1 / [facility] ENDOSCOPY    Anesthesia Start: 0745 Anesthesia Stop: 0759    Procedure: BRONCHOSCOPY WITH LAVAGE (Bronchus) Diagnosis:       Multiple tracheobronchial mucus plugs      Pneumonia due to infectious organism, unspecified laterality, unspecified part of lung      (Multiple tracheobronchial mucus plugs [T17.800A])      (Pneumonia due to infectious organism, unspecified laterality, unspecified part of lung [J18.9])    Surgeons: Juanjo Castañeda MD Provider: Shaniqua Aguilar MD    Anesthesia Type: general ASA Status: 3            Anesthesia Type: general    Vitals  Vitals Value Taken Time   /65 03/19/25 08:37   Temp     Pulse 68 03/19/25 08:41   Resp 14 03/19/25 08:35   SpO2 94 % 03/19/25 08:41   Vitals shown include unfiled device data.        Post Anesthesia Care and Evaluation    Patient location during evaluation: PACU  Patient participation: complete - patient participated  Level of consciousness: awake and alert  Pain management: satisfactory to patient    Airway patency: patent  Anesthetic complications: No anesthetic complications  PONV Status: none  Cardiovascular status: acceptable  Respiratory status: acceptable  Hydration status: acceptable

## 2025-03-19 NOTE — PLAN OF CARE
Goal Outcome Evaluation:              Outcome Evaluation: Patient is A&Ox4 on 5L NC. No complaints of pain. Droplet precautions. Had bronch this AM. ACHS. Will need 6 min walk test. plan to go home when d/c

## 2025-03-19 NOTE — PROGRESS NOTES
6 min walk ordered. Patient is currently on a 7 liter high flow. Patients O2 needs have increased today. Will complete walk once needs come down.  Alissa Narvaez, CRT

## 2025-03-19 NOTE — PLAN OF CARE
"     Brenden Peter presents with functional mobility impairments which indicate the need for skilled intervention. Tolerating session today without incident. Pt had a bronchoscopy this morning.  Pt on 5L HF nc.  Sats drop with gait training the afternoon.  Will continue to follow and progress as tolerated.     Plan/Recommendations:   If medically appropriate, Low Intensity Therapy recommended post-acute care - This is recommended as therapy feels this patient would require 2-3 visits per week. OP or HH would be the best option depending on patient's home bound status. Consider, if the patient has other  \"skilled\" needs such as wounds, IV antibiotics, etc. Combined with \"low intensity\" could also equate to a SNF. If patient is medically complex, consider LTAC. Pt requires no DME at discharge.     Pt desires Home with family assist and Home Health at discharge. Pt cooperative; agreeable to therapeutic recommendations and plan of care.                                        "

## 2025-03-19 NOTE — THERAPY TREATMENT NOTE
"Subjective: Pt agreeable to therapeutic plan of care.    Objective:     Precautions -   droplet, 02    Bed mobility - N/A or Not attempted.  Pt already up in the chair  Transfers - SBA  sit to stand  Ambulation - 80 feet CGA  hand held assist.  Pt mildly unsteady    Vitals: Desaturates  pt's sats dropped to 74% during gait training and was very short of air.  Took pt a few minutes to recover back to 92%    Pain: 0      Education: Provided education on the importance of mobility in the acute care setting, Verbal/Tactile Cues, and Gait Training    Assessment: Brenden Peter presents with functional mobility impairments which indicate the need for skilled intervention. Tolerating session today without incident. Pt had a bronchoscopy this morning.  Pt on 5L HF nc.  Sats drop with gait training the afternoon.  Will continue to follow and progress as tolerated.     Plan/Recommendations:   If medically appropriate, Low Intensity Therapy recommended post-acute care - This is recommended as therapy feels this patient would require 2-3 visits per week. OP or HH would be the best option depending on patient's home bound status. Consider, if the patient has other  \"skilled\" needs such as wounds, IV antibiotics, etc. Combined with \"low intensity\" could also equate to a SNF. If patient is medically complex, consider LTAC. Pt requires no DME at discharge.     Pt desires Home with family assist and Home Health at discharge. Pt cooperative; agreeable to therapeutic recommendations and plan of care.     Basic Mobility 6-click:  Rollin = Total, A lot = 2, A little = 3; 4 = None  Supine>Sit:   1 = Total, A lot = 2, A little = 3; 4 = None   Sit>Stand with arms:  1 = Total, A lot = 2, A little = 3; 4 = None  Bed>Chair:   1 = Total, A lot = 2, A little = 3; 4 = None  Ambulate in room:  1 = Total, A lot = 2, A little = 3; 4 = None  3-5 Steps with railin = Total, A lot = 2, A little = 3; 4 = None  Score: 19    Post-Tx " Position: Up in Chair, Alarms activated, and Call light and personal items within reach  PPE: gloves and surgical mask    Therapy Charges for Today       Code Description Service Date Service Provider Modifiers Qty    [id number] HC GAIT TRAINING EA 15 MIN 3/19/2025 Niru Miguel PTA GP 1           PT Charges       Row Name 03/19/25 1634             Time Calculation    Start Time 1603  -SC      Stop Time 1618  -SC      Time Calculation (min) 15 min  -SC      PT Received On 03/19/25  -SC      PT - Next Appointment 03/21/25  -SC         Time Calculation- PT    Total Timed Code Minutes- PT 15 minute(s)  -SC                User Key  (r) = Recorded By, (t) = Taken By, (c) = Cosigned By      Initials Name Provider Type    SC Niru Miguel PTA Physical Therapist Assistant

## 2025-03-19 NOTE — PLAN OF CARE
Goal Outcome Evaluation:   Patient NPO since midnight for bronch today. Remains on 6L NC. Patient stable at this time.

## 2025-03-19 NOTE — PHARMACY PATIENT ASSISTANCE
Transitions of Care (test claim):    Drug Sildenafil:  20 mg PO TID  Covered/PA Required: PA needed  Copay Per Month: $1815.27  Is the medication eligible for a trial card/copay card? No        Please note that when it states medication is eligible for a trail card that this only means that the medication has a free trial available. This does not assess if the patient has already utilized the once in a lifetime free trial.     This test claim coverage is only valid on the date the note is published. Copay/coverage could vary depending on patient coverage at a later date.  Additionally this test claim is for the sole purpose of a price check not a clinical recommendation.     For billing questions please call [facility] Pharmacist at ext: [phone]  For M2B questions please call [facility] Pharm[facility] at ext: [phone]      Andi Yates PharmD, BCPS

## 2025-03-19 NOTE — ANESTHESIA PREPROCEDURE EVALUATION
Anesthesia Evaluation     Patient summary reviewed   no history of anesthetic complications:   NPO Solid Status: > 8 hours  NPO Liquid Status: > 8 hours           Airway   Dental      Pulmonary    (+) COPD,  Cardiovascular     ECG reviewed    (+) hypertension, CAD, hyperlipidemia      Neuro/Psych  (+) psychiatric history Anxiety and Depression  GI/Hepatic/Renal/Endo    (+) morbid obesity, renal disease- CRI, diabetes mellitus type 2 well controlled, thyroid problem hypothyroidism    Musculoskeletal     Abdominal    Substance History      OB/GYN          Other   arthritis,     ROS/Med Hx Other: Assessment:  Acute on chronic hypoxia     Pneumonia due to unspecified pathogen     Chronic obstructive pulmonary disease,   Acute on chronic respiratory failure with hypoxemia and hypercapnia: ABGs 7.42/49.7/65.5, patient wears 4 L of home oxygen  Respiratory panel is positive for human rhinovirus  Obstructive sleep apnea, on BiPAP  Pulmonary hypertension: Echo 3/16/2025 LV EF 66 to 70%, RV pressure 45-55 mmHg     Coronary artery disease     Hypertension   Hyperlipidemia  Diabetes mellitus  .  SONJA, patient is unable to use CPAP  Severe COPD:  PFT FEV1 1.12L which is 41%, post BD 51%, ratio 57, ERV 17%, %, TLC 82%, DLCO 27%, obstructive sleep apnea features  mild interstitial lung disease        Recommendations:  Schedule bronchoscopy 3/19/2025 to remove mucous plugs and obtain cultures     Antibiotics: Rocephin     Mucinex  Encourage use of incentive spirometry  IV Solu-Medrol 20 mg every 8 hours  Oxygen supplement and titration to maintain saturation 90 to 95%: Currently 5 L per nasal cannula  Bronchodilators  Sildenafil for pulmonary hypertension  Theophylline     Amlodipine  Diuresis: Lasix  Insulin sliding scale  DVT prophylaxis: Heparin subcu                     Anesthesia Plan    ASA 3     general   total IV anesthesia  intravenous induction     Anesthetic plan, risks, benefits, and alternatives have been  provided, discussed and informed consent has been obtained with: patient.    Plan discussed with CRNA.    CODE STATUS:    Code Status (Patient has no pulse and is not breathing): CPR (Attempt to Resuscitate)  Medical Interventions (Patient has pulse or is breathing): Full Support  Level Of Support Discussed With: Patient

## 2025-03-20 ENCOUNTER — READMISSION MANAGEMENT (OUTPATIENT)
Dept: CALL CENTER | Facility: HOSPITAL | Age: 75
End: 2025-03-20
Payer: MEDICARE

## 2025-03-20 VITALS
DIASTOLIC BLOOD PRESSURE: 70 MMHG | HEART RATE: 69 BPM | TEMPERATURE: 97.6 F | BODY MASS INDEX: 27.75 KG/M2 | RESPIRATION RATE: 18 BRPM | HEIGHT: 67 IN | OXYGEN SATURATION: 98 % | WEIGHT: 176.81 LBS | SYSTOLIC BLOOD PRESSURE: 140 MMHG

## 2025-03-20 PROBLEM — J18.9 PNEUMONIA, UNSPECIFIED ORGANISM: Status: ACTIVE | Noted: 2025-03-20

## 2025-03-20 LAB
ANION GAP SERPL CALCULATED.3IONS-SCNC: 9.9 MMOL/L (ref 5–15)
BASOPHILS # BLD AUTO: 0.01 10*3/MM3 (ref 0–0.2)
BASOPHILS NFR BLD AUTO: 0.2 % (ref 0–1.5)
BUN SERPL-MCNC: 48 MG/DL (ref 8–23)
BUN/CREAT SERPL: 44 (ref 7–25)
CALCIUM SPEC-SCNC: 9.1 MG/DL (ref 8.6–10.5)
CHLORIDE SERPL-SCNC: 103 MMOL/L (ref 98–107)
CO2 SERPL-SCNC: 25.1 MMOL/L (ref 22–29)
CREAT SERPL-MCNC: 1.09 MG/DL (ref 0.76–1.27)
DEPRECATED RDW RBC AUTO: 45.1 FL (ref 37–54)
EGFRCR SERPLBLD CKD-EPI 2021: 71.2 ML/MIN/1.73
EOSINOPHIL # BLD AUTO: 0 10*3/MM3 (ref 0–0.4)
EOSINOPHIL NFR BLD AUTO: 0 % (ref 0.3–6.2)
ERYTHROCYTE [DISTWIDTH] IN BLOOD BY AUTOMATED COUNT: 13.1 % (ref 12.3–15.4)
GLUCOSE BLDC GLUCOMTR-MCNC: 113 MG/DL (ref 70–105)
GLUCOSE BLDC GLUCOMTR-MCNC: 230 MG/DL (ref 70–105)
GLUCOSE SERPL-MCNC: 111 MG/DL (ref 65–99)
HCT VFR BLD AUTO: 30.8 % (ref 37.5–51)
HGB BLD-MCNC: 9.5 G/DL (ref 13–17.7)
IMM GRANULOCYTES # BLD AUTO: 0.05 10*3/MM3 (ref 0–0.05)
IMM GRANULOCYTES NFR BLD AUTO: 0.8 % (ref 0–0.5)
LAB AP CASE REPORT: NORMAL
LYMPHOCYTES # BLD AUTO: 0.73 10*3/MM3 (ref 0.7–3.1)
LYMPHOCYTES NFR BLD AUTO: 11.6 % (ref 19.6–45.3)
MAGNESIUM SERPL-MCNC: 2.8 MG/DL (ref 1.6–2.4)
MCH RBC QN AUTO: 29.1 PG (ref 26.6–33)
MCHC RBC AUTO-ENTMCNC: 30.8 G/DL (ref 31.5–35.7)
MCV RBC AUTO: 94.2 FL (ref 79–97)
MONOCYTES # BLD AUTO: 0.74 10*3/MM3 (ref 0.1–0.9)
MONOCYTES NFR BLD AUTO: 11.7 % (ref 5–12)
NEUTROPHILS NFR BLD AUTO: 4.78 10*3/MM3 (ref 1.7–7)
NEUTROPHILS NFR BLD AUTO: 75.7 % (ref 42.7–76)
NRBC BLD AUTO-RTO: 0 /100 WBC (ref 0–0.2)
PATH REPORT.FINAL DX SPEC: NORMAL
PATH REPORT.GROSS SPEC: NORMAL
PLATELET # BLD AUTO: 165 10*3/MM3 (ref 140–450)
PMV BLD AUTO: 11.8 FL (ref 6–12)
POTASSIUM SERPL-SCNC: 4.3 MMOL/L (ref 3.5–5.2)
RBC # BLD AUTO: 3.27 10*6/MM3 (ref 4.14–5.8)
SODIUM SERPL-SCNC: 138 MMOL/L (ref 136–145)
WBC NRBC COR # BLD AUTO: 6.31 10*3/MM3 (ref 3.4–10.8)

## 2025-03-20 PROCEDURE — 80048 BASIC METABOLIC PNL TOTAL CA: CPT | Performed by: STUDENT IN AN ORGANIZED HEALTH CARE EDUCATION/TRAINING PROGRAM

## 2025-03-20 PROCEDURE — 94799 UNLISTED PULMONARY SVC/PX: CPT

## 2025-03-20 PROCEDURE — 82948 REAGENT STRIP/BLOOD GLUCOSE: CPT | Performed by: STUDENT IN AN ORGANIZED HEALTH CARE EDUCATION/TRAINING PROGRAM

## 2025-03-20 PROCEDURE — 25010000002 CEFTRIAXONE PER 250 MG: Performed by: INTERNAL MEDICINE

## 2025-03-20 PROCEDURE — 85025 COMPLETE CBC W/AUTO DIFF WBC: CPT | Performed by: STUDENT IN AN ORGANIZED HEALTH CARE EDUCATION/TRAINING PROGRAM

## 2025-03-20 PROCEDURE — 82948 REAGENT STRIP/BLOOD GLUCOSE: CPT

## 2025-03-20 PROCEDURE — 94761 N-INVAS EAR/PLS OXIMETRY MLT: CPT

## 2025-03-20 PROCEDURE — 25010000002 HEPARIN (PORCINE) PER 1000 UNITS: Performed by: STUDENT IN AN ORGANIZED HEALTH CARE EDUCATION/TRAINING PROGRAM

## 2025-03-20 PROCEDURE — 94618 PULMONARY STRESS TESTING: CPT

## 2025-03-20 PROCEDURE — 25010000002 METHYLPREDNISOLONE PER 40 MG: Performed by: NURSE PRACTITIONER

## 2025-03-20 PROCEDURE — 83735 ASSAY OF MAGNESIUM: CPT | Performed by: STUDENT IN AN ORGANIZED HEALTH CARE EDUCATION/TRAINING PROGRAM

## 2025-03-20 PROCEDURE — 63710000001 INSULIN LISPRO (HUMAN) PER 5 UNITS: Performed by: NURSE PRACTITIONER

## 2025-03-20 PROCEDURE — 94664 DEMO&/EVAL PT USE INHALER: CPT

## 2025-03-20 PROCEDURE — 99232 SBSQ HOSP IP/OBS MODERATE 35: CPT | Performed by: INTERNAL MEDICINE

## 2025-03-20 RX ORDER — SILDENAFIL CITRATE 20 MG/1
20 TABLET ORAL 3 TIMES DAILY
Qty: 90 TABLET | Refills: 0 | Status: SHIPPED | OUTPATIENT
Start: 2025-03-20 | End: 2025-04-19

## 2025-03-20 RX ORDER — OSELTAMIVIR PHOSPHATE 30 MG/1
30 CAPSULE ORAL EVERY 12 HOURS SCHEDULED
Qty: 7 CAPSULE | Refills: 0 | Status: SHIPPED | OUTPATIENT
Start: 2025-03-20 | End: 2025-03-24

## 2025-03-20 RX ORDER — PREDNISONE 10 MG/1
10 TABLET ORAL DAILY
Status: DISCONTINUED | OUTPATIENT
Start: 2025-03-25 | End: 2025-03-20 | Stop reason: HOSPADM

## 2025-03-20 RX ORDER — PREDNISONE 10 MG/1
TABLET ORAL
Qty: 12 TABLET | Refills: 0 | Status: SHIPPED | OUTPATIENT
Start: 2025-03-21 | End: 2025-03-27

## 2025-03-20 RX ORDER — PREDNISONE 20 MG/1
20 TABLET ORAL DAILY
Status: DISCONTINUED | OUTPATIENT
Start: 2025-03-23 | End: 2025-03-20 | Stop reason: HOSPADM

## 2025-03-20 RX ADMIN — OSELTAMIVIR PHOSPHATE 30 MG: 30 CAPSULE ORAL at 08:26

## 2025-03-20 RX ADMIN — CEFTRIAXONE 2000 MG: 2 INJECTION, POWDER, FOR SOLUTION INTRAMUSCULAR; INTRAVENOUS at 01:35

## 2025-03-20 RX ADMIN — INSULIN LISPRO 5 UNITS: 100 INJECTION, SOLUTION INTRAVENOUS; SUBCUTANEOUS at 13:45

## 2025-03-20 RX ADMIN — THEOPHYLLINE 300 MG: 300 TABLET, EXTENDED RELEASE ORAL at 08:26

## 2025-03-20 RX ADMIN — HEPARIN SODIUM 5000 UNITS: 5000 INJECTION INTRAVENOUS; SUBCUTANEOUS at 13:44

## 2025-03-20 RX ADMIN — GUAIFENESIN 1200 MG: 600 TABLET, MULTILAYER, EXTENDED RELEASE ORAL at 08:26

## 2025-03-20 RX ADMIN — HEPARIN SODIUM 5000 UNITS: 5000 INJECTION INTRAVENOUS; SUBCUTANEOUS at 05:22

## 2025-03-20 RX ADMIN — ACETYLCYSTEINE 3 ML: 200 INHALANT RESPIRATORY (INHALATION) at 07:51

## 2025-03-20 RX ADMIN — SILDENAFIL 20 MG: 20 TABLET ORAL at 08:26

## 2025-03-20 RX ADMIN — IPRATROPIUM BROMIDE AND ALBUTEROL SULFATE 3 ML: .5; 3 SOLUTION RESPIRATORY (INHALATION) at 11:53

## 2025-03-20 RX ADMIN — BUDESONIDE AND FORMOTEROL FUMARATE DIHYDRATE 2 PUFF: 160; 4.5 AEROSOL RESPIRATORY (INHALATION) at 08:00

## 2025-03-20 RX ADMIN — IPRATROPIUM BROMIDE AND ALBUTEROL SULFATE 3 ML: .5; 3 SOLUTION RESPIRATORY (INHALATION) at 07:51

## 2025-03-20 RX ADMIN — METHYLPREDNISOLONE SODIUM SUCCINATE 40 MG: 40 INJECTION, POWDER, FOR SOLUTION INTRAMUSCULAR; INTRAVENOUS at 08:26

## 2025-03-20 NOTE — PLAN OF CARE
Goal Outcome Evaluation:      Patient remains on IV abx. Patient states he is feeling better. Patient stable at this time.

## 2025-03-20 NOTE — PROGRESS NOTES
Daily Progress Note          Assessment    Acute on chronic hypoxia    Pneumonia due to unspecified pathogen    Chronic obstructive pulmonary disease,   Acute on chronic respiratory failure with hypoxemia and hypercapnia: ABGs 7.42/49.7/65.5, patient wears 4 L of home oxygen  Respiratory panel is positive for human rhinovirus  Obstructive sleep apnea, on BiPAP  Pulmonary hypertension: Echo 3/16/2025 LV EF 66 to 70%, RV pressure 45-55 mmHg     Coronary artery disease     Hypertension   Hyperlipidemia  Diabetes mellitus  .  SONJA, patient is unable to use CPAP  Severe COPD:  PFT FEV1 1.12L which is 41%, post BD 51%, ratio 57, ERV 17%, %, TLC 82%, DLCO 27%, obstructive sleep apnea features  mild interstitial lung disease      Recommendations:  Respiratory status is improving and patient can be discharged from pulmonary standpoint on tapering dose of prednisone and follow-up in the office in 2 weeks     status post bronchoscopy 3/19/2025 with removal of large amount of thick white mucous plugs from 5 lobes, monitor cultures, positive for influenza A but currently does not require Tamiflu    nebulized Mucomyst while inpatient    Antibiotics: Rocephin     Mucinex  Encourage use of incentive spirometry    Oxygen supplement and titration to maintain saturation 90 to 95%: Currently 5 L per nasal cannula  Bronchodilators  Sildenafil for pulmonary hypertension  Theophylline    Amlodipine  Diuresis: Lasix  Insulin sliding scale  DVT prophylaxis: Heparin subcu        I personally reviewed the radiological studies             LOS: 3 days     Subjective     Cough and shortness of breath    Objective     Vital signs for last 24 hours:  Vitals:    03/20/25 0753 03/20/25 0759 03/20/25 0800 03/20/25 0802   BP: 140/70      BP Location:       Patient Position:       Pulse: 65 64 62 63   Resp: 18 18 18 18   Temp: 97.6 °F (36.4 °C)      TempSrc: Oral      SpO2: 95% 100% 100% 100%   Weight:       Height:           Intake/Output last 3  shifts:  I/O last 3 completed shifts:  In: 1040 [P.O.:840; I.V.:200]  Out: 475 [Urine:475]  Intake/Output this shift:  No intake/output data recorded.      Radiology  Imaging Results (Last 24 Hours)       ** No results found for the last 24 hours. **            Labs:  Results from last 7 days   Lab Units 03/20/25  0138   WBC 10*3/mm3 6.31   HEMOGLOBIN g/dL 9.5*   HEMATOCRIT % 30.8*   PLATELETS 10*3/mm3 165     Results from last 7 days   Lab Units 03/20/25  0138   SODIUM mmol/L 138   POTASSIUM mmol/L 4.3   CHLORIDE mmol/L 103   CO2 mmol/L 25.1   BUN mg/dL 48*   CREATININE mg/dL 1.09   CALCIUM mg/dL 9.1   GLUCOSE mg/dL 111*             Results from last 7 days   Lab Units 03/16/25  1542 03/16/25  1244 03/16/25  1137   HSTROP T ng/L 83* 96* 94*         Results from last 7 days   Lab Units 03/20/25  0138   MAGNESIUM mg/dL 2.8*                   Meds:   SCHEDULE  acetylcysteine, 3 mL, Nebulization, BID - RT  amLODIPine, 5 mg, Oral, Nightly  budesonide-formoterol, 2 puff, Inhalation, BID - RT  cefTRIAXone, 2,000 mg, Intravenous, Q24H  [Held by provider] furosemide, 40 mg, Oral, Daily  [Held by provider] gabapentin, 300 mg, Oral, TID  guaiFENesin, 1,200 mg, Oral, Q12H  heparin (porcine), 5,000 Units, Subcutaneous, Q8H  insulin lispro, 3-14 Units, Subcutaneous, 4x Daily AC & at Bedtime  ipratropium-albuterol, 3 mL, Nebulization, 4x Daily - RT  methylPREDNISolone sodium succinate, 40 mg, Intravenous, Q24H  oseltamivir, 30 mg, Oral, Q12H  sertraline, 50 mg, Oral, Nightly  sildenafil, 20 mg, Oral, TID  theophylline, 300 mg, Oral, Daily      Infusions  Pharmacy to Dose Oseltamivir (TAMIFLU),       PRNs    acetaminophen **OR** acetaminophen **OR** acetaminophen    senna-docusate sodium **AND** polyethylene glycol **AND** bisacodyl **AND** bisacodyl    Calcium Replacement - Follow Nurse / BPA Driven Protocol    dextrose    dextrose    glucagon (human recombinant)    Magnesium Cardiology Dose Replacement - Follow Nurse / BPA  Driven Protocol    Pharmacy to Dose Oseltamivir (TAMIFLU)    Phosphorus Replacement - Follow Nurse / BPA Driven Protocol    Potassium Replacement - Follow Nurse / BPA Driven Protocol    [COMPLETED] Insert Peripheral IV **AND** sodium chloride    Physical Exam:  General Appearance:  Alert   HEENT:  Normocephalic, without obvious abnormality, Conjunctiva/corneas clear,.   Nares normal, no drainage     Neck:  Supple, symmetrical, trachea midline.   Lungs /Chest wall:   Bilateral basal rhonchi, respirations unlabored, symmetrical wall movement.     Heart:  Regular rate and rhythm, S1 S2 normal  Abdomen: Soft, non-tender, no masses, no organomegaly.    Extremities: No edema, no clubbing or cyanosis     ROS  Constitutional: Negative for chills, fever and malaise/fatigue.   HENT: Negative.    Eyes: Negative.    Cardiovascular: Negative.    Respiratory: Positive for cough and shortness of breath.    Skin: Negative.    Musculoskeletal: Negative.    Gastrointestinal: Negative.    Genitourinary: Negative.    Neurological: Negative.    Psychiatric/Behavioral: Negative.      I reviewed the recent clinical results  I personally reviewed the latest radiological studies    Part of this note may be an electronic transcription/translation of spoken language to printed text using the Dragon Dictation System.

## 2025-03-20 NOTE — CASE MANAGEMENT/SOCIAL WORK
Continued Stay Note  MARIA GUADALUPE Sanabria     Patient Name: Brenden Peter  MRN: [id number]  Today's Date: 3/20/2025    Admit Date: 3/16/2025    Plan: Routine home with wife. Home O2 4L w/ Inogen. Declines HHC.   Discharge Plan       Row Name 03/20/25 1228       Plan    Plan Routine home with wife. Home O2 4L w/ Inogen. Declines HHC.    Patient/Family in Agreement with Plan yes    Plan Comments CM met with patient at bedside and provided copy of IMM letter. Pt wants to discuss home health with his wife and was directed to contact PCP if he becomes interested in services. CM contacted PCP office and scheduled follow up appt for Monday 3/31 at 11:40 AM. Added appt info to AVS and updated pt at bedside.             Megan Naegele, RN     Office Phone: [phone]  Office Cell: [phone]

## 2025-03-20 NOTE — PROGRESS NOTES
LOS: 3 days   Admitting Physician- No att. providers found    Reason For Followup:    Shortness of breath  COPD exacerbation  Pulmonary hypertension  Hypertension    Subjective     Shortness of breath is much better    Objective     Hemodynamics are stable    Review of Systems:   Review of Systems   Constitutional: Negative for chills and fever.   HENT:  Negative for ear discharge and nosebleeds.    Eyes:  Negative for discharge and redness.   Cardiovascular:  Negative for chest pain, orthopnea, palpitations, paroxysmal nocturnal dyspnea and syncope.   Respiratory:  Positive for shortness of breath. Negative for cough and wheezing.    Endocrine: Negative for heat intolerance.   Skin:  Negative for rash.   Musculoskeletal:  Negative for arthritis and myalgias.   Gastrointestinal:  Negative for abdominal pain, melena, nausea and vomiting.   Genitourinary:  Negative for dysuria and hematuria.   Neurological:  Negative for dizziness, light-headedness, numbness and tremors.   Psychiatric/Behavioral:  Negative for depression. The patient is not nervous/anxious.          Vital Signs  Vitals:    03/20/25 0800 03/20/25 0802 03/20/25 1153 03/20/25 1159   BP:       BP Location:       Patient Position:       Pulse: 62 63 70 69   Resp: 18 18 18 18   Temp:       TempSrc:       SpO2: 100% 100% 92% 98%   Weight:       Height:         Wt Readings from Last 1 Encounters:   03/20/25 80.2 kg (176 lb 12.9 oz)       Intake/Output Summary (Last 24 hours) at 3/20/2025 1558  Last data filed at 3/20/2025 0500  Gross per 24 hour   Intake 480 ml   Output 475 ml   Net 5 ml     Physical Exam:  Constitutional:       Appearance: Well-developed.   Eyes:      General: No scleral icterus.        Right eye: No discharge.   HENT:      Head: Normocephalic and atraumatic.   Neck:      Thyroid: No thyromegaly.      Lymphadenopathy: No cervical adenopathy.   Pulmonary:      Effort: Pulmonary effort is normal. No respiratory distress.      Breath sounds:  "Normal breath sounds. No wheezing. No rales.   Cardiovascular:      Normal rate. Regular rhythm.      No gallop.    Edema:     Peripheral edema absent.   Abdominal:      Tenderness: There is no abdominal tenderness.   Skin:     Findings: No erythema or rash.   Neurological:      Mental Status: Alert and oriented to person, place, and time.         Results Review:   Lab Results (last 24 hours)       Procedure Component Value Units Date/Time    POC Glucose Once [[id number]]  (Abnormal) Collected: 03/20/25 1305    Specimen: Blood Updated: 03/20/25 1306     Glucose 230 mg/dL      Comment: Serial Number: [id number]:  [id number]       Non-gynecologic Cytology [[id number]] Collected: 03/19/25 0753    Specimen: Lavage from Lung, Left Lower Lobe Updated: 03/20/25 1101     Case Report --     Medical Cytology Report                           Case: [id number]                                Authorizing Provider:  Juanjo Castañeda MD         Collected:           03/19/2025 07:53 AM        Ordering Location:     [facility]  Received:            03/19/2025 09:02 AM                               SUITES                                                                     Pathologist:           Nakul Simmons MD                                                          Specimen:    Lung, Left Lower Lobe, LLL LAVAGE                                                         Final Diagnosis --     Left lower lobe, bronchoalveolar lavage with smears and cytospin:  Acute inflammation, mature squamous cells, pulmonary macrophages and bronchial epithelial cells  No malignancy identified    JOCE       Gross Description --     1. Lung, Left Lower Lobe.  Received in [place] and designated \"Lung, Left Lower Lobe\" are 50 mL of clear green fluid. Particulate matter is present. This specimen is processed as per protocol.          AFB Culture - Lavage, Lung, Left Lower Lobe [[id number]] Collected: 03/19/25 0753    Specimen: Lavage from " Lung, Left Lower Lobe Updated: 03/20/25 1028     AFB Stain No acid fast bacilli seen on concentrated smear    POC Glucose 4x Daily Before Meals & at Bedtime [[id number]]  (Abnormal) Collected: 03/20/25 0751    Specimen: Blood Updated: 03/20/25 0753     Glucose 113 mg/dL      Comment: Serial Number: [id number]:  [id number]       BAL Culture, Quantitative - Lavage, Lung, Left Lower Lobe [[id number]] Collected: 03/19/25 0753    Specimen: Lavage from Lung, Left Lower Lobe Updated: 03/20/25 0741     BAL Culture 50,000 CFU/mL The culture consists of normal respiratory genny. This is a preliminary report; final report to follow.     Gram Stain Few (2+) WBCs seen      Rare (1+) Bronchial epithelial cells      Few (2+) Mixed bacterial morphotypes seen on Gram Stain    Basic Metabolic Panel [[id number]]  (Abnormal) Collected: 03/20/25 0138    Specimen: Blood from Arm, Right Updated: 03/20/25 0252     Glucose 111 mg/dL      BUN 48 mg/dL      Creatinine 1.09 mg/dL      Sodium 138 mmol/L      Potassium 4.3 mmol/L      Chloride 103 mmol/L      CO2 25.1 mmol/L      Calcium 9.1 mg/dL      BUN/Creatinine Ratio 44.0     Anion Gap 9.9 mmol/L      eGFR 71.2 mL/min/1.73     Narrative:      GFR Categories in Chronic Kidney Disease (CKD)      GFR Category          GFR (mL/min/1.73)    Interpretation  G1                     90 or greater         Normal or high (1)  G2                      60-89                Mild decrease (1)  G3a                   45-59                Mild to moderate decrease  G3b                   30-44                Moderate to severe decrease  G4                    15-29                Severe decrease  G5                    14 or less           Kidney failure          (1)In the absence of evidence of kidney disease, neither GFR category G1 or G2 fulfill the criteria for CKD.    eGFR calculation 2021 CKD-EPI creatinine equation, which does not include race as a factor    Magnesium [[id number]]  (Abnormal)  Collected: 03/20/25 0138    Specimen: Blood from Arm, Right Updated: 03/20/25 0252     Magnesium 2.8 mg/dL     CBC Auto Differential [[id number]]  (Abnormal) Collected: 03/20/25 0138    Specimen: Blood from Arm, Right Updated: 03/20/25 0224     WBC 6.31 10*3/mm3      RBC 3.27 10*6/mm3      Hemoglobin 9.5 g/dL      Hematocrit 30.8 %      MCV 94.2 fL      MCH 29.1 pg      MCHC 30.8 g/dL      RDW 13.1 %      RDW-SD 45.1 fl      MPV 11.8 fL      Platelets 165 10*3/mm3      Neutrophil % 75.7 %      Lymphocyte % 11.6 %      Monocyte % 11.7 %      Eosinophil % 0.0 %      Basophil % 0.2 %      Immature Grans % 0.8 %      Neutrophils, Absolute 4.78 10*3/mm3      Lymphocytes, Absolute 0.73 10*3/mm3      Monocytes, Absolute 0.74 10*3/mm3      Eosinophils, Absolute 0.00 10*3/mm3      Basophils, Absolute 0.01 10*3/mm3      Immature Grans, Absolute 0.05 10*3/mm3      nRBC 0.0 /100 WBC     POC Glucose Once [[id number]]  (Abnormal) Collected: 03/19/25 1936    Specimen: Blood Updated: 03/19/25 1937     Glucose 254 mg/dL      Comment: Serial Number: [id number]:  [id number]       POC Glucose Once [[id number]]  (Abnormal) Collected: 03/19/25 1628    Specimen: Blood Updated: 03/19/25 1629     Glucose 170 mg/dL      Comment: Serial Number: [id number]:  [id number]             Imaging Results (Last 72 Hours)       ** No results found for the last 72 hours. **          ECG/EMG Results (most recent)       Procedure Component Value Units Date/Time    ECG 12 Lead Dyspnea [[id number]] Collected: 03/16/25 1120     Updated: 03/17/25 0649     QT Interval 398 ms      QTC Interval 432 ms     Narrative:      HEART RATE=71  bpm  RR Unzitblu=939  ms  NH Wccofafd=750  ms  P Horizontal Axis=-31  deg  P Front Axis=64  deg  QRSD Dybvldxv=577  ms  QT Pmxorpys=156  ms  WLsJ=890  ms  QRS Axis=118  deg  T Wave Axis=30  deg  - ABNORMAL ECG -  Sinus rhythm  Right bundle branch block  Probable  anterolateral infarct, old  When compared with ECG of  10-Apr-2024 20:06:18,  Significant repolarization change  Electronically Signed By: Felice Stevens (Isaías) 2025-03-17 06:49:40  Date and Time of Study:2025-03-16 11:20:55    Adult Transthoracic Echo Complete W/ Cont if Necessary Per Protocol [[id number]] Resulted: 03/17/25 1616     Updated: 03/17/25 1616     LVIDd 5.2 cm      LVIDs 3.6 cm      IVSd 1.10 cm      LVPWd 1.00 cm      FS 30.8 %      IVS/LVPW 1.10 cm      ESV(cubed) 46.7 ml      LV Sys Vol (BSA corrected) 12.6 cm2      EDV(cubed) 140.6 ml      LV Webster Vol (BSA corrected) 64.1 cm2      LV mass(C)d 207.3 grams      LVOT area 3.8 cm2      LVOT diam 2.20 cm      EDV(MOD-sp4) 134.0 ml      ESV(MOD-sp4) 26.3 ml      SV(MOD-sp4) 107.7 ml      SVi(MOD-SP4) 51.5 ml/m2      SVi (LVOT) 43.1 ml/m2      EF(MOD-sp4) 80.4 %      MV E max franco 78.9 cm/sec      MV A max franco 116.0 cm/sec      MV dec time 0.22 sec      MV E/A 0.68     Pulm A Revs Dur 0.12 sec      LA ESV Index (BP) 24.7 ml/m2      Med Peak E' Franco 6.9 cm/sec      Lat Peak E' Franco 7.7 cm/sec      TR max franco 317.0 cm/sec      Avg E/e' ratio 10.81     SV(LVOT) 90.1 ml      RVIDd 5.6 cm      TAPSE (>1.6) 2.19 cm      RV S' 11.5 cm/sec      LA dimension (2D)  3.5 cm      Pulm Sys Franco 57.9 cm/sec      Pulm Webster Franco 47.0 cm/sec      Pulm S/D 1.23     Pulm A Revs Franco 28.7 cm/sec      LV V1 max 108.0 cm/sec      LV V1 max PG 4.7 mmHg      LV V1 mean PG 2.00 mmHg      LV V1 VTI 23.7 cm      Ao pk franco 123.0 cm/sec      Ao max PG 6.1 mmHg      Ao mean PG 3.0 mmHg      Ao V2 VTI 27.5 cm      BERNARD(I,D) 3.3 cm2      Dimensionless Index 0.86 (DI)      MV max PG 5.6 mmHg      MV mean PG 2.00 mmHg      MV V2 VTI 37.4 cm      MV P1/2t 64.3 msec      MVA(P1/2t) 3.4 cm2      MVA(VTI) 2.41 cm2      MV dec slope 444.0 cm/sec2      TR max PG 40.2 mmHg      RVSP(TR) 48.2 mmHg      RAP systole 8.0 mmHg      RV V1 max PG 2.16 mmHg      RV V1 max 73.4 cm/sec      RV V1 VTI 14.4 cm      PA V2 max 77.0 cm/sec      PA acc time 0.09 sec      ACS  2.10 cm      Sinus 3.3 cm      STJ 2.6 cm      EF(MOD-bp) 80.0 %     Narrative:        Left ventricular systolic function is normal. Left ventricular ejection   fraction appears to be 66 - 70%.    Left ventricular diastolic function was normal.    The right ventricular cavity is mild to moderately dilated.    Estimated right ventricular systolic pressure from tricuspid   regurgitation is moderately elevated (45-55 mmHg). Calculated right   ventricular systolic pressure from tricuspid regurgitation is 48 mmHg.      Telemetry Scan [[id number]] Resulted: 03/16/25     Updated: 03/17/25 1740    Telemetry Scan [[id number]] Resulted: 03/16/25     Updated: 03/18/25 1232    Telemetry Scan [[id number]] Resulted: 03/16/25     Updated: 03/18/25 1248    Telemetry Scan [[id number]] Resulted: 03/16/25     Updated: 03/18/25 1300    Telemetry Scan [[id number]] Resulted: 03/16/25     Updated: 03/18/25 1500    Telemetry Scan [[id number]] Resulted: 03/16/25     Updated: 03/19/25 0948    Telemetry Scan [[id number]] Resulted: 03/16/25     Updated: 03/19/25 1103    Telemetry Scan [[id number]] Resulted: 03/16/25     Updated: 03/19/25 1128    Telemetry Scan [[id number]] Resulted: 03/16/25     Updated: 03/20/25 1010    Telemetry Scan [[id number]] Resulted: 03/16/25     Updated: 03/20/25 1043    Telemetry Scan [[id number]] Resulted: 03/16/25     Updated: 03/20/25 1252          CBC    Results from last 7 days   Lab Units 03/20/25  0138 03/18/25 2240 03/18/25  0626 03/17/25  0512 03/16/25  1137   WBC 10*3/mm3 6.31 7.40 7.94 4.16 4.95   HEMOGLOBIN g/dL 9.5* 9.9* 10.0* 9.8* 10.4*   PLATELETS 10*3/mm3 165 174 170 143 162     BMP   Results from last 7 days   Lab Units 03/20/25  0138 03/18/25 2240 03/18/25  0626 03/17/25  0512 03/16/25  1137   SODIUM mmol/L 138 137 137 139 136   POTASSIUM mmol/L 4.3 4.8 5.2 5.1 4.8   CHLORIDE mmol/L 103 99 102 102 101   CO2 mmol/L 25.1 25.8 26.0 25.5 23.0   BUN mg/dL 48* 57* 46* 38* 27*   CREATININE mg/dL 1.09 1.43* 1.24  1.22 1.28*   GLUCOSE mg/dL 111* 209* 175* 179* 177*   MAGNESIUM mg/dL 2.8* 2.6* 2.6* 2.3  --      CMP   Results from last 7 days   Lab Units 03/20/25  0138 03/18/25  2240 03/18/25  0626 03/17/25  0512 03/16/25  1137   SODIUM mmol/L 138 137 137 139 136   POTASSIUM mmol/L 4.3 4.8 5.2 5.1 4.8   CHLORIDE mmol/L 103 99 102 102 101   CO2 mmol/L 25.1 25.8 26.0 25.5 23.0   BUN mg/dL 48* 57* 46* 38* 27*   CREATININE mg/dL 1.09 1.43* 1.24 1.22 1.28*   GLUCOSE mg/dL 111* 209* 175* 179* 177*     Cardiac Studies:  Echo- Results for orders placed during the hospital encounter of 03/16/25    Adult Transthoracic Echo Complete W/ Cont if Necessary Per Protocol    Interpretation Summary    Left ventricular systolic function is normal. Left ventricular ejection fraction appears to be 66 - 70%.    Left ventricular diastolic function was normal.    The right ventricular cavity is mild to moderately dilated.    Estimated right ventricular systolic pressure from tricuspid regurgitation is moderately elevated (45-55 mmHg). Calculated right ventricular systolic pressure from tricuspid regurgitation is 48 mmHg.    Stress Myoview-  Cath-      Medication Review:   Scheduled Meds:acetylcysteine, 3 mL, Nebulization, BID - RT  amLODIPine, 5 mg, Oral, Nightly  budesonide-formoterol, 2 puff, Inhalation, BID - RT  cefTRIAXone, 2,000 mg, Intravenous, Q24H  [Held by provider] furosemide, 40 mg, Oral, Daily  [Held by provider] gabapentin, 300 mg, Oral, TID  guaiFENesin, 1,200 mg, Oral, Q12H  heparin (porcine), 5,000 Units, Subcutaneous, Q8H  insulin lispro, 3-14 Units, Subcutaneous, 4x Daily AC & at Bedtime  ipratropium-albuterol, 3 mL, Nebulization, 4x Daily - RT  oseltamivir, 30 mg, Oral, Q12H  [START ON 3/21/2025] predniSONE, 30 mg, Oral, Daily   Followed by  [START ON 3/23/2025] predniSONE, 20 mg, Oral, Daily   Followed by  [START ON 3/25/2025] predniSONE, 10 mg, Oral, Daily  sertraline, 50 mg, Oral, Nightly  sildenafil, 20 mg, Oral,  TID  theophylline, 300 mg, Oral, Daily      Continuous Infusions:Pharmacy to Dose Oseltamivir (TAMIFLU),       PRN Meds:.  acetaminophen **OR** acetaminophen **OR** acetaminophen    senna-docusate sodium **AND** polyethylene glycol **AND** bisacodyl **AND** bisacodyl    Calcium Replacement - Follow Nurse / BPA Driven Protocol    dextrose    dextrose    glucagon (human recombinant)    Magnesium Cardiology Dose Replacement - Follow Nurse / BPA Driven Protocol    Pharmacy to Dose Oseltamivir (TAMIFLU)    Phosphorus Replacement - Follow Nurse / BPA Driven Protocol    Potassium Replacement - Follow Nurse / BPA Driven Protocol    [COMPLETED] Insert Peripheral IV **AND** sodium chloride      Assessment & Plan     Acute on chronic respiratory failure    Pneumonia due to infectious organism, unspecified laterality, unspecified part of lung    Multiple tracheobronchial mucus plugs    Pneumonia, unspecified organism    MDM:    1.  Shortness of breath:    Shortness of breath is improving.    2.  Acute on chronic diastolic congestive heart failure    Patient was on diuretics patient is feeling better but creatinine has increased diuretics are on hold I agree with that continue to observe.  BUN 48/1.09    3.  COPD exacerbation:    Patient is on nebulizer treatment as well as steroids.    4.  Pulmonary hypertension:    Sildenafil has been started.        Rhythm and hemodynamics stable  Continue current Rx and supportive care  Ok for d/c from cards standpoint  Additional recommendations per Dr. Baker      Patient is seen and examined and findings are verified.  All data is reviewed by me personally.  Assessment and plan formulated by APC was done after discussion with attending.  I spent more than 50% of time in taking care of the patient.    Patient is sitting up in the bed.  No complaints.  Shortness of breath is much better.    Normal S1 and S2.  No pericardial rub or murmur abdominal exam is benign.  No leg edema noted.    Today  creatinine is 1.09.  It has improved.  Patient would need mild diuretics on discharge.  At present patient is stable.  Patient can be discharged from cardiac standpoint.  We will follow as a outpatient    Electronically signed by Shawn Baker MD, 03/20/25, 3:58 PM EDT.      Shawn Baker MD  03/20/25  15:58 EDT

## 2025-03-20 NOTE — PROCEDURES
Exercise Oximetry    Patient Name:Brenden Peter   MRN: [id number]   Date: 03/20/25             ROOM AIR BASELINE   SpO2% 83   Heart Rate 98   Blood Pressure      EXERCISE ON ROOM AIR SpO2% EXERCISE ON O2 @ 4 LPM SpO2%   1 MINUTE  1 MINUTE 88   2 MINUTES  2 MINUTES 88   3 MINUTES  3 MINUTES 89   4 MINUTES  4 MINUTES 90   5 MINUTES  5 MINUTES 91   6 MINUTES  6 MINUTES 92              Distance Walked   Distance Walked   Dyspnea (Miranda Scale)   Dyspnea (Miranda Scale)   Fatigue (Miranda Scale)   Fatigue (Miranda Scale)   SpO2% Post Exercise  93 SpO2% Post Exercise   HR Post Exercise  113 HR Post Exercise   Time to Recovery  3 minutes Time to Recovery     Comments: Patient wasn't able to walk very far.  Desatted just standing up to the bedside.  Moved around a little before getting back in bed.  Deep breathing in bed sat came up to 93

## 2025-03-20 NOTE — CASE MANAGEMENT/SOCIAL WORK
Case Management Discharge Note      Final Note: Home.      Selected Continued Care - Discharged on 3/20/2025 Admission date: 3/16/2025 - Discharge disposition: Home or Self Care       Transportation Services  Private: Car    Final Discharge Disposition Code: 01 - home or self-care

## 2025-03-20 NOTE — DISCHARGE SUMMARY
[facility] Medicine Services  Discharge Summary    Date of Service: 3/20/2025  Patient Name: Brenden Peter  : 1950  MRN: [id number]    Date of Admission: 3/16/2025  Discharge Diagnosis: Acute on chronic respiratory failure  Date of Discharge: 3/20/2025  Primary Care Physician: Bert Rojas MD      Presenting Problem:   Elevated troponin [R79.89]  Acute on chronic respiratory failure [J96.20]  Dyspnea, unspecified type [R06.00]    Active and Resolved Hospital Problems:  Active Hospital Problems    Diagnosis POA    **Acute on chronic respiratory failure [J96.20] Yes    Pneumonia, unspecified organism [J18.9] Yes    Multiple tracheobronchial mucus plugs [T17.800A] Unknown    Pneumonia due to infectious organism, unspecified laterality, unspecified part of lung [J18.9] Unknown      Resolved Hospital Problems   No resolved problems to display.         Hospital Course     HPI:  History of Present Illness: Brenden Peter is a 74 y.o. male with a CMH of COPD, CHF, pulmonary hypertension, chronic respiratory failure on 4L NC, T2DM, Htn, Hld  who presented to [facility] on 3/16/2025 with  shortness of air. Pt reports starting 5 days ago he has had increased productive cough and wheeze with associated shortness of air and dyspnea on exertion. Symptoms have cont to worsen, prompting him to present to the ED today. He reports some sensation of fever/chills overnight. He denies known sick contacts though notes he has had multiple doctors office visits recently, thinks he could have been exposed to someone there. Denies any chest pain/pressure/palpitations, has not had any lower extremity swelling or pain, no recent travel or procedures. Has been using his home nebulizer with some limited relief. Of note pt has CPAP at home but reports has not been using recently d/t issues with mask fit and his home O2.   In ED pt found to be hypoxic to 81% on his baseline 4L. Workup also  notable for significant wheezing, BNP 1700, troponin 94 ->96, ecg without ischemic findings, mild BHASKAR. Given solumedrol, duoneb, and IV lasix with significant improvement, at present is on baseline 4L. Hospitalist consulted for admission for further evaluation/management.       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  He also has a prior history of diastolic heart failure as well as pulmonary hypertension.  He was therefore admitted secondary to dyspnea which is likely multifactorial in origin.    We place consultation above pulmonary service as well as to cardiology.  On 3/19/2025 patient underwent bronchoscopy.  Please see procedure note for details.  In conjunction with cardiology evaluation patient did undergo 2D echocardiogram.  Patient was noted to have elevated right-sided heart pressures.  This was consistent with pulmonary hypertension.  Patient was started on Revatio.    Patient has responded well to above therapies.  He was placed on IV steroids, aerosols, as well as IV diuresis.    Secondary to IV diuresis he did have a slight elevation of his creatinine consistent with acute kidney injury.  IV diuresis was stopped and this morning his creatinine currently is normal.  He will be changed back to his routine oral dose of diuretics at time of discharge.    Discharge his home medications have been reviewed in an effort to reduce polypharmacy some of his medications have been discontinued and/or adjusted.  Patient does have an underlying history of diastolic heart failure.  His Actos has been discontinued altogether.  Strong consideration should be given to discontinuing oral hypoglycemic agent such as sulfonylureas secondary to contraindication in elderly.  For now I will leave this medication and defer this decision to primary care provider.  Patient has been given a prescription for Jardiance in the place of Actos.  I do  foresee patient will likely require basal insulin moving forward.  Again this discussion and/or further management is as per primary care provider.    Placed on a prednisone taper at time of discharge.  CT imaging of chest did not reveal acute infiltrate.  Pulmonary services do not recommend any further antibiotics at time of discharge.    Patient is feeling well this morning.  He is currently on 4 L O2 via nasal cannula.  He has been cleared by PT and OT.  He wishes to be discharged home today.    Please note on 3/19/2025 patient did develop a low-grade fever and underwent a respiratory panel.  He was noted to be positive for influenza A.  He was started on Tamiflu.  Despite being positive for influenza patient denies any myalgias, body aches.  In fact he has remained fever free over the past 24 hours.  He is agreeable to continue his Tamiflu at time of discharge.    Given that patient clinically has improved significantly we will discharge him home with the understanding he will follow-up with his primary care provider as an outpatient in regards to diabetic regimen, influenza follow-up, as well as review of new medication regimen.    DISCHARGE Follow Up Recommendations for labs and diagnostics:   Patient should follow-up with his primary care provider in the next 2 to 3 weeks.      Day of Discharge     Vital Signs:  Temp:  [97.3 °F (36.3 °C)-98.8 °F (37.1 °C)] 97.6 °F (36.4 °C)  Heart Rate:  [60-73] 63  Resp:  [16-20] 18  BP: ()/(45-70) 140/70  Flow (L/min) (Oxygen Therapy):  [5-9] 5    Physical Exam:  Physical Exam       Pertinent  and/or Most Recent Results     LAB RESULTS:      Lab 03/20/25  0138 03/18/25  2240 03/18/25  0626 03/17/25  0512 03/16/25  1542 03/16/25  1137   WBC 6.31 7.40 7.94 4.16  --  4.95   HEMOGLOBIN 9.5* 9.9* 10.0* 9.8*  --  10.4*   HEMATOCRIT 30.8* 33.1* 32.0* 32.3*  --  34.0*   PLATELETS 165 174 170 143  --  162   NEUTROS ABS 4.78 6.49 6.83 3.44  --  3.75   IMMATURE GRANS (ABS) 0.05  0.06* 0.05 0.04  --  0.02   LYMPHS ABS 0.73 0.35* 0.59* 0.51*  --  0.57*   MONOS ABS 0.74 0.49 0.46 0.17  --  0.57   EOS ABS 0.00 0.00 0.00 0.00  --  0.03   MCV 94.2 95.4 94.4 95.6  --  95.5   D DIMER QUANT  --   --   --   --  1.79*  --          Lab 03/20/25  0138 03/18/25  2240 03/18/25  0626 03/17/25  0512 03/16/25  1137   SODIUM 138 137 137 139 136   POTASSIUM 4.3 4.8 5.2 5.1 4.8   CHLORIDE 103 99 102 102 101   CO2 25.1 25.8 26.0 25.5 23.0   ANION GAP 9.9 12.2 9.0 11.5 12.0   BUN 48* 57* 46* 38* 27*   CREATININE 1.09 1.43* 1.24 1.22 1.28*   EGFR 71.2 51.4* 61.0 62.2 58.7*   GLUCOSE 111* 209* 175* 179* 177*   CALCIUM 9.1 9.2 9.0 8.8 8.8   MAGNESIUM 2.8* 2.6* 2.6* 2.3  --    HEMOGLOBIN A1C  --   --   --   --  6.47*             Lab 03/17/25  1241 03/16/25  1542 03/16/25  1244 03/16/25  1137   PROBNP 2,758.0*  --   --  1,701.0*   HSTROP T  --  83* 96* 94*         Lab 03/17/25  0512   CHOLESTEROL 198   LDL CHOL 143*   HDL CHOL 25*   TRIGLYCERIDES 163*             Brief Urine Lab Results  (Last result in the past 365 days)        Color   Clarity   Blood   Leuk Est   Nitrite   Protein   CREAT   Urine HCG        03/16/25 2326             64.6         03/16/25 2326 Yellow   Clear   Negative   Negative   Negative   Negative                 Microbiology Results (last 10 days)       Procedure Component Value - Date/Time    BAL Culture, Quantitative - Lavage, Lung, Left Lower Lobe [[id number]] Collected: 03/19/25 0753    Lab Status: Preliminary result Specimen: Lavage from Lung, Left Lower Lobe Updated: 03/20/25 0741     BAL Culture 50,000 CFU/mL The culture consists of normal respiratory genny. This is a preliminary report; final report to follow.     Gram Stain Few (2+) WBCs seen      Rare (1+) Bronchial epithelial cells      Few (2+) Mixed bacterial morphotypes seen on Gram Stain    Respiratory Panel PCR w/COVID-19(SARS-CoV-2) IAN/NAYELY/KATERIN/PAD/COR/KRISH In-House, NP Swab in UTM/VTM, 2 HR TAT - Lavage, Lung, Left Lower Lobe  [[id number]]  (Abnormal) Collected: 03/19/25 0753    Lab Status: Final result Specimen: Lavage from Lung, Left Lower Lobe Updated: 03/19/25 1039     ADENOVIRUS, PCR Not Detected     Coronavirus 229E Not Detected     Coronavirus HKU1 Not Detected     Coronavirus NL63 Not Detected     Coronavirus OC43 Not Detected     COVID19 Not Detected     Human Metapneumovirus Not Detected     Human Rhinovirus/Enterovirus Not Detected     Influenza A H1 2009 PCR Detected     Influenza B PCR Not Detected     Parainfluenza Virus 1 Not Detected     Parainfluenza Virus 2 Not Detected     Parainfluenza Virus 3 Not Detected     Parainfluenza Virus 4 Not Detected     RSV, PCR Not Detected     Bordetella pertussis pcr Not Detected     Bordetella parapertussis PCR Not Detected     Chlamydophila pneumoniae PCR Not Detected     Mycoplasma pneumo by PCR Not Detected    Narrative:      In the setting of a positive respiratory panel with a viral infection PLUS a negative procalcitonin without other underlying concern for bacterial infection, consider observing off antibiotics or discontinuation of antibiotics and continue supportive care. If the respiratory panel is positive for atypical bacterial infection (Bordetella pertussis, Chlamydophila pneumoniae, or Mycoplasma pneumoniae), consider antibiotic de-escalation to target atypical bacterial infection.    Respiratory Panel PCR w/COVID-19(SARS-CoV-2) IAN/NAYELY/KATERIN/PAD/COR/KRISH In-House, NP Swab in UTM/VTM, 2 HR TAT - Swab, Nasopharynx [[id number]]  (Normal) Collected: 03/16/25 1137    Lab Status: Final result Specimen: Swab from Nasopharynx Updated: 03/16/25 1315     ADENOVIRUS, PCR Not Detected     Coronavirus 229E Not Detected     Coronavirus HKU1 Not Detected     Coronavirus NL63 Not Detected     Coronavirus OC43 Not Detected     COVID19 Not Detected     Human Metapneumovirus Not Detected     Human Rhinovirus/Enterovirus Not Detected     Influenza A PCR Not Detected     Influenza B PCR Not  Detected     Parainfluenza Virus 1 Not Detected     Parainfluenza Virus 2 Not Detected     Parainfluenza Virus 3 Not Detected     Parainfluenza Virus 4 Not Detected     RSV, PCR Not Detected     Bordetella pertussis pcr Not Detected     Bordetella parapertussis PCR Not Detected     Chlamydophila pneumoniae PCR Not Detected     Mycoplasma pneumo by PCR Not Detected    Narrative:      In the setting of a positive respiratory panel with a viral infection PLUS a negative procalcitonin without other underlying concern for bacterial infection, consider observing off antibiotics or discontinuation of antibiotics and continue supportive care. If the respiratory panel is positive for atypical bacterial infection (Bordetella pertussis, Chlamydophila pneumoniae, or Mycoplasma pneumoniae), consider antibiotic de-escalation to target atypical bacterial infection.            CT Angiogram Chest Pulmonary Embolism  Result Date: 3/16/2025  Impression: Impression: 1.No evidence of pulmonary embolism. 2.Mild emphysema and mild diffuse peribronchial thickening. 3.Clustered micronodules in the left lower lobe may reflect a small focus of pneumonia or infectious bronchiolitis. 4.Coronary artery disease. 5.Thyroid goiter. Electronically Signed: Darius Dixon MD  3/16/2025 11:34 PM EDT  Workstation ID: [id number]    XR Chest 1 View  Result Date: 3/16/2025  Impression: Impression: Interstitial thickening similar to prior study which may relate to a component of underlying mild pulmonary edema. Electronically Signed: Wang Alba MD  3/16/2025 12:12 PM EDT  Workstation ID: [id number]              Results for orders placed during the hospital encounter of 03/16/25    Adult Transthoracic Echo Complete W/ Cont if Necessary Per Protocol    Interpretation Summary    Left ventricular systolic function is normal. Left ventricular ejection fraction appears to be 66 - 70%.    Left ventricular diastolic function was normal.    The right ventricular  cavity is mild to moderately dilated.    Estimated right ventricular systolic pressure from tricuspid regurgitation is moderately elevated (45-55 mmHg). Calculated right ventricular systolic pressure from tricuspid regurgitation is 48 mmHg.      Labs Pending at Discharge:  Pending Results       Procedure [Order ID] Specimen - Date/Time    AFB Culture - Lavage, Lung, Left Lower Lobe [[id number]] Collected: 03/19/25 0753    Specimen: Lavage from Lung, Left Lower Lobe Updated: 03/19/25 0904    Fungus Culture - Lavage, Lung, Left Lower Lobe [[id number]] Collected: 03/19/25 0753    Specimen: Lavage from Lung, Left Lower Lobe Updated: 03/19/25 0904    Pneumocystis PCR - Lavage, Lung, Left Lower Lobe [[id number]] Collected: 03/19/25 0753    Specimen: Lavage from Lung, Left Lower Lobe Updated: 03/19/25 0904    Virus Culture - Lavage, Lung, Left Lower Lobe [[id number]] Collected: 03/19/25 0753    Specimen: Lavage from Lung, Left Lower Lobe Updated: 03/19/25 0904            Procedures Performed  Procedure(s):  BRONCHOSCOPY WITH LAVAGE         Consults:   Consults       Date and Time Order Name Status Description    3/17/2025  2:53 PM Inpatient Pulmonology Consult Completed     3/16/2025  3:51 PM Inpatient Cardiology Consult Completed     3/16/2025  2:40 PM Hospitalist (on-call MD unless specified)                Discharge Details        Discharge Medications        New Medications        Instructions Start Date   empagliflozin 10 MG tablet tablet  Commonly known as: JARDIANCE   10 mg, Oral, Daily      oseltamivir 30 MG capsule  Commonly known as: TAMIFLU   30 mg, Oral, Every 12 Hours Scheduled      sildenafil 20 MG tablet  Commonly known as: REVATIO   20 mg, Oral, 3 Times Daily             Changes to Medications        Instructions Start Date   predniSONE 10 MG tablet  Commonly known as: DELTASONE  What changed:   medication strength  See the new instructions.   Take 3 tablets by mouth Daily for 2 days, THEN 2 tablets Daily for 2  days, THEN 1 tablet Daily for 2 days.   Start Date: March 21, 2025            Continue These Medications        Instructions Start Date   amLODIPine 5 MG tablet  Commonly known as: NORVASC   1 tablet, Oral, Nightly      Combivent Respimat  MCG/ACT inhaler  Generic drug: ipratropium-albuterol   1 puff, Inhalation, 4 Times Daily - RT      Fluticasone-Salmeterol 250-50 MCG/ACT DISKUS  Commonly known as: ADVAIR/WIXELA   1 puff, Inhalation, 2 Times Daily - RT      furosemide 40 MG tablet  Commonly known as: Lasix   40 mg, Oral, Daily      gabapentin 300 MG capsule  Commonly known as: NEURONTIN   1 capsule, Oral, 3 Times Daily      glipizide 10 MG tablet  Commonly known as: GLUCOTROL   5 mg, Oral, 2 Times Daily Before Meals      metFORMIN 1000 MG tablet  Commonly known as: GLUCOPHAGE   1 tablet, Oral, 2 Times Daily With Meals      O2  Commonly known as: OXYGEN   4 L/min, Inhalation, Continuous      pravastatin 10 MG tablet  Commonly known as: PRAVACHOL   10 mg, Oral, Daily      sertraline 50 MG tablet  Commonly known as: ZOLOFT   1 tablet, Oral, Nightly             Stop These Medications      cefuroxime 250 MG tablet  Commonly known as: CEFTIN     doxycycline 100 MG capsule  Commonly known as: VIBRAMYCIN     pioglitazone 30 MG tablet  Commonly known as: ACTOS     vitamin D 1.25 MG (52348 UT) capsule capsule  Commonly known as: ERGOCALCIFEROL            ASK your doctor about these medications        Instructions Start Date   aspirin 81 MG EC tablet   81 mg, 3 Times Weekly      potassium chloride 10 MEQ CR tablet  Commonly known as: KLOR-CON M10   10 mEq, Oral, 2 Times Daily               No Known Allergies      Discharge Disposition:   Home or Self Care    Diet:  Hospital:  Diet Order   Procedures    Diet: Cardiac, Diabetic; Healthy Heart (2-3 Na+); Consistent Carbohydrate; Fluid Consistency: Thin (IDDSI 0)         Discharge Activity:         CODE STATUS:  Code Status and Medical Interventions: CPR (Attempt to  Resuscitate); Full Support   Ordered at: 03/16/25 1518     Code Status (Patient has no pulse and is not breathing):    CPR (Attempt to Resuscitate)     Medical Interventions (Patient has pulse or is breathing):    Full Support     Level Of Support Discussed With:    Patient         No future appointments.        Time spent on Discharge including face to face service: 35 minutes    Signature: Electronically signed by Nikkie Polanco MD, 03/20/25, 10:12 EDT.  [facility] Hospitalist Team

## 2025-03-21 LAB
BACTERIA SPEC AEROBE CULT: NORMAL
GRAM STN SPEC: NORMAL

## 2025-03-21 NOTE — OUTREACH NOTE
Prep Survey      Flowsheet Row Responses   [facility] facility patient discharged from? [facility]   Is LACE score < 7 ? No   Eligibility Readm Mgmt   Discharge diagnosis a/c Respiratory failure-Bronchoscopy with lavage   Does the patient have one of the following disease processes/diagnoses(primary or secondary)? Other   Does the patient have Home health ordered? No   Is there a DME ordered? No   Prep survey completed? Yes            LULY LEUNG - Registered Nurse

## 2025-03-23 LAB — VIRUS SPEC CULT: ABNORMAL

## 2025-03-24 ENCOUNTER — READMISSION MANAGEMENT (OUTPATIENT)
Dept: CALL CENTER | Facility: HOSPITAL | Age: 75
End: 2025-03-24
Payer: MEDICARE

## 2025-03-24 LAB
FUNGUS WND CULT: ABNORMAL
P JIROVECII DNA L RESP QL NAA+NON-PROBE: NEGATIVE
REF LAB TEST METHOD: NORMAL

## 2025-03-24 NOTE — OUTREACH NOTE
Medical Week 1 Survey      Flowsheet Row Responses   [facility] patient discharged from? [facility]   Does the patient have one of the following disease processes/diagnoses(primary or secondary)? Other   Week 1 attempt successful? Yes   Call start time 1251   Call end time 1254   Discharge diagnosis a/c Respiratory failure-Bronchoscopy with lavage   Person spoke with today (if not patient) and relationship spouse   Meds reviewed with patient/caregiver? Yes   Is the patient having any side effects they believe may be caused by any medication additions or changes? No   Does the patient have all medications ordered at discharge? Yes   Is the patient taking all medications as directed (includes completed medication regime)? Yes   Does the patient have a primary care provider?  Yes   Does the patient have an appointment with their PCP within 7 days of discharge? Yes   Has the patient kept scheduled appointments due by today? N/A   Psychosocial issues? No   Did the patient receive a copy of their discharge instructions? Yes   Nursing interventions Reviewed instructions with patient   What is the patient's perception of their health status since discharge? Improving   Is the patient/caregiver able to teach back signs and symptoms related to disease process for when to call PCP? Yes   Is the patient/caregiver able to teach back signs and symptoms related to disease process for when to call 911? Yes   Is the patient/caregiver able to teach back the hierarchy of who to call/visit for symptoms/problems? PCP, Specialist, Home health nurse, Urgent Care, ED, 911 Yes   If the patient is a current smoker, are they able to teach back resources for cessation? Not a smoker   Week 1 call completed? Yes   Would this patient benefit from a Referral to Amb Social Work? No   Is the patient interested in additional calls from an ambulatory ? No   Wrap up additional comments Spouse shares pt's SOB is better. Pt was sleeping at  time of call. Reviewed AVS/meds with spouse. Spouse verified PCP fu appt   Call end time 1254            Nahomy ZAVALETA - Registered Nurse

## 2025-03-26 LAB
MYCOBACTERIUM SPEC CULT: NORMAL
NIGHT BLUE STAIN TISS: NORMAL

## 2025-03-27 LAB — VIRUS SPEC CULT: ABNORMAL

## 2025-04-02 LAB
MYCOBACTERIUM SPEC CULT: NORMAL
NIGHT BLUE STAIN TISS: NORMAL

## 2025-04-09 LAB
MYCOBACTERIUM SPEC CULT: NORMAL
NIGHT BLUE STAIN TISS: NORMAL

## 2025-04-16 LAB
MYCOBACTERIUM SPEC CULT: NORMAL
NIGHT BLUE STAIN TISS: NORMAL

## 2025-04-23 LAB
MYCOBACTERIUM SPEC CULT: NORMAL
NIGHT BLUE STAIN TISS: NORMAL

## 2025-04-30 LAB
MYCOBACTERIUM SPEC CULT: NORMAL
NIGHT BLUE STAIN TISS: NORMAL

## (undated) DEVICE — NEEDLE, QUINCKE, 18GX3.5": Brand: MEDLINE

## (undated) DEVICE — DUAL CUT SAGITTAL BLADE

## (undated) DEVICE — ZIPPERED TOGA, 2X LARGE: Brand: FLYTE

## (undated) DEVICE — CUFF TOURNI 1BLADDER 1PRT 34IN STRL

## (undated) DEVICE — PIN DRL NOHEAD TROC 3.2X75MM

## (undated) DEVICE — GLV SURG SIGNATURE ESSENTIAL PF LTX SZ6

## (undated) DEVICE — DRAPE SHEET ULTRAGARD: Brand: MEDLINE

## (undated) DEVICE — PK TOTL KN 50

## (undated) DEVICE — GLV SURG SENSICARE PI ORTHO SZ8.5 LF STRL

## (undated) DEVICE — GLV SURG SENSICARE PI LF PF 8 GRN STRL

## (undated) DEVICE — PINNACLE INTRODUCER SHEATH: Brand: PINNACLE

## (undated) DEVICE — SOL IRR NACL 0.9PCT ARTHROMATIC 3000ML

## (undated) DEVICE — GLV SURG SIGNATURE ESSENTIAL PF LTX SZ8.5

## (undated) DEVICE — SKIN AFFIX SURG ADHESIVE 72/CS 0.55ML: Brand: MEDLINE

## (undated) DEVICE — DRSNG TELFA ILND ADH 4X6IN

## (undated) DEVICE — GLV SURG SENSICARE PI ORTHO SZ8 LF STRL

## (undated) DEVICE — CONTAINER,SPECIMEN,OR STERILE,4OZ: Brand: MEDLINE

## (undated) DEVICE — SPNG CVR STR 2S 4X4

## (undated) DEVICE — BAPTIST FLOYD BRONCHOSCOPY: Brand: MEDLINE INDUSTRIES, INC.

## (undated) DEVICE — STERILE PATIENT PROTECTIVE PAD FOR IMP® KNEE POSITIONERS & COHESIVE WRAP (10 / CASE): Brand: DE MAYO KNEE POSITIONER®

## (undated) DEVICE — DRSNG BARR INSUL KN POLAR/CARE S/ADHS LG

## (undated) DEVICE — DRSNG SURESITE WNDW 4X4.5

## (undated) DEVICE — SOL WND BACTISURE LAVG 1L

## (undated) DEVICE — SOL IRRIG H2O 1000ML STRL

## (undated) DEVICE — SOLUTION,WATER,IRRIGATION,1000ML,STERILE: Brand: MEDLINE

## (undated) DEVICE — UNDERGLV SURG BIOGEL INDICAT PI SZ8 BLU

## (undated) DEVICE — UNDYED BRAIDED (POLYGLACTIN 910), SYNTHETIC ABSORBABLE SUTURE: Brand: COATED VICRYL

## (undated) DEVICE — GW PTFE EMERALD HEPCOAT FC J TIP STD .035 3MM 150CM

## (undated) DEVICE — SUT VIC 2/0 CT2 27IN J269H

## (undated) DEVICE — KT SURG TURNOVER 050

## (undated) DEVICE — CATH DIAG IMPULSE FL4 6F 100CM

## (undated) DEVICE — PK TRY HEART CATH 50

## (undated) DEVICE — INTEGUSEAL MICROBIAL SEALANT: Brand: AVANOS

## (undated) DEVICE — PAD UNDERCAST WYTEX 6IN 4YD LF STRL

## (undated) DEVICE — CATH DIAG IMPULSE FR4 6F 100CM

## (undated) DEVICE — ELECTRD DEFIB M/FUNC PROPADZ RADIOL 2PK

## (undated) DEVICE — CUFF TOURNI 1BLADDER 1PRT 30IN STRL

## (undated) DEVICE — SOL IRRIG SOD CHL 0.9PCT 3000ML

## (undated) DEVICE — SOL IRRIG NACL 9PCT 1000ML BTL

## (undated) DEVICE — SYS COLD/THRP POLAR/CARE/CUBE

## (undated) DEVICE — GLV SURG DERMASSURE GRN LF PF 8.5

## (undated) DEVICE — DRSNG SLVR/ANTIBAC PRIMASEAL POST/OP ADHS 3.5X12IN

## (undated) DEVICE — SCRW HEX PERSONA 3.5X3.2X33MM
Type: IMPLANTABLE DEVICE | Site: KNEE | Status: NON-FUNCTIONAL
Removed: 2020-10-21

## (undated) DEVICE — DECANTER: Brand: UNBRANDED

## (undated) DEVICE — PAD UNDERCAST WYTEX 4IN 4YD LF STRL

## (undated) DEVICE — IRRIGATOR BULB ASEPTO 60CC STRL

## (undated) DEVICE — ESWAB LQ AMIES  REG. NYLON FLOCKED  APPLICATOR: Brand: ESWAB™

## (undated) DEVICE — DRAPE,U/ SHT,SPLIT,PLAS,STERIL: Brand: MEDLINE

## (undated) DEVICE — PENCL EVAC ULTRAVAC SMOKE W/BLD

## (undated) DEVICE — SOL IRRIG NACL 1000ML

## (undated) DEVICE — PAD COLD/THRP KN POLAR/CARE WRAP/ON XL

## (undated) DEVICE — PERCLOSE™ PROSTYLE™ SUTURE-MEDIATED CLOSURE AND REPAIR SYSTEM: Brand: PERCLOSE™ PROSTYLE™

## (undated) DEVICE — CEMENT MIXING SYSTEM WITH FEMORAL BREAKWAY NOZZLE: Brand: REVOLUTION

## (undated) DEVICE — ADHS LIQ MASTISOL 2/3ML

## (undated) DEVICE — STAPLER, SKIN, 35W, A: Brand: MEDLINE INDUSTRIES, INC.

## (undated) DEVICE — GLV SURG TRIUMPH GREEN W/ALOE PF LTX 6 STRL

## (undated) DEVICE — 3 BONE CEMENT MIXER: Brand: MIXEVAC